# Patient Record
Sex: FEMALE | Race: BLACK OR AFRICAN AMERICAN | NOT HISPANIC OR LATINO
[De-identification: names, ages, dates, MRNs, and addresses within clinical notes are randomized per-mention and may not be internally consistent; named-entity substitution may affect disease eponyms.]

---

## 2017-01-12 ENCOUNTER — LABORATORY RESULT (OUTPATIENT)
Age: 37
End: 2017-01-12

## 2017-01-12 ENCOUNTER — APPOINTMENT (OUTPATIENT)
Dept: INTERNAL MEDICINE | Facility: HOSPITAL | Age: 37
End: 2017-01-12

## 2017-01-12 ENCOUNTER — INPATIENT (INPATIENT)
Facility: HOSPITAL | Age: 37
LOS: 3 days | Discharge: HOME | End: 2017-01-16
Attending: HOSPITALIST | Admitting: HOSPITALIST
Payer: COMMERCIAL

## 2017-01-12 ENCOUNTER — APPOINTMENT (OUTPATIENT)
Dept: RADIOLOGY | Facility: HOSPITAL | Age: 37
End: 2017-01-12

## 2017-01-12 ENCOUNTER — OUTPATIENT (OUTPATIENT)
Dept: OUTPATIENT SERVICES | Facility: HOSPITAL | Age: 37
LOS: 1 days | End: 2017-01-12
Payer: COMMERCIAL

## 2017-01-12 VITALS — SYSTOLIC BLOOD PRESSURE: 127 MMHG | DIASTOLIC BLOOD PRESSURE: 78 MMHG | HEART RATE: 115 BPM | OXYGEN SATURATION: 98 %

## 2017-01-12 VITALS
DIASTOLIC BLOOD PRESSURE: 77 MMHG | SYSTOLIC BLOOD PRESSURE: 121 MMHG | RESPIRATION RATE: 20 BRPM | TEMPERATURE: 98 F | OXYGEN SATURATION: 100 % | HEART RATE: 118 BPM

## 2017-01-12 VITALS — OXYGEN SATURATION: 99 %

## 2017-01-12 DIAGNOSIS — Z98.89 OTHER SPECIFIED POSTPROCEDURAL STATES: Chronic | ICD-10-CM

## 2017-01-12 DIAGNOSIS — Z98.51 TUBAL LIGATION STATUS: Chronic | ICD-10-CM

## 2017-01-12 LAB
ALBUMIN SERPL ELPH-MCNC: 1.4 G/DL — LOW (ref 3.3–5)
ALBUMIN SERPL ELPH-MCNC: 1.6 G/DL — LOW (ref 3.3–5)
ALP SERPL-CCNC: 69 U/L — SIGNIFICANT CHANGE UP (ref 40–120)
ALP SERPL-CCNC: 72 U/L — SIGNIFICANT CHANGE UP (ref 40–120)
ALT FLD-CCNC: 10 U/L — SIGNIFICANT CHANGE UP (ref 4–33)
ALT FLD-CCNC: 8 U/L — SIGNIFICANT CHANGE UP (ref 4–33)
ANISOCYTOSIS BLD QL: SIGNIFICANT CHANGE UP
AST SERPL-CCNC: 16 U/L — SIGNIFICANT CHANGE UP (ref 4–32)
AST SERPL-CCNC: 21 U/L — SIGNIFICANT CHANGE UP (ref 4–32)
BASE EXCESS BLDA CALC-SCNC: -2.3 MMOL/L — SIGNIFICANT CHANGE UP
BASOPHILS # BLD AUTO: 0.05 K/UL — SIGNIFICANT CHANGE UP (ref 0–0.2)
BASOPHILS NFR BLD AUTO: 0.2 % — SIGNIFICANT CHANGE UP (ref 0–2)
BASOPHILS NFR SPEC: 0.8 % — SIGNIFICANT CHANGE UP (ref 0–2)
BILIRUB SERPL-MCNC: 0.3 MG/DL — SIGNIFICANT CHANGE UP (ref 0.2–1.2)
BILIRUB SERPL-MCNC: 0.4 MG/DL — SIGNIFICANT CHANGE UP (ref 0.2–1.2)
BLASTS # FLD: 0 % — SIGNIFICANT CHANGE UP (ref 0–0)
BUN SERPL-MCNC: 54 MG/DL — HIGH (ref 7–23)
BUN SERPL-MCNC: 55 MG/DL — HIGH (ref 7–23)
CALCIUM SERPL-MCNC: 5.9 MG/DL — CRITICAL LOW (ref 8.4–10.5)
CALCIUM SERPL-MCNC: 6.1 MG/DL — CRITICAL LOW (ref 8.4–10.5)
CHLORIDE BLDA-SCNC: 108 MMOL/L — SIGNIFICANT CHANGE UP (ref 96–108)
CHLORIDE SERPL-SCNC: 104 MMOL/L — SIGNIFICANT CHANGE UP (ref 98–107)
CHLORIDE SERPL-SCNC: 104 MMOL/L — SIGNIFICANT CHANGE UP (ref 98–107)
CK MB BLD-MCNC: 1.72 NG/ML — SIGNIFICANT CHANGE UP (ref 1–4.7)
CK SERPL-CCNC: 99 U/L — SIGNIFICANT CHANGE UP (ref 25–170)
CO2 SERPL-SCNC: 20 MMOL/L — LOW (ref 22–31)
CO2 SERPL-SCNC: 21 MMOL/L — LOW (ref 22–31)
CREAT BLDA-MCNC: 7.93 MG/DL — HIGH (ref 0.5–1.3)
CREAT SERPL-MCNC: 7.38 MG/DL — HIGH (ref 0.5–1.3)
CREAT SERPL-MCNC: 7.39 MG/DL — HIGH (ref 0.5–1.3)
EOSINOPHIL # BLD AUTO: 0.3 K/UL — SIGNIFICANT CHANGE UP (ref 0–0.5)
EOSINOPHIL NFR BLD AUTO: 1.4 % — SIGNIFICANT CHANGE UP (ref 0–6)
EOSINOPHIL NFR FLD: 1.6 % — SIGNIFICANT CHANGE UP (ref 0–6)
GLUCOSE BLDA-MCNC: 99 MG/DL — SIGNIFICANT CHANGE UP (ref 70–99)
GLUCOSE SERPL-MCNC: 97 MG/DL — SIGNIFICANT CHANGE UP (ref 70–99)
GLUCOSE SERPL-MCNC: 98 MG/DL — SIGNIFICANT CHANGE UP (ref 70–99)
HCO3 BLDA-SCNC: 23 MMOL/L — SIGNIFICANT CHANGE UP (ref 22–26)
HCT VFR BLD CALC: 13.6 % — CRITICAL LOW (ref 34.5–45)
HCT VFR BLD CALC: 15.2 % — CRITICAL LOW (ref 34.5–45)
HCT VFR BLDA CALC: 13.5 % — CRITICAL LOW (ref 34.5–46.5)
HGB BLD-MCNC: 4.5 G/DL — CRITICAL LOW (ref 11.5–15.5)
HGB BLD-MCNC: 5.1 G/DL — CRITICAL LOW (ref 11.5–15.5)
HGB BLDA-MCNC: 4.2 G/DL — CRITICAL LOW (ref 11.5–15.5)
HYPOCHROMIA BLD QL: SIGNIFICANT CHANGE UP
IMM GRANULOCYTES NFR BLD AUTO: 0.6 % — SIGNIFICANT CHANGE UP (ref 0–1.5)
LACTATE BLDA-SCNC: 1.9 MMOL/L — SIGNIFICANT CHANGE UP (ref 0.5–2)
LYMPHOCYTES # BLD AUTO: 20.9 % — SIGNIFICANT CHANGE UP (ref 13–44)
LYMPHOCYTES # BLD AUTO: 4.54 K/UL — HIGH (ref 1–3.3)
LYMPHOCYTES NFR SPEC AUTO: 19.5 % — SIGNIFICANT CHANGE UP (ref 13–44)
MACROCYTES BLD QL: SLIGHT — SIGNIFICANT CHANGE UP
MCHC RBC-ENTMCNC: 27.6 PG — SIGNIFICANT CHANGE UP (ref 27–34)
MCHC RBC-ENTMCNC: 28 PG — SIGNIFICANT CHANGE UP (ref 27–34)
MCHC RBC-ENTMCNC: 33.1 % — SIGNIFICANT CHANGE UP (ref 32–36)
MCHC RBC-ENTMCNC: 33.6 % — SIGNIFICANT CHANGE UP (ref 32–36)
MCV RBC AUTO: 83.4 FL — SIGNIFICANT CHANGE UP (ref 80–100)
MCV RBC AUTO: 83.5 FL — SIGNIFICANT CHANGE UP (ref 80–100)
METAMYELOCYTES # FLD: 0.8 % — SIGNIFICANT CHANGE UP (ref 0–1)
MICROCYTES BLD QL: SLIGHT — SIGNIFICANT CHANGE UP
MONOCYTES # BLD AUTO: 1.87 K/UL — HIGH (ref 0–0.9)
MONOCYTES NFR BLD AUTO: 8.6 % — SIGNIFICANT CHANGE UP (ref 2–14)
MONOCYTES NFR BLD: 2.3 % — SIGNIFICANT CHANGE UP (ref 2–9)
MYELOCYTES NFR BLD: 0 % — SIGNIFICANT CHANGE UP (ref 0–0)
NEUTROPHIL AB SER-ACNC: 75 % — SIGNIFICANT CHANGE UP (ref 43–77)
NEUTROPHILS # BLD AUTO: 14.79 K/UL — HIGH (ref 1.8–7.4)
NEUTROPHILS NFR BLD AUTO: 68.3 % — SIGNIFICANT CHANGE UP (ref 43–77)
NEUTS BAND # BLD: 0 % — SIGNIFICANT CHANGE UP (ref 0–6)
OTHER - HEMATOLOGY %: 0 — SIGNIFICANT CHANGE UP
OVALOCYTES BLD QL SMEAR: SLIGHT — SIGNIFICANT CHANGE UP
PCO2 BLDA: 35 MMHG — SIGNIFICANT CHANGE UP (ref 32–48)
PH BLDA: 7.41 PH — SIGNIFICANT CHANGE UP (ref 7.35–7.45)
PLATELET # BLD AUTO: 338 K/UL — SIGNIFICANT CHANGE UP (ref 150–400)
PLATELET # BLD AUTO: 447 K/UL — HIGH (ref 150–400)
PLATELET COUNT - ESTIMATE: NORMAL — SIGNIFICANT CHANGE UP
PMV BLD: 10 FL — SIGNIFICANT CHANGE UP (ref 7–13)
PMV BLD: 10.4 FL — SIGNIFICANT CHANGE UP (ref 7–13)
PO2 BLDA: 111 MMHG — HIGH (ref 83–108)
POIKILOCYTOSIS BLD QL AUTO: SIGNIFICANT CHANGE UP
POLYCHROMASIA BLD QL SMEAR: SLIGHT — SIGNIFICANT CHANGE UP
POTASSIUM BLDA-SCNC: 4.1 MMOL/L — SIGNIFICANT CHANGE UP (ref 3.4–4.5)
POTASSIUM SERPL-MCNC: 4.5 MMOL/L — SIGNIFICANT CHANGE UP (ref 3.5–5.3)
POTASSIUM SERPL-MCNC: 4.5 MMOL/L — SIGNIFICANT CHANGE UP (ref 3.5–5.3)
POTASSIUM SERPL-SCNC: 4.5 MMOL/L — SIGNIFICANT CHANGE UP (ref 3.5–5.3)
POTASSIUM SERPL-SCNC: 4.5 MMOL/L — SIGNIFICANT CHANGE UP (ref 3.5–5.3)
PROMYELOCYTES # FLD: 0 % — SIGNIFICANT CHANGE UP (ref 0–0)
PROT SERPL-MCNC: 4.6 G/DL — LOW (ref 6–8.3)
PROT SERPL-MCNC: 4.9 G/DL — LOW (ref 6–8.3)
RBC # BLD: 1.63 M/UL — LOW (ref 3.8–5.2)
RBC # BLD: 1.82 M/UL — LOW (ref 3.8–5.2)
RBC # FLD: 20.4 % — HIGH (ref 10.3–14.5)
RBC # FLD: 20.5 % — HIGH (ref 10.3–14.5)
RETICS #: 240.3 10X3/UL — HIGH (ref 17–73)
RETICS/RBC NFR: 14.7 % — HIGH (ref 0.5–2.5)
SAO2 % BLDA: 99.6 % — HIGH (ref 95–99)
SICKLE CELLS BLD QL SMEAR: SLIGHT — SIGNIFICANT CHANGE UP
SODIUM BLDA-SCNC: 137 MMOL/L — SIGNIFICANT CHANGE UP (ref 136–146)
SODIUM SERPL-SCNC: 141 MMOL/L — SIGNIFICANT CHANGE UP (ref 135–145)
SODIUM SERPL-SCNC: 141 MMOL/L — SIGNIFICANT CHANGE UP (ref 135–145)
SPHEROCYTES BLD QL SMEAR: SLIGHT — SIGNIFICANT CHANGE UP
TARGETS BLD QL SMEAR: SLIGHT — SIGNIFICANT CHANGE UP
TROPONIN T SERPL-MCNC: < 0.06 NG/ML — SIGNIFICANT CHANGE UP (ref 0–0.06)
VARIANT LYMPHS # BLD: 0 % — SIGNIFICANT CHANGE UP
WBC # BLD: 19.91 K/UL — HIGH (ref 3.8–10.5)
WBC # BLD: 21.69 K/UL — HIGH (ref 3.8–10.5)
WBC # FLD AUTO: 19.91 K/UL — HIGH (ref 3.8–10.5)
WBC # FLD AUTO: 21.69 K/UL — HIGH (ref 3.8–10.5)

## 2017-01-12 PROCEDURE — 71020: CPT | Mod: 26

## 2017-01-12 RX ORDER — HYDROMORPHONE HYDROCHLORIDE 2 MG/ML
1 INJECTION INTRAMUSCULAR; INTRAVENOUS; SUBCUTANEOUS ONCE
Qty: 0 | Refills: 0 | Status: DISCONTINUED | OUTPATIENT
Start: 2017-01-12 | End: 2017-01-12

## 2017-01-12 RX ORDER — HYDROMORPHONE HYDROCHLORIDE 2 MG/ML
2 INJECTION INTRAMUSCULAR; INTRAVENOUS; SUBCUTANEOUS ONCE
Qty: 0 | Refills: 0 | Status: DISCONTINUED | OUTPATIENT
Start: 2017-01-12 | End: 2017-01-12

## 2017-01-12 RX ORDER — AZITHROMYCIN 500 MG/1
500 TABLET, FILM COATED ORAL ONCE
Qty: 0 | Refills: 0 | Status: COMPLETED | OUTPATIENT
Start: 2017-01-12 | End: 2017-01-12

## 2017-01-12 RX ORDER — CEFTRIAXONE 500 MG/1
1 INJECTION, POWDER, FOR SOLUTION INTRAMUSCULAR; INTRAVENOUS ONCE
Qty: 0 | Refills: 0 | Status: COMPLETED | OUTPATIENT
Start: 2017-01-12 | End: 2017-01-12

## 2017-01-12 RX ADMIN — HYDROMORPHONE HYDROCHLORIDE 2 MILLIGRAM(S): 2 INJECTION INTRAMUSCULAR; INTRAVENOUS; SUBCUTANEOUS at 21:58

## 2017-01-12 RX ADMIN — CEFTRIAXONE 100 GRAM(S): 500 INJECTION, POWDER, FOR SOLUTION INTRAMUSCULAR; INTRAVENOUS at 23:35

## 2017-01-12 RX ADMIN — HYDROMORPHONE HYDROCHLORIDE 2 MILLIGRAM(S): 2 INJECTION INTRAMUSCULAR; INTRAVENOUS; SUBCUTANEOUS at 22:42

## 2017-01-12 NOTE — ED ADULT NURSE REASSESSMENT NOTE - NS ED NURSE REASSESS COMMENT FT1
pt AOx3, reduced pain scale after taking pain medication. Cardiac monitor in place/sinus tachy. Will continue to monitor.

## 2017-01-12 NOTE — ED PROVIDER NOTE - MEDICAL DECISION MAKING DETAILS
36 F with sickle cell disease, p/w sob and chest pain  in setting of anemia and worsening renal failure, concerning for acute chest vs PE. will get labs trasnfusion, rectal temp, pain cotnrol, ct chest, MIcu consult

## 2017-01-12 NOTE — ED PROVIDER NOTE - ATTENDING CONTRIBUTION TO CARE
Attending note:   After face to face evaluation of this patient, I concur with above noted hx, pe, and care plan for this patient. +R pleuritic pain in this patient with ss, arf, fever, chills.   Evaluation in progress

## 2017-01-12 NOTE — ED PROVIDER NOTE - OBJECTIVE STATEMENT
36 F h/o sickle cell disease, no h/o acute chest, p/w sob and pleuritic chest pain 36 F h/o sickle cell disease, no h/o acute chest, also with kidney failure 2/2 parvovirus, p/w sob and pleuritic chest pain that began yesterday at rest. Also with chills at home. No cough/URi symptoms. No personal or family h/o clots. No joint /muscle pain. Pain feels similar to prior pna, but worse. Was sent by Dr Velásquez with hgb 5 and worsening renal failure. Chronic edema, but no worsening leg edema or calf pain. Sob is worse with exertion, not worse with laying flat.

## 2017-01-12 NOTE — ED ADULT NURSE NOTE - OBJECTIVE STATEMENT
Pt AO x3, ambulatory, c/o chest pain radiating sickle cell disease. Pt c/o chest pain ans sob. Pt states hemoglobin  was 5 and CR 7.8 Pt AO x3, ambulatory, c/o chest pain radiating s/p sickle cell disease for 2 days. Pt states hemoglobin  was 5 and CR 7.8. Evaluated by provider. Blood will be obtained by provider. cardiac monitor in place/tachy.  Awaiting further study.

## 2017-01-12 NOTE — ED ADULT NURSE NOTE - PMH
History of Cholecystectomy    HPV (Human Papillomavirus)    Nephrotic syndrome    Parvovirus B19 infection    Right Hip Pain- Surgery 1991    Sickle Cell Crisis

## 2017-01-13 DIAGNOSIS — D64.9 ANEMIA, UNSPECIFIED: ICD-10-CM

## 2017-01-13 DIAGNOSIS — D57.00 HB-SS DISEASE WITH CRISIS, UNSPECIFIED: ICD-10-CM

## 2017-01-13 DIAGNOSIS — N17.9 ACUTE KIDNEY FAILURE, UNSPECIFIED: ICD-10-CM

## 2017-01-13 DIAGNOSIS — A41.9 SEPSIS, UNSPECIFIED ORGANISM: ICD-10-CM

## 2017-01-13 DIAGNOSIS — E83.51 HYPOCALCEMIA: ICD-10-CM

## 2017-01-13 DIAGNOSIS — Z41.8 ENCOUNTER FOR OTHER PROCEDURES FOR PURPOSES OTHER THAN REMEDYING HEALTH STATE: ICD-10-CM

## 2017-01-13 DIAGNOSIS — N04.9 NEPHROTIC SYNDROME WITH UNSPECIFIED MORPHOLOGIC CHANGES: ICD-10-CM

## 2017-01-13 LAB
ALBUMIN SERPL ELPH-MCNC: 1.5 G/DL — LOW (ref 3.3–5)
ALP SERPL-CCNC: 75 U/L — SIGNIFICANT CHANGE UP (ref 40–120)
ALT FLD-CCNC: 8 U/L — SIGNIFICANT CHANGE UP (ref 4–33)
APTT BLD: 22.8 SEC — LOW (ref 27.5–37.4)
AST SERPL-CCNC: 16 U/L — SIGNIFICANT CHANGE UP (ref 4–32)
B PERT DNA SPEC QL NAA+PROBE: SIGNIFICANT CHANGE UP
BASOPHILS # BLD AUTO: 0.05 K/UL — SIGNIFICANT CHANGE UP (ref 0–0.2)
BASOPHILS NFR BLD AUTO: 0.3 % — SIGNIFICANT CHANGE UP (ref 0–2)
BILIRUB SERPL-MCNC: 0.4 MG/DL — SIGNIFICANT CHANGE UP (ref 0.2–1.2)
BLD GP AB SCN SERPL QL: NEGATIVE — SIGNIFICANT CHANGE UP
BUN SERPL-MCNC: 54 MG/DL — HIGH (ref 7–23)
C PNEUM DNA SPEC QL NAA+PROBE: NOT DETECTED — SIGNIFICANT CHANGE UP
CALCIUM SERPL-MCNC: 6.2 MG/DL — CRITICAL LOW (ref 8.4–10.5)
CHLORIDE SERPL-SCNC: 103 MMOL/L — SIGNIFICANT CHANGE UP (ref 98–107)
CHOLEST SERPL-MCNC: 263 MG/DL — HIGH (ref 120–199)
CK SERPL-CCNC: 82 U/L — SIGNIFICANT CHANGE UP (ref 25–170)
CO2 SERPL-SCNC: 21 MMOL/L — LOW (ref 22–31)
CREAT SERPL-MCNC: 7.31 MG/DL — HIGH (ref 0.5–1.3)
EOSINOPHIL # BLD AUTO: 0.23 K/UL — SIGNIFICANT CHANGE UP (ref 0–0.5)
EOSINOPHIL NFR BLD AUTO: 1.3 % — SIGNIFICANT CHANGE UP (ref 0–6)
FLUAV H1 2009 PAND RNA SPEC QL NAA+PROBE: NOT DETECTED — SIGNIFICANT CHANGE UP
FLUAV H1 RNA SPEC QL NAA+PROBE: NOT DETECTED — SIGNIFICANT CHANGE UP
FLUAV H3 RNA SPEC QL NAA+PROBE: NOT DETECTED — SIGNIFICANT CHANGE UP
FLUAV SUBTYP SPEC NAA+PROBE: SIGNIFICANT CHANGE UP
FLUBV RNA SPEC QL NAA+PROBE: NOT DETECTED — SIGNIFICANT CHANGE UP
GLUCOSE SERPL-MCNC: 103 MG/DL — HIGH (ref 70–99)
HADV DNA SPEC QL NAA+PROBE: NOT DETECTED — SIGNIFICANT CHANGE UP
HBA1C BLD-MCNC: 3.8 % — LOW (ref 4–5.6)
HCG SERPL-ACNC: < 5 MIU/ML — SIGNIFICANT CHANGE UP
HCOV 229E RNA SPEC QL NAA+PROBE: NOT DETECTED — SIGNIFICANT CHANGE UP
HCOV HKU1 RNA SPEC QL NAA+PROBE: NOT DETECTED — SIGNIFICANT CHANGE UP
HCOV NL63 RNA SPEC QL NAA+PROBE: NOT DETECTED — SIGNIFICANT CHANGE UP
HCOV OC43 RNA SPEC QL NAA+PROBE: NOT DETECTED — SIGNIFICANT CHANGE UP
HCT VFR BLD CALC: 17.1 % — CRITICAL LOW (ref 34.5–45)
HDLC SERPL-MCNC: 50 MG/DL — SIGNIFICANT CHANGE UP (ref 45–65)
HGB BLD-MCNC: 5.4 G/DL — CRITICAL LOW (ref 11.5–15.5)
HMPV RNA SPEC QL NAA+PROBE: NOT DETECTED — SIGNIFICANT CHANGE UP
HPIV1 RNA SPEC QL NAA+PROBE: NOT DETECTED — SIGNIFICANT CHANGE UP
HPIV2 RNA SPEC QL NAA+PROBE: NOT DETECTED — SIGNIFICANT CHANGE UP
HPIV3 RNA SPEC QL NAA+PROBE: NOT DETECTED — SIGNIFICANT CHANGE UP
HPIV4 RNA SPEC QL NAA+PROBE: NOT DETECTED — SIGNIFICANT CHANGE UP
IMM GRANULOCYTES NFR BLD AUTO: 0.4 % — SIGNIFICANT CHANGE UP (ref 0–1.5)
INR BLD: 1.04 — SIGNIFICANT CHANGE UP (ref 0.87–1.18)
LDH SERPL L TO P-CCNC: 419 U/L — HIGH (ref 135–225)
LIPID PNL WITH DIRECT LDL SERPL: 180 MG/DL — SIGNIFICANT CHANGE UP
LYMPHOCYTES # BLD AUTO: 18.9 % — SIGNIFICANT CHANGE UP (ref 13–44)
LYMPHOCYTES # BLD AUTO: 3.22 K/UL — SIGNIFICANT CHANGE UP (ref 1–3.3)
M PNEUMO DNA SPEC QL NAA+PROBE: NOT DETECTED — SIGNIFICANT CHANGE UP
MAGNESIUM SERPL-MCNC: 1.5 MG/DL — LOW (ref 1.6–2.6)
MAGNESIUM SERPL-MCNC: 1.7 MG/DL — SIGNIFICANT CHANGE UP (ref 1.6–2.6)
MANUAL SMEAR VERIFICATION: SIGNIFICANT CHANGE UP
MCHC RBC-ENTMCNC: 26.3 PG — LOW (ref 27–34)
MCHC RBC-ENTMCNC: 31.6 % — LOW (ref 32–36)
MCV RBC AUTO: 83.4 FL — SIGNIFICANT CHANGE UP (ref 80–100)
MONOCYTES # BLD AUTO: 1.43 K/UL — HIGH (ref 0–0.9)
MONOCYTES NFR BLD AUTO: 8.4 % — SIGNIFICANT CHANGE UP (ref 2–14)
NEUTROPHILS # BLD AUTO: 12.04 K/UL — HIGH (ref 1.8–7.4)
NEUTROPHILS NFR BLD AUTO: 70.7 % — SIGNIFICANT CHANGE UP (ref 43–77)
NT-PROBNP SERPL-SCNC: 3576 PG/ML — SIGNIFICANT CHANGE UP
NT-PROBNP SERPL-SCNC: 4280 PG/ML — SIGNIFICANT CHANGE UP
PHOSPHATE SERPL-MCNC: 6.2 MG/DL — HIGH (ref 2.5–4.5)
PHOSPHATE SERPL-MCNC: 6.7 MG/DL — HIGH (ref 2.5–4.5)
PLATELET # BLD AUTO: 430 K/UL — HIGH (ref 150–400)
PMV BLD: 10.5 FL — SIGNIFICANT CHANGE UP (ref 7–13)
POTASSIUM SERPL-MCNC: 4.3 MMOL/L — SIGNIFICANT CHANGE UP (ref 3.5–5.3)
POTASSIUM SERPL-SCNC: 4.3 MMOL/L — SIGNIFICANT CHANGE UP (ref 3.5–5.3)
PROT SERPL-MCNC: 5 G/DL — LOW (ref 6–8.3)
PROTHROM AB SERPL-ACNC: 11.9 SEC — SIGNIFICANT CHANGE UP (ref 10–13.1)
RBC # BLD: 2.05 M/UL — LOW (ref 3.8–5.2)
RBC # FLD: 20.7 % — HIGH (ref 10.3–14.5)
RETICS #: 197 10X3/UL — HIGH (ref 17–73)
RETICS/RBC NFR: 9.6 % — HIGH (ref 0.5–2.5)
RH IG SCN BLD-IMP: NEGATIVE — SIGNIFICANT CHANGE UP
RSV RNA SPEC QL NAA+PROBE: NOT DETECTED — SIGNIFICANT CHANGE UP
RV+EV RNA SPEC QL NAA+PROBE: NOT DETECTED — SIGNIFICANT CHANGE UP
SODIUM SERPL-SCNC: 141 MMOL/L — SIGNIFICANT CHANGE UP (ref 135–145)
SPECIMEN SOURCE: SIGNIFICANT CHANGE UP
SPECIMEN SOURCE: SIGNIFICANT CHANGE UP
TRIGL SERPL-MCNC: 280 MG/DL — HIGH (ref 10–149)
TROPONIN T SERPL-MCNC: < 0.06 NG/ML — SIGNIFICANT CHANGE UP (ref 0–0.06)
TSH SERPL-MCNC: 4.51 UIU/ML — HIGH (ref 0.27–4.2)
WBC # BLD: 17.04 K/UL — HIGH (ref 3.8–10.5)
WBC # FLD AUTO: 17.04 K/UL — HIGH (ref 3.8–10.5)

## 2017-01-13 PROCEDURE — 99233 SBSQ HOSP IP/OBS HIGH 50: CPT | Mod: GC

## 2017-01-13 PROCEDURE — 99223 1ST HOSP IP/OBS HIGH 75: CPT | Mod: GC

## 2017-01-13 PROCEDURE — 93970 EXTREMITY STUDY: CPT | Mod: 26

## 2017-01-13 PROCEDURE — 99254 IP/OBS CNSLTJ NEW/EST MOD 60: CPT | Mod: GC

## 2017-01-13 PROCEDURE — 77001 FLUOROGUIDE FOR VEIN DEVICE: CPT | Mod: 26,GC

## 2017-01-13 PROCEDURE — 71250 CT THORAX DX C-: CPT | Mod: 26

## 2017-01-13 PROCEDURE — 76937 US GUIDE VASCULAR ACCESS: CPT | Mod: 26

## 2017-01-13 PROCEDURE — 36569 INSJ PICC 5 YR+ W/O IMAGING: CPT

## 2017-01-13 RX ORDER — AZITHROMYCIN 500 MG/1
500 TABLET, FILM COATED ORAL EVERY 24 HOURS
Qty: 0 | Refills: 0 | Status: DISCONTINUED | OUTPATIENT
Start: 2017-01-14 | End: 2017-01-16

## 2017-01-13 RX ORDER — CALCIUM ACETATE 667 MG
1334 TABLET ORAL
Qty: 0 | Refills: 0 | Status: DISCONTINUED | OUTPATIENT
Start: 2017-01-13 | End: 2017-01-15

## 2017-01-13 RX ORDER — DIPHENHYDRAMINE HCL 50 MG
25 CAPSULE ORAL ONCE
Qty: 0 | Refills: 0 | Status: COMPLETED | OUTPATIENT
Start: 2017-01-13 | End: 2017-01-13

## 2017-01-13 RX ORDER — HYDROMORPHONE HYDROCHLORIDE 2 MG/ML
8 INJECTION INTRAMUSCULAR; INTRAVENOUS; SUBCUTANEOUS EVERY 4 HOURS
Qty: 0 | Refills: 0 | Status: DISCONTINUED | OUTPATIENT
Start: 2017-01-13 | End: 2017-01-15

## 2017-01-13 RX ORDER — FUROSEMIDE 40 MG
40 TABLET ORAL ONCE
Qty: 0 | Refills: 0 | Status: DISCONTINUED | OUTPATIENT
Start: 2017-01-13 | End: 2017-01-13

## 2017-01-13 RX ORDER — SODIUM CHLORIDE 9 MG/ML
1000 INJECTION, SOLUTION INTRAVENOUS
Qty: 0 | Refills: 0 | Status: DISCONTINUED | OUTPATIENT
Start: 2017-01-13 | End: 2017-01-15

## 2017-01-13 RX ORDER — HYDROMORPHONE HYDROCHLORIDE 2 MG/ML
2 INJECTION INTRAMUSCULAR; INTRAVENOUS; SUBCUTANEOUS EVERY 4 HOURS
Qty: 0 | Refills: 0 | Status: DISCONTINUED | OUTPATIENT
Start: 2017-01-13 | End: 2017-01-13

## 2017-01-13 RX ORDER — FUROSEMIDE 40 MG
40 TABLET ORAL ONCE
Qty: 0 | Refills: 0 | Status: COMPLETED | OUTPATIENT
Start: 2017-01-13 | End: 2017-01-14

## 2017-01-13 RX ORDER — ERYTHROPOIETIN 10000 [IU]/ML
10000 INJECTION, SOLUTION INTRAVENOUS; SUBCUTANEOUS
Qty: 0 | Refills: 0 | Status: DISCONTINUED | OUTPATIENT
Start: 2017-01-13 | End: 2017-01-13

## 2017-01-13 RX ORDER — DOCUSATE SODIUM 100 MG
100 CAPSULE ORAL THREE TIMES A DAY
Qty: 0 | Refills: 0 | Status: DISCONTINUED | OUTPATIENT
Start: 2017-01-13 | End: 2017-01-16

## 2017-01-13 RX ORDER — HYDROMORPHONE HYDROCHLORIDE 2 MG/ML
30 INJECTION INTRAMUSCULAR; INTRAVENOUS; SUBCUTANEOUS
Qty: 0 | Refills: 0 | Status: DISCONTINUED | OUTPATIENT
Start: 2017-01-13 | End: 2017-01-13

## 2017-01-13 RX ORDER — CEFTRIAXONE 500 MG/1
1 INJECTION, POWDER, FOR SOLUTION INTRAMUSCULAR; INTRAVENOUS EVERY 24 HOURS
Qty: 0 | Refills: 0 | Status: DISCONTINUED | OUTPATIENT
Start: 2017-01-13 | End: 2017-01-16

## 2017-01-13 RX ORDER — MAGNESIUM SULFATE 500 MG/ML
2 VIAL (ML) INJECTION ONCE
Qty: 0 | Refills: 0 | Status: COMPLETED | OUTPATIENT
Start: 2017-01-13 | End: 2017-01-13

## 2017-01-13 RX ORDER — LACTOBACILLUS ACIDOPHILUS 100MM CELL
1 CAPSULE ORAL EVERY 12 HOURS
Qty: 0 | Refills: 0 | Status: DISCONTINUED | OUTPATIENT
Start: 2017-01-13 | End: 2017-01-16

## 2017-01-13 RX ORDER — HEPARIN SODIUM 5000 [USP'U]/ML
5000 INJECTION INTRAVENOUS; SUBCUTANEOUS EVERY 8 HOURS
Qty: 0 | Refills: 0 | Status: DISCONTINUED | OUTPATIENT
Start: 2017-01-13 | End: 2017-01-16

## 2017-01-13 RX ORDER — FOLIC ACID 0.8 MG
1 TABLET ORAL DAILY
Qty: 0 | Refills: 0 | Status: DISCONTINUED | OUTPATIENT
Start: 2017-01-13 | End: 2017-01-16

## 2017-01-13 RX ORDER — ONDANSETRON 8 MG/1
4 TABLET, FILM COATED ORAL EVERY 6 HOURS
Qty: 0 | Refills: 0 | Status: DISCONTINUED | OUTPATIENT
Start: 2017-01-13 | End: 2017-01-13

## 2017-01-13 RX ORDER — ACETAMINOPHEN 500 MG
650 TABLET ORAL EVERY 8 HOURS
Qty: 0 | Refills: 0 | Status: DISCONTINUED | OUTPATIENT
Start: 2017-01-13 | End: 2017-01-16

## 2017-01-13 RX ORDER — NALOXONE HYDROCHLORIDE 4 MG/.1ML
0.1 SPRAY NASAL
Qty: 0 | Refills: 0 | Status: DISCONTINUED | OUTPATIENT
Start: 2017-01-13 | End: 2017-01-16

## 2017-01-13 RX ORDER — SODIUM BICARBONATE 1 MEQ/ML
650 SYRINGE (ML) INTRAVENOUS
Qty: 0 | Refills: 0 | Status: DISCONTINUED | OUTPATIENT
Start: 2017-01-13 | End: 2017-01-16

## 2017-01-13 RX ORDER — HYDROMORPHONE HYDROCHLORIDE 2 MG/ML
2 INJECTION INTRAMUSCULAR; INTRAVENOUS; SUBCUTANEOUS ONCE
Qty: 0 | Refills: 0 | Status: DISCONTINUED | OUTPATIENT
Start: 2017-01-13 | End: 2017-01-13

## 2017-01-13 RX ORDER — SENNA PLUS 8.6 MG/1
2 TABLET ORAL AT BEDTIME
Qty: 0 | Refills: 0 | Status: DISCONTINUED | OUTPATIENT
Start: 2017-01-13 | End: 2017-01-16

## 2017-01-13 RX ORDER — FUROSEMIDE 40 MG
80 TABLET ORAL ONCE
Qty: 0 | Refills: 0 | Status: COMPLETED | OUTPATIENT
Start: 2017-01-13 | End: 2017-01-13

## 2017-01-13 RX ADMIN — HYDROMORPHONE HYDROCHLORIDE 8 MILLIGRAM(S): 2 INJECTION INTRAMUSCULAR; INTRAVENOUS; SUBCUTANEOUS at 14:04

## 2017-01-13 RX ADMIN — Medication 25 MILLIGRAM(S): at 03:39

## 2017-01-13 RX ADMIN — Medication 100 MILLIGRAM(S): at 08:31

## 2017-01-13 RX ADMIN — Medication 25 MILLIGRAM(S): at 21:30

## 2017-01-13 RX ADMIN — HYDROMORPHONE HYDROCHLORIDE 8 MILLIGRAM(S): 2 INJECTION INTRAMUSCULAR; INTRAVENOUS; SUBCUTANEOUS at 14:34

## 2017-01-13 RX ADMIN — HEPARIN SODIUM 5000 UNIT(S): 5000 INJECTION INTRAVENOUS; SUBCUTANEOUS at 22:42

## 2017-01-13 RX ADMIN — HYDROMORPHONE HYDROCHLORIDE 2 MILLIGRAM(S): 2 INJECTION INTRAMUSCULAR; INTRAVENOUS; SUBCUTANEOUS at 10:15

## 2017-01-13 RX ADMIN — Medication 1334 MILLIGRAM(S): at 18:39

## 2017-01-13 RX ADMIN — Medication 650 MILLIGRAM(S): at 08:32

## 2017-01-13 RX ADMIN — SODIUM CHLORIDE 75 MILLILITER(S): 9 INJECTION, SOLUTION INTRAVENOUS at 18:48

## 2017-01-13 RX ADMIN — HEPARIN SODIUM 5000 UNIT(S): 5000 INJECTION INTRAVENOUS; SUBCUTANEOUS at 08:31

## 2017-01-13 RX ADMIN — Medication 1 TABLET(S): at 18:39

## 2017-01-13 RX ADMIN — Medication 80 MILLIGRAM(S): at 06:30

## 2017-01-13 RX ADMIN — HYDROMORPHONE HYDROCHLORIDE 2 MILLIGRAM(S): 2 INJECTION INTRAMUSCULAR; INTRAVENOUS; SUBCUTANEOUS at 10:01

## 2017-01-13 RX ADMIN — AZITHROMYCIN 250 MILLIGRAM(S): 500 TABLET, FILM COATED ORAL at 00:03

## 2017-01-13 RX ADMIN — HYDROMORPHONE HYDROCHLORIDE 8 MILLIGRAM(S): 2 INJECTION INTRAMUSCULAR; INTRAVENOUS; SUBCUTANEOUS at 19:40

## 2017-01-13 RX ADMIN — Medication 1 TABLET(S): at 08:31

## 2017-01-13 RX ADMIN — HYDROMORPHONE HYDROCHLORIDE 8 MILLIGRAM(S): 2 INJECTION INTRAMUSCULAR; INTRAVENOUS; SUBCUTANEOUS at 18:43

## 2017-01-13 RX ADMIN — Medication 650 MILLIGRAM(S): at 22:42

## 2017-01-13 RX ADMIN — Medication 1 MILLIGRAM(S): at 12:57

## 2017-01-13 NOTE — H&P ADULT. - NEUROLOGICAL DETAILS
responds to pain/alert and oriented x 3 responds to verbal commands/alert and oriented x 3/responds to pain

## 2017-01-13 NOTE — H&P ADULT. - RS GEN PE MLT RESP DETAILS PC
rales/airway patent/breath sounds equal/respirations non-labored/good air movement rales/respirations non-labored

## 2017-01-13 NOTE — H&P ADULT. - ATTENDING COMMENTS
35 y/o female HX of Sickle cell Disease, Nephrotic Syndrome due to Parvovirus , CKD stage 5, still making urine, NO HX of ACS, + chronic Legs edema, Pt was admitted with SOB, Pleuritic CP, x 2 days, + fever, + chills, on PO Lasix at home, NO HA, NO Dizziness, no N/V, No Cough, + Back pain, + MOIZ on CKD, pt was rejected by MICU, pt was seen by Renal House gracy, BUN 54, Creatinine 7.38, Hgb 5.4, WBC 19.9, Retic 14.7% , pt is getting 2 units of PRBC,     Venous Doppler Legs R/O DVT, Hem Onc Consult, Renal Consult F/U, Bacid, + Sepsis, R/O Pneumonia, IV Ceftriaxone, IV Zithromax, DVT Prophylaxis: SQ Heparin, Tele monitor, ECHO, incentive Spirometry, Folic Acid, Lasix 80 mg IVP X 1 after first unit of PRBC,  Fall/aspiration precaution, F/U CBC, CMP, cultures, UA, Retic count, LDH, RVP, BNP, Hep A,B,C   profile, F/U CT Chest Report, Pain control, IV Dilaudid PRN, Senna, Colace, Sodium Bicarb    Case D/W Pt, HS, Nursing     Pt was seen & Examined by me, Dr. DYLAN Griffin on 1/13/2017.

## 2017-01-13 NOTE — H&P ADULT. - PROBLEM SELECTOR PLAN 2
- Pt in sickle crisis that may have been triggered by PNA.   - CT chest results pending. CXR could be ACS.   - Pain control with PCA pump for now.   - monitor RBCs and transfuse as needed. - Pt in sickle crisis that may have been triggered by PNA.   - CT chest results pending. CXR could be ACS.   - Pain control with IV Dilaudid for now consider PCA pump if not controlled.   - monitor RBCs and transfuse as needed.

## 2017-01-13 NOTE — H&P ADULT. - ASSESSMENT
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 currently admitted for PNA, which concern for some mild ACS

## 2017-01-13 NOTE — H&P ADULT. - PSH
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

## 2017-01-13 NOTE — H&P ADULT. - PROBLEM SELECTOR PLAN 5
- Pt is fluid overloaded with a low alb.   - currently not on full AC ever.  - renal c/s placed. consider started AC for low protein.

## 2017-01-13 NOTE — ED ADULT NURSE REASSESSMENT NOTE - NS ED NURSE REASSESS COMMENT FT1
Dr. Tyson now covering Pt Nic contacted.  Dr Tyson was aware of the situation and has DC'd the 2nd unit of blood.  Dr. Tyson advises she will A-stick PT Nic for 6:00am labs.  Dr. Tyson advises she will contact RUTH to obtain IV access via ultrasound.  Dr. Tyson advises she will call the ED to follow up.  Day shift RN notified of situation.

## 2017-01-13 NOTE — H&P ADULT. - RADIOLOGY RESULTS AND INTERPRETATION
CXR. right opacity. Chest X Ray: IMPRESSION:    Mild  pulmonary  vascular  congestion.    Bilateral  trace  to  small  pleural  effusions  with  likely  associated  passive  atelectasis.    Linear  atelectasis  in  the  lower  lungs.

## 2017-01-13 NOTE — H&P ADULT. - PROBLEM SELECTOR PLAN 4
- MOIZ on CKD 2/2 FSGS c hemodynamically mediated in the setting of sepsis.   - monitor Cr as infection begins to resolve.   - avoid nephrotoxins. - MOIZ on CKD 2/2 FSGS c hemodynamically mediated in the setting of sepsis.   - monitor Cr as infection begins to resolve.   - avoid nephrotoxins.  - renal c/s placed.

## 2017-01-13 NOTE — ED ADULT NURSE REASSESSMENT NOTE - NS ED NURSE REASSESS COMMENT FT1
Pt Iv became infiltrated at start of magnesium sulfate drip.  IV removed.  Pt refused to allow RN staff to attempt to gain IV access d/t hx of difficulty obtainiong access.  Pt reports it took 7 attempts by MD with ultrasound machine to obtain access yesterday.  MD Villalta notified.  MD Villalta advises to contact RUTH to obtain access.  Situation escalated to ANM Randolph.

## 2017-01-13 NOTE — H&P ADULT. - FAMILY HISTORY
<<-----Click on this checkbox to enter Family History Family history of sickle cell trait in father     Family history of sickle cell trait in mother     Sibling  Still living? Yes, Estimated age: Age Unknown  Family history of sickle cell disease, Age at diagnosis: Age Unknown  Family history of sarcoidosis, Age at diagnosis: Age Unknown

## 2017-01-13 NOTE — H&P ADULT. - PROBLEM SELECTOR PLAN 6
- 2/2 renal disease.   - correct for albumin. - 2/2 renal disease.   - corrected for albumin seems to be close to baseline.   - c/w calcium supplementation

## 2017-01-13 NOTE — H&P ADULT. - HISTORY OF PRESENT ILLNESS
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 She states that yesterday suddenly she started feeling chest pain in the center of her chest. The pain was about an 8/10. She went to see her PCP who told her to come into the ED. She could not think of any factors that may have caused this. She denies dehydration, cough, sputum production, recent illness, sick contacts, recent travel, or hemoptysis.  She does admit to some orthopnea and lower ext edema which have been chronic for her. Currently she states that her chest pain is improved, but she is still having SOB and is having pain when she takes deep breaths.   In the ED the patients vitals were: 100.2, 113, 118/57, 24, 100% 2L NC. She was given ceftriaxone and azithromycin as well as IV dilaudid.

## 2017-01-13 NOTE — H&P ADULT. - PROBLEM SELECTOR PLAN 3
- Multifactorial likely 2/2 chronic blood loss and chronic disease anemia.   - getting 2 units of blood with lasix in between.   - As per renal can give 2.5 of Metolazone if pt does not respond.

## 2017-01-13 NOTE — H&P ADULT. - PROBLEM SELECTOR PLAN 1
- Pt with leukocytsis, increased RR, and sings of PNA  - Started on tx for CAP.   - Fluids could not be given due to pts already fluid overloaded state.   - F/u blood cultures. sputum cx, legionella, and CT chest read.

## 2017-01-13 NOTE — H&P ADULT. - CONSTITUTIONAL DETAILS
well-groomed/well-nourished/well-developed well-developed/distress due to pain/well-nourished/well-groomed

## 2017-01-13 NOTE — PHYSICAL THERAPY INITIAL EVALUATION ADULT - PERTINENT HX OF CURRENT PROBLEM, REHAB EVAL
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

## 2017-01-13 NOTE — PHYSICAL THERAPY INITIAL EVALUATION ADULT - ADDITIONAL COMMENTS
Patient lives with family in a home with NAIMA and within; patient did not use an AD prior to admission

## 2017-01-14 ENCOUNTER — TRANSCRIPTION ENCOUNTER (OUTPATIENT)
Age: 37
End: 2017-01-14

## 2017-01-14 LAB
BASOPHILS # BLD AUTO: 0.02 K/UL — SIGNIFICANT CHANGE UP (ref 0–0.2)
BASOPHILS NFR BLD AUTO: 0.2 % — SIGNIFICANT CHANGE UP (ref 0–2)
BUN SERPL-MCNC: 58 MG/DL — HIGH (ref 7–23)
CALCIUM SERPL-MCNC: 6 MG/DL — CRITICAL LOW (ref 8.4–10.5)
CHLORIDE SERPL-SCNC: 105 MMOL/L — SIGNIFICANT CHANGE UP (ref 98–107)
CK SERPL-CCNC: 46 U/L — SIGNIFICANT CHANGE UP (ref 25–170)
CO2 SERPL-SCNC: 21 MMOL/L — LOW (ref 22–31)
CREAT SERPL-MCNC: 7.28 MG/DL — HIGH (ref 0.5–1.3)
EOSINOPHIL # BLD AUTO: 0.53 K/UL — HIGH (ref 0–0.5)
EOSINOPHIL NFR BLD AUTO: 4.2 % — SIGNIFICANT CHANGE UP (ref 0–6)
GLUCOSE SERPL-MCNC: 89 MG/DL — SIGNIFICANT CHANGE UP (ref 70–99)
HAV IGM SER-ACNC: SIGNIFICANT CHANGE UP
HBV CORE IGM SER-ACNC: NONREACTIVE — SIGNIFICANT CHANGE UP
HBV SURFACE AG SER-ACNC: NONREACTIVE — SIGNIFICANT CHANGE UP
HCT VFR BLD CALC: 19 % — CRITICAL LOW (ref 34.5–45)
HCV AB S/CO SERPL IA: 0.11 S/CO — SIGNIFICANT CHANGE UP
HCV AB SERPL-IMP: SIGNIFICANT CHANGE UP
HGB BLD-MCNC: 6.4 G/DL — CRITICAL LOW (ref 11.5–15.5)
IMM GRANULOCYTES NFR BLD AUTO: 0.5 % — SIGNIFICANT CHANGE UP (ref 0–1.5)
LDH SERPL L TO P-CCNC: 324 U/L — HIGH (ref 135–225)
LYMPHOCYTES # BLD AUTO: 23.9 % — SIGNIFICANT CHANGE UP (ref 13–44)
LYMPHOCYTES # BLD AUTO: 3 K/UL — SIGNIFICANT CHANGE UP (ref 1–3.3)
MAGNESIUM SERPL-MCNC: 1.6 MG/DL — SIGNIFICANT CHANGE UP (ref 1.6–2.6)
MCHC RBC-ENTMCNC: 27.9 PG — SIGNIFICANT CHANGE UP (ref 27–34)
MCHC RBC-ENTMCNC: 33.7 % — SIGNIFICANT CHANGE UP (ref 32–36)
MCV RBC AUTO: 83 FL — SIGNIFICANT CHANGE UP (ref 80–100)
MONOCYTES # BLD AUTO: 1.28 K/UL — HIGH (ref 0–0.9)
MONOCYTES NFR BLD AUTO: 10.2 % — SIGNIFICANT CHANGE UP (ref 2–14)
NEUTROPHILS # BLD AUTO: 7.65 K/UL — HIGH (ref 1.8–7.4)
NEUTROPHILS NFR BLD AUTO: 61 % — SIGNIFICANT CHANGE UP (ref 43–77)
PHOSPHATE SERPL-MCNC: 7.5 MG/DL — HIGH (ref 2.5–4.5)
PLATELET # BLD AUTO: 337 K/UL — SIGNIFICANT CHANGE UP (ref 150–400)
PMV BLD: 9.9 FL — SIGNIFICANT CHANGE UP (ref 7–13)
POTASSIUM SERPL-MCNC: 4 MMOL/L — SIGNIFICANT CHANGE UP (ref 3.5–5.3)
POTASSIUM SERPL-SCNC: 4 MMOL/L — SIGNIFICANT CHANGE UP (ref 3.5–5.3)
RBC # BLD: 2.29 M/UL — LOW (ref 3.8–5.2)
RBC # FLD: 18.8 % — HIGH (ref 10.3–14.5)
RETICS #: 177.5 10X3/UL — HIGH (ref 17–73)
RETICS/RBC NFR: 7.8 % — HIGH (ref 0.5–2.5)
SODIUM SERPL-SCNC: 140 MMOL/L — SIGNIFICANT CHANGE UP (ref 135–145)
TROPONIN T SERPL-MCNC: < 0.06 NG/ML — SIGNIFICANT CHANGE UP (ref 0–0.06)
WBC # BLD: 12.54 K/UL — HIGH (ref 3.8–10.5)
WBC # FLD AUTO: 12.54 K/UL — HIGH (ref 3.8–10.5)

## 2017-01-14 PROCEDURE — 99233 SBSQ HOSP IP/OBS HIGH 50: CPT

## 2017-01-14 PROCEDURE — 99232 SBSQ HOSP IP/OBS MODERATE 35: CPT | Mod: GC

## 2017-01-14 RX ORDER — DIPHENHYDRAMINE HCL 50 MG
25 CAPSULE ORAL ONCE
Qty: 0 | Refills: 0 | Status: COMPLETED | OUTPATIENT
Start: 2017-01-14 | End: 2017-01-14

## 2017-01-14 RX ORDER — ERYTHROPOIETIN 10000 [IU]/ML
20000 INJECTION, SOLUTION INTRAVENOUS; SUBCUTANEOUS ONCE
Qty: 0 | Refills: 0 | Status: COMPLETED | OUTPATIENT
Start: 2017-01-14 | End: 2017-01-14

## 2017-01-14 RX ORDER — FUROSEMIDE 40 MG
40 TABLET ORAL ONCE
Qty: 0 | Refills: 0 | Status: COMPLETED | OUTPATIENT
Start: 2017-01-14 | End: 2017-01-14

## 2017-01-14 RX ORDER — CALCIUM ACETATE 667 MG
667 TABLET ORAL
Qty: 0 | Refills: 0 | Status: DISCONTINUED | OUTPATIENT
Start: 2017-01-14 | End: 2017-01-14

## 2017-01-14 RX ADMIN — Medication 1334 MILLIGRAM(S): at 17:47

## 2017-01-14 RX ADMIN — Medication 1 TABLET(S): at 17:46

## 2017-01-14 RX ADMIN — ERYTHROPOIETIN 20000 UNIT(S): 10000 INJECTION, SOLUTION INTRAVENOUS; SUBCUTANEOUS at 23:25

## 2017-01-14 RX ADMIN — Medication 100 MILLIGRAM(S): at 14:51

## 2017-01-14 RX ADMIN — HYDROMORPHONE HYDROCHLORIDE 8 MILLIGRAM(S): 2 INJECTION INTRAMUSCULAR; INTRAVENOUS; SUBCUTANEOUS at 18:10

## 2017-01-14 RX ADMIN — Medication 25 MILLIGRAM(S): at 03:10

## 2017-01-14 RX ADMIN — SODIUM CHLORIDE 75 MILLILITER(S): 9 INJECTION, SOLUTION INTRAVENOUS at 06:47

## 2017-01-14 RX ADMIN — Medication 1334 MILLIGRAM(S): at 08:06

## 2017-01-14 RX ADMIN — CEFTRIAXONE 100 GRAM(S): 500 INJECTION, POWDER, FOR SOLUTION INTRAMUSCULAR; INTRAVENOUS at 01:05

## 2017-01-14 RX ADMIN — HYDROMORPHONE HYDROCHLORIDE 8 MILLIGRAM(S): 2 INJECTION INTRAMUSCULAR; INTRAVENOUS; SUBCUTANEOUS at 01:48

## 2017-01-14 RX ADMIN — Medication 40 MILLIGRAM(S): at 18:00

## 2017-01-14 RX ADMIN — AZITHROMYCIN 250 MILLIGRAM(S): 500 TABLET, FILM COATED ORAL at 01:05

## 2017-01-14 RX ADMIN — HEPARIN SODIUM 5000 UNIT(S): 5000 INJECTION INTRAVENOUS; SUBCUTANEOUS at 17:47

## 2017-01-14 RX ADMIN — HYDROMORPHONE HYDROCHLORIDE 8 MILLIGRAM(S): 2 INJECTION INTRAMUSCULAR; INTRAVENOUS; SUBCUTANEOUS at 01:18

## 2017-01-14 RX ADMIN — HYDROMORPHONE HYDROCHLORIDE 8 MILLIGRAM(S): 2 INJECTION INTRAMUSCULAR; INTRAVENOUS; SUBCUTANEOUS at 19:11

## 2017-01-14 RX ADMIN — Medication 1 TABLET(S): at 05:47

## 2017-01-14 RX ADMIN — Medication 650 MILLIGRAM(S): at 17:46

## 2017-01-14 RX ADMIN — HYDROMORPHONE HYDROCHLORIDE 8 MILLIGRAM(S): 2 INJECTION INTRAMUSCULAR; INTRAVENOUS; SUBCUTANEOUS at 10:29

## 2017-01-14 RX ADMIN — CEFTRIAXONE 100 GRAM(S): 500 INJECTION, POWDER, FOR SOLUTION INTRAMUSCULAR; INTRAVENOUS at 22:46

## 2017-01-14 RX ADMIN — Medication 1 MILLIGRAM(S): at 14:51

## 2017-01-14 RX ADMIN — HYDROMORPHONE HYDROCHLORIDE 8 MILLIGRAM(S): 2 INJECTION INTRAMUSCULAR; INTRAVENOUS; SUBCUTANEOUS at 10:14

## 2017-01-14 RX ADMIN — HEPARIN SODIUM 5000 UNIT(S): 5000 INJECTION INTRAVENOUS; SUBCUTANEOUS at 08:06

## 2017-01-14 RX ADMIN — HEPARIN SODIUM 5000 UNIT(S): 5000 INJECTION INTRAVENOUS; SUBCUTANEOUS at 23:27

## 2017-01-14 RX ADMIN — Medication 40 MILLIGRAM(S): at 03:10

## 2017-01-14 RX ADMIN — Medication 25 MILLIGRAM(S): at 14:41

## 2017-01-14 RX ADMIN — SODIUM CHLORIDE 75 MILLILITER(S): 9 INJECTION, SOLUTION INTRAVENOUS at 19:11

## 2017-01-14 RX ADMIN — Medication 650 MILLIGRAM(S): at 08:06

## 2017-01-14 NOTE — DISCHARGE NOTE ADULT - PATIENT PORTAL LINK FT
“You can access the Follow[employer] Patient Portal, offered by [facility], by registering with the following website: [email or web address]”

## 2017-01-14 NOTE — DISCHARGE NOTE ADULT - MEDICATION SUMMARY - MEDICATIONS TO TAKE
I will START or STAY ON the medications listed below when I get home from the hospital:    Tylenol with Codeine #3 oral tablet  -- 1 tab(s) by mouth every 6 hours, As Needed  -- Indication: For pain    sodium bicarbonate 650 mg oral tablet  -- 1 tab(s) by mouth 2 times a day  -- Indication: For CKD    Procrit 10,000 units/mL injectable solution  -- 1 milliliter(s) injectable 3 times a week  -- Indication: For CKD    senna oral tablet  -- 2 tab(s) by mouth once a day (at bedtime)  -- Indication: For Bowel regimen     docusate sodium 100 mg oral capsule  -- 1 cap(s) by mouth 3 times a day  -- Indication: For bowel regimen     calcium acetate 667 mg oral tablet  -- 2 tab(s) by mouth 3 times a day  -- Indication: For Nephrotic syndrome    folic acid 1 mg oral tablet  -- 1 tab(s) by mouth once a day  -- Indication: For Hb-SS disease with crisis

## 2017-01-14 NOTE — DISCHARGE NOTE ADULT - CARE PROVIDER_API CALL
Saniya Velásquez), Internal Medicine  [address] [facility] [place] [address]  Phone: [phone]  Fax: [phone] Saniya Velásquez), Internal Medicine  [address] [place] [address]  Phone: [phone]  Fax: [phone]    Donovan Hampton), Nephrology  [address] [place] [address]  Phone: [phone]  Fax: [phone]

## 2017-01-14 NOTE — DISCHARGE NOTE ADULT - MEDICATION SUMMARY - MEDICATIONS TO STOP TAKING
I will STOP taking the medications listed below when I get home from the hospital:    potassium chloride 10 mEq oral tablet, extended release  -- 2 tab(s) by mouth 2 times a day    Lasix 40 mg oral tablet  -- 1.5 tab(s) by mouth 2 times a day

## 2017-01-14 NOTE — DISCHARGE NOTE ADULT - HOSPITAL COURSE
36F h/o sickle cell, CKD V (not on HD at home) due to nephrotic syndrome (FSGS 2/2 parvo) adm 1/13 for Hgb 5.2, Cr 7 with chest pain, concerning for pain crisis. 36F h/o sickle cell, CKD V (not on HD at home) due to nephrotic syndrome (FSGS 2/2 parvo) adm 1/13 for Hgb 5.2, Cr 7 with chest pain, concerning for pain crisis.    In the ED, patient was noted to have a Hgb in the 4s and a Cr in the 7s.  Renal and Heme consulted.  She was given 1U pRBCs but subsequently lost access.  IV access was difficult to obtain on multiple attempts, so PICC line was placed by IR.  Patient was started on CAP coverage due to haziness on CXR.  RVP negative.  Heme did not have high suspicion for acute chest and was continued on pain control and antibiotics, but IVF were held 2/2 fluid overload.  Renal had concern for PE, but patient was unable to undergo V/Q scan due to PICC, and patient could not undergo CTA due to MOIZ on CKD.  On 1/14, pt with Hgb 6.4, transfused another unit pRBCs. 36F h/o sickle cell, CKD V (not on HD at home) due to nephrotic syndrome (FSGS 2/2 parvo) adm 1/13 for Hgb 5.2, Cr 7 with chest pain, concerning for pain crisis.    In the ED, patient was noted to have a Hgb in the 4s and a Cr in the 7s.  Renal and Heme consulted.  She was given 1U pRBCs but subsequently lost access.  IV access was difficult to obtain on multiple attempts, so PICC line was placed by IR.  Patient was started on CAP coverage due to haziness on CXR.  RVP negative.  Heme did not have high suspicion for acute chest and was continued on pain control and antibiotics, but IVF were held 2/2 fluid overload.  Renal had concern for PE, but patient was unable to undergo V/Q scan due to PICC, and patient could not undergo CTA due to MOIZ on CKD.  On 1/14, pt with Hgb 6.4, transfused another unit pRBCs.    Pt to hold off on lasix for now- Will need to take Lasix prn as needed  F/U Dr Hampton and DR lola Velásquez as out pt.  Dispo Home 36F h/o sickle cell, CKD V (not on HD at home) due to nephrotic syndrome (FSGS 2/2 parvo) adm 1/13 for Hgb 5.2, Cr 7 with chest pain, concerning for pain crisis.    In the ED, patient was noted to have a Hgb in the 4s and a Cr in the 7s.  Renal and Heme consulted.  She was given 1U pRBCs but subsequently lost access.  IV access was difficult to obtain on multiple attempts, so PICC line was placed by IR.  Patient was started on CAP coverage due to haziness on CXR.  RVP negative.  Heme did not have high suspicion for acute chest and was continued on pain control and antibiotics, but IVF were held 2/2 fluid overload.  Renal had concern for PE, but patient was unable to undergo V/Q scan due to PICC, and patient could not undergo CTA due to MOIZ on CKD.  On 1/14, pt with Hgb 6.4, transfused another unit pRBCs.  pleuritic chest pain almost completely resolved by DC  Pt to hold off on lasix for now- Will need to take Lasix prn as needed  F/U Dr Hampton and DR lola Velásquez as out pt.  Dispo Home

## 2017-01-14 NOTE — DISCHARGE NOTE ADULT - PLAN OF CARE
To be free of crisis F/U DR Saniya Velásquez as out patient in a week F/U with Dr Hampton in 1-2 weeks as out pt. F/U with Dr Hampton appt 1/26

## 2017-01-14 NOTE — DISCHARGE NOTE ADULT - CARE PROVIDERS DIRECT ADDRESSES
[email or web address]

## 2017-01-14 NOTE — DISCHARGE NOTE ADULT - CARE PLAN
Principal Discharge DX:	Sickle cell crisis  Secondary Diagnosis:	Nephrotic syndrome Principal Discharge DX:	Sickle cell crisis  Goal:	To be free of crisis  Instructions for follow-up, activity and diet:	F/U DR Saniya Velásquez as out patient in a week  Secondary Diagnosis:	Nephrotic syndrome  Instructions for follow-up, activity and diet:	F/U with Dr Hampton in 1-2 weeks as out pt. Principal Discharge DX:	Sickle cell crisis  Goal:	To be free of crisis  Instructions for follow-up, activity and diet:	F/U DR Saniya Velásquez as out patient in a week  Secondary Diagnosis:	Nephrotic syndrome  Instructions for follow-up, activity and diet:	F/U with Dr Hampton appt 1/26

## 2017-01-15 LAB
ALBUMIN SERPL ELPH-MCNC: 1.2 G/DL — LOW (ref 3.3–5)
BASOPHILS # BLD AUTO: 0.02 K/UL — SIGNIFICANT CHANGE UP (ref 0–0.2)
BASOPHILS NFR BLD AUTO: 0.2 % — SIGNIFICANT CHANGE UP (ref 0–2)
BUN SERPL-MCNC: 58 MG/DL — HIGH (ref 7–23)
CALCIUM SERPL-MCNC: 6.1 MG/DL — CRITICAL LOW (ref 8.4–10.5)
CHLORIDE SERPL-SCNC: 104 MMOL/L — SIGNIFICANT CHANGE UP (ref 98–107)
CO2 SERPL-SCNC: 21 MMOL/L — LOW (ref 22–31)
CREAT SERPL-MCNC: 7.17 MG/DL — HIGH (ref 0.5–1.3)
EOSINOPHIL # BLD AUTO: 0.51 K/UL — HIGH (ref 0–0.5)
EOSINOPHIL NFR BLD AUTO: 4.8 % — SIGNIFICANT CHANGE UP (ref 0–6)
GLUCOSE SERPL-MCNC: 82 MG/DL — SIGNIFICANT CHANGE UP (ref 70–99)
HCT VFR BLD CALC: 21.2 % — LOW (ref 34.5–45)
HCT VFR BLD CALC: 24.5 % — LOW (ref 34.5–45)
HGB A MFR BLD: 32.5 % — LOW
HGB A MFR BLD: 55.2 % — LOW
HGB A2 MFR BLD: 3.5 % — SIGNIFICANT CHANGE UP (ref 2.4–3.5)
HGB A2 MFR BLD: 3.8 % — HIGH (ref 2.4–3.5)
HGB BLD-MCNC: 7.1 G/DL — LOW (ref 11.5–15.5)
HGB BLD-MCNC: 8 G/DL — LOW (ref 11.5–15.5)
HGB F MFR BLD: < 1 % — SIGNIFICANT CHANGE UP (ref 0–1.5)
HGB F MFR BLD: < 1 % — SIGNIFICANT CHANGE UP (ref 0–1.5)
HGB S MFR BLD: 40.9 % — HIGH (ref 0–0)
HGB S MFR BLD: 63.3 % — HIGH (ref 0–0)
IMM GRANULOCYTES NFR BLD AUTO: 0.6 % — SIGNIFICANT CHANGE UP (ref 0–1.5)
LDH SERPL L TO P-CCNC: 323 U/L — HIGH (ref 135–225)
LYMPHOCYTES # BLD AUTO: 2.96 K/UL — SIGNIFICANT CHANGE UP (ref 1–3.3)
LYMPHOCYTES # BLD AUTO: 27.7 % — SIGNIFICANT CHANGE UP (ref 13–44)
MAGNESIUM SERPL-MCNC: 1.6 MG/DL — SIGNIFICANT CHANGE UP (ref 1.6–2.6)
MCHC RBC-ENTMCNC: 27.9 PG — SIGNIFICANT CHANGE UP (ref 27–34)
MCHC RBC-ENTMCNC: 28.4 PG — SIGNIFICANT CHANGE UP (ref 27–34)
MCHC RBC-ENTMCNC: 32.7 % — SIGNIFICANT CHANGE UP (ref 32–36)
MCHC RBC-ENTMCNC: 33.5 % — SIGNIFICANT CHANGE UP (ref 32–36)
MCV RBC AUTO: 84.8 FL — SIGNIFICANT CHANGE UP (ref 80–100)
MCV RBC AUTO: 85.4 FL — SIGNIFICANT CHANGE UP (ref 80–100)
MONOCYTES # BLD AUTO: 1.01 K/UL — HIGH (ref 0–0.9)
MONOCYTES NFR BLD AUTO: 9.4 % — SIGNIFICANT CHANGE UP (ref 2–14)
NEUTROPHILS # BLD AUTO: 6.13 K/UL — SIGNIFICANT CHANGE UP (ref 1.8–7.4)
NEUTROPHILS NFR BLD AUTO: 57.3 % — SIGNIFICANT CHANGE UP (ref 43–77)
PHOSPHATE SERPL-MCNC: 7.7 MG/DL — HIGH (ref 2.5–4.5)
PLATELET # BLD AUTO: 336 K/UL — SIGNIFICANT CHANGE UP (ref 150–400)
PLATELET # BLD AUTO: 380 K/UL — SIGNIFICANT CHANGE UP (ref 150–400)
PMV BLD: 10.2 FL — SIGNIFICANT CHANGE UP (ref 7–13)
PMV BLD: 9.9 FL — SIGNIFICANT CHANGE UP (ref 7–13)
POTASSIUM SERPL-MCNC: 3.7 MMOL/L — SIGNIFICANT CHANGE UP (ref 3.5–5.3)
POTASSIUM SERPL-SCNC: 3.7 MMOL/L — SIGNIFICANT CHANGE UP (ref 3.5–5.3)
RBC # BLD: 2.5 M/UL — LOW (ref 3.8–5.2)
RBC # BLD: 2.87 M/UL — LOW (ref 3.8–5.2)
RBC # FLD: 18.2 % — HIGH (ref 10.3–14.5)
RBC # FLD: 18.2 % — HIGH (ref 10.3–14.5)
RETICS #: 152 10X3/UL — HIGH (ref 17–73)
RETICS/RBC NFR: 6.1 % — HIGH (ref 0.5–2.5)
SODIUM SERPL-SCNC: 139 MMOL/L — SIGNIFICANT CHANGE UP (ref 135–145)
WBC # BLD: 10.69 K/UL — HIGH (ref 3.8–10.5)
WBC # BLD: 12.1 K/UL — HIGH (ref 3.8–10.5)
WBC # FLD AUTO: 10.69 K/UL — HIGH (ref 3.8–10.5)
WBC # FLD AUTO: 12.1 K/UL — HIGH (ref 3.8–10.5)

## 2017-01-15 PROCEDURE — 99233 SBSQ HOSP IP/OBS HIGH 50: CPT | Mod: GC

## 2017-01-15 RX ORDER — CALCIUM ACETATE 667 MG
1334 TABLET ORAL
Qty: 0 | Refills: 0 | Status: DISCONTINUED | OUTPATIENT
Start: 2017-01-15 | End: 2017-01-16

## 2017-01-15 RX ORDER — HYDROMORPHONE HYDROCHLORIDE 2 MG/ML
2 INJECTION INTRAMUSCULAR; INTRAVENOUS; SUBCUTANEOUS EVERY 4 HOURS
Qty: 0 | Refills: 0 | Status: DISCONTINUED | OUTPATIENT
Start: 2017-01-15 | End: 2017-01-16

## 2017-01-15 RX ORDER — HYDROMORPHONE HYDROCHLORIDE 2 MG/ML
1 INJECTION INTRAMUSCULAR; INTRAVENOUS; SUBCUTANEOUS EVERY 4 HOURS
Qty: 0 | Refills: 0 | Status: DISCONTINUED | OUTPATIENT
Start: 2017-01-15 | End: 2017-01-16

## 2017-01-15 RX ADMIN — CEFTRIAXONE 100 GRAM(S): 500 INJECTION, POWDER, FOR SOLUTION INTRAMUSCULAR; INTRAVENOUS at 22:34

## 2017-01-15 RX ADMIN — AZITHROMYCIN 250 MILLIGRAM(S): 500 TABLET, FILM COATED ORAL at 00:58

## 2017-01-15 RX ADMIN — Medication 1 TABLET(S): at 17:51

## 2017-01-15 RX ADMIN — HYDROMORPHONE HYDROCHLORIDE 2 MILLIGRAM(S): 2 INJECTION INTRAMUSCULAR; INTRAVENOUS; SUBCUTANEOUS at 17:50

## 2017-01-15 RX ADMIN — Medication 100 MILLIGRAM(S): at 05:53

## 2017-01-15 RX ADMIN — Medication 1 TABLET(S): at 05:53

## 2017-01-15 RX ADMIN — HEPARIN SODIUM 5000 UNIT(S): 5000 INJECTION INTRAVENOUS; SUBCUTANEOUS at 17:52

## 2017-01-15 RX ADMIN — HYDROMORPHONE HYDROCHLORIDE 2 MILLIGRAM(S): 2 INJECTION INTRAMUSCULAR; INTRAVENOUS; SUBCUTANEOUS at 13:45

## 2017-01-15 RX ADMIN — HEPARIN SODIUM 5000 UNIT(S): 5000 INJECTION INTRAVENOUS; SUBCUTANEOUS at 09:38

## 2017-01-15 RX ADMIN — HYDROMORPHONE HYDROCHLORIDE 2 MILLIGRAM(S): 2 INJECTION INTRAMUSCULAR; INTRAVENOUS; SUBCUTANEOUS at 13:29

## 2017-01-15 RX ADMIN — Medication 1334 MILLIGRAM(S): at 17:51

## 2017-01-15 RX ADMIN — Medication 100 MILLIGRAM(S): at 13:31

## 2017-01-15 RX ADMIN — HYDROMORPHONE HYDROCHLORIDE 8 MILLIGRAM(S): 2 INJECTION INTRAMUSCULAR; INTRAVENOUS; SUBCUTANEOUS at 05:53

## 2017-01-15 RX ADMIN — HYDROMORPHONE HYDROCHLORIDE 8 MILLIGRAM(S): 2 INJECTION INTRAMUSCULAR; INTRAVENOUS; SUBCUTANEOUS at 01:24

## 2017-01-15 RX ADMIN — Medication 1 MILLIGRAM(S): at 13:32

## 2017-01-15 RX ADMIN — HEPARIN SODIUM 5000 UNIT(S): 5000 INJECTION INTRAVENOUS; SUBCUTANEOUS at 23:52

## 2017-01-15 RX ADMIN — HYDROMORPHONE HYDROCHLORIDE 8 MILLIGRAM(S): 2 INJECTION INTRAMUSCULAR; INTRAVENOUS; SUBCUTANEOUS at 06:50

## 2017-01-15 RX ADMIN — HYDROMORPHONE HYDROCHLORIDE 2 MILLIGRAM(S): 2 INJECTION INTRAMUSCULAR; INTRAVENOUS; SUBCUTANEOUS at 22:35

## 2017-01-15 RX ADMIN — Medication 1334 MILLIGRAM(S): at 13:31

## 2017-01-15 RX ADMIN — HYDROMORPHONE HYDROCHLORIDE 2 MILLIGRAM(S): 2 INJECTION INTRAMUSCULAR; INTRAVENOUS; SUBCUTANEOUS at 23:35

## 2017-01-15 RX ADMIN — Medication 650 MILLIGRAM(S): at 05:53

## 2017-01-15 RX ADMIN — Medication 650 MILLIGRAM(S): at 17:51

## 2017-01-15 RX ADMIN — HYDROMORPHONE HYDROCHLORIDE 8 MILLIGRAM(S): 2 INJECTION INTRAMUSCULAR; INTRAVENOUS; SUBCUTANEOUS at 02:12

## 2017-01-15 RX ADMIN — Medication 1334 MILLIGRAM(S): at 09:39

## 2017-01-15 RX ADMIN — Medication 1334 MILLIGRAM(S): at 22:53

## 2017-01-15 RX ADMIN — AZITHROMYCIN 250 MILLIGRAM(S): 500 TABLET, FILM COATED ORAL at 23:52

## 2017-01-16 VITALS
DIASTOLIC BLOOD PRESSURE: 65 MMHG | SYSTOLIC BLOOD PRESSURE: 103 MMHG | HEART RATE: 89 BPM | TEMPERATURE: 98 F | RESPIRATION RATE: 18 BRPM | OXYGEN SATURATION: 99 %

## 2017-01-16 LAB
ALBUMIN SERPL ELPH-MCNC: 1.2 G/DL — LOW (ref 3.3–5)
BASOPHILS # BLD AUTO: 0.03 K/UL — SIGNIFICANT CHANGE UP (ref 0–0.2)
BASOPHILS NFR BLD AUTO: 0.3 % — SIGNIFICANT CHANGE UP (ref 0–2)
BUN SERPL-MCNC: 57 MG/DL — HIGH (ref 7–23)
CALCIUM SERPL-MCNC: 6.5 MG/DL — CRITICAL LOW (ref 8.4–10.5)
CHLORIDE SERPL-SCNC: 105 MMOL/L — SIGNIFICANT CHANGE UP (ref 98–107)
CO2 SERPL-SCNC: 21 MMOL/L — LOW (ref 22–31)
CREAT SERPL-MCNC: 7.47 MG/DL — HIGH (ref 0.5–1.3)
EOSINOPHIL # BLD AUTO: 0.52 K/UL — HIGH (ref 0–0.5)
EOSINOPHIL NFR BLD AUTO: 5.1 % — SIGNIFICANT CHANGE UP (ref 0–6)
GLUCOSE SERPL-MCNC: 88 MG/DL — SIGNIFICANT CHANGE UP (ref 70–99)
HCT VFR BLD CALC: 22.5 % — LOW (ref 34.5–45)
HGB A MFR BLD: 63.7 % — LOW
HGB A2 MFR BLD: 3.2 % — SIGNIFICANT CHANGE UP (ref 2.4–3.5)
HGB BLD-MCNC: 7.4 G/DL — LOW (ref 11.5–15.5)
HGB ELECT COMMENT: SIGNIFICANT CHANGE UP
HGB ELECT COMMENT: SIGNIFICANT CHANGE UP
HGB F MFR BLD: 0.4 % — SIGNIFICANT CHANGE UP (ref 0–1.5)
HGB S MFR BLD: 32.7 % — HIGH (ref 0–0)
IMM GRANULOCYTES NFR BLD AUTO: 0.5 % — SIGNIFICANT CHANGE UP (ref 0–1.5)
LDH SERPL L TO P-CCNC: 309 U/L — HIGH (ref 135–225)
LYMPHOCYTES # BLD AUTO: 2.72 K/UL — SIGNIFICANT CHANGE UP (ref 1–3.3)
LYMPHOCYTES # BLD AUTO: 26.9 % — SIGNIFICANT CHANGE UP (ref 13–44)
MAGNESIUM SERPL-MCNC: 1.7 MG/DL — SIGNIFICANT CHANGE UP (ref 1.6–2.6)
MCHC RBC-ENTMCNC: 28 PG — SIGNIFICANT CHANGE UP (ref 27–34)
MCHC RBC-ENTMCNC: 32.9 % — SIGNIFICANT CHANGE UP (ref 32–36)
MCV RBC AUTO: 85.2 FL — SIGNIFICANT CHANGE UP (ref 80–100)
MONOCYTES # BLD AUTO: 1.05 K/UL — HIGH (ref 0–0.9)
MONOCYTES NFR BLD AUTO: 10.4 % — SIGNIFICANT CHANGE UP (ref 2–14)
NEUTROPHILS # BLD AUTO: 5.74 K/UL — SIGNIFICANT CHANGE UP (ref 1.8–7.4)
NEUTROPHILS NFR BLD AUTO: 56.8 % — SIGNIFICANT CHANGE UP (ref 43–77)
PHOSPHATE SERPL-MCNC: 7.5 MG/DL — HIGH (ref 2.5–4.5)
PLATELET # BLD AUTO: 378 K/UL — SIGNIFICANT CHANGE UP (ref 150–400)
PMV BLD: 10.1 FL — SIGNIFICANT CHANGE UP (ref 7–13)
POTASSIUM SERPL-MCNC: 3.4 MMOL/L — LOW (ref 3.5–5.3)
POTASSIUM SERPL-SCNC: 3.4 MMOL/L — LOW (ref 3.5–5.3)
RBC # BLD: 2.64 M/UL — LOW (ref 3.8–5.2)
RBC # FLD: 18.4 % — HIGH (ref 10.3–14.5)
RETICS #: 132.6 10X3/UL — HIGH (ref 17–73)
RETICS/RBC NFR: 4.9 % — HIGH (ref 0.5–2.5)
SODIUM SERPL-SCNC: 142 MMOL/L — SIGNIFICANT CHANGE UP (ref 135–145)
WBC # BLD: 10.11 K/UL — SIGNIFICANT CHANGE UP (ref 3.8–10.5)
WBC # FLD AUTO: 10.11 K/UL — SIGNIFICANT CHANGE UP (ref 3.8–10.5)

## 2017-01-16 PROCEDURE — 99239 HOSP IP/OBS DSCHRG MGMT >30: CPT

## 2017-01-16 PROCEDURE — 99233 SBSQ HOSP IP/OBS HIGH 50: CPT | Mod: GC

## 2017-01-16 RX ORDER — ACETAMINOPHEN WITH CODEINE 300MG-30MG
1 TABLET ORAL
Qty: 0 | Refills: 0 | COMMUNITY

## 2017-01-16 RX ORDER — FUROSEMIDE 40 MG
1.5 TABLET ORAL
Qty: 0 | Refills: 0 | COMMUNITY

## 2017-01-16 RX ORDER — DOCUSATE SODIUM 100 MG
1 CAPSULE ORAL
Qty: 0 | Refills: 0 | COMMUNITY
Start: 2017-01-16

## 2017-01-16 RX ORDER — SENNA PLUS 8.6 MG/1
2 TABLET ORAL
Qty: 0 | Refills: 0 | COMMUNITY
Start: 2017-01-16

## 2017-01-16 RX ADMIN — Medication 1334 MILLIGRAM(S): at 09:06

## 2017-01-16 RX ADMIN — HEPARIN SODIUM 5000 UNIT(S): 5000 INJECTION INTRAVENOUS; SUBCUTANEOUS at 08:08

## 2017-01-16 RX ADMIN — HYDROMORPHONE HYDROCHLORIDE 2 MILLIGRAM(S): 2 INJECTION INTRAMUSCULAR; INTRAVENOUS; SUBCUTANEOUS at 06:21

## 2017-01-16 RX ADMIN — HYDROMORPHONE HYDROCHLORIDE 2 MILLIGRAM(S): 2 INJECTION INTRAMUSCULAR; INTRAVENOUS; SUBCUTANEOUS at 06:50

## 2017-01-16 RX ADMIN — Medication 1 MILLIGRAM(S): at 12:21

## 2017-01-16 RX ADMIN — Medication 1 TABLET(S): at 06:21

## 2017-01-16 RX ADMIN — Medication 650 MILLIGRAM(S): at 06:21

## 2017-01-16 RX ADMIN — Medication 1334 MILLIGRAM(S): at 12:21

## 2017-01-16 NOTE — PROVIDER CONTACT NOTE (OTHER) - RECOMMENDATIONS
continue to monitor
continue to monitor VS q4 and symptomatic hypotension
continue to monitor pt for symptomatic low BP and continue VS assessment q4hr
order calcium supplement
continue to monitor pt for symptomatic low BP and continue VS assessment q4hr

## 2017-01-17 LAB
BACTERIA BLD CULT: SIGNIFICANT CHANGE UP
BACTERIA BLD CULT: SIGNIFICANT CHANGE UP
HGB ELECT COMMENT: SIGNIFICANT CHANGE UP

## 2017-01-20 ENCOUNTER — LABORATORY RESULT (OUTPATIENT)
Age: 37
End: 2017-01-20

## 2017-01-20 ENCOUNTER — APPOINTMENT (OUTPATIENT)
Dept: INTERNAL MEDICINE | Facility: HOSPITAL | Age: 37
End: 2017-01-20

## 2017-01-20 ENCOUNTER — OUTPATIENT (OUTPATIENT)
Dept: OUTPATIENT SERVICES | Facility: HOSPITAL | Age: 37
LOS: 1 days | End: 2017-01-20

## 2017-01-20 VITALS — SYSTOLIC BLOOD PRESSURE: 120 MMHG | OXYGEN SATURATION: 100 % | HEART RATE: 79 BPM | DIASTOLIC BLOOD PRESSURE: 84 MMHG

## 2017-01-20 DIAGNOSIS — N05.1 UNSPECIFIED NEPHRITIC SYNDROME WITH FOCAL AND SEGMENTAL GLOMERULAR LESIONS: ICD-10-CM

## 2017-01-20 DIAGNOSIS — Z98.89 OTHER SPECIFIED POSTPROCEDURAL STATES: Chronic | ICD-10-CM

## 2017-01-20 DIAGNOSIS — N18.4 CHRONIC KIDNEY DISEASE, STAGE 4 (SEVERE): ICD-10-CM

## 2017-01-20 DIAGNOSIS — R80.9 PROTEINURIA, UNSPECIFIED: ICD-10-CM

## 2017-01-20 DIAGNOSIS — D64.9 ANEMIA, UNSPECIFIED: ICD-10-CM

## 2017-01-20 DIAGNOSIS — Z98.51 TUBAL LIGATION STATUS: Chronic | ICD-10-CM

## 2017-01-20 DIAGNOSIS — D57.1 SICKLE-CELL DISEASE WITHOUT CRISIS: ICD-10-CM

## 2017-01-20 LAB
ALBUMIN SERPL ELPH-MCNC: 1.5 G/DL — LOW (ref 3.3–5)
ALP SERPL-CCNC: 68 U/L — SIGNIFICANT CHANGE UP (ref 40–120)
ALT FLD-CCNC: 8 U/L — SIGNIFICANT CHANGE UP (ref 4–33)
ANISOCYTOSIS BLD QL: SLIGHT — SIGNIFICANT CHANGE UP
AST SERPL-CCNC: 10 U/L — SIGNIFICANT CHANGE UP (ref 4–32)
BASOPHILS # BLD AUTO: 0.04 K/UL — SIGNIFICANT CHANGE UP (ref 0–0.2)
BASOPHILS NFR BLD AUTO: 0.3 % — SIGNIFICANT CHANGE UP (ref 0–2)
BILIRUB SERPL-MCNC: < 0.2 MG/DL — LOW (ref 0.2–1.2)
BUN SERPL-MCNC: 51 MG/DL — HIGH (ref 7–23)
CALCIUM SERPL-MCNC: 6.9 MG/DL — LOW (ref 8.4–10.5)
CHLORIDE SERPL-SCNC: 106 MMOL/L — SIGNIFICANT CHANGE UP (ref 98–107)
CO2 SERPL-SCNC: 23 MMOL/L — SIGNIFICANT CHANGE UP (ref 22–31)
CREAT SERPL-MCNC: 7.02 MG/DL — HIGH (ref 0.5–1.3)
EOSINOPHIL # BLD AUTO: 0.58 K/UL — HIGH (ref 0–0.5)
EOSINOPHIL NFR BLD AUTO: 4.8 % — SIGNIFICANT CHANGE UP (ref 0–6)
GLUCOSE SERPL-MCNC: 80 MG/DL — SIGNIFICANT CHANGE UP (ref 70–99)
HCT VFR BLD CALC: 26.8 % — LOW (ref 34.5–45)
HGB BLD-MCNC: 8.4 G/DL — LOW (ref 11.5–15.5)
HYPOCHROMIA BLD QL: SLIGHT — SIGNIFICANT CHANGE UP
IMM GRANULOCYTES NFR BLD AUTO: 0.3 % — SIGNIFICANT CHANGE UP (ref 0–1.5)
LG PLATELETS BLD QL AUTO: SLIGHT — SIGNIFICANT CHANGE UP
LYMPHOCYTES # BLD AUTO: 27.1 % — SIGNIFICANT CHANGE UP (ref 13–44)
LYMPHOCYTES # BLD AUTO: 3.28 K/UL — SIGNIFICANT CHANGE UP (ref 1–3.3)
MANUAL SMEAR VERIFICATION: SIGNIFICANT CHANGE UP
MCHC RBC-ENTMCNC: 27.9 PG — SIGNIFICANT CHANGE UP (ref 27–34)
MCHC RBC-ENTMCNC: 31.3 % — LOW (ref 32–36)
MCV RBC AUTO: 89 FL — SIGNIFICANT CHANGE UP (ref 80–100)
MONOCYTES # BLD AUTO: 0.72 K/UL — SIGNIFICANT CHANGE UP (ref 0–0.9)
MONOCYTES NFR BLD AUTO: 6 % — SIGNIFICANT CHANGE UP (ref 2–14)
NEUTROPHILS # BLD AUTO: 7.43 K/UL — HIGH (ref 1.8–7.4)
NEUTROPHILS NFR BLD AUTO: 61.5 % — SIGNIFICANT CHANGE UP (ref 43–77)
OVALOCYTES BLD QL SMEAR: SLIGHT — SIGNIFICANT CHANGE UP
PLATELET # BLD AUTO: 581 K/UL — HIGH (ref 150–400)
PLATELET COUNT - ESTIMATE: SIGNIFICANT CHANGE UP
PMV BLD: 10.3 FL — SIGNIFICANT CHANGE UP (ref 7–13)
POIKILOCYTOSIS BLD QL AUTO: SLIGHT — SIGNIFICANT CHANGE UP
POLYCHROMASIA BLD QL SMEAR: SLIGHT — SIGNIFICANT CHANGE UP
POTASSIUM SERPL-MCNC: 3.9 MMOL/L — SIGNIFICANT CHANGE UP (ref 3.5–5.3)
POTASSIUM SERPL-SCNC: 3.9 MMOL/L — SIGNIFICANT CHANGE UP (ref 3.5–5.3)
PROT SERPL-MCNC: 5.1 G/DL — LOW (ref 6–8.3)
RBC # BLD: 3.01 M/UL — LOW (ref 3.8–5.2)
RBC # FLD: 19.4 % — HIGH (ref 10.3–14.5)
RETICS #: 180 10X3/UL — HIGH (ref 17–73)
RETICS/RBC NFR: 6 % — HIGH (ref 0.5–2.5)
SODIUM SERPL-SCNC: 142 MMOL/L — SIGNIFICANT CHANGE UP (ref 135–145)
TARGETS BLD QL SMEAR: SLIGHT — SIGNIFICANT CHANGE UP
WBC # BLD: 12.09 K/UL — HIGH (ref 3.8–10.5)
WBC # FLD AUTO: 12.09 K/UL — HIGH (ref 3.8–10.5)

## 2017-01-25 ENCOUNTER — APPOINTMENT (OUTPATIENT)
Dept: SURGERY | Facility: CLINIC | Age: 37
End: 2017-01-25

## 2017-01-25 VITALS
SYSTOLIC BLOOD PRESSURE: 102 MMHG | DIASTOLIC BLOOD PRESSURE: 70 MMHG | TEMPERATURE: 98.2 F | HEART RATE: 81 BPM | BODY MASS INDEX: 25.88 KG/M2 | WEIGHT: 161 LBS | HEIGHT: 66 IN

## 2017-01-26 ENCOUNTER — LABORATORY RESULT (OUTPATIENT)
Age: 37
End: 2017-01-26

## 2017-01-26 ENCOUNTER — APPOINTMENT (OUTPATIENT)
Dept: NEPHROLOGY | Facility: CLINIC | Age: 37
End: 2017-01-26

## 2017-01-26 VITALS
HEIGHT: 66 IN | WEIGHT: 166 LBS | RESPIRATION RATE: 14 BRPM | DIASTOLIC BLOOD PRESSURE: 79 MMHG | SYSTOLIC BLOOD PRESSURE: 101 MMHG | HEART RATE: 77 BPM | OXYGEN SATURATION: 99 % | BODY MASS INDEX: 26.68 KG/M2 | TEMPERATURE: 96.6 F

## 2017-01-26 DIAGNOSIS — N18.4 CHRONIC KIDNEY DISEASE, STAGE 4 (SEVERE): ICD-10-CM

## 2017-01-26 RX ORDER — CYCLOBENZAPRINE HYDROCHLORIDE 10 MG/1
10 TABLET, FILM COATED ORAL
Qty: 30 | Refills: 3 | Status: ACTIVE | COMMUNITY
Start: 2017-01-26 | End: 1900-01-01

## 2017-01-27 ENCOUNTER — RESULT REVIEW (OUTPATIENT)
Age: 37
End: 2017-01-27

## 2017-02-06 ENCOUNTER — APPOINTMENT (OUTPATIENT)
Dept: NEPHROLOGY | Facility: CLINIC | Age: 37
End: 2017-02-06

## 2017-02-06 ENCOUNTER — LABORATORY RESULT (OUTPATIENT)
Age: 37
End: 2017-02-06

## 2017-02-06 VITALS
OXYGEN SATURATION: 100 % | WEIGHT: 165.34 LBS | HEIGHT: 66 IN | DIASTOLIC BLOOD PRESSURE: 81 MMHG | SYSTOLIC BLOOD PRESSURE: 117 MMHG | HEART RATE: 99 BPM | BODY MASS INDEX: 26.57 KG/M2

## 2017-02-06 DIAGNOSIS — E87.6 HYPOKALEMIA: ICD-10-CM

## 2017-02-06 DIAGNOSIS — D64.9 ANEMIA, UNSPECIFIED: ICD-10-CM

## 2017-02-06 DIAGNOSIS — E83.51 HYPOCALCEMIA: ICD-10-CM

## 2017-02-06 DIAGNOSIS — R60.9 EDEMA, UNSPECIFIED: ICD-10-CM

## 2017-02-06 DIAGNOSIS — E87.2 ACIDOSIS: ICD-10-CM

## 2017-02-06 RX ORDER — METOLAZONE 5 MG/1
5 TABLET ORAL TWICE DAILY
Refills: 0 | Status: ACTIVE | COMMUNITY
Start: 2017-01-26

## 2017-02-07 ENCOUNTER — RESULT REVIEW (OUTPATIENT)
Age: 37
End: 2017-02-07

## 2017-02-08 LAB
ALBUMIN SERPL ELPH-MCNC: 1.6 G/DL
ALBUMIN SERPL ELPH-MCNC: 1.7 G/DL
ANION GAP SERPL CALC-SCNC: 17 MMOL/L
ANION GAP SERPL CALC-SCNC: 20 MMOL/L
B19V DNA FLD QL NAA+PROBE: NORMAL
BASOPHILS # BLD AUTO: 0.1 K/UL
BASOPHILS # BLD AUTO: 0.1 K/UL
BASOPHILS NFR BLD AUTO: 0.9 %
BASOPHILS NFR BLD AUTO: 0.9 %
BUN SERPL-MCNC: 53 MG/DL
BUN SERPL-MCNC: 60 MG/DL
CALCIUM SERPL-MCNC: 6.5 MG/DL
CALCIUM SERPL-MCNC: 6.8 MG/DL
CHLORIDE SERPL-SCNC: 106 MMOL/L
CHLORIDE SERPL-SCNC: 108 MMOL/L
CO2 SERPL-SCNC: 18 MMOL/L
CO2 SERPL-SCNC: 20 MMOL/L
CREAT SERPL-MCNC: 6.55 MG/DL
CREAT SERPL-MCNC: 7.46 MG/DL
EOSINOPHIL # BLD AUTO: 0.58 K/UL
EOSINOPHIL # BLD AUTO: 0.75 K/UL
EOSINOPHIL NFR BLD AUTO: 5.2 %
EOSINOPHIL NFR BLD AUTO: 6.9 %
GLUCOSE SERPL-MCNC: 36 MG/DL
GLUCOSE SERPL-MCNC: 76 MG/DL
HCT VFR BLD CALC: 25.3 %
HCT VFR BLD CALC: 28 %
HGB BLD-MCNC: 7.9 G/DL
HGB BLD-MCNC: 8.6 G/DL
IMM GRANULOCYTES NFR BLD AUTO: 0.6 %
LYMPHOCYTES # BLD AUTO: 3.12 K/UL
LYMPHOCYTES # BLD AUTO: 3.93 K/UL
LYMPHOCYTES NFR BLD AUTO: 28.7 %
LYMPHOCYTES NFR BLD AUTO: 35.3 %
MAN DIFF?: NORMAL
MAN DIFF?: NORMAL
MCHC RBC-ENTMCNC: 27.4 PG
MCHC RBC-ENTMCNC: 27.4 PG
MCHC RBC-ENTMCNC: 30.7 GM/DL
MCHC RBC-ENTMCNC: 31.2 GM/DL
MCV RBC AUTO: 87.8 FL
MCV RBC AUTO: 89.2 FL
MONOCYTES # BLD AUTO: 0.58 K/UL
MONOCYTES # BLD AUTO: 0.73 K/UL
MONOCYTES NFR BLD AUTO: 5.2 %
MONOCYTES NFR BLD AUTO: 6.7 %
NEUTROPHILS # BLD AUTO: 5.67 K/UL
NEUTROPHILS # BLD AUTO: 6.12 K/UL
NEUTROPHILS NFR BLD AUTO: 50.9 %
NEUTROPHILS NFR BLD AUTO: 56.2 %
PHOSPHATE SERPL-MCNC: 6.5 MG/DL
PHOSPHATE SERPL-MCNC: 7.6 MG/DL
PLATELET # BLD AUTO: 553 K/UL
PLATELET # BLD AUTO: 604 K/UL
POTASSIUM SERPL-SCNC: 5 MMOL/L
POTASSIUM SERPL-SCNC: 5.3 MMOL/L
RBC # BLD: 2.88 M/UL
RBC # BLD: 3.14 M/UL
RBC # FLD: 20 %
RBC # FLD: 20.1 %
SODIUM SERPL-SCNC: 143 MMOL/L
SODIUM SERPL-SCNC: 146 MMOL/L
WBC # FLD AUTO: 10.89 K/UL
WBC # FLD AUTO: 11.14 K/UL

## 2017-02-09 ENCOUNTER — OUTPATIENT (OUTPATIENT)
Dept: OUTPATIENT SERVICES | Facility: HOSPITAL | Age: 37
LOS: 1 days | End: 2017-02-09
Payer: COMMERCIAL

## 2017-02-09 VITALS
OXYGEN SATURATION: 99 % | SYSTOLIC BLOOD PRESSURE: 110 MMHG | DIASTOLIC BLOOD PRESSURE: 80 MMHG | RESPIRATION RATE: 16 BRPM | WEIGHT: 164.91 LBS | TEMPERATURE: 98 F | HEIGHT: 65 IN | HEART RATE: 96 BPM

## 2017-02-09 DIAGNOSIS — N18.9 CHRONIC KIDNEY DISEASE, UNSPECIFIED: ICD-10-CM

## 2017-02-09 DIAGNOSIS — Z98.89 OTHER SPECIFIED POSTPROCEDURAL STATES: Chronic | ICD-10-CM

## 2017-02-09 DIAGNOSIS — Z98.51 TUBAL LIGATION STATUS: Chronic | ICD-10-CM

## 2017-02-09 DIAGNOSIS — D57.1 SICKLE-CELL DISEASE WITHOUT CRISIS: ICD-10-CM

## 2017-02-09 LAB
ALBUMIN SERPL ELPH-MCNC: 1.7 G/DL — LOW (ref 3.3–5)
ALP SERPL-CCNC: 83 U/L — SIGNIFICANT CHANGE UP (ref 40–120)
ALT FLD-CCNC: 9 U/L — SIGNIFICANT CHANGE UP (ref 4–33)
AST SERPL-CCNC: 12 U/L — SIGNIFICANT CHANGE UP (ref 4–32)
BILIRUB SERPL-MCNC: < 0.2 MG/DL — LOW (ref 0.2–1.2)
BLD GP AB SCN SERPL QL: NEGATIVE — SIGNIFICANT CHANGE UP
BUN SERPL-MCNC: 68 MG/DL — HIGH (ref 7–23)
CALCIUM SERPL-MCNC: 6.8 MG/DL — LOW (ref 8.4–10.5)
CHLORIDE SERPL-SCNC: 104 MMOL/L — SIGNIFICANT CHANGE UP (ref 98–107)
CO2 SERPL-SCNC: 17 MMOL/L — LOW (ref 22–31)
CREAT SERPL-MCNC: 7.9 MG/DL — HIGH (ref 0.5–1.3)
GLUCOSE SERPL-MCNC: 63 MG/DL — LOW (ref 70–99)
HCG SERPL-ACNC: < 5 MIU/ML — SIGNIFICANT CHANGE UP
HCT VFR BLD CALC: 26.5 % — LOW (ref 34.5–45)
HGB BLD-MCNC: 8.5 G/DL — LOW (ref 11.5–15.5)
MCHC RBC-ENTMCNC: 27.8 PG — SIGNIFICANT CHANGE UP (ref 27–34)
MCHC RBC-ENTMCNC: 32.1 % — SIGNIFICANT CHANGE UP (ref 32–36)
MCV RBC AUTO: 86.6 FL — SIGNIFICANT CHANGE UP (ref 80–100)
PLATELET # BLD AUTO: 558 K/UL — HIGH (ref 150–400)
PMV BLD: 10.8 FL — SIGNIFICANT CHANGE UP (ref 7–13)
POTASSIUM SERPL-MCNC: 4.2 MMOL/L — SIGNIFICANT CHANGE UP (ref 3.5–5.3)
POTASSIUM SERPL-SCNC: 4.2 MMOL/L — SIGNIFICANT CHANGE UP (ref 3.5–5.3)
PROT SERPL-MCNC: 5.4 G/DL — LOW (ref 6–8.3)
RBC # BLD: 3.06 M/UL — LOW (ref 3.8–5.2)
RBC # FLD: 20.1 % — HIGH (ref 10.3–14.5)
RH IG SCN BLD-IMP: NEGATIVE — SIGNIFICANT CHANGE UP
SODIUM SERPL-SCNC: 143 MMOL/L — SIGNIFICANT CHANGE UP (ref 135–145)
WBC # BLD: 14.99 K/UL — HIGH (ref 3.8–10.5)
WBC # FLD AUTO: 14.99 K/UL — HIGH (ref 3.8–10.5)

## 2017-02-09 PROCEDURE — 93010 ELECTROCARDIOGRAM REPORT: CPT

## 2017-02-09 RX ORDER — POTASSIUM PHOSPHATE, MONOBASIC 500 MG/1
0 TABLET, SOLUBLE ORAL
Qty: 0 | Refills: 0 | COMMUNITY

## 2017-02-09 RX ORDER — CALCIUM PHOSPHATE 600 MG
1 TABLET ORAL
Qty: 0 | Refills: 0 | COMMUNITY

## 2017-02-09 NOTE — H&P PST ADULT - LYMPHATIC
supraclavicular L/posterior cervical R/posterior cervical L/supraclavicular R/anterior cervical R/anterior cervical L

## 2017-02-09 NOTE — H&P PST ADULT - HISTORY OF PRESENT ILLNESS
37y/o female presents for preop eval for scheduled laparoscopic insertion of intraperitoneal dialysis catheter on 2/14/2017. 37y/o female presents for preop eval for scheduled laparoscopic insertion of intraperitoneal dialysis catheter on 2/14/2017. Pt with h/o chronic kidney disease not currently on dialysis.

## 2017-02-09 NOTE — H&P PST ADULT - PROBLEM SELECTOR PLAN 1
scheduled laparoscopic insertion of intraperitoneal dialysis catheter on 2/14/2017.   preop instructions, gi prophylaxis & surgical soap given  Pt verbalized understanding of all PST instructions  t&s and ucg on admit.

## 2017-02-09 NOTE — H&P PST ADULT - PROBLEM SELECTOR PLAN 2
followed by Dr Velásquez, last transfused with three unit PRBC 1/14/17  pending copy of medical eval  OR booking notified via fax pt with sickle cell disease

## 2017-02-09 NOTE — H&P PST ADULT - PMH
History of Cholecystectomy    HPV (Human Papillomavirus)    Nephrotic syndrome    Parvovirus B19 infection    Right Hip Pain- Surgery 1991    Sickle Cell Crisis Chronic kidney disease    History of Cholecystectomy    HPV (Human Papillomavirus)    Nephrotic syndrome    Parvovirus B19 infection    Right Hip Pain- Surgery 1991    Sickle Cell Crisis Chronic kidney disease    History of Cholecystectomy    HPV (Human Papillomavirus)    Nephrotic syndrome    Parvovirus B19 infection    Right Hip Pain- Surgery 1991    Sickle cell disease

## 2017-02-09 NOTE — H&P PST ADULT - NEGATIVE MUSCULOSKELETAL SYMPTOMS
muscle pain b/l leg  & generalized joint pain with muscle pain b/l leg  & generalized joint pain  with sickle cell crisis

## 2017-02-09 NOTE — H&P PST ADULT - NSANTHOSAYNRD_GEN_A_CORE
No. STAR screening performed.  STOP BANG Legend: 0-2 = LOW Risk; 3-4 = INTERMEDIATE Risk; 5-8 = HIGH Risk

## 2017-02-13 ENCOUNTER — APPOINTMENT (OUTPATIENT)
Dept: INTERNAL MEDICINE | Facility: HOSPITAL | Age: 37
End: 2017-02-13

## 2017-02-13 ENCOUNTER — OUTPATIENT (OUTPATIENT)
Dept: OUTPATIENT SERVICES | Facility: HOSPITAL | Age: 37
LOS: 1 days | End: 2017-02-13

## 2017-02-13 VITALS — HEART RATE: 107 BPM | SYSTOLIC BLOOD PRESSURE: 126 MMHG | OXYGEN SATURATION: 100 % | DIASTOLIC BLOOD PRESSURE: 89 MMHG

## 2017-02-13 DIAGNOSIS — Z98.51 TUBAL LIGATION STATUS: Chronic | ICD-10-CM

## 2017-02-13 DIAGNOSIS — Z98.89 OTHER SPECIFIED POSTPROCEDURAL STATES: Chronic | ICD-10-CM

## 2017-02-13 DIAGNOSIS — N18.9 CHRONIC KIDNEY DISEASE, UNSPECIFIED: ICD-10-CM

## 2017-02-13 DIAGNOSIS — N04.9 NEPHROTIC SYNDROME WITH UNSPECIFIED MORPHOLOGIC CHANGES: ICD-10-CM

## 2017-02-13 NOTE — ASU PATIENT PROFILE, ADULT - PMH
Chronic kidney disease    History of Cholecystectomy    HPV (Human Papillomavirus)    Nephrotic syndrome    Parvovirus B19 infection    Right Hip Pain- Surgery 1991    Sickle cell disease

## 2017-02-14 ENCOUNTER — APPOINTMENT (OUTPATIENT)
Dept: SURGERY | Facility: HOSPITAL | Age: 37
End: 2017-02-14

## 2017-02-14 ENCOUNTER — OUTPATIENT (OUTPATIENT)
Dept: OUTPATIENT SERVICES | Facility: HOSPITAL | Age: 37
LOS: 1 days | Discharge: HOME | End: 2017-02-14
Payer: COMMERCIAL

## 2017-02-14 VITALS
SYSTOLIC BLOOD PRESSURE: 130 MMHG | DIASTOLIC BLOOD PRESSURE: 83 MMHG | HEART RATE: 93 BPM | OXYGEN SATURATION: 98 % | HEIGHT: 66 IN | WEIGHT: 164.91 LBS | RESPIRATION RATE: 13 BRPM | TEMPERATURE: 98 F

## 2017-02-14 VITALS
OXYGEN SATURATION: 100 % | SYSTOLIC BLOOD PRESSURE: 141 MMHG | HEART RATE: 86 BPM | RESPIRATION RATE: 18 BRPM | DIASTOLIC BLOOD PRESSURE: 91 MMHG

## 2017-02-14 DIAGNOSIS — Z98.51 TUBAL LIGATION STATUS: Chronic | ICD-10-CM

## 2017-02-14 DIAGNOSIS — Z98.89 OTHER SPECIFIED POSTPROCEDURAL STATES: Chronic | ICD-10-CM

## 2017-02-14 DIAGNOSIS — N18.9 CHRONIC KIDNEY DISEASE, UNSPECIFIED: ICD-10-CM

## 2017-02-14 DIAGNOSIS — D57.1 SICKLE-CELL DISEASE WITHOUT CRISIS: ICD-10-CM

## 2017-02-14 DIAGNOSIS — N05.1 UNSPECIFIED NEPHRITIC SYNDROME WITH FOCAL AND SEGMENTAL GLOMERULAR LESIONS: ICD-10-CM

## 2017-02-14 DIAGNOSIS — N04.9 NEPHROTIC SYNDROME WITH UNSPECIFIED MORPHOLOGIC CHANGES: ICD-10-CM

## 2017-02-14 LAB
BLD GP AB SCN SERPL QL: NEGATIVE — SIGNIFICANT CHANGE UP
GAS PNL BLDV: 136 MMOL/L — SIGNIFICANT CHANGE UP (ref 136–146)
GLUCOSE BLDV-MCNC: 73 — SIGNIFICANT CHANGE UP (ref 70–99)
HCG UR QL: NEGATIVE — SIGNIFICANT CHANGE UP
HCT VFR BLDV CALC: 28.3 % — LOW (ref 34.5–45)
POTASSIUM BLDV-SCNC: 4.2 MMOL/L — SIGNIFICANT CHANGE UP (ref 3.4–4.5)
RH IG SCN BLD-IMP: NEGATIVE — SIGNIFICANT CHANGE UP

## 2017-02-14 PROCEDURE — 49324 LAP INSERT TUNNEL IP CATH: CPT | Mod: GC

## 2017-02-14 RX ORDER — SODIUM CHLORIDE 9 MG/ML
1000 INJECTION INTRAMUSCULAR; INTRAVENOUS; SUBCUTANEOUS
Qty: 0 | Refills: 0 | Status: DISCONTINUED | OUTPATIENT
Start: 2017-02-14 | End: 2017-03-01

## 2017-02-14 RX ORDER — POTASSIUM CHLORIDE 20 MEQ
1 PACKET (EA) ORAL
Qty: 0 | Refills: 0 | COMMUNITY

## 2017-02-14 RX ORDER — HYDROMORPHONE HYDROCHLORIDE 2 MG/ML
0.5 INJECTION INTRAMUSCULAR; INTRAVENOUS; SUBCUTANEOUS
Qty: 0 | Refills: 0 | Status: DISCONTINUED | OUTPATIENT
Start: 2017-02-14 | End: 2017-02-14

## 2017-02-14 RX ORDER — HYDROMORPHONE HYDROCHLORIDE 2 MG/ML
1 INJECTION INTRAMUSCULAR; INTRAVENOUS; SUBCUTANEOUS
Qty: 0 | Refills: 0 | Status: DISCONTINUED | OUTPATIENT
Start: 2017-02-14 | End: 2017-02-14

## 2017-02-14 RX ORDER — ONDANSETRON 8 MG/1
4 TABLET, FILM COATED ORAL ONCE
Qty: 0 | Refills: 0 | Status: DISCONTINUED | OUTPATIENT
Start: 2017-02-14 | End: 2017-03-01

## 2017-02-14 RX ADMIN — HYDROMORPHONE HYDROCHLORIDE 1 MILLIGRAM(S): 2 INJECTION INTRAMUSCULAR; INTRAVENOUS; SUBCUTANEOUS at 18:15

## 2017-02-14 RX ADMIN — HYDROMORPHONE HYDROCHLORIDE 0.5 MILLIGRAM(S): 2 INJECTION INTRAMUSCULAR; INTRAVENOUS; SUBCUTANEOUS at 19:14

## 2017-02-14 RX ADMIN — HYDROMORPHONE HYDROCHLORIDE 1 MILLIGRAM(S): 2 INJECTION INTRAMUSCULAR; INTRAVENOUS; SUBCUTANEOUS at 18:30

## 2017-02-14 RX ADMIN — HYDROMORPHONE HYDROCHLORIDE 0.5 MILLIGRAM(S): 2 INJECTION INTRAMUSCULAR; INTRAVENOUS; SUBCUTANEOUS at 19:05

## 2017-02-14 RX ADMIN — HYDROMORPHONE HYDROCHLORIDE 1 MILLIGRAM(S): 2 INJECTION INTRAMUSCULAR; INTRAVENOUS; SUBCUTANEOUS at 18:57

## 2017-02-14 NOTE — ASU DISCHARGE PLAN (ADULT/PEDIATRIC). - NOTIFY
Bleeding that does not stop/Fever greater than 101/Persistent Nausea and Vomiting/Inability to Tolerate Liquids or Foods Bleeding that does not stop/Persistent Nausea and Vomiting/Fever greater than 101/Unable to Urinate/Pain not relieved by Medications Inability to Tolerate Liquids or Foods/Fever greater than 101/Bleeding that does not stop/Persistent Nausea and Vomiting/Unable to Urinate/Pain not relieved by Medications

## 2017-02-14 NOTE — ASU DISCHARGE PLAN (ADULT/PEDIATRIC). - MEDICATION SUMMARY - MEDICATIONS TO TAKE
I will START or STAY ON the medications listed below when I get home from the hospital:    metalazone  -- 5 milligram(s) by mouth 2 times a day  -- Indication: For Promote diuresis    calcium phosphrous  -- 1 tab(s) by mouth 2 times a day  -- Indication: For Prevent hypocalcemia    Tylenol with Codeine #3 oral tablet  -- 1 tab(s) by mouth every 6 hours, As Needed  -- Indication: For Pain controal as needed, patient has at home    Lasix 80 mg oral tablet  -- 1 tab(s) by mouth 2 times a day  -- Indication: For Promote diuresis    Procrit 3000 units/mL injectable solution  --  injectable 2 x week  -- Indication: For Stimulate erythropoietin production    sodium chloride 1 g oral tablet  -- 1 tab(s) by mouth 2 times a day  -- Indication: For Prevent hyponatremia    folic acid 1 mg oral tablet  -- 1 tab(s) by mouth once a day  -- Indication: For Vitamin B supplement

## 2017-02-14 NOTE — ASU DISCHARGE PLAN (ADULT/PEDIATRIC). - MEDICATION SUMMARY - MEDICATIONS TO STOP TAKING
I will STOP taking the medications listed below when I get home from the hospital:    potassium chloride 20 mEq oral granule, extended release  -- 1 tab(s) by mouth 2 times a day

## 2017-02-14 NOTE — ASU DISCHARGE PLAN (ADULT/PEDIATRIC). - NURSING INSTRUCTIONS
call MD if fever greater than 101 , increase in pain not relieved by medicine , any redness , swelling or drainage from incision site

## 2017-02-16 ENCOUNTER — TRANSCRIPTION ENCOUNTER (OUTPATIENT)
Age: 37
End: 2017-02-16

## 2017-02-17 ENCOUNTER — APPOINTMENT (OUTPATIENT)
Dept: NEPHROLOGY | Facility: CLINIC | Age: 37
End: 2017-02-17

## 2017-02-22 ENCOUNTER — APPOINTMENT (OUTPATIENT)
Dept: SURGERY | Facility: CLINIC | Age: 37
End: 2017-02-22

## 2017-02-27 ENCOUNTER — APPOINTMENT (OUTPATIENT)
Dept: INTERNAL MEDICINE | Facility: HOSPITAL | Age: 37
End: 2017-02-27

## 2017-02-28 ENCOUNTER — APPOINTMENT (OUTPATIENT)
Dept: ORTHOPEDIC SURGERY | Facility: CLINIC | Age: 37
End: 2017-02-28

## 2017-02-28 ENCOUNTER — FORM ENCOUNTER (OUTPATIENT)
Age: 37
End: 2017-02-28

## 2017-02-28 VITALS
DIASTOLIC BLOOD PRESSURE: 85 MMHG | WEIGHT: 155 LBS | HEIGHT: 66 IN | BODY MASS INDEX: 24.91 KG/M2 | HEART RATE: 98 BPM | SYSTOLIC BLOOD PRESSURE: 125 MMHG

## 2017-02-28 DIAGNOSIS — M16.0 BILATERAL PRIMARY OSTEOARTHRITIS OF HIP: ICD-10-CM

## 2017-03-01 ENCOUNTER — OUTPATIENT (OUTPATIENT)
Dept: OUTPATIENT SERVICES | Facility: HOSPITAL | Age: 37
LOS: 1 days | End: 2017-03-01
Payer: COMMERCIAL

## 2017-03-01 ENCOUNTER — APPOINTMENT (OUTPATIENT)
Dept: RADIOLOGY | Facility: CLINIC | Age: 37
End: 2017-03-01

## 2017-03-01 DIAGNOSIS — Z98.89 OTHER SPECIFIED POSTPROCEDURAL STATES: Chronic | ICD-10-CM

## 2017-03-01 DIAGNOSIS — Z00.8 ENCOUNTER FOR OTHER GENERAL EXAMINATION: ICD-10-CM

## 2017-03-01 DIAGNOSIS — Z98.51 TUBAL LIGATION STATUS: Chronic | ICD-10-CM

## 2017-03-01 PROCEDURE — 27093 INJECTION FOR HIP X-RAY: CPT

## 2017-03-01 PROCEDURE — 73525 CONTRAST X-RAY OF HIP: CPT

## 2017-03-03 DIAGNOSIS — K59.00 CONSTIPATION, UNSPECIFIED: ICD-10-CM

## 2017-03-03 RX ORDER — SENNA 8.6 MG/1
8.6 TABLET, FILM COATED ORAL AT BEDTIME
Qty: 90 | Refills: 3 | Status: ACTIVE | COMMUNITY
Start: 2017-03-03 | End: 1900-01-01

## 2017-03-03 RX ORDER — DOCUSATE SODIUM 100 MG/1
100 CAPSULE ORAL 3 TIMES DAILY
Qty: 270 | Refills: 3 | Status: ACTIVE | COMMUNITY
Start: 2017-03-03 | End: 1900-01-01

## 2017-03-06 ENCOUNTER — EMERGENCY (EMERGENCY)
Facility: HOSPITAL | Age: 37
LOS: 1 days | Discharge: HOME | End: 2017-03-06
Attending: EMERGENCY MEDICINE | Admitting: EMERGENCY MEDICINE
Payer: COMMERCIAL

## 2017-03-06 ENCOUNTER — APPOINTMENT (OUTPATIENT)
Dept: SURGERY | Facility: CLINIC | Age: 37
End: 2017-03-06

## 2017-03-06 VITALS
RESPIRATION RATE: 18 BRPM | SYSTOLIC BLOOD PRESSURE: 136 MMHG | TEMPERATURE: 98 F | OXYGEN SATURATION: 100 % | DIASTOLIC BLOOD PRESSURE: 89 MMHG | HEART RATE: 99 BPM

## 2017-03-06 VITALS
SYSTOLIC BLOOD PRESSURE: 125 MMHG | DIASTOLIC BLOOD PRESSURE: 80 MMHG | HEIGHT: 66 IN | BODY MASS INDEX: 24.91 KG/M2 | TEMPERATURE: 98.2 F | WEIGHT: 155 LBS | HEART RATE: 99 BPM

## 2017-03-06 DIAGNOSIS — Z98.89 OTHER SPECIFIED POSTPROCEDURAL STATES: Chronic | ICD-10-CM

## 2017-03-06 DIAGNOSIS — Z98.51 TUBAL LIGATION STATUS: Chronic | ICD-10-CM

## 2017-03-06 PROCEDURE — 93010 ELECTROCARDIOGRAM REPORT: CPT

## 2017-03-06 PROCEDURE — 99285 EMERGENCY DEPT VISIT HI MDM: CPT | Mod: 25

## 2017-03-07 ENCOUNTER — CLINICAL ADVICE (OUTPATIENT)
Age: 37
End: 2017-03-07

## 2017-03-07 VITALS
DIASTOLIC BLOOD PRESSURE: 82 MMHG | SYSTOLIC BLOOD PRESSURE: 122 MMHG | HEART RATE: 81 BPM | OXYGEN SATURATION: 98 % | RESPIRATION RATE: 18 BRPM

## 2017-03-07 LAB
ALBUMIN SERPL ELPH-MCNC: 2 G/DL
ALBUMIN SERPL ELPH-MCNC: 2 G/DL — LOW (ref 3.3–5)
ALP SERPL-CCNC: 77 U/L — SIGNIFICANT CHANGE UP (ref 40–120)
ALT FLD-CCNC: 10 U/L RC — SIGNIFICANT CHANGE UP (ref 10–45)
ANION GAP SERPL CALC-SCNC: 19 MMOL/L
ANION GAP SERPL CALC-SCNC: 21 MMOL/L — HIGH (ref 5–17)
APTT BLD: 29.3 SEC — SIGNIFICANT CHANGE UP (ref 27.5–37.4)
AST SERPL-CCNC: 18 U/L — SIGNIFICANT CHANGE UP (ref 10–40)
BASOPHILS # BLD AUTO: 0.1 K/UL — SIGNIFICANT CHANGE UP (ref 0–0.2)
BILIRUB SERPL-MCNC: 0.3 MG/DL — SIGNIFICANT CHANGE UP (ref 0.2–1.2)
BLD GP AB SCN SERPL QL: NEGATIVE — SIGNIFICANT CHANGE UP
BUN SERPL-MCNC: 85 MG/DL
BUN SERPL-MCNC: 85 MG/DL — HIGH (ref 7–23)
CALCIUM SERPL-MCNC: 4.9 MG/DL — CRITICAL LOW (ref 8.4–10.5)
CALCIUM SERPL-MCNC: 5.2 MG/DL
CHLORIDE SERPL-SCNC: 102 MMOL/L
CHLORIDE SERPL-SCNC: 102 MMOL/L — SIGNIFICANT CHANGE UP (ref 96–108)
CO2 SERPL-SCNC: 20 MMOL/L
CO2 SERPL-SCNC: 21 MMOL/L — LOW (ref 22–31)
CREAT SERPL-MCNC: 10.27 MG/DL — HIGH (ref 0.5–1.3)
CREAT SERPL-MCNC: 10.34 MG/DL
EOSINOPHIL # BLD AUTO: 2 K/UL — HIGH (ref 0–0.5)
EOSINOPHIL NFR BLD AUTO: 10 % — HIGH (ref 0–6)
GAS PNL BLDV: SIGNIFICANT CHANGE UP
GLUCOSE SERPL-MCNC: 102 MG/DL
GLUCOSE SERPL-MCNC: 94 MG/DL — SIGNIFICANT CHANGE UP (ref 70–99)
HCT VFR BLD CALC: 20.3 % — CRITICAL LOW (ref 34.5–45)
HGB BLD-MCNC: 6.9 G/DL — CRITICAL LOW (ref 11.5–15.5)
INR BLD: 1.03 RATIO — SIGNIFICANT CHANGE UP (ref 0.88–1.16)
LYMPHOCYTES # BLD AUTO: 22 % — SIGNIFICANT CHANGE UP (ref 13–44)
LYMPHOCYTES # BLD AUTO: 5.1 K/UL — HIGH (ref 1–3.3)
MAGNESIUM SERPL-MCNC: 1.5 MG/DL — LOW (ref 1.6–2.6)
MCHC RBC-ENTMCNC: 28.5 PG — SIGNIFICANT CHANGE UP (ref 27–34)
MCHC RBC-ENTMCNC: 33.8 GM/DL — SIGNIFICANT CHANGE UP (ref 32–36)
MCV RBC AUTO: 84.5 FL — SIGNIFICANT CHANGE UP (ref 80–100)
MONOCYTES # BLD AUTO: 2.1 K/UL — HIGH (ref 0–0.9)
MONOCYTES NFR BLD AUTO: 7 % — SIGNIFICANT CHANGE UP (ref 2–14)
NEUTROPHILS # BLD AUTO: 11 K/UL — HIGH (ref 1.8–7.4)
NEUTROPHILS NFR BLD AUTO: 61 % — SIGNIFICANT CHANGE UP (ref 43–77)
PHOSPHATE SERPL-MCNC: 7.9 MG/DL
PHOSPHATE SERPL-MCNC: 8.4 MG/DL — HIGH (ref 2.5–4.5)
PLATELET # BLD AUTO: 471 K/UL — HIGH (ref 150–400)
POTASSIUM SERPL-MCNC: 3.8 MMOL/L — SIGNIFICANT CHANGE UP (ref 3.5–5.3)
POTASSIUM SERPL-SCNC: 3.6 MMOL/L
POTASSIUM SERPL-SCNC: 3.8 MMOL/L — SIGNIFICANT CHANGE UP (ref 3.5–5.3)
PROT SERPL-MCNC: 5.3 G/DL — LOW (ref 6–8.3)
PROTHROM AB SERPL-ACNC: 11.2 SEC — SIGNIFICANT CHANGE UP (ref 10–13.1)
RBC # BLD: 2.41 M/UL — LOW (ref 3.8–5.2)
RBC # BLD: 2.41 M/UL — LOW (ref 3.8–5.2)
RBC # FLD: 17.4 % — HIGH (ref 10.3–14.5)
RETICS #: 74.4 K/UL — SIGNIFICANT CHANGE UP (ref 25–125)
RETICS/RBC NFR: 3.1 % — HIGH (ref 0.5–2.5)
RH IG SCN BLD-IMP: NEGATIVE — SIGNIFICANT CHANGE UP
SODIUM SERPL-SCNC: 141 MMOL/L
SODIUM SERPL-SCNC: 144 MMOL/L — SIGNIFICANT CHANGE UP (ref 135–145)
WBC # BLD: 20.2 K/UL — HIGH (ref 3.8–10.5)
WBC # FLD AUTO: 20.2 K/UL — HIGH (ref 3.8–10.5)

## 2017-03-07 PROCEDURE — 99284 EMERGENCY DEPT VISIT MOD MDM: CPT | Mod: 25

## 2017-03-07 PROCEDURE — 82947 ASSAY GLUCOSE BLOOD QUANT: CPT

## 2017-03-07 PROCEDURE — 96376 TX/PRO/DX INJ SAME DRUG ADON: CPT

## 2017-03-07 PROCEDURE — 85045 AUTOMATED RETICULOCYTE COUNT: CPT

## 2017-03-07 PROCEDURE — 93005 ELECTROCARDIOGRAM TRACING: CPT

## 2017-03-07 PROCEDURE — 82803 BLOOD GASES ANY COMBINATION: CPT

## 2017-03-07 PROCEDURE — 84132 ASSAY OF SERUM POTASSIUM: CPT

## 2017-03-07 PROCEDURE — 85730 THROMBOPLASTIN TIME PARTIAL: CPT

## 2017-03-07 PROCEDURE — 84100 ASSAY OF PHOSPHORUS: CPT

## 2017-03-07 PROCEDURE — 83735 ASSAY OF MAGNESIUM: CPT

## 2017-03-07 PROCEDURE — 76937 US GUIDE VASCULAR ACCESS: CPT

## 2017-03-07 PROCEDURE — 86850 RBC ANTIBODY SCREEN: CPT

## 2017-03-07 PROCEDURE — 82565 ASSAY OF CREATININE: CPT

## 2017-03-07 PROCEDURE — 85610 PROTHROMBIN TIME: CPT

## 2017-03-07 PROCEDURE — 85027 COMPLETE CBC AUTOMATED: CPT

## 2017-03-07 PROCEDURE — 96375 TX/PRO/DX INJ NEW DRUG ADDON: CPT

## 2017-03-07 PROCEDURE — 82330 ASSAY OF CALCIUM: CPT

## 2017-03-07 PROCEDURE — 76937 US GUIDE VASCULAR ACCESS: CPT | Mod: 26

## 2017-03-07 PROCEDURE — 96374 THER/PROPH/DIAG INJ IV PUSH: CPT

## 2017-03-07 PROCEDURE — 80053 COMPREHEN METABOLIC PANEL: CPT

## 2017-03-07 PROCEDURE — 83605 ASSAY OF LACTIC ACID: CPT

## 2017-03-07 PROCEDURE — 86901 BLOOD TYPING SEROLOGIC RH(D): CPT

## 2017-03-07 PROCEDURE — 36000 PLACE NEEDLE IN VEIN: CPT

## 2017-03-07 PROCEDURE — 82435 ASSAY OF BLOOD CHLORIDE: CPT

## 2017-03-07 PROCEDURE — 86900 BLOOD TYPING SEROLOGIC ABO: CPT

## 2017-03-07 PROCEDURE — 85014 HEMATOCRIT: CPT

## 2017-03-07 PROCEDURE — 84295 ASSAY OF SERUM SODIUM: CPT

## 2017-03-07 RX ORDER — HYDROMORPHONE HYDROCHLORIDE 2 MG/ML
1 INJECTION INTRAMUSCULAR; INTRAVENOUS; SUBCUTANEOUS ONCE
Qty: 0 | Refills: 0 | Status: DISCONTINUED | OUTPATIENT
Start: 2017-03-07 | End: 2017-03-07

## 2017-03-07 RX ORDER — CALCIUM GLUCONATE 100 MG/ML
2 VIAL (ML) INTRAVENOUS ONCE
Qty: 0 | Refills: 0 | Status: COMPLETED | OUTPATIENT
Start: 2017-03-07 | End: 2017-03-07

## 2017-03-07 RX ORDER — SODIUM CHLORIDE 9 MG/ML
1000 INJECTION INTRAMUSCULAR; INTRAVENOUS; SUBCUTANEOUS ONCE
Qty: 0 | Refills: 0 | Status: DISCONTINUED | OUTPATIENT
Start: 2017-03-07 | End: 2017-03-07

## 2017-03-07 RX ADMIN — HYDROMORPHONE HYDROCHLORIDE 1 MILLIGRAM(S): 2 INJECTION INTRAMUSCULAR; INTRAVENOUS; SUBCUTANEOUS at 04:12

## 2017-03-07 RX ADMIN — HYDROMORPHONE HYDROCHLORIDE 1 MILLIGRAM(S): 2 INJECTION INTRAMUSCULAR; INTRAVENOUS; SUBCUTANEOUS at 05:53

## 2017-03-07 RX ADMIN — Medication 200 GRAM(S): at 03:57

## 2017-03-07 RX ADMIN — HYDROMORPHONE HYDROCHLORIDE 1 MILLIGRAM(S): 2 INJECTION INTRAMUSCULAR; INTRAVENOUS; SUBCUTANEOUS at 03:03

## 2017-03-07 NOTE — ED PROVIDER NOTE - PROGRESS NOTE DETAILS
spoke with Dr. Ramires, nephro fellow hypocalcemic as  outpatient, recommending 2 grams calcium gluconate. patient has appointment in office on 3/7/17 for f/u. hold transfusion at this time as patient on border for transfusion, not in severe pain crisis, would not have come to ED if it were not for abnormal labwork.  patient to f/u in nephro office pain improved, discussed d/c, all abnormal lab results, need to f/u today with nephrologist.

## 2017-03-07 NOTE — ED PROVIDER NOTE - CARE PLAN
Instructions for follow-up, activity and diet:	1) Follow up with Dr. Larsen on 3/7/17, call to confirm appointment   2) You were given a copy of your results, please show them to your doctor for review.   3) Return to the ED for any pain, chest pain, shortness of breath, feel weak, confused, numbness and tingling, dizziness, lightheadedness, or if you have any other new, worsening or concerning symptoms. Principal Discharge DX:	Hypocalcemia  Instructions for follow-up, activity and diet:	1) Follow up with Dr. Larsen on 3/7/17, call to confirm appointment   2) You were given a copy of your results, please show them to your doctor for review.   3) Return to the ED for any pain, chest pain, shortness of breath, feel weak, confused, numbness and tingling, dizziness, lightheadedness, or if you have any other new, worsening or concerning symptoms.

## 2017-03-07 NOTE — ED PROVIDER NOTE - OBJECTIVE STATEMENT
36 year old female, past medical history sickle cell, ESRD on peritoneal dialysis, who presents to the ED for abnormal labwork. Labwork done today by Dr. Larsen nephrology [phone] ([address]). Patient reports pain in lower back and knees, typical for sickle cell crisis. No chest pain, shortness of breath, cough, nausea, vomiting. No weakness, fatigue. No abdominal pain, diarhea, constipation, hematochezia, melena. No fevers. No trauma.     primary medical doctor: Dr. Uzma Velásquez

## 2017-03-07 NOTE — ED PROVIDER NOTE - MEDICAL DECISION MAKING DETAILS
36 year old female, past medical history sickle cell, ESRD on peritoneal dialysis, who presents to the ED for abnormal labwork. Check labwork for hypocalcemia. Pain control for sickle cell crisis.

## 2017-03-07 NOTE — ED PROVIDER NOTE - PLAN OF CARE
1) Follow up with Dr. Larsen on 3/7/17, call to confirm appointment   2) You were given a copy of your results, please show them to your doctor for review.   3) Return to the ED for any pain, chest pain, shortness of breath, feel weak, confused, numbness and tingling, dizziness, lightheadedness, or if you have any other new, worsening or concerning symptoms.

## 2017-03-07 NOTE — ED PROVIDER NOTE - ATTENDING CONTRIBUTION TO CARE
36 year old female, past medical history sickle cell, ESRD on peritoneal dialysis, who presents to the ED for abnormal labwork. Check labwork for hypocalcemia. Pain control for sickle cell   spoke with nephro fellow - will repleat Ca++ and reassess. patient to be seen in morning by nephro.

## 2017-03-07 NOTE — ED PROCEDURE NOTE - PROCEDURE ADDITIONAL DETAILS
POCUS: Emergency Department Focused Ultrasound performed at patient's bedside.  The complete report will be available in PACS.     20 G  R brachial vein

## 2017-03-08 ENCOUNTER — LABORATORY RESULT (OUTPATIENT)
Age: 37
End: 2017-03-08

## 2017-03-09 LAB
ALBUMIN SERPL ELPH-MCNC: 1.9 G/DL
ALBUMIN SERPL ELPH-MCNC: 2 G/DL
ALP BLD-CCNC: 72 U/L
ALT SERPL-CCNC: 16 U/L
ANION GAP SERPL CALC-SCNC: 22 MMOL/L
AST SERPL-CCNC: 17 U/L
BASOPHILS # BLD AUTO: 0.18 K/UL
BASOPHILS NFR BLD AUTO: 1 %
BILIRUB DIRECT SERPL-MCNC: 0.1 MG/DL
BILIRUB INDIRECT SERPL-MCNC: 0.2 MG/DL
BILIRUB SERPL-MCNC: 0.3 MG/DL
BUN SERPL-MCNC: 90 MG/DL
CALCIUM SERPL-MCNC: 6.1 MG/DL
CHLORIDE SERPL-SCNC: 101 MMOL/L
CO2 SERPL-SCNC: 19 MMOL/L
CREAT SERPL-MCNC: 11.19 MG/DL
EOSINOPHIL # BLD AUTO: 1.61 K/UL
EOSINOPHIL NFR BLD AUTO: 9 %
GLUCOSE SERPL-MCNC: 104 MG/DL
HAPTOGLOB SERPL-MCNC: <20 MG/DL
HCT VFR BLD CALC: 22.3 %
HGB BLD-MCNC: 7.3 G/DL
LDH SERPL-CCNC: 484 U/L
LYMPHOCYTES # BLD AUTO: 3.77 K/UL
LYMPHOCYTES NFR BLD AUTO: 21 %
MAN DIFF?: NORMAL
MCHC RBC-ENTMCNC: 27.3 PG
MCHC RBC-ENTMCNC: 32.7 GM/DL
MCV RBC AUTO: 83.5 FL
MONOCYTES # BLD AUTO: 1.43 K/UL
MONOCYTES NFR BLD AUTO: 8 %
NEUTROPHILS # BLD AUTO: 10.58 K/UL
NEUTROPHILS NFR BLD AUTO: 59 %
PHOSPHATE SERPL-MCNC: 7.8 MG/DL
PLATELET # BLD AUTO: 541 K/UL
POTASSIUM SERPL-SCNC: 3.7 MMOL/L
PROT SERPL-MCNC: 5.6 G/DL
RBC # BLD: 2.67 M/UL
RBC # FLD: 19.9 %
SODIUM SERPL-SCNC: 142 MMOL/L
WBC # FLD AUTO: 17.93 K/UL

## 2017-03-10 DIAGNOSIS — M25.561 PAIN IN RIGHT KNEE: ICD-10-CM

## 2017-03-10 DIAGNOSIS — Z90.49 ACQUIRED ABSENCE OF OTHER SPECIFIED PARTS OF DIGESTIVE TRACT: ICD-10-CM

## 2017-03-10 DIAGNOSIS — M54.5 LOW BACK PAIN: ICD-10-CM

## 2017-03-10 DIAGNOSIS — M25.562 PAIN IN LEFT KNEE: ICD-10-CM

## 2017-03-10 DIAGNOSIS — Z99.2 DEPENDENCE ON RENAL DIALYSIS: ICD-10-CM

## 2017-03-10 DIAGNOSIS — N18.6 END STAGE RENAL DISEASE: ICD-10-CM

## 2017-03-10 DIAGNOSIS — E83.52 HYPERCALCEMIA: ICD-10-CM

## 2017-03-22 ENCOUNTER — RESULT REVIEW (OUTPATIENT)
Age: 37
End: 2017-03-22

## 2017-03-22 ENCOUNTER — OTHER (OUTPATIENT)
Age: 37
End: 2017-03-22

## 2017-03-30 ENCOUNTER — APPOINTMENT (OUTPATIENT)
Dept: INTERNAL MEDICINE | Facility: HOSPITAL | Age: 37
End: 2017-03-30

## 2017-03-30 ENCOUNTER — OUTPATIENT (OUTPATIENT)
Dept: OUTPATIENT SERVICES | Facility: HOSPITAL | Age: 37
LOS: 1 days | End: 2017-03-30

## 2017-03-30 DIAGNOSIS — R80.9 PROTEINURIA, UNSPECIFIED: ICD-10-CM

## 2017-03-30 DIAGNOSIS — Z98.51 TUBAL LIGATION STATUS: Chronic | ICD-10-CM

## 2017-03-30 DIAGNOSIS — Z98.89 OTHER SPECIFIED POSTPROCEDURAL STATES: Chronic | ICD-10-CM

## 2017-03-30 RX ORDER — HYDROMORPHONE HYDROCHLORIDE 2 MG/1
2 TABLET ORAL EVERY 6 HOURS
Qty: 24 | Refills: 0 | Status: DISCONTINUED | COMMUNITY
Start: 2017-03-07 | End: 2017-03-30

## 2017-03-30 RX ORDER — CALCIUM ACETATE 667 MG/1
667 CAPSULE ORAL 3 TIMES DAILY
Qty: 180 | Refills: 3 | Status: DISCONTINUED | COMMUNITY
Start: 2017-02-13 | End: 2017-03-30

## 2017-03-31 DIAGNOSIS — D57.1 SICKLE-CELL DISEASE WITHOUT CRISIS: ICD-10-CM

## 2017-03-31 DIAGNOSIS — N05.1 UNSPECIFIED NEPHRITIC SYNDROME WITH FOCAL AND SEGMENTAL GLOMERULAR LESIONS: ICD-10-CM

## 2017-03-31 DIAGNOSIS — R80.9 PROTEINURIA, UNSPECIFIED: ICD-10-CM

## 2017-03-31 DIAGNOSIS — M87.059 IDIOPATHIC ASEPTIC NECROSIS OF UNSPECIFIED FEMUR: ICD-10-CM

## 2017-03-31 DIAGNOSIS — M25.551 PAIN IN RIGHT HIP: ICD-10-CM

## 2017-05-03 ENCOUNTER — RX RENEWAL (OUTPATIENT)
Age: 37
End: 2017-05-03

## 2017-05-26 ENCOUNTER — APPOINTMENT (OUTPATIENT)
Dept: RADIOLOGY | Facility: CLINIC | Age: 37
End: 2017-05-26

## 2017-05-26 ENCOUNTER — OUTPATIENT (OUTPATIENT)
Dept: OUTPATIENT SERVICES | Facility: HOSPITAL | Age: 37
LOS: 1 days | End: 2017-05-26
Payer: COMMERCIAL

## 2017-05-26 DIAGNOSIS — M16.0 BILATERAL PRIMARY OSTEOARTHRITIS OF HIP: ICD-10-CM

## 2017-05-26 DIAGNOSIS — Z98.51 TUBAL LIGATION STATUS: Chronic | ICD-10-CM

## 2017-05-26 DIAGNOSIS — Z98.89 OTHER SPECIFIED POSTPROCEDURAL STATES: Chronic | ICD-10-CM

## 2017-05-26 PROCEDURE — 27093 INJECTION FOR HIP X-RAY: CPT

## 2017-05-26 PROCEDURE — 73525 CONTRAST X-RAY OF HIP: CPT

## 2017-06-05 DIAGNOSIS — Z41.8 ENCOUNTER FOR OTHER PROCEDURES FOR PURPOSES OTHER THAN REMEDYING HEALTH STATE: ICD-10-CM

## 2017-06-07 ENCOUNTER — RX RENEWAL (OUTPATIENT)
Age: 37
End: 2017-06-07

## 2017-06-08 ENCOUNTER — APPOINTMENT (OUTPATIENT)
Dept: INTERNAL MEDICINE | Facility: HOSPITAL | Age: 37
End: 2017-06-08

## 2017-06-14 ENCOUNTER — INPATIENT (INPATIENT)
Facility: HOSPITAL | Age: 37
LOS: 0 days | Discharge: HOME | DRG: 811 | End: 2017-06-15
Attending: GENERAL ACUTE CARE HOSPITAL | Admitting: GENERAL ACUTE CARE HOSPITAL
Payer: COMMERCIAL

## 2017-06-14 VITALS
RESPIRATION RATE: 18 BRPM | SYSTOLIC BLOOD PRESSURE: 120 MMHG | TEMPERATURE: 98 F | DIASTOLIC BLOOD PRESSURE: 80 MMHG | HEART RATE: 106 BPM | OXYGEN SATURATION: 96 %

## 2017-06-14 DIAGNOSIS — D64.9 ANEMIA, UNSPECIFIED: ICD-10-CM

## 2017-06-14 DIAGNOSIS — Z98.89 OTHER SPECIFIED POSTPROCEDURAL STATES: Chronic | ICD-10-CM

## 2017-06-14 DIAGNOSIS — N25.81 SECONDARY HYPERPARATHYROIDISM OF RENAL ORIGIN: ICD-10-CM

## 2017-06-14 DIAGNOSIS — N18.6 END STAGE RENAL DISEASE: ICD-10-CM

## 2017-06-14 DIAGNOSIS — Z98.51 TUBAL LIGATION STATUS: Chronic | ICD-10-CM

## 2017-06-14 DIAGNOSIS — D57.1 SICKLE-CELL DISEASE WITHOUT CRISIS: ICD-10-CM

## 2017-06-14 DIAGNOSIS — E83.39 OTHER DISORDERS OF PHOSPHORUS METABOLISM: ICD-10-CM

## 2017-06-14 LAB
ALBUMIN SERPL ELPH-MCNC: 3.8 G/DL — SIGNIFICANT CHANGE UP (ref 3.3–5)
ALP SERPL-CCNC: 85 U/L — SIGNIFICANT CHANGE UP (ref 40–120)
ALT FLD-CCNC: 16 U/L RC — SIGNIFICANT CHANGE UP (ref 10–45)
ANION GAP SERPL CALC-SCNC: 17 MMOL/L — SIGNIFICANT CHANGE UP (ref 5–17)
AST SERPL-CCNC: 30 U/L — SIGNIFICANT CHANGE UP (ref 10–40)
BILIRUB SERPL-MCNC: 1.5 MG/DL — HIGH (ref 0.2–1.2)
BLD GP AB SCN SERPL QL: NEGATIVE — SIGNIFICANT CHANGE UP
BUN SERPL-MCNC: 51 MG/DL — HIGH (ref 7–23)
CALCIUM SERPL-MCNC: 8.4 MG/DL — SIGNIFICANT CHANGE UP (ref 8.4–10.5)
CHLORIDE SERPL-SCNC: 93 MMOL/L — LOW (ref 96–108)
CO2 SERPL-SCNC: 26 MMOL/L — SIGNIFICANT CHANGE UP (ref 22–31)
CREAT SERPL-MCNC: 11.15 MG/DL — HIGH (ref 0.5–1.3)
GAS PNL BLDV: SIGNIFICANT CHANGE UP
GLUCOSE SERPL-MCNC: 93 MG/DL — SIGNIFICANT CHANGE UP (ref 70–99)
HAPTOGLOB SERPL-MCNC: <20 MG/DL — LOW (ref 34–200)
HCG SERPL-ACNC: 3.3 MIU/ML — SIGNIFICANT CHANGE UP
HCT VFR BLD CALC: 12.8 % — CRITICAL LOW (ref 34.5–45)
HGB BLD-MCNC: 4.7 G/DL — CRITICAL LOW (ref 11.5–15.5)
MCHC RBC-ENTMCNC: 31.5 PG — SIGNIFICANT CHANGE UP (ref 27–34)
MCHC RBC-ENTMCNC: 36.6 GM/DL — HIGH (ref 32–36)
MCV RBC AUTO: 86.2 FL — SIGNIFICANT CHANGE UP (ref 80–100)
PLATELET # BLD AUTO: 289 K/UL — SIGNIFICANT CHANGE UP (ref 150–400)
POTASSIUM SERPL-MCNC: 3.6 MMOL/L — SIGNIFICANT CHANGE UP (ref 3.5–5.3)
POTASSIUM SERPL-SCNC: 3.6 MMOL/L — SIGNIFICANT CHANGE UP (ref 3.5–5.3)
PROT SERPL-MCNC: 7.4 G/DL — SIGNIFICANT CHANGE UP (ref 6–8.3)
RBC # BLD: 1.48 M/UL — LOW (ref 3.8–5.2)
RBC # FLD: 28.3 % — HIGH (ref 10.3–14.5)
RH IG SCN BLD-IMP: NEGATIVE — SIGNIFICANT CHANGE UP
SODIUM SERPL-SCNC: 136 MMOL/L — SIGNIFICANT CHANGE UP (ref 135–145)
WBC # BLD: 14 K/UL — HIGH (ref 3.8–10.5)
WBC # FLD AUTO: 14 K/UL — HIGH (ref 3.8–10.5)

## 2017-06-14 PROCEDURE — 93010 ELECTROCARDIOGRAM REPORT: CPT

## 2017-06-14 PROCEDURE — 99285 EMERGENCY DEPT VISIT HI MDM: CPT | Mod: 25

## 2017-06-14 PROCEDURE — 71020: CPT | Mod: 26

## 2017-06-14 RX ORDER — CALCITRIOL 0.5 UG/1
0.5 CAPSULE ORAL DAILY
Qty: 0 | Refills: 0 | Status: DISCONTINUED | OUTPATIENT
Start: 2017-06-14 | End: 2017-06-15

## 2017-06-14 RX ORDER — SODIUM CHLORIDE 9 MG/ML
3 INJECTION INTRAMUSCULAR; INTRAVENOUS; SUBCUTANEOUS EVERY 8 HOURS
Qty: 0 | Refills: 0 | Status: DISCONTINUED | OUTPATIENT
Start: 2017-06-14 | End: 2017-06-15

## 2017-06-14 RX ORDER — FUROSEMIDE 40 MG
1 TABLET ORAL
Qty: 0 | Refills: 0 | COMMUNITY

## 2017-06-14 RX ORDER — FUROSEMIDE 40 MG
100 TABLET ORAL
Qty: 0 | Refills: 0 | Status: DISCONTINUED | OUTPATIENT
Start: 2017-06-14 | End: 2017-06-15

## 2017-06-14 RX ORDER — FOLIC ACID 0.8 MG
1 TABLET ORAL DAILY
Qty: 0 | Refills: 0 | Status: DISCONTINUED | OUTPATIENT
Start: 2017-06-14 | End: 2017-06-15

## 2017-06-14 RX ORDER — ACETAMINOPHEN 500 MG
650 TABLET ORAL ONCE
Qty: 0 | Refills: 0 | Status: COMPLETED | OUTPATIENT
Start: 2017-06-14 | End: 2017-06-14

## 2017-06-14 RX ORDER — DOCUSATE SODIUM 100 MG
100 CAPSULE ORAL THREE TIMES A DAY
Qty: 0 | Refills: 0 | Status: DISCONTINUED | OUTPATIENT
Start: 2017-06-14 | End: 2017-06-15

## 2017-06-14 RX ORDER — GENTAMICIN SULFATE 0.1 %
1 OINTMENT (GRAM) TOPICAL
Qty: 0 | Refills: 0 | Status: DISCONTINUED | OUTPATIENT
Start: 2017-06-14 | End: 2017-06-15

## 2017-06-14 RX ORDER — CALCIUM ACETATE 667 MG
2001 TABLET ORAL
Qty: 0 | Refills: 0 | Status: DISCONTINUED | OUTPATIENT
Start: 2017-06-14 | End: 2017-06-15

## 2017-06-14 RX ORDER — DIPHENHYDRAMINE HCL 50 MG
50 CAPSULE ORAL ONCE
Qty: 0 | Refills: 0 | Status: COMPLETED | OUTPATIENT
Start: 2017-06-14 | End: 2017-06-14

## 2017-06-14 RX ADMIN — SODIUM CHLORIDE 3 MILLILITER(S): 9 INJECTION INTRAMUSCULAR; INTRAVENOUS; SUBCUTANEOUS at 21:35

## 2017-06-14 RX ADMIN — Medication 1 MILLIGRAM(S): at 22:09

## 2017-06-14 RX ADMIN — Medication 102 MILLIGRAM(S): at 16:03

## 2017-06-14 RX ADMIN — CALCITRIOL 0.5 MICROGRAM(S): 0.5 CAPSULE ORAL at 22:09

## 2017-06-14 RX ADMIN — Medication 650 MILLIGRAM(S): at 17:09

## 2017-06-14 RX ADMIN — Medication 1 TABLET(S): at 22:09

## 2017-06-14 RX ADMIN — Medication 100 MILLIGRAM(S): at 22:09

## 2017-06-14 RX ADMIN — Medication 2001 MILLIGRAM(S): at 20:11

## 2017-06-14 RX ADMIN — SODIUM CHLORIDE 3 MILLILITER(S): 9 INJECTION INTRAMUSCULAR; INTRAVENOUS; SUBCUTANEOUS at 14:20

## 2017-06-14 RX ADMIN — Medication 650 MILLIGRAM(S): at 16:03

## 2017-06-14 NOTE — PROVIDER CONTACT NOTE (OTHER) - RECOMMENDATIONS
Perform 2.5% dialysate exchange now. Dwell for 4 hours. Perform 1.5% dialysate exchange after 4 hour dwell.

## 2017-06-14 NOTE — PROVIDER CONTACT NOTE (OTHER) - ACTION/TREATMENT ORDERED:
Perform 2.5% dialysate exchange now. Dwell for 4 hours. Perform 1.5% dialysate exchange after 4 hour dwell, dwell 1.5% overnight. Day Team will come in AM to verify schedule for tomorrow.

## 2017-06-14 NOTE — CONSULT NOTE ADULT - PROBLEM SELECTOR RECOMMENDATION 4
in setting of renal failure. Monitor serum phosphorus. Continue calcium carbonate 3 tab TID with meals. Low phosphate diet.

## 2017-06-14 NOTE — ED PROVIDER NOTE - ATTENDING CONTRIBUTION TO CARE
Attending MD Butterfield:  I personally have seen and examined this patient.  Resident note reviewed and agree on plan of care and except where noted.  See HPI, PE, and MDM for details.       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

## 2017-06-14 NOTE — ED PROVIDER NOTE - PROGRESS NOTE DETAILS
Attending MD Butterfield: Dr. Hong rasmussen for admission x 2, no callback yet Attending MD Butterfield: Dr. Pitts pageyonatan 3rd time, no response

## 2017-06-14 NOTE — PROVIDER CONTACT NOTE (OTHER) - SITUATION
Pt arrived from ED with CAPD orders. Pt only did extraneal dwell this AM with no other exchanges today. Would normally do 2.5% dwell for 4 hours and then 1.5% dwell for overnight

## 2017-06-14 NOTE — ED PROVIDER NOTE - CARE PLAN
Principal Discharge DX:	Anemia, unspecified type Principal Discharge DX:	Anemia, unspecified type  Instructions for follow-up, activity and diet:	transfuse 2u PRBCs, marcella pereyra heme contacted. renal consulted. monitor hemolysis labs.

## 2017-06-14 NOTE — ED PROVIDER NOTE - PHYSICAL EXAMINATION
Attending MD Butterfield: A & O x 3, NAD, HEENT WNL and no facial asymmetry; +scleral icterus, lungs CTAB, heart with reg rhythm without murmur; abdomen soft NTND; PD catheter site c/d/i, extremities with no edema; neuro exam non focal with no motor or sensory deficits.

## 2017-06-14 NOTE — ED ADULT NURSE REASSESSMENT NOTE - NS ED NURSE REASSESS COMMENT FT1
pt receiving 1 unit of PRBC pt educated on signs and symptoms of transfusion reaction. pt noted to be hypoxic to 89% on RA, pt states that this happens sometimes when she needs a transfusion. pt denies sob, no wheezing noted. 2L NC placed, pt sating 95-96%, admitting MD at bedside. no transfusion reaction noted. will continue to monitor.

## 2017-06-14 NOTE — CONSULT NOTE ADULT - PROBLEM SELECTOR RECOMMENDATION 9
Will resume outpatient PD orders of 3 manual exchanges of 2000ml,( 2 exchanges of 2.5% and 1 exchange of extroneal overnight). Continue lasix.

## 2017-06-14 NOTE — ED PROVIDER NOTE - OBJECTIVE STATEMENT
36 F w/ HbSS disease (never had exchange, never acute chest), baseline Hb 8, ESRD on new peritoneal HD as of May presenting after outpatient labs showed Hb 5.2, Reports fatigue and ZAMORA since Monday. Has normal periods, no heavy bleeding, last at the end of May. Denies dark or bloody stools, dizziness, light headedness, presyncope. Gets epo 15k units 3x weekly w peritoneal HD. Last transfusion was in february. No current joint pains c/w sickle crisis. No cough, no chest pain.     Had rash and itching with her first transfusion years ago and now requires benadryl pre medication to prevent reaction. No recent reaction to transfusion.

## 2017-06-14 NOTE — H&P ADULT - HISTORY OF PRESENT ILLNESS
36 F with history of HbSS disease,  ESRD on PD  sent for anemia of 5.2.  Pt reports that this value was found on her monthly labs . she reports feeling fatigue upon exertion, and may " a little SOB"   denies any cp / lightheadeness   no Melena , hematochezia .  has regular periods and not unusally heavy   she recieves epo during HD   denies any pain at this time .. is not in SS crisis   denies fever  /chills / N/V /GI / sx   denies taking NSAID s

## 2017-06-14 NOTE — H&P ADULT - PROBLEM SELECTOR PLAN 1
acute n chronic   etiology likely multifactorial sec to ESRD, SSD and possible element of bone supression.  no obvious source of acute blood loss  cont transfusion  monitor H/H  monitor for fluid overload   check occult blood   heme and

## 2017-06-14 NOTE — CONSULT NOTE ADULT - PROBLEM SELECTOR RECOMMENDATION 2
in setting of renal failure/? Gi loss- Monitor H/H. Pt scheduled to get  pRBC today. Continue outpt dose of epogen 15,000 units x3 weekly.

## 2017-06-14 NOTE — ED PROCEDURE NOTE - PROCEDURE ADDITIONAL DETAILS
Emergency Department Focused Ultrasound performed at patient's bedside for educational purposes. The study will have a follow up study performed or was performed in the direct supervision of an ultrasound trained attending.     successful cannulation of LUE brachial vein with 20G angiocatheter

## 2017-06-14 NOTE — ED ADULT NURSE REASSESSMENT NOTE - NS ED NURSE REASSESS COMMENT FT1
Spoke with blood bank and was told blood was ready for . Upon arriving @ blood bank, I was told that blood was not ready and that it needed to be irradiated and to return in 5-10min. Moriah DOTSON aware

## 2017-06-14 NOTE — CONSULT NOTE ADULT - SUBJECTIVE AND OBJECTIVE BOX
Health system DIVISION OF KIDNEY DISEASES AND HYPERTENSION -- INITIAL CONSULT NOTE  --------------------------------------------------------------------------------  HPI:  36 F with history of HbSS disease,  ESRD on PD  presented  after outpatient labs showed Hb 5.2. In Er patient found to have Hgb of 4.3 , to get pRBC. Pt states she was recently started on PD on 2017. Follows with Dr. Larsen. Pt states renal failure was secondary to Parvo-virus. Pt states PD is going well, denies any cloudy drainage, discharge, fever chills, constipation.       PAST HISTORY  --------------------------------------------------------------------------------  PAST MEDICAL & SURGICAL HISTORY:  Sickle cell disease  Chronic kidney disease  Parvovirus B19 infection  Nephrotic syndrome  HPV (Human Papillomavirus)  Right Hip Pain- Surgery   History of Cholecystectomy  Sickle Cell Crisis  History of hip surgery: R hip due to necrosis  H/O tubal ligation  H/O:   S/P Laparoscopic Cholecystectomy    FAMILY HISTORY:  Family history of sarcoidosis (Sibling): Sister with HgbSS and sarcoidosis  Family history of sickle cell disease (Sibling): Sister with HgbSS and sarcoidosis  Family history of sickle cell trait in mother  Family history of sickle cell trait in father  No pertinent family history in first degree relatives    PAST SOCIAL HISTORY:    ALLERGIES & MEDICATIONS  --------------------------------------------------------------------------------  Allergies    No Known Allergies    Intolerances      Standing Inpatient Medications  sodium chloride 0.9% lock flush 3milliLiter(s) IV Push every 8 hours  diphenhydrAMINE  IVPB 50milliGRAM(s) IV Intermittent Once  acetaminophen   Tablet. 650milliGRAM(s) Oral once    PRN Inpatient Medications      REVIEW OF SYSTEMS  --------------------------------------------------------------------------------  Gen: No fevers/chills  Skin: No rashes  Head/Eyes/Ears: Normal hearing,  Normal vision   Respiratory: + dyspnea, - cough  CV: No chest pain  GI: No abdominal pain, diarrhea, constipation, nausea, vomiting  : No dysuria, hematuria  MSK: No  edema  Heme: No easy bruising or bleeding  Psych: No significant depression    All other systems were reviewed and are negative, except as noted.    VITALS/PHYSICAL EXAM  --------------------------------------------------------------------------------  T(C): 36.9, Max: 36.9 (06-14 @ 15:10)  HR: 96 (96 - 106)  BP: 106/67 (106/67 - 120/80)  RR: 18 (18 - 18)  SpO2: 96% (96% - 96%)  Wt(kg): --        Physical Exam:  	Gen: NAD  	HEENT: MMM  	Pulm: CTA B/L  	CV: S1S2  	Abd: Soft, +BS , +PD catheter   	Ext: No LE edema B/L  	Neuro: Awake, alert   	Skin: Warm and dry, No rash   	    LABS/STUDIES  --------------------------------------------------------------------------------              4.7    14.0  >-----------<  289      [17 @ 14:18]              12.8     136  |  93  |  51  ----------------------------<  93      [17 @ 14:18]  3.6   |  26  |  11.15        Ca     8.4     [17 @ 14:18]    TPro  7.4  /  Alb  3.8  /  TBili  1.5  /  DBili  x   /  AST  30  /  ALT  16  /  AlkPhos  85  [17 @ 14:18]              [17 @ 14:18]    Creatinine Trend:  SCr 11.15 [:18]    Urinalysis - [05-10-16 @ 19:10]      Color PLYEL / Appearance CLEAR / SG 1.011 / pH 7.5      Gluc 50 / Ketone NEGATIVE  / Bili NEGATIVE / Urobili NORMAL       Blood SMALL / Protein >600 / Leuk Est NEGATIVE / Nitrite NEGATIVE      RBC 0-2 / WBC 10-25 / Hyaline 2-5 / Gran  / Sq Epi OCC / Non Sq Epi  / Bacteria FEW      Ferritin 2500      [10-26-16 @ 07:35]  .6 (Ca --)      [16 @ 11:17]   --  HbA1c 3.8      [17 @ 11:35]  TSH 4.51      [17 @ 11:35]  Lipid: chol 263, , HDL 50,       [17 @ 11:35]    HBsAg Nonreactive      [17 11:35]  HCV 0.11, Nonreactive Hepatitis C AB  S/CO Ratio                        Interpretation  < 1.0                                     Non-Reactive  1.0 - 4.9                           Weakly-Reactive  > 5.0                                 Reactive  Non-Reactive: A      [17 @ 11:35]    dsDNA <12      [16 @ 06:20]  Rheumatoid Factor 8.6      [16 @ 06:20]  Syphilis Screen (Treponema Pallidum Ab) Negative      [14 @ 17:15]

## 2017-06-15 VITALS
HEART RATE: 96 BPM | TEMPERATURE: 98 F | DIASTOLIC BLOOD PRESSURE: 84 MMHG | RESPIRATION RATE: 18 BRPM | OXYGEN SATURATION: 98 % | SYSTOLIC BLOOD PRESSURE: 127 MMHG

## 2017-06-15 LAB
ALBUMIN SERPL ELPH-MCNC: 3.3 G/DL — SIGNIFICANT CHANGE UP (ref 3.3–5)
ALP SERPL-CCNC: 76 U/L — SIGNIFICANT CHANGE UP (ref 40–120)
ALT FLD-CCNC: 14 U/L RC — SIGNIFICANT CHANGE UP (ref 10–45)
ANION GAP SERPL CALC-SCNC: 19 MMOL/L — HIGH (ref 5–17)
AST SERPL-CCNC: 22 U/L — SIGNIFICANT CHANGE UP (ref 10–40)
BASOPHILS # BLD AUTO: 0.1 K/UL — SIGNIFICANT CHANGE UP (ref 0–0.2)
BASOPHILS NFR BLD AUTO: 0.8 % — SIGNIFICANT CHANGE UP (ref 0–2)
BILIRUB SERPL-MCNC: 1.2 MG/DL — SIGNIFICANT CHANGE UP (ref 0.2–1.2)
BUN SERPL-MCNC: 47 MG/DL — HIGH (ref 7–23)
CALCIUM SERPL-MCNC: 8 MG/DL — LOW (ref 8.4–10.5)
CHLORIDE SERPL-SCNC: 93 MMOL/L — LOW (ref 96–108)
CO2 SERPL-SCNC: 25 MMOL/L — SIGNIFICANT CHANGE UP (ref 22–31)
CREAT SERPL-MCNC: 10.47 MG/DL — HIGH (ref 0.5–1.3)
EOSINOPHIL # BLD AUTO: 0.7 K/UL — HIGH (ref 0–0.5)
EOSINOPHIL NFR BLD AUTO: 6.2 % — HIGH (ref 0–6)
GLUCOSE SERPL-MCNC: 88 MG/DL — SIGNIFICANT CHANGE UP (ref 70–99)
HCT VFR BLD CALC: 20.6 % — CRITICAL LOW (ref 34.5–45)
HGB BLD-MCNC: 7.2 G/DL — LOW (ref 11.5–15.5)
LYMPHOCYTES # BLD AUTO: 3.9 K/UL — HIGH (ref 1–3.3)
LYMPHOCYTES # BLD AUTO: 36.4 % — SIGNIFICANT CHANGE UP (ref 13–44)
MCHC RBC-ENTMCNC: 32.4 PG — SIGNIFICANT CHANGE UP (ref 27–34)
MCHC RBC-ENTMCNC: 35.7 GM/DL — SIGNIFICANT CHANGE UP (ref 32–36)
MCV RBC AUTO: 90.7 FL — SIGNIFICANT CHANGE UP (ref 80–100)
MONOCYTES # BLD AUTO: 1.4 K/UL — HIGH (ref 0–0.9)
MONOCYTES NFR BLD AUTO: 13.2 % — SIGNIFICANT CHANGE UP (ref 2–14)
NEUTROPHILS # BLD AUTO: 4.7 K/UL — SIGNIFICANT CHANGE UP (ref 1.8–7.4)
NEUTROPHILS NFR BLD AUTO: 43.4 % — SIGNIFICANT CHANGE UP (ref 43–77)
PLATELET # BLD AUTO: 311 K/UL — SIGNIFICANT CHANGE UP (ref 150–400)
POTASSIUM SERPL-MCNC: 3.7 MMOL/L — SIGNIFICANT CHANGE UP (ref 3.5–5.3)
POTASSIUM SERPL-SCNC: 3.7 MMOL/L — SIGNIFICANT CHANGE UP (ref 3.5–5.3)
PROT SERPL-MCNC: 6.5 G/DL — SIGNIFICANT CHANGE UP (ref 6–8.3)
RBC # BLD: 2.27 M/UL — LOW (ref 3.8–5.2)
RBC # FLD: 22.2 % — HIGH (ref 10.3–14.5)
SODIUM SERPL-SCNC: 137 MMOL/L — SIGNIFICANT CHANGE UP (ref 135–145)
WBC # BLD: 10.8 K/UL — HIGH (ref 3.8–10.5)
WBC # FLD AUTO: 10.8 K/UL — HIGH (ref 3.8–10.5)

## 2017-06-15 PROCEDURE — 82947 ASSAY GLUCOSE BLOOD QUANT: CPT

## 2017-06-15 PROCEDURE — 83010 ASSAY OF HAPTOGLOBIN QUANT: CPT

## 2017-06-15 PROCEDURE — 82803 BLOOD GASES ANY COMBINATION: CPT

## 2017-06-15 PROCEDURE — 36430 TRANSFUSION BLD/BLD COMPNT: CPT

## 2017-06-15 PROCEDURE — 85045 AUTOMATED RETICULOCYTE COUNT: CPT

## 2017-06-15 PROCEDURE — 82435 ASSAY OF BLOOD CHLORIDE: CPT

## 2017-06-15 PROCEDURE — 84295 ASSAY OF SERUM SODIUM: CPT

## 2017-06-15 PROCEDURE — 84702 CHORIONIC GONADOTROPIN TEST: CPT

## 2017-06-15 PROCEDURE — 80053 COMPREHEN METABOLIC PANEL: CPT

## 2017-06-15 PROCEDURE — 99285 EMERGENCY DEPT VISIT HI MDM: CPT | Mod: 25

## 2017-06-15 PROCEDURE — 86900 BLOOD TYPING SEROLOGIC ABO: CPT

## 2017-06-15 PROCEDURE — 82330 ASSAY OF CALCIUM: CPT

## 2017-06-15 PROCEDURE — 85027 COMPLETE CBC AUTOMATED: CPT

## 2017-06-15 PROCEDURE — 86850 RBC ANTIBODY SCREEN: CPT

## 2017-06-15 PROCEDURE — 86923 COMPATIBILITY TEST ELECTRIC: CPT

## 2017-06-15 PROCEDURE — P9040: CPT

## 2017-06-15 PROCEDURE — 96374 THER/PROPH/DIAG INJ IV PUSH: CPT

## 2017-06-15 PROCEDURE — 82565 ASSAY OF CREATININE: CPT

## 2017-06-15 PROCEDURE — 93005 ELECTROCARDIOGRAM TRACING: CPT

## 2017-06-15 PROCEDURE — 85014 HEMATOCRIT: CPT

## 2017-06-15 PROCEDURE — 84132 ASSAY OF SERUM POTASSIUM: CPT

## 2017-06-15 PROCEDURE — 85660 RBC SICKLE CELL TEST: CPT

## 2017-06-15 PROCEDURE — 86901 BLOOD TYPING SEROLOGIC RH(D): CPT

## 2017-06-15 PROCEDURE — 83615 LACTATE (LD) (LDH) ENZYME: CPT

## 2017-06-15 PROCEDURE — 83605 ASSAY OF LACTIC ACID: CPT

## 2017-06-15 PROCEDURE — 71046 X-RAY EXAM CHEST 2 VIEWS: CPT

## 2017-06-15 RX ADMIN — Medication 100 MILLIGRAM(S): at 05:51

## 2017-06-15 RX ADMIN — SODIUM CHLORIDE 3 MILLILITER(S): 9 INJECTION INTRAMUSCULAR; INTRAVENOUS; SUBCUTANEOUS at 05:50

## 2017-06-15 NOTE — DISCHARGE NOTE ADULT - SECONDARY DIAGNOSIS.
Hyperparathyroidism due to renal insufficiency ESRD (end stage renal disease) on dialysis Hyperphosphatemia Hb-SS disease without crisis

## 2017-06-15 NOTE — DISCHARGE NOTE ADULT - PLAN OF CARE
Follow up with hematologist You were admitted with severe anemia Continue calcitriol Continue peritoneal dialysis  Follow up with your nephrologist Continue current medication Follow up with your primary care physician and hematologist You were admitted with severe anemia with no obvious source of acute blood loss  and had blood transfusion   H & H to be monitored and need to have hematological evaluation  However you wanted to sign out against medical advice  Follow up with primary care physician and hematologist as soon as possible Continue current medication  No concentrated phosphate You were admitted with severe anemia with no obvious source of acute blood loss  and had blood transfusion   H & H to be monitored and need to have hematological evaluation  However you wanted to sign out against medical advice  Please Follow up with primary care physician and hematologist as soon as possible

## 2017-06-15 NOTE — DISCHARGE NOTE ADULT - ADDITIONAL INSTRUCTIONS
You are leaving the hospital against medical advice. After through discussion regarding the risks , you have verbalized understanding that leaving now without completing the recommended diagnostic and treatment regimen may result in permanent physical injury up to and including death.  Please follow up with your primary care physician and hematologist as soon as possible  Make appointments to follow up with your out patient physicians.  Bring all discharge paperwork including discharge medication list to your follow up appointments.

## 2017-06-15 NOTE — DISCHARGE NOTE ADULT - CARE PROVIDER_API CALL
Saniya Velásquez), Internal Medicine  [id number] [place] [id number]  Phone: [phone]  Fax: [phone]    Sailaja Larsen), Internal Medicine; Nephrology  [address] [place] [address]  Phone: [phone]  Fax: [phone]

## 2017-06-15 NOTE — DISCHARGE NOTE ADULT - HOSPITAL COURSE
To be done by MD 36 F with history of HbSS disease,  ESRD on PD  sent for anemia of 5.2.  Pt reports that this value was found on her monthly labs . she reports feeling fatigue upon exertion, and may " a little SOB"   denies any cp / lightheadeness   no Melena , hematochezia .  has regular periods and not unusally heavy   she recieves epo during HD   denies any pain at this time .. is not in SC crisis   denies fever  /chills / N/V /GI / sx   denies taking NSAID s         Problem/Plan - 1:  ·  Problem: Anemia.  Plan: acute n chronic   etiology likely multifactorial sec to ESRD, SCD and possible element of bone supression.  no obvious source of acute blood loss  cont transfusion  monitor H/H  monitor for fluid overload   check occult blood   heme and.     Problem/Plan - 2:  ·  Problem: Hyperparathyroidism due to renal insufficiency.  Plan: Continue calcitriol 0.5mcg Qd. Monitor serum PTH.     Problem/Plan - 3:  ·  Problem: ESRD (end stage renal disease) on dialysis.  Plan: resume outpatient PD orders.     Problem/Plan - 4:  ·  Problem: Hyperphosphatemia.  Plan: calcium carbonate 3 tab TID with meals. Low phosphate diet.     Problem/Plan - 5:  ·  Problem: Sickle cell disease.  Plan: not in crisis   monitor.   Pt signed out AMA on Maryse /15

## 2017-06-15 NOTE — DISCHARGE NOTE ADULT - PATIENT PORTAL LINK FT
“You can access the Follow[facility] Patient Portal, offered by [facility], by registering with the following website: [email or web address]”

## 2017-06-15 NOTE — DISCHARGE NOTE ADULT - CARE PROVIDERS DIRECT ADDRESSES
[email or web address]

## 2017-06-15 NOTE — DISCHARGE NOTE ADULT - MEDICATION SUMMARY - MEDICATIONS TO TAKE
I will START or STAY ON the medications listed below when I get home from the hospital:    gentamicin 0.1% topical cream  -- Apply on skin to dialysis exit site  -- Indication: For Antibiotic ointment    furosemide 20 mg oral tablet  -- 1 tab(s) by mouth 2 times a day (total of 100 mg 2 times a day)  -- Indication: For diuretic    furosemide 80 mg oral tablet  -- 1 tab(s) by mouth 2 times a day (total of 100 mg 2 times a day)  -- Indication: For diuretic    metOLazone 2.5 mg oral tablet  -- 2 tab(s) by mouth 2 times a day  -- Indication: For Diuretic    Procrit  --  injectable 3 times a week (Sunday, Wednesday & Friday)  -- Indication: For Anemia    Colace 100 mg oral capsule  -- 3 cap(s) by mouth once a day (at bedtime)  -- Indication: For bowel movement    calcium acetate 667 mg oral tablet  -- 3 tab(s) by mouth 3 times a day (with meals)  -- Indication: For Hyperphosphatemia    Cristin-Marguerite Rx oral tablet  -- 1 tab(s) by mouth once a day  -- Indication: For Supplement    calcitriol 0.25 mcg oral capsule  -- 2 cap(s) by mouth once a day (in the evening)  -- Indication: For Hyperparathyroidism due to renal insufficiency    folic acid 1 mg oral tablet  -- 1 tab(s) by mouth once a day  -- Indication: For Sickle cell disease

## 2017-06-19 ENCOUNTER — RX RENEWAL (OUTPATIENT)
Age: 37
End: 2017-06-19

## 2017-07-11 RX ORDER — CALCITRIOL 0.5 UG/1
0.5 CAPSULE, LIQUID FILLED ORAL DAILY
Qty: 90 | Refills: 2 | Status: ACTIVE | COMMUNITY
Start: 2017-03-31 | End: 1900-01-01

## 2017-07-18 LAB
ALBUMIN SERPL ELPH-MCNC: 3.5 G/DL
ALBUMIN SERPL ELPH-MCNC: 3.5 G/DL
ALP BLD-CCNC: 111 U/L
ALT SERPL-CCNC: 14 U/L
ANION GAP SERPL CALC-SCNC: 23 MMOL/L
AST SERPL-CCNC: 31 U/L
BASOPHILS # BLD AUTO: 0.04 K/UL
BASOPHILS NFR BLD AUTO: 0.6 %
BILIRUB DIRECT SERPL-MCNC: 0.2 MG/DL
BILIRUB INDIRECT SERPL-MCNC: 0.4 MG/DL
BILIRUB SERPL-MCNC: 0.6 MG/DL
BUN SERPL-MCNC: 26 MG/DL
CALCIUM SERPL-MCNC: 8.3 MG/DL
CALCIUM SERPL-MCNC: 8.3 MG/DL
CHLORIDE SERPL-SCNC: 91 MMOL/L
CO2 SERPL-SCNC: 21 MMOL/L
CREAT SERPL-MCNC: 9 MG/DL
EOSINOPHIL # BLD AUTO: 0.22 K/UL
EOSINOPHIL NFR BLD AUTO: 3.4 %
FERRITIN SERPL-MCNC: 2294 NG/ML
GLUCOSE SERPL-MCNC: 74 MG/DL
HCT VFR BLD CALC: 38.2 %
HGB BLD-MCNC: 12.6 G/DL
IMM GRANULOCYTES NFR BLD AUTO: 0.2 %
IRON SATN MFR SERPL: 35 %
IRON SERPL-MCNC: 60 UG/DL
LYMPHOCYTES # BLD AUTO: 2.56 K/UL
LYMPHOCYTES NFR BLD AUTO: 39 %
MAN DIFF?: NORMAL
MCHC RBC-ENTMCNC: 27.8 PG
MCHC RBC-ENTMCNC: 33 GM/DL
MCV RBC AUTO: 84.1 FL
MONOCYTES # BLD AUTO: 0.58 K/UL
MONOCYTES NFR BLD AUTO: 8.8 %
NEUTROPHILS # BLD AUTO: 3.15 K/UL
NEUTROPHILS NFR BLD AUTO: 48 %
PARATHYROID HORMONE INTACT: 592 PG/ML
PHOSPHATE SERPL-MCNC: 6.3 MG/DL
PLATELET # BLD AUTO: 236 K/UL
POTASSIUM SERPL-SCNC: 3.5 MMOL/L
PROT SERPL-MCNC: 7.4 G/DL
RBC # BLD: 4.54 M/UL
RBC # FLD: 17.1 %
SODIUM SERPL-SCNC: 135 MMOL/L
TIBC SERPL-MCNC: 170 UG/DL
UIBC SERPL-MCNC: 110 UG/DL
WBC # FLD AUTO: 6.56 K/UL

## 2017-07-27 RX ORDER — VIT B COMP NO.3/FOLIC/C/BIOTIN 1 MG-60 MG
1 TABLET ORAL DAILY
Qty: 90 | Refills: 0 | Status: ACTIVE | COMMUNITY
Start: 2017-03-31 | End: 1900-01-01

## 2017-08-01 ENCOUNTER — FORM ENCOUNTER (OUTPATIENT)
Age: 37
End: 2017-08-01

## 2017-08-02 ENCOUNTER — APPOINTMENT (OUTPATIENT)
Dept: RADIOLOGY | Facility: IMAGING CENTER | Age: 37
End: 2017-08-02
Payer: COMMERCIAL

## 2017-08-02 ENCOUNTER — OUTPATIENT (OUTPATIENT)
Dept: OUTPATIENT SERVICES | Facility: HOSPITAL | Age: 37
LOS: 1 days | End: 2017-08-02
Payer: COMMERCIAL

## 2017-08-02 ENCOUNTER — CXD DOCUMENT (OUTPATIENT)
Age: 37
End: 2017-08-02

## 2017-08-02 DIAGNOSIS — Z98.51 TUBAL LIGATION STATUS: Chronic | ICD-10-CM

## 2017-08-02 DIAGNOSIS — Z98.89 OTHER SPECIFIED POSTPROCEDURAL STATES: Chronic | ICD-10-CM

## 2017-08-02 DIAGNOSIS — N18.9 CHRONIC KIDNEY DISEASE, UNSPECIFIED: ICD-10-CM

## 2017-08-02 PROCEDURE — 74020: CPT

## 2017-08-02 PROCEDURE — 74020: CPT | Mod: 26

## 2017-08-03 ENCOUNTER — INPATIENT (INPATIENT)
Facility: HOSPITAL | Age: 37
LOS: 0 days | Discharge: HOME | DRG: 919 | End: 2017-08-04
Attending: INTERNAL MEDICINE | Admitting: INTERNAL MEDICINE
Payer: COMMERCIAL

## 2017-08-03 VITALS
OXYGEN SATURATION: 95 % | RESPIRATION RATE: 16 BRPM | SYSTOLIC BLOOD PRESSURE: 114 MMHG | TEMPERATURE: 99 F | HEIGHT: 65 IN | DIASTOLIC BLOOD PRESSURE: 74 MMHG | WEIGHT: 132.06 LBS | HEART RATE: 110 BPM

## 2017-08-03 DIAGNOSIS — Z98.89 OTHER SPECIFIED POSTPROCEDURAL STATES: Chronic | ICD-10-CM

## 2017-08-03 DIAGNOSIS — Z98.51 TUBAL LIGATION STATUS: Chronic | ICD-10-CM

## 2017-08-03 PROCEDURE — 99285 EMERGENCY DEPT VISIT HI MDM: CPT | Mod: 25

## 2017-08-03 NOTE — ED ADULT NURSE NOTE - OBJECTIVE STATEMENT
patient stated "my PD cath. filling but not draining from yesterday" Denies any discomforts to the site. Afebrile. Awaiting to be seen.

## 2017-08-04 ENCOUNTER — TRANSCRIPTION ENCOUNTER (OUTPATIENT)
Age: 37
End: 2017-08-04

## 2017-08-04 VITALS
HEART RATE: 84 BPM | RESPIRATION RATE: 16 BRPM | SYSTOLIC BLOOD PRESSURE: 100 MMHG | OXYGEN SATURATION: 97 % | DIASTOLIC BLOOD PRESSURE: 63 MMHG | TEMPERATURE: 98 F

## 2017-08-04 DIAGNOSIS — D57.1 SICKLE-CELL DISEASE WITHOUT CRISIS: ICD-10-CM

## 2017-08-04 DIAGNOSIS — N18.6 END STAGE RENAL DISEASE: ICD-10-CM

## 2017-08-04 DIAGNOSIS — N25.81 SECONDARY HYPERPARATHYROIDISM OF RENAL ORIGIN: ICD-10-CM

## 2017-08-04 DIAGNOSIS — E83.39 OTHER DISORDERS OF PHOSPHORUS METABOLISM: ICD-10-CM

## 2017-08-04 DIAGNOSIS — T85.611A BREAKDOWN (MECHANICAL) OF INTRAPERITONEAL DIALYSIS CATHETER, INITIAL ENCOUNTER: ICD-10-CM

## 2017-08-04 LAB
ALBUMIN SERPL ELPH-MCNC: 3.5 G/DL — SIGNIFICANT CHANGE UP (ref 3.3–5)
ALP SERPL-CCNC: 88 U/L — SIGNIFICANT CHANGE UP (ref 40–120)
ALT FLD-CCNC: 21 U/L RC — SIGNIFICANT CHANGE UP (ref 10–45)
ANION GAP SERPL CALC-SCNC: 20 MMOL/L — HIGH (ref 5–17)
AST SERPL-CCNC: 41 U/L — HIGH (ref 10–40)
BILIRUB SERPL-MCNC: 0.9 MG/DL — SIGNIFICANT CHANGE UP (ref 0.2–1.2)
BUN SERPL-MCNC: 64 MG/DL — HIGH (ref 7–23)
CALCIUM SERPL-MCNC: 8.2 MG/DL — LOW (ref 8.4–10.5)
CHLORIDE SERPL-SCNC: 96 MMOL/L — SIGNIFICANT CHANGE UP (ref 96–108)
CO2 SERPL-SCNC: 21 MMOL/L — LOW (ref 22–31)
CREAT SERPL-MCNC: 10.07 MG/DL — HIGH (ref 0.5–1.3)
EOSINOPHIL NFR BLD AUTO: 3 % — SIGNIFICANT CHANGE UP (ref 0–6)
GLUCOSE SERPL-MCNC: 89 MG/DL — SIGNIFICANT CHANGE UP (ref 70–99)
HCT VFR BLD CALC: 22.8 % — LOW (ref 34.5–45)
HGB BLD-MCNC: 8 G/DL — LOW (ref 11.5–15.5)
LYMPHOCYTES # BLD AUTO: 29 % — SIGNIFICANT CHANGE UP (ref 13–44)
MAGNESIUM SERPL-MCNC: 1.8 MG/DL — SIGNIFICANT CHANGE UP (ref 1.6–2.6)
MCHC RBC-ENTMCNC: 29 PG — SIGNIFICANT CHANGE UP (ref 27–34)
MCHC RBC-ENTMCNC: 35.4 GM/DL — SIGNIFICANT CHANGE UP (ref 32–36)
MCV RBC AUTO: 82.1 FL — SIGNIFICANT CHANGE UP (ref 80–100)
MONOCYTES NFR BLD AUTO: 10 % — SIGNIFICANT CHANGE UP (ref 2–14)
NEUTROPHILS NFR BLD AUTO: 58 % — SIGNIFICANT CHANGE UP (ref 43–77)
PHOSPHATE SERPL-MCNC: 6.1 MG/DL — HIGH (ref 2.5–4.5)
PLATELET # BLD AUTO: 255 K/UL — SIGNIFICANT CHANGE UP (ref 150–400)
POTASSIUM SERPL-MCNC: 3.5 MMOL/L — SIGNIFICANT CHANGE UP (ref 3.5–5.3)
POTASSIUM SERPL-SCNC: 3.5 MMOL/L — SIGNIFICANT CHANGE UP (ref 3.5–5.3)
PROT SERPL-MCNC: 6.7 G/DL — SIGNIFICANT CHANGE UP (ref 6–8.3)
RBC # BLD: 2.77 M/UL — LOW (ref 3.8–5.2)
RBC # FLD: 19.4 % — HIGH (ref 10.3–14.5)
SODIUM SERPL-SCNC: 137 MMOL/L — SIGNIFICANT CHANGE UP (ref 135–145)
WBC # BLD: 14.5 K/UL — HIGH (ref 3.8–10.5)
WBC # FLD AUTO: 14.5 K/UL — HIGH (ref 3.8–10.5)

## 2017-08-04 PROCEDURE — 99223 1ST HOSP IP/OBS HIGH 75: CPT

## 2017-08-04 PROCEDURE — 80053 COMPREHEN METABOLIC PANEL: CPT

## 2017-08-04 PROCEDURE — 99223 1ST HOSP IP/OBS HIGH 75: CPT | Mod: GC

## 2017-08-04 PROCEDURE — 96374 THER/PROPH/DIAG INJ IV PUSH: CPT

## 2017-08-04 PROCEDURE — 84100 ASSAY OF PHOSPHORUS: CPT

## 2017-08-04 PROCEDURE — 85027 COMPLETE CBC AUTOMATED: CPT

## 2017-08-04 PROCEDURE — 99285 EMERGENCY DEPT VISIT HI MDM: CPT | Mod: 25

## 2017-08-04 PROCEDURE — 83735 ASSAY OF MAGNESIUM: CPT

## 2017-08-04 PROCEDURE — 93005 ELECTROCARDIOGRAM TRACING: CPT

## 2017-08-04 RX ORDER — CALCIUM ACETATE 667 MG
667 TABLET ORAL
Qty: 0 | Refills: 0 | Status: DISCONTINUED | OUTPATIENT
Start: 2017-08-04 | End: 2017-08-04

## 2017-08-04 RX ORDER — DOCUSATE SODIUM 100 MG
100 CAPSULE ORAL
Qty: 0 | Refills: 0 | Status: DISCONTINUED | OUTPATIENT
Start: 2017-08-04 | End: 2017-08-04

## 2017-08-04 RX ORDER — FUROSEMIDE 40 MG
20 TABLET ORAL
Qty: 0 | Refills: 0 | Status: DISCONTINUED | OUTPATIENT
Start: 2017-08-04 | End: 2017-08-04

## 2017-08-04 RX ORDER — FOLIC ACID 0.8 MG
1 TABLET ORAL DAILY
Qty: 0 | Refills: 0 | Status: DISCONTINUED | OUTPATIENT
Start: 2017-08-04 | End: 2017-08-04

## 2017-08-04 RX ORDER — FUROSEMIDE 40 MG
80 TABLET ORAL ONCE
Qty: 0 | Refills: 0 | Status: COMPLETED | OUTPATIENT
Start: 2017-08-04 | End: 2017-08-04

## 2017-08-04 RX ORDER — FUROSEMIDE 40 MG
20 TABLET ORAL ONCE
Qty: 0 | Refills: 0 | Status: COMPLETED | OUTPATIENT
Start: 2017-08-04 | End: 2017-08-04

## 2017-08-04 RX ORDER — HEPARIN SODIUM 5000 [USP'U]/ML
5000 INJECTION INTRAVENOUS; SUBCUTANEOUS EVERY 12 HOURS
Qty: 0 | Refills: 0 | Status: DISCONTINUED | OUTPATIENT
Start: 2017-08-04 | End: 2017-08-04

## 2017-08-04 RX ORDER — GENTAMICIN SULFATE 0.1 %
1 OINTMENT (GRAM) TOPICAL
Qty: 0 | Refills: 0 | Status: DISCONTINUED | OUTPATIENT
Start: 2017-08-04 | End: 2017-08-04

## 2017-08-04 RX ORDER — CALCITRIOL 0.5 UG/1
0.25 CAPSULE ORAL DAILY
Qty: 0 | Refills: 0 | Status: DISCONTINUED | OUTPATIENT
Start: 2017-08-04 | End: 2017-08-04

## 2017-08-04 RX ORDER — FUROSEMIDE 40 MG
80 TABLET ORAL
Qty: 0 | Refills: 0 | Status: DISCONTINUED | OUTPATIENT
Start: 2017-08-04 | End: 2017-08-04

## 2017-08-04 RX ORDER — FUROSEMIDE 40 MG
40 TABLET ORAL ONCE
Qty: 0 | Refills: 0 | Status: COMPLETED | OUTPATIENT
Start: 2017-08-04 | End: 2017-08-04

## 2017-08-04 RX ADMIN — Medication 80 MILLIGRAM(S): at 12:40

## 2017-08-04 RX ADMIN — Medication 1 TABLET(S): at 12:39

## 2017-08-04 RX ADMIN — Medication 40 MILLIGRAM(S): at 02:49

## 2017-08-04 RX ADMIN — Medication 667 MILLIGRAM(S): at 12:40

## 2017-08-04 RX ADMIN — Medication 20 MILLIGRAM(S): at 12:40

## 2017-08-04 RX ADMIN — Medication 1 MILLIGRAM(S): at 12:40

## 2017-08-04 RX ADMIN — CALCITRIOL 0.25 MICROGRAM(S): 0.5 CAPSULE ORAL at 12:39

## 2017-08-04 NOTE — CONSULT NOTE ADULT - ATTENDING COMMENTS
I have seen this patient with the fellow and agree with their assessment and plan. In addition,  ESRD on Peritoneal dialysis admitted with catheter malfunction  Xray reviewed: Dialysis catheter is in the left upper quadrant. This is unlikely to move down to the pelvis on its own and will need surgical intervention.  Discussed with Dr Angulo( P D   surgeon) He would prefer her to be in [facility] as he does not come here and has OR time there. He will be able to take her to the OR on Monday morning at 7 am.   Currently she is not uremic or volume overloaded. Her electrolytes are stable and there is no indication for urgent HD. She still makes urine  Ok to discharge her home today. She will go to the ER on sunday afternoon at [facility]   Will give lasix IV x  1dose  Continue with home medications

## 2017-08-04 NOTE — H&P ADULT - NSHPLABSRESULTS_GEN_ALL_CORE
Labs and diagnostic imaging personally reviewed. Pertinent results include:   - leukocytosis of 14.5 w/o neutrophilic predominance, chronic normocytic anemia of 8/22.8, CKD w/ elevated BUN/Cr = 64/10.07, hyperphosphatemia = 6.1  - abdominal xray w/ visualized peritoneal dialysis catheter w/ tip ending in LUQ Labs and diagnostic imaging personally reviewed. Pertinent results include:   - leukocytosis of 14.5 w/o neutrophilic predominance, chronic normocytic anemia of 8/22.8, CKD w/ elevated BUN/Cr = 64/10.07, hyperphosphatemia = 6.1  - abdominal xray w/ visualized peritoneal dialysis catheter w/ tip ending in LUQ, appears coiled

## 2017-08-04 NOTE — CHART NOTE - NSCHARTNOTEFT_GEN_A_CORE
Medicine Discharge note:   Discussed with Dr. Snow / Dr. Salazar plan for discharge.  Medications reviewed, reconciled and resolved with Dr. Salazar.  Pt. has plan to go to [facility] on Sunday afternoon to have preop workup and admission for   possible OR on Monday with Dr. Angulo on Monday morning.  Discharge discussed with Dr. Salazar in detail and will be discharged home.  Pt. was given instructions and details of plan with vascular MD as well.

## 2017-08-04 NOTE — H&P ADULT - NEGATIVE GENERAL GENITOURINARY SYMPTOMS
no urine discoloration/no hematuria/no dysuria/no flank pain L/no flank pain R/normal urinary frequency

## 2017-08-04 NOTE — H&P ADULT - NEGATIVE CARDIOVASCULAR SYMPTOMS
no paroxysmal nocturnal dyspnea/no palpitations/no dyspnea on exertion/no orthopnea/no chest pain/no peripheral edema

## 2017-08-04 NOTE — DISCHARGE NOTE ADULT - MEDICATION SUMMARY - MEDICATIONS TO TAKE
I will START or STAY ON the medications listed below when I get home from the hospital:    gentamicin 0.1% topical cream  -- Apply on skin to dialysis exit site  -- Indication: For renal     furosemide 20 mg oral tablet  -- 1 tab(s) by mouth 2 times a day (total of 100 mg 2 times a day)  -- Indication: For diuretic / renal     furosemide 80 mg oral tablet  -- 1 tab(s) by mouth 2 times a day (total of 100 mg 2 times a day)  -- Indication: For diuretic  / renal     metOLazone 2.5 mg oral tablet  -- 2 tab(s) by mouth 2 times a day  -- Indication: For diuretic  / renal     Procrit  --  injectable 3 times a week (Sunday, Wednesday & Friday)  -- Indication: For anemia     Colace 100 mg oral capsule  -- 3 cap(s) by mouth once a day (at bedtime)  -- Indication: For stool softener     calcium acetate 667 mg oral tablet  -- 3 tab(s) by mouth 3 times a day (with meals)  -- Indication: For stool softener     Cristin-Marguerite Rx oral tablet  -- 1 tab(s) by mouth once a day  -- Indication: For supplement     calcitriol 0.25 mcg oral capsule  -- 2 cap(s) by mouth once a day (in the evening)  -- Indication: For supplement     folic acid 1 mg oral tablet  -- 1 tab(s) by mouth once a day  -- Indication: For supplement

## 2017-08-04 NOTE — H&P ADULT - PROBLEM SELECTOR PLAN 5
- continue Calcium Carbonate 3tabs TID with meals; will monitor Ph levels  - renal diet (low phosphorus)

## 2017-08-04 NOTE — DISCHARGE NOTE ADULT - CARE PLAN
Principal Discharge DX:	Peritoneal dialysis catheter dysfunction, initial encounter  Goal:	seen and followed by renal team  / will f/u at [facility]  - Dr. Angulo  Instructions for follow-up, activity and diet:	You will be going to [facility] on Sunday, 8/6  You will be following and seeing Dr. Angulo on Monday, 8/7 at [facility]  Keep your follow up appointments as directed  Secondary Diagnosis:	ESRD on peritoneal dialysis  Goal:	stable - scheduled to see Dr. Angulo at [facility]  Instructions for follow-up, activity and diet:	You will be going to [facility] on Sunday, 8/6  You will be following and seeing Dr. Angulo on Monday, 8/7 at [facility]  Keep your follow up appointments as directed  Secondary Diagnosis:	Hb-SS disease without crisis  Goal:	stable  Instructions for follow-up, activity and diet:	c/w Lasix / Epogen  Secondary Diagnosis:	Hyperparathyroidism due to renal insufficiency  Goal:	stable  Instructions for follow-up, activity and diet:	c/w calcitriol  Secondary Diagnosis:	Hyperphosphatemia  Goal:	stable  Instructions for follow-up, activity and diet:	c/w Phoslo   c/w renal diet Principal Discharge DX:	Peritoneal dialysis catheter dysfunction, initial encounter  Goal:	seen and followed by renal team  / will f/u at [facility]  - Dr. Angulo  Instructions for follow-up, activity and diet:	You will be going to [facility] on Sunday, 8/6  You will be following and seeing Dr. Angulo on Monday, 8/7 at [facility]  Keep your follow up appointments as directed  Secondary Diagnosis:	ESRD on peritoneal dialysis  Goal:	stable - scheduled to see Dr. Angulo at [facility]  Instructions for follow-up, activity and diet:	You will be going to [facility] on Sunday, 8/6  You will be following and seeing Dr. Angulo on Monday, 8/7 at [facility]  Keep your follow up appointments as directed  Secondary Diagnosis:	Hb-SS disease without crisis  Goal:	stable  Instructions for follow-up, activity and diet:	c/w Lasix / Epogen  Secondary Diagnosis:	Hyperparathyroidism due to renal insufficiency  Goal:	stable  Instructions for follow-up, activity and diet:	c/w calcitriol  Secondary Diagnosis:	Hyperphosphatemia  Goal:	stable  Instructions for follow-up, activity and diet:	c/w Phoslo   c/w renal diet

## 2017-08-04 NOTE — H&P ADULT - PROBLEM SELECTOR PLAN 2
- she is still able to make urine; continue home diuretics: Lasix 100mg BID and Metolazone 5mg BID; monitor strict Is/Os  - nephrology consulted - she is still able to make urine; continue home diuretics: Lasix 100mg BID and Metolazone 5mg BID; monitor strict Is/Os  - nephrology consulted; their recommendations have been appreciated, no indication for urgent dialysis at this time; will continue to monitor renal function and electrolytes

## 2017-08-04 NOTE — H&P ADULT - PROBLEM SELECTOR PLAN 1
- pt unable to perform peritoneal dialysis at home for two nights prior to presentation  - etiology of malfunction: occluded catheter vs. malpositioned/kinked catheter vs. pericatheter leakage  - no signs/sx of infection, pt denies constipation (last BM day of presentation), abdominal xray performed in ED w/ visualized peritoneal dialysis catheter w/ tip ending in LUQ  - will consider CT imaging w/ contrast w/ nephrology's input - pt unable to perform complete peritoneal dialysis at home for two nights prior to presentation  - etiology of malfunction: occluded catheter vs. malpositioned/kinked catheter vs. pericatheter leakage  - no signs/sx of infection, pt denies constipation (last BM day of presentation), abdominal xray performed in ED w/ visualized peritoneal dialysis catheter, coiled in LUQ  - surgery/ Held to be consulted for repositioning/replacement  - will consider CT imaging w/ contrast w/ surgery's input

## 2017-08-04 NOTE — H&P ADULT - NEGATIVE GASTROINTESTINAL SYMPTOMS
no melena/no nausea/no hematochezia/no abdominal pain/no change in bowel habits/no vomiting/no diarrhea/no constipation

## 2017-08-04 NOTE — H&P ADULT - PROBLEM SELECTOR PLAN 3
- not in acute crisis  - stable anemia; will continue to monitor H&H; continue Folic Acid 1mg daily  - pt receives Epogen 3x weekly as outpatient

## 2017-08-04 NOTE — DISCHARGE NOTE ADULT - ADDITIONAL INSTRUCTIONS
You will need to go to the Emergency Department at [facility] on Sunday   You are scheduled to be seen by Dr. Angulo and have a procedure on Monday, 8/7  Take your medications as directed  Follow up as directed above and see your renal  / medical MD as out-pt

## 2017-08-04 NOTE — DISCHARGE NOTE ADULT - CARE PROVIDER_API CALL
Corina Snow), Internal Medicine; Nephrology  [address] [place] [address]  [address] [place] [address]  Phone: [phone]  Fax: [phone]    Nikolai Angulo), ColonRectal Surgery; Surgery  [address] [place] [address]  Phone: [phone]  Fax: [phone]

## 2017-08-04 NOTE — CONSULT NOTE ADULT - SUBJECTIVE AND OBJECTIVE BOX
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 As per pt, her last complete HD was  Pt. states since last weekend, she was unable to completely drain her belly. Pt. states she fills her belly with 2L and only been able to drain 1 L out. She went to PD clinic on Wednesday where an abdominal xray was done. Pt. states the next day, she was able to drain 1.7L , which was an improvement, however she was advised to go to the hospital for evaluation of PD catheter. Pt. states she has no abdominal pain, SOB, fever/chills, N/V, diarrhea, constipation.     PAST HISTORY  --------------------------------------------------------------------------------  PAST MEDICAL & SURGICAL HISTORY:  Sickle cell disease  ESRD on PD  Parvovirus B19 infection  Nephrotic syndrome  HPV (Human Papillomavirus)  Right Hip Pain- Surgery   History of Cholecystectomy  History of hip surgery: R hip due to necrosis  H/O tubal ligation  H/O:   S/P Laparoscopic Cholecystectomy      FAMILY HISTORY:  Family history of sarcoidosis (Sibling): Sister with HgbSS and sarcoidosis  Family history of sickle cell disease (Sibling): Sister with HgbSS and sarcoidosis  Family history of sickle cell trait in mother  Family history of sickle cell trait in father    PAST SOCIAL HISTORY: no history of alcohol, drug or tobacco use.     ALLERGIES & MEDICATIONS  --------------------------------------------------------------------------------  Allergies    No Known Allergies    Intolerances      Standing Inpatient Medications  heparin  Injectable 5000 Unit(s) SubCutaneous every 12 hours  gentamicin 0.1% Cream 1 Application(s) Topical four times a day  furosemide    Tablet 20 milliGRAM(s) Oral two times a day  furosemide    Tablet 80 milliGRAM(s) Oral two times a day  metolazone 5 milliGRAM(s) Oral two times a day  calcium acetate 667 milliGRAM(s) Oral three times a day with meals  Nephro-jay 1 Tablet(s) Oral daily  calcitriol   Capsule 0.25 MICROGram(s) Oral daily  folic acid 1 milliGRAM(s) Oral daily  docusate sodium 100 milliGRAM(s) Oral two times a day    PRN Inpatient Medications      REVIEW OF SYSTEMS  --------------------------------------------------------------------------------  Gen: No fatigue, fevers/chills, weakness  Skin: No rashes  Head/Eyes/Ears/Mouth: No headache;   Respiratory: No dyspnea,   CV: No chest pain,   GI: No abdominal pain, diarrhea, constipation, nausea, vomiting,   : No increased frequency, dysuria,   MSK:  no edema  Neuro: No dizziness/lightheadedness, weakness,   Heme: No easy bruising or bleeding      All other systems were reviewed and are negative, except as noted.    VITALS/PHYSICAL EXAM  --------------------------------------------------------------------------------  T(C): 36.8 (17 @ 06:22), Max: 37.2 (17 @ 23:11)  HR: 82 (17 06:22) (82 - 110)  BP: 105/66 (17 06:22) (104/62 - 114/74)  RR: 18 (17:22) (16 - 20)  SpO2: 95% (17 06:22) (95% - 98%)  Wt(kg): --  Height (cm): 165.1 (17:22)  Weight (kg): 59.9 (17 06:22)  BMI (kg/m2): 22 (17:22)  BSA (m2): 1.66 (17:22)      Physical Exam:  	Gen: NAD,   	HEENT: PERRL,  	Pulm: CTA B/L  	CV: RRR,   	Abd: +BS, soft, nontender/nondistended  + PD cath in place  	: No suprapubic tenderness  	LE: Warm,  no edema  	Neuro: No focal deficits,   	Psych: Normal affect and mood  	Skin: Warm, without rashes  	    LABS/STUDIES  --------------------------------------------------------------------------------              8.0    14.5  >-----------<  255      [17 @ 02:40]              22.8     137  |  96  |  64  ----------------------------<  89      [17 @ 02:40]  3.5   |  21  |  10.07        Ca     8.2     [17 @ 02:40]      Mg     1.8     [17 02:40]      Phos  6.1     [17 02:40]    TPro  6.7  /  Alb  3.5  /  TBili  0.9  /  DBili  x   /  AST  41  /  ALT  21  /  AlkPhos  88  [17 @ 02:40]          Creatinine Trend:  SCr 10.07 [ @ 02:40]    Urinalysis - [05-10-16 @ 19:10]      Color PLYEL / Appearance CLEAR / SG 1.011 / pH 7.5      Gluc 50 / Ketone NEGATIVE  / Bili NEGATIVE / Urobili NORMAL       Blood SMALL / Protein >600 / Leuk Est NEGATIVE / Nitrite NEGATIVE      RBC 0-2 / WBC 10-25 / Hyaline 2-5 / Gran  / Sq Epi OCC / Non Sq Epi  / Bacteria FEW      Ferritin 2500      [10-26-16 @ 07:35]  .6 (Ca --)      [16 @ 11:17]   --  HbA1c 3.8      [17 @ 11:35]  TSH 4.51      [17 @ 11:35]  Lipid: chol 263, , HDL 50,       [17 @ 11:35]    HCV 0.11, --      [17 @ 11:35]    dsDNA <12      [16 @ 06:20]  Rheumatoid Factor 8.6      [16 @ 06:20]  Syphilis Screen (Treponema Pallidum Ab) Negative      [14 @ 17:15]

## 2017-08-04 NOTE — H&P ADULT - NSHPPHYSICALEXAM_GEN_ALL_CORE
VITALS: 98.3F, 105/66, 82bpm, 18br/m  GEN: young female, laying in bed, in NAD  PSYCH: mood and affect appear appropriate  SKIN: intact, no e/o rash  HEENT: swollen face (improved since yesterday as per pt), NC/AT, PERRL  NECK: supple, no e/o elevated JVP  RESPI: B/L air entry, CTAB  CARDIO: regular rate/rhythm, no murmurs appreciated; no LE edema B/L  ABD: peritoneal catheter site bandaged, underneath no e/o erythema/warmth/swelling; soft, NT, ND, +BS  EXT: spontaneous movement/full ROM of all extremities  VASC: peripheral pulses palpated B/L

## 2017-08-04 NOTE — ED PROVIDER NOTE - OBJECTIVE STATEMENT
36F with sickle cell disease, ESRD (2/2 Parvovirus infection) on PD presents for non-functional PD catheter. Pt unable to complete PD sessions since yesterday. Pt notified her Nephrologist (Dr. Pearl) who advised her to have abdominal x-ray. She was told PD catheter may have shifted 2/2 constipation and to present to ED. She denies constipation, reports last BM today. Pt makes urine 2-3 times per day, takes Lasix 100 BID, missed PM dose. 36F with sickle cell disease, ESRD (2/2 Parvovirus infection) on PD presents for non-functional PD catheter. Pt unable to complete PD sessions since yesterday. Pt notified her Nephrologist (Dr. Pearl) who advised her to have abdominal x-ray. She was told PD catheter may have shifted 2/2 constipation and to present to ED. She denies constipation, reports last BM today. Pt makes urine 2-3 times per day, takes Lasix 100 BID, missed PM dose.    Attendinyo female presents with PD catheter malfunctioning for 2 days.  Last had regular PD on Tuesday.

## 2017-08-04 NOTE — H&P ADULT - ASSESSMENT
36yoF w/ PMHx significant for ESRD on PD (nightly for 8h while sleeping, last session on 8/1/17; pt still makes urine and is on diuretics; dry weight = 130lbs as per pt), hyperparathyroidism, hyperphosphatemia, sickle cell disease (HbSS), chronic anemia, who presents from home w/ complaints of a malfunctioning peritoneal dialysis catheter causing her to miss two sessions prior to presentation. 36yoF w/ PMHx significant for ESRD on PD (nightly for 8h while sleeping, last complete session on 8/1/17; pt still makes urine and is on diuretics; dry weight = 130lbs as per pt), hyperparathyroidism, hyperphosphatemia, sickle cell disease (HbSS), chronic anemia, who presents from home w/ complaints of a malfunctioning peritoneal dialysis catheter, coiled as per abdominal xray.

## 2017-08-04 NOTE — DISCHARGE NOTE ADULT - SECONDARY DIAGNOSIS.
ESRD on peritoneal dialysis Hb-SS disease without crisis Hyperparathyroidism due to renal insufficiency Hyperphosphatemia

## 2017-08-04 NOTE — ED PROVIDER NOTE - MEDICAL DECISION MAKING DETAILS
Malfunctioning PD catheter, no respiratory distress at this time, check CBC, CMP for electrolyte abnormalities and EKG, consult Nephrology for recommendations

## 2017-08-04 NOTE — ED PROVIDER NOTE - NS ED ROS FT
CONST: no fevers, no chills  EYES: no pain  ENT: no sore throat   CV: no chest pain  RESP: no shortness of breath  ABD: no abdominal pain   : no dysuria  MSK: no back pain  NEURO: no headache or additional neurologic complaints  HEME: no easy bleeding  SKIN:  no rash CONST: no fevers, no chills  HEAD: +puffy face  EYES: no pain  ENT: no sore throat   CV: no chest pain, no palpitations  RESP: no shortness of breath  ABD: no abdominal pain   : no dysuria   MSK: no back pain  NEURO: no headache or additional neurologic complaints  HEME: no easy bleeding  SKIN:  no rash

## 2017-08-04 NOTE — CONSULT NOTE ADULT - ASSESSMENT
36F w/ ESRD on home dialysis via PD catheter p/w complaint of non-functional PD catheter  - PD catheter is likely malpositioned as there is no evidence of infection, constipation, fibrin plugging or other etiology for dysfunction  - Patient reports she will be seeing Dr. Angulo on Monday at 7am and will be discharged today.  After discussion with Dr. Angulo, he clarified that the patient is to come to the [facility] ED Sunday evening to have preop labs and is scheduled for PD catheter replacement Monday morning at [facility] at 7:30am.   Discussed with Dr. Angulo, will be discussed with patient and primary team  Gisel Garcia PGY2

## 2017-08-04 NOTE — DISCHARGE NOTE ADULT - HOSPITAL COURSE
to  be completed by attending MD 36yoF w/ PMHx significant for ESRD on PD (nightly for 8h while sleeping, last complete session on 8/1/17; pt still makes urine and is on diuretics; dry weight = 130lbs as per pt), hyperparathyroidism, hyperphosphatemia, sickle cell disease (HbSS), chronic anemia, who presents from home w/ complaints of a malfunctioning peritoneal dialysis catheter (she is unable to completely drain dialysate), and inability to perform complete dialysis sessions for two days prior to presentation. Patient had presented as outpatient to PD Clinic and abdominal radiography was obtained revealing coiled catheter in LUQ. After evaluation by nephrology during this hospitalization, pt was administered Lasix 100mg IV as per their recommendations (hemodynamics monitored thereafter), and since there is no indication for urgent dialysis, they recommend pt to be discharged w/ f/u at [facility] w/ Dr. Angulo (surgeon who had originally placed the pt's PD catheter) for repositioning of peritoneal catheter on Monday, 8/7 (pt instructed to present to [facility]'s ED on Sunday 8/6). Patient will return home on current medication regimen w/o changes. Stable at the time of discharge to home.

## 2017-08-04 NOTE — ED PROVIDER NOTE - PHYSICAL EXAMINATION
GENERAL: AOx3, NAD, appears stated age   HEENT: pupils equal & reactive, no scleral icterus +facial swelling  NECK: supple, trachea midline, no thyromegaly  CARDIAC: S1 & S2 auscultated, RRR, no murmurs  CHEST: breath sounds equal B/L, no increased WOB, no crackles  ABDOMEN: bowel sounds present, soft, NTTP +distention +PD catheter left abdominal wall  NEURO: CN II-XII grossly intact, no focal deficits  MSKl: Strength appropriate in all extremities for age, ROM normal  SKIN: Warm and dry, no diffuse rashes  PSYCH: Speech fluid, appropriate mood and affect  EXT: No LE edema, pedal pulses 2+ and equal B/L

## 2017-08-04 NOTE — CONSULT NOTE ADULT - PROBLEM SELECTOR RECOMMENDATION 9
Pt. now p/w inability to completely drain. 2/2 constipation vs mal-positioned PD cathter.  Abdominal Xray shows PD cathter is coiled in LUQ. Recommend surgical consult with Dr. Angulo who placed PD cathter in for assessment, possible re-positioning. No urgent indication for dialysis at this time. Pt. still has residual renal function. Monitor BMP. Pt. now p/w inability to completely drain. 2/2 constipation vs mal-positioned PD cathter.  Abdominal Xray shows PD cathter is coiled in LUQ. Recommend surgical consult with Dr. Angulo who placed PD cathter in for assessment, possible re-positioning. No urgent indication for dialysis at this time. Pt. still has residual renal function. Monitor BMP.    PD prescription: CCPD: 3 Cycles of 2L each (2.5% dextrose and last fill with extraneal fluid.

## 2017-08-04 NOTE — CONSULT NOTE ADULT - SUBJECTIVE AND OBJECTIVE BOX
CC: Patient is a 36y old  Female who presents with a chief complaint of malfunctioning peritoneal dialysis catheter (04 Aug 2017 12:39)          HPI:  36yoF w/ PMHx significant for ESRD on PD (placed 3/2017 by Dr. Angulo), hyperparathyroidism, hyperphosphatemia, sickle cell disease (HbSS), and chronic anemia who was sent in from PD clinic for inability to drain dialysate. She states her normal cycle is to drain 2-2.4L at night, then cycle dialysate twice, then in the AM drains about 2L. She was last able to successfully cycle Tuesday night prior to presentation, but on Wednesday morning, she only drained 1L (1L less than normal).  That day, she went to the PD clinic where she was told she needed an abdominal xray. She underwent an xray that afternoon and returned to PD clinic Thursday AM. She then was asked to try manual drainage, and was able to instill 2L and only drained 1.7L. She states she had no resistance to instilling fluid but still was not able to drain an appropriate amount. She last had a BM yesterday and reports daily to every other day bowel movements with no change in bowel habits. She denies tenderness or erythema at the site of the PD catheter. She denies fever or other signs of infection.    PMH  Sickle cell disease  Chronic kidney disease  Parvovirus B19 infection  Nephrotic syndrome  HPV (Human Papillomavirus)  Right Hip Pain- Surgery   History of Cholecystectomy  Sickle Cell Crisis    PSH  History of hip surgery  H/O tubal ligation  H/O:   S/P Laparoscopic Cholecystectomy    MEDS  See EMR  Allergies    No Known Allergies      Physical Exam  T(C): 36.7 (17 @ 12:43), Max: 37.2 (17 @ 23:11)  HR: 84 (17 @ 12:43) (82 - 110)  BP: 100/63 (17 @ 12:43) (100/63 - 114/74)  RR: 16 (17 @ 12:43) (16 - 20)  SpO2: 97% (17 @ 12:43) (95% - 98%)  Wt(kg): --  Tmax: T(C): , Max: 37.2 (17 @ 23:11)  Wt(kg): --    Gen: NAD, resting comfortably in bed  HEENT: normocephalic, atraumatic, no scleral icterus  Pulm: Equal chest rise, no respiratory distress  Abd: Soft, ND, NTP, no rebound, no guarding, no palpable organomegaly/masses, catheter dressing C/D/I. No tenderness around catheter site      17  -  17  --------------------------------------------------------  IN:    Oral Fluid: 360 mL  Total IN: 360 mL    OUT:  Total OUT: 0 mL    Total NET: 360 mL          Labs:                        8.0    14.5  )-----------( 255      ( 04 Aug 2017 02:40 )             22.8     08-04    137  |  96  |  64<H>  ----------------------------<  89  3.5   |  21<L>  |  10.07<H>    Ca    8.2<L>      04 Aug 2017 02:40  Phos  6.1     08-04  Mg     1.8     -    TPro  6.7  /  Alb  3.5  /  TBili  0.9  /  DBili  x   /  AST  41<H>  /  ALT  21  /  AlkPhos  88  08-04              Imaging  AXR demonstrates no significant stool burden with PD catheter curled in LUQ; appears intact

## 2017-08-04 NOTE — CONSULT NOTE ADULT - ASSESSMENT
37 y/o F PMH SSA, Parvo B19 causing nephrotic syndrome FSGS leading to ESRD now on PD admitted for mal-functioning PD catheter.

## 2017-08-04 NOTE — H&P ADULT - HISTORY OF PRESENT ILLNESS
36yoF w/ PMHx significant for ESRD on PD (nightly for 8h while sleeping, last session on 8/1/17; pt still makes urine and is on diuretics; dry weight = 130lbs as per pt), hyperparathyroidism, hyperphosphatemia, sickle cell disease (HbSS), chronic anemia, who presents from home w/ complaints of a malfunctioning peritoneal dialysis catheter, and inability to perform dialysis sessions for two days prior to presentation. Pt denies any associated sx, including abdominal pain, constipation, melena/dark stools/hematochezia, dysuria/increased freq of urination/hematuria, erythema/edema/pain around catheter insertion site, discharge from catheter insertion site, fever/chills, or recent illness/sick contacts.     ED Presenting Vitals: 98.9F, 110bpm, 114/74, 16br/m, 95% on RA 36yoF w/ PMHx significant for ESRD on PD (nightly for 8h while sleeping, last session on 8/1/17; pt still makes urine and is on diuretics; dry weight = 130lbs as per pt), hyperparathyroidism, hyperphosphatemia, sickle cell disease (HbSS), chronic anemia, who presents from home w/ complaints of a malfunctioning peritoneal dialysis catheter, and inability to perform dialysis sessions for two days prior to presentation. Pt denies any associated sx, including abdominal pain, constipation (last BM day of presentation), melena/dark stools/hematochezia, dysuria/increased freq of urination/hematuria, erythema/edema/pain around catheter insertion site, discharge from catheter insertion site, fever/chills, or recent illness/sick contacts. Of note, pt recently hospitalized (pt left AMA on 6/15/17) for symptomatic, acute on chronic anemia.     ED Presenting Vitals: 98.9F, 110bpm, 114/74, 16br/m, 95% on RA 36yoF w/ PMHx significant for ESRD on PD (nightly for 8h while sleeping, last complete session on 8/1/17; pt still makes urine and is on diuretics; dry weight = 130lbs as per pt), hyperparathyroidism, hyperphosphatemia, sickle cell disease (HbSS), chronic anemia, who presents from home w/ complaints of a malfunctioning peritoneal dialysis catheter, and inability to perform complete dialysis sessions for two days prior to presentation. As per patient, after presenting to PD clinic, she was advised to present to ED for evaluation. Pt denies any associated sx, including abdominal pain, constipation (last BM day of presentation), melena/dark stools/hematochezia, dysuria/increased freq of urination/hematuria, erythema/edema/pain around catheter insertion site, discharge from catheter insertion site, fever/chills, or recent illness/sick contacts. Of note, pt recently hospitalized (pt left AMA on 6/15/17) for symptomatic, acute on chronic anemia. PD catheter placed in March 2017 by surgery/Dr. Angulo.    ED Presenting Vitals: 98.9F, 110bpm, 114/74, 16br/m, 95% on RA 36yoF w/ PMHx significant for ESRD on PD (nightly for 8h while sleeping, last complete session on 8/1/17; pt still makes urine and is on diuretics; dry weight = 130lbs as per pt), hyperparathyroidism, hyperphosphatemia, sickle cell disease (HbSS), chronic anemia, who presents from home w/ complaints of a malfunctioning peritoneal dialysis catheter (she is unable to completely drain dialysate), and inability to perform complete dialysis sessions for two days prior to presentation. As per patient, after presenting to PD clinic, she was advised to present to ED for evaluation. Pt denies any associated sx, including abdominal pain, constipation (last BM day of presentation), melena/dark stools/hematochezia, dysuria/increased freq of urination/hematuria, erythema/edema/pain around catheter insertion site, discharge from catheter insertion site, fever/chills, or recent illness/sick contacts. Of note, pt recently hospitalized (pt left AMA on 6/15/17) for symptomatic, acute on chronic anemia. PD catheter placed in March 2017 by surgery/Dr. Angulo.    ED Presenting Vitals: 98.9F, 110bpm, 114/74, 16br/m, 95% on RA

## 2017-08-04 NOTE — DISCHARGE NOTE ADULT - PATIENT PORTAL LINK FT
“You can access the [facility] Patient Portal, offered by [facility], by registering with the following website: [email or web address]”

## 2017-08-06 ENCOUNTER — INPATIENT (INPATIENT)
Facility: HOSPITAL | Age: 37
LOS: 2 days | Discharge: HOME | End: 2017-08-09
Attending: HOSPITALIST | Admitting: HOSPITALIST
Payer: COMMERCIAL

## 2017-08-06 VITALS
TEMPERATURE: 99 F | SYSTOLIC BLOOD PRESSURE: 112 MMHG | HEART RATE: 88 BPM | OXYGEN SATURATION: 97 % | DIASTOLIC BLOOD PRESSURE: 73 MMHG | RESPIRATION RATE: 16 BRPM

## 2017-08-06 DIAGNOSIS — Z98.51 TUBAL LIGATION STATUS: Chronic | ICD-10-CM

## 2017-08-06 DIAGNOSIS — Z98.89 OTHER SPECIFIED POSTPROCEDURAL STATES: Chronic | ICD-10-CM

## 2017-08-06 DIAGNOSIS — D64.9 ANEMIA, UNSPECIFIED: ICD-10-CM

## 2017-08-06 LAB
ALBUMIN SERPL ELPH-MCNC: 3.3 G/DL — SIGNIFICANT CHANGE UP (ref 3.3–5)
ALP SERPL-CCNC: 90 U/L — SIGNIFICANT CHANGE UP (ref 40–120)
ALT FLD-CCNC: 16 U/L — SIGNIFICANT CHANGE UP (ref 4–33)
ANISOCYTOSIS BLD QL: SLIGHT — SIGNIFICANT CHANGE UP
APTT BLD: 29.6 SEC — SIGNIFICANT CHANGE UP (ref 27.5–37.4)
AST SERPL-CCNC: 28 U/L — SIGNIFICANT CHANGE UP (ref 4–32)
BASOPHILS # BLD AUTO: 0.01 K/UL — SIGNIFICANT CHANGE UP (ref 0–0.2)
BASOPHILS NFR BLD AUTO: 0.1 % — SIGNIFICANT CHANGE UP (ref 0–2)
BASOPHILS NFR SPEC: 0 % — SIGNIFICANT CHANGE UP (ref 0–2)
BILIRUB SERPL-MCNC: 0.9 MG/DL — SIGNIFICANT CHANGE UP (ref 0.2–1.2)
BLD GP AB SCN SERPL QL: NEGATIVE — SIGNIFICANT CHANGE UP
BUN SERPL-MCNC: 86 MG/DL — HIGH (ref 7–23)
CALCIUM SERPL-MCNC: 7.8 MG/DL — LOW (ref 8.4–10.5)
CHLORIDE SERPL-SCNC: 99 MMOL/L — SIGNIFICANT CHANGE UP (ref 98–107)
CO2 SERPL-SCNC: 23 MMOL/L — SIGNIFICANT CHANGE UP (ref 22–31)
CREAT SERPL-MCNC: 13.11 MG/DL — HIGH (ref 0.5–1.3)
EOSINOPHIL # BLD AUTO: 0.75 K/UL — HIGH (ref 0–0.5)
EOSINOPHIL NFR BLD AUTO: 6.6 % — HIGH (ref 0–6)
EOSINOPHIL NFR FLD: 2 % — SIGNIFICANT CHANGE UP (ref 0–6)
GLUCOSE SERPL-MCNC: 94 MG/DL — SIGNIFICANT CHANGE UP (ref 70–99)
HCT VFR BLD CALC: 17.7 % — CRITICAL LOW (ref 34.5–45)
HGB BLD-MCNC: 6.4 G/DL — CRITICAL LOW (ref 11.5–15.5)
HYPOCHROMIA BLD QL: SLIGHT — SIGNIFICANT CHANGE UP
IMM GRANULOCYTES # BLD AUTO: 0.1 # — SIGNIFICANT CHANGE UP
IMM GRANULOCYTES NFR BLD AUTO: 0.9 % — SIGNIFICANT CHANGE UP (ref 0–1.5)
INR BLD: 0.89 — SIGNIFICANT CHANGE UP (ref 0.88–1.17)
LYMPHOCYTES # BLD AUTO: 3.58 K/UL — HIGH (ref 1–3.3)
LYMPHOCYTES # BLD AUTO: 31.4 % — SIGNIFICANT CHANGE UP (ref 13–44)
LYMPHOCYTES NFR SPEC AUTO: 33 % — SIGNIFICANT CHANGE UP (ref 13–44)
MANUAL SMEAR VERIFICATION: SIGNIFICANT CHANGE UP
MCHC RBC-ENTMCNC: 27.4 PG — SIGNIFICANT CHANGE UP (ref 27–34)
MCHC RBC-ENTMCNC: 36.2 % — HIGH (ref 32–36)
MCV RBC AUTO: 75.6 FL — LOW (ref 80–100)
MICROCYTES BLD QL: SLIGHT — SIGNIFICANT CHANGE UP
MONOCYTES # BLD AUTO: 1.2 K/UL — HIGH (ref 0–0.9)
MONOCYTES NFR BLD AUTO: 10.5 % — SIGNIFICANT CHANGE UP (ref 2–14)
MONOCYTES NFR BLD: 9 % — SIGNIFICANT CHANGE UP (ref 2–9)
NEUTROPHIL AB SER-ACNC: 56 % — SIGNIFICANT CHANGE UP (ref 43–77)
NEUTROPHILS # BLD AUTO: 5.77 K/UL — SIGNIFICANT CHANGE UP (ref 1.8–7.4)
NEUTROPHILS NFR BLD AUTO: 50.5 % — SIGNIFICANT CHANGE UP (ref 43–77)
NRBC # FLD: 0.03 — SIGNIFICANT CHANGE UP
OVALOCYTES BLD QL SMEAR: SIGNIFICANT CHANGE UP
PLATELET # BLD AUTO: 267 K/UL — SIGNIFICANT CHANGE UP (ref 150–400)
PLATELET COUNT - ESTIMATE: NORMAL — SIGNIFICANT CHANGE UP
PMV BLD: 11 FL — SIGNIFICANT CHANGE UP (ref 7–13)
POIKILOCYTOSIS BLD QL AUTO: SIGNIFICANT CHANGE UP
POTASSIUM SERPL-MCNC: 3.9 MMOL/L — SIGNIFICANT CHANGE UP (ref 3.5–5.3)
POTASSIUM SERPL-SCNC: 3.9 MMOL/L — SIGNIFICANT CHANGE UP (ref 3.5–5.3)
PROT SERPL-MCNC: 6.9 G/DL — SIGNIFICANT CHANGE UP (ref 6–8.3)
PROTHROM AB SERPL-ACNC: 10 SEC — SIGNIFICANT CHANGE UP (ref 9.8–13.1)
RBC # BLD: 2.34 M/UL — LOW (ref 3.8–5.2)
RBC # FLD: 21.5 % — HIGH (ref 10.3–14.5)
REVIEW TO FOLLOW: YES — SIGNIFICANT CHANGE UP
RH IG SCN BLD-IMP: NEGATIVE — SIGNIFICANT CHANGE UP
SICKLE CELLS BLD QL SMEAR: SIGNIFICANT CHANGE UP
SODIUM SERPL-SCNC: 140 MMOL/L — SIGNIFICANT CHANGE UP (ref 135–145)
WBC # BLD: 11.41 K/UL — HIGH (ref 3.8–10.5)
WBC # FLD AUTO: 11.41 K/UL — HIGH (ref 3.8–10.5)

## 2017-08-06 PROCEDURE — 99223 1ST HOSP IP/OBS HIGH 75: CPT | Mod: GC

## 2017-08-06 RX ORDER — DIPHENHYDRAMINE HCL 50 MG
50 CAPSULE ORAL ONCE
Qty: 0 | Refills: 0 | Status: COMPLETED | OUTPATIENT
Start: 2017-08-06 | End: 2017-08-06

## 2017-08-06 RX ORDER — FUROSEMIDE 40 MG
50 TABLET ORAL ONCE
Qty: 0 | Refills: 0 | Status: COMPLETED | OUTPATIENT
Start: 2017-08-06 | End: 2017-08-06

## 2017-08-06 RX ADMIN — Medication 50 MILLIGRAM(S): at 21:53

## 2017-08-06 NOTE — H&P ADULT - NSHPLABSRESULTS_GEN_ALL_CORE
(08-06 @ 18:30)                      6.4  11.41 )-----------( 267                 17.7    Neutrophils = 5.77 (50.5%)  Lymphocytes = 3.58 (31.4%)  Eosinophils = 0.75 (6.6%)  Basophils = 0.01 (0.1%)  Monocytes = 1.20 (10.5%)  Bands = --%    08-06    140  |  99  |  86<H>  ----------------------------<  94  3.9   |  23  |  13.11<H>    Ca    7.8<L>      06 Aug 2017 18:30    TPro  6.9  /  Alb  3.3  /  TBili  0.9  /  DBili  x   /  AST  28  /  ALT  16  /  AlkPhos  90  08-06    ( 06 Aug 2017 18:30 )   PT: 10.0 SEC;   INR: 0.89 ;       PTT:29.6 SEC    Labs reviewed. Leukocytosis. Microcytic anemia with drop in hgb from 8 to 6.4 Cr of 13. Ca 7.8  Imaging from 8/3: PD cath in LUQ

## 2017-08-06 NOTE — ED PROVIDER NOTE - OBJECTIVE STATEMENT
35 yo female with PMHx of chronic kidney disease s/p parvo virus infection causing avascular necrosis PD dialysis patient presenting to the ED for admission, pending surgical replacement of dialysis catheter tomorrow with Dr. Angulo. Pt seen at [facility] on wednesday with xray done showing catheter tip in LUQ. Pt sent here by Dr. Abdalla (nephrology at [facility]). Pt denies current complaints. Denies fever, chills, nausea, vomiting, abdominal pain, cp ,sob, diarrhea, constipation, urinary sx.

## 2017-08-06 NOTE — H&P ADULT - NSHPOUTPATIENTPROVIDERS_GEN_ALL_CORE
Dr Saniya Velásquez [phone]  Dr Hampton ( nephrologist) [phone] PCP: Dr Saniya Velásquez [phone] Heme: Dr Saniya Velásquez [phone]  Nephrologist: Dr. Abdalla

## 2017-08-06 NOTE — H&P ADULT - ASSESSMENT
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

## 2017-08-06 NOTE — H&P ADULT - HISTORY OF PRESENT ILLNESS
35-year-old woman w/ a hx of sickle cell  disease, Right hip avascular necrosis (s/p remote bone graft), and Parvovirus B19 infection c/b Nephrotic syndrome FSGS, collapsing variant (5/2016), ESRD on PD, chronic anemia 35-year-old woman w/ a hx of sickle cell  disease, Right hip avascular necrosis (s/p remote bone graft), and Parvovirus B19 infection c/b Nephrotic syndrome FSGS, collapsing variant (5/2016), ESRD on PD, chronic anemia presents for scheduled surgical replacement of peritoneal dialysis catheter 8/7 with Dr. Angulo. Pt without acute complaints however found to be anemic to 6.4 35-year-old woman w/ a hx of sickle cell  disease, Right hip avascular necrosis (s/p remote bone graft), and Parvovirus B19 infection c/b Nephrotic syndrome FSGS, collapsing variant (5/2016), ESRD on PD, chronic anemia presents for scheduled surgical replacement of peritoneal dialysis catheter 8/7 with Dr. Angulo. Pt without acute complaints however found to be anemic to 6.4 on admission labs. Denies shortness of breath, palpitations, chest pain or signs of bleeding. Reports LMP was 7/18/16. Periods last 4 days and are usually heavy requiring use of 3-4 menstrual pads. Denies n/v, diarrhea, abdominal pain, fevers, 35-year-old woman w/ a hx of sickle cell  disease, Right hip avascular necrosis (s/p remote bone graft), and Parvovirus B19 infection c/b Nephrotic syndrome FSGS, collapsing variant (5/2016), ESRD on PD, chronic anemia presents for scheduled surgical replacement of peritoneal dialysis catheter 8/7 with Dr. Angulo. Pt without acute complaints however found to be anemic to 6.4 on admission labs. Denies shortness of breath, palpitations, chest pain or signs of bleeding. Reports LMP was 7/18/16. Periods last 4 days and are usually heavy requiring use of 3-4 menstrual pads. Denies n/v, diarrhea, melena, abdominal pain, fevers or lower extremity edema.   In the ED, Vital Signs Last 24 Hrs  T(C): 36.8 (06 Aug 2017 23:05), Max: 37.1 (06 Aug 2017 16:33)  T(F): 98.3 (06 Aug 2017 23:05), Max: 98.7 (06 Aug 2017 16:33)  HR: 81 (06 Aug 2017 23:05) (80 - 88)  BP: 111/69 (06 Aug 2017 23:05) (109/78 - 128/83)  RR: 17 (06 Aug 2017 23:05) (16 - 18)  SpO2: 98% (06 Aug 2017 23:05) (97% - 100%)    Pt was transfused 1 unit of PRBCs and treated with 1x 50mg IV lasix and IV benadryl. 35-year-old woman w/ a hx of sickle cell  disease, Right hip avascular necrosis (s/p remote bone graft), and Parvovirus B19 infection c/b Nephrotic syndrome FSGS, collapsing variant (5/2016), ESRD on PD, chronic anemia presents for scheduled surgical replacement of peritoneal dialysis catheter 8/7 with Dr. Angulo. Pt noted the catheter was not draining as well therefore presented to [facility] where the catheter was found to be malpositioned. Pt without acute complaints however found to be anemic to 6.4 on admission labs. Denies shortness of breath, palpitations, chest pain or signs of bleeding. Reports LMP was 7/18/16. Periods last 4 days and are usually heavy requiring use of 3-4 menstrual pads. Denies n/v, diarrhea, melena, abdominal pain, fevers or lower extremity edema.   In the ED, Vital Signs Last 24 Hrs  T(C): 36.8 (06 Aug 2017 23:05), Max: 37.1 (06 Aug 2017 16:33)  T(F): 98.3 (06 Aug 2017 23:05), Max: 98.7 (06 Aug 2017 16:33)  HR: 81 (06 Aug 2017 23:05) (80 - 88)  BP: 111/69 (06 Aug 2017 23:05) (109/78 - 128/83)  RR: 17 (06 Aug 2017 23:05) (16 - 18)  SpO2: 98% (06 Aug 2017 23:05) (97% - 100%)    Pt was transfused 1 unit of PRBCs and treated with 1x 50mg IV lasix and IV benadryl. 35-year-old female, [employer] employee, with h/o sickle cell  disease (remote VOC), h/o Rt hip  avascular necrosis (s/p remote bone graft per the pt), Parvovirus infection c/b Nephrotic syndrome FSGS, collapsing variant (5/2016), ESRD on PD (still makes "some" urine), chronic anemia presents for scheduled surgical replacement of peritoneal dialysis catheter 8/7 with Dr. Angulo. Pt noted the catheter was not draining as well therefore presented to [facility] where the catheter was found to be malpositioned. Pt without acute complaints however found to be anemic to 6.4 on admission labs. Denies shortness of breath, palpitations, chest pain or signs of bleeding. Reports LMP was 7/18/16. Periods last 4 days and are usually heavy requiring use of 3-4 menstrual pads. Reports no n/v, diarrhea, melena, abdominal pain, fevers or lower extremity edema, however, says that her face feels and look swollen, which is c/w "too much fluid". In addition the pt says that has moderate body aches and b/l knee pain which are c/w a "crisis". Pt states that her pain is usually controlled by Tylenol with codeine.     In the ED, Vital Signs Last 24 Hrs  T(C): 36.8 (06 Aug 2017 23:05), Max: 37.1 (06 Aug 2017 16:33)  T(F): 98.3 (06 Aug 2017 23:05), Max: 98.7 (06 Aug 2017 16:33)  HR: 81 (06 Aug 2017 23:05) (80 - 88)  BP: 111/69 (06 Aug 2017 23:05) (109/78 - 128/83)  RR: 17 (06 Aug 2017 23:05) (16 - 18)  SpO2: 98% (06 Aug 2017 23:05) (97% - 100%)    Pt was transfused 1 unit of PRBCs and treated with 1x 50mg IV lasix and IV benadryl.

## 2017-08-06 NOTE — ED ADULT TRIAGE NOTE - CHIEF COMPLAINT QUOTE
onset 5 days ago. c/o  PD no working . s/p xray done, catheter "moved up" scheduled for sx tomorrow w/ Dr Angulo. . s/p parvo virus that affected kidneys 2016. here for admission onset 5 days ago. c/o  PD not working . s/p xray done, catheter "moved up" scheduled for sx tomorrow w/ Dr Angulo. . s/p parvo virus that affected kidneys 2016. here for admission

## 2017-08-06 NOTE — ED ADULT NURSE NOTE - OBJECTIVE STATEMENT
pt AO x3, ambulatory, present for admission s/p surgery for PD not working since last Wednesday. XR shows catheter moved up. Scheduled for surgery tomorrow w/  Held. Last dialysis was Wednesday and she couldn't dialysis for 4 days. Denies any pain or discomfort at this time. VSS. Pending evaluation by provider. pt asked US for IV access and blood works. Awaiting further study. Will continue to monitor.

## 2017-08-06 NOTE — ED PROVIDER NOTE - MEDICAL DECISION MAKING DETAILS
35 yo female with PMHx of chronic kidney disease s/p parvo virus infection causing avascular necrosis PD dialysis patient presenting to the ED for admission, pending surgical replacement of dialysis catheter tomorrow with Dr. Angulo.  Plan: check kidney function  Labs, preops, admit

## 2017-08-06 NOTE — H&P ADULT - NSHPREVIEWOFSYSTEMS_GEN_ALL_CORE
CONSTITUTIONAL:  No weight loss, fever, chills, weakness or fatigue.  HEENT:  Eyes:  No visual loss, blurred vision, double vision or yellow sclerae. Ears, Nose, Throat:  No hearing loss, sneezing, congestion, runny nose or sore throat.  SKIN:  No rash or itching.  CARDIOVASCULAR:  No chest pain, chest pressure or chest discomfort. No palpitations or edema.  RESPIRATORY:  No shortness of breath, cough or sputum.  GASTROINTESTINAL:  No anorexia, nausea, vomiting or diarrhea. No abdominal pain or blood.  GENITOURINARY:  Denies hematuria, dysuria.   NEUROLOGICAL:  No headache, dizziness, syncope, paralysis, ataxia, numbness or tingling in the extremities. No change in bowel or bladder control.  MUSCULOSKELETAL:  No muscle, back pain, joint pain or stiffness.  HEMATOLOGIC:  No anemia, bleeding or bruising.  LYMPHATICS:  No enlarged nodes. No history of splenectomy.  PSYCHIATRIC:  No history of depression or anxiety.  ENDOCRINOLOGIC:  No reports of sweating, cold or heat intolerance. No polyuria or polydipsia.  ALLERGIES:  No history of asthma, hives, eczema or rhinitis. CONSTITUTIONAL:  No weight loss, fever, chills, weakness or fatigue.  HEENT:  Eyes:  No visual loss, blurred vision, double vision or yellow sclerae. Ears, Nose, Throat:  No hearing loss, sneezing, congestion, runny nose or sore throat.  SKIN:  No rash or itching.  CARDIOVASCULAR:  No chest pain, chest pressure or chest discomfort. No palpitations or edema.  RESPIRATORY:  No shortness of breath, cough or sputum.  GASTROINTESTINAL:  No anorexia, nausea, vomiting or diarrhea. No abdominal pain or blood.  GENITOURINARY:  Denies hematuria, dysuria.   NEUROLOGICAL:  No headache, dizziness, syncope, paralysis, ataxia, numbness or tingling in the extremities. No change in bowel or bladder control.  MUSCULOSKELETAL:  gen body pains and b/l knee pain  HEMATOLOGIC:  No anemia, bleeding or bruising.  LYMPHATICS:  No enlarged nodes. No history of splenectomy.  PSYCHIATRIC:  No history of depression or anxiety.  ENDOCRINOLOGIC:  No reports of sweating, cold or heat intolerance. No polyuria or polydipsia.  ALLERGIES:  No history of asthma, hives, eczema or rhinitis.

## 2017-08-06 NOTE — ED ADULT NURSE NOTE - CHIEF COMPLAINT QUOTE
onset 5 days ago. c/o  PD not working . s/p xray done, catheter "moved up" scheduled for sx tomorrow w/ Dr Angulo. . s/p parvo virus that affected kidneys 2016. here for admission

## 2017-08-06 NOTE — ED PROVIDER NOTE - CHPI ED SYMPTOMS NEG
no nausea/no pain/no vomiting/no decreased eating/drinking/no numbness/no chills/no tingling/no dizziness/no weakness/no fever

## 2017-08-06 NOTE — H&P ADULT - NSHPPHYSICALEXAM_GEN_ALL_CORE
GENERAL APPEARANCE: Well developed, well nourished, alert and cooperative. NAD.   HEENT:  PERRL, EOMI. External auditory canals normal, hearing grossly intact.  NECK: Neck supple, non-tender without lymphadenopathy, masses or thyromegaly.  CARDIAC: Normal S1 and S2. No S3, S4 or murmurs. Rhythm is regular.  LUNGS: Clear to auscultation and percussion without rales, rhonchi, wheezing or diminished breath sounds.  ABDOMEN: Positive bowel sounds. Soft, nondistended, nontender. PD catheter belt in place. No guarding or rebound.   MUSKULOSKELETAL: ROM intact spine and extremities. No joint erythema or tenderness.   BACK: normal gait and posture, no spinal deformity, symmetry of spinal muscles, without tenderness, decreased range of motion.  EXTREMITIES: No significant deformity or joint abnormality. No edema. Peripheral pulses intact. No varicosities.  NEUROLOGICAL: CN II-XII intact. Strength and sensation symmetric and intact throughout.   SKIN: Skin normal color, texture and turgor with no lesions or eruptions.  PSYCHIATRIC: AOx3.Normal affect and behavior.

## 2017-08-06 NOTE — H&P ADULT - PROBLEM SELECTOR PLAN 3
-continue with Calcitrol  -continue to monitor -Plan for PD catheter exchange tomorrow   -NPO after midnight  -continue with lasix 100 mg BID and metolazone  -continue with ca acetate, nephrovite  -monitor electrolytes, avoid nephrotoxins -Plan for catheter replacement tomorrow, transfuse 2 units as per surgery  -follow up post transfusion cbc  -Heme consult in the AM for weigh in regarding OR timing given acute VOC

## 2017-08-06 NOTE — H&P ADULT - PROBLEM SELECTOR PLAN 1
-will check iron studies  -likely mixed picture of iron deficiency anemia and anemia of chronic disease due to ESRD  -will transfuse 1 unit of prbcs  -follow up post transfusion cbc in the AM  -continue with epo on HD days M/W/F  -continue with folic acid -will check iron studies  -likely mixed picture of iron deficiency anemia and anemia of chronic disease due to ESRD  -will transfuse 1 unit of prbcs  -follow up post transfusion cbc in the AM  -continue with epo on HD days M/W/F  -continue with folic acid  -heme consult -will check iron studies  -likely mixed picture of iron deficiency anemia and anemia of chronic disease due to ESRD  -will transfuse 2 unit of prbcs  -follow up post transfusion cbc in the AM  -continue with epo on HD days M/W/F  -continue with folic acid  -heme consult Transfuse 2 units of PRBC  Pain control, Tylenol and low dose Dilaudid in the setting of ESRD; The pt wishes to not be on PCA pump as the pain is only mild to moderate;   Given pending transfusion of 2 units of PRBC and mild fluid overload in the setting of ESRD and PD will hold on IV hydration  Hemolysis labs in AM  Hematology c/s in AM Transfuse 2 units of PRBC  Pain control, Tylenol and low dose Dilaudid in the setting of ESRD; The pt wishes to not be on PCA pump as the pain is only mild to moderate;   Given pending transfusion of 2 units of PRBC and mild fluid overload in the setting of ESRD and PD will hold off on IV hydration  Hemolysis labs in AM  Hematology c/s in AM

## 2017-08-06 NOTE — H&P ADULT - PROBLEM SELECTOR PLAN 2
-Plan for PD catheter exchange tomorrow   -NPO after midnight  -continue with lasix 100 mg BID and metolazone  -continue with ca acetate, nephrovite  -monitor electrolytes, avoid nephrotoxins -plan for catheter replacement tomorrow, follow up post transfusion cbc  -Will consult Heme in the morning regarding need for additional transfusion -plan for catheter replacement tomorrow, transfuse 2 units as per surgery  -follow up post transfusion cbc  -Will consult Heme in the morning -plan for catheter replacement tomorrow, transfuse 2 units as per surgery  -follow up post transfusion cbc  -consider Heme consult in the AM Multifactorial anemia given ESRD, sickle cell disease and chronic inflammation;  -follow up post transfusion cbc in the AM  -continue with folic acid  -Heme consult in AM prior to OR

## 2017-08-06 NOTE — H&P ADULT - PROBLEM SELECTOR PLAN 4
-SCDs  -continue with colace -continue with Calcitrol  -continue to monitor -Plan for PD catheter exchange   -NPO after midnight  -continue with lasix 100 mg BID and metolazone  -continue with ca acetate, nephrovite  -monitor electrolytes, avoid nephrotoxins -Renal c/s in AM  -Plan for PD catheter exchange   -NPO after midnight  -continue with lasix 100 mg BID and metolazone  -continue with ca acetate, nephrovite  -monitor electrolytes, avoid nephrotoxins

## 2017-08-07 DIAGNOSIS — N18.9 CHRONIC KIDNEY DISEASE, UNSPECIFIED: ICD-10-CM

## 2017-08-07 DIAGNOSIS — D64.9 ANEMIA, UNSPECIFIED: ICD-10-CM

## 2017-08-07 DIAGNOSIS — E83.51 HYPOCALCEMIA: ICD-10-CM

## 2017-08-07 DIAGNOSIS — T85.611A BREAKDOWN (MECHANICAL) OF INTRAPERITONEAL DIALYSIS CATHETER, INITIAL ENCOUNTER: ICD-10-CM

## 2017-08-07 DIAGNOSIS — Z29.9 ENCOUNTER FOR PROPHYLACTIC MEASURES, UNSPECIFIED: ICD-10-CM

## 2017-08-07 DIAGNOSIS — D57.00 HB-SS DISEASE WITH CRISIS, UNSPECIFIED: ICD-10-CM

## 2017-08-07 DIAGNOSIS — N18.6 END STAGE RENAL DISEASE: ICD-10-CM

## 2017-08-07 LAB
BASOPHILS # BLD AUTO: 0.05 K/UL — SIGNIFICANT CHANGE UP (ref 0–0.2)
BASOPHILS NFR BLD AUTO: 0.4 % — SIGNIFICANT CHANGE UP (ref 0–2)
BILIRUB SERPL-MCNC: 0.7 MG/DL — SIGNIFICANT CHANGE UP (ref 0.2–1.2)
BUN SERPL-MCNC: 93 MG/DL — HIGH (ref 7–23)
CALCIUM SERPL-MCNC: 7.3 MG/DL — LOW (ref 8.4–10.5)
CHLORIDE SERPL-SCNC: 100 MMOL/L — SIGNIFICANT CHANGE UP (ref 98–107)
CO2 SERPL-SCNC: 20 MMOL/L — LOW (ref 22–31)
CREAT SERPL-MCNC: 13.14 MG/DL — HIGH (ref 0.5–1.3)
EOSINOPHIL # BLD AUTO: 0.75 K/UL — HIGH (ref 0–0.5)
EOSINOPHIL NFR BLD AUTO: 6.7 % — HIGH (ref 0–6)
FERRITIN SERPL-MCNC: 2501 NG/ML — HIGH (ref 15–150)
GLUCOSE SERPL-MCNC: 77 MG/DL — SIGNIFICANT CHANGE UP (ref 70–99)
HCG SERPL-ACNC: < 5 MIU/ML — SIGNIFICANT CHANGE UP
HCG SERPL-ACNC: < 5 MIU/ML — SIGNIFICANT CHANGE UP
HCG UR-SCNC: NEGATIVE — SIGNIFICANT CHANGE UP
HCT VFR BLD CALC: 22.5 % — LOW (ref 34.5–45)
HGB BLD-MCNC: 8.1 G/DL — LOW (ref 11.5–15.5)
IMM GRANULOCYTES # BLD AUTO: 0.07 # — SIGNIFICANT CHANGE UP
IMM GRANULOCYTES NFR BLD AUTO: 0.6 % — SIGNIFICANT CHANGE UP (ref 0–1.5)
INR BLD: 0.88 — SIGNIFICANT CHANGE UP (ref 0.88–1.17)
IRON SATN MFR SERPL: 165 UG/DL — SIGNIFICANT CHANGE UP (ref 140–530)
IRON SATN MFR SERPL: 71 UG/DL — SIGNIFICANT CHANGE UP (ref 30–160)
LDH SERPL L TO P-CCNC: 412 U/L — HIGH (ref 135–225)
LYMPHOCYTES # BLD AUTO: 3.79 K/UL — HIGH (ref 1–3.3)
LYMPHOCYTES # BLD AUTO: 34 % — SIGNIFICANT CHANGE UP (ref 13–44)
MAGNESIUM SERPL-MCNC: 1.8 MG/DL — SIGNIFICANT CHANGE UP (ref 1.6–2.6)
MCHC RBC-ENTMCNC: 28.3 PG — SIGNIFICANT CHANGE UP (ref 27–34)
MCHC RBC-ENTMCNC: 36 % — SIGNIFICANT CHANGE UP (ref 32–36)
MCV RBC AUTO: 78.7 FL — LOW (ref 80–100)
MONOCYTES # BLD AUTO: 1.21 K/UL — HIGH (ref 0–0.9)
MONOCYTES NFR BLD AUTO: 10.8 % — SIGNIFICANT CHANGE UP (ref 2–14)
NEUTROPHILS # BLD AUTO: 5.29 K/UL — SIGNIFICANT CHANGE UP (ref 1.8–7.4)
NEUTROPHILS NFR BLD AUTO: 47.5 % — SIGNIFICANT CHANGE UP (ref 43–77)
NRBC # FLD: 0.02 — SIGNIFICANT CHANGE UP
PHOSPHATE SERPL-MCNC: 7.9 MG/DL — HIGH (ref 2.5–4.5)
PLATELET # BLD AUTO: 243 K/UL — SIGNIFICANT CHANGE UP (ref 150–400)
PMV BLD: 10.6 FL — SIGNIFICANT CHANGE UP (ref 7–13)
POTASSIUM SERPL-MCNC: 4.2 MMOL/L — SIGNIFICANT CHANGE UP (ref 3.5–5.3)
POTASSIUM SERPL-SCNC: 4.2 MMOL/L — SIGNIFICANT CHANGE UP (ref 3.5–5.3)
PROTHROM AB SERPL-ACNC: 9.9 SEC — SIGNIFICANT CHANGE UP (ref 9.8–13.1)
RBC # BLD: 2.86 M/UL — LOW (ref 3.8–5.2)
RBC # FLD: 20.3 % — HIGH (ref 10.3–14.5)
RETICS #: 0.2 10X6/UL — HIGH (ref 0.02–0.07)
RETICS/RBC NFR: 8.1 % — HIGH (ref 0.5–2.5)
SODIUM SERPL-SCNC: 142 MMOL/L — SIGNIFICANT CHANGE UP (ref 135–145)
SP GR UR: 1.01 — SIGNIFICANT CHANGE UP (ref 1–1.03)
UIBC SERPL-MCNC: 94 UG/DL — LOW (ref 110–370)
WBC # BLD: 11.16 K/UL — HIGH (ref 3.8–10.5)
WBC # FLD AUTO: 11.16 K/UL — HIGH (ref 3.8–10.5)

## 2017-08-07 PROCEDURE — 99233 SBSQ HOSP IP/OBS HIGH 50: CPT | Mod: GC

## 2017-08-07 PROCEDURE — 99222 1ST HOSP IP/OBS MODERATE 55: CPT | Mod: GC

## 2017-08-07 RX ORDER — DIPHENHYDRAMINE HCL 50 MG
50 CAPSULE ORAL ONCE
Qty: 0 | Refills: 0 | Status: COMPLETED | OUTPATIENT
Start: 2017-08-07 | End: 2017-08-07

## 2017-08-07 RX ORDER — FOLIC ACID 0.8 MG
1 TABLET ORAL DAILY
Qty: 0 | Refills: 0 | Status: DISCONTINUED | OUTPATIENT
Start: 2017-08-07 | End: 2017-08-09

## 2017-08-07 RX ORDER — ACETAMINOPHEN 500 MG
650 TABLET ORAL EVERY 8 HOURS
Qty: 0 | Refills: 0 | Status: DISCONTINUED | OUTPATIENT
Start: 2017-08-07 | End: 2017-08-09

## 2017-08-07 RX ORDER — HYDROMORPHONE HYDROCHLORIDE 2 MG/ML
0.5 INJECTION INTRAMUSCULAR; INTRAVENOUS; SUBCUTANEOUS ONCE
Qty: 0 | Refills: 0 | Status: DISCONTINUED | OUTPATIENT
Start: 2017-08-07 | End: 2017-08-07

## 2017-08-07 RX ORDER — GENTAMICIN SULFATE 0.1 %
1 OINTMENT (GRAM) TOPICAL
Qty: 0 | Refills: 0 | Status: DISCONTINUED | OUTPATIENT
Start: 2017-08-07 | End: 2017-08-09

## 2017-08-07 RX ORDER — DOCUSATE SODIUM 100 MG
300 CAPSULE ORAL DAILY
Qty: 0 | Refills: 0 | Status: DISCONTINUED | OUTPATIENT
Start: 2017-08-07 | End: 2017-08-09

## 2017-08-07 RX ORDER — CALCIUM ACETATE 667 MG
667 TABLET ORAL
Qty: 0 | Refills: 0 | Status: DISCONTINUED | OUTPATIENT
Start: 2017-08-07 | End: 2017-08-09

## 2017-08-07 RX ORDER — FUROSEMIDE 40 MG
100 TABLET ORAL
Qty: 0 | Refills: 0 | Status: DISCONTINUED | OUTPATIENT
Start: 2017-08-07 | End: 2017-08-09

## 2017-08-07 RX ORDER — CALCITRIOL 0.5 UG/1
0.5 CAPSULE ORAL DAILY
Qty: 0 | Refills: 0 | Status: DISCONTINUED | OUTPATIENT
Start: 2017-08-07 | End: 2017-08-09

## 2017-08-07 RX ORDER — HYDROMORPHONE HYDROCHLORIDE 2 MG/ML
0.5 INJECTION INTRAMUSCULAR; INTRAVENOUS; SUBCUTANEOUS EVERY 6 HOURS
Qty: 0 | Refills: 0 | Status: DISCONTINUED | OUTPATIENT
Start: 2017-08-07 | End: 2017-08-09

## 2017-08-07 RX ADMIN — Medication 300 MILLIGRAM(S): at 12:37

## 2017-08-07 RX ADMIN — Medication 667 MILLIGRAM(S): at 12:37

## 2017-08-07 RX ADMIN — Medication 1 TABLET(S): at 12:37

## 2017-08-07 RX ADMIN — HYDROMORPHONE HYDROCHLORIDE 0.5 MILLIGRAM(S): 2 INJECTION INTRAMUSCULAR; INTRAVENOUS; SUBCUTANEOUS at 02:50

## 2017-08-07 RX ADMIN — CALCITRIOL 0.5 MICROGRAM(S): 0.5 CAPSULE ORAL at 12:37

## 2017-08-07 RX ADMIN — HYDROMORPHONE HYDROCHLORIDE 0.5 MILLIGRAM(S): 2 INJECTION INTRAMUSCULAR; INTRAVENOUS; SUBCUTANEOUS at 21:45

## 2017-08-07 RX ADMIN — HYDROMORPHONE HYDROCHLORIDE 0.5 MILLIGRAM(S): 2 INJECTION INTRAMUSCULAR; INTRAVENOUS; SUBCUTANEOUS at 21:27

## 2017-08-07 RX ADMIN — Medication 100 MILLIGRAM(S): at 06:55

## 2017-08-07 RX ADMIN — Medication 50 MILLIGRAM(S): at 02:27

## 2017-08-07 RX ADMIN — HYDROMORPHONE HYDROCHLORIDE 0.5 MILLIGRAM(S): 2 INJECTION INTRAMUSCULAR; INTRAVENOUS; SUBCUTANEOUS at 03:05

## 2017-08-07 RX ADMIN — Medication 1 MILLIGRAM(S): at 12:37

## 2017-08-07 RX ADMIN — Medication 667 MILLIGRAM(S): at 17:09

## 2017-08-07 RX ADMIN — Medication 100 MILLIGRAM(S): at 17:09

## 2017-08-07 NOTE — CONSULT NOTE ADULT - ASSESSMENT
36F with sickle cell and ESRD on PD presents with PD catheter malpositioning and anemia  -transfuse 2 units and repeat CBC  -NPO pMN  -electrolytes as per medicine  -OR tomorrow  -d/w Dr. Angulo

## 2017-08-07 NOTE — PROGRESS NOTE ADULT - SUBJECTIVE AND OBJECTIVE BOX
Patient is a 36y old  Female who presents with a chief complaint of Cath replacement/anemia (06 Aug 2017 22:45)      SUBJECTIVE / OVERNIGHT EVENTS:  No acute events overnight. Patient found to have abnormal PD catheter functions this past Tuesday. Has been off dialysis since Thursday. Denied pain, sob, chest pain.    MEDICATIONS  (STANDING):  gentamicin 0.1% Cream 1 Application(s) Topical four times a day  metolazone 5 milliGRAM(s) Oral two times a day  furosemide    Tablet 100 milliGRAM(s) Oral two times a day  docusate sodium 300 milliGRAM(s) Oral daily  calcium acetate 667 milliGRAM(s) Oral three times a day with meals  Nephro-jay 1 Tablet(s) Oral daily  calcitriol   Capsule 0.5 MICROGram(s) Oral daily  folic acid 1 milliGRAM(s) Oral daily    MEDICATIONS  (PRN):  acetaminophen   Tablet. 650 milliGRAM(s) Oral every 8 hours PRN Mild Pain (1 - 3)  HYDROmorphone  Injectable 0.5 milliGRAM(s) IV Push every 6 hours PRN Moderate and Severe Pain (4 - 10)      CAPILLARY BLOOD GLUCOSE  I&O's Summary    07 Aug 2017 07:01  -  07 Aug 2017 08:44  --------------------------------------------------------  IN: 300 mL / OUT: 0 mL / NET: 300 mL        T(C): 36.5 (08-07-17 @ 06:51), Max: 37.1 (08-06-17 @ 16:33)  HR: 64 (08-07-17 @ 06:51) (64 - 88)  BP: 100/66 (08-07-17 @ 06:51) (97/62 - 128/83)  RR: 18 (08-07-17 @ 06:51) (16 - 18)  SpO2: 99% (08-07-17 @ 06:51) (97% - 100%)  PHYSICAL EXAM:  GENERAL: NAD, well-developed  HEAD:  Atraumatic, Normocephalic  EYES: EOMI, PERRLA, conjunctiva and sclera clear  NECK: Supple, No JVD  CHEST/LUNG: Clear to auscultation bilaterally; No wheeze  HEART: Regular rate and rhythm; No murmurs, rubs, or gallops  ABDOMEN: Soft, Nontender, Nondistended; PD catheter with dressing in place.  EXTREMITIES:  2+ Peripheral Pulses, No clubbing, cyanosis, or edema  PSYCH: AAOx3  NEUROLOGY: non-focal  SKIN: No rashes or lesions    LABS:                        8.1    11.16 )-----------( 243      ( 07 Aug 2017 06:45 )             22.5     WBC Trend: 11.16<--, 11.41<--, 14.5<--  08-07    142  |  100  |  93<H>  ----------------------------<  77  4.2   |  20<L>  |  13.14<H>    Ca    7.3<L>      07 Aug 2017 06:45  Phos  7.9     08-07  Mg     1.8     08-07    TPro  x   /  Alb  x   /  TBili  0.7  /  DBili  x   /  AST  x   /  ALT  x   /  AlkPhos  x   08-07    Creatinine Trend: 13.14<--, 13.11<--, 10.07<--  PT/INR - ( 07 Aug 2017 06:45 )   PT: 9.9 SEC;   INR: 0.88          PTT - ( 06 Aug 2017 18:30 )  PTT:29.6 SEC      RADIOLOGY & ADDITIONAL TESTS:    Imaging Personally Reviewed:    Consultant(s) Notes Reviewed:      Care Discussed with Consultants/Other Providers: Patient is a 36y old  Female who presents with a chief complaint of Cath replacement/anemia (06 Aug 2017 22:45)    SUBJECTIVE / OVERNIGHT EVENTS:  No acute events overnight. Patient found to have abnormal PD catheter functions this past Tuesday. Has been off dialysis since Thursday. Denied pain, sob, chest pain.    MEDICATIONS  (STANDING):  gentamicin 0.1% Cream 1 Application(s) Topical four times a day  metolazone 5 milliGRAM(s) Oral two times a day  furosemide    Tablet 100 milliGRAM(s) Oral two times a day  docusate sodium 300 milliGRAM(s) Oral daily  calcium acetate 667 milliGRAM(s) Oral three times a day with meals  Nephro-jay 1 Tablet(s) Oral daily  calcitriol   Capsule 0.5 MICROGram(s) Oral daily  folic acid 1 milliGRAM(s) Oral daily    MEDICATIONS  (PRN):  acetaminophen   Tablet. 650 milliGRAM(s) Oral every 8 hours PRN Mild Pain (1 - 3)  HYDROmorphone  Injectable 0.5 milliGRAM(s) IV Push every 6 hours PRN Moderate and Severe Pain (4 - 10)      CAPILLARY BLOOD GLUCOSE  I&O's Summary    07 Aug 2017 07:01  -  07 Aug 2017 08:44  --------------------------------------------------------  IN: 300 mL / OUT: 0 mL / NET: 300 mL        T(C): 36.5 (08-07-17 @ 06:51), Max: 37.1 (08-06-17 @ 16:33)  HR: 64 (08-07-17 @ 06:51) (64 - 88)  BP: 100/66 (08-07-17 @ 06:51) (97/62 - 128/83)  RR: 18 (08-07-17 @ 06:51) (16 - 18)  SpO2: 99% (08-07-17 @ 06:51) (97% - 100%)  PHYSICAL EXAM:  GENERAL: NAD, well-developed  HEAD:  Atraumatic, Normocephalic  EYES: EOMI, PERRLA, conjunctiva and sclera clear  NECK: Supple, No JVD  CHEST/LUNG: Clear to auscultation bilaterally; No wheeze  HEART: Regular rate and rhythm; No murmurs, rubs, or gallops  ABDOMEN: Soft, Nontender, Nondistended; PD catheter with dressing in place.  EXTREMITIES:  2+ Peripheral Pulses, No clubbing, cyanosis, or edema  PSYCH: AAOx3  NEUROLOGY: non-focal  SKIN: No rashes or lesions    LABS:                        8.1    11.16 )-----------( 243      ( 07 Aug 2017 06:45 )             22.5     WBC Trend: 11.16<--, 11.41<--, 14.5<--  08-07    142  |  100  |  93<H>  ----------------------------<  77  4.2   |  20<L>  |  13.14<H>    Ca    7.3<L>      07 Aug 2017 06:45  Phos  7.9     08-07  Mg     1.8     08-07    TPro  x   /  Alb  x   /  TBili  0.7  /  DBili  x   /  AST  x   /  ALT  x   /  AlkPhos  x   08-07    Creatinine Trend: 13.14<--, 13.11<--, 10.07<--  PT/INR - ( 07 Aug 2017 06:45 )   PT: 9.9 SEC;   INR: 0.88          PTT - ( 06 Aug 2017 18:30 )  PTT:29.6 SEC      RADIOLOGY & ADDITIONAL TESTS:    Imaging Personally Reviewed:  Chest X-ray- Bibasilar atelectasis    Consultant(s) Notes Reviewed:      Care Discussed with Consultants/Other Providers: Surgery- Discussed possible plan for surgery tomorrow

## 2017-08-07 NOTE — CONSULT NOTE ADULT - SUBJECTIVE AND OBJECTIVE BOX
[facility] Division of Kidney Diseases & Hypertension  INITIAL CONSULT NOTE  [phone]--------------------------------------------------------------------------------  HPI:    5 y/o F PMH Sickle cell disease, Parvo B19 (with failed IVIG treatment), nephrotic syndrome from FSGS leading to  ESRD now on PD p/w malfunctioning PD catheter. As per pt, her last complete HD was 17. Pt. states since last weekend, she was unable to completely drain her belly. Pt. states she fills her belly with 2L and only been able to drain 1 L out. She went to PD clinic on Wednesday where an abdominal xray was done. Pt. states the next day, she was able to drain 1.7L , which was an improvement, however she was advised to go to the hospital for evaluation of PD catheter. She went to [facility] on  and was advised by surgery for catheter repositioning. Since her surgeon comes to [facility], she was discharged from [facility] and came to [facility] yesterday. She does 3 cycles of CCPD with 2L each (2.5% dextrose and last fill with extraneal fluid). She was scheduled to go to OR today but was rescheduled for tomorrow due to anemia. Currently she has no complains of SOB, CP, abdominal pain, fevers with chills, vomiting, leg swelling.         PAST HISTORY  --------------------------------------------------------------------------------  PAST MEDICAL & SURGICAL HISTORY:  Sickle cell disease  Chronic kidney disease  Parvovirus B19 infection  Nephrotic syndrome  HPV (Human Papillomavirus)  Right Hip Pain- Surgery   History of Cholecystectomy  History of hip surgery: R hip due to necrosis  H/O tubal ligation  H/O:   S/P Laparoscopic Cholecystectomy    FAMILY HISTORY:  Family history of sarcoidosis (Sibling): Sister with HgbSS and sarcoidosis  Family history of sickle cell disease (Sibling): Sister with HgbSS and sarcoidosis  Family history of sickle cell trait in mother  Family history of sickle cell trait in father    PAST SOCIAL HISTORY:    ALLERGIES & MEDICATIONS  --------------------------------------------------------------------------------  Allergies    No Known Allergies    Intolerances      Standing Inpatient Medications  gentamicin 0.1% Cream 1 Application(s) Topical four times a day  metolazone 5 milliGRAM(s) Oral two times a day  furosemide    Tablet 100 milliGRAM(s) Oral two times a day  docusate sodium 300 milliGRAM(s) Oral daily  calcium acetate 667 milliGRAM(s) Oral three times a day with meals  Nephro-jay 1 Tablet(s) Oral daily  calcitriol   Capsule 0.5 MICROGram(s) Oral daily  folic acid 1 milliGRAM(s) Oral daily    PRN Inpatient Medications  acetaminophen   Tablet. 650 milliGRAM(s) Oral every 8 hours PRN  HYDROmorphone  Injectable 0.5 milliGRAM(s) IV Push every 6 hours PRN      REVIEW OF SYSTEMS  --------------------------------------------------------------------------------  Gen: No  fevers/chills, weakness  Skin: No rashes  Head/Eyes/Ears/Mouth: No headache; Normal hearing; Normal vision w/o blurriness;   Respiratory: No dyspnea, cough, wheezing, hemoptysis  CV: No chest pain,   GI: No abdominal pain, diarrhea, constipation, nausea, vomiting  : No increased frequency, dysuria, hematuria, nocturia  MSK: No joint pain/swelling;   Neuro: No dizziness/lightheadedness, weakness, seizures    All other systems were reviewed and are negative, except as noted.    VITALS/PHYSICAL EXAM  --------------------------------------------------------------------------------  T(C): 36.5 (17 @ 06:51), Max: 37.1 (17 @ 16:33)  HR: 64 (17 @ 06:51) (64 - 88)  BP: 100/66 (17 @ 06:51) (97/62 - 128/83)  RR: 18 (17 @ 06:51) (16 - 18)  SpO2: 99% (17 @ 06:51) (97% - 100%)  Wt(kg): --  Height (cm): 165.1 (17 @ 01:49)      17 @ 07:01  -  17 @ 11:01  --------------------------------------------------------  IN: 300 mL / OUT: 0 mL / NET: 300 mL      Physical Exam:  	Gen: NAD  	Pulm: CTA B/L  	CV:  S1S2  	Abd: +BS, soft, PD catheter present, site appears to be clean.    	Ext: No B/L Lower ext edema    LABS/STUDIES  --------------------------------------------------------------------------------              8.1    11.16 >-----------<  243      [17 @ 06:45]              22.5     142  |  100  |  93  ----------------------------<  77      [17 06:45]  4.2   |  20  |  13.14        Ca     7.3     [17 06:45]      Mg     1.8     [17:45]      Phos  7.9     [17 06:45]    TPro  x   /  Alb  x   /  TBili  0.7  /  DBili  x   /  AST  x   /  ALT  x   /  AlkPhos  x   [17:45]    PT/INR: PT 9.9  , INR 0.88       [17 06:45]  PTT: 29.6       [17 18:30]          [17:45]    Creatinine Trend:  SCr 13.14 [:45]  SCr 13.11 [ 18:30]  SCr 10.07 [ @ 02:40]        Iron 71, TIBC 165, %sat --      [17 @ 18:30]  Ferritin 2501      [17 18:30]

## 2017-08-07 NOTE — PROGRESS NOTE ADULT - PROBLEM SELECTOR PLAN 2
Multifactorial anemia given ESRD, sickle cell disease and chronic inflammation;  -Hb 8.4 after transfusion 2u.  -continue with folic acid  -f/u heme recs. Acute on chronic; likelu Multifactorial anemia given ESRD, sickle cell disease and chronic inflammation;  -Hb 8.4 after transfusion 2u.  -continue with folic acid  -f/u heme recs.

## 2017-08-07 NOTE — PROGRESS NOTE ADULT - ASSESSMENT
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

## 2017-08-07 NOTE — CONSULT NOTE ADULT - PROBLEM SELECTOR RECOMMENDATION 9
ESRD on PD: Pt. scheduled for PD catheter repositioning on 8/7.Latest Scr is 13.14 which is stable from previous labs. No urgent indication for dialysis at this time. Pt. still has residual renal function. Monitor BMP.

## 2017-08-07 NOTE — CONSULT NOTE ADULT - ATTENDING COMMENTS
Agree with above. Planned PD catheter repositioning in OR on 8/8.  Will followup afte procedure with surgery to organize PD if appropriate. No acute need for dialysis today.

## 2017-08-07 NOTE — PROGRESS NOTE ADULT - SUBJECTIVE AND OBJECTIVE BOX
GENERAL SURGERY PREOP NOTE:     Procedure: Laparoscopic repositioning of peritoneal dialysis catheter    Pt is NPO since midnight    Consent signed and in front of chart    Vital Signs Last 24 Hrs  T(C): 36.5 (07 Aug 2017 06:51), Max: 37.1 (06 Aug 2017 16:33)  T(F): 97.7 (07 Aug 2017 06:51), Max: 98.7 (06 Aug 2017 16:33)  HR: 64 (07 Aug 2017 06:51) (64 - 88)  BP: 100/66 (07 Aug 2017 06:51) (97/62 - 128/83)  BP(mean): --  RR: 18 (07 Aug 2017 06:51) (16 - 18)  SpO2: 99% (07 Aug 2017 06:51) (97% - 100%)      LABS:                        8.1    11.16 )-----------( 243      ( 07 Aug 2017 06:45 )             22.5     08-07    142  |  100  |  93<H>  ----------------------------<  77  4.2   |  20<L>  |  13.14<H>    Ca    7.3<L>      07 Aug 2017 06:45  Phos  7.9     08-07  Mg     1.8     08-07    TPro  x   /  Alb  x   /  TBili  0.7  /  DBili  x   /  AST  x   /  ALT  x   /  AlkPhos  x   08-07    PT/INR - ( 07 Aug 2017 06:45 )   PT: 9.9 SEC;   INR: 0.88     PTT - ( 06 Aug 2017 18:30 )  PTT:29.6 SEC GENERAL SURGERY PREOP NOTE:     Procedure: Laparoscopic repositioning of peritoneal dialysis catheter    Pt is ordered NPO since midnight    Consent signed and in front of chart    Vital Signs Last 24 Hrs  T(C): 36.5 (07 Aug 2017 06:51), Max: 37.1 (06 Aug 2017 16:33)  T(F): 97.7 (07 Aug 2017 06:51), Max: 98.7 (06 Aug 2017 16:33)  HR: 64 (07 Aug 2017 06:51) (64 - 88)  BP: 100/66 (07 Aug 2017 06:51) (97/62 - 128/83)  BP(mean): --  RR: 18 (07 Aug 2017 06:51) (16 - 18)  SpO2: 99% (07 Aug 2017 06:51) (97% - 100%)      LABS:                        8.1    11.16 )-----------( 243      ( 07 Aug 2017 06:45 )             22.5     08-07    142  |  100  |  93<H>  ----------------------------<  77  4.2   |  20<L>  |  13.14<H>    Ca    7.3<L>      07 Aug 2017 06:45  Phos  7.9     08-07  Mg     1.8     08-07    TPro  x   /  Alb  x   /  TBili  0.7  /  DBili  x   /  AST  x   /  ALT  x   /  AlkPhos  x   08-07    PT/INR - ( 07 Aug 2017 06:45 )   PT: 9.9 SEC;   INR: 0.88     PTT - ( 06 Aug 2017 18:30 )  PTT:29.6 SEC

## 2017-08-07 NOTE — PROGRESS NOTE ADULT - PROBLEM SELECTOR PLAN 4
-Renal c/s. F/u recs.  -Plan for PD catheter exchange.   -NPO after midnight  -continue with lasix 100 mg BID and metolazone  -continue with ca acetate, nephrovite  -monitor electrolytes, avoid nephrotoxins

## 2017-08-07 NOTE — PROGRESS NOTE ADULT - PROBLEM SELECTOR PLAN 5
-continue with Calcitrol  -continue to monitor -continue with Calcitrol  -continue to monitor  - C/w phos binder for hyperphosphatemia

## 2017-08-07 NOTE — PROGRESS NOTE ADULT - PROBLEM SELECTOR PLAN 1
- s/p 2 units of PRBC transfusion and post-transfusion Hb 8.4.  - Pain control, Tylenol and low dose Dilaudid in the setting of ESRD; The pt wishes to not be on PCA pump as the pain is only mild to moderate;   Given pending transfusion of 2 units of PRBC and mild fluid overload in the setting of - ESRD and PD will hold off on IV hydration  - Hematology consulted.

## 2017-08-07 NOTE — CONSULT NOTE ADULT - SUBJECTIVE AND OBJECTIVE BOX
PEDIATRIC GENERAL SURGERY CONSULT NOTE    Patient is a 36y old  Female who was sent over from [facility] this evening by her nephrologist, Dr. Abdalla. Patient has a history of sickle cell and ESRD on peritoneal dialysis 2/2 parvovirus B19. As per patient, PD catheter was placed in March by Dr. Angulo. Since then, she has been successfully draining 2 liters nightly. On Wednesday, she began noticing she was unable to drain as much, and presented to [place]. Abdominal series showed PD catheter with tip in the left upper quadrant, Dr. Angulo notified and scheduled for repositioning on .      HPI:  35-year-old woman w/ a hx of sickle cell  disease, Right hip avascular necrosis (s/p remote bone graft), and Parvovirus B19 infection c/b Nephrotic syndrome FSGS, collapsing variant (2016), ESRD on PD, chronic anemia presents for scheduled surgical replacement of peritoneal dialysis catheter  with Dr. Angulo. Pt without acute complaints however found to be anemic to 6.4 on admission labs. Denies shortness of breath, palpitations, chest pain or signs of bleeding. Reports LMP was 16. Periods last 4 days and are usually heavy requiring use of 3-4 menstrual pads. Denies n/v, diarrhea, melena, abdominal pain, fevers or lower extremity edema.   In the ED, Vital Signs Last 24 Hrs  T(C): 36.8 (06 Aug 2017 23:05), Max: 37.1 (06 Aug 2017 16:33)  T(F): 98.3 (06 Aug 2017 23:05), Max: 98.7 (06 Aug 2017 16:33)  HR: 81 (06 Aug 2017 23:05) (80 - 88)  BP: 111/69 (06 Aug 2017 23:05) (109/78 - 128/83)  RR: 17 (06 Aug 2017 23:05) (16 - 18)  SpO2: 98% (06 Aug 2017 23:05) (97% - 100%)    Pt was transfused 1 unit of PRBCs and treated with 1x 50mg IV lasix and IV benadryl. (06 Aug 2017 22:45)      PAST MEDICAL & SURGICAL HISTORY:  Sickle cell disease  Chronic kidney disease  Parvovirus B19 infection  Nephrotic syndrome  HPV (Human Papillomavirus)  Right Hip Pain- Surgery   History of Cholecystectomy  History of hip surgery: R hip due to necrosis  H/O tubal ligation  H/O:   S/P Laparoscopic Cholecystectomy      FAMILY HISTORY:  Family history of sarcoidosis (Sibling): Sister with HgbSS and sarcoidosis  Family history of sickle cell disease (Sibling): Sister with HgbSS and sarcoidosis  Family history of sickle cell trait in mother  Family history of sickle cell trait in father    Allergies    No Known Allergies      Physical Exam  Vital Signs Last 24 Hrs  T(C): 36.8 (06 Aug 2017 23:05), Max: 37.1 (06 Aug 2017 16:33)  T(F): 98.3 (06 Aug 2017 23:05), Max: 98.7 (06 Aug 2017 16:33)  HR: 81 (06 Aug 2017 23:05) (80 - 88)  BP: 111/69 (06 Aug 2017 23:05) (109/78 - 128/83)  BP(mean): --  RR: 17 (06 Aug 2017 23:05) (16 - 18)  SpO2: 98% (06 Aug 2017 23:05) (97% - 100%)    General: WN/WD NAD  Neurology: A&Ox3, nonfocal, CANDELARIA x 4  Respiratory: CTA B/L  CV: RRR, S1S2, no murmurs, rubs or gallops  Abdominal: Soft, mildly distended, not tender. Peritoneal dialysis catheter in place. No surrounding erythema/ exudate  Extremities: No edema, + peripheral pulses                            6.4    11.41 )-----------( 267      ( 06 Aug 2017 18:30 )             17.7     08-06    140  |  99  |  86<H>  ----------------------------<  94  3.9   |  23  |  13.11<H>    Ca    7.8<L>      06 Aug 2017 18:30    TPro  6.9  /  Alb  3.3  /  TBili  0.9  /  DBili  x   /  AST  28  /  ALT  16  /  AlkPhos  90  08-06    PT/INR - ( 06 Aug 2017 18:30 )   PT: 10.0 SEC;   INR: 0.89          PTT - ( 06 Aug 2017 18:30 )  PTT:29.6 SEC      IMAGING STUDIES:

## 2017-08-08 LAB
24R-OH-CALCIDIOL SERPL-MCNC: 6.1 NG/ML — LOW (ref 30–100)
BLD GP AB SCN SERPL QL: NEGATIVE — SIGNIFICANT CHANGE UP
BUN SERPL-MCNC: 103 MG/DL — HIGH (ref 7–23)
CALCIUM SERPL-MCNC: 7.5 MG/DL — LOW (ref 8.4–10.5)
CHLORIDE SERPL-SCNC: 101 MMOL/L — SIGNIFICANT CHANGE UP (ref 98–107)
CO2 SERPL-SCNC: 20 MMOL/L — LOW (ref 22–31)
CREAT SERPL-MCNC: 14.08 MG/DL — HIGH (ref 0.5–1.3)
GLUCOSE SERPL-MCNC: 87 MG/DL — SIGNIFICANT CHANGE UP (ref 70–99)
HCT VFR BLD CALC: 23 % — LOW (ref 34.5–45)
HGB BLD-MCNC: 8.2 G/DL — LOW (ref 11.5–15.5)
MAGNESIUM SERPL-MCNC: 1.8 MG/DL — SIGNIFICANT CHANGE UP (ref 1.6–2.6)
MCHC RBC-ENTMCNC: 28 PG — SIGNIFICANT CHANGE UP (ref 27–34)
MCHC RBC-ENTMCNC: 35.7 % — SIGNIFICANT CHANGE UP (ref 32–36)
MCV RBC AUTO: 78.5 FL — LOW (ref 80–100)
NRBC # FLD: 0.02 — SIGNIFICANT CHANGE UP
PHOSPHATE SERPL-MCNC: 7 MG/DL — HIGH (ref 2.5–4.5)
PLATELET # BLD AUTO: 280 K/UL — SIGNIFICANT CHANGE UP (ref 150–400)
PMV BLD: 11.1 FL — SIGNIFICANT CHANGE UP (ref 7–13)
POTASSIUM SERPL-MCNC: 4.4 MMOL/L — SIGNIFICANT CHANGE UP (ref 3.5–5.3)
POTASSIUM SERPL-SCNC: 4.4 MMOL/L — SIGNIFICANT CHANGE UP (ref 3.5–5.3)
RBC # BLD: 2.93 M/UL — LOW (ref 3.8–5.2)
RBC # FLD: 21.5 % — HIGH (ref 10.3–14.5)
RH IG SCN BLD-IMP: NEGATIVE — SIGNIFICANT CHANGE UP
SODIUM SERPL-SCNC: 140 MMOL/L — SIGNIFICANT CHANGE UP (ref 135–145)
VIT D25+D1,25 OH+D1,25 PNL SERPL-MCNC: 13.8 PG/ML — LOW (ref 19.9–79.3)
WBC # BLD: 11.61 K/UL — HIGH (ref 3.8–10.5)
WBC # FLD AUTO: 11.61 K/UL — HIGH (ref 3.8–10.5)

## 2017-08-08 PROCEDURE — 49421 INS TUN IP CATH FOR DIAL OPN: CPT | Mod: GC

## 2017-08-08 PROCEDURE — 99233 SBSQ HOSP IP/OBS HIGH 50: CPT | Mod: GC

## 2017-08-08 RX ORDER — HYDROMORPHONE HYDROCHLORIDE 2 MG/ML
0.5 INJECTION INTRAMUSCULAR; INTRAVENOUS; SUBCUTANEOUS ONCE
Qty: 0 | Refills: 0 | Status: DISCONTINUED | OUTPATIENT
Start: 2017-08-08 | End: 2017-08-08

## 2017-08-08 RX ORDER — FENTANYL CITRATE 50 UG/ML
25 INJECTION INTRAVENOUS
Qty: 0 | Refills: 0 | Status: DISCONTINUED | OUTPATIENT
Start: 2017-08-08 | End: 2017-08-08

## 2017-08-08 RX ORDER — HYDROMORPHONE HYDROCHLORIDE 2 MG/ML
0.5 INJECTION INTRAMUSCULAR; INTRAVENOUS; SUBCUTANEOUS
Qty: 0 | Refills: 0 | Status: DISCONTINUED | OUTPATIENT
Start: 2017-08-08 | End: 2017-08-08

## 2017-08-08 RX ADMIN — HYDROMORPHONE HYDROCHLORIDE 0.5 MILLIGRAM(S): 2 INJECTION INTRAMUSCULAR; INTRAVENOUS; SUBCUTANEOUS at 05:59

## 2017-08-08 RX ADMIN — FENTANYL CITRATE 25 MICROGRAM(S): 50 INJECTION INTRAVENOUS at 17:47

## 2017-08-08 RX ADMIN — HYDROMORPHONE HYDROCHLORIDE 0.5 MILLIGRAM(S): 2 INJECTION INTRAMUSCULAR; INTRAVENOUS; SUBCUTANEOUS at 18:45

## 2017-08-08 RX ADMIN — Medication 667 MILLIGRAM(S): at 22:05

## 2017-08-08 RX ADMIN — FENTANYL CITRATE 25 MICROGRAM(S): 50 INJECTION INTRAVENOUS at 17:37

## 2017-08-08 RX ADMIN — HYDROMORPHONE HYDROCHLORIDE 0.5 MILLIGRAM(S): 2 INJECTION INTRAMUSCULAR; INTRAVENOUS; SUBCUTANEOUS at 22:44

## 2017-08-08 RX ADMIN — Medication 100 MILLIGRAM(S): at 22:05

## 2017-08-08 RX ADMIN — HYDROMORPHONE HYDROCHLORIDE 0.5 MILLIGRAM(S): 2 INJECTION INTRAMUSCULAR; INTRAVENOUS; SUBCUTANEOUS at 21:50

## 2017-08-08 RX ADMIN — HYDROMORPHONE HYDROCHLORIDE 0.5 MILLIGRAM(S): 2 INJECTION INTRAMUSCULAR; INTRAVENOUS; SUBCUTANEOUS at 22:59

## 2017-08-08 RX ADMIN — HYDROMORPHONE HYDROCHLORIDE 0.5 MILLIGRAM(S): 2 INJECTION INTRAMUSCULAR; INTRAVENOUS; SUBCUTANEOUS at 06:15

## 2017-08-08 RX ADMIN — FENTANYL CITRATE 25 MICROGRAM(S): 50 INJECTION INTRAVENOUS at 18:00

## 2017-08-08 RX ADMIN — HYDROMORPHONE HYDROCHLORIDE 0.5 MILLIGRAM(S): 2 INJECTION INTRAMUSCULAR; INTRAVENOUS; SUBCUTANEOUS at 18:30

## 2017-08-08 RX ADMIN — HYDROMORPHONE HYDROCHLORIDE 0.5 MILLIGRAM(S): 2 INJECTION INTRAMUSCULAR; INTRAVENOUS; SUBCUTANEOUS at 21:35

## 2017-08-08 RX ADMIN — HYDROMORPHONE HYDROCHLORIDE 0.5 MILLIGRAM(S): 2 INJECTION INTRAMUSCULAR; INTRAVENOUS; SUBCUTANEOUS at 18:05

## 2017-08-08 RX ADMIN — Medication 100 MILLIGRAM(S): at 06:52

## 2017-08-08 NOTE — BRIEF OPERATIVE NOTE - OPERATION/FINDINGS
Laparoscopic assisted repositioning of PD cath. 500cc of heparinized NS instilled with good return
abdominal wall mass and incarcerated umbilical hernia with omentum

## 2017-08-08 NOTE — PROGRESS NOTE ADULT - SUBJECTIVE AND OBJECTIVE BOX
[facility] Division of Kidney Diseases & Hypertension  FOLLOW UP NOTE  [phone]--------------------------------------------------------------------------------  Chief Complaint:Anemia      24 hour events/subjective:    Patient seen and evaluated today. Currently has no complaints of SOB, CP, nausea, vomiting, abdominal pain, leaking from PD catheter site. She is scheduled for PD catheter repositioning today.     PAST HISTORY  --------------------------------------------------------------------------------  No significant changes to PMH, PSH, FHx, SHx, unless otherwise noted    ALLERGIES & MEDICATIONS  --------------------------------------------------------------------------------  Allergies    No Known Allergies    Intolerances      Standing Inpatient Medications  gentamicin 0.1% Cream 1 Application(s) Topical four times a day  metolazone 5 milliGRAM(s) Oral two times a day  furosemide    Tablet 100 milliGRAM(s) Oral two times a day  docusate sodium 300 milliGRAM(s) Oral daily  calcium acetate 667 milliGRAM(s) Oral three times a day with meals  Nephro-jay 1 Tablet(s) Oral daily  calcitriol   Capsule 0.5 MICROGram(s) Oral daily  folic acid 1 milliGRAM(s) Oral daily    PRN Inpatient Medications  acetaminophen   Tablet. 650 milliGRAM(s) Oral every 8 hours PRN  HYDROmorphone  Injectable 0.5 milliGRAM(s) IV Push every 6 hours PRN      REVIEW OF SYSTEMS  --------------------------------------------------------------------------------  Gen: No  fevers/chills  Skin: No rashes  Head/Eyes/Ears/Mouth: No headache; Normal hearing; Normal vision w/o blurriness  Respiratory: No dyspnea, cough, wheezing, hemoptysis  CV: No chest pain, PND, orthopnea  GI: No abdominal pain, diarrhea, constipation, nausea, vomiting  : No increased frequency, dysuria, hematuria, nocturia  MSK: No joint pain/swelling; no back pain; no edema  Neuro: No dizziness/lightheadedness, weakness, seizures, numbness, tingling      All other systems were reviewed and are negative, except as noted.    VITALS/PHYSICAL EXAM  --------------------------------------------------------------------------------  T(C): 36.7 (08-08-17 @ 05:49), Max: 37 (08-07-17 @ 17:06)  HR: 74 (08-08-17 @ 06:50) (66 - 80)  BP: 110/75 (08-08-17 @ 06:50) (105/72 - 111/76)  RR: 17 (08-08-17 @ 06:50) (17 - 18)  SpO2: 96% (08-08-17 @ 06:50) (96% - 100%)  Wt(kg): --  Height (cm): 165.1 (08-07-17 @ 01:49)  Weight (kg): 65.9 (08-08-17 @ 05:51)  BMI (kg/m2): 24.2 (08-08-17 @ 05:51)  BSA (m2): 1.73 (08-08-17 @ 05:51)      08-07-17 @ 07:01  -  08-08-17 @ 07:00  --------------------------------------------------------  IN: 300 mL / OUT: 0 mL / NET: 300 mL      Physical Exam:  	Gen: NAD, well-appearing  	Pulm: CTA B/L  	CV: RRR, S1S2;  	Abd: +BS, soft, nontender/nondistended, PD catheter site covered in dressing; site appears to be clean               Extremities: no bilateral LE edema noted.     LABS/STUDIES  --------------------------------------------------------------------------------              8.2    11.61 >-----------<  280      [08-08-17 @ 06:40]              23.0     140  |  101  |  103  ----------------------------<  87      [08-08-17 @ 06:40]  4.4   |  20  |  14.08        Ca     7.5     [08-08-17 @ 06:40]      Mg     1.8     [08-08-17 @ 06:40]      Phos  7.0     [08-08-17 @ 06:40]    TPro  x   /  Alb  x   /  TBili  0.7  /  DBili  x   /  AST  x   /  ALT  x   /  AlkPhos  x   [08-07-17 @ 06:45]    PT/INR: PT 9.9  , INR 0.88       [08-07-17 @ 06:45]  PTT: 29.6       [08-06-17 @ 18:30]          [08-07-17 @ 06:45]    Creatinine Trend:  SCr 14.08 [08-08 @ 06:40]  SCr 13.14 [08-07 @ 06:45]  SCr 13.11 [08-06 @ 18:30]  SCr 10.07 [08-04 @ 02:40]        Iron 71, TIBC 165, %sat --      [08-06-17 @ 18:30]  Ferritin 2501      [08-06-17 @ 18:30]

## 2017-08-08 NOTE — PROGRESS NOTE ADULT - PROBLEM SELECTOR PLAN 2
Serum phosphorus noted to be 7.0 on 8/8/17; Serum phosphorus noted be 7.9. Monitor serum phosphorus and c/w phoslo with meals.

## 2017-08-08 NOTE — PROGRESS NOTE ADULT - PROBLEM SELECTOR PLAN 4
-Renal c/s. No emergent HD at this time.  -Plan for PD catheter exchange.   -continue with lasix 100 mg BID and metolazone  -continue with ca acetate, nephrovite  -monitor electrolytes, avoid nephrotoxins

## 2017-08-08 NOTE — BRIEF OPERATIVE NOTE - PRE-OP DX
ESRD (end stage renal disease)  08/08/2017    Active  Refugio Lang
Abdominal wall mass  08/07/2017    Farhat Rosenberg  Incarcerated umbilical hernia  08/07/2017    Farhat Rosenberg

## 2017-08-08 NOTE — BRIEF OPERATIVE NOTE - PROCEDURE
Laparoscopic manipulation of peritoneal dialysis catheter  08/08/2017    Active  WROYSTER
Soft tissue mass excision  08/07/2017  abdominal wall mass  Active  GUY  Umbilical hernia repair  08/07/2017    Active  GUY

## 2017-08-08 NOTE — PROGRESS NOTE ADULT - PROBLEM SELECTOR PLAN 1
ESRD on PD: Pt. scheduled for PD catheter repositioning today.Latest Scr is 14.08 which is stable from previous labs. No urgent indication for dialysis at this time; Pt. still has residual renal function. Monitor BMP.

## 2017-08-08 NOTE — PROGRESS NOTE ADULT - PROBLEM SELECTOR PLAN 1
- s/p 2 units of PRBC transfusion and post-transfusion Hb 8.4, now stable at 8.2 this am.  - Denied of pain in the am. Used 2 doses prn Dilaudid overnight for pain.  - No sob, signs of infection at this time.

## 2017-08-08 NOTE — PROGRESS NOTE ADULT - ASSESSMENT
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

## 2017-08-08 NOTE — PROGRESS NOTE ADULT - SUBJECTIVE AND OBJECTIVE BOX
Patient is a 36y old  Female who presents with a chief complaint of Cath replacement/anemia (06 Aug 2017 22:45)      SUBJECTIVE / OVERNIGHT EVENTS:  No acute events overnight. Denied pain, sob, chest pain. Remained NPO since midnight.    MEDICATIONS  (STANDING):  gentamicin 0.1% Cream 1 Application(s) Topical four times a day  metolazone 5 milliGRAM(s) Oral two times a day  furosemide    Tablet 100 milliGRAM(s) Oral two times a day  docusate sodium 300 milliGRAM(s) Oral daily  calcium acetate 667 milliGRAM(s) Oral three times a day with meals  Nephro-jay 1 Tablet(s) Oral daily  calcitriol   Capsule 0.5 MICROGram(s) Oral daily  folic acid 1 milliGRAM(s) Oral daily    MEDICATIONS  (PRN):  acetaminophen   Tablet. 650 milliGRAM(s) Oral every 8 hours PRN Mild Pain (1 - 3)  HYDROmorphone  Injectable 0.5 milliGRAM(s) IV Push every 6 hours PRN Moderate and Severe Pain (4 - 10)      CAPILLARY BLOOD GLUCOSE        I&O's Summary    07 Aug 2017 07:01  -  08 Aug 2017 07:00  --------------------------------------------------------  IN: 300 mL / OUT: 0 mL / NET: 300 mL        T(C): 36.7 (08-08-17 @ 05:49), Max: 37 (08-07-17 @ 17:06)  HR: 74 (08-08-17 @ 06:50) (66 - 80)  BP: 110/75 (08-08-17 @ 06:50) (105/72 - 111/76)  RR: 17 (08-08-17 @ 06:50) (17 - 18)  SpO2: 96% (08-08-17 @ 06:50) (96% - 100%)  PHYSICAL EXAM:  GENERAL: NAD, well-developed  HEAD:  Atraumatic, Normocephalic  EYES: EOMI, PERRLA, conjunctiva and sclera clear  NECK: Supple, No JVD  CHEST/LUNG: Clear to auscultation bilaterally; No wheeze  HEART: Regular rate and rhythm; No murmurs, rubs, or gallops  ABDOMEN: Soft, Nontender, Nondistended; Bowel sounds present  EXTREMITIES:  2+ Peripheral Pulses, No clubbing, cyanosis, or edema  PSYCH: AAOx3  NEUROLOGY: non-focal  SKIN: No rashes or lesions    LABS:                        8.2    11.61 )-----------( 280      ( 08 Aug 2017 06:40 )             23.0     WBC Trend: 11.61<--, 11.16<--, 11.41<--  08-08    140  |  101  |  103<H>  ----------------------------<  87  4.4   |  20<L>  |  14.08<H>    Ca    7.5<L>      08 Aug 2017 06:40  Phos  7.0     08-08  Mg     1.8     08-08    TPro  x   /  Alb  x   /  TBili  0.7  /  DBili  x   /  AST  x   /  ALT  x   /  AlkPhos  x   08-07    Creatinine Trend: 14.08<--, 13.14<--, 13.11<--, 10.07<--  PT/INR - ( 07 Aug 2017 06:45 )   PT: 9.9 SEC;   INR: 0.88          PTT - ( 06 Aug 2017 18:30 )  PTT:29.6 SEC Patient is a 36y old  Female who presents with a chief complaint of Cath replacement/anemia (06 Aug 2017 22:45)      SUBJECTIVE / OVERNIGHT EVENTS:  No acute events overnight. Denied pain, sob, chest pain. Remained NPO since midnight.    MEDICATIONS  (STANDING):  gentamicin 0.1% Cream 1 Application(s) Topical four times a day  metolazone 5 milliGRAM(s) Oral two times a day  furosemide    Tablet 100 milliGRAM(s) Oral two times a day  docusate sodium 300 milliGRAM(s) Oral daily  calcium acetate 667 milliGRAM(s) Oral three times a day with meals  Nephro-jay 1 Tablet(s) Oral daily  calcitriol   Capsule 0.5 MICROGram(s) Oral daily  folic acid 1 milliGRAM(s) Oral daily    MEDICATIONS  (PRN):  acetaminophen   Tablet. 650 milliGRAM(s) Oral every 8 hours PRN Mild Pain (1 - 3)  HYDROmorphone  Injectable 0.5 milliGRAM(s) IV Push every 6 hours PRN Moderate and Severe Pain (4 - 10)      CAPILLARY BLOOD GLUCOSE        I&O's Summary    07 Aug 2017 07:01  -  08 Aug 2017 07:00  --------------------------------------------------------  IN: 300 mL / OUT: 0 mL / NET: 300 mL        T(C): 36.7 (08-08-17 @ 05:49), Max: 37 (08-07-17 @ 17:06)  HR: 74 (08-08-17 @ 06:50) (66 - 80)  BP: 110/75 (08-08-17 @ 06:50) (105/72 - 111/76)  RR: 17 (08-08-17 @ 06:50) (17 - 18)  SpO2: 96% (08-08-17 @ 06:50) (96% - 100%)    PHYSICAL EXAM:  GENERAL: NAD, well-developed  HEAD:  Atraumatic, Normocephalic  EYES: EOMI, PERRLA, conjunctiva and sclera clear  NECK: Supple, No JVD  CHEST/LUNG: Clear to auscultation bilaterally; No wheeze  HEART: Regular rate and rhythm; No murmurs, rubs, or gallops  ABDOMEN: Soft, Nontender, Nondistended; Bowel sounds present  EXTREMITIES:  2+ Peripheral Pulses, No clubbing, cyanosis, or edema  PSYCH: AAOx3  NEUROLOGY: non-focal  SKIN: No rashes or lesions    LABS:                        8.2    11.61 )-----------( 280      ( 08 Aug 2017 06:40 )             23.0     WBC Trend: 11.61<--, 11.16<--, 11.41<--  08-08    140  |  101  |  103<H>  ----------------------------<  87  4.4   |  20<L>  |  14.08<H>    Ca    7.5<L>      08 Aug 2017 06:40  Phos  7.0     08-08  Mg     1.8     08-08    TPro  x   /  Alb  x   /  TBili  0.7  /  DBili  x   /  AST  x   /  ALT  x   /  AlkPhos  x   08-07    Creatinine Trend: 14.08<--, 13.14<--, 13.11<--, 10.07<--  PT/INR - ( 07 Aug 2017 06:45 )   PT: 9.9 SEC;   INR: 0.88          PTT - ( 06 Aug 2017 18:30 )  PTT:29.6 SEC

## 2017-08-08 NOTE — PROGRESS NOTE ADULT - ASSESSMENT
35 y/o F PMH SSA, Parvo B19 causing nephrotic syndrome FSGS leading to ESRD now on PD admitted for mal-functioning PD catheter.

## 2017-08-08 NOTE — PROGRESS NOTE ADULT - PROBLEM SELECTOR PLAN 2
Acute on chronic; likelu Multifactorial anemia given ESRD, sickle cell disease and chronic inflammation;  -Hb 8.4 after transfusion 2u. Currently stable.  -continue with folic acid.

## 2017-08-09 ENCOUNTER — TRANSCRIPTION ENCOUNTER (OUTPATIENT)
Age: 37
End: 2017-08-09

## 2017-08-09 VITALS
SYSTOLIC BLOOD PRESSURE: 109 MMHG | TEMPERATURE: 98 F | RESPIRATION RATE: 17 BRPM | HEART RATE: 98 BPM | DIASTOLIC BLOOD PRESSURE: 66 MMHG

## 2017-08-09 LAB
BASOPHILS # BLD AUTO: 0.03 K/UL — SIGNIFICANT CHANGE UP (ref 0–0.2)
BASOPHILS NFR BLD AUTO: 0.2 % — SIGNIFICANT CHANGE UP (ref 0–2)
BUN SERPL-MCNC: 110 MG/DL — HIGH (ref 7–23)
CALCIUM SERPL-MCNC: 7.8 MG/DL — LOW (ref 8.4–10.5)
CHLORIDE SERPL-SCNC: 99 MMOL/L — SIGNIFICANT CHANGE UP (ref 98–107)
CO2 SERPL-SCNC: 19 MMOL/L — LOW (ref 22–31)
CREAT SERPL-MCNC: 14.5 MG/DL — HIGH (ref 0.5–1.3)
EOSINOPHIL # BLD AUTO: 0.02 K/UL — SIGNIFICANT CHANGE UP (ref 0–0.5)
EOSINOPHIL NFR BLD AUTO: 0.1 % — SIGNIFICANT CHANGE UP (ref 0–6)
GLUCOSE SERPL-MCNC: 97 MG/DL — SIGNIFICANT CHANGE UP (ref 70–99)
HCT VFR BLD CALC: 23.9 % — LOW (ref 34.5–45)
HGB BLD-MCNC: 8.3 G/DL — LOW (ref 11.5–15.5)
IMM GRANULOCYTES # BLD AUTO: 0.08 # — SIGNIFICANT CHANGE UP
IMM GRANULOCYTES NFR BLD AUTO: 0.5 % — SIGNIFICANT CHANGE UP (ref 0–1.5)
LYMPHOCYTES # BLD AUTO: 1.96 K/UL — SIGNIFICANT CHANGE UP (ref 1–3.3)
LYMPHOCYTES # BLD AUTO: 11.7 % — LOW (ref 13–44)
MAGNESIUM SERPL-MCNC: 1.7 MG/DL — SIGNIFICANT CHANGE UP (ref 1.6–2.6)
MCHC RBC-ENTMCNC: 27.9 PG — SIGNIFICANT CHANGE UP (ref 27–34)
MCHC RBC-ENTMCNC: 34.7 % — SIGNIFICANT CHANGE UP (ref 32–36)
MCV RBC AUTO: 80.5 FL — SIGNIFICANT CHANGE UP (ref 80–100)
MONOCYTES # BLD AUTO: 0.67 K/UL — SIGNIFICANT CHANGE UP (ref 0–0.9)
MONOCYTES NFR BLD AUTO: 4 % — SIGNIFICANT CHANGE UP (ref 2–14)
NEUTROPHILS # BLD AUTO: 14.05 K/UL — HIGH (ref 1.8–7.4)
NEUTROPHILS NFR BLD AUTO: 83.5 % — HIGH (ref 43–77)
NRBC # FLD: 0 — SIGNIFICANT CHANGE UP
PHOSPHATE SERPL-MCNC: 7.2 MG/DL — HIGH (ref 2.5–4.5)
PLATELET # BLD AUTO: 261 K/UL — SIGNIFICANT CHANGE UP (ref 150–400)
PMV BLD: 11 FL — SIGNIFICANT CHANGE UP (ref 7–13)
POTASSIUM SERPL-MCNC: 4.5 MMOL/L — SIGNIFICANT CHANGE UP (ref 3.5–5.3)
POTASSIUM SERPL-SCNC: 4.5 MMOL/L — SIGNIFICANT CHANGE UP (ref 3.5–5.3)
RBC # BLD: 2.97 M/UL — LOW (ref 3.8–5.2)
RBC # FLD: 21.8 % — HIGH (ref 10.3–14.5)
SODIUM SERPL-SCNC: 138 MMOL/L — SIGNIFICANT CHANGE UP (ref 135–145)
WBC # BLD: 16.81 K/UL — HIGH (ref 3.8–10.5)
WBC # FLD AUTO: 16.81 K/UL — HIGH (ref 3.8–10.5)

## 2017-08-09 PROCEDURE — 99239 HOSP IP/OBS DSCHRG MGMT >30: CPT

## 2017-08-09 RX ORDER — HYDROMORPHONE HYDROCHLORIDE 2 MG/ML
0.5 INJECTION INTRAMUSCULAR; INTRAVENOUS; SUBCUTANEOUS ONCE
Qty: 0 | Refills: 0 | Status: DISCONTINUED | OUTPATIENT
Start: 2017-08-09 | End: 2017-08-09

## 2017-08-09 RX ORDER — OXYCODONE AND ACETAMINOPHEN 5; 325 MG/1; MG/1
1 TABLET ORAL EVERY 6 HOURS
Qty: 0 | Refills: 0 | Status: DISCONTINUED | OUTPATIENT
Start: 2017-08-09 | End: 2017-08-09

## 2017-08-09 RX ORDER — ACETAMINOPHEN 500 MG
650 TABLET ORAL EVERY 6 HOURS
Qty: 0 | Refills: 0 | Status: DISCONTINUED | OUTPATIENT
Start: 2017-08-09 | End: 2017-08-09

## 2017-08-09 RX ORDER — HYDROMORPHONE HYDROCHLORIDE 2 MG/ML
0.5 INJECTION INTRAMUSCULAR; INTRAVENOUS; SUBCUTANEOUS EVERY 6 HOURS
Qty: 0 | Refills: 0 | Status: DISCONTINUED | OUTPATIENT
Start: 2017-08-09 | End: 2017-08-09

## 2017-08-09 RX ORDER — ACETAMINOPHEN WITH CODEINE 300MG-30MG
1 TABLET ORAL
Qty: 10 | Refills: 0 | OUTPATIENT
Start: 2017-08-09 | End: 2017-08-12

## 2017-08-09 RX ORDER — SENNA PLUS 8.6 MG/1
2 TABLET ORAL AT BEDTIME
Qty: 0 | Refills: 0 | Status: DISCONTINUED | OUTPATIENT
Start: 2017-08-09 | End: 2017-08-09

## 2017-08-09 RX ORDER — DOCUSATE SODIUM 100 MG
100 CAPSULE ORAL
Qty: 0 | Refills: 0 | Status: DISCONTINUED | OUTPATIENT
Start: 2017-08-09 | End: 2017-08-09

## 2017-08-09 RX ADMIN — HYDROMORPHONE HYDROCHLORIDE 0.5 MILLIGRAM(S): 2 INJECTION INTRAMUSCULAR; INTRAVENOUS; SUBCUTANEOUS at 05:18

## 2017-08-09 RX ADMIN — Medication 300 MILLIGRAM(S): at 11:49

## 2017-08-09 RX ADMIN — Medication 667 MILLIGRAM(S): at 11:48

## 2017-08-09 RX ADMIN — OXYCODONE AND ACETAMINOPHEN 1 TABLET(S): 5; 325 TABLET ORAL at 18:19

## 2017-08-09 RX ADMIN — HYDROMORPHONE HYDROCHLORIDE 0.5 MILLIGRAM(S): 2 INJECTION INTRAMUSCULAR; INTRAVENOUS; SUBCUTANEOUS at 13:49

## 2017-08-09 RX ADMIN — OXYCODONE AND ACETAMINOPHEN 1 TABLET(S): 5; 325 TABLET ORAL at 10:06

## 2017-08-09 RX ADMIN — HYDROMORPHONE HYDROCHLORIDE 0.5 MILLIGRAM(S): 2 INJECTION INTRAMUSCULAR; INTRAVENOUS; SUBCUTANEOUS at 01:33

## 2017-08-09 RX ADMIN — Medication 667 MILLIGRAM(S): at 08:59

## 2017-08-09 RX ADMIN — OXYCODONE AND ACETAMINOPHEN 1 TABLET(S): 5; 325 TABLET ORAL at 09:36

## 2017-08-09 RX ADMIN — Medication 1 TABLET(S): at 11:48

## 2017-08-09 RX ADMIN — CALCITRIOL 0.5 MICROGRAM(S): 0.5 CAPSULE ORAL at 11:48

## 2017-08-09 RX ADMIN — HYDROMORPHONE HYDROCHLORIDE 0.5 MILLIGRAM(S): 2 INJECTION INTRAMUSCULAR; INTRAVENOUS; SUBCUTANEOUS at 01:48

## 2017-08-09 RX ADMIN — OXYCODONE AND ACETAMINOPHEN 1 TABLET(S): 5; 325 TABLET ORAL at 17:49

## 2017-08-09 RX ADMIN — Medication 667 MILLIGRAM(S): at 17:56

## 2017-08-09 RX ADMIN — HYDROMORPHONE HYDROCHLORIDE 0.5 MILLIGRAM(S): 2 INJECTION INTRAMUSCULAR; INTRAVENOUS; SUBCUTANEOUS at 05:03

## 2017-08-09 RX ADMIN — HYDROMORPHONE HYDROCHLORIDE 0.5 MILLIGRAM(S): 2 INJECTION INTRAMUSCULAR; INTRAVENOUS; SUBCUTANEOUS at 14:02

## 2017-08-09 RX ADMIN — Medication 100 MILLIGRAM(S): at 05:38

## 2017-08-09 RX ADMIN — Medication 100 MILLIGRAM(S): at 17:54

## 2017-08-09 RX ADMIN — Medication 1 MILLIGRAM(S): at 11:49

## 2017-08-09 NOTE — DISCHARGE NOTE ADULT - PLAN OF CARE
Replacement Please follow up in PD clinic as instructed. Maintain stable You were transfused 2u pRBC on admission. Hb stable afterwards. Please c/w procrit as outpatient after discharge. Follow up with your nephrologist. Maintain stable Hb level Please follow up in PD clinic as instructed. Your PD catheter was replaced per surgery laparoscopically after admission. Pain controlled post-procedure. Trial of one session PD dialysis went well at the time of discharge.

## 2017-08-09 NOTE — PROGRESS NOTE ADULT - PROBLEM SELECTOR PLAN 3
-Renal on consult. No emergent HD at this time.  -continue with lasix 100 mg BID and metolazone daily  -continue with ca acetate for hypocalcemia, nephrovite and phos binder.  -monitor electrolytes, avoid nephrotoxins

## 2017-08-09 NOTE — PROGRESS NOTE ADULT - ATTENDING COMMENTS
Agree with above. Had successful PD catheter repositioning via laprascopic technique on 8/8, with successful post procedure lavage.    -will do 1 low volume (500cc) supine exchange, and then okay from renal perspective to d/c home with follow up in PD clinic. Also discussed with Dr. Larsen, her outpatient nephrologist, to ensure patient's followup is organized to restart PD.
Patient seen and examined. Feels well. Surgery to fix peritoneal catheter delayed until tomorrow due to patient eating breakfast and lunch today. Surgery and renal aware. No acute indication for dialysis per renal. Plan for surgery tomorrow. NPO at midnight. Likely will need dialysis following re-positioning.
Agree with above. Planned PD catheter repositioning in OR on 8/8.  Will followup after procedure with surgery to organize PD if appropriate. No acute need for dialysis today.
Patient seen and examined. Plan for surgery to re-position PD catheter today. Once performed, will need PD non-urgently per renal recs (appreciated). DC home tomorrow.    Further details of plan per resident note above
Patient seen and examined. Currently, medically stable for discharge following PD today. F/u with renal as outpatient.

## 2017-08-09 NOTE — PROGRESS NOTE ADULT - PROBLEM SELECTOR PLAN 1
ESRD on PD:  Had PD catheter repositioning done yesterday. Latest Scr is 14.50 which is stable from previous labs. No urgent indication for dialysis at this time; Pt. still has residual renal function. Monitor BMP. ESRD on PD:  Had PD catheter repositioning done yesterday. Latest Scr is 14.50 which is stable from previous labs. Discussed with surgical team; catheter is ready to be used. Will do 1 exchange of 500 mL of 1.5% dextrose with dwell time of 3 hours and in supine position. Advised patient not to use cycler until she follows up in PD clinic. Monitor renal function and BP. ESRD on PD:  Had laproscopic PD catheter repositioning done yesterday. Latest Scr is 14.50 which is stable from previous labs. Discussed with surgical team; catheter is ready to be used. Will do low volume, supine PD -->:1 exchange of 500 mL of 1.5% dextrose with dwell time of 3 hours and in supine position. Advised patient not to use cycler until she follows up in PD clinic. Monitor renal function and BP.

## 2017-08-09 NOTE — PROGRESS NOTE ADULT - NSHPATTENDINGPLANDISCUSS_GEN_ALL_CORE
Dr. moreno, Dr. Larsen (outpatient nephrologist), primary team
Dr. Jurado
Dr. Jurado
Dr. moreno
Dr. Jurado

## 2017-08-09 NOTE — PROGRESS NOTE ADULT - SUBJECTIVE AND OBJECTIVE BOX
ANESTHESIA POSTOP CHECK    36y Female POSTOP DAY 1 S/P laparoscopic peritoneal dialysis catheter revision    Vital Signs Last 24 Hrs  T(C): 36.8 (09 Aug 2017 05:00), Max: 36.8 (08 Aug 2017 17:25)  T(F): 98.3 (09 Aug 2017 05:00), Max: 98.3 (09 Aug 2017 05:00)  HR: 78 (09 Aug 2017 05:00) (66 - 86)  BP: 117/73 (09 Aug 2017 05:00) (107/70 - 136/79)  BP(mean): --  RR: 18 (09 Aug 2017 05:00) (12 - 19)  SpO2: 97% (09 Aug 2017 05:00) (94% - 100%)  I&O's Summary      [x] NO APPARENT ANESTHESIA COMPLICATIONS

## 2017-08-09 NOTE — DISCHARGE NOTE ADULT - MEDICATION SUMMARY - MEDICATIONS TO TAKE
I will START or STAY ON the medications listed below when I get home from the hospital:    acetaminophen-codeine 300 mg-15 mg oral tablet  -- 1 tab(s) by mouth every 6 hours, As Needed for pain MDD:4 tabs  -- Caution federal law prohibits the transfer of this drug to any person other  than the person for whom it was prescribed.  May cause drowsiness.  Alcohol may intensify this effect.  Use care when operating dangerous machinery.  This product contains acetaminophen.  Do not use  with any other product containing acetaminophen to prevent possible liver damage.  Using more of this medication than prescribed may cause serious breathing problems.    -- Indication: For Pain control    gentamicin 0.1% topical cream  -- Apply on skin to dialysis exit site  -- Indication: For Skin    furosemide 20 mg oral tablet  -- 1 tab(s) by mouth 2 times a day (taken with 80mg tab for total of 100 mg 2 times a day)  -- Indication: For ESRD on peritoneal dialysis    furosemide 80 mg oral tablet  -- 1 tab(s) by mouth 2 times a day (taken with 20mg tab for total of 100 mg 2 times a day)  -- Indication: For ESRD on peritoneal dialysis    metOLazone 2.5 mg oral tablet  -- 2 tab(s) by mouth 2 times a day  -- Indication: For ESRD on peritoneal dialysis    Procrit  --  injectable 3 times a week on dialysis days (Monday, Wednesday, & Friday)  -- Indication: For ESRD on peritoneal dialysis    Colace 100 mg oral capsule  -- 3 cap(s) by mouth once a day (at bedtime)  -- Indication: For Constipation    calcium acetate 667 mg oral tablet  -- 3 tab(s) by mouth 3 times a day (with meals)  -- Indication: For ESRD on peritoneal dialysis    Cristin-Marguerite Rx oral tablet  -- 1 tab(s) by mouth once a day  -- Indication: For ESRD on peritoneal dialysis    calcitriol 0.25 mcg oral capsule  -- 2 cap(s) by mouth once a day (in the evening)  -- Indication: For ESRD on peritoneal dialysis    folic acid 1 mg oral tablet  -- 1 tab(s) by mouth once a day  -- Indication: For ESRD on peritoneal dialysis

## 2017-08-09 NOTE — DISCHARGE NOTE ADULT - PATIENT PORTAL LINK FT
“You can access the [facility] Patient Portal, offered by [facility], by registering with the following website: [email or web address]”

## 2017-08-09 NOTE — PROGRESS NOTE ADULT - SUBJECTIVE AND OBJECTIVE BOX
[facility] GENERAL SURGERY POST-OP NOTE    SUBJECTIVE: Pt underwent laparoscopic manipulation and repositioning of peritoneal dialysis catheter. Pt tolerated the procedure well and was transferred to PACU then floor in stable condition. Pt seen and evaluated at bedside. Resting comfortably in bed. Pain well controlled on current regimen. Denies nausea/vomiting, CP, palpitations, SOB, lightheaded, dizziness.      Objective:  Gen: NAD, AAOx3  Pulm: b/l chest rise. No work on breathing  Card: RRR  Abd: soft, appropriately tender, distended. Dressings CDI. PD catheter in place    Vital Signs Last 24 Hrs  T(C): 36.7 (08 Aug 2017 21:11), Max: 36.8 (08 Aug 2017 17:25)  T(F): 98 (08 Aug 2017 21:11), Max: 98.2 (08 Aug 2017 17:25)  HR: 84 (08 Aug 2017 21:11) (66 - 86)  BP: 132/89 (08 Aug 2017 21:11) (106/72 - 136/79)  BP(mean): --  RR: 18 (08 Aug 2017 21:11) (12 - 19)  SpO2: 95% (08 Aug 2017 21:11) (94% - 100%)  I&O's Summary    07 Aug 2017 07:01  -  08 Aug 2017 07:00  --------------------------------------------------------  IN: 300 mL / OUT: 0 mL / NET: 300 mL      I&O's Detail    07 Aug 2017 07:01  -  08 Aug 2017 07:00  --------------------------------------------------------  IN:    Packed Red Blood Cells: 300 mL  Total IN: 300 mL    OUT:  Total OUT: 0 mL    Total NET: 300 mL          MEDICATIONS  (STANDING):  gentamicin 0.1% Cream 1 Application(s) Topical four times a day  metolazone 5 milliGRAM(s) Oral two times a day  furosemide    Tablet 100 milliGRAM(s) Oral two times a day  docusate sodium 300 milliGRAM(s) Oral daily  calcium acetate 667 milliGRAM(s) Oral three times a day with meals  Nephro-jay 1 Tablet(s) Oral daily  calcitriol   Capsule 0.5 MICROGram(s) Oral daily  folic acid 1 milliGRAM(s) Oral daily    MEDICATIONS  (PRN):  acetaminophen   Tablet. 650 milliGRAM(s) Oral every 8 hours PRN Mild Pain (1 - 3)  HYDROmorphone  Injectable 0.5 milliGRAM(s) IV Push every 6 hours PRN Moderate and Severe Pain (4 - 10)      LABS:                        8.2    11.61 )-----------( 280      ( 08 Aug 2017 06:40 )             23.0     08-08    140  |  101  |  103<H>  ----------------------------<  87  4.4   |  20<L>  |  14.08<H>    Ca    7.5<L>      08 Aug 2017 06:40  Phos  7.0     08-08  Mg     1.8     08-08    TPro  x   /  Alb  x   /  TBili  0.7  /  DBili  x   /  AST  x   /  ALT  x   /  AlkPhos  x   08-07    PT/INR - ( 07 Aug 2017 06:45 )   PT: 9.9 SEC;   INR: 0.88                RADIOLOGY & ADDITIONAL STUDIES:      A/P: 36y Female with ESRD s/p peritoneal dialysis catheter repositioning. Pt hemodynamically stable with adequate pain control resting in bed comfortably.      - Pain control - continue current regimen, aggressive pain control for pt due to Sickle cell hx  - Strict I/O's  - F/U GI fxn  - OOB/DVT ppx  - F/u AM labs  - dispo: stable

## 2017-08-09 NOTE — PROGRESS NOTE ADULT - SUBJECTIVE AND OBJECTIVE BOX
Patient is a 36y old  Female who presents with a chief complaint of Cath replacement/anemia (06 Aug 2017 22:45)      SUBJECTIVE / OVERNIGHT EVENTS:  No acute events overnight. Tolerated procedure well yesterday. Pain controlled overnight with 4 x 0.5 dilaudid IV push. Tolerating po intake.    MEDICATIONS  (STANDING):  gentamicin 0.1% Cream 1 Application(s) Topical four times a day  metolazone 5 milliGRAM(s) Oral two times a day  furosemide    Tablet 100 milliGRAM(s) Oral two times a day  docusate sodium 300 milliGRAM(s) Oral daily  calcium acetate 667 milliGRAM(s) Oral three times a day with meals  Nephro-jay 1 Tablet(s) Oral daily  calcitriol   Capsule 0.5 MICROGram(s) Oral daily  folic acid 1 milliGRAM(s) Oral daily  senna 2 Tablet(s) Oral at bedtime  docusate sodium 100 milliGRAM(s) Oral two times a day    MEDICATIONS  (PRN):  oxyCODONE    5 mG/acetaminophen 325 mG 1 Tablet(s) Oral every 6 hours PRN Severe Pain (7 - 10)  acetaminophen   Tablet. 650 milliGRAM(s) Oral every 6 hours PRN Moderate Pain (4 - 6)  HYDROmorphone  Injectable 0.5 milliGRAM(s) IV Push every 6 hours PRN for breakthrough pain    T(C): 36.8 (08-09-17 @ 05:00), Max: 36.8 (08-08-17 @ 17:25)  HR: 78 (08-09-17 @ 05:00) (66 - 86)  BP: 117/73 (08-09-17 @ 05:00) (107/70 - 136/79)  RR: 18 (08-09-17 @ 05:00) (12 - 19)  SpO2: 97% (08-09-17 @ 05:00) (94% - 100%)  PHYSICAL EXAM:  GENERAL: NAD, well-developed  HEAD:  Atraumatic, Normocephalic  EYES: EOMI, PERRLA, conjunctiva and sclera clear  NECK: Supple, No JVD  CHEST/LUNG: Clear to auscultation bilaterally; No wheeze  HEART: Regular rate and rhythm; No murmurs, rubs, or gallops  ABDOMEN: Soft, Nontender, Nondistended; Bowel sounds present  EXTREMITIES:  2+ Peripheral Pulses, No clubbing, cyanosis, or edema  PSYCH: AAOx3  NEUROLOGY: non-focal  SKIN: No rashes or lesions    LABS:                        8.3    x     )-----------( 261      ( 09 Aug 2017 05:50 )             23.9     WBC Trend: 11.61<--, 11.16<--, 11.41<--  08-08    140  |  101  |  103<H>  ----------------------------<  87  4.4   |  20<L>  |  14.08<H>    Ca    7.5<L>      08 Aug 2017 06:40  Phos  7.0     08-08  Mg     1.8     08-08      Creatinine Trend: 14.08<--, 13.14<--, 13.11<--, 10.07<--    RADIOLOGY & ADDITIONAL TESTS:    Imaging Personally Reviewed:    Consultant(s) Notes Reviewed:  Surgery    Care Discussed with Consultants/Other Providers: Nephrology Patient is a 36y old  Female who presents with a chief complaint of Cath replacement/anemia (06 Aug 2017 22:45)      SUBJECTIVE / OVERNIGHT EVENTS:  No acute events overnight. Tolerated procedure well yesterday. Pain controlled overnight with 4 x 0.5 dilaudid IV push. Tolerating po intake.    MEDICATIONS  (STANDING):  gentamicin 0.1% Cream 1 Application(s) Topical four times a day  metolazone 5 milliGRAM(s) Oral two times a day  furosemide    Tablet 100 milliGRAM(s) Oral two times a day  docusate sodium 300 milliGRAM(s) Oral daily  calcium acetate 667 milliGRAM(s) Oral three times a day with meals  Nephro-jay 1 Tablet(s) Oral daily  calcitriol   Capsule 0.5 MICROGram(s) Oral daily  folic acid 1 milliGRAM(s) Oral daily  senna 2 Tablet(s) Oral at bedtime  docusate sodium 100 milliGRAM(s) Oral two times a day    MEDICATIONS  (PRN):  oxyCODONE    5 mG/acetaminophen 325 mG 1 Tablet(s) Oral every 6 hours PRN Severe Pain (7 - 10)  acetaminophen   Tablet. 650 milliGRAM(s) Oral every 6 hours PRN Moderate Pain (4 - 6)  HYDROmorphone  Injectable 0.5 milliGRAM(s) IV Push every 6 hours PRN for breakthrough pain    T(C): 36.8 (08-09-17 @ 05:00), Max: 36.8 (08-08-17 @ 17:25)  HR: 78 (08-09-17 @ 05:00) (66 - 86)  BP: 117/73 (08-09-17 @ 05:00) (107/70 - 136/79)  RR: 18 (08-09-17 @ 05:00) (12 - 19)  SpO2: 97% (08-09-17 @ 05:00) (94% - 100%)  PHYSICAL EXAM:  GENERAL: NAD, well-developed  HEAD:  Atraumatic, Normocephalic  EYES: EOMI, PERRLA, conjunctiva and sclera clear  NECK: Supple, No JVD  CHEST/LUNG: Clear to auscultation bilaterally; No wheeze  HEART: Regular rate and rhythm; No murmurs, rubs, or gallops  ABDOMEN: Soft, Tender at aleisha-surgical site. PD catheter and Dressing CDI  EXTREMITIES:  2+ Peripheral Pulses, No clubbing, cyanosis, or edema  PSYCH: AAOx3  NEUROLOGY: non-focal  SKIN: No rashes or lesions    LABS:                        8.3    x     )-----------( 261      ( 09 Aug 2017 05:50 )             23.9     WBC Trend: 11.61<--, 11.16<--, 11.41<--  08-08    140  |  101  |  103<H>  ----------------------------<  87  4.4   |  20<L>  |  14.08<H>    Ca    7.5<L>      08 Aug 2017 06:40  Phos  7.0     08-08  Mg     1.8     08-08      Creatinine Trend: 14.08<--, 13.14<--, 13.11<--, 10.07<--    RADIOLOGY & ADDITIONAL TESTS:    Imaging Personally Reviewed:    Consultant(s) Notes Reviewed:  Surgery    Care Discussed with Consultants/Other Providers: Nephrology

## 2017-08-09 NOTE — DISCHARGE NOTE ADULT - HOSPITAL COURSE
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 Angulo. Pt noted the catheter was not draining as well therefore presented to [facility] where the catheter was found to be malpositioned. Pt without acute complaints however found to be anemic to 6.4 on admission labs. Denies shortness of breath, palpitations, chest pain or signs of bleeding. Reports LMP was 7/18/16. Periods last 4 days and are usually heavy requiring use of 3-4 menstrual pads. Reports no n/v, diarrhea, melena, abdominal pain, fevers or lower extremity edema, however, says that her face feels and look swollen, which is c/w "too much fluid". In addition the pt says that has moderate body aches and b/l knee pain which are c/w a "crisis". Pt states that her pain is usually controlled by Tylenol with codeine.     After admission, patient was started on 2u pRBC transfusion. HD stable and post-transfusion serial Hb stable. Patient underwent laparoscopic PD catheter replacement on 8/8. She tolerated the procedure well and had one session of peritoneal dialysis on 8/9. Patient pain controlled on the day of discharge and will follow up with PD clinic as outpatient per nephrology. 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 Angulo. Pt noted the catheter was not draining as well therefore presented to [facility] where the catheter was found to be malpositioned. Pt without acute complaints however found to be anemic to 6.4 on admission labs. Denies shortness of breath, palpitations, chest pain or signs of bleeding. Reports LMP was 7/18/16. Periods last 4 days and are usually heavy requiring use of 3-4 menstrual pads. Reports no n/v, diarrhea, melena, abdominal pain, fevers or lower extremity edema, however, says that her face feels and look swollen, which is c/w "too much fluid". In addition the pt says that has moderate body aches and b/l knee pain which are c/w a "crisis". Pt states that her pain is usually controlled by Tylenol with codeine.     After admission, patient was started on 2u pRBC transfusion. HD stable and post-transfusion serial Hb stable. Patient underwent laparoscopic PD catheter replacement on 8/8. She tolerated the procedure well and had one session of peritoneal dialysis on 8/9. Patient pain controlled on the day of discharge and will follow up with PD clinic as outpatient per nephrology.    DISCHARGE TIME- 35 MINUTES SPENT PREPARING DISCHARGE, INCLUDING PAPERWORK, MEDICATION RECONCILIATION, AND COUNSELLING OF PATIENT. 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 Angulo. Pt noted the catheter was not draining as well therefore presented to [facility] where the catheter was found to be malpositioned. Pt without acute complaints however found to be anemic to 6.4 on admission labs. Denies shortness of breath, palpitations, chest pain or signs of bleeding. Reports LMP was 7/18/16. Periods last 4 days and are usually heavy requiring use of 3-4 menstrual pads. Reports no n/v, diarrhea, melena, abdominal pain, fevers or lower extremity edema. In addition the pt says that has moderate body aches and b/l knee pain which are c/w a "crisis". Pt states that her pain is usually controlled by Tylenol with codeine. After admission, patient was started on 2u pRBC transfusion. HD stable and post-transfusion serial Hb stable. Patient underwent laparoscopic PD catheter replacement on 8/8. She tolerated the procedure well and had one session of peritoneal dialysis on 8/9. Patient pain controlled on the day of discharge and will follow up with PD clinic as outpatient per nephrology.    DISCHARGE TIME- 35 MINUTES SPENT PREPARING DISCHARGE, INCLUDING PAPERWORK, MEDICATION RECONCILIATION, AND COUNSELLING OF PATIENT.

## 2017-08-09 NOTE — PROGRESS NOTE ADULT - SUBJECTIVE AND OBJECTIVE BOX
[facility] Division of Kidney Diseases & Hypertension  FOLLOW UP NOTE  [phone]--------------------------------------------------------------------------------  Chief Complaint:Anemia      24 hour events/subjective:    Patient seen and evaluated today. Had PD catheter repositioning done yesterday. Currently denies complains of SOB, CP, nausea, vomiting. Still has complains of abdominal pain at site of PD catheter site.     PAST HISTORY  --------------------------------------------------------------------------------  No significant changes to PMH, PSH, FHx, SHx, unless otherwise noted    ALLERGIES & MEDICATIONS  --------------------------------------------------------------------------------  Allergies    No Known Allergies    Intolerances      Standing Inpatient Medications  gentamicin 0.1% Cream 1 Application(s) Topical four times a day  metolazone 5 milliGRAM(s) Oral two times a day  furosemide    Tablet 100 milliGRAM(s) Oral two times a day  docusate sodium 300 milliGRAM(s) Oral daily  calcium acetate 667 milliGRAM(s) Oral three times a day with meals  Nephro-jay 1 Tablet(s) Oral daily  calcitriol   Capsule 0.5 MICROGram(s) Oral daily  folic acid 1 milliGRAM(s) Oral daily  senna 2 Tablet(s) Oral at bedtime  docusate sodium 100 milliGRAM(s) Oral two times a day    PRN Inpatient Medications  oxyCODONE    5 mG/acetaminophen 325 mG 1 Tablet(s) Oral every 6 hours PRN  acetaminophen   Tablet. 650 milliGRAM(s) Oral every 6 hours PRN  HYDROmorphone  Injectable 0.5 milliGRAM(s) IV Push every 6 hours PRN      REVIEW OF SYSTEMS  --------------------------------------------------------------------------------  Gen: No  fevers/chills  Skin: No rashes  Head/Eyes/Ears/Mouth: No headache; Normal hearing; Normal vision w/o blurriness  Respiratory: No dyspnea, cough, wheezing, hemoptysis  CV: No chest pain, PND, orthopnea  GI: No abdominal pain, diarrhea, constipation, nausea, vomiting  : No increased frequency, dysuria, hematuria, nocturia  MSK: No joint pain/swelling; no back pain; no edema  Neuro: No dizziness/lightheadedness, weakness, seizures, numbness, tingling      All other systems were reviewed and are negative, except as noted.    VITALS/PHYSICAL EXAM  --------------------------------------------------------------------------------  T(C): 36.8 (08-09-17 @ 05:00), Max: 36.8 (08-08-17 @ 17:25)  HR: 78 (08-09-17 @ 05:00) (66 - 86)  BP: 117/73 (08-09-17 @ 05:00) (107/70 - 136/79)  RR: 18 (08-09-17 @ 05:00) (12 - 19)  SpO2: 97% (08-09-17 @ 05:00) (94% - 100%)  Wt(kg): --  Height (cm): 165.1 (08-08-17 @ 15:22)  Weight (kg): 60.3 (08-08-17 @ 15:22)  BMI (kg/m2): 22.1 (08-08-17 @ 15:22)  BSA (m2): 1.66 (08-08-17 @ 15:22)      Physical Exam:  	Gen: NAD, well-appearing  	Pulm: CTA B/L  	CV: RRR, S1S2;  	Abd: +BS, soft, nontender/nondistended; PD catheter site covering in dressing; site clean.               Extremities: no bilateral LE edema present.     LABS/STUDIES  --------------------------------------------------------------------------------              8.3    16.81 >-----------<  261      [08-09-17 @ 05:50]              23.9     138  |  99  |  110  ----------------------------<  97      [08-09-17 @ 06:00]  4.5   |  19  |  14.50        Ca     7.8     [08-09-17 @ 06:00]      Mg     1.7     [08-09-17 @ 06:00]      Phos  7.2     [08-09-17 @ 06:00]            Creatinine Trend:  SCr 14.50 [08-09 @ 06:00]  SCr 14.08 [08-08 @ 06:40]  SCr 13.14 [08-07 @ 06:45]  SCr 13.11 [08-06 @ 18:30]  SCr 10.07 [08-04 @ 02:40]        Iron 71, TIBC 165, %sat --      [08-06-17 @ 18:30]  Ferritin 2501      [08-06-17 @ 18:30]  Vitamin D (25OH) 6.1      [08-08-17 @ 06:40] [facility] Division of Kidney Diseases & Hypertension  FOLLOW UP NOTE  [phone]--------------------------------------------------------------------------------  Chief Complaint:Anemia      24 hour events/subjective:    Patient seen and evaluated today. Had PD catheter repositioning done yesterday, and was successfully lavaged. Currently denies complains of SOB, CP, nausea, vomiting. Still has complains of abdominal pain at site of PD catheter site.     PAST HISTORY  --------------------------------------------------------------------------------  No significant changes to PMH, PSH, FHx, SHx, unless otherwise noted    ALLERGIES & MEDICATIONS  --------------------------------------------------------------------------------  Allergies    No Known Allergies    Intolerances      Standing Inpatient Medications  gentamicin 0.1% Cream 1 Application(s) Topical four times a day  metolazone 5 milliGRAM(s) Oral two times a day  furosemide    Tablet 100 milliGRAM(s) Oral two times a day  docusate sodium 300 milliGRAM(s) Oral daily  calcium acetate 667 milliGRAM(s) Oral three times a day with meals  Nephro-ajy 1 Tablet(s) Oral daily  calcitriol   Capsule 0.5 MICROGram(s) Oral daily  folic acid 1 milliGRAM(s) Oral daily  senna 2 Tablet(s) Oral at bedtime  docusate sodium 100 milliGRAM(s) Oral two times a day    PRN Inpatient Medications  oxyCODONE    5 mG/acetaminophen 325 mG 1 Tablet(s) Oral every 6 hours PRN  acetaminophen   Tablet. 650 milliGRAM(s) Oral every 6 hours PRN  HYDROmorphone  Injectable 0.5 milliGRAM(s) IV Push every 6 hours PRN      REVIEW OF SYSTEMS  --------------------------------------------------------------------------------  Gen: No  fevers/chills  Skin: No rashes  Head/Eyes/Ears/Mouth: No headache; Normal hearing; Normal vision w/o blurriness  Respiratory: No dyspnea, cough, wheezing, hemoptysis  CV: No chest pain, PND, orthopnea  GI: No abdominal pain, diarrhea, constipation, nausea, vomiting  : No increased frequency, dysuria, hematuria, nocturia  MSK: No joint pain/swelling; no back pain; no edema  Neuro: No dizziness/lightheadedness, weakness, seizures, numbness, tingling      All other systems were reviewed and are negative, except as noted.    VITALS/PHYSICAL EXAM  --------------------------------------------------------------------------------  T(C): 36.8 (08-09-17 @ 05:00), Max: 36.8 (08-08-17 @ 17:25)  HR: 78 (08-09-17 @ 05:00) (66 - 86)  BP: 117/73 (08-09-17 @ 05:00) (107/70 - 136/79)  RR: 18 (08-09-17 @ 05:00) (12 - 19)  SpO2: 97% (08-09-17 @ 05:00) (94% - 100%)  Wt(kg): --  Height (cm): 165.1 (08-08-17 @ 15:22)  Weight (kg): 60.3 (08-08-17 @ 15:22)  BMI (kg/m2): 22.1 (08-08-17 @ 15:22)  BSA (m2): 1.66 (08-08-17 @ 15:22)      Physical Exam:  	Gen: NAD, well-appearing  	Pulm: CTA B/L  	CV: RRR, S1S2;  	Abd: +BS, soft, nontender/nondistended; PD catheter site covering in dressing; site clean.               Extremities: no bilateral LE edema present.     LABS/STUDIES  --------------------------------------------------------------------------------              8.3    16.81 >-----------<  261      [08-09-17 @ 05:50]              23.9     138  |  99  |  110  ----------------------------<  97      [08-09-17 @ 06:00]  4.5   |  19  |  14.50        Ca     7.8     [08-09-17 @ 06:00]      Mg     1.7     [08-09-17 @ 06:00]      Phos  7.2     [08-09-17 @ 06:00]            Creatinine Trend:  SCr 14.50 [08-09 @ 06:00]  SCr 14.08 [08-08 @ 06:40]  SCr 13.14 [08-07 @ 06:45]  SCr 13.11 [08-06 @ 18:30]  SCr 10.07 [08-04 @ 02:40]        Iron 71, TIBC 165, %sat --      [08-06-17 @ 18:30]  Ferritin 2501      [08-06-17 @ 18:30]  Vitamin D (25OH) 6.1      [08-08-17 @ 06:40]

## 2017-08-09 NOTE — PROGRESS NOTE ADULT - PROBLEM SELECTOR PLAN 1
- s/p 2 units of PRBC transfusion and post-transfusion Hb 8.4, now stable at >8 this am.  - Pain more likely from post-op at this time. Used 4 doses prn Dilaudid overnight for pain.  - No sob, signs of infection at this time.  - C/w folic acid. Patient on Epo as outpatient.

## 2017-08-09 NOTE — PROGRESS NOTE ADULT - PROBLEM SELECTOR PLAN 2
- s/p repair. Will discuss w/ renal regarding further management.  - Pain control w/ po percocet prn. Dilaudid prn q6h 0.5mg only for breakthrough pain.

## 2017-08-09 NOTE — DISCHARGE NOTE ADULT - CARE PLAN
Principal Discharge DX:	Peritoneal dialysis catheter dysfunction  Goal:	Replacement  Instructions for follow-up, activity and diet:	Please follow up in PD clinic as instructed.  Secondary Diagnosis:	Sickle cell disease  Goal:	Maintain stable Principal Discharge DX:	Peritoneal dialysis catheter dysfunction  Goal:	Replacement  Instructions for follow-up, activity and diet:	Please follow up in PD clinic as instructed.  Secondary Diagnosis:	Sickle cell disease  Goal:	Maintain stable Hb level  Instructions for follow-up, activity and diet:	You were transfused 2u pRBC on admission. Hb stable afterwards. Please c/w procrit as outpatient after discharge. Follow up with your nephrologist. Principal Discharge DX:	Peritoneal dialysis catheter dysfunction  Goal:	Replacement  Instructions for follow-up, activity and diet:	Please follow up in PD clinic as instructed. Your PD catheter was replaced per surgery laparoscopically after admission. Pain controlled post-procedure. Trial of one session PD dialysis went well at the time of discharge.  Secondary Diagnosis:	Sickle cell disease  Goal:	Maintain stable Hb level  Instructions for follow-up, activity and diet:	You were transfused 2u pRBC on admission. Hb stable afterwards. Please c/w procrit as outpatient after discharge. Follow up with your nephrologist.

## 2017-08-18 RX ORDER — LACTULOSE 10 G/15ML
10 SOLUTION ORAL DAILY
Qty: 1 | Refills: 0 | Status: ACTIVE | COMMUNITY
Start: 2017-03-03 | End: 1900-01-01

## 2017-08-23 ENCOUNTER — RX RENEWAL (OUTPATIENT)
Age: 37
End: 2017-08-23

## 2017-08-31 ENCOUNTER — APPOINTMENT (OUTPATIENT)
Dept: INTERNAL MEDICINE | Facility: HOSPITAL | Age: 37
End: 2017-08-31
Payer: COMMERCIAL

## 2017-08-31 ENCOUNTER — OUTPATIENT (OUTPATIENT)
Dept: OUTPATIENT SERVICES | Facility: HOSPITAL | Age: 37
LOS: 1 days | End: 2017-08-31

## 2017-08-31 VITALS — DIASTOLIC BLOOD PRESSURE: 86 MMHG | SYSTOLIC BLOOD PRESSURE: 134 MMHG | HEART RATE: 86 BPM

## 2017-08-31 DIAGNOSIS — Z98.89 OTHER SPECIFIED POSTPROCEDURAL STATES: Chronic | ICD-10-CM

## 2017-08-31 DIAGNOSIS — B34.3 PARVOVIRUS INFECTION, UNSPECIFIED: ICD-10-CM

## 2017-08-31 DIAGNOSIS — Z98.51 TUBAL LIGATION STATUS: Chronic | ICD-10-CM

## 2017-08-31 PROCEDURE — 99213 OFFICE O/P EST LOW 20 MIN: CPT

## 2017-09-01 DIAGNOSIS — N05.1 UNSPECIFIED NEPHRITIC SYNDROME WITH FOCAL AND SEGMENTAL GLOMERULAR LESIONS: ICD-10-CM

## 2017-09-01 DIAGNOSIS — N18.5 CHRONIC KIDNEY DISEASE, STAGE 5: ICD-10-CM

## 2017-09-01 DIAGNOSIS — D57.1 SICKLE-CELL DISEASE WITHOUT CRISIS: ICD-10-CM

## 2017-09-01 DIAGNOSIS — B34.3 PARVOVIRUS INFECTION, UNSPECIFIED: ICD-10-CM

## 2017-09-07 ENCOUNTER — FORM ENCOUNTER (OUTPATIENT)
Age: 37
End: 2017-09-07

## 2017-09-08 ENCOUNTER — APPOINTMENT (OUTPATIENT)
Dept: RADIOLOGY | Facility: CLINIC | Age: 37
End: 2017-09-08
Payer: COMMERCIAL

## 2017-09-08 ENCOUNTER — OUTPATIENT (OUTPATIENT)
Dept: OUTPATIENT SERVICES | Facility: HOSPITAL | Age: 37
LOS: 1 days | End: 2017-09-08
Payer: COMMERCIAL

## 2017-09-08 DIAGNOSIS — Z98.51 TUBAL LIGATION STATUS: Chronic | ICD-10-CM

## 2017-09-08 DIAGNOSIS — Z00.8 ENCOUNTER FOR OTHER GENERAL EXAMINATION: ICD-10-CM

## 2017-09-08 DIAGNOSIS — Z98.89 OTHER SPECIFIED POSTPROCEDURAL STATES: Chronic | ICD-10-CM

## 2017-09-08 PROCEDURE — 27093 INJECTION FOR HIP X-RAY: CPT

## 2017-09-08 PROCEDURE — 73525 CONTRAST X-RAY OF HIP: CPT | Mod: 26,RT

## 2017-09-08 PROCEDURE — 27093 INJECTION FOR HIP X-RAY: CPT | Mod: LT

## 2017-09-08 PROCEDURE — 73525 CONTRAST X-RAY OF HIP: CPT

## 2017-09-22 DIAGNOSIS — N25.81 SECONDARY HYPERPARATHYROIDISM OF RENAL ORIGIN: ICD-10-CM

## 2017-09-22 RX ORDER — CINACALCET HYDROCHLORIDE 30 MG/1
30 TABLET, COATED ORAL
Qty: 90 | Refills: 0 | Status: ACTIVE | COMMUNITY
Start: 2017-09-22 | End: 1900-01-01

## 2017-10-12 ENCOUNTER — RESULT REVIEW (OUTPATIENT)
Age: 37
End: 2017-10-12

## 2017-11-01 ENCOUNTER — APPOINTMENT (OUTPATIENT)
Dept: SURGERY | Facility: CLINIC | Age: 37
End: 2017-11-01
Payer: COMMERCIAL

## 2017-11-01 VITALS
TEMPERATURE: 98.1 F | SYSTOLIC BLOOD PRESSURE: 120 MMHG | DIASTOLIC BLOOD PRESSURE: 84 MMHG | HEART RATE: 85 BPM | WEIGHT: 135 LBS | BODY MASS INDEX: 21.69 KG/M2 | HEIGHT: 66 IN

## 2017-11-01 DIAGNOSIS — K42.9 UMBILICAL HERNIA W/OUT OBSTRUCTION OR GANGRENE: ICD-10-CM

## 2017-11-01 DIAGNOSIS — Z71.9 COUNSELING, UNSPECIFIED: ICD-10-CM

## 2017-11-01 PROCEDURE — 99242 OFF/OP CONSLTJ NEW/EST SF 20: CPT

## 2017-11-06 ENCOUNTER — APPOINTMENT (OUTPATIENT)
Dept: SURGERY | Facility: CLINIC | Age: 37
End: 2017-11-06
Payer: COMMERCIAL

## 2017-11-06 VITALS
SYSTOLIC BLOOD PRESSURE: 121 MMHG | HEIGHT: 66 IN | HEART RATE: 85 BPM | TEMPERATURE: 97.9 F | WEIGHT: 135 LBS | DIASTOLIC BLOOD PRESSURE: 86 MMHG | BODY MASS INDEX: 21.69 KG/M2

## 2017-11-06 PROCEDURE — 99212 OFFICE O/P EST SF 10 MIN: CPT

## 2017-11-09 ENCOUNTER — OUTPATIENT (OUTPATIENT)
Dept: OUTPATIENT SERVICES | Facility: HOSPITAL | Age: 37
LOS: 1 days | End: 2017-11-09
Payer: COMMERCIAL

## 2017-11-09 VITALS
DIASTOLIC BLOOD PRESSURE: 82 MMHG | SYSTOLIC BLOOD PRESSURE: 124 MMHG | TEMPERATURE: 99 F | HEART RATE: 96 BPM | HEIGHT: 65 IN | RESPIRATION RATE: 14 BRPM | WEIGHT: 141.98 LBS

## 2017-11-09 DIAGNOSIS — Z98.89 OTHER SPECIFIED POSTPROCEDURAL STATES: Chronic | ICD-10-CM

## 2017-11-09 DIAGNOSIS — K42.9 UMBILICAL HERNIA WITHOUT OBSTRUCTION OR GANGRENE: ICD-10-CM

## 2017-11-09 DIAGNOSIS — Z01.818 ENCOUNTER FOR OTHER PREPROCEDURAL EXAMINATION: ICD-10-CM

## 2017-11-09 DIAGNOSIS — N18.9 CHRONIC KIDNEY DISEASE, UNSPECIFIED: ICD-10-CM

## 2017-11-09 DIAGNOSIS — Z98.51 TUBAL LIGATION STATUS: Chronic | ICD-10-CM

## 2017-11-09 LAB
BASOPHILS # BLD AUTO: 0.06 K/UL — SIGNIFICANT CHANGE UP (ref 0–0.2)
BASOPHILS NFR BLD AUTO: 0.6 % — SIGNIFICANT CHANGE UP (ref 0–2)
BUN SERPL-MCNC: 51 MG/DL — HIGH (ref 7–23)
CALCIUM SERPL-MCNC: 9.1 MG/DL — SIGNIFICANT CHANGE UP (ref 8.4–10.5)
CHLORIDE SERPL-SCNC: 89 MMOL/L — LOW (ref 98–107)
CO2 SERPL-SCNC: 25 MMOL/L — SIGNIFICANT CHANGE UP (ref 22–31)
CREAT SERPL-MCNC: 9.28 MG/DL — HIGH (ref 0.5–1.3)
EOSINOPHIL # BLD AUTO: 0.53 K/UL — HIGH (ref 0–0.5)
EOSINOPHIL NFR BLD AUTO: 5.3 % — SIGNIFICANT CHANGE UP (ref 0–6)
GLUCOSE SERPL-MCNC: 70 MG/DL — SIGNIFICANT CHANGE UP (ref 70–99)
HCT VFR BLD CALC: 37.6 % — SIGNIFICANT CHANGE UP (ref 34.5–45)
HGB BLD-MCNC: 12.6 G/DL — SIGNIFICANT CHANGE UP (ref 11.5–15.5)
IMM GRANULOCYTES # BLD AUTO: 0.08 # — SIGNIFICANT CHANGE UP
IMM GRANULOCYTES NFR BLD AUTO: 0.8 % — SIGNIFICANT CHANGE UP (ref 0–1.5)
LYMPHOCYTES # BLD AUTO: 2.82 K/UL — SIGNIFICANT CHANGE UP (ref 1–3.3)
LYMPHOCYTES # BLD AUTO: 28.3 % — SIGNIFICANT CHANGE UP (ref 13–44)
MCHC RBC-ENTMCNC: 27.8 PG — SIGNIFICANT CHANGE UP (ref 27–34)
MCHC RBC-ENTMCNC: 33.5 % — SIGNIFICANT CHANGE UP (ref 32–36)
MCV RBC AUTO: 83 FL — SIGNIFICANT CHANGE UP (ref 80–100)
MONOCYTES # BLD AUTO: 0.84 K/UL — SIGNIFICANT CHANGE UP (ref 0–0.9)
MONOCYTES NFR BLD AUTO: 8.4 % — SIGNIFICANT CHANGE UP (ref 2–14)
NEUTROPHILS # BLD AUTO: 5.65 K/UL — SIGNIFICANT CHANGE UP (ref 1.8–7.4)
NEUTROPHILS NFR BLD AUTO: 56.6 % — SIGNIFICANT CHANGE UP (ref 43–77)
NRBC # FLD: 0.06 — SIGNIFICANT CHANGE UP
PLATELET # BLD AUTO: 240 K/UL — SIGNIFICANT CHANGE UP (ref 150–400)
PMV BLD: 12.2 FL — SIGNIFICANT CHANGE UP (ref 7–13)
POTASSIUM SERPL-MCNC: 3.9 MMOL/L — SIGNIFICANT CHANGE UP (ref 3.5–5.3)
POTASSIUM SERPL-SCNC: 3.9 MMOL/L — SIGNIFICANT CHANGE UP (ref 3.5–5.3)
RBC # BLD: 4.53 M/UL — SIGNIFICANT CHANGE UP (ref 3.8–5.2)
RBC # FLD: 15.6 % — HIGH (ref 10.3–14.5)
SODIUM SERPL-SCNC: 131 MMOL/L — LOW (ref 135–145)
WBC # BLD: 9.98 K/UL — SIGNIFICANT CHANGE UP (ref 3.8–10.5)
WBC # FLD AUTO: 9.98 K/UL — SIGNIFICANT CHANGE UP (ref 3.8–10.5)

## 2017-11-09 PROCEDURE — 93010 ELECTROCARDIOGRAM REPORT: CPT

## 2017-11-09 RX ORDER — ACETAMINOPHEN WITH CODEINE 300MG-30MG
1 TABLET ORAL
Qty: 0 | Refills: 0 | COMMUNITY

## 2017-11-09 RX ORDER — FUROSEMIDE 40 MG
1 TABLET ORAL
Qty: 0 | Refills: 0 | COMMUNITY

## 2017-11-09 RX ORDER — DOCUSATE SODIUM 100 MG
3 CAPSULE ORAL
Qty: 0 | Refills: 0 | COMMUNITY

## 2017-11-09 RX ORDER — CALCITRIOL 0.5 UG/1
2 CAPSULE ORAL
Qty: 0 | Refills: 0 | COMMUNITY

## 2017-11-09 RX ORDER — POTASSIUM CHLORIDE 20 MEQ
1 PACKET (EA) ORAL
Qty: 0 | Refills: 0 | COMMUNITY

## 2017-11-09 RX ORDER — SODIUM BICARBONATE 1 MEQ/ML
1 SYRINGE (ML) INTRAVENOUS
Qty: 0 | Refills: 0 | COMMUNITY

## 2017-11-09 RX ORDER — CALCIUM ACETATE 667 MG
2 TABLET ORAL
Qty: 0 | Refills: 0 | COMMUNITY

## 2017-11-09 RX ORDER — ERYTHROPOIETIN 10000 [IU]/ML
0 INJECTION, SOLUTION INTRAVENOUS; SUBCUTANEOUS
Qty: 0 | Refills: 0 | COMMUNITY

## 2017-11-09 RX ORDER — ERYTHROPOIETIN 10000 [IU]/ML
1 INJECTION, SOLUTION INTRAVENOUS; SUBCUTANEOUS
Qty: 0 | Refills: 0 | COMMUNITY

## 2017-11-09 RX ORDER — GENTAMICIN SULFATE 0.1 %
1 OINTMENT (GRAM) TOPICAL
Qty: 0 | Refills: 0 | COMMUNITY

## 2017-11-09 RX ORDER — SODIUM CHLORIDE 9 MG/ML
1 INJECTION INTRAMUSCULAR; INTRAVENOUS; SUBCUTANEOUS
Qty: 0 | Refills: 0 | COMMUNITY

## 2017-11-09 RX ORDER — FOLIC ACID 0.8 MG
1 TABLET ORAL
Qty: 0 | Refills: 0 | COMMUNITY

## 2017-11-09 RX ORDER — CALCIUM ACETATE 667 MG
3 TABLET ORAL
Qty: 0 | Refills: 0 | COMMUNITY

## 2017-11-09 NOTE — H&P PST ADULT - PROBLEM SELECTOR PLAN 2
As discussed with anesthesia, Dr. Villalta, await nephrology clearance with nephrologist due to fact patient is taking furosemide 100 mg orally 2x/day and metolazone 2.5 mg orally 2x/day  * Discussed with anesthesia, Dr. Villalta, that patient requires surgery being done at [facility]. Notified Gita in surgeon's office As discussed with anesthesia, Dr. Villalta, await nephrology clearance with nephrologist due to fact patient is taking furosemide 100 mg orally 2x/day and metolazone 2.5 mg orally 2x/day--notified Gita in surgeon's office  * Discussed with anesthesia, Dr. Villalta, that patient requires surgery being done at [facility]. Notified Gita in surgeon's office

## 2017-11-09 NOTE — H&P PST ADULT - PSH
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

## 2017-11-09 NOTE — H&P PST ADULT - PROBLEM SELECTOR PLAN 1
This is a  35 y/o female who is scheduled for umbilical hernia repair on 11-17-17  * Given pre op famotidine  * Instructed to take normal am dose of   the am of surgery  * Await stress test and echo from [facility] (patient on transplant list) This is a  37 y/o female who is scheduled for umbilical hernia repair on 11-17-17  * Given pre op famotidine  * Instructed to take normal am dose of furosemide and metolazone the am of surgery  * Await stress test and echo from [facility] (patient on transplant list)

## 2017-11-09 NOTE — H&P PST ADULT - LYMPHATIC
anterior cervical R/posterior cervical R/posterior cervical L/anterior cervical L/supraclavicular L/supraclavicular R

## 2017-11-09 NOTE — H&P PST ADULT - HISTORY OF PRESENT ILLNESS
This is a  35 y/o female with significant h/o necrosis left hip with subsequent surgery, sickle cell disease (received transfusion received Jan. 2017), anemia, and renal failure on peritoneal dialysis due to Parvo virus in 2016 (has "minimal function of one kidney" -- not sure which one). Patient is on the transplant list at [facility]. She presents with umbilical hernia after her peritoneal catheter inserted. Scheduled for umbilical hernia repair on 11-17-17 This is a  35 y/o female with significant h/o necrosis right hip with subsequent surgery, sickle cell disease (received transfusion received Jan. 2017), anemia, and renal failure on peritoneal dialysis due to Parvo virus in 2016 (has "minimal function of one kidney" -- not sure which one). Patient is on the transplant list at [facility]. She presents with umbilical hernia after her peritoneal catheter inserted. Scheduled for umbilical hernia repair on 11-17-17

## 2017-11-09 NOTE — H&P PST ADULT - PMH
Anemia    Cholelithiasis    Chronic kidney disease  started dialysis March 2017 with etiology from Parvo Virus 2016  CRF (chronic renal failure)  2-2017, insertion of peritoneal dialysis  HPV (Human Papillomavirus)    Kidney dysfunction  "have minimal function in one kidney" -- patient not sure which one -- started dialysis in 2017 with insertion of peritoneal dialysis  Nephrotic syndrome    Parvovirus B19 infection  2016 resulting in chronic renal disease  Right Hip Pain- Surgery 1991  at PAST appointment on 11-9-17, patient states surgery was 1991  Sickle cell disease    Umbilical hernia  November 2017 Anemia    Cholelithiasis    Chronic kidney disease  started dialysis March 2017 with etiology from Parvo Virus 2016  CRF (chronic renal failure)  2-2017, insertion of peritoneal dialysis  HPV (Human Papillomavirus)    Hypertension  DENIES  HYPERTENSION at PAST appointment on 11-9-17 at [facility]  -- states she is on furosemide 100 mg po 2x/day and metolazone 2.5 mg orally 2x/day but NOT  FOR  HYPERTENSION  Kidney dysfunction  "have minimal function in one kidney" -- patient not sure which one -- started dialysis in 2017 with insertion of peritoneal dialysis  Nephrotic syndrome    Parvovirus B19 infection  2016 resulting in chronic renal disease  Right Hip Pain- Surgery 1991  at PAST appointment on 11-9-17, patient states surgery was 1991  Sickle cell disease    Umbilical hernia  November 2017

## 2017-11-09 NOTE — H&P PST ADULT - GENITOURINARY COMMENTS
CRF -- has peritoneal dialysis catheter -- on transplant list at [facility]; "have minimal functioning of one kidney"-- patient not sure which one it is

## 2017-11-09 NOTE — H&P PST ADULT - FAMILY HISTORY
Family history of sickle cell trait in father     Family history of sickle cell trait in mother     Sibling  Still living? Yes, Estimated age: Age Unknown  Family history of sickle cell disease, Age at diagnosis: Age Unknown  Family history of sarcoidosis, Age at diagnosis: Age Unknown

## 2017-11-14 NOTE — ASU PATIENT PROFILE, ADULT - PMH
Anemia    Cholelithiasis    Chronic kidney disease  started dialysis March 2017 with etiology from Parvo Virus 2016  CRF (chronic renal failure)  2-2017, insertion of peritoneal dialysis  HPV (Human Papillomavirus)    Hypertension  DENIES  HYPERTENSION at PAST appointment on 11-9-17 at [facility]  -- states she is on furosemide 100 mg po 2x/day and metolazone 2.5 mg orally 2x/day but NOT  FOR  HYPERTENSION  Kidney dysfunction  "have minimal function in one kidney" -- patient not sure which one -- started dialysis in 2017 with insertion of peritoneal dialysis  Nephrotic syndrome    Parvovirus B19 infection  2016 resulting in chronic renal disease  Right Hip Pain- Surgery 1991  at PAST appointment on 11-9-17, patient states surgery was 1991  Sickle cell disease    Umbilical hernia  November 2017

## 2017-11-15 ENCOUNTER — APPOINTMENT (OUTPATIENT)
Dept: SURGERY | Facility: HOSPITAL | Age: 37
End: 2017-11-15

## 2017-11-15 ENCOUNTER — OUTPATIENT (OUTPATIENT)
Dept: OUTPATIENT SERVICES | Facility: HOSPITAL | Age: 37
LOS: 1 days | Discharge: HOME | End: 2017-11-15
Payer: COMMERCIAL

## 2017-11-15 VITALS
DIASTOLIC BLOOD PRESSURE: 75 MMHG | HEART RATE: 81 BPM | WEIGHT: 141.98 LBS | RESPIRATION RATE: 16 BRPM | SYSTOLIC BLOOD PRESSURE: 114 MMHG | TEMPERATURE: 98 F | HEIGHT: 65 IN | OXYGEN SATURATION: 100 %

## 2017-11-15 VITALS
SYSTOLIC BLOOD PRESSURE: 120 MMHG | OXYGEN SATURATION: 99 % | HEART RATE: 80 BPM | RESPIRATION RATE: 12 BRPM | DIASTOLIC BLOOD PRESSURE: 80 MMHG

## 2017-11-15 DIAGNOSIS — Z98.89 OTHER SPECIFIED POSTPROCEDURAL STATES: Chronic | ICD-10-CM

## 2017-11-15 DIAGNOSIS — Z98.51 TUBAL LIGATION STATUS: Chronic | ICD-10-CM

## 2017-11-15 DIAGNOSIS — K42.9 UMBILICAL HERNIA WITHOUT OBSTRUCTION OR GANGRENE: ICD-10-CM

## 2017-11-15 PROCEDURE — 49587: CPT

## 2017-11-15 RX ORDER — SODIUM CHLORIDE 9 MG/ML
1000 INJECTION INTRAMUSCULAR; INTRAVENOUS; SUBCUTANEOUS
Qty: 0 | Refills: 0 | Status: DISCONTINUED | OUTPATIENT
Start: 2017-11-15 | End: 2017-11-15

## 2017-11-15 RX ORDER — HYDROMORPHONE HYDROCHLORIDE 2 MG/ML
2 INJECTION INTRAMUSCULAR; INTRAVENOUS; SUBCUTANEOUS
Qty: 0 | Refills: 0 | Status: DISCONTINUED | OUTPATIENT
Start: 2017-11-15 | End: 2017-11-15

## 2017-11-15 RX ORDER — HYDROMORPHONE HYDROCHLORIDE 2 MG/ML
1 INJECTION INTRAMUSCULAR; INTRAVENOUS; SUBCUTANEOUS
Qty: 32 | Refills: 0 | OUTPATIENT
Start: 2017-11-15 | End: 2017-11-19

## 2017-11-15 RX ADMIN — HYDROMORPHONE HYDROCHLORIDE 2 MILLIGRAM(S): 2 INJECTION INTRAMUSCULAR; INTRAVENOUS; SUBCUTANEOUS at 08:47

## 2017-11-15 NOTE — ASU DISCHARGE PLAN (ADULT/PEDIATRIC). - NOTIFY
Increased Irritability or Sluggishness/Numbness, color, or temperature change to extremity/Numbness, tingling/Excessive Diarrhea/Pain not relieved by Medications/Persistent Nausea and Vomiting/Bleeding that does not stop/Unable to Urinate/Fever greater than 101/Inability to Tolerate Liquids or Foods/Swelling that continues

## 2017-11-15 NOTE — ASU DISCHARGE PLAN (ADULT/PEDIATRIC). - MEDICATION SUMMARY - MEDICATIONS TO TAKE
I will START or STAY ON the medications listed below when I get home from the hospital:    HYDROmorphone 2 mg oral tablet  -- 1 tab(s) by mouth every 3 hours, As needed, Moderate Pain (4 - 6) MDD:6 tabs  -- Indication: For Umbilical hernia without obstruction and without gangrene    metOLazone 2.5 mg oral tablet  -- 1 tab(s) by mouth 2 times a day  -- Indication: For Umbilical hernia without obstruction and without gangrene    furosemide  -- 100 milligram(s) by mouth 2 times a day  -- Indication: For Umbilical hernia without obstruction and without gangrene    Epogen  -- injectable 2 times a week with dialysis  -- Indication: For Umbilical hernia without obstruction and without gangrene    calcium acetate 667 mg oral tablet  -- 3 tab(s) by mouth 3 times a day  -- Indication: For Umbilical hernia without obstruction and without gangrene    Cristin-Marguerite oral tablet  -- 1 tab(s) by mouth once a day  -- Indication: For Umbilical hernia without obstruction and without gangrene    folic acid 1 mg oral tablet  -- 1 tab(s) by mouth once a day pm  -- Indication: For Umbilical hernia without obstruction and without gangrene    calcitriol 0.5 mcg oral capsule  -- 2 cap(s) by mouth 2 times a day  -- Indication: For Umbilical hernia without obstruction and without gangrene

## 2017-11-15 NOTE — ASU DISCHARGE PLAN (ADULT/PEDIATRIC). - NURSING INSTRUCTIONS
You received dilaudid in the recovery room at  8:45 am,  your next dose should be taken( if needed)  as prescribed in 3 hrs at 11:45 am

## 2017-11-16 ENCOUNTER — TRANSCRIPTION ENCOUNTER (OUTPATIENT)
Age: 37
End: 2017-11-16

## 2017-11-29 ENCOUNTER — APPOINTMENT (OUTPATIENT)
Dept: SURGERY | Facility: CLINIC | Age: 37
End: 2017-11-29

## 2017-11-30 ENCOUNTER — RX RENEWAL (OUTPATIENT)
Age: 37
End: 2017-11-30

## 2017-12-06 ENCOUNTER — FORM ENCOUNTER (OUTPATIENT)
Age: 37
End: 2017-12-06

## 2017-12-07 ENCOUNTER — APPOINTMENT (OUTPATIENT)
Dept: RADIOLOGY | Facility: CLINIC | Age: 37
End: 2017-12-07
Payer: COMMERCIAL

## 2017-12-07 ENCOUNTER — OUTPATIENT (OUTPATIENT)
Dept: OUTPATIENT SERVICES | Facility: HOSPITAL | Age: 37
LOS: 1 days | End: 2017-12-07
Payer: COMMERCIAL

## 2017-12-07 DIAGNOSIS — Z98.51 TUBAL LIGATION STATUS: Chronic | ICD-10-CM

## 2017-12-07 DIAGNOSIS — Z98.89 OTHER SPECIFIED POSTPROCEDURAL STATES: Chronic | ICD-10-CM

## 2017-12-07 DIAGNOSIS — Z00.8 ENCOUNTER FOR OTHER GENERAL EXAMINATION: ICD-10-CM

## 2017-12-07 PROCEDURE — 73525 CONTRAST X-RAY OF HIP: CPT

## 2017-12-07 PROCEDURE — 27093 INJECTION FOR HIP X-RAY: CPT

## 2017-12-07 PROCEDURE — 73525 CONTRAST X-RAY OF HIP: CPT | Mod: 26,RT

## 2017-12-07 PROCEDURE — 27093 INJECTION FOR HIP X-RAY: CPT | Mod: RT

## 2017-12-08 ENCOUNTER — INPATIENT (INPATIENT)
Facility: HOSPITAL | Age: 37
LOS: 8 days | Discharge: HOME | End: 2017-12-17
Attending: HOSPITALIST | Admitting: HOSPITALIST
Payer: COMMERCIAL

## 2017-12-08 VITALS
OXYGEN SATURATION: 100 % | SYSTOLIC BLOOD PRESSURE: 143 MMHG | HEART RATE: 108 BPM | RESPIRATION RATE: 18 BRPM | TEMPERATURE: 98 F | DIASTOLIC BLOOD PRESSURE: 90 MMHG

## 2017-12-08 DIAGNOSIS — Z98.89 OTHER SPECIFIED POSTPROCEDURAL STATES: Chronic | ICD-10-CM

## 2017-12-08 DIAGNOSIS — Z98.51 TUBAL LIGATION STATUS: Chronic | ICD-10-CM

## 2017-12-08 NOTE — ED ADULT TRIAGE NOTE - CHIEF COMPLAINT QUOTE
Pt. with c/o chest pain, back pain and difficulty breathing that started around 7pm 2/2 sickle cell crisis.

## 2017-12-09 DIAGNOSIS — D72.829 ELEVATED WHITE BLOOD CELL COUNT, UNSPECIFIED: ICD-10-CM

## 2017-12-09 DIAGNOSIS — R07.9 CHEST PAIN, UNSPECIFIED: ICD-10-CM

## 2017-12-09 DIAGNOSIS — D57.00 HB-SS DISEASE WITH CRISIS, UNSPECIFIED: ICD-10-CM

## 2017-12-09 DIAGNOSIS — N18.6 END STAGE RENAL DISEASE: ICD-10-CM

## 2017-12-09 DIAGNOSIS — Z29.9 ENCOUNTER FOR PROPHYLACTIC MEASURES, UNSPECIFIED: ICD-10-CM

## 2017-12-09 DIAGNOSIS — D57.1 SICKLE-CELL DISEASE WITHOUT CRISIS: ICD-10-CM

## 2017-12-09 LAB
ALBUMIN SERPL ELPH-MCNC: 3.3 G/DL — SIGNIFICANT CHANGE UP (ref 3.3–5)
ALP SERPL-CCNC: 84 U/L — SIGNIFICANT CHANGE UP (ref 40–120)
ALT FLD-CCNC: 17 U/L — SIGNIFICANT CHANGE UP (ref 4–33)
ANISOCYTOSIS BLD QL: SIGNIFICANT CHANGE UP
ANISOCYTOSIS BLD QL: SIGNIFICANT CHANGE UP
APTT BLD: 23.7 SEC — LOW (ref 27.5–37.4)
AST SERPL-CCNC: 30 U/L — SIGNIFICANT CHANGE UP (ref 4–32)
BASE EXCESS BLDA CALC-SCNC: 1.5 MMOL/L — SIGNIFICANT CHANGE UP
BASE EXCESS BLDV CALC-SCNC: 6.4 MMOL/L — SIGNIFICANT CHANGE UP
BASOPHILS # BLD AUTO: 0.02 K/UL — SIGNIFICANT CHANGE UP (ref 0–0.2)
BASOPHILS # BLD AUTO: 0.07 K/UL — SIGNIFICANT CHANGE UP (ref 0–0.2)
BASOPHILS NFR BLD AUTO: 0.1 % — SIGNIFICANT CHANGE UP (ref 0–2)
BASOPHILS NFR BLD AUTO: 0.3 % — SIGNIFICANT CHANGE UP (ref 0–2)
BASOPHILS NFR SPEC: 0 % — SIGNIFICANT CHANGE UP (ref 0–2)
BASOPHILS NFR SPEC: 0 % — SIGNIFICANT CHANGE UP (ref 0–2)
BILIRUB SERPL-MCNC: 1 MG/DL — SIGNIFICANT CHANGE UP (ref 0.2–1.2)
BLASTS # FLD: 0 % — SIGNIFICANT CHANGE UP (ref 0–0)
BLOOD GAS VENOUS - CREATININE: 11.3 MG/DL — HIGH (ref 0.5–1.3)
BODY FLUID TYPE: SIGNIFICANT CHANGE UP
BUN SERPL-MCNC: 68 MG/DL — HIGH (ref 7–23)
BUN SERPL-MCNC: 69 MG/DL — HIGH (ref 7–23)
CA-I BLDA-SCNC: 1.02 MMOL/L — LOW (ref 1.15–1.29)
CALCIUM SERPL-MCNC: 8.5 MG/DL — SIGNIFICANT CHANGE UP (ref 8.4–10.5)
CALCIUM SERPL-MCNC: 8.6 MG/DL — SIGNIFICANT CHANGE UP (ref 8.4–10.5)
CHLORIDE BLDV-SCNC: 96 MMOL/L — SIGNIFICANT CHANGE UP (ref 96–108)
CHLORIDE SERPL-SCNC: 91 MMOL/L — LOW (ref 98–107)
CHLORIDE SERPL-SCNC: 94 MMOL/L — LOW (ref 98–107)
CK MB BLD-MCNC: 2.48 NG/ML — SIGNIFICANT CHANGE UP (ref 1–4.7)
CK MB BLD-MCNC: 2.52 NG/ML — SIGNIFICANT CHANGE UP (ref 1–4.7)
CK MB BLD-MCNC: SIGNIFICANT CHANGE UP (ref 0–2.5)
CK MB BLD-MCNC: SIGNIFICANT CHANGE UP (ref 0–2.5)
CK SERPL-CCNC: 102 U/L — SIGNIFICANT CHANGE UP (ref 25–170)
CK SERPL-CCNC: 98 U/L — SIGNIFICANT CHANGE UP (ref 25–170)
CLARITY SPEC: CLEAR — SIGNIFICANT CHANGE UP
CO2 SERPL-SCNC: 23 MMOL/L — SIGNIFICANT CHANGE UP (ref 22–31)
CO2 SERPL-SCNC: 25 MMOL/L — SIGNIFICANT CHANGE UP (ref 22–31)
COLOR FLD: COLORLESS — SIGNIFICANT CHANGE UP
CREAT SERPL-MCNC: 10.3 MG/DL — HIGH (ref 0.5–1.3)
CREAT SERPL-MCNC: 10.44 MG/DL — HIGH (ref 0.5–1.3)
D DIMER BLD IA.RAPID-MCNC: 5575 NG/ML — SIGNIFICANT CHANGE UP
DACRYOCYTES BLD QL SMEAR: SLIGHT — SIGNIFICANT CHANGE UP
EOSINOPHIL # BLD AUTO: 0 K/UL — SIGNIFICANT CHANGE UP (ref 0–0.5)
EOSINOPHIL # BLD AUTO: 0.01 K/UL — SIGNIFICANT CHANGE UP (ref 0–0.5)
EOSINOPHIL NFR BLD AUTO: 0 % — SIGNIFICANT CHANGE UP (ref 0–6)
EOSINOPHIL NFR BLD AUTO: 0 % — SIGNIFICANT CHANGE UP (ref 0–6)
EOSINOPHIL NFR FLD: 0 % — SIGNIFICANT CHANGE UP (ref 0–6)
EOSINOPHIL NFR FLD: 0 % — SIGNIFICANT CHANGE UP (ref 0–6)
GAS PNL BLDV: 135 MMOL/L — LOW (ref 136–146)
GIANT PLATELETS BLD QL SMEAR: PRESENT — SIGNIFICANT CHANGE UP
GLUCOSE BLDA-MCNC: 134 MG/DL — HIGH (ref 70–99)
GLUCOSE BLDV-MCNC: 116 — HIGH (ref 70–99)
GLUCOSE SERPL-MCNC: 119 MG/DL — HIGH (ref 70–99)
GLUCOSE SERPL-MCNC: 128 MG/DL — HIGH (ref 70–99)
GRAM STN FLD: SIGNIFICANT CHANGE UP
HCG SERPL-ACNC: < 5 MIU/ML — SIGNIFICANT CHANGE UP
HCO3 BLDA-SCNC: 26 MMOL/L — SIGNIFICANT CHANGE UP (ref 22–26)
HCO3 BLDV-SCNC: 30 MMOL/L — HIGH (ref 20–27)
HCT VFR BLD CALC: 24.4 % — LOW (ref 34.5–45)
HCT VFR BLD CALC: 25.2 % — LOW (ref 34.5–45)
HCT VFR BLDA CALC: 26 % — LOW (ref 34.5–46.5)
HCT VFR BLDV CALC: 29.3 % — LOW (ref 34.5–45)
HGB BLD-MCNC: 8.6 G/DL — LOW (ref 11.5–15.5)
HGB BLD-MCNC: 8.7 G/DL — LOW (ref 11.5–15.5)
HGB BLDA-MCNC: 8.4 G/DL — LOW (ref 11.5–15.5)
HGB BLDV-MCNC: 9.5 G/DL — LOW (ref 11.5–15.5)
IMM GRANULOCYTES # BLD AUTO: 0.49 # — SIGNIFICANT CHANGE UP
IMM GRANULOCYTES # BLD AUTO: 1.56 # — SIGNIFICANT CHANGE UP
IMM GRANULOCYTES NFR BLD AUTO: 2.7 % — HIGH (ref 0–1.5)
IMM GRANULOCYTES NFR BLD AUTO: 5.6 % — HIGH (ref 0–1.5)
INR BLD: 0.95 — SIGNIFICANT CHANGE UP (ref 0.88–1.17)
LACTATE BLDA-SCNC: 2.5 MMOL/L — HIGH (ref 0.5–2)
LACTATE BLDV-MCNC: 1.6 MMOL/L — SIGNIFICANT CHANGE UP (ref 0.5–2)
LYMPHOCYTES # BLD AUTO: 10.7 % — LOW (ref 13–44)
LYMPHOCYTES # BLD AUTO: 11.4 % — LOW (ref 13–44)
LYMPHOCYTES # BLD AUTO: 2.06 K/UL — SIGNIFICANT CHANGE UP (ref 1–3.3)
LYMPHOCYTES # BLD AUTO: 2.95 K/UL — SIGNIFICANT CHANGE UP (ref 1–3.3)
LYMPHOCYTES NFR SPEC AUTO: 8.3 % — LOW (ref 13–44)
LYMPHOCYTES NFR SPEC AUTO: 9.6 % — LOW (ref 13–44)
MACROCYTES BLD QL: SIGNIFICANT CHANGE UP
MACROCYTES BLD QL: SLIGHT — SIGNIFICANT CHANGE UP
MAGNESIUM SERPL-MCNC: 1.8 MG/DL — SIGNIFICANT CHANGE UP (ref 1.6–2.6)
MCHC RBC-ENTMCNC: 27.3 PG — SIGNIFICANT CHANGE UP (ref 27–34)
MCHC RBC-ENTMCNC: 28.3 PG — SIGNIFICANT CHANGE UP (ref 27–34)
MCHC RBC-ENTMCNC: 34.5 % — SIGNIFICANT CHANGE UP (ref 32–36)
MCHC RBC-ENTMCNC: 35.2 % — SIGNIFICANT CHANGE UP (ref 32–36)
MCV RBC AUTO: 79 FL — LOW (ref 80–100)
MCV RBC AUTO: 80.3 FL — SIGNIFICANT CHANGE UP (ref 80–100)
METAMYELOCYTES # FLD: 0.9 % — SIGNIFICANT CHANGE UP (ref 0–1)
METAMYELOCYTES # FLD: 0.9 % — SIGNIFICANT CHANGE UP (ref 0–1)
MICROCYTES BLD QL: SLIGHT — SIGNIFICANT CHANGE UP
MONOCYTES # BLD AUTO: 1.45 K/UL — HIGH (ref 0–0.9)
MONOCYTES # BLD AUTO: 2.53 K/UL — HIGH (ref 0–0.9)
MONOCYTES NFR BLD AUTO: 8 % — SIGNIFICANT CHANGE UP (ref 2–14)
MONOCYTES NFR BLD AUTO: 9.1 % — SIGNIFICANT CHANGE UP (ref 2–14)
MONOCYTES NFR BLD: 4.4 % — SIGNIFICANT CHANGE UP (ref 2–9)
MONOCYTES NFR BLD: 7.4 % — SIGNIFICANT CHANGE UP (ref 2–9)
MYELOCYTES NFR BLD: 0 % — SIGNIFICANT CHANGE UP (ref 0–0)
MYELOCYTES NFR BLD: 1.9 % — HIGH (ref 0–0)
NEUTROPHIL AB SER-ACNC: 77.8 % — HIGH (ref 43–77)
NEUTROPHIL AB SER-ACNC: 80.7 % — HIGH (ref 43–77)
NEUTROPHILS # BLD AUTO: 14.1 K/UL — HIGH (ref 1.8–7.4)
NEUTROPHILS # BLD AUTO: 20.56 K/UL — HIGH (ref 1.8–7.4)
NEUTROPHILS NFR BLD AUTO: 74.3 % — SIGNIFICANT CHANGE UP (ref 43–77)
NEUTROPHILS NFR BLD AUTO: 77.8 % — HIGH (ref 43–77)
NEUTS BAND # BLD: 1.8 % — SIGNIFICANT CHANGE UP (ref 0–6)
NRBC # FLD: 0.11 — SIGNIFICANT CHANGE UP
NRBC # FLD: 0.42 — SIGNIFICANT CHANGE UP
NRBC FLD-RTO: 1.5 — SIGNIFICANT CHANGE UP
OTHER - HEMATOLOGY %: 0 — SIGNIFICANT CHANGE UP
PCO2 BLDA: 38 MMHG — SIGNIFICANT CHANGE UP (ref 32–48)
PCO2 BLDV: 47 MMHG — SIGNIFICANT CHANGE UP (ref 41–51)
PH BLDA: 7.44 PH — SIGNIFICANT CHANGE UP (ref 7.35–7.45)
PH BLDV: 7.43 PH — SIGNIFICANT CHANGE UP (ref 7.32–7.43)
PHOSPHATE SERPL-MCNC: 5.1 MG/DL — HIGH (ref 2.5–4.5)
PLATELET # BLD AUTO: 134 K/UL — LOW (ref 150–400)
PLATELET # BLD AUTO: 218 K/UL — SIGNIFICANT CHANGE UP (ref 150–400)
PLATELET COUNT - ESTIMATE: NORMAL — SIGNIFICANT CHANGE UP
PLATELET COUNT - ESTIMATE: SIGNIFICANT CHANGE UP
PMV BLD: 10.5 FL — SIGNIFICANT CHANGE UP (ref 7–13)
PMV BLD: 11.4 FL — SIGNIFICANT CHANGE UP (ref 7–13)
PO2 BLDA: 122 MMHG — HIGH (ref 83–108)
PO2 BLDV: 40 MMHG — SIGNIFICANT CHANGE UP (ref 35–40)
POIKILOCYTOSIS BLD QL AUTO: SIGNIFICANT CHANGE UP
POIKILOCYTOSIS BLD QL AUTO: SLIGHT — SIGNIFICANT CHANGE UP
POLYCHROMASIA BLD QL SMEAR: SLIGHT — SIGNIFICANT CHANGE UP
POLYCHROMASIA BLD QL SMEAR: SLIGHT — SIGNIFICANT CHANGE UP
POTASSIUM BLDA-SCNC: 4.2 MMOL/L — SIGNIFICANT CHANGE UP (ref 3.4–4.5)
POTASSIUM BLDV-SCNC: 4.6 MMOL/L — HIGH (ref 3.4–4.5)
POTASSIUM SERPL-MCNC: 4.6 MMOL/L — SIGNIFICANT CHANGE UP (ref 3.5–5.3)
POTASSIUM SERPL-MCNC: 5.1 MMOL/L — SIGNIFICANT CHANGE UP (ref 3.5–5.3)
POTASSIUM SERPL-SCNC: 4.6 MMOL/L — SIGNIFICANT CHANGE UP (ref 3.5–5.3)
POTASSIUM SERPL-SCNC: 5.1 MMOL/L — SIGNIFICANT CHANGE UP (ref 3.5–5.3)
PROMYELOCYTES # FLD: 0 % — SIGNIFICANT CHANGE UP (ref 0–0)
PROT SERPL-MCNC: 7.4 G/DL — SIGNIFICANT CHANGE UP (ref 6–8.3)
PROTHROM AB SERPL-ACNC: 10.5 SEC — SIGNIFICANT CHANGE UP (ref 9.8–13.1)
RBC # BLD: 3.04 M/UL — LOW (ref 3.8–5.2)
RBC # BLD: 3.19 M/UL — LOW (ref 3.8–5.2)
RBC # FLD: 18.1 % — HIGH (ref 10.3–14.5)
RBC # FLD: 18.6 % — HIGH (ref 10.3–14.5)
RCV VOL RI: 0 CELL/UL — SIGNIFICANT CHANGE UP (ref 0–5)
RETICS #: 0.2 10X6/UL — HIGH (ref 0.02–0.07)
RETICS/RBC NFR: 5.3 % — HIGH (ref 0.5–2.5)
REVIEW TO FOLLOW: YES — SIGNIFICANT CHANGE UP
REVIEW TO FOLLOW: YES — SIGNIFICANT CHANGE UP
SAO2 % BLDA: 99.4 % — HIGH (ref 95–99)
SAO2 % BLDV: 66.5 % — SIGNIFICANT CHANGE UP (ref 60–85)
SICKLE CELLS BLD QL SMEAR: SLIGHT — SIGNIFICANT CHANGE UP
SICKLE CELLS BLD QL SMEAR: SLIGHT — SIGNIFICANT CHANGE UP
SODIUM BLDA-SCNC: 134 MMOL/L — LOW (ref 136–146)
SODIUM SERPL-SCNC: 135 MMOL/L — SIGNIFICANT CHANGE UP (ref 135–145)
SODIUM SERPL-SCNC: 137 MMOL/L — SIGNIFICANT CHANGE UP (ref 135–145)
SPECIMEN SOURCE: SIGNIFICANT CHANGE UP
TARGETS BLD QL SMEAR: SIGNIFICANT CHANGE UP
TARGETS BLD QL SMEAR: SLIGHT — SIGNIFICANT CHANGE UP
TOTAL NUCLEATED CELL COUNT, BODY FLUID: 4 CELL/UL — SIGNIFICANT CHANGE UP (ref 0–5)
TROPONIN T SERPL-MCNC: < 0.06 NG/ML — SIGNIFICANT CHANGE UP (ref 0–0.06)
TROPONIN T SERPL-MCNC: < 0.06 NG/ML — SIGNIFICANT CHANGE UP (ref 0–0.06)
VARIANT LYMPHS # BLD: 2.6 % — SIGNIFICANT CHANGE UP
VARIANT LYMPHS # BLD: 3.7 % — SIGNIFICANT CHANGE UP
WBC # BLD: 18.12 K/UL — HIGH (ref 3.8–10.5)
WBC # BLD: 27.68 K/UL — HIGH (ref 3.8–10.5)
WBC # FLD AUTO: 18.12 K/UL — HIGH (ref 3.8–10.5)
WBC # FLD AUTO: 27.68 K/UL — HIGH (ref 3.8–10.5)

## 2017-12-09 PROCEDURE — 71010: CPT | Mod: 26,59

## 2017-12-09 PROCEDURE — 71020: CPT | Mod: 26

## 2017-12-09 PROCEDURE — 99223 1ST HOSP IP/OBS HIGH 75: CPT | Mod: AI,GC

## 2017-12-09 PROCEDURE — 90947 DIALYSIS REPEATED EVAL: CPT | Mod: GC

## 2017-12-09 RX ORDER — HYDROMORPHONE HYDROCHLORIDE 2 MG/ML
1 INJECTION INTRAMUSCULAR; INTRAVENOUS; SUBCUTANEOUS ONCE
Qty: 0 | Refills: 0 | Status: DISCONTINUED | OUTPATIENT
Start: 2017-12-09 | End: 2017-12-09

## 2017-12-09 RX ORDER — ACETAMINOPHEN 500 MG
650 TABLET ORAL EVERY 6 HOURS
Qty: 0 | Refills: 0 | Status: DISCONTINUED | OUTPATIENT
Start: 2017-12-09 | End: 2017-12-10

## 2017-12-09 RX ORDER — SENNA PLUS 8.6 MG/1
2 TABLET ORAL AT BEDTIME
Qty: 0 | Refills: 0 | Status: DISCONTINUED | OUTPATIENT
Start: 2017-12-09 | End: 2017-12-11

## 2017-12-09 RX ORDER — HEPARIN SODIUM 5000 [USP'U]/ML
5000 INJECTION INTRAVENOUS; SUBCUTANEOUS EVERY 8 HOURS
Qty: 0 | Refills: 0 | Status: DISCONTINUED | OUTPATIENT
Start: 2017-12-09 | End: 2017-12-12

## 2017-12-09 RX ORDER — KETAMINE HYDROCHLORIDE 100 MG/ML
20 INJECTION INTRAMUSCULAR; INTRAVENOUS ONCE
Qty: 0 | Refills: 0 | Status: DISCONTINUED | OUTPATIENT
Start: 2017-12-09 | End: 2017-12-09

## 2017-12-09 RX ORDER — CALCIUM ACETATE 667 MG
3 TABLET ORAL
Qty: 0 | Refills: 0 | COMMUNITY

## 2017-12-09 RX ORDER — IPRATROPIUM/ALBUTEROL SULFATE 18-103MCG
3 AEROSOL WITH ADAPTER (GRAM) INHALATION EVERY 6 HOURS
Qty: 0 | Refills: 0 | Status: DISCONTINUED | OUTPATIENT
Start: 2017-12-09 | End: 2017-12-17

## 2017-12-09 RX ORDER — SODIUM CHLORIDE 9 MG/ML
1000 INJECTION INTRAMUSCULAR; INTRAVENOUS; SUBCUTANEOUS ONCE
Qty: 0 | Refills: 0 | Status: COMPLETED | OUTPATIENT
Start: 2017-12-09 | End: 2017-12-09

## 2017-12-09 RX ORDER — CALCIUM ACETATE 667 MG
2668 TABLET ORAL
Qty: 0 | Refills: 0 | Status: DISCONTINUED | OUTPATIENT
Start: 2017-12-09 | End: 2017-12-17

## 2017-12-09 RX ORDER — CALCIUM ACETATE 667 MG
667 TABLET ORAL
Qty: 0 | Refills: 0 | Status: DISCONTINUED | OUTPATIENT
Start: 2017-12-09 | End: 2017-12-09

## 2017-12-09 RX ORDER — CALCITRIOL 0.5 UG/1
0.5 CAPSULE ORAL DAILY
Qty: 0 | Refills: 0 | Status: DISCONTINUED | OUTPATIENT
Start: 2017-12-09 | End: 2017-12-17

## 2017-12-09 RX ORDER — FOLIC ACID 0.8 MG
1 TABLET ORAL DAILY
Qty: 0 | Refills: 0 | Status: DISCONTINUED | OUTPATIENT
Start: 2017-12-09 | End: 2017-12-17

## 2017-12-09 RX ORDER — DOCUSATE SODIUM 100 MG
100 CAPSULE ORAL
Qty: 0 | Refills: 0 | Status: DISCONTINUED | OUTPATIENT
Start: 2017-12-09 | End: 2017-12-11

## 2017-12-09 RX ORDER — GENTAMICIN SULFATE 0.1 %
1 OINTMENT (GRAM) TOPICAL
Qty: 0 | Refills: 0 | Status: DISCONTINUED | OUTPATIENT
Start: 2017-12-09 | End: 2017-12-09

## 2017-12-09 RX ORDER — HYDROMORPHONE HYDROCHLORIDE 2 MG/ML
2 INJECTION INTRAMUSCULAR; INTRAVENOUS; SUBCUTANEOUS ONCE
Qty: 0 | Refills: 0 | Status: DISCONTINUED | OUTPATIENT
Start: 2017-12-09 | End: 2017-12-09

## 2017-12-09 RX ORDER — NALOXONE HYDROCHLORIDE 4 MG/.1ML
0.1 SPRAY NASAL
Qty: 0 | Refills: 0 | Status: DISCONTINUED | OUTPATIENT
Start: 2017-12-09 | End: 2017-12-17

## 2017-12-09 RX ORDER — SODIUM CHLORIDE 9 MG/ML
1000 INJECTION, SOLUTION INTRAVENOUS
Qty: 0 | Refills: 0 | Status: DISCONTINUED | OUTPATIENT
Start: 2017-12-09 | End: 2017-12-13

## 2017-12-09 RX ORDER — GENTAMICIN SULFATE 0.1 %
1 OINTMENT (GRAM) TOPICAL THREE TIMES A DAY
Qty: 0 | Refills: 0 | Status: DISCONTINUED | OUTPATIENT
Start: 2017-12-09 | End: 2017-12-12

## 2017-12-09 RX ORDER — HYDROMORPHONE HYDROCHLORIDE 2 MG/ML
4 INJECTION INTRAMUSCULAR; INTRAVENOUS; SUBCUTANEOUS EVERY 4 HOURS
Qty: 0 | Refills: 0 | Status: DISCONTINUED | OUTPATIENT
Start: 2017-12-09 | End: 2017-12-10

## 2017-12-09 RX ORDER — POLYETHYLENE GLYCOL 3350 17 G/17G
17 POWDER, FOR SOLUTION ORAL DAILY
Qty: 0 | Refills: 0 | Status: DISCONTINUED | OUTPATIENT
Start: 2017-12-09 | End: 2017-12-11

## 2017-12-09 RX ORDER — HYDROMORPHONE HYDROCHLORIDE 2 MG/ML
2 INJECTION INTRAMUSCULAR; INTRAVENOUS; SUBCUTANEOUS EVERY 4 HOURS
Qty: 0 | Refills: 0 | Status: DISCONTINUED | OUTPATIENT
Start: 2017-12-09 | End: 2017-12-10

## 2017-12-09 RX ORDER — FUROSEMIDE 40 MG
100 TABLET ORAL
Qty: 0 | Refills: 0 | Status: DISCONTINUED | OUTPATIENT
Start: 2017-12-09 | End: 2017-12-13

## 2017-12-09 RX ORDER — ONDANSETRON 8 MG/1
4 TABLET, FILM COATED ORAL EVERY 6 HOURS
Qty: 0 | Refills: 0 | Status: DISCONTINUED | OUTPATIENT
Start: 2017-12-09 | End: 2017-12-17

## 2017-12-09 RX ORDER — HYDROMORPHONE HYDROCHLORIDE 2 MG/ML
30 INJECTION INTRAMUSCULAR; INTRAVENOUS; SUBCUTANEOUS
Qty: 0 | Refills: 0 | Status: DISCONTINUED | OUTPATIENT
Start: 2017-12-09 | End: 2017-12-10

## 2017-12-09 RX ADMIN — Medication 100 MILLIGRAM(S): at 20:16

## 2017-12-09 RX ADMIN — HYDROMORPHONE HYDROCHLORIDE 1 MILLIGRAM(S): 2 INJECTION INTRAMUSCULAR; INTRAVENOUS; SUBCUTANEOUS at 00:50

## 2017-12-09 RX ADMIN — HYDROMORPHONE HYDROCHLORIDE 1 MILLIGRAM(S): 2 INJECTION INTRAMUSCULAR; INTRAVENOUS; SUBCUTANEOUS at 01:18

## 2017-12-09 RX ADMIN — HYDROMORPHONE HYDROCHLORIDE 4 MILLIGRAM(S): 2 INJECTION INTRAMUSCULAR; INTRAVENOUS; SUBCUTANEOUS at 13:55

## 2017-12-09 RX ADMIN — Medication 2668 MILLIGRAM(S): at 20:16

## 2017-12-09 RX ADMIN — HYDROMORPHONE HYDROCHLORIDE 30 MILLILITER(S): 2 INJECTION INTRAMUSCULAR; INTRAVENOUS; SUBCUTANEOUS at 17:24

## 2017-12-09 RX ADMIN — KETAMINE HYDROCHLORIDE 20 MILLIGRAM(S): 100 INJECTION INTRAMUSCULAR; INTRAVENOUS at 05:04

## 2017-12-09 RX ADMIN — HYDROMORPHONE HYDROCHLORIDE 2 MILLIGRAM(S): 2 INJECTION INTRAMUSCULAR; INTRAVENOUS; SUBCUTANEOUS at 11:31

## 2017-12-09 RX ADMIN — HYDROMORPHONE HYDROCHLORIDE 4 MILLIGRAM(S): 2 INJECTION INTRAMUSCULAR; INTRAVENOUS; SUBCUTANEOUS at 08:33

## 2017-12-09 RX ADMIN — HYDROMORPHONE HYDROCHLORIDE 4 MILLIGRAM(S): 2 INJECTION INTRAMUSCULAR; INTRAVENOUS; SUBCUTANEOUS at 13:35

## 2017-12-09 RX ADMIN — HYDROMORPHONE HYDROCHLORIDE 2 MILLIGRAM(S): 2 INJECTION INTRAMUSCULAR; INTRAVENOUS; SUBCUTANEOUS at 11:46

## 2017-12-09 RX ADMIN — HYDROMORPHONE HYDROCHLORIDE 1 MILLIGRAM(S): 2 INJECTION INTRAMUSCULAR; INTRAVENOUS; SUBCUTANEOUS at 00:35

## 2017-12-09 RX ADMIN — Medication 1 APPLICATION(S): at 17:00

## 2017-12-09 RX ADMIN — HYDROMORPHONE HYDROCHLORIDE 1 MILLIGRAM(S): 2 INJECTION INTRAMUSCULAR; INTRAVENOUS; SUBCUTANEOUS at 02:57

## 2017-12-09 RX ADMIN — HYDROMORPHONE HYDROCHLORIDE 2 MILLIGRAM(S): 2 INJECTION INTRAMUSCULAR; INTRAVENOUS; SUBCUTANEOUS at 02:07

## 2017-12-09 RX ADMIN — HYDROMORPHONE HYDROCHLORIDE 1 MILLIGRAM(S): 2 INJECTION INTRAMUSCULAR; INTRAVENOUS; SUBCUTANEOUS at 04:05

## 2017-12-09 RX ADMIN — HYDROMORPHONE HYDROCHLORIDE 1 MILLIGRAM(S): 2 INJECTION INTRAMUSCULAR; INTRAVENOUS; SUBCUTANEOUS at 01:03

## 2017-12-09 RX ADMIN — HYDROMORPHONE HYDROCHLORIDE 1 MILLIGRAM(S): 2 INJECTION INTRAMUSCULAR; INTRAVENOUS; SUBCUTANEOUS at 03:50

## 2017-12-09 RX ADMIN — HYDROMORPHONE HYDROCHLORIDE 4 MILLIGRAM(S): 2 INJECTION INTRAMUSCULAR; INTRAVENOUS; SUBCUTANEOUS at 08:48

## 2017-12-09 RX ADMIN — SODIUM CHLORIDE 1000 MILLILITER(S): 9 INJECTION INTRAMUSCULAR; INTRAVENOUS; SUBCUTANEOUS at 01:02

## 2017-12-09 RX ADMIN — HYDROMORPHONE HYDROCHLORIDE 2 MILLIGRAM(S): 2 INJECTION INTRAMUSCULAR; INTRAVENOUS; SUBCUTANEOUS at 15:39

## 2017-12-09 RX ADMIN — HYDROMORPHONE HYDROCHLORIDE 1 MILLIGRAM(S): 2 INJECTION INTRAMUSCULAR; INTRAVENOUS; SUBCUTANEOUS at 03:14

## 2017-12-09 RX ADMIN — HYDROMORPHONE HYDROCHLORIDE 2 MILLIGRAM(S): 2 INJECTION INTRAMUSCULAR; INTRAVENOUS; SUBCUTANEOUS at 02:22

## 2017-12-09 NOTE — ED PROVIDER NOTE - PMH
Anemia    Cholelithiasis    Chronic kidney disease  started dialysis March 2017 with etiology from Parvo Virus 2016  CRF (chronic renal failure)  2-2017, insertion of peritoneal dialysis  HPV (Human Papillomavirus)    Hypertension  DENIES  HYPERTENSION at PAST appointment on 11-9-17 at [facility]  -- states she is on furosemide 100 mg po 2x/day and metolazone 2.5 mg orally 2x/day but NOT  FOR  HYPERTENSION  Kidney dysfunction  "have minimal function in one kidney" -- patient not sure which one -- started dialysis in 2017 with insertion of peritoneal dialysis  Nephrotic syndrome    Parvovirus B19 infection  2016 resulting in chronic renal disease  Right Hip Pain- Surgery 1991  at PAST appointment on 11-9-17, patient states surgery was 1991  Sickle cell disease    Umbilical hernia  November 2017

## 2017-12-09 NOTE — ED PROVIDER NOTE - PROGRESS NOTE DETAILS
chest pain continued will treat with dilaudid. Pt has elevated D-dimer discussed case with Dr. Perdomo, will hold off on lovenox for now, will wait until morning for V/Q scan will admit for sickle cell crisis currently.

## 2017-12-09 NOTE — H&P ADULT - NSHPSOCIALHISTORY_GEN_ALL_CORE
Denies tobacco, EtOH, IV or recreational drug use Lives in a home locally, works as  at [employer]. Lives with three young children, all healthy. Denies tobacco, EtOH, IV or recreational drug use

## 2017-12-09 NOTE — H&P ADULT - HISTORY OF PRESENT ILLNESS
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 She reports being in her usual state of health until the onset of chest pain at approximately 5 PM yesterday. The onset was sudden with severe pain; the pain is non-pleuritic and not associated with movement. The pain has since spread to involve her back and bilateral knees and elbows and she states this pain is typical of prior sickle cell crises. She denies having frequent pain crises but is unable to given more specific information regarding their frequency. She took 4 mg of dilaudid at home without relief of her pain. Currently she denies fever/chills, SOB, cough, bleeding, N/V/D/C.     On presentation to the ED vitals were: 37 102 135/89 22 100% RA. She received 1L NS and a total of 6 mg dilaudid over 4 hours and an additional 20 mg of ketamine without adequate control of pain. 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 She reports being in her usual state of health until the onset of chest pain at approximately 5 PM yesterday. She has had no change in her health recently other than receiving a scheduled steroid injection in to her right hip yesterday for chronic hip pain. The onset of her pain was sudden with severe throbbing substernal pain; the pain is non-pleuritic and not associated with movement. The pain has since spread to involve her back and bilateral knees and elbows and she states this pain is typical of prior sickle cell crises. She denies having frequent pain crises, less than one per year. She took 4 mg of dilaudid at home without relief of her pain. Currently she denies fever/chills, SOB, cough, bleeding, N/V/D/C.     On presentation to the ED vitals were: 37 102 135/89 22 100% RA. She received 1L NS and a total of 6 mg dilaudid over 4 hours and an additional 20 mg of ketamine without adequate control of pain.

## 2017-12-09 NOTE — ED PROVIDER NOTE - OBJECTIVE STATEMENT
36 YOF pmh sickle cell disease, ESRD on PD, presents to ed with severe midsternal chest pain, back pain and diffuse joint pain. Pt denies any previous episodes of chest pain, denies fevers, denies chills, denies headaches. Pt denies any nausea or vomiting. Pt denies any recent admissions to the hospital, denies any long flights, denies any calf pain, denies any SOB. Chest pain started abruptly at 5pm, pt took 4mg of dilaudid at home and was still in severe pain.

## 2017-12-09 NOTE — H&P ADULT - PROBLEM SELECTOR PLAN 4
Patient with history of SCD with infrequent pain crises; not on hydroxyurea  - C/S heme for atypical pain crisis  - trend CBC  - Pain control as above Patient with history of ESRD 2/2 FSGS  - Consult nephro to continue PD as inpatient  - Continue calcium acetate, calcitriol, neppravite, epogen, sensipar Patient with history of ESRD 2/2 FSGS  - Consult nephro to continue PD as inpatient  - Continue calcium acetate, calcitriol, neppravite, epogen

## 2017-12-09 NOTE — ED ADULT NURSE NOTE - OBJECTIVE STATEMENT
Patint is bought to room 25 accompanied by mother with complaints of chest pain and back pain history of siccle cell and peritoneal dialysis. On arrival, patient is alert and oriented x 4, very uncomfortable, respirations are even and unlabored, normal sinus rhythm on cardiac monitor,, abdomen is soft and nontender, 20 gauge saline lock placed on right AC, blood drawn and sent. Will follow up and monitor. Patient is bought to room 25 accompanied by mother with complaints of chest pain and back pain history of sickle cell and peritoneal dialysis. On arrival, patient is alert and oriented x 4, very uncomfortable, respirations are even and unlabored, normal sinus rhythm on cardiac monitor,, abdomen is soft and nontender, 20 gauge saline lock placed on right AC, blood drawn and sent. Will follow up and monitor.

## 2017-12-09 NOTE — H&P ADULT - ASSESSMENT
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 Patient reports that pain is typical of prior crises although she has not had chest involvement in the past. She denies clear inciting factor for her current pain crisis. Suspect this is pain crisis; low suspicion for PE, ACS, acute chest given normal sats, EKG, troponin, lack of calf pain/swelling.

## 2017-12-09 NOTE — ED ADULT NURSE NOTE - CHPI ED SYMPTOMS NEG
no dizziness/no fever/no cough/no syncope/no chills/no nausea/no shortness of breath/no diaphoresis/no vomiting

## 2017-12-09 NOTE — H&P ADULT - PROBLEM SELECTOR PLAN 5
HSQ q8 Patient with history of SCD with infrequent pain crises; not on hydroxyurea  - C/S heme for atypical pain crisis  - trend CBC  - Pain control as above

## 2017-12-09 NOTE — H&P ADULT - PROBLEM SELECTOR PLAN 2
Low suspicion for ACS (atypical pain, normal EKG/trop), PE (Wells Score 1.5 for tachycardia, D-Dimer could be explained by ESRD, and pain), acute chest (lack of CXR changes)  - Pain control as above  - Trend trops q8 x3 Low suspicion for ACS (atypical pain, normal EKG/trop), PE (Wells Score 1.5 for tachycardia, D-Dimer could be explained by ESRD, and pain), acute chest (lack of CXR changes)  - Pain control as above  - Trend trops q8 x3  - Defer CT PE protocol at this time given low suspicion

## 2017-12-09 NOTE — ED PROVIDER NOTE - ATTENDING CONTRIBUTION TO CARE
35 y/o F with h/o SCD, ESRD on PD here with chest pain.  Pt was 35 y/o F with h/o SCD, ESRD on PD here with chest pain.  Pt reports sudden onset of substernal nonradiating chest pain around 9pm tonight, worse with deep inspiration and movement.  No fever, chills, sob, cough, n/v/d, dysuria, leg pain or swelling.  Pt reports relatively well controlled SCD, no previous h/o acute chest, and typical sxs of crisis are joint pains 35 y/o F with h/o SCD, ESRD on PD here with chest pain.  Pt reports sudden onset of substernal nonradiating chest pain around 9pm tonight, worse with deep inspiration and movement.  No fever, chills, sob, cough, n/v/d, dysuria, leg pain or swelling.  Pt reports relatively well controlled SCD, no previous h/o acute chest, and typical sxs of crisis are joint pains, never had chest pain.  Pt is lying in stretcher, awake and alert, nontoxic but is tearful and appears uncomfortable in pain.  VSS.  Lungs cta bl.  Cards nl S1/S2, RRR, no MRG.  Abd soft ntnd.  No pedal edema or calf tenderness.

## 2017-12-09 NOTE — H&P ADULT - ATTENDING COMMENTS
I personally seen and examined pt with HS. D/W HS, agree with the above H&P.  Well's Score 1.5. low suspicion for PE. Will obtain leg doppler and ABG and closely monitor pt for thromboembolic process or acute chest syndrome.  Will start PCA with Dilaudid for pain control. I personally seen and examined pt with HS. D/W HS, agree with the above H&P.  Well's Score 1.5. low suspicion for PE. Will obtain leg doppler and ABG , check 2nd CE and tele monitoring,  closely monitor pt for thromboembolic process or acute chest syndrome.  Will start PCA with Dilaudid for pain control.

## 2017-12-09 NOTE — H&P ADULT - NSHPPHYSICALEXAM_GEN_ALL_CORE
PHYSICAL EXAM:  GENERAL: Moderate distress complaining of severe diffuse pain  HEAD:  Atraumatic  EYES: PERRLA  ENMT: Moist mucous membranes  CHEST/LUNG: Clear to auscultation bilaterally; no crackles/wheezes  HEART: Regular rate and rhythm; no m/r/g  ABDOMEN: Distended, non-tender. PD catheter in place, non-tender, no bleeding/leaking.   VASCULAR:  Warm, no edema  NERVOUS SYSTEM:  Alert & Oriented X3, Good concentration; Motor Strength 5/5 B/L upper and lower extremities; SILT distal extremities  MSK: No effusion/erythema/swelling of knees or elbow, not tender to palpation. No pain with palpation of wrists, finger joints, ankle or toes. No calf pain/swelling.  SKIN: No rashes or lesions

## 2017-12-09 NOTE — H&P ADULT - NSHPREVIEWOFSYSTEMS_GEN_ALL_CORE
REVIEW OF SYSTEMS  General:	No fever/chill, fatigue  Skin/Breast: No rash  Ophthalmologic: No vision change, eye pain  ENMT: No oral lesions	  Respiratory and Thorax: No SOB, cough  Cardiovascular: +Chest pain, mid sternal, non-pleuritic, non-radiating	  Gastrointestinal: -Abdominal Pain, - N/V/D	  Genitourinary: -Dysuria	  Musculoskeletal: +Bilateral elbow, knee pain. Denies pain in other joints	  Neurological: No headache, weakness, numbness		  Hematology/Lymphatics: No recent bleeding

## 2017-12-09 NOTE — H&P ADULT - PROBLEM SELECTOR PLAN 3
Patient with history of ESRD 2/2 FSGS  - Consult nephro to continue PD as inpatient  - Continue calcium acetate, calcitriol, neppravite, epogen, sensipar Elevated WBC on admission; patient afebrile and denying respiratory/urinary symptoms. No pneumonia on CXR  - Likely reactive given pain crisis  - repeat CBC Elevated WBC on admission; patient afebrile and denying respiratory/urinary symptoms. No pneumonia on CXR, no abdominal tenderness to suggest secondary bacterial peritonitis  - Likely reactive given pain crisis, monitor off abx  - repeat CBC

## 2017-12-09 NOTE — CONSULT NOTE ADULT - PROBLEM SELECTOR RECOMMENDATION 9
Pt. with ESRD on PD. Last PD was last night. Pt. states she tolerated it well without complications. PD catheter working well. Currently pt. clinically stable. Continue with current PD prescription of 3 exchanges (fill volume: 2000 cc with 2.5% dextrose dianeal fluid and last fill). Monitor labs and BP Pt. with ESRD on PD. Last PD was Thursday night. Pt. states she tolerated it well without complications. PD catheter working well. Currently pt. clinically stable. Continue with current PD prescription of 3 exchanges (fill volume: 2000 cc with 2.5% dextrose dianeal fluid and last fill). Monitor labs and BP

## 2017-12-09 NOTE — CONSULT NOTE ADULT - SUBJECTIVE AND OBJECTIVE BOX
[facility] DIVISION OF KIDNEY DISEASES AND HYPERTENSION -- INITIAL CONSULT NOTE  --------------------------------------------------------------------------------  HPI: This is a 36 year old female with a PMHx of sickle cell disease, FSGS, nephrotic syndrome and  Parvo 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 Pt. has been on PD since 3/2017. Pt. nephrologist is Dr. Larsen. Pt. last PD session was last night. As per pt, it went well without complications. PD catheter is functioning well. Pt. current PD prescription is . Nephrolgoy was consulted to arrange for PD. Currently pt. states she has chest discomfort and joint pain. Pt. denies any SOB, abdominal pain, N/V.                PAST HISTORY  --------------------------------------------------------------------------------  PAST MEDICAL & SURGICAL HISTORY:  Umbilical hernia: 2017  CRF (chronic renal failure): 2-, insertion of peritoneal dialysis  Kidney dysfunction: &quot;have minimal function in one kidney&quot; -- patient not sure which one -- started dialysis in 2017 with insertion of peritoneal dialysis  Cholelithiasis  Anemia  Sickle cell disease  Chronic kidney disease: started dialysis 2017 with etiology from Parvo Virus 2016  Parvovirus B19 infection: 2016 resulting in chronic renal disease  Nephrotic syndrome  HPV (Human Papillomavirus)  Right Hip Pain- Surgery : at PAST appointment on 17, patient states surgery was   History of hip surgery: R hip due to necrosis in --bone graft from right tibia  H/O tubal ligation: in , along with   H/O: : 014  S/P Laparoscopic Cholecystectomy    FAMILY HISTORY:  Family history of sarcoidosis (Sibling): Sister with HgbSS and sarcoidosis  Family history of sickle cell disease (Sibling): Sister with HgbSS and sarcoidosis  Family history of sickle cell trait in mother  Family history of sickle cell trait in father    PAST SOCIAL HISTORY: No history of alcohol, drugs or tobacco use.     ALLERGIES & MEDICATIONS  --------------------------------------------------------------------------------  Allergies    No Known Allergies    Intolerances      Standing Inpatient Medications  calcitriol   Capsule 0.5 MICROGram(s) Oral daily  calcium acetate 2668 milliGRAM(s) Oral three times a day with meals  docusate sodium 100 milliGRAM(s) Oral two times a day  folic acid 1 milliGRAM(s) Oral daily  furosemide    Tablet 100 milliGRAM(s) Oral two times a day  gentamicin 0.1% Cream 1 Application(s) Topical four times a day  heparin  Injectable 5000 Unit(s) SubCutaneous every 8 hours  metolazone 5 milliGRAM(s) Oral two times a day  Nephro-jay 1 Tablet(s) Oral daily  senna 2 Tablet(s) Oral at bedtime  sodium chloride 0.45%. 1000 milliLiter(s) IV Continuous <Continuous>    PRN Inpatient Medications  acetaminophen   Tablet. 650 milliGRAM(s) Oral every 6 hours PRN  ALBUTerol/ipratropium for Nebulization 3 milliLiter(s) Nebulizer every 6 hours PRN  HYDROmorphone  Injectable 2 milliGRAM(s) IV Push every 4 hours PRN  HYDROmorphone  Injectable 4 milliGRAM(s) IV Push every 4 hours PRN      REVIEW OF SYSTEMS  --------------------------------------------------------------------------------  Gen: No fevers  Head/Eyes/Ears/Mouth: No headache  Respiratory: No dyspnea  CV: +chest pain   GI: No abdominal pain  : No dysuria  MSK: No edema + joint pain   Heme: As per HPI       All other systems were reviewed and are negative, except as noted.    VITALS/PHYSICAL EXAM  --------------------------------------------------------------------------------  T(C): 37 (17 05:58), Max: 37 (17 @ 05:58)  HR: 102 (17 05:58) (95 - 108)  BP: 135/89 (17 05:58) (133/85 - 143/90)  RR: 22 (17 05:58) (18 - 24)  SpO2: 100% (17 05:58) (99% - 100%)  Wt(kg): --        Physical Exam:  	Gen: Resting in bed  	HEENT: supple neck   	Pulm: CTA B/L  	CV: RRR  	Abd: soft + PD catheter seen intact.   	: No suprapubic tenderness  	LE: Warm, no edema  	Neuro: Awake and alert   	Psych: Normal affect and mood  	Skin: Warm, without rashes  	    LABS/STUDIES  --------------------------------------------------------------------------------              8.7    18.12 >-----------<  218      [17 @ 00:01]              25.2     135  |  91  |  68  ----------------------------<  119      [17 @ 00:10]  5.1   |  23  |  10.44        Ca     8.6     [17 @ 00:10]    TPro  7.4  /  Alb  3.3  /  TBili  1.0  /  DBili  x   /  AST  30  /  ALT  17  /  AlkPhos  84  [17 @ 00:10]    PT/INR: PT 10.5 , INR 0.95       [17 @ 02:31]  PTT: 23.7       [17 @ 02:31]    Troponin < 0.06      [17 @ 00:10]  CK 98      [17 @ 00:10]    Creatinine Trend:  SCr 10.44 [ @ 00:10] [facility] DIVISION OF KIDNEY DISEASES AND HYPERTENSION -- INITIAL CONSULT NOTE  --------------------------------------------------------------------------------  HPI: This is a 36 year old female with a PMHx of sickle cell disease, FSGS, nephrotic syndrome and  Parvo 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 Pt. has been on PD since 3/2017. Pt. nephrologist is Dr. Larsen. Pt. states she has not had any problems with PD. PD catheter is working well. Pt. states her last PD was last night, tolerated well without any complications. Pt. says she is on CCPD 3 exchanges (fill volume: 2000cc with 2.5% dextrose dianeal fluid and a last fill). Currently pt. states she has chest discomfort and joint pain. Pt. denies any SOB, abdominal pain, N/V.         PAST HISTORY  --------------------------------------------------------------------------------  PAST MEDICAL & SURGICAL HISTORY:  Umbilical hernia: 2017  CRF (chronic renal failure): -, insertion of peritoneal dialysis  Kidney dysfunction: &quot;have minimal function in one kidney&quot; -- patient not sure which one -- started dialysis in 2017 with insertion of peritoneal dialysis  Cholelithiasis  Anemia  Sickle cell disease  Chronic kidney disease: started dialysis 2017 with etiology from Parvo Virus 2016  Parvovirus B19 infection: 2016 resulting in chronic renal disease  Nephrotic syndrome  HPV (Human Papillomavirus)  Right Hip Pain- Surgery : at PAST appointment on 17, patient states surgery was   History of hip surgery: R hip due to necrosis in --bone graft from right tibia  H/O tubal ligation: in , along with   H/O: : 014  S/P Laparoscopic Cholecystectomy    FAMILY HISTORY:  Family history of sarcoidosis (Sibling): Sister with HgbSS and sarcoidosis  Family history of sickle cell disease (Sibling): Sister with HgbSS and sarcoidosis  Family history of sickle cell trait in mother  Family history of sickle cell trait in father    PAST SOCIAL HISTORY: No history of alcohol, drugs or tobacco use.     ALLERGIES & MEDICATIONS  --------------------------------------------------------------------------------  Allergies    No Known Allergies    Intolerances      Standing Inpatient Medications  calcitriol   Capsule 0.5 MICROGram(s) Oral daily  calcium acetate 2668 milliGRAM(s) Oral three times a day with meals  docusate sodium 100 milliGRAM(s) Oral two times a day  folic acid 1 milliGRAM(s) Oral daily  furosemide    Tablet 100 milliGRAM(s) Oral two times a day  gentamicin 0.1% Cream 1 Application(s) Topical four times a day  heparin  Injectable 5000 Unit(s) SubCutaneous every 8 hours  metolazone 5 milliGRAM(s) Oral two times a day  Nephro-jay 1 Tablet(s) Oral daily  senna 2 Tablet(s) Oral at bedtime  sodium chloride 0.45%. 1000 milliLiter(s) IV Continuous <Continuous>    PRN Inpatient Medications  acetaminophen   Tablet. 650 milliGRAM(s) Oral every 6 hours PRN  ALBUTerol/ipratropium for Nebulization 3 milliLiter(s) Nebulizer every 6 hours PRN  HYDROmorphone  Injectable 2 milliGRAM(s) IV Push every 4 hours PRN  HYDROmorphone  Injectable 4 milliGRAM(s) IV Push every 4 hours PRN      REVIEW OF SYSTEMS  --------------------------------------------------------------------------------  Gen: No fevers  Head/Eyes/Ears/Mouth: No headache  Respiratory: No dyspnea  CV: +chest pain   GI: No abdominal pain  : No dysuria  MSK: No edema + joint pain   Heme: As per HPI       All other systems were reviewed and are negative, except as noted.    VITALS/PHYSICAL EXAM  --------------------------------------------------------------------------------  T(C): 37 (17 @ 05:58), Max: 37 (17 @ 05:58)  HR: 102 (17 05:58) (95 - 108)  BP: 135/89 (17 05:58) (133/85 - 143/90)  RR: 22 (17 05:58) (18 - 24)  SpO2: 100% (17 05:58) (99% - 100%)  Wt(kg): --        Physical Exam:  	Gen: Resting in bed  	HEENT: supple neck   	Pulm: CTA B/L  	CV: RRR  	Abd: soft + PD catheter seen intact.   	: No suprapubic tenderness  	LE: Warm, no edema  	Neuro: Awake and alert   	Psych: Normal affect and mood  	Skin: Warm, without rashes  	    LABS/STUDIES  --------------------------------------------------------------------------------              8.7    18.12 >-----------<  218      [17 00:01]              25.2     135  |  91  |  68  ----------------------------<  119      [17 00:10]  5.1   |  23  |  10.44        Ca     8.6     [12-09-17 @ 00:10]    TPro  7.4  /  Alb  3.3  /  TBili  1.0  /  DBili  x   /  AST  30  /  ALT  17  /  AlkPhos  84  [17 @ 00:10]    PT/INR: PT 10.5 , INR 0.95       [17 @ 02:31]  PTT: 23.7       [17 @ 02:31]    Troponin < 0.06      [17 @ 00:10]  CK 98      [17 @ 00:10]    Creatinine Trend:  SCr 10.44 [ 00:10] [facility] DIVISION OF KIDNEY DISEASES AND HYPERTENSION -- INITIAL CONSULT NOTE  --------------------------------------------------------------------------------  HPI: This is a 36 year old female with a PMHx of sickle cell disease, FSGS, nephrotic syndrome and  Parvo 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 Pt. has been on PD since 3/2017. Pt. nephrologist is Dr. Larsen. Pt. states she has not had any problems with PD. PD catheter is working well. Pt. states her last PD was Thursday night, tolerated well without any complications. Pt. was unable to do PD yesterday due to chest discomfort. Pt. says she is on CCPD 3 exchanges (fill volume: 2000cc with 2.5% dextrose dianeal fluid and a last fill). Currently pt. states she has chest discomfort and joint pain. Pt. denies any SOB, abdominal pain, N/V.         PAST HISTORY  --------------------------------------------------------------------------------  PAST MEDICAL & SURGICAL HISTORY:  Umbilical hernia: 2017  CRF (chronic renal failure): -, insertion of peritoneal dialysis  Kidney dysfunction: &quot;have minimal function in one kidney&quot; -- patient not sure which one -- started dialysis in 2017 with insertion of peritoneal dialysis  Cholelithiasis  Anemia  Sickle cell disease  Chronic kidney disease: started dialysis 2017 with etiology from Parvo Virus 2016  Parvovirus B19 infection: 2016 resulting in chronic renal disease  Nephrotic syndrome  HPV (Human Papillomavirus)  Right Hip Pain- Surgery : at PAST appointment on 17, patient states surgery was   History of hip surgery: R hip due to necrosis in --bone graft from right tibia  H/O tubal ligation: in , along with   H/O: : 014  S/P Laparoscopic Cholecystectomy    FAMILY HISTORY:  Family history of sarcoidosis (Sibling): Sister with HgbSS and sarcoidosis  Family history of sickle cell disease (Sibling): Sister with HgbSS and sarcoidosis  Family history of sickle cell trait in mother  Family history of sickle cell trait in father    PAST SOCIAL HISTORY: No history of alcohol, drugs or tobacco use.     ALLERGIES & MEDICATIONS  --------------------------------------------------------------------------------  Allergies    No Known Allergies    Intolerances      Standing Inpatient Medications  calcitriol   Capsule 0.5 MICROGram(s) Oral daily  calcium acetate 2668 milliGRAM(s) Oral three times a day with meals  docusate sodium 100 milliGRAM(s) Oral two times a day  folic acid 1 milliGRAM(s) Oral daily  furosemide    Tablet 100 milliGRAM(s) Oral two times a day  gentamicin 0.1% Cream 1 Application(s) Topical four times a day  heparin  Injectable 5000 Unit(s) SubCutaneous every 8 hours  metolazone 5 milliGRAM(s) Oral two times a day  Nephro-jay 1 Tablet(s) Oral daily  senna 2 Tablet(s) Oral at bedtime  sodium chloride 0.45%. 1000 milliLiter(s) IV Continuous <Continuous>    PRN Inpatient Medications  acetaminophen   Tablet. 650 milliGRAM(s) Oral every 6 hours PRN  ALBUTerol/ipratropium for Nebulization 3 milliLiter(s) Nebulizer every 6 hours PRN  HYDROmorphone  Injectable 2 milliGRAM(s) IV Push every 4 hours PRN  HYDROmorphone  Injectable 4 milliGRAM(s) IV Push every 4 hours PRN      REVIEW OF SYSTEMS  --------------------------------------------------------------------------------  Gen: No fevers  Head/Eyes/Ears/Mouth: No headache  Respiratory: No dyspnea  CV: +chest pain   GI: No abdominal pain  : No dysuria  MSK: No edema + joint pain   Heme: As per HPI       All other systems were reviewed and are negative, except as noted.    VITALS/PHYSICAL EXAM  --------------------------------------------------------------------------------  T(C): 37 (17 @ 05:58), Max: 37 (17 05:58)  HR: 102 (17 @ 05:58) (95 - 108)  BP: 135/89 (17 @ 05:58) (133/85 - 143/90)  RR: 22 (17 @ 05:58) (18 - 24)  SpO2: 100% (17 @ 05:58) (99% - 100%)  Wt(kg): --        Physical Exam:  	Gen: Resting in bed  	HEENT: supple neck   	Pulm: CTA B/L  	CV: RRR  	Abd: soft + PD catheter seen intact.   	: No suprapubic tenderness  	LE: Warm, no edema  	Neuro: Awake and alert   	Psych: Normal affect and mood  	Skin: Warm, without rashes  	    LABS/STUDIES  --------------------------------------------------------------------------------              8.7    18.12 >-----------<  218      [17 @ 00:01]              25.2     135  |  91  |  68  ----------------------------<  119      [17 @ 00:10]  5.1   |  23  |  10.44        Ca     8.6     [17 @ 00:10]    TPro  7.4  /  Alb  3.3  /  TBili  1.0  /  DBili  x   /  AST  30  /  ALT  17  /  AlkPhos  84  [17 @ 00:10]    PT/INR: PT 10.5 , INR 0.95       [17 @ 02:31]  PTT: 23.7       [17 @ 02:31]    Troponin < 0.06      [17 @ 00:10]  CK 98      [17 00:10]    Creatinine Trend:  SCr 10.44 [ 00:10]

## 2017-12-09 NOTE — ED PROVIDER NOTE - MEDICAL DECISION MAKING DETAILS
36 YOF pmh ssd, ESRD on PD, coming with first episode of severe chest pain, no SOB, pt is tachycardic. DDx includes ACS, acute chest syndrome, PE, acute pain crisis, ptx, pna. Will hydrate pt CTA chest for PE, pain control reassess.

## 2017-12-09 NOTE — H&P ADULT - PMH
Anemia    Cholelithiasis    Chronic kidney disease  started dialysis March 2017 with etiology from Parvo Virus 2016  CRF (chronic renal failure)  2-2017, insertion of peritoneal dialysis  HPV (Human Papillomavirus)    Kidney dysfunction  "have minimal function in one kidney" -- patient not sure which one -- started dialysis in 2017 with insertion of peritoneal dialysis  Nephrotic syndrome    Parvovirus B19 infection  2016 resulting in chronic renal disease  Right Hip Pain- Surgery 1991  at PAST appointment on 11-9-17, patient states surgery was 1991  Sickle cell disease    Umbilical hernia  November 2017

## 2017-12-09 NOTE — ED PROVIDER NOTE - CONSTITUTIONAL, MLM
normal... awake, alert, oriented to person, place, time/situation and in moderate apparent distress.

## 2017-12-09 NOTE — H&P ADULT - PROBLEM SELECTOR PLAN 1
Patient reports pain is typical of prior episodes but she does not usually have chest involvement. There is no clear inciting event for this current pain episode.  - Pain control with 4 dilaudid IV q4 severe pain, 2 IV q4 moderate, tylenol mild  - 75 cc/hr NS; cautious given ESRD  - Incentive spirometry, duonebs PRN  - UA to rule out UTI as precipitating cause Patient reports pain is typical of prior episodes but she does not usually have chest involvement. There is no clear inciting event for this current pain episode.  - Pain control with 4 dilaudid IV q4 severe pain, 2 IV q4 moderate, tylenol mild (I-STOP # [id number])  - 75 cc/hr NS; cautious given ESRD  - Incentive spirometry, duonebs PRN  - UA to rule out UTI as precipitating cause Patient reports pain is typical of prior episodes but she does not usually have chest involvement. There is no clear inciting event for this current pain episode.  - Pain control with 4 dilaudid IV q4 severe pain, 2 IV q4 moderate, tylenol mild (I-STOP # [id number])  - 75 cc/hr NS; cautious given ESRD  - Incentive spirometry, duonebs PRN  - UA to rule out UTI as precipitating cause  - Tele with continuous pulse ox

## 2017-12-09 NOTE — H&P ADULT - NSHPLABSRESULTS_GEN_ALL_CORE
Complete Blood Count + Automated Diff (12.09.17 @ 00:01)    Review to Follow: YES    Nucleated RBC #: 0.11    WBC Count: 18.12 K/uL    RBC Count: 3.19 M/uL    Hemoglobin: 8.7: Delta: 12.6 on 11/09/  Delta: 12.6 on 11/09/ g/dL    Hematocrit: 25.2 %    Mean Cell Volume: 79.0 fL    Mean Cell Hemoglobin: 27.3 pg    Mean Cell Hemoglobin Conc: 34.5 %    Red Cell Distrib Width: 18.1 %    Platelet Count - Automated: 218 K/uL    MPV: 11.4 fl    Comprehensive Metabolic Panel (12.22.16 @ 16:42)    Sodium, Serum: 141 mmol/L    Potassium, Serum: 3.7 mmol/L    Chloride, Serum: 104 mmol/L    Carbon Dioxide, Serum: 24 mmol/L    Blood Urea Nitrogen, Serum: 34 mg/dL    Creatinine, Serum: 4.66 mg/dL    Glucose, Serum: 85 mg/dL    Calcium, Total Serum: 6.7 mg/dL    Protein Total, Serum: 4.7 g/dL    Albumin, Serum: 1.5 g/dL    Bilirubin Total, Serum: 0.3 mg/dL    Alkaline Phosphatase, Serum: 79    Blood Gas Venous Comprehensive (12.09.17 @ 01:00)    Blood Gas Venous - Lactate: 1.6: Please note updated reference range. mmol/L    Blood Gas Venous - Chloride: 96: Delta: 109 on 10/04/  Delta: 109 on 10/04/ mmol/L    Blood Gas Venous - Creatinine: 11.30: Delta: 5.54 on 10/04/  Delta: 5.54 on 10/04/ mg/dL    pH, Venous: 7.43 pH    pCO2, Venous: 47 mmHg    pO2, Venous: 40 mmHg    HCO3, Venous: 30 mmol/L    Base Excess, Venous: 6.4: REFERENCE RANGE = -3 + 2 mmol/L mmol/L    Oxygen Saturation, Venous: 66.5 %    Blood Gas Venous - Sodium: 135 mmol/L    Blood Gas Venous - Potassium: 4.6 mmol/L    Blood Gas Venous - Glucose: 116    Blood Gas Venous - Hemoglobin: 9.5: Delta: 4.4 on 10/04/  Delta: 4.4 on 10/04/ g/dL    Blood Gas Venous - Hematocrit: 29.3 %    D-Dimer Assay, Quantitative: 0019 Complete Blood Count + Automated Diff (12.09.17 @ 00:01)    Review to Follow: YES    Nucleated RBC #: 0.11    WBC Count: 18.12 K/uL    RBC Count: 3.19 M/uL    Hemoglobin: 8.7: Delta: 12.6 on 11/09/  Delta: 12.6 on 11/09/ g/dL    Hematocrit: 25.2 %    Mean Cell Volume: 79.0 fL    Mean Cell Hemoglobin: 27.3 pg    Mean Cell Hemoglobin Conc: 34.5 %    Red Cell Distrib Width: 18.1 %    Platelet Count - Automated: 218 K/uL    MPV: 11.4 fl    Comprehensive Metabolic Panel (12.22.16 @ 16:42)    Sodium, Serum: 141 mmol/L    Potassium, Serum: 3.7 mmol/L    Chloride, Serum: 104 mmol/L    Carbon Dioxide, Serum: 24 mmol/L    Blood Urea Nitrogen, Serum: 34 mg/dL    Creatinine, Serum: 4.66 mg/dL    Glucose, Serum: 85 mg/dL    Calcium, Total Serum: 6.7 mg/dL    Protein Total, Serum: 4.7 g/dL    Albumin, Serum: 1.5 g/dL    Bilirubin Total, Serum: 0.3 mg/dL    Alkaline Phosphatase, Serum: 79    Blood Gas Venous Comprehensive (12.09.17 @ 01:00)    Blood Gas Venous - Lactate: 1.6: Please note updated reference range. mmol/L    Blood Gas Venous - Chloride: 96: Delta: 109 on 10/04/  Delta: 109 on 10/04/ mmol/L    Blood Gas Venous - Creatinine: 11.30: Delta: 5.54 on 10/04/  Delta: 5.54 on 10/04/ mg/dL    pH, Venous: 7.43 pH    pCO2, Venous: 47 mmHg    pO2, Venous: 40 mmHg    HCO3, Venous: 30 mmol/L    Base Excess, Venous: 6.4: REFERENCE RANGE = -3 + 2 mmol/L mmol/L    Oxygen Saturation, Venous: 66.5 %    Blood Gas Venous - Sodium: 135 mmol/L    Blood Gas Venous - Potassium: 4.6 mmol/L    Blood Gas Venous - Glucose: 116    Blood Gas Venous - Hemoglobin: 9.5: Delta: 4.4 on 10/04/  Delta: 4.4 on 10/04/ g/dL    Blood Gas Venous - Hematocrit: 29.3 %    D-Dimer Assay, Quantitative: 5575    EKG - NSR, rate 100, normal axis. No ST segment change or TWI    Radiology:  < from: Xray Chest 2 Views PA/Lat (12.09.17 @ 02:21) >    IMPRESSION:  Elevated left hemidiaphragm with underlying gaseous and fluid gastric   distention    Fine bibasilar strand-like opacities compatible with subsegmental   atelectatic changes or scarring. Underinflated but otherwise clear   remaining visualized lungs. No pleural effusions or pneumothorax.    Stable cardiac and mediastinal silhouettes.     Trachea midline.    Unremarkable osseous structures.    Right upper quadrant surgical clips again noted.    < end of copied text >

## 2017-12-10 LAB
BODY FLUID TYPE: SIGNIFICANT CHANGE UP
BUN SERPL-MCNC: 70 MG/DL — HIGH (ref 7–23)
CALCIUM SERPL-MCNC: 7.8 MG/DL — LOW (ref 8.4–10.5)
CHLORIDE SERPL-SCNC: 94 MMOL/L — LOW (ref 98–107)
CLARITY SPEC: SIGNIFICANT CHANGE UP
CO2 SERPL-SCNC: 23 MMOL/L — SIGNIFICANT CHANGE UP (ref 22–31)
COLOR FLD: YELLOW — SIGNIFICANT CHANGE UP
CREAT SERPL-MCNC: 9.97 MG/DL — HIGH (ref 0.5–1.3)
CRP SERPL-MCNC: 200.8 MG/L — HIGH
CRYSTALS FLD MICRO: SIGNIFICANT CHANGE UP
ERYTHROCYTE [SEDIMENTATION RATE] IN BLOOD: 58 MM/HR — HIGH (ref 4–25)
GLUCOSE SERPL-MCNC: 107 MG/DL — HIGH (ref 70–99)
GRAM STN FLD: SIGNIFICANT CHANGE UP
HCT VFR BLD CALC: 21.9 % — LOW (ref 34.5–45)
HGB BLD-MCNC: 7.7 G/DL — LOW (ref 11.5–15.5)
MAGNESIUM SERPL-MCNC: 1.6 MG/DL — SIGNIFICANT CHANGE UP (ref 1.6–2.6)
MCHC RBC-ENTMCNC: 28 PG — SIGNIFICANT CHANGE UP (ref 27–34)
MCHC RBC-ENTMCNC: 35.2 % — SIGNIFICANT CHANGE UP (ref 32–36)
MCV RBC AUTO: 79.6 FL — LOW (ref 80–100)
NRBC # FLD: 2.22 — SIGNIFICANT CHANGE UP
NRBC FLD-RTO: 8.2 — SIGNIFICANT CHANGE UP
PHOSPHATE SERPL-MCNC: 5 MG/DL — HIGH (ref 2.5–4.5)
PLATELET # BLD AUTO: 155 K/UL — SIGNIFICANT CHANGE UP (ref 150–400)
PMV BLD: 11 FL — SIGNIFICANT CHANGE UP (ref 7–13)
POTASSIUM SERPL-MCNC: 4.7 MMOL/L — SIGNIFICANT CHANGE UP (ref 3.5–5.3)
POTASSIUM SERPL-SCNC: 4.7 MMOL/L — SIGNIFICANT CHANGE UP (ref 3.5–5.3)
RBC # BLD: 2.75 M/UL — LOW (ref 3.8–5.2)
RBC # FLD: 18.7 % — HIGH (ref 10.3–14.5)
RCV VOL RI: 200 CELL/UL — HIGH (ref 0–5)
SODIUM SERPL-SCNC: 134 MMOL/L — LOW (ref 135–145)
SPECIMEN SOURCE: SIGNIFICANT CHANGE UP
TOTAL NUCLEATED CELL COUNT, BODY FLUID: 4000 CELL/UL — HIGH (ref 0–5)
WBC # BLD: 27.1 K/UL — HIGH (ref 3.8–10.5)
WBC # FLD AUTO: 27.1 K/UL — HIGH (ref 3.8–10.5)

## 2017-12-10 PROCEDURE — 71020: CPT | Mod: 26

## 2017-12-10 PROCEDURE — 73562 X-RAY EXAM OF KNEE 3: CPT | Mod: 26,RT

## 2017-12-10 PROCEDURE — 99233 SBSQ HOSP IP/OBS HIGH 50: CPT | Mod: GC

## 2017-12-10 RX ORDER — ACETAMINOPHEN 500 MG
650 TABLET ORAL EVERY 6 HOURS
Qty: 0 | Refills: 0 | Status: DISCONTINUED | OUTPATIENT
Start: 2017-12-10 | End: 2017-12-14

## 2017-12-10 RX ORDER — MORPHINE SULFATE 50 MG/1
30 CAPSULE, EXTENDED RELEASE ORAL
Qty: 0 | Refills: 0 | Status: DISCONTINUED | OUTPATIENT
Start: 2017-12-10 | End: 2017-12-11

## 2017-12-10 RX ORDER — PIPERACILLIN AND TAZOBACTAM 4; .5 G/20ML; G/20ML
3.38 INJECTION, POWDER, LYOPHILIZED, FOR SOLUTION INTRAVENOUS EVERY 12 HOURS
Qty: 0 | Refills: 0 | Status: DISCONTINUED | OUTPATIENT
Start: 2017-12-10 | End: 2017-12-14

## 2017-12-10 RX ORDER — VANCOMYCIN HCL 1 G
1000 VIAL (EA) INTRAVENOUS ONCE
Qty: 0 | Refills: 0 | Status: COMPLETED | OUTPATIENT
Start: 2017-12-10 | End: 2017-12-10

## 2017-12-10 RX ADMIN — SODIUM CHLORIDE 75 MILLILITER(S): 9 INJECTION, SOLUTION INTRAVENOUS at 06:55

## 2017-12-10 RX ADMIN — HYDROMORPHONE HYDROCHLORIDE 4 MILLIGRAM(S): 2 INJECTION INTRAMUSCULAR; INTRAVENOUS; SUBCUTANEOUS at 04:47

## 2017-12-10 RX ADMIN — Medication 650 MILLIGRAM(S): at 22:39

## 2017-12-10 RX ADMIN — HEPARIN SODIUM 5000 UNIT(S): 5000 INJECTION INTRAVENOUS; SUBCUTANEOUS at 23:18

## 2017-12-10 RX ADMIN — Medication 1 MILLIGRAM(S): at 15:53

## 2017-12-10 RX ADMIN — Medication 100 MILLIGRAM(S): at 05:00

## 2017-12-10 RX ADMIN — HYDROMORPHONE HYDROCHLORIDE 30 MILLILITER(S): 2 INJECTION INTRAMUSCULAR; INTRAVENOUS; SUBCUTANEOUS at 15:27

## 2017-12-10 RX ADMIN — HYDROMORPHONE HYDROCHLORIDE 4 MILLIGRAM(S): 2 INJECTION INTRAMUSCULAR; INTRAVENOUS; SUBCUTANEOUS at 05:56

## 2017-12-10 RX ADMIN — PIPERACILLIN AND TAZOBACTAM 25 GRAM(S): 4; .5 INJECTION, POWDER, LYOPHILIZED, FOR SOLUTION INTRAVENOUS at 23:18

## 2017-12-10 RX ADMIN — Medication 100 MILLIGRAM(S): at 18:36

## 2017-12-10 RX ADMIN — Medication 250 MILLIGRAM(S): at 22:08

## 2017-12-10 RX ADMIN — CALCITRIOL 0.5 MICROGRAM(S): 0.5 CAPSULE ORAL at 15:53

## 2017-12-10 RX ADMIN — HYDROMORPHONE HYDROCHLORIDE 4 MILLIGRAM(S): 2 INJECTION INTRAMUSCULAR; INTRAVENOUS; SUBCUTANEOUS at 09:47

## 2017-12-10 RX ADMIN — MORPHINE SULFATE 30 MILLILITER(S): 50 CAPSULE, EXTENDED RELEASE ORAL at 22:01

## 2017-12-10 RX ADMIN — HEPARIN SODIUM 5000 UNIT(S): 5000 INJECTION INTRAVENOUS; SUBCUTANEOUS at 15:53

## 2017-12-10 RX ADMIN — Medication 650 MILLIGRAM(S): at 17:20

## 2017-12-10 RX ADMIN — HYDROMORPHONE HYDROCHLORIDE 30 MILLILITER(S): 2 INJECTION INTRAMUSCULAR; INTRAVENOUS; SUBCUTANEOUS at 02:06

## 2017-12-10 RX ADMIN — Medication 1 TABLET(S): at 15:53

## 2017-12-10 RX ADMIN — HEPARIN SODIUM 5000 UNIT(S): 5000 INJECTION INTRAVENOUS; SUBCUTANEOUS at 05:00

## 2017-12-10 NOTE — CHART NOTE - NSCHARTNOTEFT_GEN_A_CORE
X-ray right knee reviewed no fractures.  Given elevated CRP of 200 and elevated WBC spoke with patient regarding risks of septic arthritis.  Performed a right knee aspiration under usual sterile conditions through suprapatellar approach.  7 cc of clear yellow fluid was aspirated.  Sent for CC/GS/Culture/Crystals.    Keep patient NPO after midnight until results come back.    RICHARD Goldsmith MD [id number]

## 2017-12-10 NOTE — CONSULT NOTE ADULT - SUBJECTIVE AND OBJECTIVE BOX
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 She reports being in her usual state of health until the onset of chest pain at approximately 5 PM 17 She has had no change in her health recently other than receiving a scheduled steroid injection in to her right hip yesterday for chronic hip pain. The onset of her pain was sudden with severe throbbing substernal pain; the pain is non-pleuritic and not associated with movement. The pain has spread to involve her back and bilateral knees and elbows and she states this pain is typical of prior sickle cell crises. She denies having frequent pain crises, less than one per year. She took 4 mg of dilaudid at home without relief of her pain. Currently she denies fever/chills, SOB, cough, bleeding, N/V/D/C.     Pain currently worst in right knee.  Pain with rom of knee.  Able to Range from 0-45 degrees without pain. No hip pain at this time. . Denies recent trauma.        PAST MEDICAL & SURGICAL HISTORY:  Umbilical hernia: 2017  CRF (chronic renal failure): 2-, insertion of peritoneal dialysis  Kidney dysfunction: &quot;have minimal function in one kidney&quot; -- patient not sure which one -- started dialysis in 2017 with insertion of peritoneal dialysis  Cholelithiasis  Anemia  Sickle cell disease  Chronic kidney disease: started dialysis 2017 with etiology from Parvo Virus 2016  Parvovirus B19 infection: 2016 resulting in chronic renal disease  Nephrotic syndrome  HPV (Human Papillomavirus)  Right Hip Pain- Surgery : at PAST appointment on 17, patient states surgery was   History of hip surgery: R hip due to necrosis in --bone graft from right tibia  H/O tubal ligation: in , along with   H/O: : 014  S/P Laparoscopic Cholecystectomy    [] No significant past history as reviewed with the patient and family    FAMILY HISTORY:  Family history of sarcoidosis (Sibling): Sister with HgbSS and sarcoidosis  Family history of sickle cell disease (Sibling): Sister with HgbSS and sarcoidosis  Family history of sickle cell trait in mother  Family history of sickle cell trait in father    [] Family history not pertinent as reviewed with the patient and family    SOCIAL HISTORY:    MEDICATIONS  (STANDING):  calcitriol   Capsule 0.5 MICROGram(s) Oral daily  calcium acetate 2668 milliGRAM(s) Oral three times a day with meals  docusate sodium 100 milliGRAM(s) Oral two times a day  folic acid 1 milliGRAM(s) Oral daily  furosemide    Tablet 100 milliGRAM(s) Oral two times a day  gentamicin 0.1% Cream 1 Application(s) Topical three times a day  heparin  Injectable 5000 Unit(s) SubCutaneous every 8 hours  metolazone 5 milliGRAM(s) Oral two times a day  morphine PCA (5 mG/mL) 30 milliLiter(s) PCA Continuous PCA Continuous  Nephro-jay 1 Tablet(s) Oral daily  piperacillin/tazobactam IVPB. 3.375 Gram(s) IV Intermittent every 12 hours  polyethylene glycol 3350 17 Gram(s) Oral daily  senna 2 Tablet(s) Oral at bedtime  sodium chloride 0.45%. 1000 milliLiter(s) (75 mL/Hr) IV Continuous <Continuous>  vancomycin  IVPB 1000 milliGRAM(s) IV Intermittent once    MEDICATIONS  (PRN):  acetaminophen   Tablet 650 milliGRAM(s) Oral every 6 hours PRN For Temp greater than 38 C (100.4 F)  ALBUTerol/ipratropium for Nebulization 3 milliLiter(s) Nebulizer every 6 hours PRN Shortness of Breath  naloxone Injectable 0.1 milliGRAM(s) IV Push every 3 minutes PRN For ANY of the following changes in patient status:  A. RR LESS THAN 10 breaths per minute, B. Oxygen saturation LESS THAN 90%, C. Sedation score of 6  ondansetron Injectable 4 milliGRAM(s) IV Push every 6 hours PRN Nausea    Allergies    No Known Allergies    Intolerances        REVIEW OF SYSTEMS  [x] All review of systems negative except for those marked.  Systemic:	[] Fever		[] Chills		[] Night sweats		[] Fatigue	[] Other  [] Cardiovascular:  [] Pulmonary:  [] Renal/Urologic:  [] Gastrointestinal:  [] Metabolic:  [] Neurologic:  [] Hematologic:  [] ENT:  [] Ophthalmologic:  [] Musculoskeletal: see HPI    Vital Signs Last 24 Hrs  T(C): 37.7 (10 Dec 2017 18:10), Max: 38.8 (10 Dec 2017 16:41)  T(F): 99.8 (10 Dec 2017 18:10), Max: 101.8 (10 Dec 2017 16:41)  HR: 135 (10 Dec 2017 18:10) (100 - 136)  BP: 139/81 (10 Dec 2017 18:10) (125/84 - 147/84)  BP(mean): --  RR: 20 (10 Dec 2017 18:10) (19 - 24)  SpO2: 95% (10 Dec 2017 18:10) (93% - 100%)     @ 07:01  -  12-10 @ 07:00  --------------------------------------------------------  IN: 4000 mL / OUT: 5800 mL / NET: -1800 mL        PHYSICAL EXAM:  NAD  RLE: Minimal knee effusion.  No erythema.  TTP over distal femur and proximal tibia.  AROM: 0-45 degrees without pain.  45-75 with pain  EHL/FHL/TA/GS intact  SILT DP/SP/Beatty/Sa/T  WWP distall, Dp palpable                          7.7    27.10 )-----------( 155      ( 10 Dec 2017 07:10 )             21.9     12-10    134<L>  |  94<L>  |  70<H>  ----------------------------<  107<H>  4.7   |  23  |  9.97<H>    Ca    7.8<L>      10 Dec 2017 07:10  Phos  5.0     12-10  Mg     1.6     12-10    TPro  7.4  /  Alb  3.3  /  TBili  1.0  /  DBili  x   /  AST  30  /  ALT  17  /  AlkPhos  84  12-09    PT/INR - ( 09 Dec 2017 02:31 )   PT: 10.5 SEC;   INR: 0.95          PTT - ( 09 Dec 2017 02:31 )  PTT:23.7 SEC      IMAGING STUDIES:    36y Female with acute pain crisis and right knee pain with elevated white count.  FU Xray right knee  FU ESR/CRP  FU cultures  Will follow

## 2017-12-10 NOTE — CHART NOTE - NSCHARTNOTEFT_GEN_A_CORE
Was called for medication error involving PCA pump. During day, patients Dilaudid PCA pump was changed to morphine PCA pump. As per night nursing team, day nurse did not change medication from morphine to Dilaudid, but medication dosing of Dilaudid PCA were changed to the medication dosing of the morphine PCA. This error had occurred over a period of approximately 6 hours, with the exception of the pump being held for approximately 30-45 minutes for a procedure.  Patient was seen an evaluated at bedside. Patient was informed of medication error and told that she received Dilaudid instead of morphine for the past few hours. Patient understood mistake was made an had no further questions.  Patient was informed that if she became nausea, fatigued, SOB, or overly sedated to immediately notify nursing staff. Patient denies any nausea, vomiting, CP, SOB, and abdominal pain. At the radha patient was seen she was febrile, but otherwise hemodynamically stable with vitals similar to prior readings. Patient was sitting in bed, not in acute distress, and alert and orientedx3. Cardiac Exam was notable to tachycardia. Lungs were clear to ascultation bilaterally. Abdomen was soft, mildly distended, but non-tender. No peripheral edema was appreciated on exam.     Plan   - incident report was filed and signed   - plan to have nursing staff evaluate patient with bed check q30min for next few hours to monitor for over sedation or respiratory distress  - plan discussed with nursing staff

## 2017-12-10 NOTE — PROGRESS NOTE ADULT - ASSESSMENT
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 Patient reports that pain is typical of prior crises although she has not had chest involvement in the past. She denies clear inciting factor for her current pain crisis. Suspect this is pain crisis; low suspicion for PE, ACS, acute chest given normal sats, EKG, troponin, lack of calf pain/swelling.

## 2017-12-10 NOTE — CHART NOTE - NSCHARTNOTEFT_GEN_A_CORE
Results of aspriation reviewed.    CC: 4k  Gram Stain : neg  Crystals: neg    Given aspiration results low likelihood of septic arthritis.  Pain likely secondary to sickle cell crisis.  Will continue to follow culture.

## 2017-12-10 NOTE — CHART NOTE - NSCHARTNOTEFT_GEN_A_CORE
pt developed fever 0f 101.8. C/o pain of arms/ legs and back. No chest pain or SOB. C/o worsening right knee pain. Denies any abd pain. Exam showed clear lungs. Abd benign. Right knee: (+) tenderness, mildly swelling, no erythema, no warm, limited ROM due to pain.   :ab: WBC 27.1  Impression: fever in the setting of sickle cell crisis.  Likely due to VOC. r/o infection. R/O right knee arthritis (septic vs crystal).  Plan:  Blood cx X 2 setts.  UA, Ucx, CXR  Empiric broad abx coverage, Zosyn, Vanco, renally dosing.  X-ray of right knee  ortho consult  Check serum uric acid level  Hematology consult in Am  Increase PCA dilaudid for better pain control.    Spoke to pt's father, Dr Lucero at bedside about plan of care. He was in agreement with the plan,. pt developed fever 0f 101.8. C/o pain of arms/ legs and back. No chest pain or SOB. C/o worsening right knee pain. Denies any abd pain. Exam showed clear lungs. Abd benign. Right knee: (+) tenderness, mildly swelling, no erythema, no warm, limited ROM due to pain.   Lab: WBC 27.1  Impression: fever in the setting of sickle cell crisis.  Likely due to VOC. r/o infection. R/O right knee arthritis (septic vs crystal).  Plan:  Blood cx X 2 sets.  UA, Ucx, CXR  Empiric broad abx coverage, Zosyn, Vanco, renally dosing.  X-ray of right knee  ortho consult  Check serum uric acid level  Hematology consult in Am  Increase PCA dilaudid for better pain control.    Spoke to pt's father, Dr Lucero at bedside about plan of care. He was in agreement with the plan,.

## 2017-12-10 NOTE — PROGRESS NOTE ADULT - PROBLEM SELECTOR PLAN 5
Patient with history of SCD with infrequent pain crises; not on hydroxyurea  - defer heme consult for the time being  - trend CBC  - Pain control as above

## 2017-12-10 NOTE — PROGRESS NOTE ADULT - PROBLEM SELECTOR PLAN 4
Patient with history of ESRD 2/2 FSGS  - Continue PD per renal orders  - Continue calcium acetate, calcitriol, neppravite, epogen

## 2017-12-10 NOTE — PROGRESS NOTE ADULT - SUBJECTIVE AND OBJECTIVE BOX
CONTACT INFO  Nico Summers M.D., PGY-1  Pager: NS- [phone], [facility]- [phone]    Mon-Fri: pager covered by day team 7am-7pm;   ***Academic conferences M-F 8am-9am & 12pm-1pm- page ONLY if URGENT or if Consultant  /Sabine: see chart, primary physician assigned available 7am-12pm  Sat/Beatty Cross Coverage 12pm-7pm: NS- page [phone] for Team1-4, [facility]- pager forwarded to covering Resident  For Night coverage 7pm-7am: NS- page [phone] Team1-3, page [phone] Team4 & Care Model    ERIC ELIZABETH  36y  Female      Patient is a 36y old  Female who presents with a chief complaint of Pain (09 Dec 2017 07:50)      INTERVAL HPI/OVERNIGHT EVENTS: NAEON, pain better controlled with PCA pump but persistent. Patient without complaint this AM.     REVIEW OF SYSTEMS:  CONSTITUTIONAL: No fever  RESPIRATORY: No cough; No shortness of breath  CARDIOVASCULAR: Improved chest pain  GASTROINTESTINAL: No abdominal or epigastric pain. No constipation  GENITOURINARY: No dysuria  NEUROLOGICAL: No headache  MUSCULOSKELETAL: Improved knee, elbow pain  SKIN: No rash  ENDOCRINE: No heat or cold intolerance  PSYCHIATRIC: No difficulty sleeping  HEME/LYMPH: No easy bleeding     Vital Signs Last 24 Hrs  T(C): 37.7 (10 Dec 2017 05:15), Max: 37.7 (10 Dec 2017 05:15)  T(F): 99.8 (10 Dec 2017 05:15), Max: 99.8 (10 Dec 2017 05:15)  HR: 111 (10 Dec 2017 05:15) (96 - 111)  BP: 147/84 (10 Dec 2017 05:15) (125/79 - 147/84)  BP(mean): --  RR: 22 (10 Dec 2017 05:15) (21 - 24)  SpO2: 97% (10 Dec 2017 05:15) (97% - 100%)    PHYSICAL EXAM:  GENERAL: NAD  HEAD:  Atraumatic  ENMT: Moist mucous membranes  NERVOUS SYSTEM:  Alert & Oriented X3, Good concentration; MAEW, SILT distal extremities  CHEST/LUNG: Clear to auscultation bilaterally; w/r/r  HEART: Regular rate and rhythm; no m/r/g  ABDOMEN: Distended, non-tender, PD catheter in place  EXTREMITIES:  Warm, no edema  SKIN: No rashes or lesions    Consultant(s) Notes Reviewed:  renal  Care Discussed with Consultants/Other Providers: renal    LABS:                        8.6    27.68 )-----------( 134      ( 09 Dec 2017 15:37 )             24.4     12-09    137  |  94<L>  |  69<H>  ----------------------------<  128<H>  4.6   |  25  |  10.30<H>    Ca    8.5      09 Dec 2017 15:37  Phos  5.1     12-09  Mg     1.8     12-09    TPro  7.4  /  Alb  3.3  /  TBili  1.0  /  DBili  x   /  AST  30  /  ALT  17  /  AlkPhos  84  12-09    PT/INR - ( 09 Dec 2017 02:31 )   PT: 10.5 SEC;   INR: 0.95          PTT - ( 09 Dec 2017 02:31 )  PTT:23.7 SEC    ABG - ( 09 Dec 2017 14:42 )  pH: 7.44  /  pCO2: 38    /  pO2: 122   / HCO3: 26    / Base Excess: 1.5   /  SaO2: 99.4 CONTACT INFO  Nico Summers M.D., PGY-1  Pager: NS- [phone], [facility]- [phone]    Mon-Fri: pager covered by day team 7am-7pm;   ***Academic conferences M-F 8am-9am & 12pm-1pm- page ONLY if URGENT or if Consultant  /Sabine: see chart, primary physician assigned available 7am-12pm  Sat/Beatty Cross Coverage 12pm-7pm: NS- page [phone] for Team1-4, [facility]- pager forwarded to covering Resident  For Night coverage 7pm-7am: NS- page [phone] Team1-3, page [phone] Team4 & Care Model    ERIC ELIZABETH  36y  Female      Patient is a 36y old  Female who presents with a chief complaint of Pain (09 Dec 2017 07:50)      INTERVAL HPI/OVERNIGHT EVENTS: NAEON, pain better controlled with PCA pump but persistent. Patient without complaint this AM. Tele with sinus [phone] overnight.     REVIEW OF SYSTEMS:  CONSTITUTIONAL: No fever  RESPIRATORY: No cough; No shortness of breath  CARDIOVASCULAR: Improved chest pain  GASTROINTESTINAL: No abdominal or epigastric pain. No constipation  GENITOURINARY: No dysuria  NEUROLOGICAL: No headache  MUSCULOSKELETAL: Improved knee, elbow pain  SKIN: No rash  ENDOCRINE: No heat or cold intolerance  PSYCHIATRIC: No difficulty sleeping  HEME/LYMPH: No easy bleeding     Vital Signs Last 24 Hrs  T(C): 37.7 (10 Dec 2017 05:15), Max: 37.7 (10 Dec 2017 05:15)  T(F): 99.8 (10 Dec 2017 05:15), Max: 99.8 (10 Dec 2017 05:15)  HR: 111 (10 Dec 2017 05:15) (96 - 111)  BP: 147/84 (10 Dec 2017 05:15) (125/79 - 147/84)  BP(mean): --  RR: 22 (10 Dec 2017 05:15) (21 - 24)  SpO2: 97% (10 Dec 2017 05:15) (97% - 100%)    PHYSICAL EXAM:  GENERAL: NAD  HEAD:  Atraumatic  ENMT: Moist mucous membranes  NERVOUS SYSTEM:  Alert & Oriented X3, Good concentration; MAEW, SILT distal extremities  CHEST/LUNG: Clear to auscultation bilaterally; w/r/r  HEART: Regular rate and rhythm; no m/r/g  ABDOMEN: Distended, non-tender, PD catheter in place  EXTREMITIES:  Warm, no edema  SKIN: No rashes or lesions    Consultant(s) Notes Reviewed:  renal  Care Discussed with Consultants/Other Providers: renal    LABS:                        8.6    27.68 )-----------( 134      ( 09 Dec 2017 15:37 )             24.4     12-09    137  |  94<L>  |  69<H>  ----------------------------<  128<H>  4.6   |  25  |  10.30<H>    Ca    8.5      09 Dec 2017 15:37  Phos  5.1     12-09  Mg     1.8     12-09    TPro  7.4  /  Alb  3.3  /  TBili  1.0  /  DBili  x   /  AST  30  /  ALT  17  /  AlkPhos  84  12-09    PT/INR - ( 09 Dec 2017 02:31 )   PT: 10.5 SEC;   INR: 0.95          PTT - ( 09 Dec 2017 02:31 )  PTT:23.7 SEC    ABG - ( 09 Dec 2017 14:42 )  pH: 7.44  /  pCO2: 38    /  pO2: 122   / HCO3: 26    / Base Excess: 1.5   /  SaO2: 99.4

## 2017-12-10 NOTE — PROGRESS NOTE ADULT - PROBLEM SELECTOR PLAN 1
Patient reports pain is typical of prior episodes but she does not usually have chest involvement. There is no clear inciting event for this current pain episode.  - Continue dilaudid PCA, patient continues to report pain although improving (I-STOP # [id number])  - 75 cc/hr NS  - c/w IS, prn duoneb  - c/w tele

## 2017-12-10 NOTE — PROGRESS NOTE ADULT - PROBLEM SELECTOR PLAN 2
Improved this AM.   - ABG with only mildly elevated A-a gradient; duplex pending; low suspicion PE  - Trops -, stop trending. Continue tele

## 2017-12-10 NOTE — PROGRESS NOTE ADULT - PROBLEM SELECTOR PLAN 3
WBC uptrended yesterday; suspect reactive. No symptoms infection.  - Peritoneal fluid with few PMN, no organisms on gram stain  - F/U peritoneal fluid cx, bcx  - f/u am CBC  - monitor off abx

## 2017-12-11 ENCOUNTER — TRANSCRIPTION ENCOUNTER (OUTPATIENT)
Age: 37
End: 2017-12-11

## 2017-12-11 DIAGNOSIS — D57.01 HB-SS DISEASE WITH ACUTE CHEST SYNDROME: ICD-10-CM

## 2017-12-11 DIAGNOSIS — A04.72 ENTEROCOLITIS DUE TO CLOSTRIDIUM DIFFICILE, NOT SPECIFIED AS RECURRENT: ICD-10-CM

## 2017-12-11 DIAGNOSIS — R00.0 TACHYCARDIA, UNSPECIFIED: ICD-10-CM

## 2017-12-11 DIAGNOSIS — A41.9 SEPSIS, UNSPECIFIED ORGANISM: ICD-10-CM

## 2017-12-11 LAB
ALBUMIN FLD-MCNC: 0.3 G/DL — SIGNIFICANT CHANGE UP
ALBUMIN SERPL ELPH-MCNC: 2.9 G/DL — LOW (ref 3.3–5)
ALP SERPL-CCNC: 238 U/L — HIGH (ref 40–120)
ALT FLD-CCNC: 36 U/L — HIGH (ref 4–33)
AMYLASE FLD-CCNC: 12 U/L — SIGNIFICANT CHANGE UP
AST SERPL-CCNC: 67 U/L — HIGH (ref 4–32)
BILIRUB SERPL-MCNC: 1.5 MG/DL — HIGH (ref 0.2–1.2)
BODY FLUID TYPE: SIGNIFICANT CHANGE UP
BUN SERPL-MCNC: 90 MG/DL — HIGH (ref 7–23)
BUN SERPL-MCNC: 90 MG/DL — HIGH (ref 7–23)
C DIFF TOX GENS STL QL NAA+PROBE: DETECTED — HIGH
CALCIUM SERPL-MCNC: 7.6 MG/DL — LOW (ref 8.4–10.5)
CALCIUM SERPL-MCNC: 7.6 MG/DL — LOW (ref 8.4–10.5)
CHLORIDE SERPL-SCNC: 96 MMOL/L — LOW (ref 98–107)
CHLORIDE SERPL-SCNC: 96 MMOL/L — LOW (ref 98–107)
CLARITY SPEC: CLEAR — SIGNIFICANT CHANGE UP
CO2 SERPL-SCNC: 16 MMOL/L — LOW (ref 22–31)
CO2 SERPL-SCNC: 16 MMOL/L — LOW (ref 22–31)
COLOR FLD: YELLOW — SIGNIFICANT CHANGE UP
CREAT SERPL-MCNC: 11.04 MG/DL — HIGH (ref 0.5–1.3)
CREAT SERPL-MCNC: 11.04 MG/DL — HIGH (ref 0.5–1.3)
GLUCOSE FLD-MCNC: 456 MG/DL — SIGNIFICANT CHANGE UP
GLUCOSE SERPL-MCNC: 109 MG/DL — HIGH (ref 70–99)
GLUCOSE SERPL-MCNC: 109 MG/DL — HIGH (ref 70–99)
GRAM STN FLD: SIGNIFICANT CHANGE UP
HCT VFR BLD CALC: 19.3 % — CRITICAL LOW (ref 34.5–45)
HGB BLD-MCNC: 6.9 G/DL — CRITICAL LOW (ref 11.5–15.5)
LDH SERPL L TO P-CCNC: 131 U/L — SIGNIFICANT CHANGE UP
LDH SERPL L TO P-CCNC: 2278 U/L — HIGH (ref 135–225)
LYMPHOCYTES NFR FLD: 18 % — SIGNIFICANT CHANGE UP
MACROPHAGES # FLD: 74 % — SIGNIFICANT CHANGE UP
MAGNESIUM SERPL-MCNC: 1.8 MG/DL — SIGNIFICANT CHANGE UP (ref 1.6–2.6)
MCHC RBC-ENTMCNC: 27 PG — SIGNIFICANT CHANGE UP (ref 27–34)
MCHC RBC-ENTMCNC: 35.8 % — SIGNIFICANT CHANGE UP (ref 32–36)
MCV RBC AUTO: 75.4 FL — LOW (ref 80–100)
NEUTS SEG NFR FLD MANUAL: 8 % — SIGNIFICANT CHANGE UP
NRBC # FLD: 7.52 — SIGNIFICANT CHANGE UP
NRBC FLD-RTO: 30.1 — SIGNIFICANT CHANGE UP
PHOSPHATE SERPL-MCNC: 6.2 MG/DL — HIGH (ref 2.5–4.5)
PLATELET # BLD AUTO: 93 K/UL — LOW (ref 150–400)
PMV BLD: 10 FL — SIGNIFICANT CHANGE UP (ref 7–13)
POTASSIUM SERPL-MCNC: 5.6 MMOL/L — HIGH (ref 3.5–5.3)
POTASSIUM SERPL-MCNC: 5.6 MMOL/L — HIGH (ref 3.5–5.3)
POTASSIUM SERPL-SCNC: 5.6 MMOL/L — HIGH (ref 3.5–5.3)
POTASSIUM SERPL-SCNC: 5.6 MMOL/L — HIGH (ref 3.5–5.3)
PROT FLD-MCNC: 0.4 G/DL — SIGNIFICANT CHANGE UP
PROT SERPL-MCNC: 6.4 G/DL — SIGNIFICANT CHANGE UP (ref 6–8.3)
RBC # BLD: 2.56 M/UL — LOW (ref 3.8–5.2)
RBC # FLD: 19.7 % — HIGH (ref 10.3–14.5)
RCV VOL RI: < 1000 CELL/UL — HIGH (ref 0–5)
RETICS #: 0.1 10X6/UL — HIGH (ref 0.02–0.07)
RETICS/RBC NFR: 2.2 % — SIGNIFICANT CHANGE UP (ref 0.5–2.5)
SODIUM SERPL-SCNC: 138 MMOL/L — SIGNIFICANT CHANGE UP (ref 135–145)
SODIUM SERPL-SCNC: 138 MMOL/L — SIGNIFICANT CHANGE UP (ref 135–145)
SPECIMEN SOURCE: SIGNIFICANT CHANGE UP
TOTAL CELLS COUNTED, BODY FLUID: 100 CELLS — SIGNIFICANT CHANGE UP
TOTAL NUCLEATED CELL COUNT, BODY FLUID: 45 CELL/UL — HIGH (ref 0–5)
URATE SERPL-MCNC: 10.5 MG/DL — HIGH (ref 2.5–7)
VANCOMYCIN FLD-MCNC: 27.5 UG/ML — CRITICAL HIGH
WBC # BLD: 24.97 K/UL — HIGH (ref 3.8–10.5)
WBC # FLD AUTO: 24.97 K/UL — HIGH (ref 3.8–10.5)

## 2017-12-11 PROCEDURE — 99291 CRITICAL CARE FIRST HOUR: CPT | Mod: 25

## 2017-12-11 PROCEDURE — 99233 SBSQ HOSP IP/OBS HIGH 50: CPT | Mod: GC

## 2017-12-11 PROCEDURE — 36800 INSERTION OF CANNULA: CPT | Mod: GC

## 2017-12-11 PROCEDURE — 99223 1ST HOSP IP/OBS HIGH 75: CPT

## 2017-12-11 PROCEDURE — 99221 1ST HOSP IP/OBS SF/LOW 40: CPT | Mod: 25

## 2017-12-11 PROCEDURE — 36512 APHERESIS RBC: CPT

## 2017-12-11 PROCEDURE — 90945 DIALYSIS ONE EVALUATION: CPT | Mod: GC

## 2017-12-11 PROCEDURE — 71010: CPT | Mod: 26

## 2017-12-11 RX ORDER — DIPHENHYDRAMINE HCL 50 MG
25 CAPSULE ORAL ONCE
Qty: 0 | Refills: 0 | Status: COMPLETED | OUTPATIENT
Start: 2017-12-11 | End: 2017-12-11

## 2017-12-11 RX ORDER — AZITHROMYCIN 500 MG/1
500 TABLET, FILM COATED ORAL ONCE
Qty: 0 | Refills: 0 | Status: COMPLETED | OUTPATIENT
Start: 2017-12-11 | End: 2017-12-11

## 2017-12-11 RX ORDER — AZITHROMYCIN 500 MG/1
500 TABLET, FILM COATED ORAL EVERY 24 HOURS
Qty: 0 | Refills: 0 | Status: DISCONTINUED | OUTPATIENT
Start: 2017-12-12 | End: 2017-12-14

## 2017-12-11 RX ORDER — CALCIUM GLUCONATE 100 MG/ML
1 VIAL (ML) INTRAVENOUS ONCE
Qty: 0 | Refills: 0 | Status: COMPLETED | OUTPATIENT
Start: 2017-12-11 | End: 2017-12-11

## 2017-12-11 RX ORDER — HYDROMORPHONE HYDROCHLORIDE 2 MG/ML
1 INJECTION INTRAMUSCULAR; INTRAVENOUS; SUBCUTANEOUS ONCE
Qty: 0 | Refills: 0 | Status: DISCONTINUED | OUTPATIENT
Start: 2017-12-11 | End: 2017-12-12

## 2017-12-11 RX ORDER — HYDROMORPHONE HYDROCHLORIDE 2 MG/ML
0.5 INJECTION INTRAMUSCULAR; INTRAVENOUS; SUBCUTANEOUS
Qty: 0 | Refills: 0 | Status: DISCONTINUED | OUTPATIENT
Start: 2017-12-11 | End: 2017-12-11

## 2017-12-11 RX ORDER — HEPARIN SODIUM 5000 [USP'U]/ML
2000 INJECTION INTRAVENOUS; SUBCUTANEOUS ONCE
Qty: 0 | Refills: 0 | Status: DISCONTINUED | OUTPATIENT
Start: 2017-12-11 | End: 2017-12-12

## 2017-12-11 RX ORDER — VANCOMYCIN HCL 1 G
125 VIAL (EA) INTRAVENOUS EVERY 6 HOURS
Qty: 0 | Refills: 0 | Status: DISCONTINUED | OUTPATIENT
Start: 2017-12-11 | End: 2017-12-17

## 2017-12-11 RX ORDER — AZITHROMYCIN 500 MG/1
TABLET, FILM COATED ORAL
Qty: 0 | Refills: 0 | Status: DISCONTINUED | OUTPATIENT
Start: 2017-12-11 | End: 2017-12-14

## 2017-12-11 RX ORDER — DIPHENHYDRAMINE HCL 50 MG
25 CAPSULE ORAL ONCE
Qty: 0 | Refills: 0 | Status: DISCONTINUED | OUTPATIENT
Start: 2017-12-11 | End: 2017-12-11

## 2017-12-11 RX ORDER — HYDROMORPHONE HYDROCHLORIDE 2 MG/ML
30 INJECTION INTRAMUSCULAR; INTRAVENOUS; SUBCUTANEOUS
Qty: 0 | Refills: 0 | Status: DISCONTINUED | OUTPATIENT
Start: 2017-12-11 | End: 2017-12-12

## 2017-12-11 RX ADMIN — Medication 125 MILLIGRAM(S): at 20:08

## 2017-12-11 RX ADMIN — PIPERACILLIN AND TAZOBACTAM 25 GRAM(S): 4; .5 INJECTION, POWDER, LYOPHILIZED, FOR SOLUTION INTRAVENOUS at 05:31

## 2017-12-11 RX ADMIN — Medication 650 MILLIGRAM(S): at 21:53

## 2017-12-11 RX ADMIN — PIPERACILLIN AND TAZOBACTAM 25 GRAM(S): 4; .5 INJECTION, POWDER, LYOPHILIZED, FOR SOLUTION INTRAVENOUS at 21:54

## 2017-12-11 RX ADMIN — HEPARIN SODIUM 5000 UNIT(S): 5000 INJECTION INTRAVENOUS; SUBCUTANEOUS at 14:22

## 2017-12-11 RX ADMIN — HYDROMORPHONE HYDROCHLORIDE 0.5 MILLIGRAM(S): 2 INJECTION INTRAMUSCULAR; INTRAVENOUS; SUBCUTANEOUS at 02:20

## 2017-12-11 RX ADMIN — Medication 1 APPLICATION(S): at 10:57

## 2017-12-11 RX ADMIN — HYDROMORPHONE HYDROCHLORIDE 0.5 MILLIGRAM(S): 2 INJECTION INTRAMUSCULAR; INTRAVENOUS; SUBCUTANEOUS at 02:06

## 2017-12-11 RX ADMIN — SODIUM CHLORIDE 75 MILLILITER(S): 9 INJECTION, SOLUTION INTRAVENOUS at 10:04

## 2017-12-11 RX ADMIN — HEPARIN SODIUM 5000 UNIT(S): 5000 INJECTION INTRAVENOUS; SUBCUTANEOUS at 23:06

## 2017-12-11 RX ADMIN — Medication 200 GRAM(S): at 21:53

## 2017-12-11 RX ADMIN — Medication 100 MILLIGRAM(S): at 18:45

## 2017-12-11 RX ADMIN — HYDROMORPHONE HYDROCHLORIDE 30 MILLILITER(S): 2 INJECTION INTRAMUSCULAR; INTRAVENOUS; SUBCUTANEOUS at 14:49

## 2017-12-11 RX ADMIN — HYDROMORPHONE HYDROCHLORIDE 0.5 MILLIGRAM(S): 2 INJECTION INTRAMUSCULAR; INTRAVENOUS; SUBCUTANEOUS at 05:24

## 2017-12-11 RX ADMIN — Medication 100 MILLIGRAM(S): at 05:25

## 2017-12-11 RX ADMIN — Medication 100 MILLIGRAM(S): at 05:26

## 2017-12-11 RX ADMIN — HYDROMORPHONE HYDROCHLORIDE 30 MILLILITER(S): 2 INJECTION INTRAMUSCULAR; INTRAVENOUS; SUBCUTANEOUS at 09:18

## 2017-12-11 RX ADMIN — Medication 25 MILLIGRAM(S): at 21:54

## 2017-12-11 RX ADMIN — HYDROMORPHONE HYDROCHLORIDE 0.5 MILLIGRAM(S): 2 INJECTION INTRAMUSCULAR; INTRAVENOUS; SUBCUTANEOUS at 05:45

## 2017-12-11 RX ADMIN — Medication 25 MILLIGRAM(S): at 18:57

## 2017-12-11 RX ADMIN — AZITHROMYCIN 250 MILLIGRAM(S): 500 TABLET, FILM COATED ORAL at 23:17

## 2017-12-11 RX ADMIN — HEPARIN SODIUM 5000 UNIT(S): 5000 INJECTION INTRAVENOUS; SUBCUTANEOUS at 05:25

## 2017-12-11 NOTE — DISCHARGE NOTE ADULT - CARE PROVIDER_API CALL
Saniya Velásquez), Internal Medicine  [address] [address] [place], [place] [address]  Phone: [phone]  Fax: [phone]

## 2017-12-11 NOTE — DISCHARGE NOTE ADULT - MEDICATION SUMMARY - MEDICATIONS TO CHANGE
I will SWITCH the dose or number of times a day I take the medications listed below when I get home from the hospital:    HYDROmorphone 2 mg oral tablet  -- 1 tab(s) by mouth every 3 hours, As needed, Moderate Pain (4 - 6) MDD:6 tabs

## 2017-12-11 NOTE — CONSULT NOTE ADULT - SUBJECTIVE AND OBJECTIVE BOX
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 She reports being in her usual state of health until the onset of chest pain at approximately 5 PM yesterday. She has had no change in her health recently other than receiving a scheduled steroid injection in to her right hip yesterday for chronic hip pain. The onset of her pain was sudden with severe throbbing substernal pain; the pain is non-pleuritic and not associated with movement. The pain has since spread to involve her back and bilateral knees and elbows and she states this pain is typical of prior sickle cell crises. She denies having frequent pain crises, less than one per year. She took 4 mg of dilaudid at home without relief of her pain. Currently she denies fever/chills, SOB, cough, bleeding, N/V/D/C.     On presentation to the ED vitals were: 37 102 135/89 22 100% RA. She received 1L NS and a total of 6 mg dilaudid over 4 hours and an additional 20 mg of ketamine without adequate control of pain. (09 Dec 2017 07:50)      Allergies    No Known Allergies    Intolerances        MEDICATIONS  (STANDING):  calcitriol   Capsule 0.5 MICROGram(s) Oral daily  calcium acetate 2668 milliGRAM(s) Oral three times a day with meals  folic acid 1 milliGRAM(s) Oral daily  furosemide    Tablet 100 milliGRAM(s) Oral two times a day  gentamicin 0.1% Cream 1 Application(s) Topical three times a day  heparin  Injectable 5000 Unit(s) SubCutaneous every 8 hours  HYDROmorphone PCA (1 mG/mL) 30 milliLiter(s) PCA Continuous PCA Continuous  metolazone 5 milliGRAM(s) Oral two times a day  Nephro-jay 1 Tablet(s) Oral daily  piperacillin/tazobactam IVPB. 3.375 Gram(s) IV Intermittent every 12 hours  sodium chloride 0.45%. 1000 milliLiter(s) (75 mL/Hr) IV Continuous <Continuous>    MEDICATIONS  (PRN):  acetaminophen   Tablet 650 milliGRAM(s) Oral every 6 hours PRN For Temp greater than 38 C (100.4 F)  ALBUTerol/ipratropium for Nebulization 3 milliLiter(s) Nebulizer every 6 hours PRN Shortness of Breath  naloxone Injectable 0.1 milliGRAM(s) IV Push every 3 minutes PRN For ANY of the following changes in patient status:  A. RR LESS THAN 10 breaths per minute, B. Oxygen saturation LESS THAN 90%, C. Sedation score of 6  ondansetron Injectable 4 milliGRAM(s) IV Push every 6 hours PRN Nausea      PAST MEDICAL & SURGICAL HISTORY:  Umbilical hernia: 2017  CRF (chronic renal failure): , insertion of peritoneal dialysis  Kidney dysfunction: &quot;have minimal function in one kidney&quot; -- patient not sure which one -- started dialysis in  with insertion of peritoneal dialysis  Cholelithiasis  Anemia  Sickle cell disease  Chronic kidney disease: started dialysis 2017 with etiology from Parvo Virus   Parvovirus B19 infection:  resulting in chronic renal disease  Nephrotic syndrome  HPV (Human Papillomavirus)  Right Hip Pain- Surgery : at PAST appointment on 17, patient states surgery was   History of hip surgery: R hip due to necrosis in --bone graft from right tibia  H/O tubal ligation: in , along with   H/O: : 014  S/P Laparoscopic Cholecystectomy      FAMILY HISTORY:  Family history of sarcoidosis (Sibling): Sister with HgbSS and sarcoidosis  Family history of sickle cell disease (Sibling): Sister with HgbSS and sarcoidosis  Family history of sickle cell trait in mother  Family history of sickle cell trait in father      SOCIAL HISTORY: No EtOH, no tobacco    REVIEW OF SYSTEMS:    CONSTITUTIONAL: No weakness, fevers or chills  EYES/ENT: No visual changes;  No vertigo or throat pain   NECK: No pain or stiffness  RESPIRATORY: No cough, wheezing, hemoptysis; No shortness of breath  CARDIOVASCULAR: No chest pain or palpitations  GASTROINTESTINAL: No abdominal or epigastric pain. No nausea, vomiting, or hematemesis; No diarrhea or constipation. No melena or hematochezia.  GENITOURINARY: No dysuria, frequency or hematuria  NEUROLOGICAL: No numbness or weakness  SKIN: No itching, burning, rashes, or lesions   All other review of systems is negative unless indicated above.        T(F): 98.4 (17 @ 08:39), Max: 101.8 (12-10-17 @ 16:41)  HR: 124 (17 @ 08:39)  BP: 123/87 (17 @ 08:39)  RR: 17 (17 @ 08:39)  SpO2: 100% (17 @ 08:39)  Wt(kg): --    GENERAL: NAD, well-developed  HEAD:  Atraumatic, Normocephalic  EYES: EOMI, PERRLA, conjunctiva and sclera clear  NECK: Supple, No JVD  CHEST/LUNG: Clear to auscultation bilaterally; No wheeze  HEART: Regular rate and rhythm; No murmurs, rubs, or gallops  ABDOMEN: Soft, Nontender, Nondistended; Bowel sounds present  EXTREMITIES:  2+ Peripheral Pulses, No clubbing, cyanosis, or edema  NEUROLOGY: non-focal  SKIN: No rashes or lesions                          6.9    24.97 )-----------( 93       ( 11 Dec 2017 06:50 )             19.3           138  |  96<L>  |  90<H>  ----------------------------<  109<H>  5.6<H>   |  16<L>  |  11.04<H>    Ca    7.6<L>      11 Dec 2017 06:50  Phos  6.2       Mg     1.8         TPro  6.4  /  Alb  2.9<L>  /  TBili  1.5<H>  /  DBili  x   /  AST  67<H>  /  ALT  36<H>  /  AlkPhos  238<H>        Magnesium, Serum: 1.8 mg/dL ( @ 06:50)  Phosphorus Level, Serum: 6.2 mg/dL ( @ 06:50)  Lactate Dehydrogenase, Serum: 2278 U/L ( @ 06:50)  Uric Acid, Serum: 10.5 mg/dL ( @ 06:50) Hematology Consult Note    HPI:  35 YO F with PMH of sickle cell disease and 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 She reports being in her usual state of health until the onset of chest pain at approximately 5 PM yesterday. She has had no change in her health recently other than receiving a scheduled steroid injection in to her right hip yesterday for chronic hip pain. The onset of her pain was sudden with severe throbbing substernal pain; the pain is non-pleuritic and not associated with movement. The pain has since spread to involve her back and bilateral knees and elbows and she states this pain is typical of prior sickle cell crises. She denies having frequent pain crises, less than one per year. She took 4 mg of dilaudid at home without relief of her pain. Currently she denies fever/chills, SOB, cough, bleeding, N/V/D/C.     On presentation to the ED vitals were: 37 102 135/89 22 100% RA. She received 1L NS and a total of 6 mg dilaudid over 4 hours and an additional 20 mg of ketamine without adequate control of pain. (09 Dec 2017 07:50)    Pt now noted to have fevers, possible infiltrate on CXR, and hypoxia requiring 5L via NC. Upon my evaluation of the patient, she reports no pain, but is unable to accurately tell me where she is. Review of her PCA pump reveals no usage over the past day. Pulse oximetry completed by me on 5L via NC reveals O2 sat of 93%, HR of 123.     Allergies    No Known Allergies    Intolerances        MEDICATIONS  (STANDING):  calcitriol   Capsule 0.5 MICROGram(s) Oral daily  calcium acetate 2668 milliGRAM(s) Oral three times a day with meals  folic acid 1 milliGRAM(s) Oral daily  furosemide    Tablet 100 milliGRAM(s) Oral two times a day  gentamicin 0.1% Cream 1 Application(s) Topical three times a day  heparin  Injectable 5000 Unit(s) SubCutaneous every 8 hours  HYDROmorphone PCA (1 mG/mL) 30 milliLiter(s) PCA Continuous PCA Continuous  metolazone 5 milliGRAM(s) Oral two times a day  Nephro-jya 1 Tablet(s) Oral daily  piperacillin/tazobactam IVPB. 3.375 Gram(s) IV Intermittent every 12 hours  sodium chloride 0.45%. 1000 milliLiter(s) (75 mL/Hr) IV Continuous <Continuous>    MEDICATIONS  (PRN):  acetaminophen   Tablet 650 milliGRAM(s) Oral every 6 hours PRN For Temp greater than 38 C (100.4 F)  ALBUTerol/ipratropium for Nebulization 3 milliLiter(s) Nebulizer every 6 hours PRN Shortness of Breath  naloxone Injectable 0.1 milliGRAM(s) IV Push every 3 minutes PRN For ANY of the following changes in patient status:  A. RR LESS THAN 10 breaths per minute, B. Oxygen saturation LESS THAN 90%, C. Sedation score of 6  ondansetron Injectable 4 milliGRAM(s) IV Push every 6 hours PRN Nausea      PAST MEDICAL & SURGICAL HISTORY:  Umbilical hernia: 2017  CRF (chronic renal failure): , insertion of peritoneal dialysis  Kidney dysfunction: &quot;have minimal function in one kidney&quot; -- patient not sure which one -- started dialysis in  with insertion of peritoneal dialysis  Cholelithiasis  Anemia  Sickle cell disease  Chronic kidney disease: started dialysis 2017 with etiology from Parvo Virus 2016  Parvovirus B19 infection: 2016 resulting in chronic renal disease  Nephrotic syndrome  HPV (Human Papillomavirus)  Right Hip Pain- Surgery : at PAST appointment on 17, patient states surgery was   History of hip surgery: R hip due to necrosis in --bone graft from right tibia  H/O tubal ligation: in , along with   H/O: : 014  S/P Laparoscopic Cholecystectomy      FAMILY HISTORY:  Family history of sarcoidosis (Sibling): Sister with HgbSS and sarcoidosis  Family history of sickle cell disease (Sibling): Sister with HgbSS and sarcoidosis  Family history of sickle cell trait in mother  Family history of sickle cell trait in father      SOCIAL HISTORY: No EtOH, no tobacco    REVIEW OF SYSTEMS:    Unable to accurately assess due to change in mental status        T(F): 98.4 (17 @ 08:39), Max: 101.8 (12-10-17 @ 16:41)  HR: 124 (17 @ 08:39)  BP: 123/87 (17 @ 08:39)  RR: 17 (17 @ 08:39)  SpO2: 100% (12-11-17 @ 08:39)  Wt(kg): --    GENERAL: Lethargic, but arousable, NAD, well-developed  HEAD:  Atraumatic, Normocephalic  EYES: conjunctiva and sclera clear  NECK: Supple, No JVD  CHEST/LUNG: Poor inspiratory effort, grossly clear b/l  HEART: Tachycardic; No murmurs, rubs, or gallops  ABDOMEN: Soft, Nontender, Nondistended; Bowel sounds present  EXTREMITIES:  2+ Peripheral Pulses, No clubbing, cyanosis, or edema  NEUROLOGY: Inattentive, disoriented, no gross CN or motor deficits observed  SKIN: No rashes or lesions                          6.9    24.97 )-----------( 93       ( 11 Dec 2017 06:50 )             19.3           138  |  96<L>  |  90<H>  ----------------------------<  109<H>  5.6<H>   |  16<L>  |  11.04<H>    Ca    7.6<L>      11 Dec 2017 06:50  Phos  6.2       Mg     1.8         TPro  6.4  /  Alb  2.9<L>  /  TBili  1.5<H>  /  DBili  x   /  AST  67<H>  /  ALT  36<H>  /  AlkPhos  238<H>        Magnesium, Serum: 1.8 mg/dL ( @ 06:50)  Phosphorus Level, Serum: 6.2 mg/dL ( @ 06:50)  Lactate Dehydrogenase, Serum: 2278 U/L ( @ 06:50)  Uric Acid, Serum: 10.5 mg/dL ( @ 06:50)    < from: Xray Chest 2 Views PA/Lat (12.10.17 @ 21:00) >]    PRELIM:  INTERPRETATION:  vascular crowding from low lung volumes, no definite   focal consolidation    < end of copied text >

## 2017-12-11 NOTE — PROGRESS NOTE ADULT - SUBJECTIVE AND OBJECTIVE BOX
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 She reports being in her usual state of health until the onset of chest pain at approximately 5 PM yesterday. She has had no change in her health recently other than receiving a scheduled steroid injection in to her right hip yesterday for chronic hip pain. The onset of her pain was sudden with severe throbbing substernal pain; the pain is non-pleuritic and not associated with movement. The pain has since spread to involve her back and bilateral knees and elbows and she states this pain is typical of prior sickle cell crises. She denies having frequent pain crises, less than one per year. She took 4 mg of dilaudid at home without relief of her pain. Currently she denies fever/chills, SOB, cough, bleeding, N/V/D/C.     On presentation to the ED vitals were: 37 102 135/89 22 100% RA. She received 1L NS and a total of 6 mg Dilaudid over 4 hours and an additional 20 mg of ketamine without adequate control of pain. (09 Dec 2017 07:50)    Hospital Course:    Patient was initially being treated for pain crisis with PCA pump, found to have persistent leukocytosis, fevers, with new infiltrate on CXR. and hypoxia requiring 5L on NC. Hematology was consulted and contacted the Blood bank for exchange transfusion. Pt was transferred to MICU for line access. Additionally patient is positive for c-diff colitis.       PAST MEDICAL & SURGICAL HISTORY:  Umbilical hernia: 2017  CRF (chronic renal failure): 2-, insertion of peritoneal dialysis  Kidney dysfunction: &quot;have minimal function in one kidney&quot; -- patient not sure which one -- started dialysis in 2017 with insertion of peritoneal dialysis  Cholelithiasis  Anemia  Sickle cell disease  Chronic kidney disease: started dialysis 2017 with etiology from Parvo Virus 2016  Parvovirus B19 infection: 2016 resulting in chronic renal disease  Nephrotic syndrome  HPV (Human Papillomavirus)  Right Hip Pain- Surgery : at PAST appointment on 17, patient states surgery was   History of hip surgery: R hip due to necrosis in --bone graft from right tibia  H/O tubal ligation: in , along with   H/O: : 014  S/P Laparoscopic Cholecystectomy      FAMILY HISTORY:  Family history of sarcoidosis (Sibling): Sister with HgbSS and sarcoidosis  Family history of sickle cell disease (Sibling): Sister with HgbSS and sarcoidosis  Family history of sickle cell trait in mother  Family history of sickle cell trait in father      SOCIAL HISTORY:  Smoking: [ ] Never Smoked [ ] Former Smoker (__ packs x ___ years) [ ] Current Smoker  (__ packs x ___ years)  Substance Use: [ ] Never Used [ ] Used ____  EtOH Use:  Marital Status: [ ] Single [ ]  [ ]  [ ]   Sexual History:   Occupation:  Recent Travel:  Country of Birth:  Advance Directives:    Allergies    No Known Allergies    Intolerances        HOME MEDICATIONS:    REVIEW OF SYSTEMS:  Constitutional: [ ] fevers [ ] chills [ ] weight loss [ ] weight gain  HEENT: [ ] dry eyes [ ] eye irritation [ ] postnasal drip [ ] nasal congestion  CV: [ ] chest pain [ ] orthopnea [ ] palpitations [ ] murmur  Resp: [ ] cough [ ] shortness of breath [ ] dyspnea [ ] wheezing [ ] sputum [ ] hemoptysis  GI: [ ] nausea [ ] vomiting [ ] diarrhea [ ] constipation [ ] abd pain [ ] dysphagia   : [ ] dysuria [ ] nocturia [ ] hematuria [ ] increased urinary frequency  Musculoskeletal: [ ] back pain [ ] myalgias [ ] arthralgias [ ] fracture  Skin: [ ] rash [ ] itch  Neurological: [ ] headache [ ] dizziness [ ] syncope [ ] weakness [ ] numbness  Psychiatric: [ ] anxiety [ ] depression  Endocrine: [ ] diabetes [ ] thyroid problem  Hematologic/Lymphatic: [ ] anemia [ ] bleeding problem  Allergic/Immunologic: [ ] itchy eyes [ ] nasal discharge [ ] hives [ ] angioedema  [ ] All other systems negative  [ ] Unable to assess ROS because ________    OBJECTIVE:  ICU Vital Signs Last 24 Hrs  T(C): 36.2 (11 Dec 2017 17:37), Max: 38.6 (10 Dec 2017 22:15)  T(F): 97.2 (11 Dec 2017 17:37), Max: 101.4 (10 Dec 2017 22:15)  HR: 120 (11 Dec 2017 17:37) (119 - 135)  BP: 135/87 (11 Dec 2017 17:37) (105/84 - 141/85)  BP(mean): 96 (11 Dec 2017 17:37) (89 - 96)  ABP: --  ABP(mean): --  RR: 25 (11 Dec 2017 17:37) (17 - 28)  SpO2: 95% (11 Dec 2017 17:37) (73% - 100%)         @ 07:01  -   @ 19:17  --------------------------------------------------------  IN: 4300 mL / OUT: 2300 mL / NET: 2000 mL      CAPILLARY BLOOD GLUCOSE          PHYSICAL EXAM:  General:   HEENT:   Lymph Nodes:  Neck:   Respiratory:   Cardiovascular:   Abdomen:   Extremities:   Skin:   Neurological:  Psychiatry:    LINES:     HOSPITAL MEDICATIONS:  MEDICATIONS  (STANDING):  azithromycin  IVPB 500 milliGRAM(s) IV Intermittent once  azithromycin  IVPB      calcitriol   Capsule 0.5 MICROGram(s) Oral daily  calcium acetate 2668 milliGRAM(s) Oral three times a day with meals  folic acid 1 milliGRAM(s) Oral daily  furosemide    Tablet 100 milliGRAM(s) Oral two times a day  gentamicin 0.1% Cream 1 Application(s) Topical three times a day  heparin  Injectable 5000 Unit(s) SubCutaneous every 8 hours  HYDROmorphone PCA (1 mG/mL) 30 milliLiter(s) PCA Continuous PCA Continuous  metolazone 5 milliGRAM(s) Oral two times a day  Nephro-jay 1 Tablet(s) Oral daily  piperacillin/tazobactam IVPB. 3.375 Gram(s) IV Intermittent every 12 hours  sodium chloride 0.45%. 1000 milliLiter(s) (75 mL/Hr) IV Continuous <Continuous>  vancomycin    Solution 125 milliGRAM(s) Oral every 6 hours    MEDICATIONS  (PRN):  acetaminophen   Tablet 650 milliGRAM(s) Oral every 6 hours PRN For Temp greater than 38 C (100.4 F)  ALBUTerol/ipratropium for Nebulization 3 milliLiter(s) Nebulizer every 6 hours PRN Shortness of Breath  naloxone Injectable 0.1 milliGRAM(s) IV Push every 3 minutes PRN For ANY of the following changes in patient status:  A. RR LESS THAN 10 breaths per minute, B. Oxygen saturation LESS THAN 90%, C. Sedation score of 6  ondansetron Injectable 4 milliGRAM(s) IV Push every 6 hours PRN Nausea      LABS:                        6.9    24.97 )-----------( 93       ( 11 Dec 2017 06:50 )             19.3     Hgb Trend: 6.9<--, 7.7<--, 8.6<--, 8.7<--  12-11    138  |  96<L>  |  90<H>  ----------------------------<  109<H>  5.6<H>   |  16<L>  |  11.04<H>    Ca    7.6<L>      11 Dec 2017 06:50  Phos  6.2     12-11  Mg     1.8     12-    TPro  6.4  /  Alb  2.9<L>  /  TBili  1.5<H>  /  DBili  x   /  AST  67<H>  /  ALT  36<H>  /  AlkPhos  238<H>  12-    Creatinine Trend: 11.04<--, 9.97<--, 10.30<--, 10.44<--            MICROBIOLOGY:     RADIOLOGY:  [ ] Reviewed and interpreted by me    EKG:

## 2017-12-11 NOTE — CONSULT NOTE ADULT - PROBLEM SELECTOR RECOMMENDATION 9
-Hypoxia, fever, possible infiltrate, coupled with AMS, concerning for acute chest syndrome  -Blood bank contacted for exchange transfusion. Discussed with primary team, who will contact MICU for possible transfer and line access for exchange  -Hct low for exchange, will start with simple PRBC transfusion while awaiting access.  -Agree with broad-spectrum antibiotics, continue to investigate infectious etiologies.  -Pt has not been using PCA- unclear if this is because of AMS or lack of symptoms. c/w supportive care for now.

## 2017-12-11 NOTE — PROGRESS NOTE ADULT - ASSESSMENT
This is a 36 year old female with a PMHx of sickle cell disease, FSGS, nephrotic syndrome and  Parvo 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 36 year old female with sickle cell disease and 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

## 2017-12-11 NOTE — DISCHARGE NOTE ADULT - PATIENT PORTAL LINK FT
“You can access the [facility] Patient Portal, offered by [facility], by registering with the following website: [email or web address]”

## 2017-12-11 NOTE — DISCHARGE NOTE ADULT - CARE PLAN
Principal Discharge DX:	Acute chest syndrome  Goal:	Resolution of symptoms  Instructions for follow-up, activity and diet:	During this hospitalization you experienced chest pain, fevers and had findings on your chest x-ray concerning for acute chest syndrome. This is a life threatening condition that affects some people with sickle cell disease. You were treated with antibiotics and an exchange transfusion with subsequent improvement in your symptoms. Please follow up with [------------------------].  Secondary Diagnosis:	Clostridium difficile diarrhea  Instructions for follow-up, activity and diet:	During this hospitalization you developed diarrhea; this diarrhea was tested and found to be caused by a bacteria called clostridium difficile. This is a serious infection and you were treated with an antibiotic called vancomycin. With subsequent improvement in your diarrhea.  Secondary Diagnosis:	ESRD on peritoneal dialysis  Instructions for follow-up, activity and diet:	You were treated with peritoneal dialysis during this admission for your end stage kidney disease. You tolerated dialysis well, please continue your dialysis as prescribed. Principal Discharge DX:	Acute chest syndrome  Goal:	Resolution of symptoms  Instructions for follow-up, activity and diet:	During this hospitalization you experienced chest pain, fevers and had findings on your chest x-ray concerning for acute chest syndrome. This is a life threatening condition that affects some people with sickle cell disease. You were treated with antibiotics and an exchange transfusion with subsequent improvement in your symptoms. Please follow up with Dr Velásquez within 1 week of discharge. Please continue moxifloxacin for one more day.  Secondary Diagnosis:	Clostridium difficile diarrhea  Instructions for follow-up, activity and diet:	During this hospitalization you developed diarrhea; this diarrhea was tested and found to be caused by a bacteria called clostridium difficile. This is a serious infection and you were treated with an antibiotic called vancomycin with subsequent improvement in your diarrhea. Please continue taking Vancomycin for 4 more days.  Secondary Diagnosis:	ESRD on peritoneal dialysis  Instructions for follow-up, activity and diet:	You were treated with peritoneal dialysis during this admission for your end stage kidney disease. You tolerated dialysis well, please continue your dialysis as prescribed. Principal Discharge DX:	Acute chest syndrome  Goal:	Resolution of symptoms  Instructions for follow-up, activity and diet:	During this hospitalization you experienced chest pain, fevers and had findings on your chest x-ray concerning for acute chest syndrome. This is a life threatening condition that affects some people with sickle cell disease. You were treated with antibiotics and an exchange transfusion with subsequent improvement in your symptoms. Please follow up with Dr Velásquez within 1 week of discharge.  Secondary Diagnosis:	Clostridium difficile diarrhea  Instructions for follow-up, activity and diet:	During this hospitalization you developed diarrhea; this diarrhea was tested and found to be caused by a bacteria called clostridium difficile. This is a serious infection and you were treated with an antibiotic called vancomycin with subsequent improvement in your diarrhea. Please continue taking Vancomycin as prescribed to complete your course of antibiotics.  Secondary Diagnosis:	ESRD on peritoneal dialysis  Instructions for follow-up, activity and diet:	You were treated with peritoneal dialysis during this admission for your end stage kidney disease. You tolerated dialysis well, please continue your dialysis as prescribed.

## 2017-12-11 NOTE — PROCEDURE NOTE - PROCEDURE
<<-----Click on this checkbox to enter Procedure Central line placement  12/11/2017    Active  JULIETTE

## 2017-12-11 NOTE — DISCHARGE NOTE ADULT - MEDICATION SUMMARY - MEDICATIONS TO TAKE
I will START or STAY ON the medications listed below when I get home from the hospital:    HYDROmorphone 2 mg oral tablet  -- 1 tab(s) by mouth every 4 hours while awake, As Needed -Moderate Pain (4 - 6) MDD:8 tabs   -- Indication: For Pain    gentamicin 0.1% topical cream  -- Apply on skin to affected area 4 times a day  -- Indication: For Dialysis Catheter    metOLazone 2.5 mg oral tablet  -- 1 tab(s) by mouth 2 times a day  -- Indication: For ESRD (end stage renal disease) on dialysis    furosemide  -- 100 milligram(s) by mouth 2 times a day  -- Indication: For ESRD (end stage renal disease) on dialysis    Epogen  -- injectable 2 times a week with dialysis  -- Indication: For ESRD (end stage renal disease) on dialysis    vancomycin 125 mg oral capsule  -- 1 cap(s) by mouth every 6 hours  -- Indication: For Diarrhea    calcium acetate 667 mg oral tablet  -- 4 tab(s) by mouth 3 times a day  -- Indication: For ESRD (end stage renal disease) on dialysis    Cristin-Marguerite oral tablet  -- 1 tab(s) by mouth once a day  -- Indication: For ESRD (end stage renal disease) on dialysis    folic acid 1 mg oral tablet  -- 1 tab(s) by mouth once a day pm  -- Indication: For Sickle cell disease    calcitriol 0.5 mcg oral capsule  -- 2 cap(s) by mouth 2 times a day  -- Indication: For ESRD (end stage renal disease) on dialysis

## 2017-12-11 NOTE — DISCHARGE NOTE ADULT - PLAN OF CARE
Resolution of symptoms During this hospitalization you experienced chest pain, fevers and had findings on your chest x-ray concerning for acute chest syndrome. This is a life threatening condition that affects some people with sickle cell disease. You were treated with antibiotics and an exchange transfusion with subsequent improvement in your symptoms. Please follow up with [------------------------]. During this hospitalization you developed diarrhea; this diarrhea was tested and found to be caused by a bacteria called clostridium difficile. This is a serious infection and you were treated with an antibiotic called vancomycin. With subsequent improvement in your diarrhea. You were treated with peritoneal dialysis during this admission for your end stage kidney disease. You tolerated dialysis well, please continue your dialysis as prescribed. During this hospitalization you experienced chest pain, fevers and had findings on your chest x-ray concerning for acute chest syndrome. This is a life threatening condition that affects some people with sickle cell disease. You were treated with antibiotics and an exchange transfusion with subsequent improvement in your symptoms. Please follow up with Dr Velásquez within 1 week of discharge. Please continue moxifloxacin for one more day. During this hospitalization you developed diarrhea; this diarrhea was tested and found to be caused by a bacteria called clostridium difficile. This is a serious infection and you were treated with an antibiotic called vancomycin with subsequent improvement in your diarrhea. Please continue taking Vancomycin for 4 more days. During this hospitalization you experienced chest pain, fevers and had findings on your chest x-ray concerning for acute chest syndrome. This is a life threatening condition that affects some people with sickle cell disease. You were treated with antibiotics and an exchange transfusion with subsequent improvement in your symptoms. Please follow up with Dr Velásquez within 1 week of discharge. During this hospitalization you developed diarrhea; this diarrhea was tested and found to be caused by a bacteria called clostridium difficile. This is a serious infection and you were treated with an antibiotic called vancomycin with subsequent improvement in your diarrhea. Please continue taking Vancomycin as prescribed to complete your course of antibiotics.

## 2017-12-11 NOTE — PROGRESS NOTE ADULT - PROBLEM SELECTOR PLAN 4
No complaint this AM  - Continue tele  - continue to monitor No complaint this AM. CXR with new infiltrate  - Continue tele  - continue to monitor Meets SIRS criteria with tachycardia, fevers. WBC Persistently elevated, unclear etiology. Knee and peritoneal fluid obtained and do not appear infected. CXR with new infiltrate. Received vanc zosyn yesterday.   - Continue empiric antibiotics  - f/u knee imaging  - F/U cx  - f/u am CBC

## 2017-12-11 NOTE — PROGRESS NOTE ADULT - PROBLEM SELECTOR PLAN 5
Patient with history of ESRD 2/2 FSGS  - Continue PD per renal orders  - Continue calcium acetate, calcitriol, neppravite, epogen Persistent sinus tach in the setting of fevers and pain  - Continue tele  - Continue to monitor

## 2017-12-11 NOTE — CONSULT NOTE ADULT - SUBJECTIVE AND OBJECTIVE BOX
Patient is a 36y old  Female who presents with a chief complaint of Pain (11 Dec 2017 12:28) PMH of sickle cell disease and ESRD 2/2 FSGS on PD (started )  Pt noted to have fevers, possible infiltrate on CXR, and hypoxia requiring 5L via NC.  She is drowsy and AAOx2, with difficulty swallowing and moderate distress because of pain. On contact isolation for C.diff    Vital Signs Last 24 Hrs  T(C): 36.7 (11 Dec 2017 14:08), Max: 38.8 (10 Dec 2017 16:41)  T(F): 98.1 (11 Dec 2017 14:08), Max: 101.8 (10 Dec 2017 16:41)  HR: 120 (11 Dec 2017 14:08) (120 - 135)  BP: 111/71 (11 Dec 2017 14:08) (105/84 - 141/85)  BP(mean): --  RR: 18 (11 Dec 2017 14:08) (17 - 20)  SpO2: 90% (11 Dec 2017 14:08) (90% - 100%)  Allergies    No Known Allergies      PAST MEDICAL & SURGICAL HISTORY:  Umbilical hernia: 2017  CRF (chronic renal failure): 2-, insertion of peritoneal dialysis  Kidney dysfunction: &quot;have minimal function in one kidney&quot; -- patient not sure which one -- started dialysis in  with insertion of peritoneal dialysis  Cholelithiasis  Anemia  Sickle cell disease  Chronic kidney disease: started dialysis 2017 with etiology from Parvo Virus 2016  Parvovirus B19 infection: 2016 resulting in chronic renal disease  Nephrotic syndrome  HPV (Human Papillomavirus)  Right Hip Pain- Surgery : at PAST appointment on 17, patient states surgery was   History of hip surgery: R hip due to necrosis in --bone graft from right tibia  H/O tubal ligation: in , along with   H/O: : 014  S/P Laparoscopic Cholecystectomy      REVIEW OF SYSTEMS      General: In mild distress because of pain, she is drowsy and delirrious cannot provide accurate history.    Respiratory and Thorax: SOB    Gastrointestinal:	pain  	    Allergic/Immunologic:	                        6.9    24.97 )-----------( 93       ( 11 Dec 2017 06:50 )             19.3         Lactate Dehydrogenase, Serum: 2278 U/L ( @ 06:50)      12-11    138  |  96<L>  |  90<H>  ----------------------------<  109<H>  5.6<H>   |  16<L>  |  11.04<H>    Ca    7.6<L>      11 Dec 2017 06:50  Phos  6.2     12-  Mg     1.8     12-    TPro  6.4  /  Alb  2.9<L>  /  TBili  1.5<H>  /  DBili  x   /  AST  67<H>  /  ALT  36<H>  /  AlkPhos  238<H>      PHYSICAL EXAM:      Constitutional: Febrile    Eyes: EOMI    Respiratory: Clear , no wheezing    Cardiovascular: S1-S2 no murmur    Gastrointestinal: Distended and tender on palpation, no rebound    Neurological: lethargic, no focal neurologic deficit    Psychiatric: AAOX2

## 2017-12-11 NOTE — CHART NOTE - NSCHARTNOTEFT_GEN_A_CORE
Critically ill pt requiring PIV access for medical management, Under US guidance identified Rt UE vein, proximal to  AC and successfully placed 20g x 1.88 inch angiocath into vessel. . Placement confirmed s/p with ultrasound and catheter determined to be in patent lumen of vein. Pt tolerated well w/o complication.

## 2017-12-11 NOTE — PROGRESS NOTE ADULT - PROBLEM SELECTOR PLAN 1
Pt. with ESRD on PD. Pt. tolerating PD well without complications. PD catheter working well. Continue with current PD prescription of 3 exchanges (fill volume: 2000 cc with 2.5% dextrose dianeal fluid and last fill). Monitor labs and BP Pt. with ESRD on PD. Pt. tolerating PD well. PD catheter working well. Will continue with current PD prescription. Monitor labs and BP. Pt. with ESRD on PD. Pt. tolerating PD well. PD catheter working well. Will continue with current PD prescription. Monitor labs and BP

## 2017-12-11 NOTE — PROGRESS NOTE ADULT - ASSESSMENT
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     #Neuro  - A+Ox2, no focal deficits on exam    #Cardiac  - BP systolic 130s,  sinus tachycardia most likely due to underlying pain/anemia  - No known hx cardiac disease    #Pulmonary  - Acute chest syndrome supported by fevers 101.4, new infiltrate on CXR, hypoxia, white count 24  - s/p shiley, undergoing plasmaphoresis  - pt sat 94 on RA    #GI  - no know hx of GI disease    #Endocrine  - pt glucose 109 on BMP, no anti-glycemic medication at home     #ID  - C diff positive but pt has not had any episodes of diarrhea  - on vanc/zosyn/azithromycin for PNA coverage  - f/u urine legionella  - f/u urine culture, bcx   - f/u peritoneal culture     #Renal  - pt ESRD w/ FSGS on PD continue to follow home schedule for PD  - calcitriol/phoslo b/c pt on pD    #ID  - on zosyn/azithromycin for PNA coverage  - will f/u Bcx, urine cx and titrate as appropriate  - pt being treated with oral vanc 125 mg q6 for C diff    #Heme  - known sickle cell disease (sickle/+thal)  - pt anemic this morning (Hgb 6.9) receiving 1 unit PRBC now, f/u CBC  - PCA pump for pain, narcan injectable also ordered

## 2017-12-11 NOTE — PROGRESS NOTE ADULT - PROBLEM SELECTOR PLAN 3
Persistent sinus tach in the setting of fevers and pain  - Continue tele  - Continue to monitor Patient reports pain is typical of prior episodes but she does not usually have chest involvement. There is no clear inciting event for this current pain episode. PCA changed to morphine before being ultimately dc'd yesterday.   - Currently on dilaudid .5 q2 with good pain control.   - 75 cc/hr NS  - c/w IS, prn duoneb  - c/w tele  (I-STOP # [id number])

## 2017-12-11 NOTE — PROGRESS NOTE ADULT - SUBJECTIVE AND OBJECTIVE BOX
CONTACT INFO  Nico Summers M.D., PGY-1  Pager: [facility]- [phone], [facility]- [phone]    Mon-Fri: pager covered by day team 7am-7pm;   ***Academic conferences M-F 8am-9am & 12pm-1pm- page ONLY if URGENT or if Consultant  /Sabine: see chart, primary physician assigned available 7am-12pm  Sat/Beatty Cross Coverage 12pm-7pm: [facility]- page [phone] for Team1-4, [facility]- pager forwarded to covering Resident  For Night coverage 7pm-7am: NS- page [phone] Team1-3, page [phone] Team4 & Care Model    ERIC ELIZABETH  36y  Female      Patient is a 36y old  Female who presents with a chief complaint of Pain (09 Dec 2017 07:50)      INTERVAL HPI/OVERNIGHT EVENTS: Febrile yesterday and with leukocytosis, complained of increasing R knee pain. Knee was tapped by ortho with fluid not consistent with septic/crystalline arthritis. Medication error with PCA pump disclosed to patient. This AM, states pain is well controlled on dilaudid .5 q2, feels well.     REVIEW OF SYSTEMS:  CONSTITUTIONAL: +Fevers  RESPIRATORY: No cough; No shortness of breath  CARDIOVASCULAR: No chest pain  GASTROINTESTINAL: No abdominal or epigastric pain.  GENITOURINARY: No dysuria  NEUROLOGICAL: No headaches  MUSCULOSKELETAL: +Right knee pain  SKIN: No rash  PSYCHIATRIC: No difficulty sleeping  HEME/LYMPH: No easy bleeding     Vital Signs Last 24 Hrs  T(C): 37.3 (11 Dec 2017 05:20), Max: 38.8 (10 Dec 2017 16:41)  T(F): 99.2 (11 Dec 2017 05:20), Max: 101.8 (10 Dec 2017 16:41)  HR: 134 (11 Dec 2017 05:20) (125 - 136)  BP: 117/96 (11 Dec 2017 05:20) (105/84 - 141/85)  BP(mean): --  RR: 18 (11 Dec 2017 05:20) (18 - 22)  SpO2: 95% (11 Dec 2017 05:20) (93% - 99%)    PHYSICAL EXAM:  GENERAL: NAD  HEAD:  Atraumatic  ENMT: Moist mucous membranes  NERVOUS SYSTEM:  Alert & Oriented X3. Conversant, lethargic with eyes closing throughout conversation, easily arousable and appropriate. Moves all extremities, sensation intact.   CHEST/LUNG: Clear to auscultation bilaterally  HEART: Tachycardic, regular, no murmur  ABDOMEN: Distended, non-tender, +PD catheter  EXTREMITIES: Warm, no edema. L knee painful to mild palpation, R knee bandaged, painful to mild palpation and with passive ROM. No bleeding/erythema noted under bandage. No effusion noted.   SKIN: No rashes    Consultant(s) Notes Reviewed: ortho    LABS:                        7.7    27.10 )-----------( 155      ( 10 Dec 2017 07:10 )             21.9     12-10    134<L>  |  94<L>  |  70<H>  ----------------------------<  107<H>  4.7   |  23  |  9.97<H>    Ca    7.8<L>      10 Dec 2017 07:10  Phos  5.0     12-10  Mg     1.6     12-10          ABG - ( 09 Dec 2017 14:42 )  pH: 7.44  /  pCO2: 38    /  pO2: 122   / HCO3: 26    / Base Excess: 1.5   /  SaO2: 99.4

## 2017-12-11 NOTE — PROGRESS NOTE ADULT - PROBLEM SELECTOR PLAN 6
Patient with history of SCD with infrequent pain crises; not on hydroxyurea  - heme consult   - trend CBC  - Pain control as above Patient with history of SCD with infrequent pain crises; not on hydroxyurea  - heme consult given new CXR infiltrate  - trend CBC  - Pain control as above Patient with history of ESRD 2/2 FSGS  - Continue PD per renal orders  - Continue calcium acetate, calcitriol, neppravite, epogen

## 2017-12-11 NOTE — PROGRESS NOTE ADULT - PROBLEM SELECTOR PLAN 1
Patient reports pain is typical of prior episodes but she does not usually have chest involvement. There is no clear inciting event for this current pain episode. PCA changed to morphine before being ultimately dc'd yesterday.   - Currently on dilaudid .5 q2 with good pain control.   - 75 cc/hr NS  - c/w IS, prn duoneb  - c/w tele  (I-STOP # [id number]) Patient with persistent leukocytosis, fevers yesterday and new infiltrate on CXR. Now concerned for acute chest syndrome  - Heme consult for potential exchange transfusion

## 2017-12-11 NOTE — CONSULT NOTE ADULT - ASSESSMENT
Patient is a 36y old  Female who presents with a chief complaint of Pain (11 Dec 2017 12:28) PMH of sickle cell disease and ESRD 2/2 FSGS on PD (started 2017)  With hypoxia and altered mental status most probably due to acute chest. RBC exchange with 7 units of PRBC, TARGET HCT 30% after line placement. Recommend on unit simple transfusion  while awaiting for the line placement.
This is a 36 year old female with a PMHx of sickle cell disease, FSGS, nephrotic syndrome and  Parvo 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
35 YO F with PMH of sickle cell disease and ESRD 2/2 FSGS on PD (started 2017) presents with chest, back, elbow and knee pain, now with concern for acute chest.

## 2017-12-11 NOTE — CONSULT NOTE ADULT - ATTENDING COMMENTS
Patient with sickle cell disease (sickle/thal+) now with hypoxia, fevers, ?infiltrate and AMS - constellation of symptoms concerning for acute chest syndrome so will transfer to MICU for RBC exchange.  Also has c. diff colitis which may explain his AMS however not having significant diarrhea, would also check peritoneal fluid to r/o infection.

## 2017-12-11 NOTE — PROGRESS NOTE ADULT - SUBJECTIVE AND OBJECTIVE BOX
[facility] DIVISION OF KIDNEY DISEASES AND HYPERTENSION -- FOLLOW UP NOTE  --------------------------------------------------------------------------------  HPI: This is a 36 year old female with a PMHx of sickle cell disease, FSGS, nephrotic syndrome and  Parvo 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 Pt. has been on PD since 3/2017. Pt. nephrologist is Dr. Larsen. Pt. states she has not had any problems with PD. PD catheter is working well. Pt. says she is on CCPD 3 exchanges (fill volume: 2000cc with 2.5% dextrose dianeal fluid and a last fill). Pt. had fever overnight and was started on IV ABX. Pt. states she currently has chest discomfort and joint pain. Pt. denies any SOB, abdominal pain, N/V.       PAST HISTORY  --------------------------------------------------------------------------------  No significant changes to PMH, PSH, FHx, SHx, unless otherwise noted    ALLERGIES & MEDICATIONS  --------------------------------------------------------------------------------  Allergies    No Known Allergies    Intolerances      Standing Inpatient Medications  calcitriol   Capsule 0.5 MICROGram(s) Oral daily  calcium acetate 2668 milliGRAM(s) Oral three times a day with meals  docusate sodium 100 milliGRAM(s) Oral two times a day  folic acid 1 milliGRAM(s) Oral daily  furosemide    Tablet 100 milliGRAM(s) Oral two times a day  gentamicin 0.1% Cream 1 Application(s) Topical three times a day  heparin  Injectable 5000 Unit(s) SubCutaneous every 8 hours  HYDROmorphone  Injectable 0.5 milliGRAM(s) IV Push every 2 hours  metolazone 5 milliGRAM(s) Oral two times a day  Nephro-jay 1 Tablet(s) Oral daily  piperacillin/tazobactam IVPB. 3.375 Gram(s) IV Intermittent every 12 hours  polyethylene glycol 3350 17 Gram(s) Oral daily  senna 2 Tablet(s) Oral at bedtime  sodium chloride 0.45%. 1000 milliLiter(s) IV Continuous <Continuous>    PRN Inpatient Medications  acetaminophen   Tablet 650 milliGRAM(s) Oral every 6 hours PRN  ALBUTerol/ipratropium for Nebulization 3 milliLiter(s) Nebulizer every 6 hours PRN  naloxone Injectable 0.1 milliGRAM(s) IV Push every 3 minutes PRN  ondansetron Injectable 4 milliGRAM(s) IV Push every 6 hours PRN      REVIEW OF SYSTEMS  --------------------------------------------------------------------------------  Gen: + fevers  Head/Eyes/Ears/Mouth: No headache  Respiratory: No dyspnea  CV: +chest pain   GI: No abdominal pain  : No dysuria  MSK: No edema + joint pain   Heme: As per HPI       All other systems were reviewed and are negative, except as noted.      VITALS/PHYSICAL EXAM  --------------------------------------------------------------------------------  T(C): 37.3 (12-11-17 @ 05:20), Max: 38.8 (12-10-17 @ 16:41)  HR: 134 (12-11-17 @ 05:20) (125 - 136)  BP: 117/96 (12-11-17 @ 05:20) (105/84 - 141/85)  RR: 18 (12-11-17 @ 05:20) (18 - 22)  SpO2: 95% (12-11-17 @ 05:20) (93% - 99%)  Wt(kg): --        Physical Exam:  	Gen: Resting in bed  	HEENT: supple neck   	Pulm: CTA B/L  	CV: RRR  	Abd: soft + PD catheter seen intact.   	: No suprapubic tenderness  	LE: Warm, no edema  	Neuro: Awake and alert   	Psych: Normal affect and mood  	Skin: Warm, without rashes  	  LABS/STUDIES  --------------------------------------------------------------------------------              7.7    27.10 >-----------<  155      [12-10-17 @ 07:10]              21.9     134  |  94  |  70  ----------------------------<  107      [12-10-17 @ 07:10]  4.7   |  23  |  9.97        Ca     7.8     [12-10-17 @ 07:10]      Mg     1.6     [12-10-17 @ 07:10]      Phos  5.0     [12-10-17 @ 07:10]          Troponin < 0.06      [12-09-17 @ 15:37]        [12-09-17 @ 15:37]    Creatinine Trend:  SCr 9.97 [12-10 @ 07:10]  SCr 10.30 [12-09 @ 15:37]  SCr 10.44 [12-09 @ 00:10]        Iron 71, TIBC 165, %sat --      [08-06-17 @ 18:30]  Ferritin 2501      [08-06-17 @ 18:30]  Vitamin D (25OH) 6.1      [08-08-17 @ 06:40]  HbA1c 3.8      [01-13-17 @ 11:35]  TSH 4.51      [01-13-17 @ 11:35]  Lipid: chol 263, , HDL 50,       [01-13-17 @ 11:35] [facility] DIVISION OF KIDNEY DISEASES AND HYPERTENSION -- FOLLOW UP NOTE  --------------------------------------------------------------------------------  HPI: 36-year-old female with PMH of sickle cell disease, and 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 Pt. has been on PD since 3/2017. Pt.'s outpatient nephrologist is Dr. Larsen. Pt. states she has not had any problems with PD. PD catheter is working well. Pt. says she is on CCPD 3 exchanges (fill volume: 2000cc with 2.5% dextrose dianeal fluid and a last fill). Pt. had fever overnight and was started on IV ABX. Pt. states she currently has chest discomfort and joint pain. Pt. denies any SOB, abdominal pain, or N/V.     PAST HISTORY  --------------------------------------------------------------------------------  No significant changes to PMH, PSH, FHx, SHx, unless otherwise noted    ALLERGIES & MEDICATIONS  --------------------------------------------------------------------------------  Allergies    No Known Allergies    Intolerances    Standing Inpatient Medications  calcitriol   Capsule 0.5 MICROGram(s) Oral daily  calcium acetate 2668 milliGRAM(s) Oral three times a day with meals  docusate sodium 100 milliGRAM(s) Oral two times a day  folic acid 1 milliGRAM(s) Oral daily  furosemide    Tablet 100 milliGRAM(s) Oral two times a day  gentamicin 0.1% Cream 1 Application(s) Topical three times a day  heparin  Injectable 5000 Unit(s) SubCutaneous every 8 hours  HYDROmorphone  Injectable 0.5 milliGRAM(s) IV Push every 2 hours  metolazone 5 milliGRAM(s) Oral two times a day  Nephro-jay 1 Tablet(s) Oral daily  piperacillin/tazobactam IVPB. 3.375 Gram(s) IV Intermittent every 12 hours  polyethylene glycol 3350 17 Gram(s) Oral daily  senna 2 Tablet(s) Oral at bedtime  sodium chloride 0.45%. 1000 milliLiter(s) IV Continuous <Continuous>    PRN Inpatient Medications  acetaminophen   Tablet 650 milliGRAM(s) Oral every 6 hours PRN  ALBUTerol/ipratropium for Nebulization 3 milliLiter(s) Nebulizer every 6 hours PRN  naloxone Injectable 0.1 milliGRAM(s) IV Push every 3 minutes PRN  ondansetron Injectable 4 milliGRAM(s) IV Push every 6 hours PRN    REVIEW OF SYSTEMS  --------------------------------------------------------------------------------  Gen: + fevers  Head/Eyes/Ears/Mouth: No headache  Respiratory: No dyspnea  CV: +chest pain   GI: No abdominal pain  : No dysuria  MSK: No edema + joint pain   Heme: As per HPI     VITALS/PHYSICAL EXAM  --------------------------------------------------------------------------------  T(C): 37.3 (12-11-17 @ 05:20), Max: 38.8 (12-10-17 @ 16:41)  HR: 134 (12-11-17 @ 05:20) (125 - 136)  BP: 117/96 (12-11-17 @ 05:20) (105/84 - 141/85)  RR: 18 (12-11-17 @ 05:20) (18 - 22)  SpO2: 95% (12-11-17 @ 05:20) (93% - 99%)  Wt(kg): --    Physical Exam:  	Gen: Resting in bed  	HEENT: No JVD   	Pulm: CTA B/L  	CV: RRR, S1S2+  	Abd: soft + PD catheter seen intact.   	: No suprapubic tenderness  	LE: Warm, no edema  	Neuro: Awake   	Psych: Normal affect and mood  	Skin: Warm  	  LABS/STUDIES  --------------------------------------------------------------------------------              7.7    27.10 >-----------<  155      [12-10-17 @ 07:10]              21.9     134  |  94  |  70  ----------------------------<  107      [12-10-17 @ 07:10]  4.7   |  23  |  9.97        Ca     7.8     [12-10-17 @ 07:10]      Mg     1.6     [12-10-17 @ 07:10]      Phos  5.0     [12-10-17 @ 07:10]    Troponin < 0.06      [12-09-17 @ 15:37]        [12-09-17 @ 15:37]    Creatinine Trend:  SCr 9.97 [12-10 @ 07:10]  SCr 10.30 [12-09 @ 15:37]  SCr 10.44 [12-09 @ 00:10]

## 2017-12-11 NOTE — DISCHARGE NOTE ADULT - HOSPITAL COURSE
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 She reports being in her usual state of health until the onset of chest pain. She has had no change in her health recently other than receiving a scheduled steroid injection in to her right hip yesterday for chronic hip pain. She took 4 mg of dilaudid at home without relief of her pain. On presentation to the ED vitals were: 37 102 135/89 22 100% RA. She received 1L NS and a total of 6 mg dilaudid over 4 hours and an additional 20 mg of ketamine without adequate control of pain. On admission she was placed on a dilaudid PCA for improved pain control. Her pain evolved and began to involve primarily her knees and elbows, typical of her prior pain crises, with improvement of her chest pain. She was ruled out for ACS with EKG and negative troponins; a d-dimer was elevated but it was felt that her symptoms and elevated d-dimer were adequately explained by her sickle cell pain crisis. An ABG was obtained which demonstrated adequate oxygenation and she was felt to be low probability for PE so she was not assessed with a CT angio. Chest x-ray at the time appeared clear without an infiltrate concerning for acute chest. Her white count was elevated but she was afebrile and non-toxic appearing. Nephrology was contacted to continue peritoneal dialysis. On hospital day two she developed fevers and complained of increasing pain in her right knee which was tender and swollen on exam so ortho was consulted for joint aspiration and she was placed on empiric vanc/zosyn. The results of this aspiration were largely normal and did not appear consistent with septic or crystalline arthritis. A CXR was obtained which demonstrated infiltrates bilaterally. On hospital day three the patient was found to be more lethargic and heme was consulted for recommendations given the new chest x-ray findings and concern for acute chest. The patient also was noted to have diarrhea which was tested and returned as positive for c. diff for which she began receiving PO vancomycin. She was transferred to the MICU for urgent exchange transfusion for acute chest syndrome. 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 She reports being in her usual state of health until the onset of chest pain. She has had no change in her health recently other than receiving a scheduled steroid injection in to her right hip yesterday for chronic hip pain. She took 4 mg of dilaudid at home without relief of her pain. On presentation to the ED vitals were: 37 102 135/89 22 100% RA. She received 1L NS and a total of 6 mg dilaudid over 4 hours and an additional 20 mg of ketamine without adequate control of pain. On admission she was placed on a dilaudid PCA for improved pain control. Her pain evolved and began to involve primarily her knees and elbows, typical of her prior pain crises, with improvement of her chest pain. She was ruled out for ACS with EKG and negative troponins; a d-dimer was elevated but it was felt that her symptoms and elevated d-dimer were adequately explained by her sickle cell pain crisis. An ABG was obtained which demonstrated adequate oxygenation and she was felt to be low probability for PE so she was not assessed with a CT angio. Chest x-ray at the time appeared clear without an infiltrate concerning for acute chest. Her white count was elevated but she was afebrile and non-toxic appearing. Nephrology was contacted to continue peritoneal dialysis. On hospital day two she developed fevers and complained of increasing pain in her right knee which was tender and swollen on exam so ortho was consulted for joint aspiration and she was placed on empiric vanc/zosyn. The results of this aspiration were largely normal and did not appear consistent with septic or crystalline arthritis. A CXR was obtained which demonstrated infiltrates bilaterally. On hospital day three the patient was found to be more lethargic and heme was consulted for recommendations given the new chest x-ray findings and concern for acute chest. The patient also was noted to have diarrhea which was tested and returned as positive for c. diff for which she began receiving PO vancomycin. She was transferred to the MICU for urgent exchange transfusion for acute chest syndrome. In the MICU she was successfully exchanged with an adequate reduction in her fraction of HgbS%, she was also placed on zosyn and azithro for acute chest. She remained altered but a CT head was negative for acute processes so the lethargy was felt to be likely 2/2 opiates. Her platelet count dropped significantly in the ICU (cain 34) but HIT panel was negative so this was attributed to a consumptive process related to her sickle crisis. She was transferred back to the floor following her exchange and at that time she remained afebrile with improving mental status and white blood cell count. Her heparin was restarted and she was placed on monotherapy with moxifloxacin to complete a total of 7 days for acute chest. Her pain regimen was transitioned to PO dilaudid and her symptoms began to improve. [--------------]. 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 She reports being in her usual state of health until the onset of chest pain. She has had no change in her health recently other than receiving a scheduled steroid injection in to her right hip yesterday for chronic hip pain. She took 4 mg of dilaudid at home without relief of her pain. On presentation to the ED vitals were: 37 102 135/89 22 100% RA. She received 1L NS and a total of 6 mg dilaudid over 4 hours and an additional 20 mg of ketamine without adequate control of pain. On admission she was placed on a dilaudid PCA for improved pain control. Her pain evolved and began to involve primarily her knees and elbows, typical of her prior pain crises, with improvement of her chest pain. She was ruled out for ACS with EKG and negative troponins; a d-dimer was elevated but it was felt that her symptoms and elevated d-dimer were adequately explained by her sickle cell pain crisis. An ABG was obtained which demonstrated adequate oxygenation and she was felt to be low probability for PE so she was not assessed with a CT angio. Chest x-ray at the time appeared clear without an infiltrate concerning for acute chest. Her white count was elevated but she was afebrile and non-toxic appearing. Nephrology was contacted to continue peritoneal dialysis. On hospital day two she developed fevers and complained of increasing pain in her right knee which was tender and swollen on exam so ortho was consulted for joint aspiration and she was placed on empiric vanc/zosyn. The results of this aspiration were largely normal and did not appear consistent with septic or crystalline arthritis. A CXR was obtained which demonstrated infiltrates bilaterally. On hospital day three the patient was found to be more lethargic and heme was consulted for recommendations given the new chest x-ray findings and concern for acute chest. The patient also was noted to have diarrhea which was tested and returned as positive for c. diff for which she began receiving PO vancomycin. She was transferred to the MICU for urgent exchange transfusion for acute chest syndrome. In the MICU she was successfully exchanged with an adequate reduction in her fraction of HgbS%, she was also placed on zosyn and azithro for acute chest. She remained altered but a CT head was negative for acute processes so the lethargy was felt to be likely 2/2 opiates. Her platelet count dropped significantly in the ICU (cain 34) but HIT panel was negative so this was attributed to a consumptive process related to her sickle crisis. She was transferred back to the floor following her exchange and at that time she remained afebrile with improving mental status and white blood cell count. Her heparin was restarted and she was placed on monotherapy with moxifloxacin to complete a total of 7 days for acute chest. Her pain regimen was transitioned to PO dilaudid and her symptoms began to improve. Her mental status has also improved to baseline and she is medically ready for discharge. 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 She reports being in her usual state of health until the onset of chest pain. She has had no change in her health recently other than receiving a scheduled steroid injection in to her right hip yesterday for chronic hip pain. She took 4 mg of dilaudid at home without relief of her pain. On presentation to the ED vitals were: 37 102 135/89 22 100% RA. She received 1L NS and a total of 6 mg dilaudid over 4 hours and an additional 20 mg of ketamine without adequate control of pain. On admission she was placed on a dilaudid PCA for improved pain control. Her pain evolved and began to involve primarily her knees and elbows, typical of her prior pain crises, with improvement of her chest pain. She was ruled out for ACS with EKG and negative troponins; a d-dimer was elevated but it was felt that her symptoms and elevated d-dimer were adequately explained by her sickle cell pain crisis. An ABG was obtained which demonstrated adequate oxygenation and she was felt to be low probability for PE so she was not assessed with a CT angio. Chest x-ray at the time appeared clear without an infiltrate concerning for acute chest. Her white count was elevated but she was afebrile and non-toxic appearing. Nephrology was contacted to continue peritoneal dialysis. On hospital day two she developed fevers and complained of increasing pain in her right knee which was tender and swollen on exam so ortho was consulted for joint aspiration and she was placed on empiric vanc/zosyn. The results of this aspiration were largely normal and did not appear consistent with septic or crystalline arthritis. A CXR was obtained which demonstrated infiltrates bilaterally. On hospital day three the patient was found to be more lethargic and heme was consulted for recommendations given the new chest x-ray findings and concern for acute chest. The patient also was noted to have diarrhea which was tested and returned as positive for c. diff for which she began receiving PO vancomycin. She was transferred to the MICU for urgent exchange transfusion for acute chest syndrome. In the MICU she was successfully exchanged with an adequate reduction in her fraction of HgbS%, she was also placed on zosyn and azithro for acute chest. She remained altered but a CT head was negative for acute processes so the lethargy was felt to be likely 2/2 opiates. Her platelet count dropped significantly in the ICU (cain 34) but HIT panel was negative so this was attributed to a consumptive process related to her sickle crisis. She was transferred back to the floor following her exchange and at that time she remained afebrile with improving mental status and white blood cell count. Her heparin was restarted and she was placed on monotherapy with moxifloxacin to complete a total of 7 days for acute chest. Her pain regimen was transitioned to PO dilaudid and her symptoms began to improve. Her mental status has also improved to baseline and she was deemed medically stable for discharge. 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 She reports being in her usual state of health until the onset of chest pain. She has had no change in her health recently other than receiving a scheduled steroid injection in to her right hip yesterday for chronic hip pain. She took 4 mg of dilaudid at home without relief of her pain. On presentation to the ED vitals were: 37 102 135/89 22 100% RA. She received 1L NS and a total of 6 mg dilaudid over 4 hours and an additional 20 mg of ketamine without adequate control of pain. On admission she was placed on a dilaudid PCA for improved pain control. Her pain evolved and began to involve primarily her knees and elbows, typical of her prior pain crises, with improvement of her chest pain. She was ruled out for ACS with EKG and negative troponins; a d-dimer was elevated but it was felt that her symptoms and elevated d-dimer were adequately explained by her sickle cell pain crisis. An ABG was obtained which demonstrated adequate oxygenation and she was felt to be low probability for PE so she was not assessed with a CT angio. Chest x-ray at the time appeared clear without an infiltrate concerning for acute chest. Her white count was elevated but she was afebrile and non-toxic appearing. Nephrology was contacted to continue peritoneal dialysis. On hospital day two she developed fevers and complained of increasing pain in her right knee which was tender and swollen on exam so ortho was consulted for joint aspiration and she was placed on empiric vanc/zosyn. The results of this aspiration were largely normal and did not appear consistent with septic or crystalline arthritis. A CXR was obtained which demonstrated infiltrates bilaterally. On hospital day three the patient was found to be more lethargic and heme was consulted for recommendations given the new chest x-ray findings and concern for acute chest. The patient also was noted to have diarrhea which was tested and returned as positive for c. diff for which she began receiving PO vancomycin. She was transferred to the MICU for urgent exchange transfusion for acute chest syndrome. In the MICU she was successfully exchanged with an adequate reduction in her fraction of HgbS%, she was also placed on zosyn and azithro for acute chest. She remained altered but a CT head was negative for acute processes so the lethargy was felt to be likely 2/2 opiates. Her platelet count dropped significantly in the ICU (cain 34) but HIT panel was negative so this was attributed to a consumptive process related to her sickle crisis. She was transferred back to the floor following her exchange and at that time she remained afebrile with improving mental status and white blood cell count. Her heparin was restarted and she was placed on monotherapy with moxifloxacin to complete a total of 7 days for acute chest. Her pain regimen was transitioned to PO dilaudid and her symptoms began to improve. Her mental status has also improved to baseline. Patient requested to be discharged as she felt well and was deemed medically stable for discharge. She will follow up for repeat labs as an outpatient.

## 2017-12-12 DIAGNOSIS — R41.82 ALTERED MENTAL STATUS, UNSPECIFIED: ICD-10-CM

## 2017-12-12 LAB
ALBUMIN SERPL ELPH-MCNC: 2.5 G/DL — LOW (ref 3.3–5)
ALBUMIN SERPL ELPH-MCNC: 2.7 G/DL — LOW (ref 3.3–5)
ALP SERPL-CCNC: 206 U/L — HIGH (ref 40–120)
ALP SERPL-CCNC: 209 U/L — HIGH (ref 40–120)
ALT FLD-CCNC: 32 U/L — SIGNIFICANT CHANGE UP (ref 4–33)
ALT FLD-CCNC: 33 U/L — SIGNIFICANT CHANGE UP (ref 4–33)
ANISOCYTOSIS BLD QL: SLIGHT — SIGNIFICANT CHANGE UP
AST SERPL-CCNC: 31 U/L — SIGNIFICANT CHANGE UP (ref 4–32)
AST SERPL-CCNC: 38 U/L — HIGH (ref 4–32)
BASOPHILS # BLD AUTO: 0.02 K/UL — SIGNIFICANT CHANGE UP (ref 0–0.2)
BASOPHILS # BLD AUTO: 0.06 K/UL — SIGNIFICANT CHANGE UP (ref 0–0.2)
BASOPHILS # BLD AUTO: 0.06 K/UL — SIGNIFICANT CHANGE UP (ref 0–0.2)
BASOPHILS NFR BLD AUTO: 0.2 % — SIGNIFICANT CHANGE UP (ref 0–2)
BASOPHILS NFR BLD AUTO: 0.3 % — SIGNIFICANT CHANGE UP (ref 0–2)
BASOPHILS NFR BLD AUTO: 0.3 % — SIGNIFICANT CHANGE UP (ref 0–2)
BASOPHILS NFR SPEC: 0 % — SIGNIFICANT CHANGE UP (ref 0–2)
BILIRUB DIRECT SERPL-MCNC: 0.4 MG/DL — HIGH (ref 0.1–0.2)
BILIRUB SERPL-MCNC: 0.9 MG/DL — SIGNIFICANT CHANGE UP (ref 0.2–1.2)
BLASTS # FLD: 0 % — SIGNIFICANT CHANGE UP (ref 0–0)
BUN SERPL-MCNC: 86 MG/DL — HIGH (ref 7–23)
BUN SERPL-MCNC: 91 MG/DL — HIGH (ref 7–23)
CALCIUM SERPL-MCNC: 7.3 MG/DL — LOW (ref 8.4–10.5)
CALCIUM SERPL-MCNC: 7.6 MG/DL — LOW (ref 8.4–10.5)
CHLORIDE SERPL-SCNC: 94 MMOL/L — LOW (ref 98–107)
CHLORIDE SERPL-SCNC: 95 MMOL/L — LOW (ref 98–107)
CO2 SERPL-SCNC: 21 MMOL/L — LOW (ref 22–31)
CO2 SERPL-SCNC: 22 MMOL/L — SIGNIFICANT CHANGE UP (ref 22–31)
CREAT SERPL-MCNC: 8.53 MG/DL — HIGH (ref 0.5–1.3)
CREAT SERPL-MCNC: 9.41 MG/DL — HIGH (ref 0.5–1.3)
EOSINOPHIL # BLD AUTO: 0.02 K/UL — SIGNIFICANT CHANGE UP (ref 0–0.5)
EOSINOPHIL # BLD AUTO: 0.05 K/UL — SIGNIFICANT CHANGE UP (ref 0–0.5)
EOSINOPHIL # BLD AUTO: 0.1 K/UL — SIGNIFICANT CHANGE UP (ref 0–0.5)
EOSINOPHIL NFR BLD AUTO: 0.2 % — SIGNIFICANT CHANGE UP (ref 0–6)
EOSINOPHIL NFR BLD AUTO: 0.3 % — SIGNIFICANT CHANGE UP (ref 0–6)
EOSINOPHIL NFR BLD AUTO: 0.5 % — SIGNIFICANT CHANGE UP (ref 0–6)
EOSINOPHIL NFR FLD: 0 % — SIGNIFICANT CHANGE UP (ref 0–6)
GIANT PLATELETS BLD QL SMEAR: PRESENT — SIGNIFICANT CHANGE UP
GLUCOSE SERPL-MCNC: 132 MG/DL — HIGH (ref 70–99)
GLUCOSE SERPL-MCNC: 141 MG/DL — HIGH (ref 70–99)
HAPTOGLOB SERPL-MCNC: 114 MG/DL — SIGNIFICANT CHANGE UP (ref 34–200)
HCT VFR BLD CALC: 26.5 % — LOW (ref 34.5–45)
HCT VFR BLD CALC: 28.7 % — LOW (ref 34.5–45)
HCT VFR BLD CALC: 30.5 % — LOW (ref 34.5–45)
HGB A MFR BLD: 81.5 % — LOW
HGB A2 MFR BLD: 3 % — SIGNIFICANT CHANGE UP (ref 2.4–3.5)
HGB BLD-MCNC: 10.8 G/DL — LOW (ref 11.5–15.5)
HGB BLD-MCNC: 9.6 G/DL — LOW (ref 11.5–15.5)
HGB BLD-MCNC: 9.9 G/DL — LOW (ref 11.5–15.5)
HGB ELECT COMMENT: SIGNIFICANT CHANGE UP
HGB F MFR BLD: < 1 % — SIGNIFICANT CHANGE UP (ref 0–1.5)
HGB S MFR BLD: 15 % — HIGH (ref 0–0)
IMM GRANULOCYTES # BLD AUTO: 0.31 # — SIGNIFICANT CHANGE UP
IMM GRANULOCYTES # BLD AUTO: 0.41 # — SIGNIFICANT CHANGE UP
IMM GRANULOCYTES # BLD AUTO: 0.6 # — SIGNIFICANT CHANGE UP
IMM GRANULOCYTES NFR BLD AUTO: 2.3 % — HIGH (ref 0–1.5)
IMM GRANULOCYTES NFR BLD AUTO: 2.8 % — HIGH (ref 0–1.5)
IMM GRANULOCYTES NFR BLD AUTO: 3.2 % — HIGH (ref 0–1.5)
LDH SERPL L TO P-CCNC: 1442 U/L — HIGH (ref 135–225)
LYMPHOCYTES # BLD AUTO: 1.73 K/UL — SIGNIFICANT CHANGE UP (ref 1–3.3)
LYMPHOCYTES # BLD AUTO: 17.4 % — SIGNIFICANT CHANGE UP (ref 13–44)
LYMPHOCYTES # BLD AUTO: 2.27 K/UL — SIGNIFICANT CHANGE UP (ref 1–3.3)
LYMPHOCYTES # BLD AUTO: 23.4 % — SIGNIFICANT CHANGE UP (ref 13–44)
LYMPHOCYTES # BLD AUTO: 3.07 K/UL — SIGNIFICANT CHANGE UP (ref 1–3.3)
LYMPHOCYTES # BLD AUTO: 8.2 % — LOW (ref 13–44)
LYMPHOCYTES NFR SPEC AUTO: 22.9 % — SIGNIFICANT CHANGE UP (ref 13–44)
MAGNESIUM SERPL-MCNC: 1.8 MG/DL — SIGNIFICANT CHANGE UP (ref 1.6–2.6)
MANUAL SMEAR VERIFICATION: SIGNIFICANT CHANGE UP
MCHC RBC-ENTMCNC: 30.1 PG — SIGNIFICANT CHANGE UP (ref 27–34)
MCHC RBC-ENTMCNC: 30.1 PG — SIGNIFICANT CHANGE UP (ref 27–34)
MCHC RBC-ENTMCNC: 30.5 PG — SIGNIFICANT CHANGE UP (ref 27–34)
MCHC RBC-ENTMCNC: 34.5 % — SIGNIFICANT CHANGE UP (ref 32–36)
MCHC RBC-ENTMCNC: 35.4 % — SIGNIFICANT CHANGE UP (ref 32–36)
MCHC RBC-ENTMCNC: 36.2 % — HIGH (ref 32–36)
MCV RBC AUTO: 83.1 FL — SIGNIFICANT CHANGE UP (ref 80–100)
MCV RBC AUTO: 85 FL — SIGNIFICANT CHANGE UP (ref 80–100)
MCV RBC AUTO: 88.3 FL — SIGNIFICANT CHANGE UP (ref 80–100)
METAMYELOCYTES # FLD: 0 % — SIGNIFICANT CHANGE UP (ref 0–1)
MONOCYTES # BLD AUTO: 0.91 K/UL — HIGH (ref 0–0.9)
MONOCYTES # BLD AUTO: 1.36 K/UL — HIGH (ref 0–0.9)
MONOCYTES # BLD AUTO: 1.71 K/UL — HIGH (ref 0–0.9)
MONOCYTES NFR BLD AUTO: 7.7 % — SIGNIFICANT CHANGE UP (ref 2–14)
MONOCYTES NFR BLD AUTO: 8.1 % — SIGNIFICANT CHANGE UP (ref 2–14)
MONOCYTES NFR BLD AUTO: 9.4 % — SIGNIFICANT CHANGE UP (ref 2–14)
MONOCYTES NFR BLD: 3.6 % — SIGNIFICANT CHANGE UP (ref 2–9)
MYELOCYTES NFR BLD: 0 % — SIGNIFICANT CHANGE UP (ref 0–0)
NEUTROPHIL AB SER-ACNC: 73.5 % — SIGNIFICANT CHANGE UP (ref 43–77)
NEUTROPHILS # BLD AUTO: 12.73 K/UL — HIGH (ref 1.8–7.4)
NEUTROPHILS # BLD AUTO: 16.92 K/UL — HIGH (ref 1.8–7.4)
NEUTROPHILS # BLD AUTO: 6.18 K/UL — SIGNIFICANT CHANGE UP (ref 1.8–7.4)
NEUTROPHILS NFR BLD AUTO: 63.6 % — SIGNIFICANT CHANGE UP (ref 43–77)
NEUTROPHILS NFR BLD AUTO: 72 % — SIGNIFICANT CHANGE UP (ref 43–77)
NEUTROPHILS NFR BLD AUTO: 80.1 % — HIGH (ref 43–77)
NEUTS BAND # BLD: 0 % — SIGNIFICANT CHANGE UP (ref 0–6)
NRBC # FLD: 13.64 — SIGNIFICANT CHANGE UP
NRBC # FLD: 3.95 — SIGNIFICANT CHANGE UP
NRBC # FLD: 7.07 — SIGNIFICANT CHANGE UP
NRBC FLD-RTO: 40 — SIGNIFICANT CHANGE UP
NRBC FLD-RTO: 40.7 — SIGNIFICANT CHANGE UP
NRBC FLD-RTO: 64.6 — SIGNIFICANT CHANGE UP
OTHER - HEMATOLOGY %: 0 — SIGNIFICANT CHANGE UP
PHOSPHATE SERPL-MCNC: 6.3 MG/DL — HIGH (ref 2.5–4.5)
PLATELET # BLD AUTO: 34 K/UL — LOW (ref 150–400)
PLATELET # BLD AUTO: 50 K/UL — LOW (ref 150–400)
PLATELET # BLD AUTO: 70 K/UL — LOW (ref 150–400)
PLATELET COUNT - ESTIMATE: SIGNIFICANT CHANGE UP
PMV BLD: 10.2 FL — SIGNIFICANT CHANGE UP (ref 7–13)
PMV BLD: 9.1 FL — SIGNIFICANT CHANGE UP (ref 7–13)
PMV BLD: SIGNIFICANT CHANGE UP FL (ref 7–13)
POLYCHROMASIA BLD QL SMEAR: SLIGHT — SIGNIFICANT CHANGE UP
POTASSIUM SERPL-MCNC: 3.9 MMOL/L — SIGNIFICANT CHANGE UP (ref 3.5–5.3)
POTASSIUM SERPL-MCNC: 4.1 MMOL/L — SIGNIFICANT CHANGE UP (ref 3.5–5.3)
POTASSIUM SERPL-SCNC: 3.9 MMOL/L — SIGNIFICANT CHANGE UP (ref 3.5–5.3)
POTASSIUM SERPL-SCNC: 4.1 MMOL/L — SIGNIFICANT CHANGE UP (ref 3.5–5.3)
PROMYELOCYTES # FLD: 0 % — SIGNIFICANT CHANGE UP (ref 0–0)
PROT SERPL-MCNC: 5.8 G/DL — LOW (ref 6–8.3)
PROT SERPL-MCNC: 5.9 G/DL — LOW (ref 6–8.3)
RBC # BLD: 3.19 M/UL — LOW (ref 3.8–5.2)
RBC # BLD: 3.25 M/UL — LOW (ref 3.8–5.2)
RBC # BLD: 3.59 M/UL — LOW (ref 3.8–5.2)
RBC # FLD: 18.3 % — HIGH (ref 10.3–14.5)
RBC # FLD: 18.5 % — HIGH (ref 10.3–14.5)
RBC # FLD: 19.7 % — HIGH (ref 10.3–14.5)
SODIUM SERPL-SCNC: 137 MMOL/L — SIGNIFICANT CHANGE UP (ref 135–145)
SODIUM SERPL-SCNC: 138 MMOL/L — SIGNIFICANT CHANGE UP (ref 135–145)
VARIANT LYMPHS # BLD: 0 % — SIGNIFICANT CHANGE UP
WBC # BLD: 17.68 K/UL — HIGH (ref 3.8–10.5)
WBC # BLD: 21.12 K/UL — HIGH (ref 3.8–10.5)
WBC # BLD: 9.71 K/UL — SIGNIFICANT CHANGE UP (ref 3.8–10.5)
WBC # FLD AUTO: 17.68 K/UL — HIGH (ref 3.8–10.5)
WBC # FLD AUTO: 21.12 K/UL — HIGH (ref 3.8–10.5)
WBC # FLD AUTO: 9.71 K/UL — SIGNIFICANT CHANGE UP (ref 3.8–10.5)

## 2017-12-12 PROCEDURE — 36556 INSERT NON-TUNNEL CV CATH: CPT | Mod: GC

## 2017-12-12 PROCEDURE — 99291 CRITICAL CARE FIRST HOUR: CPT | Mod: 25

## 2017-12-12 PROCEDURE — 90945 DIALYSIS ONE EVALUATION: CPT | Mod: GC

## 2017-12-12 PROCEDURE — 93970 EXTREMITY STUDY: CPT | Mod: 26

## 2017-12-12 PROCEDURE — 99232 SBSQ HOSP IP/OBS MODERATE 35: CPT | Mod: GC

## 2017-12-12 PROCEDURE — 71010: CPT | Mod: 26

## 2017-12-12 RX ORDER — MIDAZOLAM HYDROCHLORIDE 1 MG/ML
2 INJECTION, SOLUTION INTRAMUSCULAR; INTRAVENOUS ONCE
Qty: 0 | Refills: 0 | Status: DISCONTINUED | OUTPATIENT
Start: 2017-12-12 | End: 2017-12-12

## 2017-12-12 RX ORDER — HYDROMORPHONE HYDROCHLORIDE 2 MG/ML
8 INJECTION INTRAMUSCULAR; INTRAVENOUS; SUBCUTANEOUS ONCE
Qty: 0 | Refills: 0 | Status: DISCONTINUED | OUTPATIENT
Start: 2017-12-12 | End: 2017-12-12

## 2017-12-12 RX ORDER — HYDROMORPHONE HYDROCHLORIDE 2 MG/ML
1 INJECTION INTRAMUSCULAR; INTRAVENOUS; SUBCUTANEOUS ONCE
Qty: 0 | Refills: 0 | Status: DISCONTINUED | OUTPATIENT
Start: 2017-12-12 | End: 2017-12-12

## 2017-12-12 RX ORDER — CHLORHEXIDINE GLUCONATE 213 G/1000ML
1 SOLUTION TOPICAL DAILY
Qty: 0 | Refills: 0 | Status: DISCONTINUED | OUTPATIENT
Start: 2017-12-12 | End: 2017-12-17

## 2017-12-12 RX ADMIN — HYDROMORPHONE HYDROCHLORIDE 1 MILLIGRAM(S): 2 INJECTION INTRAMUSCULAR; INTRAVENOUS; SUBCUTANEOUS at 00:50

## 2017-12-12 RX ADMIN — MIDAZOLAM HYDROCHLORIDE 2 MILLIGRAM(S): 1 INJECTION, SOLUTION INTRAMUSCULAR; INTRAVENOUS at 17:06

## 2017-12-12 RX ADMIN — HYDROMORPHONE HYDROCHLORIDE 8 MILLIGRAM(S): 2 INJECTION INTRAMUSCULAR; INTRAVENOUS; SUBCUTANEOUS at 14:45

## 2017-12-12 RX ADMIN — Medication 1 TABLET(S): at 11:02

## 2017-12-12 RX ADMIN — CALCITRIOL 0.5 MICROGRAM(S): 0.5 CAPSULE ORAL at 11:02

## 2017-12-12 RX ADMIN — HYDROMORPHONE HYDROCHLORIDE 8 MILLIGRAM(S): 2 INJECTION INTRAMUSCULAR; INTRAVENOUS; SUBCUTANEOUS at 14:11

## 2017-12-12 RX ADMIN — HYDROMORPHONE HYDROCHLORIDE 1 MILLIGRAM(S): 2 INJECTION INTRAMUSCULAR; INTRAVENOUS; SUBCUTANEOUS at 21:05

## 2017-12-12 RX ADMIN — AZITHROMYCIN 250 MILLIGRAM(S): 500 TABLET, FILM COATED ORAL at 18:39

## 2017-12-12 RX ADMIN — Medication 1 MILLIGRAM(S): at 11:02

## 2017-12-12 RX ADMIN — HYDROMORPHONE HYDROCHLORIDE 1 MILLIGRAM(S): 2 INJECTION INTRAMUSCULAR; INTRAVENOUS; SUBCUTANEOUS at 21:20

## 2017-12-12 RX ADMIN — PIPERACILLIN AND TAZOBACTAM 25 GRAM(S): 4; .5 INJECTION, POWDER, LYOPHILIZED, FOR SOLUTION INTRAVENOUS at 06:11

## 2017-12-12 RX ADMIN — Medication 100 MILLIGRAM(S): at 06:11

## 2017-12-12 RX ADMIN — Medication 125 MILLIGRAM(S): at 20:18

## 2017-12-12 RX ADMIN — Medication 2668 MILLIGRAM(S): at 07:33

## 2017-12-12 RX ADMIN — HYDROMORPHONE HYDROCHLORIDE 1 MILLIGRAM(S): 2 INJECTION INTRAMUSCULAR; INTRAVENOUS; SUBCUTANEOUS at 00:10

## 2017-12-12 RX ADMIN — Medication 125 MILLIGRAM(S): at 13:06

## 2017-12-12 RX ADMIN — PIPERACILLIN AND TAZOBACTAM 25 GRAM(S): 4; .5 INJECTION, POWDER, LYOPHILIZED, FOR SOLUTION INTRAVENOUS at 20:18

## 2017-12-12 RX ADMIN — SODIUM CHLORIDE 75 MILLILITER(S): 9 INJECTION, SOLUTION INTRAVENOUS at 07:35

## 2017-12-12 RX ADMIN — SODIUM CHLORIDE 75 MILLILITER(S): 9 INJECTION, SOLUTION INTRAVENOUS at 18:39

## 2017-12-12 RX ADMIN — Medication 125 MILLIGRAM(S): at 07:32

## 2017-12-12 RX ADMIN — Medication 125 MILLIGRAM(S): at 02:08

## 2017-12-12 RX ADMIN — CHLORHEXIDINE GLUCONATE 1 APPLICATION(S): 213 SOLUTION TOPICAL at 11:02

## 2017-12-12 NOTE — PROGRESS NOTE ADULT - ASSESSMENT
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     #Neuro  - A+Ox1, no focal deficits on exam however pt increasingly agitated  - on no sedation    #Cardiac  - BP systolic 130s,  sinus tachycardia most likely due to underlying pain/anemia  - No known hx cardiac disease    #Pulmonary  - Acute chest syndrome supported by fevers 101.4, new infiltrate on CXR, hypoxia, white count 24  - s/p shiley,   - pt sat 94 on RA    #GI  - no know hx of GI disease    #Endocrine  - pt glucose 132 on BMP, no anti-glycemic medication at home     #ID  - C diff positive but pt has not had any episodes of diarrhea  - on vanc/zosyn/azithromycin for PNA coverage  - f/u urine legionella  - f/u urine culture, bcx   - peritoneal culture clear (18 lymphocytes, 74 macrophages)    #Renal  - pt ESRD w/ FSGS on PD now 4x/day because increasingly uremic  - calcitriol/phoslo b/c pt on pD    #ID  - on zosyn/azithromycin for PNA coverage  - will f/u Bcx, urine cx and titrate as appropriate  - pt being treated with oral vanc 125 mg q6 for C diff    #Heme  - known sickle cell disease (sickle/+thal)  - Hgb 10.8  - PCA pump for pain, narcan injectable also ordered

## 2017-12-12 NOTE — PROGRESS NOTE ADULT - PROBLEM SELECTOR PLAN 2
-Pt has generalized inattentiveness and lethargy, likely multifactorial in etiology- acute chest syndrome, C. diff infection.  -Low threshold for CT imaging of the brain if there is no improvement in mentation over the next 24 hours. I do not detect any focal neurological deficits on examination, but lack of improvement in symptoms is concerning for central nervous system pathology.

## 2017-12-12 NOTE — PROGRESS NOTE ADULT - PROBLEM SELECTOR PLAN 1
Pt. with ESRD on PD. Pt. tolerating PD well. PD catheter working well. Will continue with current PD prescription. Monitor labs and BP

## 2017-12-12 NOTE — PROGRESS NOTE ADULT - SUBJECTIVE AND OBJECTIVE BOX
[facility] DIVISION OF KIDNEY DISEASES AND HYPERTENSION -- FOLLOW UP NOTE  --------------------------------------------------------------------------------  HPI: 36-year-old female with PMH of sickle cell disease, and 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 Pt. has been on PD since 3/2017. Pt.'s outpatient nephrologist is Dr. Larsen. Pt. states she has not had any problems with PD. PD catheter is working well. Pt. says she is on CCPD 3 exchanges (fill volume: 2000cc with 2.5% dextrose dianeal fluid and a last fill). Pt. was found to have Cdiff infection currently on ABX.  Pt. was complaining of chest pain yesterday and was found to have acute chest syndrome. Pt. was transferred to MICU and underwent exchange transfusion as per hematology. Pt. seen and examined at bedside.     PAST HISTORY  --------------------------------------------------------------------------------  No significant changes to PMH, PSH, FHx, SHx, unless otherwise noted    ALLERGIES & MEDICATIONS  --------------------------------------------------------------------------------  Allergies    No Known Allergies    Intolerances      Standing Inpatient Medications  azithromycin  IVPB 500 milliGRAM(s) IV Intermittent every 24 hours  azithromycin  IVPB      calcitriol   Capsule 0.5 MICROGram(s) Oral daily  calcium acetate 2668 milliGRAM(s) Oral three times a day with meals  chlorhexidine 4% Liquid 1 Application(s) Topical daily  folic acid 1 milliGRAM(s) Oral daily  furosemide    Tablet 100 milliGRAM(s) Oral two times a day  gentamicin 0.1% Cream 1 Application(s) Topical three times a day  heparin  Injectable 2000 Unit(s) IV Push once  HYDROmorphone PCA (1 mG/mL) 30 milliLiter(s) PCA Continuous PCA Continuous  metolazone 5 milliGRAM(s) Oral two times a day  Nephro-jay 1 Tablet(s) Oral daily  piperacillin/tazobactam IVPB. 3.375 Gram(s) IV Intermittent every 12 hours  sodium chloride 0.45%. 1000 milliLiter(s) IV Continuous <Continuous>  vancomycin    Solution 125 milliGRAM(s) Oral every 6 hours    PRN Inpatient Medications  acetaminophen   Tablet 650 milliGRAM(s) Oral every 6 hours PRN  ALBUTerol/ipratropium for Nebulization 3 milliLiter(s) Nebulizer every 6 hours PRN  naloxone Injectable 0.1 milliGRAM(s) IV Push every 3 minutes PRN  ondansetron Injectable 4 milliGRAM(s) IV Push every 6 hours PRN      REVIEW OF SYSTEMS  --------------------------------------------------------------------------------  Gen: + fevers  Head/Eyes/Ears/Mouth: No headache  Respiratory: No dyspnea  CV: +chest pain   GI: No abdominal pain  : No dysuria  MSK: No edema + joint pain   Heme: As per HPI       VITALS/PHYSICAL EXAM  --------------------------------------------------------------------------------  T(C): 36.9 (12-12-17 @ 06:45), Max: 37.3 (12-11-17 @ 20:00)  HR: 110 (12-12-17 @ 07:00) (108 - 127)  BP: 148/104 (12-12-17 @ 07:00) (111/71 - 148/104)  RR: 23 (12-12-17 @ 07:00) (17 - 28)  SpO2: 100% (12-12-17 @ 07:00) (71% - 100%)  Wt(kg): --  Height (cm): 167.64 (12-11-17 @ 18:00)  Weight (kg): 71.1 (12-11-17 @ 18:00)  BMI (kg/m2): 25.3 (12-11-17 @ 18:00)  BSA (m2): 1.8 (12-11-17 @ 18:00)      12-11-17 @ 07:01  -  12-12-17 @ 07:00  --------------------------------------------------------  IN: 8725 mL / OUT: 7100 mL / NET: 1625 mL    12-12-17 @ 07:01  -  12-12-17 @ 07:45  --------------------------------------------------------  IN: 75 mL / OUT: 0 mL / NET: 75 mL      Physical Exam:  	Gen: Resting in bed  	HEENT: No JVD   	Pulm: CTA B/L  	CV: RRR, S1S2+  	Abd: soft + PD catheter seen intact.   	: No suprapubic tenderness  	LE: Warm, no edema  	Neuro: Awake   	Psych: Normal affect and mood  	Skin: Warm  	  LABS/STUDIES  --------------------------------------------------------------------------------              10.8   17.68 >-----------<  50       [12-12-17 @ 03:00]              30.5     137  |  95  |  91  ----------------------------<  132      [12-12-17 @ 03:00]  4.1   |  21  |  9.41        Ca     7.3     [12-12-17 @ 03:00]      Mg     1.8     [12-12-17 @ 03:00]      Phos  6.3     [12-12-17 @ 03:00]    TPro  5.9  /  Alb  2.7  /  TBili  0.9  /  DBili  x   /  AST  38  /  ALT  33  /  AlkPhos  209  [12-12-17 @ 03:00]        Uric acid 10.5      [12-11-17 @ 06:50]  LDH 2278      [12-11-17 @ 06:50]    Creatinine Trend:  SCr 9.41 [12-12 @ 03:00]  SCr 11.04 [12-11 @ 06:50]  SCr 9.97 [12-10 @ 07:10]  SCr 10.30 [12-09 @ 15:37]  SCr 10.44 [12-09 @ 00:10]        Iron 71, TIBC 165, %sat --      [08-06-17 @ 18:30]  Ferritin 2501      [08-06-17 @ 18:30]  Vitamin D (25OH) 6.1      [08-08-17 @ 06:40]  HbA1c 3.8      [01-13-17 @ 11:35]  TSH 4.51      [01-13-17 @ 11:35]  Lipid: chol 263, , HDL 50,       [01-13-17 @ 11:35] [facility] DIVISION OF KIDNEY DISEASES AND HYPERTENSION -- FOLLOW UP NOTE  --------------------------------------------------------------------------------  HPI: 36-year-old female with PMH of sickle cell disease, and 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 Pt. has been on PD since 3/2017. Pt.'s outpatient nephrologist is Dr. Larsen. Pt. states she has not had any problems with PD. PD catheter is working well. Pt. says she is on CCPD 3 exchanges (fill volume: 2000cc with 2.5% dextrose dianeal fluid and a last fill). Pt. was found to have Cdiff infection currently on ABX.  Pt. was complaining of chest pain yesterday and was found to have acute chest syndrome. Pt. was transferred to MICU and underwent exchange transfusion as per hematology. Pt. seen and examined at bedside. Pt. states she feels better and denies any SOB or chest pain.      PAST HISTORY  --------------------------------------------------------------------------------  No significant changes to PMH, PSH, FHx, SHx, unless otherwise noted    ALLERGIES & MEDICATIONS  --------------------------------------------------------------------------------  Allergies    No Known Allergies    Intolerances      Standing Inpatient Medications  azithromycin  IVPB 500 milliGRAM(s) IV Intermittent every 24 hours  azithromycin  IVPB      calcitriol   Capsule 0.5 MICROGram(s) Oral daily  calcium acetate 2668 milliGRAM(s) Oral three times a day with meals  chlorhexidine 4% Liquid 1 Application(s) Topical daily  folic acid 1 milliGRAM(s) Oral daily  furosemide    Tablet 100 milliGRAM(s) Oral two times a day  gentamicin 0.1% Cream 1 Application(s) Topical three times a day  heparin  Injectable 2000 Unit(s) IV Push once  HYDROmorphone PCA (1 mG/mL) 30 milliLiter(s) PCA Continuous PCA Continuous  metolazone 5 milliGRAM(s) Oral two times a day  Nephro-jay 1 Tablet(s) Oral daily  piperacillin/tazobactam IVPB. 3.375 Gram(s) IV Intermittent every 12 hours  sodium chloride 0.45%. 1000 milliLiter(s) IV Continuous <Continuous>  vancomycin    Solution 125 milliGRAM(s) Oral every 6 hours    PRN Inpatient Medications  acetaminophen   Tablet 650 milliGRAM(s) Oral every 6 hours PRN  ALBUTerol/ipratropium for Nebulization 3 milliLiter(s) Nebulizer every 6 hours PRN  naloxone Injectable 0.1 milliGRAM(s) IV Push every 3 minutes PRN  ondansetron Injectable 4 milliGRAM(s) IV Push every 6 hours PRN      REVIEW OF SYSTEMS  --------------------------------------------------------------------------------  Gen: + fevers  Head/Eyes/Ears/Mouth: No headache  Respiratory: No dyspnea  CV: No chest pain   GI: No abdominal pain  : No dysuria  MSK: No edema + joint pain   Heme: As per HPI       VITALS/PHYSICAL EXAM  --------------------------------------------------------------------------------  T(C): 36.9 (12-12-17 @ 06:45), Max: 37.3 (12-11-17 @ 20:00)  HR: 110 (12-12-17 @ 07:00) (108 - 127)  BP: 148/104 (12-12-17 @ 07:00) (111/71 - 148/104)  RR: 23 (12-12-17 @ 07:00) (17 - 28)  SpO2: 100% (12-12-17 @ 07:00) (71% - 100%)  Wt(kg): --  Height (cm): 167.64 (12-11-17 @ 18:00)  Weight (kg): 71.1 (12-11-17 @ 18:00)  BMI (kg/m2): 25.3 (12-11-17 @ 18:00)  BSA (m2): 1.8 (12-11-17 @ 18:00)      12-11-17 @ 07:01  -  12-12-17 @ 07:00  --------------------------------------------------------  IN: 8725 mL / OUT: 7100 mL / NET: 1625 mL    12-12-17 @ 07:01  -  12-12-17 @ 07:45  --------------------------------------------------------  IN: 75 mL / OUT: 0 mL / NET: 75 mL      Physical Exam:  	Gen: Resting in bed  	HEENT: No JVD   	Pulm: CTA B/L  	CV: RRR, S1S2+  	Abd: soft + PD catheter seen intact.   	: No suprapubic tenderness  	LE: Warm, no edema  	Neuro: Awake   	Psych: Normal affect and mood  	Skin: Warm  	  LABS/STUDIES  --------------------------------------------------------------------------------              10.8   17.68 >-----------<  50       [12-12-17 @ 03:00]              30.5     137  |  95  |  91  ----------------------------<  132      [12-12-17 @ 03:00]  4.1   |  21  |  9.41        Ca     7.3     [12-12-17 @ 03:00]      Mg     1.8     [12-12-17 @ 03:00]      Phos  6.3     [12-12-17 @ 03:00]    TPro  5.9  /  Alb  2.7  /  TBili  0.9  /  DBili  x   /  AST  38  /  ALT  33  /  AlkPhos  209  [12-12-17 @ 03:00]        Uric acid 10.5      [12-11-17 @ 06:50]  LDH 2278      [12-11-17 @ 06:50]    Creatinine Trend:  SCr 9.41 [12-12 @ 03:00]  SCr 11.04 [12-11 @ 06:50]  SCr 9.97 [12-10 @ 07:10]  SCr 10.30 [12-09 @ 15:37]  SCr 10.44 [12-09 @ 00:10]        Iron 71, TIBC 165, %sat --      [08-06-17 @ 18:30]  Ferritin 2501      [08-06-17 @ 18:30]  Vitamin D (25OH) 6.1      [08-08-17 @ 06:40]  HbA1c 3.8      [01-13-17 @ 11:35]  TSH 4.51      [01-13-17 @ 11:35]  Lipid: chol 263, , HDL 50,       [01-13-17 @ 11:35] [facility] DIVISION OF KIDNEY DISEASES AND HYPERTENSION -- FOLLOW UP NOTE  --------------------------------------------------------------------------------  HPI: 36-year-old female with PMH of sickle cell disease, and 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 Pt. has been on PD since 3/2017. Pt.'s outpatient nephrologist is Dr. Larsen. Pt. states she has not had any problems with PD. PD catheter is working well. Pt. says she is on CCPD 3 exchanges (fill volume: 2000cc with 2.5% dextrose dianeal fluid and a last fill). Pt. was found to have Cdiff infection currently on ABX.  Pt. was complaining of chest pain yesterday and was found to have acute chest syndrome. Pt. was transferred to MICU and underwent exchange transfusion as per hematology. Pt. seen and examined at bedside. Pt. states she feels better and denies any SOB or chest pain.      PAST HISTORY  --------------------------------------------------------------------------------  No significant changes to PMH, PSH, FHx, SHx, unless otherwise noted    ALLERGIES & MEDICATIONS  --------------------------------------------------------------------------------  Allergies    No Known Allergies    Intolerances    Standing Inpatient Medications  azithromycin  IVPB 500 milliGRAM(s) IV Intermittent every 24 hours  azithromycin  IVPB      calcitriol   Capsule 0.5 MICROGram(s) Oral daily  calcium acetate 2668 milliGRAM(s) Oral three times a day with meals  chlorhexidine 4% Liquid 1 Application(s) Topical daily  folic acid 1 milliGRAM(s) Oral daily  furosemide    Tablet 100 milliGRAM(s) Oral two times a day  gentamicin 0.1% Cream 1 Application(s) Topical three times a day  heparin  Injectable 2000 Unit(s) IV Push once  HYDROmorphone PCA (1 mG/mL) 30 milliLiter(s) PCA Continuous PCA Continuous  metolazone 5 milliGRAM(s) Oral two times a day  Nephro-jay 1 Tablet(s) Oral daily  piperacillin/tazobactam IVPB. 3.375 Gram(s) IV Intermittent every 12 hours  sodium chloride 0.45%. 1000 milliLiter(s) IV Continuous <Continuous>  vancomycin    Solution 125 milliGRAM(s) Oral every 6 hours    PRN Inpatient Medications  acetaminophen   Tablet 650 milliGRAM(s) Oral every 6 hours PRN  ALBUTerol/ipratropium for Nebulization 3 milliLiter(s) Nebulizer every 6 hours PRN  naloxone Injectable 0.1 milliGRAM(s) IV Push every 3 minutes PRN  ondansetron Injectable 4 milliGRAM(s) IV Push every 6 hours PRN    REVIEW OF SYSTEMS  --------------------------------------------------------------------------------  Gen: + fevers  Head/Eyes/Ears/Mouth: No headache  Respiratory: No dyspnea  CV: No chest pain   GI: No abdominal pain  : No dysuria  MSK: No edema + joint pain   Heme: As per HPI     VITALS/PHYSICAL EXAM  --------------------------------------------------------------------------------  T(C): 36.9 (12-12-17 @ 06:45), Max: 37.3 (12-11-17 @ 20:00)  HR: 110 (12-12-17 @ 07:00) (108 - 127)  BP: 148/104 (12-12-17 @ 07:00) (111/71 - 148/104)  RR: 23 (12-12-17 @ 07:00) (17 - 28)  SpO2: 100% (12-12-17 @ 07:00) (71% - 100%)  Wt(kg): --  Height (cm): 167.64 (12-11-17 @ 18:00)  Weight (kg): 71.1 (12-11-17 @ 18:00)  BMI (kg/m2): 25.3 (12-11-17 @ 18:00)  BSA (m2): 1.8 (12-11-17 @ 18:00)    12-11-17 @ 07:01  -  12-12-17 @ 07:00  --------------------------------------------------------  IN: 8725 mL / OUT: 7100 mL / NET: 1625 mL    12-12-17 @ 07:01  -  12-12-17 @ 07:45  --------------------------------------------------------  IN: 75 mL / OUT: 0 mL / NET: 75 mL    Physical Exam:  	Gen: Resting in bed  	HEENT: No JVD   	Pulm: CTA B/L  	CV: RRR, S1S2+  	Abd: soft + PD catheter seen intact  	: No suprapubic tenderness  	LE: Warm, no edema  	Neuro: Awake   	Psych: Normal affect and mood  	Skin: Warm  	  LABS/STUDIES  --------------------------------------------------------------------------------              10.8   17.68 >-----------<  50       [12-12-17 @ 03:00]              30.5     137  |  95  |  91  ----------------------------<  132      [12-12-17 @ 03:00]  4.1   |  21  |  9.41        Ca     7.3     [12-12-17 @ 03:00]      Mg     1.8     [12-12-17 @ 03:00]      Phos  6.3     [12-12-17 @ 03:00]    TPro  5.9  /  Alb  2.7  /  TBili  0.9  /  DBili  x   /  AST  38  /  ALT  33  /  AlkPhos  209  [12-12-17 @ 03:00]    Creatinine Trend:  SCr 9.41 [12-12 @ 03:00]  SCr 11.04 [12-11 @ 06:50]  SCr 9.97 [12-10 @ 07:10]  SCr 10.30 [12-09 @ 15:37]  SCr 10.44 [12-09 @ 00:10]    Iron 71, TIBC 165, %sat --      [08-06-17 @ 18:30]  Ferritin 2501      [08-06-17 @ 18:30]  Vitamin D (25OH) 6.1      [08-08-17 @ 06:40]  HbA1c 3.8      [01-13-17 @ 11:35]  TSH 4.51      [01-13-17 @ 11:35]  Lipid: chol 263, , HDL 50,       [01-13-17 @ 11:35]

## 2017-12-12 NOTE — PROCEDURE NOTE - NSPROCDETAILS_GEN_ALL_CORE
sterile dressing applied/ultrasound guidance/lumen(s) aspirated and flushed/guidewire recovered/sterile technique, catheter placed
guidewire recovered

## 2017-12-12 NOTE — PROGRESS NOTE ADULT - SUBJECTIVE AND OBJECTIVE BOX
INTERVAL HPI/OVERNIGHT EVENTS:    Overnight pt altered, can't follow commands and increasingly agitated. Completed session PD and exchange transfusion. Platelet count dropped from 155 -> 34, now 50 with concern for HIT so heparin gtt stopped. Pt increasingly uremic BUN 90.    SUBJECTIVE: Patient seen and examined at bedside.     CONSTITUTIONAL: No weakness, fevers or chills  EYES/ENT: No visual changes;  No vertigo or throat pain   NECK: No pain or stiffness  RESPIRATORY: No cough, wheezing, hemoptysis; No shortness of breath  CARDIOVASCULAR: No chest pain or palpitations  GASTROINTESTINAL: No abdominal or epigastric pain. No nausea, vomiting, or hematemesis; No diarrhea or constipation. No melena or hematochezia.  GENITOURINARY: No dysuria, frequency or hematuria  NEUROLOGICAL: No numbness or weakness  SKIN: No itching, rashes    OBJECTIVE:    VITAL SIGNS:  ICU Vital Signs Last 24 Hrs  T(C): 36.9 (12 Dec 2017 06:45), Max: 37.3 (11 Dec 2017 20:00)  T(F): 98.4 (12 Dec 2017 06:45), Max: 99.1 (11 Dec 2017 20:00)  HR: 110 (12 Dec 2017 07:00) (108 - 127)  BP: 148/104 (12 Dec 2017 07:00) (111/71 - 148/104)  BP(mean): 115 (12 Dec 2017 07:00) (77 - 122)  ABP: --  ABP(mean): --  RR: 23 (12 Dec 2017 07:00) (17 - 28)  SpO2: 100% (12 Dec 2017 07:00) (71% - 100%)        12-11 @ 07:01 - 12-12 @ 07:00  --------------------------------------------------------  IN: 8725 mL / OUT: 7100 mL / NET: 1625 mL    12-12 @ 07:01  -  12-12 @ 07:58  --------------------------------------------------------  IN: 75 mL / OUT: 0 mL / NET: 75 mL      CAPILLARY BLOOD GLUCOSE          PHYSICAL EXAM:    General: NAD  HEENT: NC/AT; PERRL, clear conjunctiva  Neck: supple  Respiratory: CTA b/l  Cardiovascular: +S1/S2; RRR  Abdomen: soft, NT/ND; +BS x4  Extremities: WWP, 2+ peripheral pulses b/l; no LE edema  Skin: normal color and turgor; no rash  Neurological:    MEDICATIONS:  MEDICATIONS  (STANDING):  azithromycin  IVPB 500 milliGRAM(s) IV Intermittent every 24 hours  azithromycin  IVPB      calcitriol   Capsule 0.5 MICROGram(s) Oral daily  calcium acetate 2668 milliGRAM(s) Oral three times a day with meals  chlorhexidine 4% Liquid 1 Application(s) Topical daily  folic acid 1 milliGRAM(s) Oral daily  furosemide    Tablet 100 milliGRAM(s) Oral two times a day  gentamicin 0.1% Cream 1 Application(s) Topical three times a day  heparin  Injectable 2000 Unit(s) IV Push once  HYDROmorphone PCA (1 mG/mL) 30 milliLiter(s) PCA Continuous PCA Continuous  metolazone 5 milliGRAM(s) Oral two times a day  Nephro-jay 1 Tablet(s) Oral daily  piperacillin/tazobactam IVPB. 3.375 Gram(s) IV Intermittent every 12 hours  sodium chloride 0.45%. 1000 milliLiter(s) (75 mL/Hr) IV Continuous <Continuous>  vancomycin    Solution 125 milliGRAM(s) Oral every 6 hours    MEDICATIONS  (PRN):  acetaminophen   Tablet 650 milliGRAM(s) Oral every 6 hours PRN For Temp greater than 38 C (100.4 F)  ALBUTerol/ipratropium for Nebulization 3 milliLiter(s) Nebulizer every 6 hours PRN Shortness of Breath  naloxone Injectable 0.1 milliGRAM(s) IV Push every 3 minutes PRN For ANY of the following changes in patient status:  A. RR LESS THAN 10 breaths per minute, B. Oxygen saturation LESS THAN 90%, C. Sedation score of 6  ondansetron Injectable 4 milliGRAM(s) IV Push every 6 hours PRN Nausea      ALLERGIES:  Allergies    No Known Allergies    Intolerances        LABS:                        10.8   17.68 )-----------( 50       ( 12 Dec 2017 03:00 )             30.5     12-12    137  |  95<L>  |  91<H>  ----------------------------<  132<H>  4.1   |  21<L>  |  9.41<H>    Ca    7.3<L>      12 Dec 2017 03:00  Phos  6.3     12-12  Mg     1.8     12-12    TPro  5.9<L>  /  Alb  2.7<L>  /  TBili  0.9  /  DBili  x   /  AST  38<H>  /  ALT  33  /  AlkPhos  209<H>  12-12          RADIOLOGY & ADDITIONAL TESTS: Reviewed.

## 2017-12-12 NOTE — PROGRESS NOTE ADULT - ASSESSMENT
36 year old female with sickle cell disease and 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 Pt. found to have acute chest syndrome and was transferred to the MICU for exchange transfusion.

## 2017-12-12 NOTE — PROGRESS NOTE ADULT - PROBLEM SELECTOR PLAN 1
-s/p one exchange transfusion. Awaiting post-exchange hemoglobin electrophoresis, which will define need for any further exchange.  -Goal HgbS is less than 30%  -Oxygen requirements have improved slightly, now saturating at 97% on 4L via NC.  -c/w supportive care- IVF, analgesia, folic acid. Pt had not been using her PCA pump yesterday due to AMS, is currently not connected to pump. Consider IV pushes of opiates PRN (currently, patient reports no pain).

## 2017-12-12 NOTE — PROGRESS NOTE ADULT - ASSESSMENT
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 diff infection, concern for acute chest s/p one exchange transfusion.

## 2017-12-12 NOTE — PROGRESS NOTE ADULT - SUBJECTIVE AND OBJECTIVE BOX
Hematology Follow-up    INTERVAL HPI/OVERNIGHT EVENTS:  Patient S&E at bedside. No o/n events, patient resting comfortably. No complaints at this time. Patient denies fever, chills, dizziness, weakness, CP, palpitations, SOB, cough, N/V/D/C, dysuria, changes in bowel movements, LE edema.    MEDICATIONS  (STANDING):  azithromycin  IVPB 500 milliGRAM(s) IV Intermittent every 24 hours  azithromycin  IVPB      calcitriol   Capsule 0.5 MICROGram(s) Oral daily  calcium acetate 2668 milliGRAM(s) Oral three times a day with meals  chlorhexidine 4% Liquid 1 Application(s) Topical daily  folic acid 1 milliGRAM(s) Oral daily  furosemide    Tablet 100 milliGRAM(s) Oral two times a day  gentamicin 0.1% Cream 1 Application(s) Topical three times a day  heparin  Injectable 2000 Unit(s) IV Push once  HYDROmorphone PCA (1 mG/mL) 30 milliLiter(s) PCA Continuous PCA Continuous  metolazone 5 milliGRAM(s) Oral two times a day  Nephro-jay 1 Tablet(s) Oral daily  piperacillin/tazobactam IVPB. 3.375 Gram(s) IV Intermittent every 12 hours  sodium chloride 0.45%. 1000 milliLiter(s) (75 mL/Hr) IV Continuous <Continuous>  vancomycin    Solution 125 milliGRAM(s) Oral every 6 hours    MEDICATIONS  (PRN):  acetaminophen   Tablet 650 milliGRAM(s) Oral every 6 hours PRN For Temp greater than 38 C (100.4 F)  ALBUTerol/ipratropium for Nebulization 3 milliLiter(s) Nebulizer every 6 hours PRN Shortness of Breath  naloxone Injectable 0.1 milliGRAM(s) IV Push every 3 minutes PRN For ANY of the following changes in patient status:  A. RR LESS THAN 10 breaths per minute, B. Oxygen saturation LESS THAN 90%, C. Sedation score of 6  ondansetron Injectable 4 milliGRAM(s) IV Push every 6 hours PRN Nausea      No Known Allergies      VITAL SIGNS:  T(F): 97.8 (12-12-17 @ 08:00)  HR: 112 (12-12-17 @ 10:00)  BP: 134/88 (12-12-17 @ 10:00)  RR: 26 (12-12-17 @ 10:00)  SpO2: 99% (12-12-17 @ 10:00)  Wt(kg): --    PHYSICAL EXAM:    Constitutional: AAOx3, NAD  Eyes: PERRL, EOMI, sclera non-icteric  Neck: supple, no masses, no JVD  Respiratory: CTA b/l, good air entry b/l, no wheezing, rhonchi, rales, with normal respiratory effort and no intercostal retractions  Cardiovascular: RRR, normal S1S2, no M/R/G  Gastrointestinal: soft, NTND, no masses palpable, BS normal in all four quadrants, no HSM  Extremities:  no c/c/e  Neurological: Grossly intact  Skin: Normal temperature    LABS:                        10.8   17.68 )-----------( 50       ( 12 Dec 2017 03:00 )             30.5     12-12    137  |  95<L>  |  91<H>  ----------------------------<  132<H>  4.1   |  21<L>  |  9.41<H>    Ca    7.3<L>      12 Dec 2017 03:00  Phos  6.3     12-12  Mg     1.8     12-12    TPro  5.9<L>  /  Alb  2.7<L>  /  TBili  0.9  /  DBili  x   /  AST  38<H>  /  ALT  33  /  AlkPhos  209<H>  12-12           RADIOLOGY & ADDITIONAL TESTS:  Studies reviewed. Hematology Follow-up    INTERVAL HPI/OVERNIGHT EVENTS:  Patient S&E at bedside. s/p one exchange transfusion last night. Remains confused. Reports no sickle cell pain. Minimal diarrhea as per nursing.    MEDICATIONS  (STANDING):  azithromycin  IVPB 500 milliGRAM(s) IV Intermittent every 24 hours  azithromycin  IVPB      calcitriol   Capsule 0.5 MICROGram(s) Oral daily  calcium acetate 2668 milliGRAM(s) Oral three times a day with meals  chlorhexidine 4% Liquid 1 Application(s) Topical daily  folic acid 1 milliGRAM(s) Oral daily  furosemide    Tablet 100 milliGRAM(s) Oral two times a day  gentamicin 0.1% Cream 1 Application(s) Topical three times a day  heparin  Injectable 2000 Unit(s) IV Push once  HYDROmorphone PCA (1 mG/mL) 30 milliLiter(s) PCA Continuous PCA Continuous  metolazone 5 milliGRAM(s) Oral two times a day  Nephro-jay 1 Tablet(s) Oral daily  piperacillin/tazobactam IVPB. 3.375 Gram(s) IV Intermittent every 12 hours  sodium chloride 0.45%. 1000 milliLiter(s) (75 mL/Hr) IV Continuous <Continuous>  vancomycin    Solution 125 milliGRAM(s) Oral every 6 hours    MEDICATIONS  (PRN):  acetaminophen   Tablet 650 milliGRAM(s) Oral every 6 hours PRN For Temp greater than 38 C (100.4 F)  ALBUTerol/ipratropium for Nebulization 3 milliLiter(s) Nebulizer every 6 hours PRN Shortness of Breath  naloxone Injectable 0.1 milliGRAM(s) IV Push every 3 minutes PRN For ANY of the following changes in patient status:  A. RR LESS THAN 10 breaths per minute, B. Oxygen saturation LESS THAN 90%, C. Sedation score of 6  ondansetron Injectable 4 milliGRAM(s) IV Push every 6 hours PRN Nausea      No Known Allergies      VITAL SIGNS:  T(F): 97.8 (12-12-17 @ 08:00)  HR: 112 (12-12-17 @ 10:00)  BP: 134/88 (12-12-17 @ 10:00)  RR: 26 (12-12-17 @ 10:00)  SpO2: 99% (12-12-17 @ 10:00)  Wt(kg): --    PHYSICAL EXAM:    Constitutional: AAOx1 (person), NAD  Eyes: sclera non-icteric  Neck: supple, no masses, no JVD  Respiratory: Poor inspiratory effort, grossly CTA b/l anteriorly  Cardiovascular: Tachycardic, normal S1S2, no M/R/G  Gastrointestinal: soft, NTND, no masses palpable, BS normal in all four quadrants, no HSM  Extremities:  no c/c/e  Neurological: Inattentive, lethargic. No gross CN, sensory or motor deficits appreciated  Skin: Normal temperature    LABS:                        10.8   17.68 )-----------( 50       ( 12 Dec 2017 03:00 )             30.5     12-12    137  |  95<L>  |  91<H>  ----------------------------<  132<H>  4.1   |  21<L>  |  9.41<H>    Ca    7.3<L>      12 Dec 2017 03:00  Phos  6.3     12-12  Mg     1.8     12-12    TPro  5.9<L>  /  Alb  2.7<L>  /  TBili  0.9  /  DBili  x   /  AST  38<H>  /  ALT  33  /  AlkPhos  209<H>  12-12           RADIOLOGY & ADDITIONAL TESTS:    < from: Xray Chest 1 View AP- PORTABLE-Urgent (12.11.17 @ 18:39) >  IMPRESSION:    1. Shiley catheter with tip at the origin of the SVC.  2. Low lung volumes with mild interstitial edema.    < end of copied text >

## 2017-12-13 LAB
ALBUMIN SERPL ELPH-MCNC: 2.4 G/DL — LOW (ref 3.3–5)
ALP SERPL-CCNC: 191 U/L — HIGH (ref 40–120)
ALT FLD-CCNC: 29 U/L — SIGNIFICANT CHANGE UP (ref 4–33)
AST SERPL-CCNC: 28 U/L — SIGNIFICANT CHANGE UP (ref 4–32)
BASOPHILS # BLD AUTO: 0.06 K/UL — SIGNIFICANT CHANGE UP (ref 0–0.2)
BASOPHILS NFR BLD AUTO: 0.3 % — SIGNIFICANT CHANGE UP (ref 0–2)
BILIRUB DIRECT SERPL-MCNC: 0.5 MG/DL — HIGH (ref 0.1–0.2)
BILIRUB SERPL-MCNC: 1.1 MG/DL — SIGNIFICANT CHANGE UP (ref 0.2–1.2)
BILIRUB SERPL-MCNC: 1.1 MG/DL — SIGNIFICANT CHANGE UP (ref 0.2–1.2)
BUN SERPL-MCNC: 85 MG/DL — HIGH (ref 7–23)
CALCIUM SERPL-MCNC: 7.3 MG/DL — LOW (ref 8.4–10.5)
CHLORIDE SERPL-SCNC: 92 MMOL/L — LOW (ref 98–107)
CO2 SERPL-SCNC: 23 MMOL/L — SIGNIFICANT CHANGE UP (ref 22–31)
CREAT SERPL-MCNC: 8.49 MG/DL — HIGH (ref 0.5–1.3)
EOSINOPHIL # BLD AUTO: 0.17 K/UL — SIGNIFICANT CHANGE UP (ref 0–0.5)
EOSINOPHIL NFR BLD AUTO: 0.8 % — SIGNIFICANT CHANGE UP (ref 0–6)
GLUCOSE SERPL-MCNC: 101 MG/DL — HIGH (ref 70–99)
HCT VFR BLD CALC: 23.3 % — LOW (ref 34.5–45)
HGB A MFR BLD: 71.5 % — LOW
HGB A2 MFR BLD: 3.3 % — SIGNIFICANT CHANGE UP (ref 2.4–3.5)
HGB BLD-MCNC: 8 G/DL — LOW (ref 11.5–15.5)
HGB F MFR BLD: < 1 % — SIGNIFICANT CHANGE UP (ref 0–1.5)
HGB S MFR BLD: 24.6 % — HIGH (ref 0–0)
IMM GRANULOCYTES # BLD AUTO: 0.79 # — SIGNIFICANT CHANGE UP
IMM GRANULOCYTES NFR BLD AUTO: 3.6 % — HIGH (ref 0–1.5)
LYMPHOCYTES # BLD AUTO: 12.4 % — LOW (ref 13–44)
LYMPHOCYTES # BLD AUTO: 2.73 K/UL — SIGNIFICANT CHANGE UP (ref 1–3.3)
MAGNESIUM SERPL-MCNC: 1.6 MG/DL — SIGNIFICANT CHANGE UP (ref 1.6–2.6)
MCHC RBC-ENTMCNC: 28.5 PG — SIGNIFICANT CHANGE UP (ref 27–34)
MCHC RBC-ENTMCNC: 34.3 % — SIGNIFICANT CHANGE UP (ref 32–36)
MCV RBC AUTO: 82.9 FL — SIGNIFICANT CHANGE UP (ref 80–100)
MONOCYTES # BLD AUTO: 1.63 K/UL — HIGH (ref 0–0.9)
MONOCYTES NFR BLD AUTO: 7.4 % — SIGNIFICANT CHANGE UP (ref 2–14)
NEUTROPHILS # BLD AUTO: 16.7 K/UL — HIGH (ref 1.8–7.4)
NEUTROPHILS NFR BLD AUTO: 75.5 % — SIGNIFICANT CHANGE UP (ref 43–77)
NRBC # FLD: 12.16 — SIGNIFICANT CHANGE UP
NRBC FLD-RTO: 55.1 — SIGNIFICANT CHANGE UP
PHOSPHATE SERPL-MCNC: 5.1 MG/DL — HIGH (ref 2.5–4.5)
PLATELET # BLD AUTO: 79 K/UL — LOW (ref 150–400)
PMV BLD: SIGNIFICANT CHANGE UP FL (ref 7–13)
POTASSIUM SERPL-MCNC: 4 MMOL/L — SIGNIFICANT CHANGE UP (ref 3.5–5.3)
POTASSIUM SERPL-SCNC: 4 MMOL/L — SIGNIFICANT CHANGE UP (ref 3.5–5.3)
PROT SERPL-MCNC: 5.4 G/DL — LOW (ref 6–8.3)
RBC # BLD: 2.81 M/UL — LOW (ref 3.8–5.2)
RBC # FLD: 19.8 % — HIGH (ref 10.3–14.5)
SODIUM SERPL-SCNC: 135 MMOL/L — SIGNIFICANT CHANGE UP (ref 135–145)
VANCOMYCIN FLD-MCNC: 16.4 UG/ML — SIGNIFICANT CHANGE UP
WBC # BLD: 22.08 K/UL — HIGH (ref 3.8–10.5)
WBC # FLD AUTO: 22.08 K/UL — HIGH (ref 3.8–10.5)

## 2017-12-13 PROCEDURE — 99291 CRITICAL CARE FIRST HOUR: CPT

## 2017-12-13 PROCEDURE — 70450 CT HEAD/BRAIN W/O DYE: CPT | Mod: 26

## 2017-12-13 PROCEDURE — 90945 DIALYSIS ONE EVALUATION: CPT | Mod: GC

## 2017-12-13 RX ORDER — HYDROMORPHONE HYDROCHLORIDE 2 MG/ML
1 INJECTION INTRAMUSCULAR; INTRAVENOUS; SUBCUTANEOUS ONCE
Qty: 0 | Refills: 0 | Status: DISCONTINUED | OUTPATIENT
Start: 2017-12-13 | End: 2017-12-13

## 2017-12-13 RX ORDER — HYDROMORPHONE HYDROCHLORIDE 2 MG/ML
1 INJECTION INTRAMUSCULAR; INTRAVENOUS; SUBCUTANEOUS EVERY 4 HOURS
Qty: 0 | Refills: 0 | Status: DISCONTINUED | OUTPATIENT
Start: 2017-12-13 | End: 2017-12-14

## 2017-12-13 RX ORDER — HEPARIN SODIUM 5000 [USP'U]/ML
5000 INJECTION INTRAVENOUS; SUBCUTANEOUS EVERY 8 HOURS
Qty: 0 | Refills: 0 | Status: DISCONTINUED | OUTPATIENT
Start: 2017-12-13 | End: 2017-12-13

## 2017-12-13 RX ORDER — HYDROMORPHONE HYDROCHLORIDE 2 MG/ML
0.5 INJECTION INTRAMUSCULAR; INTRAVENOUS; SUBCUTANEOUS EVERY 4 HOURS
Qty: 0 | Refills: 0 | Status: DISCONTINUED | OUTPATIENT
Start: 2017-12-13 | End: 2017-12-14

## 2017-12-13 RX ADMIN — Medication 125 MILLIGRAM(S): at 15:12

## 2017-12-13 RX ADMIN — PIPERACILLIN AND TAZOBACTAM 25 GRAM(S): 4; .5 INJECTION, POWDER, LYOPHILIZED, FOR SOLUTION INTRAVENOUS at 21:29

## 2017-12-13 RX ADMIN — Medication 125 MILLIGRAM(S): at 21:30

## 2017-12-13 RX ADMIN — PIPERACILLIN AND TAZOBACTAM 25 GRAM(S): 4; .5 INJECTION, POWDER, LYOPHILIZED, FOR SOLUTION INTRAVENOUS at 08:54

## 2017-12-13 RX ADMIN — CALCITRIOL 0.5 MICROGRAM(S): 0.5 CAPSULE ORAL at 12:38

## 2017-12-13 RX ADMIN — HYDROMORPHONE HYDROCHLORIDE 1 MILLIGRAM(S): 2 INJECTION INTRAMUSCULAR; INTRAVENOUS; SUBCUTANEOUS at 18:20

## 2017-12-13 RX ADMIN — Medication 1 MILLIGRAM(S): at 12:39

## 2017-12-13 RX ADMIN — HYDROMORPHONE HYDROCHLORIDE 1 MILLIGRAM(S): 2 INJECTION INTRAMUSCULAR; INTRAVENOUS; SUBCUTANEOUS at 21:50

## 2017-12-13 RX ADMIN — AZITHROMYCIN 250 MILLIGRAM(S): 500 TABLET, FILM COATED ORAL at 17:31

## 2017-12-13 RX ADMIN — Medication 125 MILLIGRAM(S): at 02:05

## 2017-12-13 RX ADMIN — Medication 100 MILLIGRAM(S): at 06:15

## 2017-12-13 RX ADMIN — CHLORHEXIDINE GLUCONATE 1 APPLICATION(S): 213 SOLUTION TOPICAL at 13:56

## 2017-12-13 RX ADMIN — Medication 1 TABLET(S): at 12:38

## 2017-12-13 RX ADMIN — Medication 2668 MILLIGRAM(S): at 08:54

## 2017-12-13 RX ADMIN — HYDROMORPHONE HYDROCHLORIDE 1 MILLIGRAM(S): 2 INJECTION INTRAMUSCULAR; INTRAVENOUS; SUBCUTANEOUS at 18:06

## 2017-12-13 RX ADMIN — HYDROMORPHONE HYDROCHLORIDE 1 MILLIGRAM(S): 2 INJECTION INTRAMUSCULAR; INTRAVENOUS; SUBCUTANEOUS at 13:00

## 2017-12-13 RX ADMIN — HYDROMORPHONE HYDROCHLORIDE 1 MILLIGRAM(S): 2 INJECTION INTRAMUSCULAR; INTRAVENOUS; SUBCUTANEOUS at 22:10

## 2017-12-13 RX ADMIN — HYDROMORPHONE HYDROCHLORIDE 1 MILLIGRAM(S): 2 INJECTION INTRAMUSCULAR; INTRAVENOUS; SUBCUTANEOUS at 12:38

## 2017-12-13 RX ADMIN — Medication 125 MILLIGRAM(S): at 08:54

## 2017-12-13 NOTE — PROGRESS NOTE ADULT - SUBJECTIVE AND OBJECTIVE BOX
CHIEF COMPLAINT:    Interval Events:    REVIEW OF SYSTEMS:  Constitutional: [ ] negative [ ] fevers [ ] chills [ ] weight loss [ ] weight gain  HEENT: [ ] negative [ ] dry eyes [ ] eye irritation [ ] postnasal drip [ ] nasal congestion  CV: [ ] negative  [ ] chest pain [ ] orthopnea [ ] palpitations [ ] murmur  Resp: [ ] negative [ ] cough [ ] shortness of breath [ ] dyspnea [ ] wheezing [ ] sputum [ ] hemoptysis  GI: [ ] negative [ ] nausea [ ] vomiting [ ] diarrhea [ ] constipation [ ] abd pain [ ] dysphagia   : [ ] negative [ ] dysuria [ ] nocturia [ ] hematuria [ ] increased urinary frequency  Musculoskeletal: [ ] negative [ ] back pain [ ] myalgias [ ] arthralgias [ ] fracture  Skin: [ ] negative [ ] rash [ ] itch  Neurological: [ ] negative [ ] headache [ ] dizziness [ ] syncope [ ] weakness [ ] numbness  Psychiatric: [ ] negative [ ] anxiety [ ] depression  Endocrine: [ ] negative [ ] diabetes [ ] thyroid problem  Hematologic/Lymphatic: [ ] negative [ ] anemia [ ] bleeding problem  Allergic/Immunologic: [ ] negative [ ] itchy eyes [ ] nasal discharge [ ] hives [ ] angioedema  [ ] All other systems negative  [ ] Unable to assess ROS because ________    OBJECTIVE:  ICU Vital Signs Last 24 Hrs  T(C): 37.1 (13 Dec 2017 07:20), Max: 37.3 (12 Dec 2017 14:53)  T(F): 98.8 (13 Dec 2017 07:20), Max: 99.2 (12 Dec 2017 16:00)  HR: 100 (13 Dec 2017 07:20) (100 - 122)  BP: 125/80 (13 Dec 2017 07:20) (113/85 - 159/113)  BP(mean): 102 (13 Dec 2017 07:00) (92 - 122)  ABP: --  ABP(mean): --  RR: 24 (13 Dec 2017 07:20) (22 - 31)  SpO2: 100% (13 Dec 2017 07:00) (92% - 100%)        12-12 @ 07:01  -  12-13 @ 07:00  --------------------------------------------------------  IN: 5550 mL / OUT: 4800 mL / NET: 750 mL    12-13 @ 07:01  - 12-13 @ 08:19  --------------------------------------------------------  IN: 2000 mL / OUT: 2300 mL / NET: -300 mL      CAPILLARY BLOOD GLUCOSE          PHYSICAL EXAM:  General:   HEENT:   Lymph Nodes:  Neck:   Respiratory:   Cardiovascular:   Abdomen:   Extremities:   Skin:   Neurological:  Psychiatry:    LINES:    HOSPITAL MEDICATIONS:  Standing Meds:  azithromycin  IVPB 500 milliGRAM(s) IV Intermittent every 24 hours  azithromycin  IVPB      calcitriol   Capsule 0.5 MICROGram(s) Oral daily  calcium acetate 2668 milliGRAM(s) Oral three times a day with meals  chlorhexidine 4% Liquid 1 Application(s) Topical daily  folic acid 1 milliGRAM(s) Oral daily  furosemide    Tablet 100 milliGRAM(s) Oral two times a day  metolazone 5 milliGRAM(s) Oral two times a day  Nephro-jay 1 Tablet(s) Oral daily  piperacillin/tazobactam IVPB. 3.375 Gram(s) IV Intermittent every 12 hours  vancomycin    Solution 125 milliGRAM(s) Oral every 6 hours      PRN Meds:  acetaminophen   Tablet 650 milliGRAM(s) Oral every 6 hours PRN  ALBUTerol/ipratropium for Nebulization 3 milliLiter(s) Nebulizer every 6 hours PRN  naloxone Injectable 0.1 milliGRAM(s) IV Push every 3 minutes PRN  ondansetron Injectable 4 milliGRAM(s) IV Push every 6 hours PRN      LABS:                        8.0    22.08 )-----------( 79       ( 13 Dec 2017 03:00 )             23.3     Hgb Trend: 8.0<--, 9.6<--, 10.8<--, 9.9<--, 6.9<--  12-13    135  |  92<L>  |  85<H>  ----------------------------<  101<H>  4.0   |  23  |  8.49<H>    Ca    7.3<L>      13 Dec 2017 03:00  Phos  5.1     12-13  Mg     1.6     12-13    TPro  5.4<L>  /  Alb  2.4<L>  /  TBili  1.1  /  DBili  0.5<H>  /  AST  28  /  ALT  29  /  AlkPhos  191<H>  12-13    Creatinine Trend: 8.49<--, 8.53<--, 9.41<--, 11.04<--, 9.97<--, 10.30<--            MICROBIOLOGY:     RADIOLOGY:  [ ] Reviewed and interpreted by me    EKG: CHIEF COMPLAINT: ACS    Interval Events: Pt endorsing improvement in SOB and pain.    REVIEW OF SYSTEMS:  Constitutional: [ ] negative [ ] fevers [ ] chills [ ] weight loss [ ] weight gain  HEENT: [ ] negative [ ] dry eyes [ ] eye irritation [ ] postnasal drip [ ] nasal congestion  CV: [ ] negative  [ ] chest pain [ ] orthopnea [ ] palpitations [ ] murmur  Resp: [ ] negative [ ] cough [ ] shortness of breath [ ] dyspnea [ ] wheezing [ ] sputum [ ] hemoptysis  GI: [ ] negative [ ] nausea [ ] vomiting [ ] diarrhea [ ] constipation [ ] abd pain [ ] dysphagia   : [ ] negative [ ] dysuria [ ] nocturia [ ] hematuria [ ] increased urinary frequency  Musculoskeletal: [ ] negative [ ] back pain [ ] myalgias [ ] arthralgias [ ] fracture  Skin: [ ] negative [ ] rash [ ] itch  Neurological: [ ] negative [ ] headache [ ] dizziness [ ] syncope [ ] weakness [ ] numbness  Psychiatric: [ ] negative [ ] anxiety [ ] depression  Endocrine: [ ] negative [ ] diabetes [ ] thyroid problem  Hematologic/Lymphatic: [ ] negative [ ] anemia [ ] bleeding problem  Allergic/Immunologic: [ ] negative [ ] itchy eyes [ ] nasal discharge [ ] hives [ ] angioedema  [ ] All other systems negative  [X] Unable to assess ROS because of pt encephalopathy    OBJECTIVE:  ICU Vital Signs Last 24 Hrs  T(C): 37.1 (13 Dec 2017 07:20), Max: 37.3 (12 Dec 2017 14:53)  T(F): 98.8 (13 Dec 2017 07:20), Max: 99.2 (12 Dec 2017 16:00)  HR: 100 (13 Dec 2017 07:20) (100 - 122)  BP: 125/80 (13 Dec 2017 07:20) (113/85 - 159/113)  BP(mean): 102 (13 Dec 2017 07:00) (92 - 122)  ABP: --  ABP(mean): --  RR: 24 (13 Dec 2017 07:20) (22 - 31)  SpO2: 100% (13 Dec 2017 07:00) (92% - 100%)        12-12 @ 07:01  -  12-13 @ 07:00  --------------------------------------------------------  IN: 5550 mL / OUT: 4800 mL / NET: 750 mL    12-13 @ 07:01  -  12-13 @ 08:19  --------------------------------------------------------  IN: 2000 mL / OUT: 2300 mL / NET: -300 mL      CAPILLARY BLOOD GLUCOSE    PHYSICAL EXAM:  General: Pt lying in bed in NAD  Neck: Supple  Respiratory: CTAB  Cardiovascular: RRR, no m/g/r  Abdomen: Soft, non-tender, non-distended, +BS  Extremities: No edema  Skin: Warm, dry  Neurological: AAOx2-3, oriented to person, place and year but still gets confused when answering questions, often requiring 2 or 3 tries to get the correct answer    LINES: IJ line    HOSPITAL MEDICATIONS:  Standing Meds:  azithromycin  IVPB 500 milliGRAM(s) IV Intermittent every 24 hours  azithromycin  IVPB      calcitriol   Capsule 0.5 MICROGram(s) Oral daily  calcium acetate 2668 milliGRAM(s) Oral three times a day with meals  chlorhexidine 4% Liquid 1 Application(s) Topical daily  folic acid 1 milliGRAM(s) Oral daily  furosemide    Tablet 100 milliGRAM(s) Oral two times a day  metolazone 5 milliGRAM(s) Oral two times a day  Nephro-jay 1 Tablet(s) Oral daily  piperacillin/tazobactam IVPB. 3.375 Gram(s) IV Intermittent every 12 hours  vancomycin    Solution 125 milliGRAM(s) Oral every 6 hours      PRN Meds:  acetaminophen   Tablet 650 milliGRAM(s) Oral every 6 hours PRN  ALBUTerol/ipratropium for Nebulization 3 milliLiter(s) Nebulizer every 6 hours PRN  naloxone Injectable 0.1 milliGRAM(s) IV Push every 3 minutes PRN  ondansetron Injectable 4 milliGRAM(s) IV Push every 6 hours PRN      LABS:                        8.0    22.08 )-----------( 79       ( 13 Dec 2017 03:00 )             23.3     Hgb Trend: 8.0<--, 9.6<--, 10.8<--, 9.9<--, 6.9<--  12-13    135  |  92<L>  |  85<H>  ----------------------------<  101<H>  4.0   |  23  |  8.49<H>    Ca    7.3<L>      13 Dec 2017 03:00  Phos  5.1     12-13  Mg     1.6     12-13    TPro  5.4<L>  /  Alb  2.4<L>  /  TBili  1.1  /  DBili  0.5<H>  /  AST  28  /  ALT  29  /  AlkPhos  191<H>  12-13    Creatinine Trend: 8.49<--, 8.53<--, 9.41<--, 11.04<--, 9.97<--, 10.30<--      MICROBIOLOGY:     RADIOLOGY:  [ ] Reviewed and interpreted by me    EKG:

## 2017-12-13 NOTE — PROGRESS NOTE ADULT - SUBJECTIVE AND OBJECTIVE BOX
[facility] DIVISION OF KIDNEY DISEASES AND HYPERTENSION -- FOLLOW UP NOTE  --------------------------------------------------------------------------------  HPI: 36-year-old female with PMH of sickle cell disease, and 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 Pt. has been on PD since 3/2017. Pt.'s outpatient nephrologist is Dr. Larsen. Pt. states she has not had any problems with PD. PD catheter is working well. Pt. says she is on CCPD 3 exchanges (fill volume: 2000cc with 2.5% dextrose dianeal fluid and a last fill). Pt. was found to have Cdiff infection currently on ABX.  Pt. was complaining of chest pain and was found to have acute chest syndrome. Pt. was transferred to MICU and underwent exchange transfusion as per hematology on 12/11. Pt. seen and examined at bedside. Pt. states she feels better and denies any SOB or chest pain.      PAST HISTORY  --------------------------------------------------------------------------------  No significant changes to PMH, PSH, FHx, SHx, unless otherwise noted    ALLERGIES & MEDICATIONS  --------------------------------------------------------------------------------  Allergies    No Known Allergies    Intolerances      Standing Inpatient Medications  azithromycin  IVPB 500 milliGRAM(s) IV Intermittent every 24 hours  azithromycin  IVPB      calcitriol   Capsule 0.5 MICROGram(s) Oral daily  calcium acetate 2668 milliGRAM(s) Oral three times a day with meals  chlorhexidine 4% Liquid 1 Application(s) Topical daily  folic acid 1 milliGRAM(s) Oral daily  furosemide    Tablet 100 milliGRAM(s) Oral two times a day  metolazone 5 milliGRAM(s) Oral two times a day  Nephro-jay 1 Tablet(s) Oral daily  piperacillin/tazobactam IVPB. 3.375 Gram(s) IV Intermittent every 12 hours  vancomycin    Solution 125 milliGRAM(s) Oral every 6 hours    PRN Inpatient Medications  acetaminophen   Tablet 650 milliGRAM(s) Oral every 6 hours PRN  ALBUTerol/ipratropium for Nebulization 3 milliLiter(s) Nebulizer every 6 hours PRN  naloxone Injectable 0.1 milliGRAM(s) IV Push every 3 minutes PRN  ondansetron Injectable 4 milliGRAM(s) IV Push every 6 hours PRN      REVIEW OF SYSTEMS  --------------------------------------------------------------------------------  Gen: No fevers  Head/Eyes/Ears/Mouth: No headache  Respiratory: No dyspnea  CV: No chest pain   GI: No abdominal pain  : No dysuria  MSK: No edema + joint pain   Heme: As per HPI       VITALS/PHYSICAL EXAM  --------------------------------------------------------------------------------  T(C): 36.7 (12-13-17 @ 04:00), Max: 37.3 (12-12-17 @ 14:53)  HR: 108 (12-13-17 @ 05:00) (105 - 122)  BP: 142/88 (12-13-17 @ 05:00) (113/85 - 159/113)  RR: 29 (12-13-17 @ 05:00) (22 - 31)  SpO2: 100% (12-13-17 @ 05:00) (96% - 100%)  Wt(kg): --  Height (cm): 167.64 (12-11-17 @ 18:00)  Weight (kg): 71.1 (12-11-17 @ 18:00)  BMI (kg/m2): 25.3 (12-11-17 @ 18:00)  BSA (m2): 1.8 (12-11-17 @ 18:00)      12-12-17 @ 07:01  -  12-13-17 @ 07:00  --------------------------------------------------------  IN: 5550 mL / OUT: 4800 mL / NET: 750 mL      Physical Exam:  	Gen: Resting in bed  	HEENT: No JVD   	Pulm: CTA B/L  	CV: RRR, S1S2+  	Abd: soft + PD catheter seen intact  	: No suprapubic tenderness  	LE: Warm, no edema  	Neuro: Awake   	Psych: Normal affect and mood  	Skin: Warm    LABS/STUDIES  --------------------------------------------------------------------------------              8.0    22.08 >-----------<  79       [12-13-17 @ 03:00]              23.3     135  |  92  |  85  ----------------------------<  101      [12-13-17 @ 03:00]  4.0   |  23  |  8.49        Ca     7.3     [12-13-17 @ 03:00]      Mg     1.6     [12-13-17 @ 03:00]      Phos  5.1     [12-13-17 @ 03:00]    TPro  5.4  /  Alb  2.4  /  TBili  1.1  /  DBili  0.5  /  AST  28  /  ALT  29  /  AlkPhos  191  [12-13-17 @ 03:00]        LDH 1442      [12-12-17 @ 22:00]    Creatinine Trend:  SCr 8.49 [12-13 @ 03:00]  SCr 8.53 [12-12 @ 22:00]  SCr 9.41 [12-12 @ 03:00]  SCr 11.04 [12-11 @ 06:50]  SCr 9.97 [12-10 @ 07:10]        Iron 71, TIBC 165, %sat --      [08-06-17 @ 18:30]  Ferritin 2501      [08-06-17 @ 18:30]  Vitamin D (25OH) 6.1      [08-08-17 @ 06:40]  HbA1c 3.8      [01-13-17 @ 11:35]  TSH 4.51      [01-13-17 @ 11:35]  Lipid: chol 263, , HDL 50,       [01-13-17 @ 11:35] [facility] DIVISION OF KIDNEY DISEASES AND HYPERTENSION -- FOLLOW UP NOTE  --------------------------------------------------------------------------------  HPI: 36-year-old female with PMH of sickle cell disease, and 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 Pt. has been on PD since 3/2017. Pt.'s outpatient nephrologist is Dr. Larsen. Pt. states she has not had any problems with PD. PD catheter is working well. Pt. says she is on CCPD 3 exchanges (fill volume: 2000cc with 2.5% dextrose dianeal fluid and a last fill). Pt. was found to have Cdiff infection currently on ABX.  Pt. was complaining of chest pain and was found to have acute chest syndrome. Pt. was transferred to MICU and underwent exchange transfusion as per hematology on 12/11. Pt. seen and examined at bedside. Pt. states she feels better and denies any SOB or chest pain.      PAST HISTORY  --------------------------------------------------------------------------------  No significant changes to PMH, PSH, FHx, SHx, unless otherwise noted    ALLERGIES & MEDICATIONS  --------------------------------------------------------------------------------  Allergies    No Known Allergies    Intolerances    Standing Inpatient Medications  azithromycin  IVPB 500 milliGRAM(s) IV Intermittent every 24 hours  azithromycin  IVPB      calcitriol   Capsule 0.5 MICROGram(s) Oral daily  calcium acetate 2668 milliGRAM(s) Oral three times a day with meals  chlorhexidine 4% Liquid 1 Application(s) Topical daily  folic acid 1 milliGRAM(s) Oral daily  furosemide    Tablet 100 milliGRAM(s) Oral two times a day  metolazone 5 milliGRAM(s) Oral two times a day  Nephro-jay 1 Tablet(s) Oral daily  piperacillin/tazobactam IVPB. 3.375 Gram(s) IV Intermittent every 12 hours  vancomycin    Solution 125 milliGRAM(s) Oral every 6 hours    PRN Inpatient Medications  acetaminophen   Tablet 650 milliGRAM(s) Oral every 6 hours PRN  ALBUTerol/ipratropium for Nebulization 3 milliLiter(s) Nebulizer every 6 hours PRN  naloxone Injectable 0.1 milliGRAM(s) IV Push every 3 minutes PRN  ondansetron Injectable 4 milliGRAM(s) IV Push every 6 hours PRN    REVIEW OF SYSTEMS  --------------------------------------------------------------------------------  Gen: No fevers  Head/Eyes/Ears/Mouth: No headache  Respiratory: No dyspnea  CV: see HPI   GI: No abdominal pain  : No dysuria  MSK: No edema + joint pain   Heme: As per HPI     VITALS/PHYSICAL EXAM  --------------------------------------------------------------------------------  T(C): 36.7 (12-13-17 @ 04:00), Max: 37.3 (12-12-17 @ 14:53)  HR: 108 (12-13-17 @ 05:00) (105 - 122)  BP: 142/88 (12-13-17 @ 05:00) (113/85 - 159/113)  RR: 29 (12-13-17 @ 05:00) (22 - 31)  SpO2: 100% (12-13-17 @ 05:00) (96% - 100%)  Wt(kg): --  Height (cm): 167.64 (12-11-17 @ 18:00)  Weight (kg): 71.1 (12-11-17 @ 18:00)  BMI (kg/m2): 25.3 (12-11-17 @ 18:00)  BSA (m2): 1.8 (12-11-17 @ 18:00)    12-12-17 @ 07:01  -  12-13-17 @ 07:00  --------------------------------------------------------  IN: 5550 mL / OUT: 4800 mL / NET: 750 mL    Physical Exam:  	Gen: Resting in bed  	HEENT: No JVD   	Pulm: CTA B/L  	CV: RRR, S1S2+  	Abd: soft + PD catheter seen intact  	: No suprapubic tenderness  	LE: Warm, no edema  	Neuro: Awake   	Psych: Normal affect and mood  	Skin: Warm    LABS/STUDIES  --------------------------------------------------------------------------------              8.0    22.08 >-----------<  79       [12-13-17 @ 03:00]              23.3     135  |  92  |  85  ----------------------------<  101      [12-13-17 @ 03:00]  4.0   |  23  |  8.49        Ca     7.3     [12-13-17 @ 03:00]      Mg     1.6     [12-13-17 @ 03:00]      Phos  5.1     [12-13-17 @ 03:00]    TPro  5.4  /  Alb  2.4  /  TBili  1.1  /  DBili  0.5  /  AST  28  /  ALT  29  /  AlkPhos  191  [12-13-17 @ 03:00]    LDH 1442      [12-12-17 @ 22:00]    Creatinine Trend:  SCr 8.49 [12-13 @ 03:00]  SCr 8.53 [12-12 @ 22:00]  SCr 9.41 [12-12 @ 03:00]  SCr 11.04 [12-11 @ 06:50]  SCr 9.97 [12-10 @ 07:10] [facility] DIVISION OF KIDNEY DISEASES AND HYPERTENSION -- FOLLOW UP NOTE  --------------------------------------------------------------------------------  HPI: 36-year-old female with PMH of sickle cell disease, and 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 Pt. has been on PD since 3/2017. Pt.'s outpatient nephrologist is Dr. Larsen. Pt. states she has not had any problems with PD. PD catheter is working well. Pt. says she is on CCPD 3 exchanges (fill volume: 2000cc with 2.5% dextrose dianeal fluid and a last fill). Pt. was found to have Cdiff infection currently on ABX.  Pt. was complaining of chest pain and was found to have acute chest syndrome. Pt. was transferred to MICU and underwent exchange transfusion as per hematology on 12/11. Pt. seen and examined at bedside. Pt. states she feels better and denies any SOB or chest pain.      PAST HISTORY  --------------------------------------------------------------------------------  No significant changes to PMH, PSH, FHx, SHx, unless otherwise noted    ALLERGIES & MEDICATIONS  --------------------------------------------------------------------------------  Allergies    No Known Allergies    Intolerances    Standing Inpatient Medications  azithromycin  IVPB 500 milliGRAM(s) IV Intermittent every 24 hours  azithromycin  IVPB      calcitriol   Capsule 0.5 MICROGram(s) Oral daily  calcium acetate 2668 milliGRAM(s) Oral three times a day with meals  chlorhexidine 4% Liquid 1 Application(s) Topical daily  folic acid 1 milliGRAM(s) Oral daily  furosemide    Tablet 100 milliGRAM(s) Oral two times a day  metolazone 5 milliGRAM(s) Oral two times a day  Nephro-jay 1 Tablet(s) Oral daily  piperacillin/tazobactam IVPB. 3.375 Gram(s) IV Intermittent every 12 hours  vancomycin    Solution 125 milliGRAM(s) Oral every 6 hours    PRN Inpatient Medications  acetaminophen   Tablet 650 milliGRAM(s) Oral every 6 hours PRN  ALBUTerol/ipratropium for Nebulization 3 milliLiter(s) Nebulizer every 6 hours PRN  naloxone Injectable 0.1 milliGRAM(s) IV Push every 3 minutes PRN  ondansetron Injectable 4 milliGRAM(s) IV Push every 6 hours PRN    REVIEW OF SYSTEMS  --------------------------------------------------------------------------------  Gen: No fevers  Head/Eyes/Ears/Mouth: No headache  Respiratory: No dyspnea  CV: see HPI   GI: No abdominal pain  : No dysuria  MSK: No edema + joint pain   Heme: As per HPI     VITALS/PHYSICAL EXAM  --------------------------------------------------------------------------------  T(C): 36.7 (12-13-17 @ 04:00), Max: 37.3 (12-12-17 @ 14:53)  HR: 108 (12-13-17 @ 05:00) (105 - 122)  BP: 142/88 (12-13-17 @ 05:00) (113/85 - 159/113)  RR: 29 (12-13-17 @ 05:00) (22 - 31)  SpO2: 100% (12-13-17 @ 05:00) (96% - 100%)  Wt(kg): --  Height (cm): 167.64 (12-11-17 @ 18:00)  Weight (kg): 71.1 (12-11-17 @ 18:00)  BMI (kg/m2): 25.3 (12-11-17 @ 18:00)  BSA (m2): 1.8 (12-11-17 @ 18:00)    12-12-17 @ 07:01  -  12-13-17 @ 07:00  --------------------------------------------------------  IN: 5550 mL / OUT: 4800 mL / NET: 750 mL    Physical Exam:  	Gen: Resting in bed  	HEENT: No JVD   	Pulm: CTA B/L  	CV: RRR, S1S2+  	Abd: soft + PD catheter seen intact  	: No suprapubic tenderness  	LE: Warm, no edema  	Neuro: Awake   	Psych: Normal affect and mood  	Skin: Warm    LABS/STUDIES  --------------------------------------------------------------------------------              8.0    22.08 >-----------<  79       [12-13-17 @ 03:00]              23.3     135  |  92  |  85  ----------------------------<  101      [12-13-17 @ 03:00]  4.0   |  23  |  8.49        Ca     7.3     [12-13-17 @ 03:00]      Mg     1.6     [12-13-17 @ 03:00]      Phos  5.1     [12-13-17 @ 03:00]    TPro  5.4  /  Alb  2.4  /  TBili  1.1  /  DBili  0.5  /  AST  28  /  ALT  29  /  AlkPhos  191  [12-13-17 @ 03:00]    Creatinine Trend:  SCr 8.49 [12-13 @ 03:00]  SCr 8.53 [12-12 @ 22:00]  SCr 9.41 [12-12 @ 03:00]  SCr 11.04 [12-11 @ 06:50]  SCr 9.97 [12-10 @ 07:10]

## 2017-12-13 NOTE — CHART NOTE - NSCHARTNOTEFT_GEN_A_CORE
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 She reports being in her usual state of health until the onset of chest pain at approximately 5 PM yesterday. She has had no change in her health recently other than receiving a scheduled steroid injection in to her right hip yesterday for chronic hip pain. The onset of her pain was sudden with severe throbbing substernal pain; the pain is non-pleuritic and not associated with movement. The pain has since spread to involve her back and bilateral knees and elbows and she states this pain is typical of prior sickle cell crises. She denies having frequent pain crises, less than one per year. She took 4 mg of dilaudid at home without relief of her pain. Currently she denies fever/chills, SOB, cough, bleeding, N/V/D/C.     On presentation to the ED vitals were: 37 102 135/89 22 100% RA. She received 1L NS and a total of 6 mg dilaudid over 4 hours and an additional 20 mg of ketamine without adequate control of pain.     Pt admitted and found to be altered and in severe pain. Placed on PCA pump and continued on peritoneal dialysis. Pain improved on PCA pump but pt continued to be altered. WBC and temp elevated and CXR showed new infiltrate. Pt transferred to MICU for exchange transfucsion for acute chest. c. diff found to be positive and pt started on PO vanco, IV zosyn, IV azithromycin. Pt more encephalopathic to AAOx1 and very agitated. Given dilaudid and ativan with improvement in agitation. On hospital day 5, pt breathing and back pain improving. Encephalopathy also improved with pt AAOx2-3.    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     #Neuro  - AAOx2-3, no focal deficits on exam and pt confusion improving  - CTH clear    #Cardiac  - BP systolic 120-130s, tachycardia improved    #Pulmonary  - Acute chest syndrome supported by fevers 101.4, new infiltrate on CXR, hypoxia, white count 24  - pt sat 100 on 2L NC    #ID  - C diff positive but pt has not had any episodes of diarrhea: on PO vanc   - on IV zosyn, azithromycin for PNA coverage  - f/u urine legionella  - f/u urine culture, bcx  - peritoneal culture clear (18 lymphocytes, 74 macrophages)    #Renal  - pt ESRD w/ FSGS on PD now 4x/day because increasing uremia  - d/c lasix  - calcitriol/phoslo b/c pt on PD    #ID  - on zosyn/azithromycin for PNA coverage  - will f/u BCx, urine cx and titrate as appropriate  - pt being treated with oral vanc 125 mg q6 for C diff    #Heme  - known sickle cell disease (sickle/+thal)  - Hgb 10.8  - on IV dilaudid for pain PRN    DVT ppx:  - intermittent pneumatic compression devices; hold heparin products until HIT r/o

## 2017-12-13 NOTE — PROGRESS NOTE ADULT - ASSESSMENT
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     #Neuro  - A+Ox1, no focal deficits on exam however pt increasingly agitated  - on no sedation    #Cardiac  - BP systolic 130s,  sinus tachycardia most likely due to underlying pain/anemia  - No known hx cardiac disease    #Pulmonary  - Acute chest syndrome supported by fevers 101.4, new infiltrate on CXR, hypoxia, white count 24  - s/p shiley,   - pt sat 94 on RA    #GI  - no know hx of GI disease    #Endocrine  - pt glucose 132 on BMP, no anti-glycemic medication at home     #ID  - C diff positive but pt has not had any episodes of diarrhea  - on vanc/zosyn/azithromycin for PNA coverage  - f/u urine legionella  - f/u urine culture, bcx   - peritoneal culture clear (18 lymphocytes, 74 macrophages)    #Renal  - pt ESRD w/ FSGS on PD now 4x/day because increasingly uremic  - calcitriol/phoslo b/c pt on pD    #ID  - on zosyn/azithromycin for PNA coverage  - will f/u Bcx, urine cx and titrate as appropriate  - pt being treated with oral vanc 125 mg q6 for C diff    #Heme  - known sickle cell disease (sickle/+thal)  - Hgb 10.8  - PCA pump for pain, narcan injectable also ordered 37 yo F with PMH of sickle cell disease(sickle/+thal) and 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     #Neuro  - AAOx2-3, no focal deficits on exam and pt confusion improving  - Not on sedation  - CTH to r/o CVA as cause of encephalopathy    #Cardiac  - BP systolic 120-130s, tachycardia improved  - No known hx cardiac disease    #Pulmonary  - Acute chest syndrome supported by fevers 101.4, new infiltrate on CXR, hypoxia, white count 24  - pt sat 100 on supplemental O2  - will wean pt off O2    #GI  - no know hx of GI disease  - regular diet    #ID  - C diff positive but pt has not had any episodes of diarrhea: on PO vanc   - on IV zosyn, azithromycin for PNA coverage  - f/u urine legionella  - f/u urine culture, bcx   - peritoneal culture clear (18 lymphocytes, 74 macrophages)    #Renal  - pt ESRD w/ FSGS on PD now 4x/day because increasingly uremic  - d/c lasix  - calcitriol/phoslo b/c pt on PD    #ID  - on zosyn/azithromycin for PNA coverage  - will f/u BCx, urine cx and titrate as appropriate  - pt being treated with oral vanc 125 mg q6 for C diff    #Heme  - known sickle cell disease (sickle/+thal)  - Hgb 10.8  - pain medication as needed for pain    DVT ppx:  - intermittent pneumatic compression devices; hold heparin products until HIT r/o

## 2017-12-13 NOTE — PROGRESS NOTE ADULT - ASSESSMENT
36 year old female with sickle cell disease and 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 Pt. found to have acute chest syndrome and was transferred to the MICU for exchange transfusion. 36 year old female with sickle cell disease and 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 Pt. found to have acute chest syndrome and was transferred to the MICU for exchange transfusion. Pt. on CAPD during current hospital stay.

## 2017-12-14 DIAGNOSIS — D69.6 THROMBOCYTOPENIA, UNSPECIFIED: ICD-10-CM

## 2017-12-14 DIAGNOSIS — M25.561 PAIN IN RIGHT KNEE: ICD-10-CM

## 2017-12-14 LAB
BASOPHILS # BLD AUTO: 0.06 K/UL — SIGNIFICANT CHANGE UP (ref 0–0.2)
BASOPHILS NFR BLD AUTO: 0.4 % — SIGNIFICANT CHANGE UP (ref 0–2)
BUN SERPL-MCNC: 83 MG/DL — HIGH (ref 7–23)
CALCIUM SERPL-MCNC: 7.7 MG/DL — LOW (ref 8.4–10.5)
CHLORIDE SERPL-SCNC: 92 MMOL/L — LOW (ref 98–107)
CO2 SERPL-SCNC: 25 MMOL/L — SIGNIFICANT CHANGE UP (ref 22–31)
CREAT SERPL-MCNC: 8.77 MG/DL — HIGH (ref 0.5–1.3)
EOSINOPHIL # BLD AUTO: 0.44 K/UL — SIGNIFICANT CHANGE UP (ref 0–0.5)
EOSINOPHIL NFR BLD AUTO: 2.8 % — SIGNIFICANT CHANGE UP (ref 0–6)
GLUCOSE SERPL-MCNC: 92 MG/DL — SIGNIFICANT CHANGE UP (ref 70–99)
HAPTOGLOB SERPL-MCNC: 115 MG/DL — SIGNIFICANT CHANGE UP (ref 34–200)
HCT VFR BLD CALC: 23.4 % — LOW (ref 34.5–45)
HEPARIN CF II AG PPP-ACNC: 0.12 — SIGNIFICANT CHANGE UP (ref 0–0.39)
HGB BLD-MCNC: 7.9 G/DL — LOW (ref 11.5–15.5)
HGB ELECT COMMENT: SIGNIFICANT CHANGE UP
IMM GRANULOCYTES # BLD AUTO: 0.6 # — SIGNIFICANT CHANGE UP
IMM GRANULOCYTES NFR BLD AUTO: 3.8 % — HIGH (ref 0–1.5)
LDH SERPL L TO P-CCNC: 884 U/L — HIGH (ref 135–225)
LYMPHOCYTES # BLD AUTO: 1.67 K/UL — SIGNIFICANT CHANGE UP (ref 1–3.3)
LYMPHOCYTES # BLD AUTO: 10.5 % — LOW (ref 13–44)
MAGNESIUM SERPL-MCNC: 1.5 MG/DL — LOW (ref 1.6–2.6)
MANUAL SMEAR VERIFICATION: SIGNIFICANT CHANGE UP
MCHC RBC-ENTMCNC: 28.5 PG — SIGNIFICANT CHANGE UP (ref 27–34)
MCHC RBC-ENTMCNC: 33.8 % — SIGNIFICANT CHANGE UP (ref 32–36)
MCV RBC AUTO: 84.5 FL — SIGNIFICANT CHANGE UP (ref 80–100)
MONOCYTES # BLD AUTO: 1.34 K/UL — HIGH (ref 0–0.9)
MONOCYTES NFR BLD AUTO: 8.4 % — SIGNIFICANT CHANGE UP (ref 2–14)
NEUTROPHILS # BLD AUTO: 11.75 K/UL — HIGH (ref 1.8–7.4)
NEUTROPHILS NFR BLD AUTO: 74.1 % — SIGNIFICANT CHANGE UP (ref 43–77)
NRBC # FLD: 11.4 — SIGNIFICANT CHANGE UP
NRBC FLD-RTO: 71.9 — SIGNIFICANT CHANGE UP
PHOSPHATE SERPL-MCNC: 5.9 MG/DL — HIGH (ref 2.5–4.5)
PLATELET # BLD AUTO: 84 K/UL — LOW (ref 150–400)
PMV BLD: 11 FL — SIGNIFICANT CHANGE UP (ref 7–13)
POTASSIUM SERPL-MCNC: 3.6 MMOL/L — SIGNIFICANT CHANGE UP (ref 3.5–5.3)
POTASSIUM SERPL-SCNC: 3.6 MMOL/L — SIGNIFICANT CHANGE UP (ref 3.5–5.3)
RBC # BLD: 2.77 M/UL — LOW (ref 3.8–5.2)
RBC # FLD: 21.1 % — HIGH (ref 10.3–14.5)
RETICS #: 0.2 10X6/UL — HIGH (ref 0.02–0.07)
RETICS/RBC NFR: 5.9 % — HIGH (ref 0.5–2.5)
SODIUM SERPL-SCNC: 134 MMOL/L — LOW (ref 135–145)
WBC # BLD: 15.86 K/UL — HIGH (ref 3.8–10.5)
WBC # FLD AUTO: 15.86 K/UL — HIGH (ref 3.8–10.5)

## 2017-12-14 PROCEDURE — 90945 DIALYSIS ONE EVALUATION: CPT | Mod: GC

## 2017-12-14 PROCEDURE — 99233 SBSQ HOSP IP/OBS HIGH 50: CPT | Mod: GC

## 2017-12-14 RX ORDER — ACETAMINOPHEN 500 MG
650 TABLET ORAL EVERY 4 HOURS
Qty: 0 | Refills: 0 | Status: COMPLETED | OUTPATIENT
Start: 2017-12-14 | End: 2017-12-14

## 2017-12-14 RX ORDER — TRAMADOL HYDROCHLORIDE 50 MG/1
50 TABLET ORAL EVERY 4 HOURS
Qty: 0 | Refills: 0 | Status: DISCONTINUED | OUTPATIENT
Start: 2017-12-14 | End: 2017-12-15

## 2017-12-14 RX ORDER — GENTAMICIN SULFATE 0.1 %
1 OINTMENT (GRAM) TOPICAL DAILY
Qty: 0 | Refills: 0 | Status: DISCONTINUED | OUTPATIENT
Start: 2017-12-14 | End: 2017-12-17

## 2017-12-14 RX ORDER — MOXIFLOXACIN HYDROCHLORIDE TABLETS, 400 MG 400 MG/1
400 TABLET, FILM COATED ORAL DAILY
Qty: 0 | Refills: 0 | Status: DISCONTINUED | OUTPATIENT
Start: 2017-12-14 | End: 2017-12-17

## 2017-12-14 RX ORDER — MAGNESIUM OXIDE 400 MG ORAL TABLET 241.3 MG
400 TABLET ORAL
Qty: 0 | Refills: 0 | Status: COMPLETED | OUTPATIENT
Start: 2017-12-14 | End: 2017-12-14

## 2017-12-14 RX ORDER — HYDROMORPHONE HYDROCHLORIDE 2 MG/ML
2 INJECTION INTRAMUSCULAR; INTRAVENOUS; SUBCUTANEOUS EVERY 4 HOURS
Qty: 0 | Refills: 0 | Status: DISCONTINUED | OUTPATIENT
Start: 2017-12-14 | End: 2017-12-17

## 2017-12-14 RX ORDER — HYDROMORPHONE HYDROCHLORIDE 2 MG/ML
4 INJECTION INTRAMUSCULAR; INTRAVENOUS; SUBCUTANEOUS EVERY 4 HOURS
Qty: 0 | Refills: 0 | Status: DISCONTINUED | OUTPATIENT
Start: 2017-12-14 | End: 2017-12-17

## 2017-12-14 RX ORDER — SODIUM CHLORIDE 0.65 %
1 AEROSOL, SPRAY (ML) NASAL
Qty: 0 | Refills: 0 | Status: DISCONTINUED | OUTPATIENT
Start: 2017-12-14 | End: 2017-12-17

## 2017-12-14 RX ADMIN — HYDROMORPHONE HYDROCHLORIDE 0.5 MILLIGRAM(S): 2 INJECTION INTRAMUSCULAR; INTRAVENOUS; SUBCUTANEOUS at 06:42

## 2017-12-14 RX ADMIN — Medication 650 MILLIGRAM(S): at 14:30

## 2017-12-14 RX ADMIN — HYDROMORPHONE HYDROCHLORIDE 0.5 MILLIGRAM(S): 2 INJECTION INTRAMUSCULAR; INTRAVENOUS; SUBCUTANEOUS at 06:52

## 2017-12-14 RX ADMIN — HYDROMORPHONE HYDROCHLORIDE 1 MILLIGRAM(S): 2 INJECTION INTRAMUSCULAR; INTRAVENOUS; SUBCUTANEOUS at 02:56

## 2017-12-14 RX ADMIN — HYDROMORPHONE HYDROCHLORIDE 0.5 MILLIGRAM(S): 2 INJECTION INTRAMUSCULAR; INTRAVENOUS; SUBCUTANEOUS at 00:24

## 2017-12-14 RX ADMIN — CALCITRIOL 0.5 MICROGRAM(S): 0.5 CAPSULE ORAL at 13:46

## 2017-12-14 RX ADMIN — Medication 125 MILLIGRAM(S): at 02:40

## 2017-12-14 RX ADMIN — Medication 1 MILLIGRAM(S): at 13:46

## 2017-12-14 RX ADMIN — HYDROMORPHONE HYDROCHLORIDE 2 MILLIGRAM(S): 2 INJECTION INTRAMUSCULAR; INTRAVENOUS; SUBCUTANEOUS at 17:32

## 2017-12-14 RX ADMIN — Medication 1 TABLET(S): at 13:46

## 2017-12-14 RX ADMIN — Medication 1 SPRAY(S): at 09:30

## 2017-12-14 RX ADMIN — MOXIFLOXACIN HYDROCHLORIDE TABLETS, 400 MG 400 MILLIGRAM(S): 400 TABLET, FILM COATED ORAL at 17:23

## 2017-12-14 RX ADMIN — CHLORHEXIDINE GLUCONATE 1 APPLICATION(S): 213 SOLUTION TOPICAL at 13:22

## 2017-12-14 RX ADMIN — HYDROMORPHONE HYDROCHLORIDE 1 MILLIGRAM(S): 2 INJECTION INTRAMUSCULAR; INTRAVENOUS; SUBCUTANEOUS at 09:31

## 2017-12-14 RX ADMIN — Medication 125 MILLIGRAM(S): at 22:56

## 2017-12-14 RX ADMIN — Medication 1 APPLICATION(S): at 12:14

## 2017-12-14 RX ADMIN — HYDROMORPHONE HYDROCHLORIDE 1 MILLIGRAM(S): 2 INJECTION INTRAMUSCULAR; INTRAVENOUS; SUBCUTANEOUS at 09:45

## 2017-12-14 RX ADMIN — Medication 125 MILLIGRAM(S): at 08:28

## 2017-12-14 RX ADMIN — Medication 650 MILLIGRAM(S): at 13:46

## 2017-12-14 RX ADMIN — HYDROMORPHONE HYDROCHLORIDE 1 MILLIGRAM(S): 2 INJECTION INTRAMUSCULAR; INTRAVENOUS; SUBCUTANEOUS at 02:41

## 2017-12-14 RX ADMIN — MAGNESIUM OXIDE 400 MG ORAL TABLET 400 MILLIGRAM(S): 241.3 TABLET ORAL at 13:46

## 2017-12-14 RX ADMIN — Medication 125 MILLIGRAM(S): at 13:46

## 2017-12-14 RX ADMIN — MAGNESIUM OXIDE 400 MG ORAL TABLET 400 MILLIGRAM(S): 241.3 TABLET ORAL at 08:28

## 2017-12-14 RX ADMIN — PIPERACILLIN AND TAZOBACTAM 25 GRAM(S): 4; .5 INJECTION, POWDER, LYOPHILIZED, FOR SOLUTION INTRAVENOUS at 08:28

## 2017-12-14 RX ADMIN — HYDROMORPHONE HYDROCHLORIDE 0.5 MILLIGRAM(S): 2 INJECTION INTRAMUSCULAR; INTRAVENOUS; SUBCUTANEOUS at 00:39

## 2017-12-14 RX ADMIN — MAGNESIUM OXIDE 400 MG ORAL TABLET 400 MILLIGRAM(S): 241.3 TABLET ORAL at 17:23

## 2017-12-14 RX ADMIN — HYDROMORPHONE HYDROCHLORIDE 2 MILLIGRAM(S): 2 INJECTION INTRAMUSCULAR; INTRAVENOUS; SUBCUTANEOUS at 18:30

## 2017-12-14 NOTE — PROGRESS NOTE ADULT - SUBJECTIVE AND OBJECTIVE BOX
[facility] DIVISION OF KIDNEY DISEASES AND HYPERTENSION -- FOLLOW UP NOTE  --------------------------------------------------------------------------------  HPI: 36-year-old female with PMH of sickle cell disease, and 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 Pt. has been on PD since 3/2017. Pt.'s outpatient nephrologist is Dr. Larsen. Pt. states she has not had any problems with PD. PD catheter is working well. Pt. says she is on CCPD 3 exchanges (fill volume: 2000cc with 2.5% dextrose dianeal fluid and a last fill). Pt. was found to have Cdiff infection currently on ABX.  Pt. was complaining of chest pain and was found to have acute chest syndrome. Pt. was transferred to MICU and underwent exchange transfusion as per hematology on 12/11. Pt. was transferred back to medical floors yesterday. Pt. seen and examined at bedside. Pt. states she feels better and denies any SOB or chest pain.        PAST HISTORY  --------------------------------------------------------------------------------  No significant changes to PMH, PSH, FHx, SHx, unless otherwise noted    ALLERGIES & MEDICATIONS  --------------------------------------------------------------------------------  Allergies    No Known Allergies    Intolerances      Standing Inpatient Medications  azithromycin  IVPB 500 milliGRAM(s) IV Intermittent every 24 hours  azithromycin  IVPB      calcitriol   Capsule 0.5 MICROGram(s) Oral daily  calcium acetate 2668 milliGRAM(s) Oral three times a day with meals  chlorhexidine 4% Liquid 1 Application(s) Topical daily  folic acid 1 milliGRAM(s) Oral daily  Nephro-jay 1 Tablet(s) Oral daily  piperacillin/tazobactam IVPB. 3.375 Gram(s) IV Intermittent every 12 hours  vancomycin    Solution 125 milliGRAM(s) Oral every 6 hours    PRN Inpatient Medications  acetaminophen   Tablet 650 milliGRAM(s) Oral every 6 hours PRN  ALBUTerol/ipratropium for Nebulization 3 milliLiter(s) Nebulizer every 6 hours PRN  HYDROmorphone  Injectable 1 milliGRAM(s) IV Push every 4 hours PRN  HYDROmorphone  Injectable 0.5 milliGRAM(s) IV Push every 4 hours PRN  naloxone Injectable 0.1 milliGRAM(s) IV Push every 3 minutes PRN  ondansetron Injectable 4 milliGRAM(s) IV Push every 6 hours PRN      REVIEW OF SYSTEMS  --------------------------------------------------------------------------------  Gen: No fevers  Head/Eyes/Ears/Mouth: No headache  Respiratory: No dyspnea  CV: see HPI   GI: No abdominal pain  : No dysuria  MSK: No edema   Heme: As per HPI       VITALS/PHYSICAL EXAM  --------------------------------------------------------------------------------  T(C): 36.9 (12-14-17 @ 06:41), Max: 37.6 (12-13-17 @ 20:22)  HR: 92 (12-14-17 @ 06:41) (92 - 111)  BP: 123/73 (12-14-17 @ 06:41) (112/81 - 156/90)  RR: 18 (12-14-17 @ 06:41) (18 - 33)  SpO2: 98% (12-14-17 @ 06:41) (91% - 100%)  Wt(kg): --        12-12-17 @ 07:01  -  12-13-17 @ 07:00  --------------------------------------------------------  IN: 5550 mL / OUT: 4800 mL / NET: 750 mL    12-13-17 @ 07:01  -  12-14-17 @ 06:56  --------------------------------------------------------  IN: 6350 mL / OUT: 6600 mL / NET: -250 mL      Physical Exam:  	Gen: Resting in bed  	HEENT: No JVD   	Pulm: CTA B/L  	CV: RRR, S1S2+  	Abd: soft + PD catheter seen intact  	: No suprapubic tenderness  	LE: Warm, no edema  	Neuro: Awake   	Psych: Normal affect and mood  	Skin: Warm      LABS/STUDIES  --------------------------------------------------------------------------------              8.0    22.08 >-----------<  79       [12-13-17 @ 03:00]              23.3     135  |  92  |  85  ----------------------------<  101      [12-13-17 @ 03:00]  4.0   |  23  |  8.49        Ca     7.3     [12-13-17 @ 03:00]      Mg     1.6     [12-13-17 @ 03:00]      Phos  5.1     [12-13-17 @ 03:00]    TPro  5.4  /  Alb  2.4  /  TBili  1.1  /  DBili  0.5  /  AST  28  /  ALT  29  /  AlkPhos  191  [12-13-17 @ 03:00]        LDH 1442      [12-12-17 @ 22:00]    Creatinine Trend:  SCr 8.49 [12-13 @ 03:00]  SCr 8.53 [12-12 @ 22:00]  SCr 9.41 [12-12 @ 03:00]  SCr 11.04 [12-11 @ 06:50]  SCr 9.97 [12-10 @ 07:10]        Iron 71, TIBC 165, %sat --      [08-06-17 @ 18:30]  Ferritin 2501      [08-06-17 @ 18:30]  Vitamin D (25OH) 6.1      [08-08-17 @ 06:40]  HbA1c 3.8      [01-13-17 @ 11:35]  TSH 4.51      [01-13-17 @ 11:35]  Lipid: chol 263, , HDL 50,       [01-13-17 @ 11:35] [facility] DIVISION OF KIDNEY DISEASES AND HYPERTENSION -- FOLLOW UP NOTE  --------------------------------------------------------------------------------  HPI: 36-year-old female with PMH of sickle cell disease, and 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 Pt. has been on PD since 3/2017. Pt.'s outpatient nephrologist is Dr. Larsen. Pt. states she has not had any problems with PD. PD catheter is working well. Pt. says she is on CCPD 3 exchanges (fill volume: 2000cc with 2.5% dextrose dianeal fluid and a last fill). Pt. was found to have Cdiff infection currently on ABX.  Pt. was complaining of chest pain and was found to have acute chest syndrome. Pt. was transferred to MICU and underwent exchange transfusion as per hematology on 12/11. Pt. was transferred back to medical floor yesterday. Pt. seen and examined at bedside. Pt. states she feels better and denies any SOB or chest pain.      PAST HISTORY  --------------------------------------------------------------------------------  No significant changes to PMH, PSH, FHx, SHx, unless otherwise noted    ALLERGIES & MEDICATIONS  --------------------------------------------------------------------------------  Allergies    No Known Allergies    Intolerances    Standing Inpatient Medications  azithromycin  IVPB 500 milliGRAM(s) IV Intermittent every 24 hours  azithromycin  IVPB      calcitriol   Capsule 0.5 MICROGram(s) Oral daily  calcium acetate 2668 milliGRAM(s) Oral three times a day with meals  chlorhexidine 4% Liquid 1 Application(s) Topical daily  folic acid 1 milliGRAM(s) Oral daily  Nephro-jay 1 Tablet(s) Oral daily  piperacillin/tazobactam IVPB. 3.375 Gram(s) IV Intermittent every 12 hours  vancomycin    Solution 125 milliGRAM(s) Oral every 6 hours    REVIEW OF SYSTEMS  --------------------------------------------------------------------------------  Gen: No fevers  Head/Eyes/Ears/Mouth: No headache  Respiratory: No dyspnea  CV: see HPI   GI: No abdominal pain  : No dysuria  MSK: No edema   Heme: As per \[facility]\""     VITALS/PHYSICAL EXAM  --------------------------------------------------------------------------------  T(C): 36.9 (12-14-17 @ 06:41), Max: 37.6 (12-13-17 @ 20:22)  HR: 92 (12-14-17 @ 06:41) (92 - 111)  BP: 123/73 (12-14-17 @ 06:41) (112/81 - 156/90)  RR: 18 (12-14-17 @ 06:41) (18 - 33)  SpO2: 98% (12-14-17 @ 06:41) (91% - 100%)  Wt(kg): --    12-12-17 @ 07:01  -  12-13-17 @ 07:00  --------------------------------------------------------  IN: 5550 mL / OUT: 4800 mL / NET: 750 mL    12-13-17 @ 07:01  -  12-14-17 @ 06:56  --------------------------------------------------------  IN: 6350 mL / OUT: 6600 mL / NET: -250 mL    Physical Exam:  	Gen: Resting in bed  	HEENT: No JVD   	Pulm: CTA B/L  	CV: RRR, S1S2+  	Abd: soft + PD catheter seen intact  	: No suprapubic tenderness  	LE: Warm, no edema  	Neuro: Awake   	Psych: Normal affect and mood  	Skin: Warm    LABS/STUDIES  --------------------------------------------------------------------------------              8.0    22.08 >-----------<  79       [12-13-17 @ 03:00]              23.3     135  |  92  |  85  ----------------------------<  101      [12-13-17 @ 03:00]  4.0   |  23  |  8.49        Ca     7.3     [12-13-17 @ 03:00]      Mg     1.6     [12-13-17 @ 03:00]      Phos  5.1     [12-13-17 @ 03:00]    TPro  5.4  /  Alb  2.4  /  TBili  1.1  /  DBili  0.5  /  AST  28  /  ALT  29  /  AlkPhos  191  [12-13-17 @ 03:00]    Creatinine Trend:  SCr 8.49 [12-13 @ 03:00]  SCr 8.53 [12-12 @ 22:00]  SCr 9.41 [12-12 @ 03:00]  SCr 11.04 [12-11 @ 06:50]  SCr 9.97 [12-10 @ 07:10]    Iron 71, TIBC 165, %sat --      [08-06-17 @ 18:30]  Ferritin 2501      [08-06-17 @ 18:30]  Vitamin D (25OH) 6.1      [08-08-17 @ 06:40]  HbA1c 3.8      [01-13-17 @ 11:35]  TSH 4.51      [01-13-17 @ 11:35]  Lipid: chol 263, , HDL 50,       [01-13-17 @ 11:35]

## 2017-12-14 NOTE — CHART NOTE - NSCHARTNOTESELECT_GEN_ALL_CORE
MAR Accept Note/Event Note
Event Note
Event Note
Ortho Procedure Note
Ortho Results Note
Transfer Note
Ultrasound guided IV

## 2017-12-14 NOTE — PROGRESS NOTE ADULT - PROBLEM SELECTOR PLAN 2
SIRS criteria of tachy >90, leukocytosis with +C.diff  - Continue PO Vanc, diarrhea appears to have resolved  - Continue zosyn (day 4), azithromycin (day 3) until urine legionella returns negative  - Clinically improving SIRS criteria of tachy >90, leukocytosis with +C.diff  - Continue PO Vanc, diarrhea appears to have resolved  - Continue zosyn (day 4), azithromycin (day 3) until urine legionella returns negative (may be unable to obtain due to patient's ESRD, unclear if she is anuric as patient reports urine production but nurses unable to obtain sample yesterday)  - Clinically improving

## 2017-12-14 NOTE — CHART NOTE - NSCHARTNOTEFT_GEN_A_CORE
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 After admission, pt noted to be altered, febrile to 101.4 w/leukocytosis, hypoxia, and new b/l infiltrates on repeat CXR consistent w/acute chest. Pt transferred to MICU for exchange transfusion, started on zosyn and azithro. Prior to transfer pt developed diarrhea, cdiff+, started on PO vanco. Additionally had aspiration of R knee, fluid analysis not consistent w/septic or crystalline arthropathy. PD peritoneal cx neg. Pt had improvement in pain, oxygenation, and AMS (AOx1 initially, now AOx2-3). While in MICU, pt had drop in plt to 34 from 155 the day prior, hep dvt ppx d/c for concern of HIT, HIT ab sent. Pt clinically improved, AFVSS, stable for transfer to the floor.    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    1) SS Dz c/b Acute Chest s/p Exchange Transfusion  - afebrile, hypoxia resolved, satting well on 2LNC  - c/w azithro, zosyn for acute chest  - c/w dilaudid PRN for pain control. No constipation, given prev diarrhea hold off on stool softeners for now  - c/w folic acid  - monitor H+H, hemolysis labs, heme following    2) AMS  - likely multifactorial 2/2 sepsis, uremia elevation above baseline, severe pain  - CTH neg for acute pathology  - improving, continue to monitor    3) C Diff Diarrhea  - No further episodes of diarrhea, afebrile but still sig leukocytosis, c/w PO vanc    4) Thrombocytopenia  - Drop in plt more likely related to sepsis, 4T score 3, low probability HIT, will f/u HIT ab, send ROSEANNA in AM  - Given low likelihood of HIT, will hold off on argatroban gtt, c/w SCDs    5) 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

## 2017-12-14 NOTE — PROGRESS NOTE ADULT - ASSESSMENT
36 year old female with sickle cell disease and 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 Pt. found to have acute chest syndrome and was transferred to the MICU for exchange transfusion. Pt. now transferred back to medical floors. Pt. on CAPD during current hospital stay. 36 year old female with sickle cell disease and 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 Pt. found to have acute chest syndrome and was transferred to the MICU for exchange transfusion. Pt. now transferred back to medical floor. Pt. on CAPD during current hospital stay.

## 2017-12-14 NOTE — PROGRESS NOTE ADULT - PROBLEM SELECTOR PLAN 5
Unclear eitiology may be consumptive given sickle cell crisis and elevated LDH. Low concern for HIT (4T score 3, <5% probability HIT) but patient is off heparin while HIT assay/ROSEANNA are pending.  - F/U HIT panel/ROSEANNA  - Trend platelets on CBC

## 2017-12-14 NOTE — PROGRESS NOTE ADULT - SUBJECTIVE AND OBJECTIVE BOX
CONTACT INFO  Nico Summers M.D., PGY-1  Pager: NS- [phone], [facility]- [phone]    Mon-Fri: pager covered by day team 7am-7pm;   ***Academic conferences M-F 8am-9am & 12pm-1pm- page ONLY if URGENT or if Consultant  /Sabine: see chart, primary physician assigned available 7am-12pm  Sat/Beatty Cross Coverage 12pm-7pm: NS- page [phone] for Team1-4, [facility]- pager forwarded to covering Resident  For Night coverage 7pm-7am: NS- page [phone] Team1-3, page [phone] Team4 & Care Model    ERIC ELIZABETH  36y  Female      Patient is a 36y old  Female who presents with a chief complaint of Pain (11 Dec 2017 12:28)      INTERVAL HPI/OVERNIGHT EVENTS: Patient accepted as MICU bounceback overnight. Patient admitted 12/9 for chest pain which progressed to involve bilateral knees and elbows suspicious for sickle cell pain crisis. However became febrile, disoriented, and c/o increasing R knee pain and repeat CXR showing new infiltrate so she was tx'd to MICU 12/11 for exchange transfusion for acute chest. In MICU successfully exchanged with adequate improvement in HgbS, however she remained altered with unclear eitiology, CT head was negative, R knee fluid/blood cx and peritoneal cx have been negative although she is c. diff positive. In the ICU platelet count was noted to have dropped and HIT panel was sent (pending). Mental status has improved and patient is now transferred to the floor for further management. Antibiotics as follows: azithro 12/11-, PO vanc 12/11-, zosyn 12/10-, IV vanc x1 12/10. Last charted fever 12/10. This AM patient was successfully dialyzed and is breathing comfortably on 2L NC, now A+Ox3, continues to report severe R knee pain.     REVIEW OF SYSTEMS:  CONSTITUTIONAL: No fever  EYES: No eye pain  RESPIRATORY: No cough; No shortness of breath  CARDIOVASCULAR: No chest pain  GASTROINTESTINAL: No abdominal or epigastric pain. No diarrhea, patient has urge to defecate but then can not, has normal stool intermittently.   GENITOURINARY: No dysuria  NEUROLOGICAL: No headaches  MUSCULOSKELETAL: +9/10 R knee pain  SKIN: No rash  PSYCHIATRIC: No difficulty sleeping  HEME/LYMPH: No easy bleeding   ALLERY AND IMMUNOLOGIC: No hives or eczema    Vital Signs Last 24 Hrs  T(C): 36.3 (14 Dec 2017 07:30), Max: 37.6 (13 Dec 2017 20:22)  T(F): 97.3 (14 Dec 2017 07:30), Max: 99.6 (13 Dec 2017 20:22)  HR: 91 (14 Dec 2017 07:30) (91 - 111)  BP: 133/81 (14 Dec 2017 07:30) (112/81 - 156/90)  BP(mean): 80 (13 Dec 2017 22:00) (80 - 107)  RR: 18 (14 Dec 2017 07:30) (18 - 33)  SpO2: 98% (14 Dec 2017 06:41) (91% - 100%)    PHYSICAL EXAM:  GENERAL: NAD, sleepy during interview  ENMT: Moist mucous membranes, Good dentition  NERVOUS SYSTEM:  Alert & Oriented X3, lethargic but easily arousable falls asleep during conversation. Movement of R knee limited secondary to pain, moves other extremities well to command, sensation is intact. Facies symmetric, no dysarthria.   CHEST/LUNG: Fine crackles bilaterally  HEART: RRR, no m/r/g  ABDOMEN: +BSx4, soft, non-tender  EXTREMITIES:  R knee minimally swollen compared to L, no erythema or effusion noted. R leg tender from mid shin to knee. Pain with passive range of motion.   SKIN: No rashes     Consultant(s) Notes Reviewed: ICU    LABS:                        7.9    15.86 )-----------( 84       ( 14 Dec 2017 06:30 )             23.4     12-14    134<L>  |  92<L>  |  83<H>  ----------------------------<  92  3.6   |  25  |  8.77<H>    Ca    7.7<L>      14 Dec 2017 06:30  Phos  5.9     12-14  Mg     1.5     12-14    TPro  5.4<L>  /  Alb  2.4<L>  /  TBili  1.1  /  DBili  0.5<H>  /  AST  28  /  ALT  29  /  AlkPhos  191<H>  12-13

## 2017-12-14 NOTE — PROGRESS NOTE ADULT - ASSESSMENT
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 Now improved s/p exchange and transferred back to the floor for continued management.

## 2017-12-14 NOTE — PROGRESS NOTE ADULT - PROBLEM SELECTOR PLAN 4
Unclear eitiology, may be residual pain from pain crisis. Joint has been aspirated and does not appear infected.  - Continue pain control with dilaudid .5 q4 PRN and 1 q4 for breakthrough

## 2017-12-14 NOTE — PROGRESS NOTE ADULT - PROBLEM SELECTOR PLAN 1
Patient transferred to MICU for exchange transfusion given chest pain, fever, leukocytosis and infiltrates on CXR. Satting appropriately on 2L, minimal chest pain currently.   - Continue zosyn/azithro  - F/U Legionella Patient transferred to MICU for exchange transfusion given chest pain, fever, leukocytosis and infiltrates on CXR. Satting appropriately on 2L, minimal chest pain currently.   - Continue zosyn/azithro for planned 7 day course  - F/U Legionella

## 2017-12-15 LAB
ANISOCYTOSIS BLD QL: SLIGHT — SIGNIFICANT CHANGE UP
BACTERIA BLD CULT: SIGNIFICANT CHANGE UP
BACTERIA BLD CULT: SIGNIFICANT CHANGE UP
BACTERIA FLD CULT: SIGNIFICANT CHANGE UP
BASOPHILS # BLD AUTO: 0.04 K/UL — SIGNIFICANT CHANGE UP (ref 0–0.2)
BASOPHILS NFR BLD AUTO: 0.3 % — SIGNIFICANT CHANGE UP (ref 0–2)
BASOPHILS NFR SPEC: 0 % — SIGNIFICANT CHANGE UP (ref 0–2)
BUN SERPL-MCNC: 76 MG/DL — HIGH (ref 7–23)
CALCIUM SERPL-MCNC: 8.2 MG/DL — LOW (ref 8.4–10.5)
CHLORIDE SERPL-SCNC: 93 MMOL/L — LOW (ref 98–107)
CO2 SERPL-SCNC: 25 MMOL/L — SIGNIFICANT CHANGE UP (ref 22–31)
CREAT SERPL-MCNC: 7.86 MG/DL — HIGH (ref 0.5–1.3)
EOSINOPHIL # BLD AUTO: 0.51 K/UL — HIGH (ref 0–0.5)
EOSINOPHIL NFR BLD AUTO: 3.6 % — SIGNIFICANT CHANGE UP (ref 0–6)
EOSINOPHIL NFR FLD: 0 % — SIGNIFICANT CHANGE UP (ref 0–6)
GIANT PLATELETS BLD QL SMEAR: PRESENT — SIGNIFICANT CHANGE UP
GLUCOSE SERPL-MCNC: 88 MG/DL — SIGNIFICANT CHANGE UP (ref 70–99)
HCT VFR BLD CALC: 27.9 % — LOW (ref 34.5–45)
HGB BLD-MCNC: 9.4 G/DL — LOW (ref 11.5–15.5)
HYPOCHROMIA BLD QL: SLIGHT — SIGNIFICANT CHANGE UP
IMM GRANULOCYTES # BLD AUTO: 0.45 # — SIGNIFICANT CHANGE UP
IMM GRANULOCYTES NFR BLD AUTO: 3.2 % — HIGH (ref 0–1.5)
LYMPHOCYTES # BLD AUTO: 0.41 K/UL — LOW (ref 1–3.3)
LYMPHOCYTES # BLD AUTO: 2.9 % — LOW (ref 13–44)
LYMPHOCYTES NFR SPEC AUTO: 3 % — LOW (ref 13–44)
MACROCYTES BLD QL: SLIGHT — SIGNIFICANT CHANGE UP
MAGNESIUM SERPL-MCNC: 1.7 MG/DL — SIGNIFICANT CHANGE UP (ref 1.6–2.6)
MCHC RBC-ENTMCNC: 28.6 PG — SIGNIFICANT CHANGE UP (ref 27–34)
MCHC RBC-ENTMCNC: 33.7 % — SIGNIFICANT CHANGE UP (ref 32–36)
MCV RBC AUTO: 84.8 FL — SIGNIFICANT CHANGE UP (ref 80–100)
MONOCYTES # BLD AUTO: 1.64 K/UL — HIGH (ref 0–0.9)
MONOCYTES NFR BLD AUTO: 11.6 % — SIGNIFICANT CHANGE UP (ref 2–14)
MONOCYTES NFR BLD: 8.8 % — SIGNIFICANT CHANGE UP (ref 2–9)
MYELOCYTES NFR BLD: 2.9 % — HIGH (ref 0–0)
NEUTROPHIL AB SER-ACNC: 80.9 % — HIGH (ref 43–77)
NEUTROPHILS # BLD AUTO: 11.06 K/UL — HIGH (ref 1.8–7.4)
NEUTROPHILS NFR BLD AUTO: 78.4 % — HIGH (ref 43–77)
NEUTS BAND # BLD: 2.9 % — SIGNIFICANT CHANGE UP (ref 0–6)
NRBC # FLD: 9.07 — SIGNIFICANT CHANGE UP
NRBC FLD-RTO: 64.3 — SIGNIFICANT CHANGE UP
PHOSPHATE SERPL-MCNC: 6.1 MG/DL — HIGH (ref 2.5–4.5)
PLATELET # BLD AUTO: 127 K/UL — LOW (ref 150–400)
PLATELET COUNT - ESTIMATE: SIGNIFICANT CHANGE UP
PMV BLD: 11.4 FL — SIGNIFICANT CHANGE UP (ref 7–13)
POIKILOCYTOSIS BLD QL AUTO: SIGNIFICANT CHANGE UP
POLYCHROMASIA BLD QL SMEAR: SIGNIFICANT CHANGE UP
POTASSIUM SERPL-MCNC: 3.8 MMOL/L — SIGNIFICANT CHANGE UP (ref 3.5–5.3)
POTASSIUM SERPL-SCNC: 3.8 MMOL/L — SIGNIFICANT CHANGE UP (ref 3.5–5.3)
RBC # BLD: 3.29 M/UL — LOW (ref 3.8–5.2)
RBC # FLD: 21.3 % — HIGH (ref 10.3–14.5)
REVIEW TO FOLLOW: YES — SIGNIFICANT CHANGE UP
SICKLE CELLS BLD QL SMEAR: SIGNIFICANT CHANGE UP
SODIUM SERPL-SCNC: 136 MMOL/L — SIGNIFICANT CHANGE UP (ref 135–145)
SRA INTERP SER-IMP: SIGNIFICANT CHANGE UP
TARGETS BLD QL SMEAR: SIGNIFICANT CHANGE UP
VARIANT LYMPHS # BLD: 1.5 % — SIGNIFICANT CHANGE UP
WBC # BLD: 14.11 K/UL — HIGH (ref 3.8–10.5)
WBC # FLD AUTO: 14.11 K/UL — HIGH (ref 3.8–10.5)

## 2017-12-15 PROCEDURE — 99232 SBSQ HOSP IP/OBS MODERATE 35: CPT | Mod: GC

## 2017-12-15 PROCEDURE — 90945 DIALYSIS ONE EVALUATION: CPT | Mod: GC

## 2017-12-15 PROCEDURE — 99233 SBSQ HOSP IP/OBS HIGH 50: CPT | Mod: GC

## 2017-12-15 RX ORDER — HEPARIN SODIUM 5000 [USP'U]/ML
5000 INJECTION INTRAVENOUS; SUBCUTANEOUS EVERY 8 HOURS
Qty: 0 | Refills: 0 | Status: DISCONTINUED | OUTPATIENT
Start: 2017-12-15 | End: 2017-12-17

## 2017-12-15 RX ORDER — TRAMADOL HYDROCHLORIDE 50 MG/1
50 TABLET ORAL EVERY 12 HOURS
Qty: 0 | Refills: 0 | Status: DISCONTINUED | OUTPATIENT
Start: 2017-12-15 | End: 2017-12-17

## 2017-12-15 RX ADMIN — CALCITRIOL 0.5 MICROGRAM(S): 0.5 CAPSULE ORAL at 13:28

## 2017-12-15 RX ADMIN — Medication 125 MILLIGRAM(S): at 20:23

## 2017-12-15 RX ADMIN — CHLORHEXIDINE GLUCONATE 1 APPLICATION(S): 213 SOLUTION TOPICAL at 13:12

## 2017-12-15 RX ADMIN — HEPARIN SODIUM 5000 UNIT(S): 5000 INJECTION INTRAVENOUS; SUBCUTANEOUS at 13:28

## 2017-12-15 RX ADMIN — Medication 125 MILLIGRAM(S): at 02:52

## 2017-12-15 RX ADMIN — Medication 125 MILLIGRAM(S): at 13:28

## 2017-12-15 RX ADMIN — MOXIFLOXACIN HYDROCHLORIDE TABLETS, 400 MG 400 MILLIGRAM(S): 400 TABLET, FILM COATED ORAL at 13:28

## 2017-12-15 RX ADMIN — HEPARIN SODIUM 5000 UNIT(S): 5000 INJECTION INTRAVENOUS; SUBCUTANEOUS at 21:27

## 2017-12-15 RX ADMIN — HYDROMORPHONE HYDROCHLORIDE 4 MILLIGRAM(S): 2 INJECTION INTRAMUSCULAR; INTRAVENOUS; SUBCUTANEOUS at 01:26

## 2017-12-15 RX ADMIN — HYDROMORPHONE HYDROCHLORIDE 2 MILLIGRAM(S): 2 INJECTION INTRAMUSCULAR; INTRAVENOUS; SUBCUTANEOUS at 20:47

## 2017-12-15 RX ADMIN — Medication 1 MILLIGRAM(S): at 13:28

## 2017-12-15 RX ADMIN — HYDROMORPHONE HYDROCHLORIDE 4 MILLIGRAM(S): 2 INJECTION INTRAMUSCULAR; INTRAVENOUS; SUBCUTANEOUS at 06:14

## 2017-12-15 RX ADMIN — Medication 125 MILLIGRAM(S): at 09:08

## 2017-12-15 RX ADMIN — Medication 1 TABLET(S): at 13:28

## 2017-12-15 RX ADMIN — HYDROMORPHONE HYDROCHLORIDE 4 MILLIGRAM(S): 2 INJECTION INTRAMUSCULAR; INTRAVENOUS; SUBCUTANEOUS at 14:00

## 2017-12-15 RX ADMIN — HYDROMORPHONE HYDROCHLORIDE 4 MILLIGRAM(S): 2 INJECTION INTRAMUSCULAR; INTRAVENOUS; SUBCUTANEOUS at 18:50

## 2017-12-15 RX ADMIN — HYDROMORPHONE HYDROCHLORIDE 4 MILLIGRAM(S): 2 INJECTION INTRAMUSCULAR; INTRAVENOUS; SUBCUTANEOUS at 02:26

## 2017-12-15 RX ADMIN — Medication 1 SPRAY(S): at 13:41

## 2017-12-15 RX ADMIN — HYDROMORPHONE HYDROCHLORIDE 4 MILLIGRAM(S): 2 INJECTION INTRAMUSCULAR; INTRAVENOUS; SUBCUTANEOUS at 19:30

## 2017-12-15 RX ADMIN — HYDROMORPHONE HYDROCHLORIDE 4 MILLIGRAM(S): 2 INJECTION INTRAMUSCULAR; INTRAVENOUS; SUBCUTANEOUS at 23:32

## 2017-12-15 RX ADMIN — HYDROMORPHONE HYDROCHLORIDE 2 MILLIGRAM(S): 2 INJECTION INTRAMUSCULAR; INTRAVENOUS; SUBCUTANEOUS at 21:47

## 2017-12-15 RX ADMIN — HYDROMORPHONE HYDROCHLORIDE 4 MILLIGRAM(S): 2 INJECTION INTRAMUSCULAR; INTRAVENOUS; SUBCUTANEOUS at 13:29

## 2017-12-15 RX ADMIN — HYDROMORPHONE HYDROCHLORIDE 4 MILLIGRAM(S): 2 INJECTION INTRAMUSCULAR; INTRAVENOUS; SUBCUTANEOUS at 06:55

## 2017-12-15 NOTE — PROGRESS NOTE ADULT - PROBLEM SELECTOR PLAN 1
-Resolved s/p RBC exchange transfusion. Post-procedural hgb electrophoresis resulted with HgbS <30%, at goal. No need for further exchange. Shiley can be removed.  -c/w supportive care- IVF, analgesia, folic acid.

## 2017-12-15 NOTE — PROGRESS NOTE ADULT - PROBLEM SELECTOR PLAN 2
SIRS criteria of tachy >90, leukocytosis with +C.diff. Both tachycardia and leukocytosis have been improving. Patient reports 7 stools yesterday, improved from prior.   - Continue PO Vanc for 10 day course  - Continue moxiflocacin for 7 day course  - Continues to improve  - F/U AM CBC for WBC

## 2017-12-15 NOTE — PROGRESS NOTE ADULT - PROBLEM SELECTOR PLAN 4
Unclear eitiology, may be residual pain from pain crisis. Joint has been aspirated and does not appear infected. Appears to be primarily tibial pain  - Transitioned to PO dilaudid 2 q4, 4 q4 for break through yesterday. Also has tramadol 50 q12 (renal dose) and tylenol PRNs.  - Pain improved this AM and she has not been requesting dilaudid as frequently as available

## 2017-12-15 NOTE — PROGRESS NOTE ADULT - SUBJECTIVE AND OBJECTIVE BOX
Hematology Follow-up    INTERVAL HPI/OVERNIGHT EVENTS:  Patient S&E at bedside. No o/n events, patient resting comfortably. No complaints at this time. Patient denies fever, chills, dizziness, weakness, CP, palpitations, SOB, cough, N/V/D/C, dysuria, LE edema. Does not remember first few days of hospitalization. States she had three loose BMs yesterday.    MEDICATIONS  (STANDING):  calcitriol   Capsule 0.5 MICROGram(s) Oral daily  calcium acetate 2668 milliGRAM(s) Oral three times a day with meals  chlorhexidine 4% Liquid 1 Application(s) Topical daily  folic acid 1 milliGRAM(s) Oral daily  gentamicin 0.1% Cream 1 Application(s) Topical daily  heparin  Injectable 5000 Unit(s) SubCutaneous every 8 hours  moxifloxacin 400 milliGRAM(s) Oral daily  Nephro-jay 1 Tablet(s) Oral daily  vancomycin    Solution 125 milliGRAM(s) Oral every 6 hours    MEDICATIONS  (PRN):  ALBUTerol/ipratropium for Nebulization 3 milliLiter(s) Nebulizer every 6 hours PRN Shortness of Breath  HYDROmorphone   Tablet 2 milliGRAM(s) Oral every 4 hours PRN Moderate Pain (4 - 6)  HYDROmorphone   Tablet 4 milliGRAM(s) Oral every 4 hours PRN Severe Pain (7 - 10)  naloxone Injectable 0.1 milliGRAM(s) IV Push every 3 minutes PRN For ANY of the following changes in patient status:  A. RR LESS THAN 10 breaths per minute, B. Oxygen saturation LESS THAN 90%, C. Sedation score of 6  ondansetron Injectable 4 milliGRAM(s) IV Push every 6 hours PRN Nausea  sodium chloride 0.65% Nasal 1 Spray(s) Both Nostrils every 1 hour PRN Nasal Congestion  traMADol 50 milliGRAM(s) Oral every 12 hours PRN Severe Pain (7 - 10)      No Known Allergies      VITAL SIGNS:  T(F): 98.3 (12-15-17 @ 12:14)  HR: 101 (12-15-17 @ 12:14)  BP: 134/94 (12-15-17 @ 12:14)  RR: 18 (12-15-17 @ 12:14)  SpO2: 100% (12-15-17 @ 12:14)  Wt(kg): --    PHYSICAL EXAM:    Constitutional: Alert, awake, NAD. Mild persistent inattentiveness  Eyes: PERRL, EOMI, sclera non-icteric  Neck: supple, no masses, no JVD. R IJ shiley in place, c/d/i  Respiratory: CTA b/l, good air entry b/l, no wheezing, rhonchi, rales, with normal respiratory effort and no intercostal retractions  Cardiovascular: Tachycardic, normal S1S2, no M/R/G  Gastrointestinal: soft, NTND, no masses palpable, BS normal in all four quadrants, no HSM  Extremities:  no c/c/e  Neurological: Grossly intact  Skin: Normal temperature    LABS:                        9.4    14.11 )-----------( 127      ( 15 Dec 2017 06:01 )             27.9     12-15    136  |  93<L>  |  76<H>  ----------------------------<  88  3.8   |  25  |  7.86<H>    Ca    8.2<L>      15 Dec 2017 06:01  Phos  6.1     12-15  Mg     1.7     12-15             RADIOLOGY & ADDITIONAL TESTS:  Studies reviewed. Hematology Follow-up    INTERVAL HPI/OVERNIGHT EVENTS:  Patient S&E at bedside. No o/n events, patient resting in bed. Reports some pain typical of her VOC. Patient denies fever, chills, dizziness, weakness, CP, palpitations, SOB, cough, N/V/D/C, dysuria, LE edema. Does not remember first few days of hospitalization. States she had three loose BMs yesterday.    MEDICATIONS  (STANDING):  calcitriol   Capsule 0.5 MICROGram(s) Oral daily  calcium acetate 2668 milliGRAM(s) Oral three times a day with meals  chlorhexidine 4% Liquid 1 Application(s) Topical daily  folic acid 1 milliGRAM(s) Oral daily  gentamicin 0.1% Cream 1 Application(s) Topical daily  heparin  Injectable 5000 Unit(s) SubCutaneous every 8 hours  moxifloxacin 400 milliGRAM(s) Oral daily  Nephro-jay 1 Tablet(s) Oral daily  vancomycin    Solution 125 milliGRAM(s) Oral every 6 hours    MEDICATIONS  (PRN):  ALBUTerol/ipratropium for Nebulization 3 milliLiter(s) Nebulizer every 6 hours PRN Shortness of Breath  HYDROmorphone   Tablet 2 milliGRAM(s) Oral every 4 hours PRN Moderate Pain (4 - 6)  HYDROmorphone   Tablet 4 milliGRAM(s) Oral every 4 hours PRN Severe Pain (7 - 10)  naloxone Injectable 0.1 milliGRAM(s) IV Push every 3 minutes PRN For ANY of the following changes in patient status:  A. RR LESS THAN 10 breaths per minute, B. Oxygen saturation LESS THAN 90%, C. Sedation score of 6  ondansetron Injectable 4 milliGRAM(s) IV Push every 6 hours PRN Nausea  sodium chloride 0.65% Nasal 1 Spray(s) Both Nostrils every 1 hour PRN Nasal Congestion  traMADol 50 milliGRAM(s) Oral every 12 hours PRN Severe Pain (7 - 10)      No Known Allergies      VITAL SIGNS:  T(F): 98.3 (12-15-17 @ 12:14)  HR: 101 (12-15-17 @ 12:14)  BP: 134/94 (12-15-17 @ 12:14)  RR: 18 (12-15-17 @ 12:14)  SpO2: 100% (12-15-17 @ 12:14)  Wt(kg): --    PHYSICAL EXAM:    Constitutional: Alert, awake, NAD. Mild persistent inattentiveness  Eyes: PERRL, EOMI, sclera non-icteric  Neck: supple, no masses, no JVD. R IJ shiley in place, c/d/i  Respiratory: CTA b/l, good air entry b/l, no wheezing, rhonchi, rales, with normal respiratory effort and no intercostal retractions  Cardiovascular: Tachycardic, normal S1S2, no M/R/G  Gastrointestinal: soft, NTND, no masses palpable, BS normal in all four quadrants, no HSM  Extremities:  no c/c/e  Neurological: Grossly intact  Skin: Normal temperature    LABS:                        9.4    14.11 )-----------( 127      ( 15 Dec 2017 06:01 )             27.9     12-15    136  |  93<L>  |  76<H>  ----------------------------<  88  3.8   |  25  |  7.86<H>    Ca    8.2<L>      15 Dec 2017 06:01  Phos  6.1     12-15  Mg     1.7     12-15    RADIOLOGY & ADDITIONAL TESTS:    < from: CT Head No Cont (12.13.17 @ 11:21) >    IMPRESSION:  No acute intracranial pathology.    < end of copied text >

## 2017-12-15 NOTE — PROGRESS NOTE ADULT - PROBLEM SELECTOR PLAN 5
Unclear eitiology may be consumptive given sickle cell crisis and elevated LDH. HIT panel returns negative (.12 < .39)  - F/U AM CBC, if platelets >100 will restart HSQ  - Trend platelets on CBC

## 2017-12-15 NOTE — PROGRESS NOTE ADULT - SUBJECTIVE AND OBJECTIVE BOX
[facility] DIVISION OF KIDNEY DISEASES AND HYPERTENSION -- FOLLOW UP NOTE  --------------------------------------------------------------------------------  HPI: 36-year-old female with PMH of sickle cell disease, and 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 Pt. has been on PD since 3/2017. Pt.'s outpatient nephrologist is Dr. Larsen. Pt. states she has not had any problems with PD. PD catheter is working well. Pt. says she is on CCPD 3 exchanges (fill volume: 2000cc with 2.5% dextrose dianeal fluid and a last fill). Pt. was found to have Cdiff infection currently on ABX.  Pt. was complaining of chest pain and was found to have acute chest syndrome. Pt. was transferred to MICU and underwent exchange transfusion as per hematology on 12/11. Pt. was transferred back to medical floors. Pt. seen and examined at bedside. Pt. states she feels better and denies any SOB or chest pain.        PAST HISTORY  --------------------------------------------------------------------------------  No significant changes to PMH, PSH, FHx, SHx, unless otherwise noted    ALLERGIES & MEDICATIONS  --------------------------------------------------------------------------------  Allergies    No Known Allergies    Intolerances      Standing Inpatient Medications  calcitriol   Capsule 0.5 MICROGram(s) Oral daily  calcium acetate 2668 milliGRAM(s) Oral three times a day with meals  chlorhexidine 4% Liquid 1 Application(s) Topical daily  folic acid 1 milliGRAM(s) Oral daily  gentamicin 0.1% Cream 1 Application(s) Topical daily  moxifloxacin 400 milliGRAM(s) Oral daily  Nephro-jay 1 Tablet(s) Oral daily  vancomycin    Solution 125 milliGRAM(s) Oral every 6 hours    PRN Inpatient Medications  ALBUTerol/ipratropium for Nebulization 3 milliLiter(s) Nebulizer every 6 hours PRN  HYDROmorphone   Tablet 2 milliGRAM(s) Oral every 4 hours PRN  HYDROmorphone   Tablet 4 milliGRAM(s) Oral every 4 hours PRN  naloxone Injectable 0.1 milliGRAM(s) IV Push every 3 minutes PRN  ondansetron Injectable 4 milliGRAM(s) IV Push every 6 hours PRN  sodium chloride 0.65% Nasal 1 Spray(s) Both Nostrils every 1 hour PRN  traMADol 50 milliGRAM(s) Oral every 12 hours PRN      REVIEW OF SYSTEMS  --------------------------------------------------------------------------------  Gen: No fevers  Head/Eyes/Ears/Mouth: No headache  Respiratory: No dyspnea  CV: see HPI   GI: No abdominal pain  : No dysuria  MSK: No edema   Heme: As per HPI     VITALS/PHYSICAL EXAM  --------------------------------------------------------------------------------  T(C): 37.4 (12-15-17 @ 06:08), Max: 37.4 (12-15-17 @ 06:08)  HR: 100 (12-15-17 @ 06:08) (89 - 100)  BP: 130/93 (12-15-17 @ 06:08) (122/75 - 137/88)  RR: 16 (12-15-17 @ 06:08) (16 - 18)  SpO2: 96% (12-15-17 @ 06:08) (96% - 100%)  Wt(kg): --        12-13-17 @ 07:01  -  12-14-17 @ 07:00  --------------------------------------------------------  IN: 6350 mL / OUT: 6600 mL / NET: -250 mL    12-14-17 @ 07:01  -  12-15-17 @ 06:36  --------------------------------------------------------  IN: 6000 mL / OUT: 6700 mL / NET: -700 mL      Physical Exam:  	Gen: Resting in bed  	HEENT: No JVD   	Pulm: CTA B/L  	CV: RRR, S1S2+  	Abd: soft + PD catheter seen intact  	: No suprapubic tenderness  	LE: Warm, no edema  	Neuro: Awake   	Psych: Normal affect and mood  	Skin: Warm    LABS/STUDIES  --------------------------------------------------------------------------------              7.9    15.86 >-----------<  84       [12-14-17 @ 06:30]              23.4     134  |  92  |  83  ----------------------------<  92      [12-14-17 @ 06:30]  3.6   |  25  |  8.77        Ca     7.7     [12-14-17 @ 06:30]      Mg     1.5     [12-14-17 @ 06:30]      Phos  5.9     [12-14-17 @ 06:30]                [12-14-17 @ 06:30]    Creatinine Trend:  SCr 8.77 [12-14 @ 06:30]  SCr 8.49 [12-13 @ 03:00]  SCr 8.53 [12-12 @ 22:00]  SCr 9.41 [12-12 @ 03:00]  SCr 11.04 [12-11 @ 06:50]        Iron 71, TIBC 165, %sat --      [08-06-17 @ 18:30]  Ferritin 2501      [08-06-17 @ 18:30]  Vitamin D (25OH) 6.1      [08-08-17 @ 06:40]  HbA1c 3.8      [01-13-17 @ 11:35]  TSH 4.51      [01-13-17 @ 11:35]  Lipid: chol 263, , HDL 50,       [01-13-17 @ 11:35] [facility] DIVISION OF KIDNEY DISEASES AND HYPERTENSION -- FOLLOW UP NOTE  --------------------------------------------------------------------------------  HPI: 36-year-old female with PMH of sickle cell disease, and 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 Pt. has been on PD since 3/2017. Pt.'s outpatient nephrologist is Dr. Larsen. Pt. states she has not had any problems with PD. PD catheter is working well. Pt. says she is on CCPD 3 exchanges (fill volume: 2000cc with 2.5% dextrose dianeal fluid and a last fill). Pt. was found to have Cdiff infection currently on ABX.  Pt. was complaining of chest pain and was found to have acute chest syndrome. Pt. was transferred to MICU and underwent exchange transfusion as per hematology on 12/11. Pt. was transferred back to medical floors. Pt. seen and examined at bedside. Pt. states she feels better and denies any SOB or chest pain.      PAST HISTORY  --------------------------------------------------------------------------------  No significant changes to PMH, PSH, FHx, SHx, unless otherwise noted    ALLERGIES & MEDICATIONS  --------------------------------------------------------------------------------  Allergies    No Known Allergies    Intolerances    Standing Inpatient Medications  calcitriol   Capsule 0.5 MICROGram(s) Oral daily  calcium acetate 2668 milliGRAM(s) Oral three times a day with meals  chlorhexidine 4% Liquid 1 Application(s) Topical daily  folic acid 1 milliGRAM(s) Oral daily  gentamicin 0.1% Cream 1 Application(s) Topical daily  moxifloxacin 400 milliGRAM(s) Oral daily  Nephro-jay 1 Tablet(s) Oral daily  vancomycin    Solution 125 milliGRAM(s) Oral every 6 hours    PRN Inpatient Medications  ALBUTerol/ipratropium for Nebulization 3 milliLiter(s) Nebulizer every 6 hours PRN  HYDROmorphone   Tablet 2 milliGRAM(s) Oral every 4 hours PRN  HYDROmorphone   Tablet 4 milliGRAM(s) Oral every 4 hours PRN  naloxone Injectable 0.1 milliGRAM(s) IV Push every 3 minutes PRN  ondansetron Injectable 4 milliGRAM(s) IV Push every 6 hours PRN  sodium chloride 0.65% Nasal 1 Spray(s) Both Nostrils every 1 hour PRN  traMADol 50 milliGRAM(s) Oral every 12 hours PRN    REVIEW OF SYSTEMS  --------------------------------------------------------------------------------  Gen: No fevers  Head/Eyes/Ears/Mouth: No headache  Respiratory: No dyspnea  CV: see HPI   GI: No abdominal pain  : No dysuria  MSK: No edema   Heme: As per HPI     VITALS/PHYSICAL EXAM  --------------------------------------------------------------------------------  T(C): 37.4 (12-15-17 @ 06:08), Max: 37.4 (12-15-17 @ 06:08)  HR: 100 (12-15-17 @ 06:08) (89 - 100)  BP: 130/93 (12-15-17 @ 06:08) (122/75 - 137/88)  RR: 16 (12-15-17 @ 06:08) (16 - 18)  SpO2: 96% (12-15-17 @ 06:08) (96% - 100%)  Wt(kg): --    12-13-17 @ 07:01  -  12-14-17 @ 07:00  --------------------------------------------------------  IN: 6350 mL / OUT: 6600 mL / NET: -250 mL    12-14-17 @ 07:01  -  12-15-17 @ 06:36  --------------------------------------------------------  IN: 6000 mL / OUT: 6700 mL / NET: -700 mL    Physical Exam:  	Gen: Resting in bed  	HEENT: No JVD   	Pulm: CTA B/L  	CV: RRR, S1S2+  	Abd: soft + PD catheter seen intact  	: No suprapubic tenderness  	LE: Warm, no edema  	Neuro: Awake   	Psych: Normal affect and mood  	Skin: Warm    LABS/STUDIES  --------------------------------------------------------------------------------              7.9    15.86 >-----------<  84       [12-14-17 @ 06:30]              23.4     134  |  92  |  83  ----------------------------<  92      [12-14-17 @ 06:30]  3.6   |  25  |  8.77        Ca     7.7     [12-14-17 @ 06:30]      Mg     1.5     [12-14-17 @ 06:30]      Phos  5.9     [12-14-17 @ 06:30]    Creatinine Trend:  SCr 8.77 [12-14 @ 06:30]  SCr 8.49 [12-13 @ 03:00]  SCr 8.53 [12-12 @ 22:00]  SCr 9.41 [12-12 @ 03:00]  SCr 11.04 [12-11 @ 06:50]    Iron 71, TIBC 165, %sat --      [08-06-17 @ 18:30]  Ferritin 2501      [08-06-17 @ 18:30]  Vitamin D (25OH) 6.1      [08-08-17 @ 06:40]  HbA1c 3.8      [01-13-17 @ 11:35]  TSH 4.51      [01-13-17 @ 11:35]  Lipid: chol 263, , HDL 50,       [01-13-17 @ 11:35] [facility] DIVISION OF KIDNEY DISEASES AND HYPERTENSION -- FOLLOW UP NOTE  --------------------------------------------------------------------------------  HPI: 36-year-old female with PMH of sickle cell disease, and 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 Pt. has been on PD since 3/2017. Pt.'s outpatient nephrologist is Dr. Larsen. Pt. states she has not had any problems with PD. PD catheter is working well. Pt. says she is on CCPD 3 exchanges (fill volume: 2000cc with 2.5% dextrose dianeal fluid and a last fill). Pt. was found to have Cdiff infection currently on ABX.  Pt. was complaining of chest pain and was found to have acute chest syndrome. Pt. was transferred to MICU and underwent exchange transfusion as per hematology on 12/11. Pt. was transferred back to medical floors. Pt. seen and examined at bedside. Pt. states she feels better and denies any SOB or chest pain. Pt. c/o bilateral knee joint pains.     PAST HISTORY  --------------------------------------------------------------------------------  No significant changes to PMH, PSH, FHx, SHx, unless otherwise noted    ALLERGIES & MEDICATIONS  --------------------------------------------------------------------------------  Allergies    No Known Allergies    Intolerances    Standing Inpatient Medications  calcitriol   Capsule 0.5 MICROGram(s) Oral daily  calcium acetate 2668 milliGRAM(s) Oral three times a day with meals  chlorhexidine 4% Liquid 1 Application(s) Topical daily  folic acid 1 milliGRAM(s) Oral daily  gentamicin 0.1% Cream 1 Application(s) Topical daily  moxifloxacin 400 milliGRAM(s) Oral daily  Nephro-jay 1 Tablet(s) Oral daily  vancomycin    Solution 125 milliGRAM(s) Oral every 6 hours    PRN Inpatient Medications  ALBUTerol/ipratropium for Nebulization 3 milliLiter(s) Nebulizer every 6 hours PRN  HYDROmorphone   Tablet 2 milliGRAM(s) Oral every 4 hours PRN  HYDROmorphone   Tablet 4 milliGRAM(s) Oral every 4 hours PRN  naloxone Injectable 0.1 milliGRAM(s) IV Push every 3 minutes PRN  ondansetron Injectable 4 milliGRAM(s) IV Push every 6 hours PRN  sodium chloride 0.65% Nasal 1 Spray(s) Both Nostrils every 1 hour PRN  traMADol 50 milliGRAM(s) Oral every 12 hours PRN    REVIEW OF SYSTEMS  --------------------------------------------------------------------------------  Gen: No fevers  Head/Eyes/Ears/Mouth: No headache  Respiratory: No dyspnea  CV: see HPI   GI: No abdominal pain  : No dysuria  MSK: See HPI   Heme: See HPI     VITALS/PHYSICAL EXAM  --------------------------------------------------------------------------------  T(C): 37.4 (12-15-17 @ 06:08), Max: 37.4 (12-15-17 @ 06:08)  HR: 100 (12-15-17 @ 06:08) (89 - 100)  BP: 130/93 (12-15-17 @ 06:08) (122/75 - 137/88)  RR: 16 (12-15-17 @ 06:08) (16 - 18)  SpO2: 96% (12-15-17 @ 06:08) (96% - 100%)  Wt(kg): --    12-13-17 @ 07:01  -  12-14-17 @ 07:00  --------------------------------------------------------  IN: 6350 mL / OUT: 6600 mL / NET: -250 mL    12-14-17 @ 07:01  -  12-15-17 @ 06:36  --------------------------------------------------------  IN: 6000 mL / OUT: 6700 mL / NET: -700 mL    Physical Exam:  	Gen: Resting in bed  	HEENT: No JVD   	Pulm: CTA B/L  	CV: RRR, S1S2+  	Abd: soft + PD catheter seen intact  	: No suprapubic tenderness  	LE: Warm, no edema  	Neuro: Awake   	Psych: Normal affect and mood  	Skin: Warm    LABS/STUDIES  --------------------------------------------------------------------------------              7.9    15.86 >-----------<  84       [12-14-17 @ 06:30]              23.4     134  |  92  |  83  ----------------------------<  92      [12-14-17 @ 06:30]  3.6   |  25  |  8.77        Ca     7.7     [12-14-17 @ 06:30]      Mg     1.5     [12-14-17 @ 06:30]      Phos  5.9     [12-14-17 @ 06:30]    Creatinine Trend:  SCr 8.77 [12-14 @ 06:30]  SCr 8.49 [12-13 @ 03:00]  SCr 8.53 [12-12 @ 22:00]  SCr 9.41 [12-12 @ 03:00]  SCr 11.04 [12-11 @ 06:50]    Iron 71, TIBC 165, %sat --      [08-06-17 @ 18:30]  Ferritin 2501      [08-06-17 @ 18:30]  Vitamin D (25OH) 6.1      [08-08-17 @ 06:40]  HbA1c 3.8      [01-13-17 @ 11:35]  TSH 4.51      [01-13-17 @ 11:35]  Lipid: chol 263, , HDL 50,       [01-13-17 @ 11:35]

## 2017-12-15 NOTE — PROGRESS NOTE ADULT - ASSESSMENT
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 diff infection, concern for acute chest s/p one exchange transfusion.

## 2017-12-15 NOTE — PROGRESS NOTE ADULT - PROBLEM SELECTOR PLAN 1
Satting mid 90s off NC this AM, minimal chest pain currently.   - Antibiotics narrowed to moxifloxacin yesterday (Day 5/7 of antibiotics)

## 2017-12-15 NOTE — PROGRESS NOTE ADULT - ASSESSMENT
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 Now improved s/p exchange and transferred back to the floor for continued management. Pain, breathing, mental status all improving.

## 2017-12-15 NOTE — PHYSICAL THERAPY INITIAL EVALUATION ADULT - PERTINENT HX OF CURRENT PROBLEM, REHAB EVAL
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 She reports being in her usual state of health until the onset of chest pain

## 2017-12-15 NOTE — PROGRESS NOTE ADULT - SUBJECTIVE AND OBJECTIVE BOX
CONTACT INFO  Nico Summers M.D., PGY-1  Pager: [facility]- [phone], [facility]- [phone]    Mon-Fri: pager covered by day team 7am-7pm;   ***Academic conferences M-F 8am-9am & 12pm-1pm- page ONLY if URGENT or if Consultant  /Sabine: see chart, primary physician assigned available 7am-12pm  Sat/Beatty Cross Coverage 12pm-7pm: [facility]- page [phone] for Team1-4, [facility]- pager forwarded to covering Resident  For Night coverage 7pm-7am: NS- page [phone] Team1-3, page [phone] Team4 & Care Model    ERIC ELIZABETH  36y  Female      Patient is a 36y old  Female who presents with a chief complaint of Pain (11 Dec 2017 12:28)      INTERVAL HPI/OVERNIGHT EVENTS: NAEON, patient reports pain is mildly improved today, is less lethargic and breathing well off NC this AM. 7 stools yesterday. Tele with tachy up to 110 but mostly NSR in 90s.     REVIEW OF SYSTEMS:  CONSTITUTIONAL: No fever  RESPIRATORY: No cough; No shortness of breath  CARDIOVASCULAR: No chest pain  GASTROINTESTINAL: +Diarrhea. No abdominal or epigastric pain.  GENITOURINARY: No dysuria  MUSCULOSKELETAL: +Right knee pain  PSYCHIATRIC: No difficulty sleeping  HEME/LYMPH: No easy bleeding    Vital Signs Last 24 Hrs  T(C): 37.4 (15 Dec 2017 06:08), Max: 37.4 (15 Dec 2017 06:08)  T(F): 99.3 (15 Dec 2017 06:08), Max: 99.3 (15 Dec 2017 06:08)  HR: 100 (15 Dec 2017 06:08) (89 - 100)  BP: 130/93 (15 Dec 2017 06:08) (122/75 - 137/88)  BP(mean): --  RR: 16 (15 Dec 2017 06:08) (16 - 18)  SpO2: 96% (15 Dec 2017 06:08) (96% - 100%)    PHYSICAL EXAM:  GENERAL: NAD  HEAD:  Atraumatic  ENMT: Moist mucous membranes  NERVOUS SYSTEM:  Awake throughout interview, Alert & Oriented X3. Moves all extremities, sensation is intact to light touch  CHEST/LUNG: Clear to auscultation bilaterally  HEART: RRR, no m/r/g  ABDOMEN: +BSx4, soft, non-tender  EXTREMITIES: Warm, no edema. R proximal tibia tender to palpation, no joint effusion, no tenderness superior to the tibia. Does not appear asymmetrically swollen  SKIN: No rashes or lesions    Consultant(s) Notes Reviewed: Nephrology    LABS:                        7.9    15.86 )-----------( 84       ( 14 Dec 2017 06:30 )             23.4     12-14    134<L>  |  92<L>  |  83<H>  ----------------------------<  92  3.6   |  25  |  8.77<H>    Ca    7.7<L>      14 Dec 2017 06:30  Phos  5.9     12-14  Mg     1.5     12-14

## 2017-12-15 NOTE — PROGRESS NOTE ADULT - PROBLEM SELECTOR PLAN 2
-CT head negative for acute intracranial pathology. Patient has no recollection of earlier events of admission. Her mental status is much improved, but she still has moments of inattentiveness and dazed off multiple times during my examination.  -Etiology is likely multifactorial- VOC, C.diff sepsis, possibly uremia associated dialysis. Continue to monitor, avoid iatrogenic causes of AMS.

## 2017-12-15 NOTE — PROGRESS NOTE ADULT - PROBLEM SELECTOR PLAN 3
Severe infection by WBC > 92264, Cr > 1.5, Alb < 3. Diarrhea continues to improve per patient report although still substantial  - Continue PO vanc day 5/10

## 2017-12-15 NOTE — PROGRESS NOTE ADULT - ASSESSMENT
36 year old female with sickle cell disease and 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 Pt. found to have acute chest syndrome and was transferred to the MICU for exchange transfusion. Pt. now transferred back to medical floor. Pt. on CAPD during current hospital stay.

## 2017-12-16 LAB
BACTERIA FLD CULT: SIGNIFICANT CHANGE UP
BASOPHILS # BLD AUTO: 0.09 K/UL — SIGNIFICANT CHANGE UP (ref 0–0.2)
BASOPHILS NFR BLD AUTO: 0.6 % — SIGNIFICANT CHANGE UP (ref 0–2)
BUN SERPL-MCNC: 72 MG/DL — HIGH (ref 7–23)
CALCIUM SERPL-MCNC: 8.7 MG/DL — SIGNIFICANT CHANGE UP (ref 8.4–10.5)
CHLORIDE SERPL-SCNC: 93 MMOL/L — LOW (ref 98–107)
CO2 SERPL-SCNC: 25 MMOL/L — SIGNIFICANT CHANGE UP (ref 22–31)
CREAT SERPL-MCNC: 7.71 MG/DL — HIGH (ref 0.5–1.3)
EOSINOPHIL # BLD AUTO: 0.41 K/UL — SIGNIFICANT CHANGE UP (ref 0–0.5)
EOSINOPHIL NFR BLD AUTO: 2.7 % — SIGNIFICANT CHANGE UP (ref 0–6)
GLUCOSE SERPL-MCNC: 108 MG/DL — HIGH (ref 70–99)
HCT VFR BLD CALC: 29.1 % — LOW (ref 34.5–45)
HGB BLD-MCNC: 9.5 G/DL — LOW (ref 11.5–15.5)
IMM GRANULOCYTES # BLD AUTO: 0.72 # — SIGNIFICANT CHANGE UP
IMM GRANULOCYTES NFR BLD AUTO: 4.8 % — HIGH (ref 0–1.5)
LYMPHOCYTES # BLD AUTO: 1.89 K/UL — SIGNIFICANT CHANGE UP (ref 1–3.3)
LYMPHOCYTES # BLD AUTO: 12.7 % — LOW (ref 13–44)
MAGNESIUM SERPL-MCNC: 1.7 MG/DL — SIGNIFICANT CHANGE UP (ref 1.6–2.6)
MANUAL SMEAR VERIFICATION: SIGNIFICANT CHANGE UP
MCHC RBC-ENTMCNC: 28.6 PG — SIGNIFICANT CHANGE UP (ref 27–34)
MCHC RBC-ENTMCNC: 32.6 % — SIGNIFICANT CHANGE UP (ref 32–36)
MCV RBC AUTO: 87.7 FL — SIGNIFICANT CHANGE UP (ref 80–100)
MONOCYTES # BLD AUTO: 1.72 K/UL — HIGH (ref 0–0.9)
MONOCYTES NFR BLD AUTO: 11.5 % — SIGNIFICANT CHANGE UP (ref 2–14)
NEUTROPHILS # BLD AUTO: 10.08 K/UL — HIGH (ref 1.8–7.4)
NEUTROPHILS NFR BLD AUTO: 67.7 % — SIGNIFICANT CHANGE UP (ref 43–77)
NRBC # FLD: 4.07 — SIGNIFICANT CHANGE UP
NRBC FLD-RTO: 27.3 — SIGNIFICANT CHANGE UP
PHOSPHATE SERPL-MCNC: 5.8 MG/DL — HIGH (ref 2.5–4.5)
PLATELET # BLD AUTO: 206 K/UL — SIGNIFICANT CHANGE UP (ref 150–400)
PMV BLD: 11.4 FL — SIGNIFICANT CHANGE UP (ref 7–13)
POTASSIUM SERPL-MCNC: 3.2 MMOL/L — LOW (ref 3.5–5.3)
POTASSIUM SERPL-SCNC: 3.2 MMOL/L — LOW (ref 3.5–5.3)
RBC # BLD: 3.32 M/UL — LOW (ref 3.8–5.2)
RBC # FLD: 20.4 % — HIGH (ref 10.3–14.5)
SODIUM SERPL-SCNC: 136 MMOL/L — SIGNIFICANT CHANGE UP (ref 135–145)
WBC # BLD: 14.91 K/UL — HIGH (ref 3.8–10.5)
WBC # FLD AUTO: 14.91 K/UL — HIGH (ref 3.8–10.5)

## 2017-12-16 PROCEDURE — 90945 DIALYSIS ONE EVALUATION: CPT

## 2017-12-16 PROCEDURE — 99233 SBSQ HOSP IP/OBS HIGH 50: CPT | Mod: GC

## 2017-12-16 RX ORDER — TRAMADOL HYDROCHLORIDE 50 MG/1
1 TABLET ORAL
Qty: 8 | Refills: 0 | OUTPATIENT
Start: 2017-12-16 | End: 2017-12-19

## 2017-12-16 RX ORDER — VANCOMYCIN HCL 1 G
1 VIAL (EA) INTRAVENOUS
Qty: 16 | Refills: 0 | OUTPATIENT
Start: 2017-12-16 | End: 2017-12-19

## 2017-12-16 RX ORDER — MOXIFLOXACIN HYDROCHLORIDE TABLETS, 400 MG 400 MG/1
1 TABLET, FILM COATED ORAL
Qty: 1 | Refills: 0 | OUTPATIENT
Start: 2017-12-16 | End: 2017-12-16

## 2017-12-16 RX ORDER — HYDROMORPHONE HYDROCHLORIDE 2 MG/ML
1 INJECTION INTRAMUSCULAR; INTRAVENOUS; SUBCUTANEOUS
Qty: 32 | Refills: 0 | OUTPATIENT
Start: 2017-12-16 | End: 2017-12-19

## 2017-12-16 RX ORDER — POTASSIUM CHLORIDE 20 MEQ
20 PACKET (EA) ORAL ONCE
Qty: 0 | Refills: 0 | Status: COMPLETED | OUTPATIENT
Start: 2017-12-16 | End: 2017-12-16

## 2017-12-16 RX ADMIN — HYDROMORPHONE HYDROCHLORIDE 2 MILLIGRAM(S): 2 INJECTION INTRAMUSCULAR; INTRAVENOUS; SUBCUTANEOUS at 06:35

## 2017-12-16 RX ADMIN — HEPARIN SODIUM 5000 UNIT(S): 5000 INJECTION INTRAVENOUS; SUBCUTANEOUS at 05:03

## 2017-12-16 RX ADMIN — HYDROMORPHONE HYDROCHLORIDE 4 MILLIGRAM(S): 2 INJECTION INTRAMUSCULAR; INTRAVENOUS; SUBCUTANEOUS at 05:03

## 2017-12-16 RX ADMIN — Medication 125 MILLIGRAM(S): at 12:50

## 2017-12-16 RX ADMIN — HEPARIN SODIUM 5000 UNIT(S): 5000 INJECTION INTRAVENOUS; SUBCUTANEOUS at 21:25

## 2017-12-16 RX ADMIN — CALCITRIOL 0.5 MICROGRAM(S): 0.5 CAPSULE ORAL at 12:51

## 2017-12-16 RX ADMIN — HYDROMORPHONE HYDROCHLORIDE 2 MILLIGRAM(S): 2 INJECTION INTRAMUSCULAR; INTRAVENOUS; SUBCUTANEOUS at 13:10

## 2017-12-16 RX ADMIN — HYDROMORPHONE HYDROCHLORIDE 4 MILLIGRAM(S): 2 INJECTION INTRAMUSCULAR; INTRAVENOUS; SUBCUTANEOUS at 10:30

## 2017-12-16 RX ADMIN — Medication 2668 MILLIGRAM(S): at 09:08

## 2017-12-16 RX ADMIN — Medication 20 MILLIEQUIVALENT(S): at 17:45

## 2017-12-16 RX ADMIN — MOXIFLOXACIN HYDROCHLORIDE TABLETS, 400 MG 400 MILLIGRAM(S): 400 TABLET, FILM COATED ORAL at 12:51

## 2017-12-16 RX ADMIN — HYDROMORPHONE HYDROCHLORIDE 4 MILLIGRAM(S): 2 INJECTION INTRAMUSCULAR; INTRAVENOUS; SUBCUTANEOUS at 17:45

## 2017-12-16 RX ADMIN — Medication 2668 MILLIGRAM(S): at 12:51

## 2017-12-16 RX ADMIN — Medication 125 MILLIGRAM(S): at 20:45

## 2017-12-16 RX ADMIN — HYDROMORPHONE HYDROCHLORIDE 2 MILLIGRAM(S): 2 INJECTION INTRAMUSCULAR; INTRAVENOUS; SUBCUTANEOUS at 02:08

## 2017-12-16 RX ADMIN — HYDROMORPHONE HYDROCHLORIDE 2 MILLIGRAM(S): 2 INJECTION INTRAMUSCULAR; INTRAVENOUS; SUBCUTANEOUS at 19:51

## 2017-12-16 RX ADMIN — Medication 125 MILLIGRAM(S): at 02:04

## 2017-12-16 RX ADMIN — HEPARIN SODIUM 5000 UNIT(S): 5000 INJECTION INTRAVENOUS; SUBCUTANEOUS at 12:51

## 2017-12-16 RX ADMIN — HYDROMORPHONE HYDROCHLORIDE 2 MILLIGRAM(S): 2 INJECTION INTRAMUSCULAR; INTRAVENOUS; SUBCUTANEOUS at 20:21

## 2017-12-16 RX ADMIN — CHLORHEXIDINE GLUCONATE 1 APPLICATION(S): 213 SOLUTION TOPICAL at 12:49

## 2017-12-16 RX ADMIN — HYDROMORPHONE HYDROCHLORIDE 2 MILLIGRAM(S): 2 INJECTION INTRAMUSCULAR; INTRAVENOUS; SUBCUTANEOUS at 12:50

## 2017-12-16 RX ADMIN — Medication 2668 MILLIGRAM(S): at 17:45

## 2017-12-16 RX ADMIN — HYDROMORPHONE HYDROCHLORIDE 4 MILLIGRAM(S): 2 INJECTION INTRAMUSCULAR; INTRAVENOUS; SUBCUTANEOUS at 06:03

## 2017-12-16 RX ADMIN — HYDROMORPHONE HYDROCHLORIDE 4 MILLIGRAM(S): 2 INJECTION INTRAMUSCULAR; INTRAVENOUS; SUBCUTANEOUS at 00:32

## 2017-12-16 RX ADMIN — Medication 125 MILLIGRAM(S): at 09:07

## 2017-12-16 RX ADMIN — Medication 1 MILLIGRAM(S): at 12:51

## 2017-12-16 RX ADMIN — HYDROMORPHONE HYDROCHLORIDE 4 MILLIGRAM(S): 2 INJECTION INTRAMUSCULAR; INTRAVENOUS; SUBCUTANEOUS at 18:23

## 2017-12-16 RX ADMIN — HYDROMORPHONE HYDROCHLORIDE 2 MILLIGRAM(S): 2 INJECTION INTRAMUSCULAR; INTRAVENOUS; SUBCUTANEOUS at 07:00

## 2017-12-16 RX ADMIN — Medication 1 TABLET(S): at 12:51

## 2017-12-16 RX ADMIN — Medication 1 APPLICATION(S): at 11:45

## 2017-12-16 RX ADMIN — HYDROMORPHONE HYDROCHLORIDE 4 MILLIGRAM(S): 2 INJECTION INTRAMUSCULAR; INTRAVENOUS; SUBCUTANEOUS at 10:06

## 2017-12-16 RX ADMIN — HYDROMORPHONE HYDROCHLORIDE 2 MILLIGRAM(S): 2 INJECTION INTRAMUSCULAR; INTRAVENOUS; SUBCUTANEOUS at 03:08

## 2017-12-16 NOTE — PROGRESS NOTE ADULT - PROBLEM SELECTOR PLAN 2
SIRS criteria of tachy >90, leukocytosis with +C.diff. Both tachycardia and leukocytosis have been improving. Patient reports 1 loose stools yesterday, none overnight  - Continue PO Vanc for 10 day course  - Continue moxiflocacin for 7 day course  - Continues to improve  - WBC improving

## 2017-12-16 NOTE — PROGRESS NOTE ADULT - PROBLEM SELECTOR PLAN 1
Satting mid 90s off NC this AM, minimal chest pain currently.   - Antibiotics narrowed to moxifloxacin (Day 6/7 of antibiotics)

## 2017-12-16 NOTE — PROGRESS NOTE ADULT - SUBJECTIVE AND OBJECTIVE BOX
Patient is a 36y old  Female who presents with a chief complaint of Pain (11 Dec 2017 12:28)      SUBJECTIVE / OVERNIGHT EVENTS: Pt states that her pain is well controlled on current regimen. Received Dilaudid 2mg x2 and 4mg x2 last night. Pt states that she still has diffuse pain all over but has significantly improved. Expresses wishes to go home today.     MEDICATIONS  (STANDING):  calcitriol   Capsule 0.5 MICROGram(s) Oral daily  calcium acetate 2668 milliGRAM(s) Oral three times a day with meals  chlorhexidine 4% Liquid 1 Application(s) Topical daily  folic acid 1 milliGRAM(s) Oral daily  gentamicin 0.1% Cream 1 Application(s) Topical daily  heparin  Injectable 5000 Unit(s) SubCutaneous every 8 hours  moxifloxacin 400 milliGRAM(s) Oral daily  Nephro-jay 1 Tablet(s) Oral daily  vancomycin    Solution 125 milliGRAM(s) Oral every 6 hours    MEDICATIONS  (PRN):  ALBUTerol/ipratropium for Nebulization 3 milliLiter(s) Nebulizer every 6 hours PRN Shortness of Breath  HYDROmorphone   Tablet 2 milliGRAM(s) Oral every 4 hours PRN Moderate Pain (4 - 6)  HYDROmorphone   Tablet 4 milliGRAM(s) Oral every 4 hours PRN Severe Pain (7 - 10)  naloxone Injectable 0.1 milliGRAM(s) IV Push every 3 minutes PRN For ANY of the following changes in patient status:  A. RR LESS THAN 10 breaths per minute, B. Oxygen saturation LESS THAN 90%, C. Sedation score of 6  ondansetron Injectable 4 milliGRAM(s) IV Push every 6 hours PRN Nausea  sodium chloride 0.65% Nasal 1 Spray(s) Both Nostrils every 1 hour PRN Nasal Congestion  traMADol 50 milliGRAM(s) Oral every 12 hours PRN Severe Pain (7 - 10)        CAPILLARY BLOOD GLUCOSE        I&O's Summary    15 Dec 2017 07:01  -  16 Dec 2017 07:00  --------------------------------------------------------  IN: 8000 mL / OUT: 9400 mL / NET: -1400 mL      ICU Vital Signs Last 24 Hrs  T(C): 37 (16 Dec 2017 07:44), Max: 37.2 (16 Dec 2017 05:02)  T(F): 98.6 (16 Dec 2017 07:44), Max: 98.9 (16 Dec 2017 05:02)  HR: 108 (16 Dec 2017 07:44) (100 - 110)  BP: 134/96 (16 Dec 2017 07:44) (126/84 - 138/95)  BP(mean): --  ABP: --  ABP(mean): --  RR: 18 (16 Dec 2017 07:44) (18 - 18)  SpO2: 97% (16 Dec 2017 05:02) (96% - 100%)    PHYSICAL EXAM:  GENERAL: NAD  HEAD:  Atraumatic  ENMT: Moist mucous membranes  NERVOUS SYSTEM:  Awake throughout interview, Alert & Oriented X3. Moves all extremities, sensation is intact to light touch  CHEST/LUNG: Clear to auscultation bilaterally  HEART: RRR, no m/r/g  ABDOMEN: +BSx4, soft, non-tender  EXTREMITIES: Warm, no edema. R proximal tibia tender to palpation, no joint effusion, no tenderness superior to the tibia. Does not appear asymmetrically swollen  SKIN: No rashes or lesions    LABS:                        9.5    14.91 )-----------( 206      ( 16 Dec 2017 06:06 )             29.1     12-16    136  |  93<L>  |  72<H>  ----------------------------<  108<H>  3.2<L>   |  25  |  7.71<H>    Ca    8.7      16 Dec 2017 06:06  Phos  5.8     12-16  Mg     1.7     12-16      Consultant(s) Notes Reviewed:  Renal, Heme/onc,

## 2017-12-16 NOTE — PROGRESS NOTE ADULT - PROBLEM SELECTOR PLAN 1
Pt. with ESRD on PD. Pt. tolerating PD well. PD catheter working well. Will continue with current PD prescription. Monitor labs and BP Pt. with ESRD on PD. Pt. tolerating PD well. PD catheter working well. BP stable during PD. Will continue with current PD prescription. Monitor labs and BP

## 2017-12-16 NOTE — PROGRESS NOTE ADULT - PROBLEM SELECTOR PLAN 4
Unclear eitiology, may be residual pain from pain crisis. Joint has been aspirated and does not appear infected. Appears to be primarily tibial pain  - C/w PO dilaudid 2 q4, 4 q4 for break through, tramadol 50 q12 (renal dose) and tylenol PRNs.  - Pain improved this AM and she has not been requesting dilaudid as frequently as available

## 2017-12-16 NOTE — PROGRESS NOTE ADULT - SUBJECTIVE AND OBJECTIVE BOX
[facility] DIVISION OF KIDNEY DISEASES AND HYPERTENSION --   --------------------------------------------------------------------------------  Chief Complaint: ESRD/Ongoing hemodialysis requirement    HPI: 36-year-old female with PMH of sickle cell disease, and 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 Pt. has been on PD since 3/2017. Pt.'s outpatient nephrologist is Dr. Larsen. Pt. states she has not had any problems with PD. PD catheter is working well. Pt. says she is on CCPD 3 exchanges (fill volume: 2000cc with 2.5% dextrose dianeal fluid and a last fill). Pt. was found to have Cdiff infection currently on ABX.  Pt. was complaining of chest pain and was found to have acute chest syndrome. Pt. was transferred to MICU and underwent exchange transfusion as per hematology on 12/11. Pt. was transferred back to medical floor. Pt. seen and examined at bedside. Pt. says she feels better and denies any SOB or chest pain. Pt. however continues to have bilateral knee joint pains and back pain. Pt. says she wants to go home today.     PAST HISTORY  --------------------------------------------------------------------------------  No significant changes to PMH, PSH, FHx, SHx, unless otherwise noted    ALLERGIES & MEDICATIONS  --------------------------------------------------------------------------------  Allergies    No Known Allergies    Intolerances    Standing Inpatient Medications  calcitriol   Capsule 0.5 MICROGram(s) Oral daily  calcium acetate 2668 milliGRAM(s) Oral three times a day with meals  chlorhexidine 4% Liquid 1 Application(s) Topical daily  folic acid 1 milliGRAM(s) Oral daily  gentamicin 0.1% Cream 1 Application(s) Topical daily  heparin  Injectable 5000 Unit(s) SubCutaneous every 8 hours  moxifloxacin 400 milliGRAM(s) Oral daily  Nephro-jay 1 Tablet(s) Oral daily  vancomycin    Solution 125 milliGRAM(s) Oral every 6 hours    REVIEW OF SYSTEMS  --------------------------------------------------------------------------------  Gen: No fevers  Head/Eyes/Ears/Mouth: No headache  Respiratory: No dyspnea  CV: see HPI   GI: No abdominal pain  : No dysuria  MSK: See HPI   Heme: See HPI     VITALS/PHYSICAL EXAM  --------------------------------------------------------------------------------  T(C): 37 (12-16-17 @ 07:44), Max: 37.2 (12-16-17 @ 05:02)  HR: 108 (12-16-17 @ 07:44) (100 - 110)  BP: 134/96 (12-16-17 @ 07:44) (126/84 - 138/95)  RR: 18 (12-16-17 @ 07:44) (18 - 18)  SpO2: 97% (12-16-17 @ 05:02) (96% - 100%)  Wt(kg): --    12-15-17 @ 07:01  -  12-16-17 @ 07:00  --------------------------------------------------------  IN: 8000 mL / OUT: 9400 mL / NET: -1400 mL    Physical Exam:  	Gen: Resting in bed  	HEENT: No JVD   	Pulm: CTA B/L  	CV: RRR, S1S2+  	Abd: soft + PD catheter seen intact  	: No suprapubic tenderness  	LE: Warm, no edema  	Neuro: Awake   	Psych: Normal affect and mood  	Skin: Warm    LABS/STUDIES  --------------------------------------------------------------------------------              9.5    14.91 >-----------<  206      [12-16-17 @ 06:06]              29.1     136  |  93  |  72  ----------------------------<  108      [12-16-17 @ 06:06]  3.2   |  25  |  7.71        Ca     8.7     [12-16-17 @ 06:06]      Mg     1.7     [12-16-17 @ 06:06]      Phos  5.8     [12-16-17 @ 06:06]    Iron 71, TIBC 165, %sat --      [08-06-17 @ 18:30]  Ferritin 2501      [08-06-17 @ 18:30]  Vitamin D (25OH) 6.1      [08-08-17 @ 06:40]  HbA1c 3.8      [01-13-17 @ 11:35]  TSH 4.51      [01-13-17 @ 11:35]  Lipid: chol 263, , HDL 50,       [01-13-17 @ 11:35]

## 2017-12-16 NOTE — PROGRESS NOTE ADULT - PROBLEM SELECTOR PLAN 3
Severe infection by WBC > 94798, Cr > 1.5, Alb < 3. Diarrhea continues to improve per patient report although still substantial  - Continue PO vanc day 6/10

## 2017-12-16 NOTE — PROGRESS NOTE ADULT - ASSESSMENT
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 Now improved s/p exchange and transferred back to the floor for continued management. Pain, breathing, mental status all improving.

## 2017-12-17 VITALS
TEMPERATURE: 99 F | OXYGEN SATURATION: 100 % | HEART RATE: 110 BPM | RESPIRATION RATE: 18 BRPM | DIASTOLIC BLOOD PRESSURE: 89 MMHG | SYSTOLIC BLOOD PRESSURE: 131 MMHG

## 2017-12-17 LAB
BACTERIA FLD CULT: SIGNIFICANT CHANGE UP
BASOPHILS # BLD AUTO: 0.07 K/UL — SIGNIFICANT CHANGE UP (ref 0–0.2)
BASOPHILS NFR BLD AUTO: 0.4 % — SIGNIFICANT CHANGE UP (ref 0–2)
BUN SERPL-MCNC: 75 MG/DL — HIGH (ref 7–23)
CALCIUM SERPL-MCNC: 9.3 MG/DL — SIGNIFICANT CHANGE UP (ref 8.4–10.5)
CHLORIDE SERPL-SCNC: 92 MMOL/L — LOW (ref 98–107)
CO2 SERPL-SCNC: 26 MMOL/L — SIGNIFICANT CHANGE UP (ref 22–31)
CREAT SERPL-MCNC: 8.13 MG/DL — HIGH (ref 0.5–1.3)
EOSINOPHIL # BLD AUTO: 0.49 K/UL — SIGNIFICANT CHANGE UP (ref 0–0.5)
EOSINOPHIL NFR BLD AUTO: 2.6 % — SIGNIFICANT CHANGE UP (ref 0–6)
GLUCOSE SERPL-MCNC: 103 MG/DL — HIGH (ref 70–99)
HCT VFR BLD CALC: 27.4 % — LOW (ref 34.5–45)
HGB BLD-MCNC: 9.2 G/DL — LOW (ref 11.5–15.5)
IMM GRANULOCYTES # BLD AUTO: 0.92 # — SIGNIFICANT CHANGE UP
IMM GRANULOCYTES NFR BLD AUTO: 4.9 % — HIGH (ref 0–1.5)
LYMPHOCYTES # BLD AUTO: 14.4 % — SIGNIFICANT CHANGE UP (ref 13–44)
LYMPHOCYTES # BLD AUTO: 2.71 K/UL — SIGNIFICANT CHANGE UP (ref 1–3.3)
MAGNESIUM SERPL-MCNC: 1.8 MG/DL — SIGNIFICANT CHANGE UP (ref 1.6–2.6)
MANUAL SMEAR VERIFICATION: SIGNIFICANT CHANGE UP
MCHC RBC-ENTMCNC: 28.7 PG — SIGNIFICANT CHANGE UP (ref 27–34)
MCHC RBC-ENTMCNC: 33.6 % — SIGNIFICANT CHANGE UP (ref 32–36)
MCV RBC AUTO: 85.4 FL — SIGNIFICANT CHANGE UP (ref 80–100)
MONOCYTES # BLD AUTO: 2.27 K/UL — HIGH (ref 0–0.9)
MONOCYTES NFR BLD AUTO: 12.1 % — SIGNIFICANT CHANGE UP (ref 2–14)
NEUTROPHILS # BLD AUTO: 12.37 K/UL — HIGH (ref 1.8–7.4)
NEUTROPHILS NFR BLD AUTO: 65.6 % — SIGNIFICANT CHANGE UP (ref 43–77)
NRBC # FLD: 0.96 — SIGNIFICANT CHANGE UP
NRBC FLD-RTO: 5.1 — SIGNIFICANT CHANGE UP
PHOSPHATE SERPL-MCNC: 6.6 MG/DL — HIGH (ref 2.5–4.5)
PLATELET # BLD AUTO: 334 K/UL — SIGNIFICANT CHANGE UP (ref 150–400)
PMV BLD: 11 FL — SIGNIFICANT CHANGE UP (ref 7–13)
POTASSIUM SERPL-MCNC: 3.3 MMOL/L — LOW (ref 3.5–5.3)
POTASSIUM SERPL-SCNC: 3.3 MMOL/L — LOW (ref 3.5–5.3)
RBC # BLD: 3.21 M/UL — LOW (ref 3.8–5.2)
RBC # FLD: 19.2 % — HIGH (ref 10.3–14.5)
RETICS #: 0.26 10X6/UL — HIGH (ref 0.02–0.07)
RETICS/RBC NFR: 8.1 % — HIGH (ref 0.5–2.5)
SODIUM SERPL-SCNC: 135 MMOL/L — SIGNIFICANT CHANGE UP (ref 135–145)
WBC # BLD: 18.83 K/UL — HIGH (ref 3.8–10.5)
WBC # FLD AUTO: 18.83 K/UL — HIGH (ref 3.8–10.5)

## 2017-12-17 PROCEDURE — 99239 HOSP IP/OBS DSCHRG MGMT >30: CPT

## 2017-12-17 RX ORDER — VANCOMYCIN HCL 1 G
1 VIAL (EA) INTRAVENOUS
Qty: 14 | Refills: 0 | OUTPATIENT
Start: 2017-12-17 | End: 2017-12-19

## 2017-12-17 RX ORDER — HYDROMORPHONE HYDROCHLORIDE 2 MG/ML
1 INJECTION INTRAMUSCULAR; INTRAVENOUS; SUBCUTANEOUS
Qty: 18 | Refills: 0 | OUTPATIENT
Start: 2017-12-17 | End: 2017-12-19

## 2017-12-17 RX ADMIN — HYDROMORPHONE HYDROCHLORIDE 2 MILLIGRAM(S): 2 INJECTION INTRAMUSCULAR; INTRAVENOUS; SUBCUTANEOUS at 16:50

## 2017-12-17 RX ADMIN — HYDROMORPHONE HYDROCHLORIDE 2 MILLIGRAM(S): 2 INJECTION INTRAMUSCULAR; INTRAVENOUS; SUBCUTANEOUS at 02:52

## 2017-12-17 RX ADMIN — CHLORHEXIDINE GLUCONATE 1 APPLICATION(S): 213 SOLUTION TOPICAL at 12:37

## 2017-12-17 RX ADMIN — Medication 2668 MILLIGRAM(S): at 17:51

## 2017-12-17 RX ADMIN — HEPARIN SODIUM 5000 UNIT(S): 5000 INJECTION INTRAVENOUS; SUBCUTANEOUS at 05:09

## 2017-12-17 RX ADMIN — HYDROMORPHONE HYDROCHLORIDE 4 MILLIGRAM(S): 2 INJECTION INTRAMUSCULAR; INTRAVENOUS; SUBCUTANEOUS at 05:40

## 2017-12-17 RX ADMIN — Medication 1 TABLET(S): at 12:50

## 2017-12-17 RX ADMIN — HYDROMORPHONE HYDROCHLORIDE 4 MILLIGRAM(S): 2 INJECTION INTRAMUSCULAR; INTRAVENOUS; SUBCUTANEOUS at 05:09

## 2017-12-17 RX ADMIN — Medication 125 MILLIGRAM(S): at 02:22

## 2017-12-17 RX ADMIN — HYDROMORPHONE HYDROCHLORIDE 4 MILLIGRAM(S): 2 INJECTION INTRAMUSCULAR; INTRAVENOUS; SUBCUTANEOUS at 01:08

## 2017-12-17 RX ADMIN — Medication 125 MILLIGRAM(S): at 08:58

## 2017-12-17 RX ADMIN — Medication 1 MILLIGRAM(S): at 12:50

## 2017-12-17 RX ADMIN — HYDROMORPHONE HYDROCHLORIDE 4 MILLIGRAM(S): 2 INJECTION INTRAMUSCULAR; INTRAVENOUS; SUBCUTANEOUS at 00:38

## 2017-12-17 RX ADMIN — Medication 125 MILLIGRAM(S): at 12:50

## 2017-12-17 RX ADMIN — HYDROMORPHONE HYDROCHLORIDE 2 MILLIGRAM(S): 2 INJECTION INTRAMUSCULAR; INTRAVENOUS; SUBCUTANEOUS at 09:26

## 2017-12-17 RX ADMIN — HYDROMORPHONE HYDROCHLORIDE 2 MILLIGRAM(S): 2 INJECTION INTRAMUSCULAR; INTRAVENOUS; SUBCUTANEOUS at 10:00

## 2017-12-17 RX ADMIN — Medication 2668 MILLIGRAM(S): at 08:58

## 2017-12-17 RX ADMIN — Medication 2668 MILLIGRAM(S): at 12:50

## 2017-12-17 RX ADMIN — HYDROMORPHONE HYDROCHLORIDE 2 MILLIGRAM(S): 2 INJECTION INTRAMUSCULAR; INTRAVENOUS; SUBCUTANEOUS at 02:22

## 2017-12-17 RX ADMIN — HEPARIN SODIUM 5000 UNIT(S): 5000 INJECTION INTRAVENOUS; SUBCUTANEOUS at 12:50

## 2017-12-17 RX ADMIN — MOXIFLOXACIN HYDROCHLORIDE TABLETS, 400 MG 400 MILLIGRAM(S): 400 TABLET, FILM COATED ORAL at 12:50

## 2017-12-17 RX ADMIN — CALCITRIOL 0.5 MICROGRAM(S): 0.5 CAPSULE ORAL at 12:50

## 2017-12-17 RX ADMIN — HYDROMORPHONE HYDROCHLORIDE 2 MILLIGRAM(S): 2 INJECTION INTRAMUSCULAR; INTRAVENOUS; SUBCUTANEOUS at 16:13

## 2017-12-17 NOTE — PROGRESS NOTE ADULT - PROBLEM SELECTOR PLAN 4
Unclear eitiology, may be residual pain from pain crisis. Improved today  - C/w PO dilaudid 2 q4, 4 q4 for break through

## 2017-12-17 NOTE — PROGRESS NOTE ADULT - PROBLEM SELECTOR PLAN 2
SIRS criteria of tachy >90, leukocytosis with +C.diff. Reports no diarrhea  - Continue PO Vanc for /710 day course

## 2017-12-17 NOTE — PROGRESS NOTE ADULT - PROBLEM SELECTOR PLAN 7
s/p exchange transfusion, heme following. Pain persistent  - F/U heme recs, monitor respiratory status, monitor CBC  - Dilaudid pain control, cautious given patient's current lethargy and recent AMS
HSQ q8
s/p exchange transfusion, heme following. Pain slowly improving  - Respiratory status improving, continue to monitor  - Blood counts improving, trend CBC
s/p exchange transfusion, heme following. Pain slowly improving  - Respiratory status improving, continue to monitor  - Blood counts improving, trend CBC
s/p exchange transfusion, heme following. Pain slowly improving  - Trend CBC

## 2017-12-17 NOTE — PROGRESS NOTE ADULT - PROBLEM SELECTOR PROBLEM 7
Sickle cell disease
Prophylactic measure
Sickle cell disease

## 2017-12-17 NOTE — PROGRESS NOTE ADULT - PROBLEM SELECTOR PLAN 8
Holding heparin given thrombocytopenia
HSQ
HSQ    Gayathri Tyson, PGY3  Pager # [phone]
Holding heparin given thrombocytopenia. Will restart today if platelets >100

## 2017-12-17 NOTE — PROGRESS NOTE ADULT - PROVIDER SPECIALTY LIST ADULT
Heme/Onc
Heme/Onc
Internal Medicine
MICU
MICU
Nephrology
MICU
Internal Medicine
Internal Medicine

## 2017-12-17 NOTE — PROGRESS NOTE ADULT - NSHPATTENDINGPLANDISCUSS_GEN_ALL_CORE
patient
patient
patient and PD RN
patient, patient's family and MICU team
patient
HS
resident
resident

## 2017-12-17 NOTE — PROGRESS NOTE ADULT - SUBJECTIVE AND OBJECTIVE BOX
CONTACT INFO  Nico Summers M.D., PGY-1  Pager: [facility]- [phone], [facility]- [phone]    Mon-Fri: pager covered by day team 7am-7pm;   ***Academic conferences M-F 8am-9am & 12pm-1pm- page ONLY if URGENT or if Consultant  /Sabine: see chart, primary physician assigned available 7am-12pm  Sat/Beatty [place] Coverage 12pm-7pm: [facility]- page [phone] for Team1-4, [facility]- pager forwarded to covering Resident  For Night coverage 7pm-7am: NS- page [phone] Team1-3, page [phone] Team4 & Care Model    ERIC ELIZABETH  36y  Female      Patient is a 36y old  Female who presents with a chief complaint of Pain (11 Dec 2017 12:28)      INTERVAL HPI/OVERNIGHT EVENTS: NAEON, feels well, states diarrhea resolved.     REVIEW OF SYSTEMS:  CONSTITUTIONAL: No fever, weight loss, or fatigue  RESPIRATORY: No cough; No shortness of breath  CARDIOVASCULAR: No chest pain  GASTROINTESTINAL: No abdominal or epigastric pain. No diarrhea or constipation.   GENITOURINARY: No dysuria  NEUROLOGICAL: No headaches  SKIN: No rash  PSYCHIATRIC: No difficulty sleeping  HEME/LYMPH: No easy bleeding    Vital Signs Last 24 Hrs  T(C): 37 (17 Dec 2017 05:07), Max: 37.4 (16 Dec 2017 20:47)  T(F): 98.6 (17 Dec 2017 05:07), Max: 99.3 (16 Dec 2017 20:47)  HR: 110 (17 Dec 2017 05:07) (108 - 121)  BP: 131/89 (17 Dec 2017 05:07) (126/83 - 135/95)  BP(mean): --  RR: 18 (17 Dec 2017 05:07) (16 - 18)  SpO2: 100% (17 Dec 2017 05:07) (100% - 100%)    PHYSICAL EXAM:  GENERAL: NAD  HEAD:  Atraumatic  ENMT: Moist mucous membranes  NERVOUS SYSTEM:  Alert & Oriented X3, Good concentration. MAEW, SILT distal extremity  CHEST/LUNG: Clear to auscultation bilaterally  HEART: Regular rate and rhythm  ABDOMEN: +BSx4, distended, non-tender  EXTREMITIES: Warm, no edema    LABS:                        9.5    14.91 )-----------( 206      ( 16 Dec 2017 06:06 )             29.1     12-16    136  |  93<L>  |  72<H>  ----------------------------<  108<H>  3.2<L>   |  25  |  7.71<H>    Ca    8.7      16 Dec 2017 06:06  Phos  5.8     12-16  Mg     1.7     12-16

## 2017-12-17 NOTE — PROGRESS NOTE ADULT - ATTENDING COMMENTS
Agree with above. Seen and examined with residents. Improving clinical status. Heme/onc following. Serial labs.
Agree with above. Seen and examined with residents. Stable sickle cell labs. Improving mental status. Head CT pending. Father at bedside.
Ms. Lucero was seen and examined and the medical intensive care unit. She was admitted with sickle cell crisis and was found to have an altered mental status yesterday. She underwent exchange transfusion for acute chest syndrome. She is improved today, but still is not had her baseline. A CT scan has been recommended. Her case was discussed with the ICU staff. Her hemoglobin electrophoresis reveals an adequate result from exchange transfusion, with a hemoglobin as level of less than 20%. I agree with Dr. Diaz's assessment and plan as stated above.    The peripheral blood smear was reviewed. There are multiple competing reasons for her low platelet count. Thrombocytopenia is often a complication of acute chest syndrome. Etiology may have something to do with fat embolism from the chronic bone marrow which is often found in the lung and other tissues at autopsy and those patients who do not survive. We recommend that her counts be followed daily as is the norm.
Ms. Lucero was seen during hematology consultation rounds in followup. She is seated in a chair in her room. She is more comfortable in terms of her pain when seated. Her mental status is improved, and a CT scan of the head was negative. She continues on peritoneal dialysis. She had a catheter had been placed, and is now removed. All questions were answered to the best of our ability and to her apparent satisfaction. I agree with the assessment and plan as stated in Dr. Diaz's note.
Pt educated about PCA use.
Pain, dyspnea, diarrhea, and lethargy all diminished. CBC trending back toward baseline. Continue moxifloxacin x 2 more days for acute chest syndrome coverage, and continue po vanco for c diff.
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 Now improved s/p exchange and transferred back to the floor for continued management. Pain, breathing, mental status all improving.  continue abx for c diff  monitor labs   dc planning if clinically improve
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 Now improved s/p exchange and transferred back to the floor for continued management. Pain, breathing, mental status all improving.  continue abx for c diff  monitor labs   dc planning today   she will f/u with dr ro and will repeat labs as out patient
Improved respiratory status and pain. Will transition to oral analgesia -- pt requesting alternative to dilaudid which is making her drowsy. Abx for acute chest syndrome and c diff.
Pt with dropping hemoglobin as well as encephalopathy today, found to have c diff infection as well as CXR infiltrate diagnostic of acute chest syndrome. Appreciate MICU acceptable and care for this patient. Anticipate non-tunneled catheter placement for exchange transfusion.

## 2017-12-17 NOTE — PROGRESS NOTE ADULT - PROBLEM SELECTOR PLAN 5
Unclear etiology may be consumptive given sickle cell crisis and elevated LDH. HIT panel returns negative (.12 < .39). Plt now normal range  - C/w HSQ  - Trend platelets on CBC

## 2017-12-21 ENCOUNTER — LABORATORY RESULT (OUTPATIENT)
Age: 37
End: 2017-12-21

## 2017-12-22 LAB
ALBUMIN SERPL ELPH-MCNC: 3.4 G/DL
ANION GAP SERPL CALC-SCNC: 21 MMOL/L
BASOPHILS # BLD AUTO: 0.04 K/UL
BASOPHILS NFR BLD AUTO: 0.2 %
BUN SERPL-MCNC: 69 MG/DL
CALCIUM SERPL-MCNC: 10.4 MG/DL
CALCIUM SERPL-MCNC: 10.4 MG/DL
CHLORIDE SERPL-SCNC: 90 MMOL/L
CO2 SERPL-SCNC: 25 MMOL/L
CREAT SERPL-MCNC: 9.27 MG/DL
EOSINOPHIL # BLD AUTO: 0.13 K/UL
EOSINOPHIL NFR BLD AUTO: 0.6 %
FERRITIN SERPL-MCNC: 3886 NG/ML
GLUCOSE SERPL-MCNC: 89 MG/DL
HAPTOGLOB SERPL-MCNC: 316 MG/DL
HCT VFR BLD CALC: 31.5 %
HGB BLD-MCNC: 10.2 G/DL
IMM GRANULOCYTES NFR BLD AUTO: 0.9 %
IRON SATN MFR SERPL: 42 %
IRON SERPL-MCNC: 92 UG/DL
LDH SERPL-CCNC: 566 U/L
LYMPHOCYTES # BLD AUTO: 2 K/UL
LYMPHOCYTES NFR BLD AUTO: 9.5 %
MAGNESIUM SERPL-MCNC: 2.1 MG/DL
MAN DIFF?: NORMAL
MCHC RBC-ENTMCNC: 28.9 PG
MCHC RBC-ENTMCNC: 32.4 GM/DL
MCV RBC AUTO: 89.2 FL
MONOCYTES # BLD AUTO: 1.22 K/UL
MONOCYTES NFR BLD AUTO: 5.8 %
NEUTROPHILS # BLD AUTO: 17.44 K/UL
NEUTROPHILS NFR BLD AUTO: 83 %
PARATHYROID HORMONE INTACT: 401 PG/ML
PHOSPHATE SERPL-MCNC: 6.3 MG/DL
PLATELET # BLD AUTO: 741 K/UL
POTASSIUM SERPL-SCNC: 3.3 MMOL/L
RBC # BLD: 3.53 M/UL
RBC # BLD: 3.53 M/UL
RBC # FLD: 18 %
RETICS # AUTO: 4.9 %
RETICS AGGREG/RBC NFR: 171.7 K/UL
SODIUM SERPL-SCNC: 136 MMOL/L
TIBC SERPL-MCNC: 220 UG/DL
UIBC SERPL-MCNC: 128 UG/DL
WBC # FLD AUTO: 21.01 K/UL

## 2018-02-15 ENCOUNTER — APPOINTMENT (OUTPATIENT)
Dept: INTERNAL MEDICINE | Facility: HOSPITAL | Age: 38
End: 2018-02-15
Payer: COMMERCIAL

## 2018-02-15 ENCOUNTER — OUTPATIENT (OUTPATIENT)
Dept: OUTPATIENT SERVICES | Facility: HOSPITAL | Age: 38
LOS: 1 days | End: 2018-02-15

## 2018-02-15 DIAGNOSIS — Z98.89 OTHER SPECIFIED POSTPROCEDURAL STATES: Chronic | ICD-10-CM

## 2018-02-15 DIAGNOSIS — Z98.51 TUBAL LIGATION STATUS: Chronic | ICD-10-CM

## 2018-02-15 DIAGNOSIS — N05.1 UNSPECIFIED NEPHRITIC SYNDROME WITH FOCAL AND SEGMENTAL GLOMERULAR LESIONS: ICD-10-CM

## 2018-02-15 PROCEDURE — 99214 OFFICE O/P EST MOD 30 MIN: CPT

## 2018-02-16 DIAGNOSIS — Z23 ENCOUNTER FOR IMMUNIZATION: ICD-10-CM

## 2018-02-16 DIAGNOSIS — Z00.00 ENCOUNTER FOR GENERAL ADULT MEDICAL EXAMINATION WITHOUT ABNORMAL FINDINGS: ICD-10-CM

## 2018-02-16 DIAGNOSIS — D57.1 SICKLE-CELL DISEASE WITHOUT CRISIS: ICD-10-CM

## 2018-02-20 DIAGNOSIS — M87.059 IDIOPATHIC ASEPTIC NECROSIS OF UNSPECIFIED FEMUR: ICD-10-CM

## 2018-02-20 DIAGNOSIS — N18.5 CHRONIC KIDNEY DISEASE, STAGE 5: ICD-10-CM

## 2018-02-20 DIAGNOSIS — N05.1 UNSPECIFIED NEPHRITIC SYNDROME WITH FOCAL AND SEGMENTAL GLOMERULAR LESIONS: ICD-10-CM

## 2018-02-22 ENCOUNTER — APPOINTMENT (OUTPATIENT)
Dept: ORTHOPEDIC SURGERY | Facility: CLINIC | Age: 38
End: 2018-02-22
Payer: COMMERCIAL

## 2018-02-22 VITALS
SYSTOLIC BLOOD PRESSURE: 111 MMHG | DIASTOLIC BLOOD PRESSURE: 74 MMHG | BODY MASS INDEX: 21.16 KG/M2 | HEIGHT: 65 IN | HEART RATE: 83 BPM | WEIGHT: 127 LBS

## 2018-02-22 DIAGNOSIS — M54.5 LOW BACK PAIN: ICD-10-CM

## 2018-02-22 DIAGNOSIS — M25.552 PAIN IN LEFT HIP: ICD-10-CM

## 2018-02-22 PROCEDURE — 99214 OFFICE O/P EST MOD 30 MIN: CPT

## 2018-02-22 PROCEDURE — 73502 X-RAY EXAM HIP UNI 2-3 VIEWS: CPT | Mod: LT

## 2018-02-22 PROCEDURE — 72100 X-RAY EXAM L-S SPINE 2/3 VWS: CPT

## 2018-03-08 ENCOUNTER — RX RENEWAL (OUTPATIENT)
Age: 38
End: 2018-03-08

## 2018-03-22 NOTE — H&P PST ADULT - LAST ECHOCARDIOGRAM
Products of conception hand delivered to the lab.      Aline Hdz RN  03/21/18 [id number]     Summer 2017 at [facility] -- patient on transplant list

## 2018-03-25 ENCOUNTER — INPATIENT (INPATIENT)
Facility: HOSPITAL | Age: 38
LOS: 5 days | Discharge: HOME | End: 2018-03-31
Attending: HOSPITALIST | Admitting: HOSPITALIST
Payer: COMMERCIAL

## 2018-03-25 VITALS
HEART RATE: 99 BPM | SYSTOLIC BLOOD PRESSURE: 103 MMHG | RESPIRATION RATE: 16 BRPM | OXYGEN SATURATION: 95 % | DIASTOLIC BLOOD PRESSURE: 71 MMHG | TEMPERATURE: 99 F

## 2018-03-25 DIAGNOSIS — Z98.89 OTHER SPECIFIED POSTPROCEDURAL STATES: Chronic | ICD-10-CM

## 2018-03-25 DIAGNOSIS — D57.00 HB-SS DISEASE WITH CRISIS, UNSPECIFIED: ICD-10-CM

## 2018-03-25 DIAGNOSIS — E83.51 HYPOCALCEMIA: ICD-10-CM

## 2018-03-25 DIAGNOSIS — N18.6 END STAGE RENAL DISEASE: ICD-10-CM

## 2018-03-25 DIAGNOSIS — Z98.51 TUBAL LIGATION STATUS: Chronic | ICD-10-CM

## 2018-03-25 DIAGNOSIS — Z29.9 ENCOUNTER FOR PROPHYLACTIC MEASURES, UNSPECIFIED: ICD-10-CM

## 2018-03-25 LAB
ALBUMIN SERPL ELPH-MCNC: 3 G/DL — LOW (ref 3.3–5)
ALP SERPL-CCNC: 157 U/L — HIGH (ref 40–120)
ALT FLD-CCNC: 45 U/L — HIGH (ref 4–33)
ANISOCYTOSIS BLD QL: SLIGHT — SIGNIFICANT CHANGE UP
AST SERPL-CCNC: 46 U/L — HIGH (ref 4–32)
BASOPHILS # BLD AUTO: 0.09 K/UL — SIGNIFICANT CHANGE UP (ref 0–0.2)
BASOPHILS NFR BLD AUTO: 0.7 % — SIGNIFICANT CHANGE UP (ref 0–2)
BASOPHILS NFR SPEC: 0 % — SIGNIFICANT CHANGE UP (ref 0–2)
BILIRUB SERPL-MCNC: 0.8 MG/DL — SIGNIFICANT CHANGE UP (ref 0.2–1.2)
BUN SERPL-MCNC: 74 MG/DL — HIGH (ref 7–23)
CA-I BLD-SCNC: SIGNIFICANT CHANGE UP MMOL/L (ref 1.03–1.23)
CALCIUM SERPL-MCNC: 6.5 MG/DL — CRITICAL LOW (ref 8.4–10.5)
CHLORIDE SERPL-SCNC: 96 MMOL/L — LOW (ref 98–107)
CO2 SERPL-SCNC: 15 MMOL/L — LOW (ref 22–31)
CREAT SERPL-MCNC: 11.21 MG/DL — HIGH (ref 0.5–1.3)
EOSINOPHIL # BLD AUTO: 0.96 K/UL — HIGH (ref 0–0.5)
EOSINOPHIL NFR BLD AUTO: 7.3 % — HIGH (ref 0–6)
EOSINOPHIL NFR FLD: 5.3 % — SIGNIFICANT CHANGE UP (ref 0–6)
GIANT PLATELETS BLD QL SMEAR: PRESENT — SIGNIFICANT CHANGE UP
GLUCOSE SERPL-MCNC: 94 MG/DL — SIGNIFICANT CHANGE UP (ref 70–99)
HCG SERPL-ACNC: < 5 MIU/ML — SIGNIFICANT CHANGE UP
HCT VFR BLD CALC: 29.3 % — LOW (ref 34.5–45)
HGB BLD-MCNC: 9.7 G/DL — LOW (ref 11.5–15.5)
IMM GRANULOCYTES # BLD AUTO: 0.75 # — SIGNIFICANT CHANGE UP
IMM GRANULOCYTES NFR BLD AUTO: 5.7 % — HIGH (ref 0–1.5)
LYMPHOCYTES # BLD AUTO: 2.64 K/UL — SIGNIFICANT CHANGE UP (ref 1–3.3)
LYMPHOCYTES # BLD AUTO: 20 % — SIGNIFICANT CHANGE UP (ref 13–44)
LYMPHOCYTES NFR SPEC AUTO: 15.9 % — SIGNIFICANT CHANGE UP (ref 13–44)
MACROCYTES BLD QL: SLIGHT — SIGNIFICANT CHANGE UP
MCHC RBC-ENTMCNC: 26.1 PG — LOW (ref 27–34)
MCHC RBC-ENTMCNC: 33.1 % — SIGNIFICANT CHANGE UP (ref 32–36)
MCV RBC AUTO: 78.8 FL — LOW (ref 80–100)
MONOCYTES # BLD AUTO: 1.35 K/UL — HIGH (ref 0–0.9)
MONOCYTES NFR BLD AUTO: 10.2 % — SIGNIFICANT CHANGE UP (ref 2–14)
MONOCYTES NFR BLD: 9.7 % — HIGH (ref 2–9)
NEUTROPHIL AB SER-ACNC: 62 % — SIGNIFICANT CHANGE UP (ref 43–77)
NEUTROPHILS # BLD AUTO: 7.39 K/UL — SIGNIFICANT CHANGE UP (ref 1.8–7.4)
NEUTROPHILS NFR BLD AUTO: 56.1 % — SIGNIFICANT CHANGE UP (ref 43–77)
NEUTS BAND # BLD: 2.7 % — SIGNIFICANT CHANGE UP (ref 0–6)
NRBC # FLD: 0.29 — SIGNIFICANT CHANGE UP
NRBC FLD-RTO: 2.2 — SIGNIFICANT CHANGE UP
PLATELET # BLD AUTO: 255 K/UL — SIGNIFICANT CHANGE UP (ref 150–400)
PLATELET COUNT - ESTIMATE: NORMAL — SIGNIFICANT CHANGE UP
PMV BLD: 10.3 FL — SIGNIFICANT CHANGE UP (ref 7–13)
POLYCHROMASIA BLD QL SMEAR: SLIGHT — SIGNIFICANT CHANGE UP
POTASSIUM SERPL-MCNC: 4.7 MMOL/L — SIGNIFICANT CHANGE UP (ref 3.5–5.3)
POTASSIUM SERPL-SCNC: 4.7 MMOL/L — SIGNIFICANT CHANGE UP (ref 3.5–5.3)
PROT SERPL-MCNC: 6.9 G/DL — SIGNIFICANT CHANGE UP (ref 6–8.3)
RBC # BLD: 3.72 M/UL — LOW (ref 3.8–5.2)
RBC # FLD: 19 % — HIGH (ref 10.3–14.5)
RETICS #: 155 K/UL — HIGH (ref 25–125)
RETICS/RBC NFR: 4.1 % — HIGH (ref 0.5–2.5)
SICKLE CELLS BLD QL SMEAR: SLIGHT — SIGNIFICANT CHANGE UP
SODIUM SERPL-SCNC: 137 MMOL/L — SIGNIFICANT CHANGE UP (ref 135–145)
TARGETS BLD QL SMEAR: SLIGHT — SIGNIFICANT CHANGE UP
VARIANT LYMPHS # BLD: 4.4 % — SIGNIFICANT CHANGE UP
WBC # BLD: 13.18 K/UL — HIGH (ref 3.8–10.5)
WBC # FLD AUTO: 13.18 K/UL — HIGH (ref 3.8–10.5)

## 2018-03-25 PROCEDURE — 71046 X-RAY EXAM CHEST 2 VIEWS: CPT | Mod: 26

## 2018-03-25 PROCEDURE — 99223 1ST HOSP IP/OBS HIGH 75: CPT

## 2018-03-25 RX ORDER — CALCIUM ACETATE 667 MG
4 TABLET ORAL
Qty: 0 | Refills: 0 | COMMUNITY

## 2018-03-25 RX ORDER — NALOXONE HYDROCHLORIDE 4 MG/.1ML
0.1 SPRAY NASAL
Qty: 0 | Refills: 0 | Status: DISCONTINUED | OUTPATIENT
Start: 2018-03-25 | End: 2018-03-31

## 2018-03-25 RX ORDER — HYDROMORPHONE HYDROCHLORIDE 2 MG/ML
1 INJECTION INTRAMUSCULAR; INTRAVENOUS; SUBCUTANEOUS ONCE
Qty: 0 | Refills: 0 | Status: DISCONTINUED | OUTPATIENT
Start: 2018-03-25 | End: 2018-03-25

## 2018-03-25 RX ORDER — HYDROMORPHONE HYDROCHLORIDE 2 MG/ML
2 INJECTION INTRAMUSCULAR; INTRAVENOUS; SUBCUTANEOUS ONCE
Qty: 0 | Refills: 0 | Status: DISCONTINUED | OUTPATIENT
Start: 2018-03-25 | End: 2018-03-25

## 2018-03-25 RX ORDER — SODIUM CHLORIDE 0.65 %
1 AEROSOL, SPRAY (ML) NASAL
Qty: 0 | Refills: 0 | Status: DISCONTINUED | OUTPATIENT
Start: 2018-03-25 | End: 2018-03-31

## 2018-03-25 RX ORDER — CALCITRIOL 0.5 UG/1
1 CAPSULE ORAL DAILY
Qty: 0 | Refills: 0 | Status: DISCONTINUED | OUTPATIENT
Start: 2018-03-25 | End: 2018-03-31

## 2018-03-25 RX ORDER — HYDROMORPHONE HYDROCHLORIDE 2 MG/ML
1.5 INJECTION INTRAMUSCULAR; INTRAVENOUS; SUBCUTANEOUS
Qty: 0 | Refills: 0 | Status: DISCONTINUED | OUTPATIENT
Start: 2018-03-25 | End: 2018-03-25

## 2018-03-25 RX ORDER — CALCITRIOL 0.5 UG/1
2 CAPSULE ORAL
Qty: 0 | Refills: 0 | COMMUNITY

## 2018-03-25 RX ORDER — CALCIUM GLUCONATE 100 MG/ML
2 VIAL (ML) INTRAVENOUS ONCE
Qty: 0 | Refills: 0 | Status: COMPLETED | OUTPATIENT
Start: 2018-03-25 | End: 2018-03-25

## 2018-03-25 RX ORDER — FOLIC ACID 0.8 MG
1 TABLET ORAL DAILY
Qty: 0 | Refills: 0 | Status: DISCONTINUED | OUTPATIENT
Start: 2018-03-25 | End: 2018-03-31

## 2018-03-25 RX ORDER — HEPARIN SODIUM 5000 [USP'U]/ML
5000 INJECTION INTRAVENOUS; SUBCUTANEOUS EVERY 8 HOURS
Qty: 0 | Refills: 0 | Status: DISCONTINUED | OUTPATIENT
Start: 2018-03-25 | End: 2018-03-31

## 2018-03-25 RX ORDER — DIPHENHYDRAMINE HCL 50 MG
25 CAPSULE ORAL EVERY 6 HOURS
Qty: 0 | Refills: 0 | Status: DISCONTINUED | OUTPATIENT
Start: 2018-03-25 | End: 2018-03-31

## 2018-03-25 RX ORDER — CALCIUM ACETATE 667 MG
667 TABLET ORAL
Qty: 0 | Refills: 0 | Status: DISCONTINUED | OUTPATIENT
Start: 2018-03-25 | End: 2018-03-26

## 2018-03-25 RX ORDER — SODIUM CHLORIDE 9 MG/ML
1000 INJECTION, SOLUTION INTRAVENOUS
Qty: 0 | Refills: 0 | Status: DISCONTINUED | OUTPATIENT
Start: 2018-03-25 | End: 2018-03-25

## 2018-03-25 RX ORDER — HYDROMORPHONE HYDROCHLORIDE 2 MG/ML
30 INJECTION INTRAMUSCULAR; INTRAVENOUS; SUBCUTANEOUS
Qty: 0 | Refills: 0 | Status: DISCONTINUED | OUTPATIENT
Start: 2018-03-25 | End: 2018-03-31

## 2018-03-25 RX ORDER — HYDROMORPHONE HYDROCHLORIDE 2 MG/ML
1 INJECTION INTRAMUSCULAR; INTRAVENOUS; SUBCUTANEOUS EVERY 4 HOURS
Qty: 0 | Refills: 0 | Status: DISCONTINUED | OUTPATIENT
Start: 2018-03-25 | End: 2018-03-25

## 2018-03-25 RX ORDER — ONDANSETRON 8 MG/1
4 TABLET, FILM COATED ORAL EVERY 6 HOURS
Qty: 0 | Refills: 0 | Status: DISCONTINUED | OUTPATIENT
Start: 2018-03-25 | End: 2018-03-31

## 2018-03-25 RX ADMIN — HYDROMORPHONE HYDROCHLORIDE 2 MILLIGRAM(S): 2 INJECTION INTRAMUSCULAR; INTRAVENOUS; SUBCUTANEOUS at 11:56

## 2018-03-25 RX ADMIN — HYDROMORPHONE HYDROCHLORIDE 1 MILLIGRAM(S): 2 INJECTION INTRAMUSCULAR; INTRAVENOUS; SUBCUTANEOUS at 03:00

## 2018-03-25 RX ADMIN — HYDROMORPHONE HYDROCHLORIDE 1 MILLIGRAM(S): 2 INJECTION INTRAMUSCULAR; INTRAVENOUS; SUBCUTANEOUS at 09:44

## 2018-03-25 RX ADMIN — Medication 200 GRAM(S): at 06:33

## 2018-03-25 RX ADMIN — HYDROMORPHONE HYDROCHLORIDE 30 MILLILITER(S): 2 INJECTION INTRAMUSCULAR; INTRAVENOUS; SUBCUTANEOUS at 12:44

## 2018-03-25 RX ADMIN — Medication 1 MILLIGRAM(S): at 13:47

## 2018-03-25 RX ADMIN — HYDROMORPHONE HYDROCHLORIDE 1 MILLIGRAM(S): 2 INJECTION INTRAMUSCULAR; INTRAVENOUS; SUBCUTANEOUS at 06:24

## 2018-03-25 RX ADMIN — HYDROMORPHONE HYDROCHLORIDE 30 MILLILITER(S): 2 INJECTION INTRAMUSCULAR; INTRAVENOUS; SUBCUTANEOUS at 19:36

## 2018-03-25 RX ADMIN — HYDROMORPHONE HYDROCHLORIDE 2 MILLIGRAM(S): 2 INJECTION INTRAMUSCULAR; INTRAVENOUS; SUBCUTANEOUS at 08:12

## 2018-03-25 RX ADMIN — HYDROMORPHONE HYDROCHLORIDE 1 MILLIGRAM(S): 2 INJECTION INTRAMUSCULAR; INTRAVENOUS; SUBCUTANEOUS at 09:58

## 2018-03-25 RX ADMIN — HEPARIN SODIUM 5000 UNIT(S): 5000 INJECTION INTRAVENOUS; SUBCUTANEOUS at 23:21

## 2018-03-25 RX ADMIN — HEPARIN SODIUM 5000 UNIT(S): 5000 INJECTION INTRAVENOUS; SUBCUTANEOUS at 13:47

## 2018-03-25 RX ADMIN — Medication 1 TABLET(S): at 13:46

## 2018-03-25 RX ADMIN — CALCITRIOL 1 MICROGRAM(S): 0.5 CAPSULE ORAL at 18:39

## 2018-03-25 RX ADMIN — HYDROMORPHONE HYDROCHLORIDE 1 MILLIGRAM(S): 2 INJECTION INTRAMUSCULAR; INTRAVENOUS; SUBCUTANEOUS at 03:15

## 2018-03-25 RX ADMIN — SODIUM CHLORIDE 1000 MILLILITER(S): 9 INJECTION, SOLUTION INTRAVENOUS at 06:11

## 2018-03-25 RX ADMIN — Medication 25 MILLIGRAM(S): at 15:19

## 2018-03-25 RX ADMIN — HYDROMORPHONE HYDROCHLORIDE 1 MILLIGRAM(S): 2 INJECTION INTRAMUSCULAR; INTRAVENOUS; SUBCUTANEOUS at 03:40

## 2018-03-25 RX ADMIN — HYDROMORPHONE HYDROCHLORIDE 30 MILLILITER(S): 2 INJECTION INTRAMUSCULAR; INTRAVENOUS; SUBCUTANEOUS at 23:39

## 2018-03-25 RX ADMIN — HYDROMORPHONE HYDROCHLORIDE 2 MILLIGRAM(S): 2 INJECTION INTRAMUSCULAR; INTRAVENOUS; SUBCUTANEOUS at 12:08

## 2018-03-25 RX ADMIN — HYDROMORPHONE HYDROCHLORIDE 2 MILLIGRAM(S): 2 INJECTION INTRAMUSCULAR; INTRAVENOUS; SUBCUTANEOUS at 06:10

## 2018-03-25 NOTE — H&P ADULT - PROBLEM SELECTOR PLAN 2
ESRD 2/2 parvovirus. Now on PD. Last session completed yesterday. CO2 15 on chemistry, K 4.7. No signs of hypervolemia on exam.     -renal consultation  -c/w calcium acetate, nephro-jay

## 2018-03-25 NOTE — H&P ADULT - ASSESSMENT
37 W PMH sickle cell disease, h/o acute chest syndrome 12/2017, ESRD on PD, presents with several hours of low back pain. VS normal, stable. On exam, patient is writhing in pain; alert, oriented, no peripheral edema. Labs with WBC 13.18, Hgb 9.7, CO2 15, K4.7, Ca 6.5, albumin 3.0, Tbili 0.8. CXR without opacities.

## 2018-03-25 NOTE — H&P ADULT - PROBLEM SELECTOR PLAN 1
Severe low back pain is typical of patient's pain crises per her report. Hgb 9.7 with appropriate reticulocytosis. No opacities on CXR or complaints of dyspnea/chest pain to raise concern for ACS.    -IV hydromorphone 1.5mg q1h until PCA set up  -hydromorphone PCA  -will refrain from empiric IV hydration in light of ESRD  -c/w folate

## 2018-03-25 NOTE — ED ADULT NURSE NOTE - OBJECTIVE STATEMENT
pt aox3 sickle cell patient; reports she is in sickle cell crisis; reports pain in her back and head started at 12pm.  Reports pain 10/10.  pt goes for peritoneal dialysis; site to abdomen clean dry and intact; reports she is unable to make urine. VSS; 22g IV placed in left hand.

## 2018-03-25 NOTE — ED ADULT TRIAGE NOTE - CHIEF COMPLAINT QUOTE
pt c/o sickle cell crisis in her back and neck. pt endorses difficulty walking due to back pain. denies chest pain. no relief from tylenol & codeine

## 2018-03-25 NOTE — ED PROVIDER NOTE - OBJECTIVE STATEMENT
36yo female with pmh of HBSS, PD, AVN, Acute Chest presents with back and hip pain. Pt states for the past couple of days worsening pain, took tylenol with codiene and pain persists, No chest pain/sob/palp, abd pain/n/v/d, recent travel.

## 2018-03-25 NOTE — H&P ADULT - HISTORY OF PRESENT ILLNESS
HPI:    37 W PMH sickle cell disease, h/o acute chest syndrome 2017, ESRD on PD, presents with several hours of low back pain. Patient states pain is in her back bilaterally, 10/10 in severity, sharp in character. Did not improve with Tylenol with Codeine. Minimally improves with lying absolutely still. Pain is worse with movement. She states this pain is typical of her sickle cell crises. She does not know what predisposed her to this particular crisis. Reports no chest pain or shortness of breath. No fevers. Last underwent PD yesterday.     PAST MEDICAL & SURGICAL HISTORY:  Umbilical hernia: 2017  CRF (chronic renal failure): 2-, insertion of peritoneal dialysis  Kidney dysfunction: &quot;have minimal function in one kidney&quot; -- patient not sure which one -- started dialysis in  with insertion of peritoneal dialysis  Cholelithiasis  Anemia  Sickle cell disease  Chronic kidney disease: started dialysis 2017 with etiology from Parvo Virus 2016  Parvovirus B19 infection: 2016 resulting in chronic renal disease  Nephrotic syndrome  HPV (Human Papillomavirus)  Right Hip Pain- Surgery : at PAST appointment on 17, patient states surgery was   History of hip surgery: R hip due to necrosis in --bone graft from right tibia  H/O tubal ligation: in , along with   H/O: : 014  S/P Laparoscopic Cholecystectomy      Review of Systems:   CONSTITUTIONAL: No fever, weight loss, or fatigue  EYES: No eye pain, visual disturbances, or discharge  ENMT:  No difficulty hearing, tinnitus, vertigo; No sinus or throat pain  NECK: No pain or stiffness  BREASTS: No pain, masses, or nipple discharge  RESPIRATORY: No cough, wheezing, chills or hemoptysis; No shortness of breath  CARDIOVASCULAR: No chest pain, palpitations, dizziness, or leg swelling  GASTROINTESTINAL: No abdominal or epigastric pain. No nausea, vomiting, or hematemesis; No diarrhea or constipation. No melena or hematochezia.  GENITOURINARY: No dysuria, frequency, hematuria, or incontinence  NEUROLOGICAL: No headaches, memory loss, loss of strength, numbness, or tremors  SKIN: No itching, burning, rashes, or lesions   LYMPH NODES: No enlarged glands  ENDOCRINE: No heat or cold intolerance; No hair loss  MUSCULOSKELETAL: No joint pain or swelling; No muscle, or extremity pain; +back pain  PSYCHIATRIC: No depression, anxiety, mood swings, or difficulty sleeping  HEME/LYMPH: No easy bruising, or bleeding gums  ALLERGY AND IMMUNOLOGIC: No hives or eczema    Allergies    morphine (Other)    Intolerances        Social History:   Lives with children. Works as a  at [employer]. Does not smoke, drink, or use drugs.    FAMILY HISTORY:  Family history of sarcoidosis (Sibling): Sister with HgbSS and sarcoidosis  Family history of sickle cell disease (Sibling): Sister with HgbSS and sarcoidosis  Family history of sickle cell trait in mother  Family history of sickle cell trait in father      MEDICATIONS  (STANDING):  HYDROmorphone  Injectable 2 milliGRAM(s) IV Push once  HYDROmorphone PCA (1 mG/mL) 30 milliLiter(s) PCA Continuous PCA Continuous  sodium chloride 0.45%. 1000 milliLiter(s) (1000 mL/Hr) IV Continuous <Continuous>    MEDICATIONS  (PRN):  naloxone Injectable 0.1 milliGRAM(s) IV Push every 3 minutes PRN For ANY of the following changes in patient status:  A. RR LESS THAN 10 breaths per minute, B. Oxygen saturation LESS THAN 90%, C. Sedation score of 6  ondansetron Injectable 4 milliGRAM(s) IV Push every 6 hours PRN Nausea      T(C): 36.8 (18 @ 10:28), Max: 37.1 (18 @ 00:49)  HR: 98 (18 @ 10:28) (91 - 99)  BP: 111/70 (18 @ 10:28) (103/71 - 111/70)  RR: 18 (18 @ 10:28) (15 - 18)  SpO2: 100% (18 @ 10:28) (95% - 100%)    CAPILLARY BLOOD GLUCOSE        I&O's Summary      PHYSICAL EXAM:  GENERAL: WN/WD woman in hospital bed, writhing in pain  HEAD:  Atraumatic, Normocephalic  EYES: EOMI, PERRLA, conjunctiva and sclera clear  NECK: Supple, No elevated JVD  CHEST/LUNG: Clear to auscultation bilaterally; No wheeze  HEART: Regular rate and rhythm; No murmurs, rubs, or gallops  ABDOMEN: Soft, Nontender, Nondistended; Bowel sounds present, PD catheter in palce  EXTREMITIES:  2+ Peripheral Pulses, No clubbing, cyanosis, or edema  PSYCH: AAOx3  NEUROLOGY: CN II-XII grossly intact, moving all extremities  SKIN: No rashes or lesions    LABS:                        9.7    13.18 )-----------( 255      ( 25 Mar 2018 02:03 )             29.3         137  |  96<L>  |  74<H>  ----------------------------<  94  4.7   |  15<L>  |  11.21<H>    Ca    6.5<LL>      25 Mar 2018 02:03    TPro  6.9  /  Alb  3.0<L>  /  TBili  0.8  /  DBili  x   /  AST  46<H>  /  ALT  45<H>  /  AlkPhos  157<H>                  RADIOLOGY & ADDITIONAL TESTS:    ECG Personally Reviewed -     Imaging Personally Reviewed: CXR - clear lungs    Consultant(s) Notes Reviewed:      Care Discussed with Consultants/Other Providers:

## 2018-03-25 NOTE — ED PROVIDER NOTE - ATTENDING CONTRIBUTION TO CARE
36 y/o F with h/o SCD, ESRD on PD, previous AVN of right hip, recent admission in Dec 2017 for acute chest syndrome here with 1 day of diffuse back pain and headache.  Pt reports the pain is typical for previous pain crises.  No associated fever, chills, cough, sob, palpitations, cp, abd pain, n/v/d, dysuria, leg pain or swelling.  Well appearing, lying comfortably in stretcher, awake and alert, nontoxic.  VSS.  Lungs cta bl.  Cards nl S1/S2, RRR, no MRG.  Abd soft ntnd (+)pd catheter in place.  No focal neuro deficits.  Likely pain crises, will obtain labs, cxr r/o acute chest given recent hx and back pain, pain control, reassess.  Poss admission for pain control.

## 2018-03-25 NOTE — ED ADULT NURSE REASSESSMENT NOTE - NS ED NURSE REASSESS COMMENT FT1
pt resting comfortable at this time; IVF infusing as per order; pt reports relief of pain to a level of 7.  Will  continue to monitor/assess

## 2018-03-26 DIAGNOSIS — R09.02 HYPOXEMIA: ICD-10-CM

## 2018-03-26 LAB
ALBUMIN SERPL ELPH-MCNC: 2.9 G/DL — LOW (ref 3.3–5)
ALP SERPL-CCNC: 170 U/L — HIGH (ref 40–120)
ALT FLD-CCNC: 41 U/L — HIGH (ref 4–33)
AST SERPL-CCNC: 46 U/L — HIGH (ref 4–32)
BASOPHILS # BLD AUTO: 0.08 K/UL — SIGNIFICANT CHANGE UP (ref 0–0.2)
BASOPHILS NFR BLD AUTO: 0.4 % — SIGNIFICANT CHANGE UP (ref 0–2)
BILIRUB SERPL-MCNC: 1.4 MG/DL — HIGH (ref 0.2–1.2)
BUN SERPL-MCNC: 88 MG/DL — HIGH (ref 7–23)
CALCIUM SERPL-MCNC: 6.7 MG/DL — LOW (ref 8.4–10.5)
CHLORIDE SERPL-SCNC: 92 MMOL/L — LOW (ref 98–107)
CO2 SERPL-SCNC: 17 MMOL/L — LOW (ref 22–31)
CREAT SERPL-MCNC: 12.73 MG/DL — HIGH (ref 0.5–1.3)
EOSINOPHIL # BLD AUTO: 0.42 K/UL — SIGNIFICANT CHANGE UP (ref 0–0.5)
EOSINOPHIL NFR BLD AUTO: 2.2 % — SIGNIFICANT CHANGE UP (ref 0–6)
GLUCOSE SERPL-MCNC: 70 MG/DL — SIGNIFICANT CHANGE UP (ref 70–99)
HCT VFR BLD CALC: 23.5 % — LOW (ref 34.5–45)
HGB BLD-MCNC: 8.3 G/DL — LOW (ref 11.5–15.5)
IMM GRANULOCYTES # BLD AUTO: 1.17 # — SIGNIFICANT CHANGE UP
IMM GRANULOCYTES NFR BLD AUTO: 6 % — HIGH (ref 0–1.5)
LDH SERPL L TO P-CCNC: 742 U/L — HIGH (ref 135–225)
LYMPHOCYTES # BLD AUTO: 14.9 % — SIGNIFICANT CHANGE UP (ref 13–44)
LYMPHOCYTES # BLD AUTO: 2.91 K/UL — SIGNIFICANT CHANGE UP (ref 1–3.3)
MANUAL SMEAR VERIFICATION: SIGNIFICANT CHANGE UP
MCHC RBC-ENTMCNC: 27.4 PG — SIGNIFICANT CHANGE UP (ref 27–34)
MCHC RBC-ENTMCNC: 35.3 % — SIGNIFICANT CHANGE UP (ref 32–36)
MCV RBC AUTO: 77.6 FL — LOW (ref 80–100)
MONOCYTES # BLD AUTO: 2.07 K/UL — HIGH (ref 0–0.9)
MONOCYTES NFR BLD AUTO: 10.6 % — SIGNIFICANT CHANGE UP (ref 2–14)
NEUTROPHILS # BLD AUTO: 12.84 K/UL — HIGH (ref 1.8–7.4)
NEUTROPHILS NFR BLD AUTO: 65.9 % — SIGNIFICANT CHANGE UP (ref 43–77)
NRBC # FLD: 0.68 — SIGNIFICANT CHANGE UP
NRBC FLD-RTO: 3.5 — SIGNIFICANT CHANGE UP
PLATELET # BLD AUTO: 136 K/UL — LOW (ref 150–400)
PMV BLD: 11.2 FL — SIGNIFICANT CHANGE UP (ref 7–13)
POTASSIUM SERPL-MCNC: 4.8 MMOL/L — SIGNIFICANT CHANGE UP (ref 3.5–5.3)
POTASSIUM SERPL-SCNC: 4.8 MMOL/L — SIGNIFICANT CHANGE UP (ref 3.5–5.3)
PROT SERPL-MCNC: 6.6 G/DL — SIGNIFICANT CHANGE UP (ref 6–8.3)
RBC # BLD: 3.03 M/UL — LOW (ref 3.8–5.2)
RBC # FLD: 20.8 % — HIGH (ref 10.3–14.5)
RETICS #: 164 K/UL — HIGH (ref 25–125)
RETICS/RBC NFR: 5.3 % — HIGH (ref 0.5–2.5)
SODIUM SERPL-SCNC: 138 MMOL/L — SIGNIFICANT CHANGE UP (ref 135–145)
WBC # BLD: 19.49 K/UL — HIGH (ref 3.8–10.5)
WBC # FLD AUTO: 19.49 K/UL — HIGH (ref 3.8–10.5)

## 2018-03-26 PROCEDURE — 99255 IP/OBS CONSLTJ NEW/EST HI 80: CPT | Mod: GC

## 2018-03-26 PROCEDURE — 99233 SBSQ HOSP IP/OBS HIGH 50: CPT

## 2018-03-26 RX ORDER — PIPERACILLIN AND TAZOBACTAM 4; .5 G/20ML; G/20ML
3.38 INJECTION, POWDER, LYOPHILIZED, FOR SOLUTION INTRAVENOUS ONCE
Qty: 0 | Refills: 0 | Status: COMPLETED | OUTPATIENT
Start: 2018-03-26 | End: 2018-03-26

## 2018-03-26 RX ORDER — HYDROMORPHONE HYDROCHLORIDE 2 MG/ML
0.5 INJECTION INTRAMUSCULAR; INTRAVENOUS; SUBCUTANEOUS ONCE
Qty: 0 | Refills: 0 | Status: DISCONTINUED | OUTPATIENT
Start: 2018-03-26 | End: 2018-03-26

## 2018-03-26 RX ORDER — ACETAMINOPHEN 500 MG
650 TABLET ORAL EVERY 6 HOURS
Qty: 0 | Refills: 0 | Status: DISCONTINUED | OUTPATIENT
Start: 2018-03-26 | End: 2018-03-31

## 2018-03-26 RX ORDER — VANCOMYCIN HCL 1 G
500 VIAL (EA) INTRAVENOUS ONCE
Qty: 0 | Refills: 0 | Status: COMPLETED | OUTPATIENT
Start: 2018-03-26 | End: 2018-03-27

## 2018-03-26 RX ORDER — GENTAMICIN SULFATE 0.1 %
1 OINTMENT (GRAM) TOPICAL
Qty: 0 | Refills: 0 | Status: DISCONTINUED | OUTPATIENT
Start: 2018-03-26 | End: 2018-03-31

## 2018-03-26 RX ORDER — CALCIUM ACETATE 667 MG
1334 TABLET ORAL
Qty: 0 | Refills: 0 | Status: DISCONTINUED | OUTPATIENT
Start: 2018-03-26 | End: 2018-03-31

## 2018-03-26 RX ADMIN — HYDROMORPHONE HYDROCHLORIDE 0.5 MILLIGRAM(S): 2 INJECTION INTRAMUSCULAR; INTRAVENOUS; SUBCUTANEOUS at 03:23

## 2018-03-26 RX ADMIN — HEPARIN SODIUM 5000 UNIT(S): 5000 INJECTION INTRAVENOUS; SUBCUTANEOUS at 13:33

## 2018-03-26 RX ADMIN — Medication 1 TABLET(S): at 13:33

## 2018-03-26 RX ADMIN — HYDROMORPHONE HYDROCHLORIDE 30 MILLILITER(S): 2 INJECTION INTRAMUSCULAR; INTRAVENOUS; SUBCUTANEOUS at 08:33

## 2018-03-26 RX ADMIN — PIPERACILLIN AND TAZOBACTAM 200 GRAM(S): 4; .5 INJECTION, POWDER, LYOPHILIZED, FOR SOLUTION INTRAVENOUS at 21:44

## 2018-03-26 RX ADMIN — Medication 667 MILLIGRAM(S): at 13:33

## 2018-03-26 RX ADMIN — Medication 1 SPRAY(S): at 05:51

## 2018-03-26 RX ADMIN — Medication 1334 MILLIGRAM(S): at 19:11

## 2018-03-26 RX ADMIN — Medication 667 MILLIGRAM(S): at 09:50

## 2018-03-26 RX ADMIN — HEPARIN SODIUM 5000 UNIT(S): 5000 INJECTION INTRAVENOUS; SUBCUTANEOUS at 05:51

## 2018-03-26 RX ADMIN — HYDROMORPHONE HYDROCHLORIDE 30 MILLILITER(S): 2 INJECTION INTRAMUSCULAR; INTRAVENOUS; SUBCUTANEOUS at 19:10

## 2018-03-26 RX ADMIN — Medication 1 MILLIGRAM(S): at 13:33

## 2018-03-26 RX ADMIN — Medication 650 MILLIGRAM(S): at 23:56

## 2018-03-26 RX ADMIN — CALCITRIOL 1 MICROGRAM(S): 0.5 CAPSULE ORAL at 13:33

## 2018-03-26 NOTE — PROGRESS NOTE ADULT - SUBJECTIVE AND OBJECTIVE BOX
Patient is a 37y old  Female who presents with a chief complaint of Pain crisis (25 Mar 2018 11:54)      SUBJECTIVE / OVERNIGHT EVENTS: Improved pain per pt. No CP or SOB. No fever    MEDICATIONS  (STANDING):  calcitriol   Capsule 1 MICROGram(s) Oral daily  calcium acetate 667 milliGRAM(s) Oral three times a day with meals  folic acid 1 milliGRAM(s) Oral daily  gentamicin 0.1% Cream 1 Application(s) Topical <User Schedule>  heparin  Injectable 5000 Unit(s) SubCutaneous every 8 hours  HYDROmorphone PCA (1 mG/mL) 30 milliLiter(s) PCA Continuous PCA Continuous  Nephro-jay 1 Tablet(s) Oral daily    MEDICATIONS  (PRN):  diphenhydrAMINE   Capsule 25 milliGRAM(s) Oral every 6 hours PRN Rash and/or Itching  naloxone Injectable 0.1 milliGRAM(s) IV Push every 3 minutes PRN For ANY of the following changes in patient status:  A. RR LESS THAN 10 breaths per minute, B. Oxygen saturation LESS THAN 90%, C. Sedation score of 6  ondansetron Injectable 4 milliGRAM(s) IV Push every 6 hours PRN Nausea  sodium chloride 0.65% Nasal 1 Spray(s) Both Nostrils two times a day PRN Nasal Congestion      T(C): 37.4 (03-26-18 @ 11:27), Max: 37.6 (03-26-18 @ 07:18)  HR: 110 (03-26-18 @ 11:27) (104 - 119)  BP: 106/72 (03-26-18 @ 11:27) (106/69 - 115/76)  RR: 18 (03-26-18 @ 11:27) (18 - 18)  SpO2: 92% (03-26-18 @ 11:00) (92% - 96%)  CAPILLARY BLOOD GLUCOSE        I&O's Summary    26 Mar 2018 07:01  -  26 Mar 2018 12:02  --------------------------------------------------------  IN: 2000 mL / OUT: 250 mL / NET: 1750 mL        PHYSICAL EXAM:  GENERAL: NAD,   HEAD:  Normocephalic  EYES: EOMI,  conjunctiva and sclera clear  NECK: Supple,   CHEST/LUNG: Clear to auscultation bilaterally; No wheeze  HEART:  s1 s2 + Regular rate and rhythm;   ABDOMEN: Soft, Nontender, Nondistended; Bowel sounds present  EXTREMITIES:   No clubbing, cyanosis  NEURO: AAOx3      LABS:                        8.3    19.49 )-----------( 136      ( 26 Mar 2018 06:01 )             23.5     03-26    138  |  92<L>  |  88<H>  ----------------------------<  70  4.8   |  17<L>  |  12.73<H>    Ca    6.7<L>      26 Mar 2018 06:01    TPro  6.6  /  Alb  2.9<L>  /  TBili  1.4<H>  /  DBili  x   /  AST  46<H>  /  ALT  41<H>  /  AlkPhos  170<H>  03-26

## 2018-03-26 NOTE — CONSULT NOTE ADULT - PROBLEM SELECTOR RECOMMENDATION 2
Pt with low serum calcium. Recommend ionized calcium level, serum phosphorus and PTH level. Replete IV as needed. Pt with low serum calcium. Recommend to check ionized calcium level, serum phosphorus and PTH level. Recommend to increase dose of phoslo to 2 tabs TID with meals (home dose) and continue calcitriol. Replete IV as needed.

## 2018-03-26 NOTE — CONSULT NOTE ADULT - PROBLEM SELECTOR RECOMMENDATION 9
ESRD on PD. Pt clinically stable. Labs reviewed. Plan for 3 session of CAPD today of 2L of 2.5% today with dwell of 3 hours ESRD on PD. Pt clinically stable. Labs reviewed. Plan for 3 session of CAPD today of 2L of 2.5% today with dwell of 3 hours. Pt. tolerated first session well. Plan ro continue CAPD while inpatient.

## 2018-03-26 NOTE — PROGRESS NOTE ADULT - PROBLEM SELECTOR PLAN 3
ESRD 2/2 parvovirus. Now on PD. Last session completed yesterday. CO2 15 on chemistry, K 4.7. No signs of hypervolemia on exam.   -renal following  -c/w calcium acetate, nephro-jay

## 2018-03-26 NOTE — CONSULT NOTE ADULT - ASSESSMENT
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

## 2018-03-26 NOTE — PROGRESS NOTE ADULT - PROBLEM SELECTOR PLAN 2
likely due to splinting from sickle cell crisis, however given concurrent tachycardia, will order CTA chest and r/o PE

## 2018-03-26 NOTE — CHART NOTE - NSCHARTNOTEFT_GEN_A_CORE
Notified by RN that pt having temp 101.6. Pt receiving PD at present in no resp distress.    37 W PMH sickle cell disease, h/o acute chest syndrome 12/2017, ESRD on PD, presents with several hours of low back pain. VS normal, stable. CXR without opacities now developed fevers.    Assessment / Plan    - ordered blood Cx, UA, urine Cx to be sent now  - Tylenol, supportive care  - ordered Vanco / Zosyn x1 dose to be administered today  - monitor VS q4 hrs, c/w IS as ordered, bowel regimen  - will cont to monitor pts clinical status

## 2018-03-26 NOTE — PROGRESS NOTE ADULT - PROBLEM SELECTOR PLAN 1
-improving pain, plan to continue IV dilauadid PCA  - incentive spirometry  - No opacities on CXR or complaints of dyspnea/chest pain to raise concern for ACS.  -will refrain from empiric IV hydration in light of ESRD  -c/w folate

## 2018-03-26 NOTE — CONSULT NOTE ADULT - SUBJECTIVE AND OBJECTIVE BOX
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 Pt. has been on PD since 3/2017. Pt. nephrologist is Dr. Larsen. Pt. states she has not had any problems with PD. PD catheter is working well. Pt. states her last PD was Friday night, tolerated well without any complications. Pt. was unable to do PD on Saturday and  due to pain. Pt. says she is on CCPD 3 exchanges (fill volume: 2000cc with 2.5% dextrose dianeal fluid and a last fill extraneal). Patient seen and examined. Pt. denies any SOB, abdominal pain, N/V.     PAST HISTORY  --------------------------------------------------------------------------------  PAST MEDICAL & SURGICAL HISTORY:  Umbilical hernia: 2017  CRF (chronic renal failure): -, insertion of peritoneal dialysis  Kidney dysfunction: &quot;have minimal function in one kidney&quot; -- patient not sure which one -- started dialysis in 2017 with insertion of peritoneal dialysis  Cholelithiasis  Anemia  Sickle cell disease  Chronic kidney disease: started dialysis 2017 with etiology from Parvo Virus 2016  Parvovirus B19 infection: 2016 resulting in chronic renal disease  Nephrotic syndrome  HPV (Human Papillomavirus)  Right Hip Pain- Surgery : at PAST appointment on 17, patient states surgery was   History of hip surgery: R hip due to necrosis in --bone graft from right tibia  H/O tubal ligation: in , along with   H/O: : 014  S/P Laparoscopic Cholecystectomy    FAMILY HISTORY:  Family history of sarcoidosis (Sibling): Sister with HgbSS and sarcoidosis  Family history of sickle cell disease (Sibling): Sister with HgbSS and sarcoidosis  Family history of sickle cell trait in mother  Family history of sickle cell trait in father    PAST SOCIAL HISTORY:    ALLERGIES & MEDICATIONS  --------------------------------------------------------------------------------  Allergies    morphine (Other)    Intolerances      Standing Inpatient Medications  calcitriol   Capsule 1 MICROGram(s) Oral daily  calcium acetate 667 milliGRAM(s) Oral three times a day with meals  folic acid 1 milliGRAM(s) Oral daily  gentamicin 0.1% Cream 1 Application(s) Topical <User Schedule>  heparin  Injectable 5000 Unit(s) SubCutaneous every 8 hours  HYDROmorphone PCA (1 mG/mL) 30 milliLiter(s) PCA Continuous PCA Continuous  Nephro-jay 1 Tablet(s) Oral daily    PRN Inpatient Medications  diphenhydrAMINE   Capsule 25 milliGRAM(s) Oral every 6 hours PRN  naloxone Injectable 0.1 milliGRAM(s) IV Push every 3 minutes PRN  ondansetron Injectable 4 milliGRAM(s) IV Push every 6 hours PRN  sodium chloride 0.65% Nasal 1 Spray(s) Both Nostrils two times a day PRN      REVIEW OF SYSTEMS  --------------------------------------------------------------------------------  Gen: No weakness  Skin: No rashes  Head/Eyes/Ears/Mouth: No headache  Respiratory: No dyspnea  CV: No chest pain, PND, orthopnea  GI: No abdominal pain, diarrhea  : No increased frequency  MSK: No edema  Neuro: No dizziness/lightheadedness  Heme: No bleeding    All other systems were reviewed and are negative, except as noted.    VITALS/PHYSICAL EXAM  --------------------------------------------------------------------------------  T(C): 37.4 (18 @ 11:27), Max: 37.6 (18 @ 07:18)  HR: 110 (18 11:27) (104 - 119)  BP: 106/72 (18 @ 11:27) (106/69 - 115/76)  RR: 18 (18 11:27) (18 - 18)  SpO2: 92% (18 @ 11:00) (92% - 96%)  Wt(kg): --  Height (cm): 165.1 (18 @ 07:18)  Weight (kg): 67.2 (18 07:18)  BMI (kg/m2): 24.7 (18 07:18)  BSA (m2): 1.74 (18 @ 07:18)      18 @ 07:01  -  18 @ 13:07  --------------------------------------------------------  IN: 2000 mL / OUT: 250 mL / NET: 1750 mL    Physical Exam:  	Gen: Resting in bed  	HEENT: supple neck   	Pulm: CTA B/L  	CV: RRR  	Abd: soft + PD catheter   	: No suprapubic tenderness  	LE: Warm, no edema  	Neuro: Awake and alert   	Psych: Normal affect and mood  	Skin: Warm, without rashes  	Vascular access: + PD catheter, dressing clean/ dry/ intact     LABS/STUDIES  --------------------------------------------------------------------------------              8.3    19.49 >-----------<  136      [18 06:01]              23.5     138  |  92  |  88  ----------------------------<  70      [18 06:01]  4.8   |  17  |  12.73        Ca     6.7     [18 06:01]      iCa    Test not performed Quantity not sufficient.     [ 02:03]    TPro  6.6  /  Alb  2.9  /  TBili  1.4  /  DBili  x   /  AST  46  /  ALT  41  /  AlkPhos  170  [18 @ 06:01]              [18 06:01]    Creatinine Trend:  SCr 12.73 [:01]  SCr 11.21 [ 02:03]    Urinalysis - [05-10-16 @ 19:10]      Color PLYEL / Appearance CLEAR / SG 1.011 / pH 7.5      Gluc 50 / Ketone NEGATIVE  / Bili NEGATIVE / Urobili NORMAL       Blood SMALL / Protein >600 / Leuk Est NEGATIVE / Nitrite NEGATIVE      RBC 0-2 / WBC 10-25 / Hyaline 2-5 / Gran  / Sq Epi OCC / Non Sq Epi  / Bacteria FEW      Iron 71, TIBC 165, %sat --      [17 @ 18:30]  Ferritin 2501      [17 @ 18:30]  Vitamin D (25OH) 6.1      [17 @ 06:40]  HbA1c 3.8      [17 @ 11:35]    HBsAg Nonreactive      [17 @ 11:35]  HCV 0.11, Nonreactive Hepatitis C AB  S/CO Ratio                        Interpretation  < 1.0                                     Non-Reactive  1.0 - 4.9                           Weakly-Reactive  > 5.0                                 Reactive  Non-Reactive: Aperson with a non-reactive HCV antibody  result is considered uninfected.  No further action is  needed unless recent infection is suspected.  In these  cases, consider repeat testing later to detect  seroconversion..  Weakly-Reactive: HCV antibody test is abnormal, HCV RNA  Qualitative test will follow.  Reactive: HCV antibody test is abnormal, HCV RNA  Qualitative test will follow.  Note: HCV antibody testing is performed on the [employer]   system.      [17 @ 11:35]    dsDNA <12      [16 @ 06:20]  Rheumatoid Factor 8.6      [16 @ 06:20]  Syphilis Screen (Treponema Pallidum Ab) Negative      [14 @ 17:15]

## 2018-03-27 DIAGNOSIS — R65.10 SYSTEMIC INFLAMMATORY RESPONSE SYNDROME (SIRS) OF NON-INFECTIOUS ORIGIN WITHOUT ACUTE ORGAN DYSFUNCTION: ICD-10-CM

## 2018-03-27 LAB
ALBUMIN SERPL ELPH-MCNC: 2.7 G/DL — LOW (ref 3.3–5)
ALP SERPL-CCNC: 177 U/L — HIGH (ref 40–120)
ALT FLD-CCNC: 36 U/L — HIGH (ref 4–33)
AST SERPL-CCNC: 47 U/L — HIGH (ref 4–32)
BASOPHILS # BLD AUTO: 0.05 K/UL — SIGNIFICANT CHANGE UP (ref 0–0.2)
BASOPHILS NFR BLD AUTO: 0.3 % — SIGNIFICANT CHANGE UP (ref 0–2)
BILIRUB SERPL-MCNC: 1.3 MG/DL — HIGH (ref 0.2–1.2)
BODY FLUID TYPE: SIGNIFICANT CHANGE UP
BUN SERPL-MCNC: 93 MG/DL — HIGH (ref 7–23)
CA-I BLD-SCNC: 0.9 MMOL/L — LOW (ref 1.03–1.23)
CALCIUM SERPL-MCNC: 7 MG/DL — LOW (ref 8.4–10.5)
CHLORIDE SERPL-SCNC: 91 MMOL/L — LOW (ref 98–107)
CLARITY SPEC: CLEAR — SIGNIFICANT CHANGE UP
CO2 SERPL-SCNC: 23 MMOL/L — SIGNIFICANT CHANGE UP (ref 22–31)
COLOR FLD: COLORLESS — SIGNIFICANT CHANGE UP
CREAT SERPL-MCNC: 11.84 MG/DL — HIGH (ref 0.5–1.3)
EOSINOPHIL # BLD AUTO: 0.25 K/UL — SIGNIFICANT CHANGE UP (ref 0–0.5)
EOSINOPHIL # FLD: 1 % — SIGNIFICANT CHANGE UP
EOSINOPHIL NFR BLD AUTO: 1.4 % — SIGNIFICANT CHANGE UP (ref 0–6)
GLUCOSE SERPL-MCNC: 102 MG/DL — HIGH (ref 70–99)
HCT VFR BLD CALC: 20.2 % — CRITICAL LOW (ref 34.5–45)
HGB BLD-MCNC: 7.2 G/DL — LOW (ref 11.5–15.5)
IMM GRANULOCYTES # BLD AUTO: 0.97 # — SIGNIFICANT CHANGE UP
IMM GRANULOCYTES NFR BLD AUTO: 5.3 % — HIGH (ref 0–1.5)
LDH SERPL L TO P-CCNC: 904 U/L — HIGH (ref 135–225)
LYMPHOCYTES # BLD AUTO: 18.2 % — SIGNIFICANT CHANGE UP (ref 13–44)
LYMPHOCYTES # BLD AUTO: 3.34 K/UL — HIGH (ref 1–3.3)
LYMPHOCYTES NFR FLD: 15 % — SIGNIFICANT CHANGE UP
MACROPHAGES # FLD: 57 % — SIGNIFICANT CHANGE UP
MAGNESIUM SERPL-MCNC: 1.6 MG/DL — SIGNIFICANT CHANGE UP (ref 1.6–2.6)
MANUAL SMEAR VERIFICATION: SIGNIFICANT CHANGE UP
MCHC RBC-ENTMCNC: 26.7 PG — LOW (ref 27–34)
MCHC RBC-ENTMCNC: 35.6 % — SIGNIFICANT CHANGE UP (ref 32–36)
MCV RBC AUTO: 74.8 FL — LOW (ref 80–100)
MESOTHL CELL # FLD: 3 % — SIGNIFICANT CHANGE UP
MONOCYTES # BLD AUTO: 2.31 K/UL — HIGH (ref 0–0.9)
MONOCYTES # FLD: 9 % — SIGNIFICANT CHANGE UP
MONOCYTES NFR BLD AUTO: 12.6 % — SIGNIFICANT CHANGE UP (ref 2–14)
NEUTROPHILS # BLD AUTO: 11.4 K/UL — HIGH (ref 1.8–7.4)
NEUTROPHILS NFR BLD AUTO: 62.2 % — SIGNIFICANT CHANGE UP (ref 43–77)
NEUTS SEG NFR FLD MANUAL: 15 % — SIGNIFICANT CHANGE UP
NRBC # FLD: 1.2 — SIGNIFICANT CHANGE UP
NRBC FLD-RTO: 6.6 — SIGNIFICANT CHANGE UP
PHOSPHATE SERPL-MCNC: 6.6 MG/DL — HIGH (ref 2.5–4.5)
PLATELET # BLD AUTO: 152 K/UL — SIGNIFICANT CHANGE UP (ref 150–400)
PMV BLD: 11 FL — SIGNIFICANT CHANGE UP (ref 7–13)
POTASSIUM SERPL-MCNC: 4.4 MMOL/L — SIGNIFICANT CHANGE UP (ref 3.5–5.3)
POTASSIUM SERPL-SCNC: 4.4 MMOL/L — SIGNIFICANT CHANGE UP (ref 3.5–5.3)
PROT SERPL-MCNC: 5.6 G/DL — LOW (ref 6–8.3)
PTH-INTACT SERPL-MCNC: 765.2 PG/ML — HIGH (ref 15–65)
RBC # BLD: 2.7 M/UL — LOW (ref 3.8–5.2)
RBC # FLD: 22.1 % — HIGH (ref 10.3–14.5)
RCV VOL RI: > 1000 CELL/UL — HIGH (ref 0–5)
RETICS #: 132 K/UL — HIGH (ref 25–125)
RETICS/RBC NFR: 4.9 % — HIGH (ref 0.5–2.5)
SODIUM SERPL-SCNC: 134 MMOL/L — LOW (ref 135–145)
SPECIMEN SOURCE: SIGNIFICANT CHANGE UP
SPECIMEN SOURCE: SIGNIFICANT CHANGE UP
TOTAL CELLS COUNTED, BODY FLUID: 100 CELLS — SIGNIFICANT CHANGE UP
TOTAL NUCLEATED CELL COUNT, BODY FLUID: 40 CELL/UL — HIGH (ref 0–5)
WBC # BLD: 18.32 K/UL — HIGH (ref 3.8–10.5)
WBC # FLD AUTO: 18.32 K/UL — HIGH (ref 3.8–10.5)

## 2018-03-27 PROCEDURE — 99233 SBSQ HOSP IP/OBS HIGH 50: CPT | Mod: GC

## 2018-03-27 PROCEDURE — 71275 CT ANGIOGRAPHY CHEST: CPT | Mod: 26

## 2018-03-27 PROCEDURE — 90945 DIALYSIS ONE EVALUATION: CPT | Mod: GC

## 2018-03-27 RX ORDER — VANCOMYCIN HCL 1 G
500 VIAL (EA) INTRAVENOUS ONCE
Qty: 0 | Refills: 0 | Status: COMPLETED | OUTPATIENT
Start: 2018-03-27 | End: 2018-03-27

## 2018-03-27 RX ORDER — SENNA PLUS 8.6 MG/1
2 TABLET ORAL AT BEDTIME
Qty: 0 | Refills: 0 | Status: DISCONTINUED | OUTPATIENT
Start: 2018-03-27 | End: 2018-03-31

## 2018-03-27 RX ORDER — POLYETHYLENE GLYCOL 3350 17 G/17G
17 POWDER, FOR SOLUTION ORAL ONCE
Qty: 0 | Refills: 0 | Status: COMPLETED | OUTPATIENT
Start: 2018-03-27 | End: 2018-03-27

## 2018-03-27 RX ORDER — DOCUSATE SODIUM 100 MG
100 CAPSULE ORAL THREE TIMES A DAY
Qty: 0 | Refills: 0 | Status: DISCONTINUED | OUTPATIENT
Start: 2018-03-27 | End: 2018-03-31

## 2018-03-27 RX ORDER — PIPERACILLIN AND TAZOBACTAM 4; .5 G/20ML; G/20ML
3.38 INJECTION, POWDER, LYOPHILIZED, FOR SOLUTION INTRAVENOUS EVERY 12 HOURS
Qty: 0 | Refills: 0 | Status: DISCONTINUED | OUTPATIENT
Start: 2018-03-27 | End: 2018-03-31

## 2018-03-27 RX ADMIN — PIPERACILLIN AND TAZOBACTAM 25 GRAM(S): 4; .5 INJECTION, POWDER, LYOPHILIZED, FOR SOLUTION INTRAVENOUS at 17:27

## 2018-03-27 RX ADMIN — Medication 1 TABLET(S): at 13:13

## 2018-03-27 RX ADMIN — CALCITRIOL 1 MICROGRAM(S): 0.5 CAPSULE ORAL at 13:07

## 2018-03-27 RX ADMIN — HYDROMORPHONE HYDROCHLORIDE 30 MILLILITER(S): 2 INJECTION INTRAMUSCULAR; INTRAVENOUS; SUBCUTANEOUS at 07:48

## 2018-03-27 RX ADMIN — HEPARIN SODIUM 5000 UNIT(S): 5000 INJECTION INTRAVENOUS; SUBCUTANEOUS at 00:29

## 2018-03-27 RX ADMIN — Medication 100 MILLIGRAM(S): at 00:30

## 2018-03-27 RX ADMIN — HEPARIN SODIUM 5000 UNIT(S): 5000 INJECTION INTRAVENOUS; SUBCUTANEOUS at 13:07

## 2018-03-27 RX ADMIN — Medication 650 MILLIGRAM(S): at 19:15

## 2018-03-27 RX ADMIN — HEPARIN SODIUM 5000 UNIT(S): 5000 INJECTION INTRAVENOUS; SUBCUTANEOUS at 05:33

## 2018-03-27 RX ADMIN — Medication 1 MILLIGRAM(S): at 13:07

## 2018-03-27 RX ADMIN — Medication 1334 MILLIGRAM(S): at 13:08

## 2018-03-27 RX ADMIN — HYDROMORPHONE HYDROCHLORIDE 30 MILLILITER(S): 2 INJECTION INTRAMUSCULAR; INTRAVENOUS; SUBCUTANEOUS at 20:43

## 2018-03-27 RX ADMIN — HEPARIN SODIUM 5000 UNIT(S): 5000 INJECTION INTRAVENOUS; SUBCUTANEOUS at 22:50

## 2018-03-27 RX ADMIN — Medication 100 MILLIGRAM(S): at 16:38

## 2018-03-27 RX ADMIN — POLYETHYLENE GLYCOL 3350 17 GRAM(S): 17 POWDER, FOR SOLUTION ORAL at 13:07

## 2018-03-27 RX ADMIN — Medication 100 MILLIGRAM(S): at 22:47

## 2018-03-27 RX ADMIN — Medication 100 MILLIGRAM(S): at 13:16

## 2018-03-27 NOTE — PROGRESS NOTE ADULT - ASSESSMENT
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 Pt. with ESRD on PD admitted with sickle cell crisis. Continue with PD during current hospital stay.

## 2018-03-27 NOTE — PROGRESS NOTE ADULT - PROBLEM SELECTOR PLAN 1
fever, leukocytosis, tachycardia  unclear source of infection  f/u PD catheter Cx  f/u BCx  Empiric Abx, Vanc after HD, Vanc level in the AM

## 2018-03-27 NOTE — PROGRESS NOTE ADULT - PROBLEM SELECTOR PLAN 1
ESRD on PD. Pt clinically stable. Labs reviewed. Plan for 3 session of CAPD today of 2L of 2.5% today with dwell of 3 hours. Pt. tolerated first session well. Plan ro continue CAPD while inpatient. ESRD on PD. Pt clinically stable. Labs reviewed. Plan for 4 sessions of CAPD today of 2L of 1.5% today with dwell of 3 hours. Pt. tolerated first session well. Plan to continue CAPD while inpatient. Pt. with ESRD on CAPD during current hospital stay at [facility]. Pt. clinically stable.  Labs reviewed. Pt. tolerating CAPD. BP stable during CAPD. PD catheter functioning well. Plan for PD exchanges today

## 2018-03-27 NOTE — PROGRESS NOTE ADULT - SUBJECTIVE AND OBJECTIVE BOX
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 Pt. has been on PD since 3/2017. Pt. nephrologist is Dr. Larsen. Pt. states she has not had any problems with PD. PD catheter is working well. Pt. states her last PD was Friday night prior to admission. Pt. was unable to do PD on Saturday and Sunday due to pain. Pt. says she is on CCPD 3 exchanges (fill volume: 2000cc with 2.5% dextrose dianeal fluid and a last fill extraneal). Patient seen and examined. Pt had 3 exchanges, 3 hour dwells of 2.5% 2L fill yesterday which was tolerated well. Pt. denies any SOB, abdominal pain, N/V. Patient admits to constipation. Pt had a fever overnight     PAST HISTORY  --------------------------------------------------------------------------------  No significant changes to PMH, PSH, FHx, SHx, unless otherwise noted    ALLERGIES & MEDICATIONS  --------------------------------------------------------------------------------  Allergies    morphine (Other)    Intolerances      Standing Inpatient Medications  calcitriol   Capsule 1 MICROGram(s) Oral daily  calcium acetate 1334 milliGRAM(s) Oral three times a day with meals  docusate sodium 100 milliGRAM(s) Oral three times a day  folic acid 1 milliGRAM(s) Oral daily  gentamicin 0.1% Cream 1 Application(s) Topical <User Schedule>  gentamicin 0.1% Ointment 1 Application(s) Topical <User Schedule>  heparin  Injectable 5000 Unit(s) SubCutaneous every 8 hours  HYDROmorphone PCA (1 mG/mL) 30 milliLiter(s) PCA Continuous PCA Continuous  Nephro-jay 1 Tablet(s) Oral daily  piperacillin/tazobactam IVPB. 3.375 Gram(s) IV Intermittent every 12 hours  polyethylene glycol 3350 17 Gram(s) Oral once  senna 2 Tablet(s) Oral at bedtime  vancomycin  IVPB 500 milliGRAM(s) IV Intermittent once    PRN Inpatient Medications  acetaminophen   Tablet 650 milliGRAM(s) Oral every 6 hours PRN  diphenhydrAMINE   Capsule 25 milliGRAM(s) Oral every 6 hours PRN  naloxone Injectable 0.1 milliGRAM(s) IV Push every 3 minutes PRN  ondansetron Injectable 4 milliGRAM(s) IV Push every 6 hours PRN  sodium chloride 0.65% Nasal 1 Spray(s) Both Nostrils two times a day PRN      REVIEW OF SYSTEMS  --------------------------------------------------------------------------------  Gen: No weakness  Skin: No rashes  Head/Eyes/Ears/Mouth: No headache  Respiratory: No dyspnea, cough  CV: No chest pain, PND, orthopnea  GI: No abdominal pain, diarrhea  MSK: No edema  Neuro: No dizziness/lightheadedness  Heme: No bleeding    All other systems were reviewed and are negative, except as noted.    VITALS/PHYSICAL EXAM  --------------------------------------------------------------------------------  T(C): 37.4 (03-27-18 @ 11:30), Max: 38.7 (03-26-18 @ 18:00)  HR: 111 (03-27-18 @ 11:30) (109 - 123)  BP: 106/74 (03-27-18 @ 11:30) (94/62 - 119/79)  RR: 16 (03-27-18 @ 11:30) (16 - 17)  SpO2: 100% (03-27-18 @ 11:30) (91% - 100%)  Wt(kg): --  Height (cm): 165.1 (03-26-18 @ 07:18)  Weight (kg): 67.2 (03-26-18 @ 07:18)  BMI (kg/m2): 24.7 (03-26-18 @ 07:18)  BSA (m2): 1.74 (03-26-18 @ 07:18)      03-26-18 @ 07:01  -  03-27-18 @ 07:00  --------------------------------------------------------  IN: 6000 mL / OUT: 4450 mL / NET: 1550 mL    03-27-18 @ 07:01  -  03-27-18 @ 13:07  --------------------------------------------------------  IN: 2000 mL / OUT: 2000 mL / NET: 0 mL    Physical Exam:  	Gen: NAD  	HEENT: supple neck   	Pulm: + crackles at bases B/L  	CV: RRR  	Abd: soft + PD catheter   	: No suprapubic tenderness  	LE: Warm, no edema  	Neuro: Awake and alert   	Psych: Normal affect and mood  	Skin: Warm, without rashes  	Vascular access: + PD catheter, dressing clean/ dry/ intact     LABS/STUDIES  --------------------------------------------------------------------------------              7.2    18.32 >-----------<  152      [03-27-18 @ 05:30]              20.2     134  |  91  |  93  ----------------------------<  102      [03-27-18 @ 05:30]  4.4   |  23  |  11.84        Ca     7.0     [03-27-18 @ 05:30]      iCa    0.90     [03-27 @ 05:30]      Mg     1.6     [03-27-18 @ 05:30]      Phos  6.6     [03-27-18 @ 05:30]    TPro  5.6  /  Alb  2.7  /  TBili  1.3  /  DBili  x   /  AST  47  /  ALT  36  /  AlkPhos  177  [03-27-18 @ 05:30]          [03-27-18 @ 05:30]    Creatinine Trend:  SCr 11.84 [03-27 @ 05:30]  SCr 12.73 [03-26 @ 06:01]  SCr 11.21 [03-25 @ 02:03]    Iron 71, TIBC 165, %sat --      [08-06-17 @ 18:30]  Ferritin 2501      [08-06-17 @ 18:30]  .2 (Ca --)      [03-27-18 @ 05:30]   --  Vitamin D (25OH) 6.1      [08-08-17 @ 06:40]  HbA1c 3.8      [01-13-17 @ 11:35] [facility] DIVISION OF KIDNEY DISEASES AND HYPERTENSION -- FOLLOW UP NOTE  --------------------------------------------------------------------------------  HPI: 37 year old female with ESRD on PD, and sickle cell disease admitted for sickle cell crisis. Pt. has been on PD since 3/2017. Pt. outpatient nephrologist is Dr. Larsen. Patient seen and examined during PD exchange today. Pt. had 3 PD exchanges (3 hour dwells of 2.5% 2L) yesterday. Pt. feels better and denies abdominal pain, nausea or vomiting. Patient admits to constipation and had fever overnight.     PAST HISTORY  --------------------------------------------------------------------------------  No significant changes to PMH, PSH, FHx, SHx, unless otherwise noted    ALLERGIES & MEDICATIONS  --------------------------------------------------------------------------------  Allergies    morphine (Other)    Intolerances      Standing Inpatient Medications  calcitriol   Capsule 1 MICROGram(s) Oral daily  calcium acetate 1334 milliGRAM(s) Oral three times a day with meals  docusate sodium 100 milliGRAM(s) Oral three times a day  folic acid 1 milliGRAM(s) Oral daily  gentamicin 0.1% Cream 1 Application(s) Topical <User Schedule>  gentamicin 0.1% Ointment 1 Application(s) Topical <User Schedule>  heparin  Injectable 5000 Unit(s) SubCutaneous every 8 hours  HYDROmorphone PCA (1 mG/mL) 30 milliLiter(s) PCA Continuous PCA Continuous  Nephro-jay 1 Tablet(s) Oral daily  piperacillin/tazobactam IVPB. 3.375 Gram(s) IV Intermittent every 12 hours  polyethylene glycol 3350 17 Gram(s) Oral once  senna 2 Tablet(s) Oral at bedtime  vancomycin  IVPB 500 milliGRAM(s) IV Intermittent once    PRN Inpatient Medications  acetaminophen   Tablet 650 milliGRAM(s) Oral every 6 hours PRN  diphenhydrAMINE   Capsule 25 milliGRAM(s) Oral every 6 hours PRN  naloxone Injectable 0.1 milliGRAM(s) IV Push every 3 minutes PRN  ondansetron Injectable 4 milliGRAM(s) IV Push every 6 hours PRN  sodium chloride 0.65% Nasal 1 Spray(s) Both Nostrils two times a day PRN    REVIEW OF SYSTEMS  --------------------------------------------------------------------------------  Gen: See HPI  Skin: No rash  Head/Eyes/Ears/Mouth: No headache  Respiratory: No dyspnea  CV: No chest pain  GI: No abdominal pain  MSK: No edema  Neuro: No dizziness  Heme: No bleeding    VITALS/PHYSICAL EXAM  --------------------------------------------------------------------------------  T(C): 37.4 (03-27-18 @ 11:30), Max: 38.7 (03-26-18 @ 18:00)  HR: 111 (03-27-18 @ 11:30) (109 - 123)  BP: 106/74 (03-27-18 @ 11:30) (94/62 - 119/79)  RR: 16 (03-27-18 @ 11:30) (16 - 17)  SpO2: 100% (03-27-18 @ 11:30) (91% - 100%)  Wt(kg): --  Height (cm): 165.1 (03-26-18 @ 07:18)  Weight (kg): 67.2 (03-26-18 @ 07:18)  BMI (kg/m2): 24.7 (03-26-18 @ 07:18)  BSA (m2): 1.74 (03-26-18 @ 07:18)    03-26-18 @ 07:01  -  03-27-18 @ 07:00  --------------------------------------------------------  IN: 6000 mL / OUT: 4450 mL / NET: 1550 mL    03-27-18 @ 07:01  -  03-27-18 @ 13:07  --------------------------------------------------------  IN: 2000 mL / OUT: 2000 mL / NET: 0 mL    Physical Exam:  	Gen: NAD  	HEENT: No JVD   	Pulm: + crackles at bases B/L  	CV: S1S2+  	Abd: soft, +PD catheter site: clean, no discharge  	: No suprapubic tenderness  	LE: Warm, no edema  	Neuro: Awake and alert   	Psych: Normal affect and mood  	Skin: Warm    LABS/STUDIES  --------------------------------------------------------------------------------              7.2    18.32 >-----------<  152      [03-27-18 @ 05:30]              20.2     134  |  91  |  93  ----------------------------<  102      [03-27-18 @ 05:30]  4.4   |  23  |  11.84        Ca     7.0     [03-27-18 @ 05:30]      iCa    0.90     [03-27 @ 05:30]      Mg     1.6     [03-27-18 @ 05:30]      Phos  6.6     [03-27-18 @ 05:30]    TPro  5.6  /  Alb  2.7  /  TBili  1.3  /  DBili  x   /  AST  47  /  ALT  36  /  AlkPhos  177  [03-27-18 @ 05:30]          [03-27-18 @ 05:30]    Creatinine Trend:  SCr 11.84 [03-27 @ 05:30]  SCr 12.73 [03-26 @ 06:01]  SCr 11.21 [03-25 @ 02:03]

## 2018-03-27 NOTE — PROGRESS NOTE ADULT - PROBLEM SELECTOR PLAN 2
Pt with low serum calcium. Pt with low ionized calcium in the setting of elevated PTH, low vitamin D and elevated serum phosphorus. Recommend to continue dose of phoslo to 2 tabs TID with meals (home dose) and continue calcitriol. Recommend IV calcium repletion. Monitor ionized calcium daily. Will adjust PD dialysate calcium bath Pt. with low ionized calcium. Continue with Vitamin D analog (calcitriol). Will order PD solution with higher calcium concentration. Monitor ionized calcium daily

## 2018-03-27 NOTE — PROGRESS NOTE ADULT - SUBJECTIVE AND OBJECTIVE BOX
Patient is a 37y old  Female who presents with a chief complaint of Pain crisis (25 Mar 2018 11:54)      SUBJECTIVE / OVERNIGHT EVENTS: Improved back pain however mild discomfort with breathing. Pt spiked fever overnight.     MEDICATIONS  (STANDING):  calcitriol   Capsule 1 MICROGram(s) Oral daily  calcium acetate 1334 milliGRAM(s) Oral three times a day with meals  folic acid 1 milliGRAM(s) Oral daily  gentamicin 0.1% Cream 1 Application(s) Topical <User Schedule>  gentamicin 0.1% Ointment 1 Application(s) Topical <User Schedule>  heparin  Injectable 5000 Unit(s) SubCutaneous every 8 hours  HYDROmorphone PCA (1 mG/mL) 30 milliLiter(s) PCA Continuous PCA Continuous  Nephro-jay 1 Tablet(s) Oral daily  piperacillin/tazobactam IVPB. 3.375 Gram(s) IV Intermittent every 12 hours  vancomycin  IVPB 500 milliGRAM(s) IV Intermittent once    MEDICATIONS  (PRN):  acetaminophen   Tablet 650 milliGRAM(s) Oral every 6 hours PRN For Temp greater than 38 C (100.4 F)  diphenhydrAMINE   Capsule 25 milliGRAM(s) Oral every 6 hours PRN Rash and/or Itching  naloxone Injectable 0.1 milliGRAM(s) IV Push every 3 minutes PRN For ANY of the following changes in patient status:  A. RR LESS THAN 10 breaths per minute, B. Oxygen saturation LESS THAN 90%, C. Sedation score of 6  ondansetron Injectable 4 milliGRAM(s) IV Push every 6 hours PRN Nausea  sodium chloride 0.65% Nasal 1 Spray(s) Both Nostrils two times a day PRN Nasal Congestion      T(C): 37.4 (03-27-18 @ 09:00), Max: 38.7 (03-26-18 @ 18:00)  HR: 113 (03-27-18 @ 09:00) (109 - 123)  BP: 114/71 (03-27-18 @ 09:00) (94/62 - 119/79)  RR: 16 (03-27-18 @ 09:00) (16 - 18)  SpO2: 93% (03-27-18 @ 07:30) (91% - 93%)  CAPILLARY BLOOD GLUCOSE        I&O's Summary    26 Mar 2018 07:01  -  27 Mar 2018 07:00  --------------------------------------------------------  IN: 6000 mL / OUT: 4450 mL / NET: 1550 mL    27 Mar 2018 07:01  -  27 Mar 2018 11:12  --------------------------------------------------------  IN: 2000 mL / OUT: 2000 mL / NET: 0 mL        PHYSICAL EXAM:  GENERAL: NAD,   HEAD:  Normocephalic  EYES: EOMI,  conjunctiva and sclera clear  NECK: Supple,   CHEST/LUNG: bibasilar crackles  HEART:  s1 s2 + Regular rate and rhythm;   ABDOMEN: Soft, Nontender, Nondistended; Bowel sounds present +PD dressing  EXTREMITIES:   No clubbing, cyanosis  NEURO: AAOx3      LABS:                        7.2    18.32 )-----------( 152      ( 27 Mar 2018 05:30 )             20.2     03-27    134<L>  |  91<L>  |  93<H>  ----------------------------<  102<H>  4.4   |  23  |  11.84<H>    Ca    7.0<L>      27 Mar 2018 05:30  Phos  6.6     03-27  Mg     1.6     03-27    TPro  5.6<L>  /  Alb  2.7<L>  /  TBili  1.3<H>  /  DBili  x   /  AST  47<H>  /  ALT  36<H>  /  AlkPhos  177<H>  03-27              Care Discussed with Consultants/Other Providers: dr. Hampton ( Nephrology)

## 2018-03-27 NOTE — PROGRESS NOTE ADULT - PROBLEM SELECTOR PLAN 2
-improving pain, plan to continue IV Dilaudid PCA  - incentive spirometry  - No opacities on CXR or complaints of dyspnea/chest pain to raise concern for ACS.  -will refrain from empiric IV hydration in light of ESRD  -c/w folate

## 2018-03-27 NOTE — PROGRESS NOTE ADULT - PROBLEM SELECTOR PLAN 3
likely due to splinting from sickle cell crisis, however given concurrent tachycardia, awaiting  CTA chest and r/o PE

## 2018-03-28 DIAGNOSIS — A41.9 SEPSIS, UNSPECIFIED ORGANISM: ICD-10-CM

## 2018-03-28 LAB
ALBUMIN SERPL ELPH-MCNC: 2.3 G/DL — LOW (ref 3.3–5)
ALP SERPL-CCNC: 150 U/L — HIGH (ref 40–120)
ALT FLD-CCNC: 31 U/L — SIGNIFICANT CHANGE UP (ref 4–33)
AST SERPL-CCNC: 30 U/L — SIGNIFICANT CHANGE UP (ref 4–32)
B PERT DNA SPEC QL NAA+PROBE: SIGNIFICANT CHANGE UP
BASOPHILS # BLD AUTO: 0.04 K/UL — SIGNIFICANT CHANGE UP (ref 0–0.2)
BASOPHILS NFR BLD AUTO: 0.2 % — SIGNIFICANT CHANGE UP (ref 0–2)
BILIRUB SERPL-MCNC: 1.2 MG/DL — SIGNIFICANT CHANGE UP (ref 0.2–1.2)
BUN SERPL-MCNC: 79 MG/DL — HIGH (ref 7–23)
C PNEUM DNA SPEC QL NAA+PROBE: NOT DETECTED — SIGNIFICANT CHANGE UP
CALCIUM SERPL-MCNC: 7.3 MG/DL — LOW (ref 8.4–10.5)
CHLORIDE SERPL-SCNC: 87 MMOL/L — LOW (ref 98–107)
CO2 SERPL-SCNC: 23 MMOL/L — SIGNIFICANT CHANGE UP (ref 22–31)
CREAT SERPL-MCNC: 10.28 MG/DL — HIGH (ref 0.5–1.3)
EOSINOPHIL # BLD AUTO: 0.46 K/UL — SIGNIFICANT CHANGE UP (ref 0–0.5)
EOSINOPHIL NFR BLD AUTO: 2.5 % — SIGNIFICANT CHANGE UP (ref 0–6)
FLUAV H1 2009 PAND RNA SPEC QL NAA+PROBE: NOT DETECTED — SIGNIFICANT CHANGE UP
FLUAV H1 RNA SPEC QL NAA+PROBE: NOT DETECTED — SIGNIFICANT CHANGE UP
FLUAV H3 RNA SPEC QL NAA+PROBE: NOT DETECTED — SIGNIFICANT CHANGE UP
FLUAV SUBTYP SPEC NAA+PROBE: SIGNIFICANT CHANGE UP
FLUBV RNA SPEC QL NAA+PROBE: NOT DETECTED — SIGNIFICANT CHANGE UP
GLUCOSE SERPL-MCNC: 101 MG/DL — HIGH (ref 70–99)
GRAM STN FLD: SIGNIFICANT CHANGE UP
HADV DNA SPEC QL NAA+PROBE: NOT DETECTED — SIGNIFICANT CHANGE UP
HCOV 229E RNA SPEC QL NAA+PROBE: NOT DETECTED — SIGNIFICANT CHANGE UP
HCOV HKU1 RNA SPEC QL NAA+PROBE: NOT DETECTED — SIGNIFICANT CHANGE UP
HCOV NL63 RNA SPEC QL NAA+PROBE: NOT DETECTED — SIGNIFICANT CHANGE UP
HCOV OC43 RNA SPEC QL NAA+PROBE: NOT DETECTED — SIGNIFICANT CHANGE UP
HCT VFR BLD CALC: 21.1 % — LOW (ref 34.5–45)
HGB BLD-MCNC: 7.7 G/DL — LOW (ref 11.5–15.5)
HMPV RNA SPEC QL NAA+PROBE: NOT DETECTED — SIGNIFICANT CHANGE UP
HPIV1 RNA SPEC QL NAA+PROBE: NOT DETECTED — SIGNIFICANT CHANGE UP
HPIV2 RNA SPEC QL NAA+PROBE: NOT DETECTED — SIGNIFICANT CHANGE UP
HPIV3 RNA SPEC QL NAA+PROBE: NOT DETECTED — SIGNIFICANT CHANGE UP
HPIV4 RNA SPEC QL NAA+PROBE: NOT DETECTED — SIGNIFICANT CHANGE UP
IMM GRANULOCYTES # BLD AUTO: 0.76 # — SIGNIFICANT CHANGE UP
IMM GRANULOCYTES NFR BLD AUTO: 4.1 % — HIGH (ref 0–1.5)
LDH SERPL L TO P-CCNC: 693 U/L — HIGH (ref 135–225)
LYMPHOCYTES # BLD AUTO: 12.1 % — LOW (ref 13–44)
LYMPHOCYTES # BLD AUTO: 2.23 K/UL — SIGNIFICANT CHANGE UP (ref 1–3.3)
M PNEUMO DNA SPEC QL NAA+PROBE: NOT DETECTED — SIGNIFICANT CHANGE UP
MAGNESIUM SERPL-MCNC: 1.5 MG/DL — LOW (ref 1.6–2.6)
MCHC RBC-ENTMCNC: 26.8 PG — LOW (ref 27–34)
MCHC RBC-ENTMCNC: 36.5 % — HIGH (ref 32–36)
MCV RBC AUTO: 73.5 FL — LOW (ref 80–100)
MONOCYTES # BLD AUTO: 1.55 K/UL — HIGH (ref 0–0.9)
MONOCYTES NFR BLD AUTO: 8.4 % — SIGNIFICANT CHANGE UP (ref 2–14)
NEUTROPHILS # BLD AUTO: 13.42 K/UL — HIGH (ref 1.8–7.4)
NEUTROPHILS NFR BLD AUTO: 72.7 % — SIGNIFICANT CHANGE UP (ref 43–77)
NRBC # FLD: 1.18 — SIGNIFICANT CHANGE UP
NRBC FLD-RTO: 6.4 — SIGNIFICANT CHANGE UP
PHOSPHATE SERPL-MCNC: 5.6 MG/DL — HIGH (ref 2.5–4.5)
PLATELET # BLD AUTO: 260 K/UL — SIGNIFICANT CHANGE UP (ref 150–400)
PMV BLD: 10.7 FL — SIGNIFICANT CHANGE UP (ref 7–13)
POTASSIUM SERPL-MCNC: 3.8 MMOL/L — SIGNIFICANT CHANGE UP (ref 3.5–5.3)
POTASSIUM SERPL-SCNC: 3.8 MMOL/L — SIGNIFICANT CHANGE UP (ref 3.5–5.3)
PROT SERPL-MCNC: 6.3 G/DL — SIGNIFICANT CHANGE UP (ref 6–8.3)
RBC # BLD: 2.87 M/UL — LOW (ref 3.8–5.2)
RBC # FLD: 23.2 % — HIGH (ref 10.3–14.5)
RETICS #: 151 K/UL — HIGH (ref 25–125)
RETICS/RBC NFR: 5.3 % — HIGH (ref 0.5–2.5)
RSV RNA SPEC QL NAA+PROBE: NOT DETECTED — SIGNIFICANT CHANGE UP
RV+EV RNA SPEC QL NAA+PROBE: NOT DETECTED — SIGNIFICANT CHANGE UP
SODIUM SERPL-SCNC: 134 MMOL/L — LOW (ref 135–145)
SPECIMEN SOURCE: SIGNIFICANT CHANGE UP
VANCOMYCIN FLD-MCNC: 14 UG/ML — SIGNIFICANT CHANGE UP
WBC # BLD: 18.46 K/UL — HIGH (ref 3.8–10.5)
WBC # FLD AUTO: 18.46 K/UL — HIGH (ref 3.8–10.5)

## 2018-03-28 PROCEDURE — 90945 DIALYSIS ONE EVALUATION: CPT | Mod: GC

## 2018-03-28 PROCEDURE — 99254 IP/OBS CNSLTJ NEW/EST MOD 60: CPT | Mod: GC

## 2018-03-28 PROCEDURE — 99233 SBSQ HOSP IP/OBS HIGH 50: CPT

## 2018-03-28 RX ORDER — VANCOMYCIN HCL 1 G
500 VIAL (EA) INTRAVENOUS ONCE
Qty: 0 | Refills: 0 | Status: DISCONTINUED | OUTPATIENT
Start: 2018-03-28 | End: 2018-03-28

## 2018-03-28 RX ADMIN — Medication 1 MILLIGRAM(S): at 12:40

## 2018-03-28 RX ADMIN — HYDROMORPHONE HYDROCHLORIDE 30 MILLILITER(S): 2 INJECTION INTRAMUSCULAR; INTRAVENOUS; SUBCUTANEOUS at 20:22

## 2018-03-28 RX ADMIN — Medication 1 TABLET(S): at 12:40

## 2018-03-28 RX ADMIN — PIPERACILLIN AND TAZOBACTAM 25 GRAM(S): 4; .5 INJECTION, POWDER, LYOPHILIZED, FOR SOLUTION INTRAVENOUS at 05:44

## 2018-03-28 RX ADMIN — Medication 200 MILLIGRAM(S): at 12:41

## 2018-03-28 RX ADMIN — CALCITRIOL 1 MICROGRAM(S): 0.5 CAPSULE ORAL at 12:41

## 2018-03-28 RX ADMIN — HEPARIN SODIUM 5000 UNIT(S): 5000 INJECTION INTRAVENOUS; SUBCUTANEOUS at 14:47

## 2018-03-28 RX ADMIN — PIPERACILLIN AND TAZOBACTAM 25 GRAM(S): 4; .5 INJECTION, POWDER, LYOPHILIZED, FOR SOLUTION INTRAVENOUS at 17:46

## 2018-03-28 RX ADMIN — HEPARIN SODIUM 5000 UNIT(S): 5000 INJECTION INTRAVENOUS; SUBCUTANEOUS at 05:45

## 2018-03-28 RX ADMIN — HYDROMORPHONE HYDROCHLORIDE 30 MILLILITER(S): 2 INJECTION INTRAMUSCULAR; INTRAVENOUS; SUBCUTANEOUS at 07:49

## 2018-03-28 RX ADMIN — Medication 1 APPLICATION(S): at 09:02

## 2018-03-28 NOTE — PROGRESS NOTE ADULT - PROBLEM SELECTOR PLAN 1
Pt. with ESRD on CAPD during current hospital stay at [facility]. Pt. clinically stable.  Labs reviewed. Pt. tolerating CAPD. BP stable during CAPD. PD catheter functioning well. Plan for PD exchanges today Pt. with ESRD on CAPD during current hospital stay at [facility]. Pt. clinically stable.  Labs reviewed. Pt. tolerating CAPD. BP stable during CAPD. PD catheter functioning well. Continue with current PD prescription

## 2018-03-28 NOTE — PROGRESS NOTE ADULT - PROBLEM SELECTOR PLAN 2
-improving pain, plan to continue IV Dilaudid PCA  - incentive spirometry  -will refrain from empiric IV hydration in light of ESRD  -c/w folate

## 2018-03-28 NOTE — CONSULT NOTE ADULT - ASSESSMENT
37 W PMH sickle cell disease, h/o acute chest syndrome 12/2017, ESRD on PD admitted 03/25 for sickle cell crises. On 03/26 started to spike fever to 101 and started on vancomycin and Zosyn. On 03/27 she underwent CTA which shows bibasilar pneumonia, PD fluid with only 40 nucleated cells and >1000 RBC-which is not consistent with peritonitis      # Hospital acquired pneumonia  Continue with zosyn3.375g iv q12h  DC vancomycin  Check RVP  Discussed with team [id number] 37 W PMH sickle cell disease, h/o acute chest syndrome 12/2017, ESRD on PD admitted 03/25 for sickle cell crises. On 03/26 started to spike fever to 101 and started on vancomycin and Zosyn. On 03/27 she underwent CTA which shows bibasilar pneumonia, PD fluid with only 40 nucleated cells and >1000 RBC-which is not consistent with peritonitis.   Overall sepsis ( fever, tachycardia, leucocytosis) due to B/L pneumonia.       # Pneumonia  Continue with zosyn3.375g iv q12h  D/C vancomycin  Check RVP  Follow up peritoneal fluid cx  Follow up prelim Blood cx.       Discussed with team [id number]

## 2018-03-28 NOTE — PROGRESS NOTE ADULT - SUBJECTIVE AND OBJECTIVE BOX
[facility] DIVISION OF KIDNEY DISEASES AND HYPERTENSION -- FOLLOW UP NOTE  --------------------------------------------------------------------------------    HPI: 37 year old female with ESRD on PD, and sickle cell disease admitted for sickle cell crisis. Pt. has been on PD since 3/2017. Pt. outpatient nephrologist is Dr. Larsen. Patient seen and examined during PD exchange today. Pt. had 4 PD exchanges (3 hour dwells of 1.5% 2L) yesterday. Pt. admits to some back pain today. Pt had Ct chest done on 3/27 which revealed bibasilar PNA. Denies CP, SOB or LE edema. Tolerating PD well.     PAST HISTORY  --------------------------------------------------------------------------------  No significant changes to PMH, PSH, FHx, SHx, unless otherwise noted    ALLERGIES & MEDICATIONS  --------------------------------------------------------------------------------  Allergies    morphine (Other)    Intolerances      Standing Inpatient Medications  calcitriol   Capsule 1 MICROGram(s) Oral daily  calcium acetate 1334 milliGRAM(s) Oral three times a day with meals  docusate sodium 100 milliGRAM(s) Oral three times a day  folic acid 1 milliGRAM(s) Oral daily  gentamicin 0.1% Cream 1 Application(s) Topical <User Schedule>  gentamicin 0.1% Ointment 1 Application(s) Topical <User Schedule>  guaiFENesin   Syrup  (Sugar-Free) 200 milliGRAM(s) Oral every 6 hours  heparin  Injectable 5000 Unit(s) SubCutaneous every 8 hours  HYDROmorphone PCA (1 mG/mL) 30 milliLiter(s) PCA Continuous PCA Continuous  Nephro-jay 1 Tablet(s) Oral daily  piperacillin/tazobactam IVPB. 3.375 Gram(s) IV Intermittent every 12 hours  senna 2 Tablet(s) Oral at bedtime  vancomycin  IVPB 500 milliGRAM(s) IV Intermittent once    PRN Inpatient Medications  acetaminophen   Tablet 650 milliGRAM(s) Oral every 6 hours PRN  diphenhydrAMINE   Capsule 25 milliGRAM(s) Oral every 6 hours PRN  naloxone Injectable 0.1 milliGRAM(s) IV Push every 3 minutes PRN  ondansetron Injectable 4 milliGRAM(s) IV Push every 6 hours PRN  sodium chloride 0.65% Nasal 1 Spray(s) Both Nostrils two times a day PRN      REVIEW OF SYSTEMS  --------------------------------------------------------------------------------  Gen: No weakness  Skin: No rashes  Head/Eyes/Ears/Mouth: No headache  Respiratory: No dyspnea  CV: No chest pain  GI: No abdominal pain  : No increased frequency  MSK: No edema  Neuro: No dizziness/lightheadedness    All other systems were reviewed and are negative, except as noted.    VITALS/PHYSICAL EXAM  --------------------------------------------------------------------------------  T(C): 37.1 (03-28-18 @ 11:30), Max: 38.3 (03-27-18 @ 19:15)  HR: 108 (03-28-18 @ 11:30) (88 - 117)  BP: 101/66 (03-28-18 @ 11:30) (94/60 - 124/66)  RR: 16 (03-28-18 @ 11:30) (16 - 18)  SpO2: 96% (03-28-18 @ 11:30) (96% - 99%)  Wt(kg): --      03-27-18 @ 07:01  -  03-28-18 @ 07:00  --------------------------------------------------------  IN: 2000 mL / OUT: 2000 mL / NET: 0 mL    03-28-18 @ 07:01  -  03-28-18 @ 11:44  --------------------------------------------------------  IN: 2000 mL / OUT: 2000 mL / NET: 0 mL    Physical Exam:  	Gen: NAD  	HEENT: No JVD   	Pulm: + crackles at bases B/L  	CV: S1S2+  	Abd: soft, +PD catheter site: clean, no discharge  	: No suprapubic tenderness  	LE: Warm, no edema  	Neuro: Awake and alert   	Psych: Normal affect and mood  	Skin: Warm    LABS/STUDIES  --------------------------------------------------------------------------------              7.7    18.46 >-----------<  260      [03-28-18 @ 06:00]              21.1     134  |  87  |  79  ----------------------------<  101      [03-28-18 @ 06:00]  3.8   |  23  |  10.28        Ca     7.3     [03-28-18 @ 06:00]      iCa    0.90     [03-27 @ 05:30]      Mg     1.5     [03-28-18 @ 06:00]      Phos  5.6     [03-28-18 @ 06:00]    TPro  6.3  /  Alb  2.3  /  TBili  1.2  /  DBili  x   /  AST  30  /  ALT  31  /  AlkPhos  150  [03-28-18 @ 06:00]          [03-28-18 @ 06:00]    Creatinine Trend:  SCr 10.28 [03-28 @ 06:00]  SCr 11.84 [03-27 @ 05:30]  SCr 12.73 [03-26 @ 06:01]  SCr 11.21 [03-25 @ 02:03]    Iron 71, TIBC 165, %sat --      [08-06-17 @ 18:30]  Ferritin 2501      [08-06-17 @ 18:30]  .2 (Ca --)      [03-27-18 @ 05:30]   --  Vitamin D (25OH) 6.1      [08-08-17 @ 06:40]  HbA1c 3.8      [01-13-17 @ 11:35] [facility] DIVISION OF KIDNEY DISEASES AND HYPERTENSION -- FOLLOW UP NOTE  --------------------------------------------------------------------------------  HPI: 37-year-old female with ESRD on PD, and sickle cell disease admitted for sickle cell crisis. Pt. has been on PD since 3/2017. Pt. outpatient nephrologist is Dr. Larsen. Patient seen and examined during PD exchange today. Pt. had 4 PD exchanges (3 hour dwells of 1.5% 2L) yesterday. Pt. admits to some back pain today. Pt had CT chest done yesterday (3/27). Denies CP, SOB or LE edema. Tolerating PD well.     PAST HISTORY  --------------------------------------------------------------------------------  No significant changes to PMH, PSH, FHx, SHx, unless otherwise noted    ALLERGIES & MEDICATIONS  --------------------------------------------------------------------------------  Allergies    morphine (Other)    Intolerances    Standing Inpatient Medications  calcitriol   Capsule 1 MICROGram(s) Oral daily  calcium acetate 1334 milliGRAM(s) Oral three times a day with meals  docusate sodium 100 milliGRAM(s) Oral three times a day  folic acid 1 milliGRAM(s) Oral daily  gentamicin 0.1% Cream 1 Application(s) Topical <User Schedule>  gentamicin 0.1% Ointment 1 Application(s) Topical <User Schedule>  guaiFENesin   Syrup  (Sugar-Free) 200 milliGRAM(s) Oral every 6 hours  heparin  Injectable 5000 Unit(s) SubCutaneous every 8 hours  HYDROmorphone PCA (1 mG/mL) 30 milliLiter(s) PCA Continuous PCA Continuous  Nephro-jay 1 Tablet(s) Oral daily  piperacillin/tazobactam IVPB. 3.375 Gram(s) IV Intermittent every 12 hours  senna 2 Tablet(s) Oral at bedtime  vancomycin  IVPB 500 milliGRAM(s) IV Intermittent once    PRN Inpatient Medications  acetaminophen   Tablet 650 milliGRAM(s) Oral every 6 hours PRN  diphenhydrAMINE   Capsule 25 milliGRAM(s) Oral every 6 hours PRN  naloxone Injectable 0.1 milliGRAM(s) IV Push every 3 minutes PRN  ondansetron Injectable 4 milliGRAM(s) IV Push every 6 hours PRN  sodium chloride 0.65% Nasal 1 Spray(s) Both Nostrils two times a day PRN    REVIEW OF SYSTEMS  --------------------------------------------------------------------------------  Gen: See HPI  Skin: No rash  Head/Eyes/Ears/Mouth: No headache  Respiratory: No dyspnea  CV: No chest pain  GI: No abdominal pain  MSK: See HPI, No edema  Neuro: No dizziness  Heme: No bleeding    VITALS/PHYSICAL EXAM  --------------------------------------------------------------------------------  T(C): 37.1 (03-28-18 @ 11:30), Max: 38.3 (03-27-18 @ 19:15)  HR: 108 (03-28-18 @ 11:30) (88 - 117)  BP: 101/66 (03-28-18 @ 11:30) (94/60 - 124/66)  RR: 16 (03-28-18 @ 11:30) (16 - 18)  SpO2: 96% (03-28-18 @ 11:30) (96% - 99%)  Wt(kg): --    03-27-18 @ 07:01  -  03-28-18 @ 07:00  --------------------------------------------------------  IN: 2000 mL / OUT: 2000 mL / NET: 0 mL    03-28-18 @ 07:01  -  03-28-18 @ 11:44  --------------------------------------------------------  IN: 2000 mL / OUT: 2000 mL / NET: 0 mL    Physical Exam:  	Gen: NAD  	HEENT: No JVD   	Pulm: + crackles at bases B/L  	CV: S1S2+  	Abd: soft, +PD catheter site: clean, no discharge  	: No suprapubic tenderness  	LE: Warm, no edema  	Neuro: Awake and alert   	Psych: Normal affect and mood  	Skin: Warm    LABS/STUDIES  --------------------------------------------------------------------------------              7.7    18.46 >-----------<  260      [03-28-18 @ 06:00]              21.1     134  |  87  |  79  ----------------------------<  101      [03-28-18 @ 06:00]  3.8   |  23  |  10.28        Ca     7.3     [03-28-18 @ 06:00]      iCa    0.90     [03-27 @ 05:30]      Mg     1.5     [03-28-18 @ 06:00]      Phos  5.6     [03-28-18 @ 06:00]    TPro  6.3  /  Alb  2.3  /  TBili  1.2  /  DBili  x   /  AST  30  /  ALT  31  /  AlkPhos  150  [03-28-18 @ 06:00]          [03-28-18 @ 06:00]    Creatinine Trend:  SCr 10.28 [03-28 @ 06:00]  SCr 11.84 [03-27 @ 05:30]  SCr 12.73 [03-26 @ 06:01]  SCr 11.21 [03-25 @ 02:03]

## 2018-03-28 NOTE — PROGRESS NOTE ADULT - PROBLEM SELECTOR PLAN 2
Pt. with low calcium. Continue with Vitamin D analog (calcitriol). Will continue PD solution with higher calcium concentration. Monitor calcium daily

## 2018-03-28 NOTE — PROGRESS NOTE ADULT - PROBLEM SELECTOR PLAN 1
fever, leukocytosis, tachycardia  Sepsis due to bibasilar pneumonia  f/u PD catheter Cx  f/u BCx  Empiric Abx, Vanc after HD, Vanc level monitoring  ID consult given nucleated cells in PD catheter fluid

## 2018-03-28 NOTE — CONSULT NOTE ADULT - SUBJECTIVE AND OBJECTIVE BOX
HPI:    37 W PMH sickle cell disease, h/o acute chest syndrome 2017, ESRD on PD, presents with several hours of low back pain. Patient states pain is in her back bilaterally, 10/10 in severity, sharp in character. Did not improve with Tylenol with Codeine. Minimally improves with lying absolutely still. Pain is worse with movement. She states this pain is typical of her sickle cell crises. She does not know what predisposed her to this particular crisis. Reports no chest pain or shortness of breath. No fevers. Last underwent PD yesterday.     PAST MEDICAL & SURGICAL HISTORY:  Umbilical hernia: 2017  CRF (chronic renal failure): 2-, insertion of peritoneal dialysis  Kidney dysfunction: &quot;have minimal function in one kidney&quot; -- patient not sure which one -- started dialysis in  with insertion of peritoneal dialysis  Cholelithiasis  Anemia  Sickle cell disease  Chronic kidney disease: started dialysis 2017 with etiology from Parvo Virus 2016  Parvovirus B19 infection: 2016 resulting in chronic renal disease  Nephrotic syndrome  HPV (Human Papillomavirus)  Right Hip Pain- Surgery : at PAST appointment on 17, patient states surgery was   History of hip surgery: R 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 3.375 every 12 hours  vancomycin  IVPB 500 once      OTHER MEDS: MEDICATIONS  (STANDING):  acetaminophen   Tablet 650 every 6 hours PRN  diphenhydrAMINE   Capsule 25 every 6 hours PRN  docusate sodium 100 three times a day  guaiFENesin   Syrup  (Sugar-Free) 200 every 6 hours  heparin  Injectable 5000 every 8 hours  HYDROmorphone PCA (1 mG/mL) 30 PCA Continuous  ondansetron Injectable 4 every 6 hours PRN  senna 2 at bedtime      SOCIAL HISTORY:  [ ] etoh [ ] tobacco [ ] former smoker [ ] IVDU    FAMILY HISTORY:  Family history of sarcoidosis (Sibling): Sister with HgbSS and sarcoidosis  Family history of sickle cell disease (Sibling): Sister with HgbSS and sarcoidosis  Family history of sickle cell trait in mother  Family history of sickle cell trait in father      REVIEW OF SYSTEMS  [  ] ROS unobtainable because:    [ x ] All other systems negative except as noted below:	    Constitutional:  [ ] fever [ ] chills  [ ] weight loss  [ ] weakness  Skin:  [ ] rash [ ] phlebitis	  Eyes: [ ] icterus [ ] pain  [ ] discharge	  ENMT: [ ] sore throat  [ ] thrush [ ] ulcers [ ] exudates  Respiratory: [ ] dyspnea [ ] hemoptysis [ ] cough [ ] sputum	  Cardiovascular:  [ ] chest pain [ ] palpitations [ ] edema	  Gastrointestinal:  [ ] nausea [ ] vomiting [ ] diarrhea [ ] constipation [ ] pain	  Genitourinary:  [ ] dysuria [ ] frequency [ ] hematuria [ ] discharge [ ] flank pain  [ ] incontinence  Musculoskeletal:  [ ] myalgias [ ] arthralgias [ ] arthritis  [ ] back pain  Neurological:  [ ] headache [ ] seizures  [ ] confusion/altered mental status  Psychiatric:  [ ] anxiety [ ] depression	  Hematology/Lymphatics:  [ ] lymphadenopathy  Endocrine:  [ ] adrenal [ ] thyroid  Allergic/Immunologic:	 [ ] transplant [ ] seasonal    Vital Signs Last 24 Hrs  T(F): 98.7 (18 @ 11:30), Max: 101.6 (18 @ 18:00)    Vital Signs Last 24 Hrs  HR: 108 (18 @ 11:30) (88 - 117)  BP: 101/66 (18 @ 11:30) (94/60 - 124/66)  RR: 16 (18 @ 11:30)  SpO2: 96% (18 @ 11:30) (96% - 99%)  Wt(kg): --    PHYSICAL EXAM:  General: non-toxic  HEAD/EYES: anicteric, PERRL  ENT:  supple  Cardiovascular:   S1, S2  Respiratory:  clear bilaterally  GI:  soft, non-tender, normal bowel sounds  :  no CVA tenderness   Musculoskeletal:  no synovitis  Neurologic:  grossly non-focal  Skin:  no rash  Lymph: no lymphadenopathy  Psychiatric:  appropriate affect  Vascular:  no phlebitis                                7.7    18.46 )-----------( 260      ( 28 Mar 2018 06:00 )             21.1           134<L>  |  87<L>  |  79<H>  ----------------------------<  101<H>  3.8   |  23  |  10.28<H>    Ca    7.3<L>      28 Mar 2018 06:00  Phos  5.6       Mg     1.5         TPro  6.3  /  Alb  2.3<L>  /  TBili  1.2  /  DBili  x   /  AST  30  /  ALT  31  /  AlkPhos  150<H>            MICROBIOLOGY:  PERITONEAL FLUID  18 --  --    WBC^White Blood Cells  QNTY CELLS IN GRAM STAIN: NO CELLS SEEN  NOS^No Organisms Seen      BLOOD VENOUS  18 --  --  --      BLOOD PERIPHERAL  18 --  --  --              Vancomycin Level, Random: 14.0 ug/mL (18 @ 06:00)  v            RADIOLOGY:  < from: Xray Chest 2 Views PA/Lat (18 @ 03:52) >    FINDINGS:  Status post cholecystectomy.  Unchanged elevation of the left hemidiaphragm.  Bibasilar subsegmental atelectasis.  The cardiomediastinal silhouette is within normal limits.  The visualized osseous and soft tissue structures demonstrate no acute  pathology.    IMPRESSION:   Bibasilar subsegmental atelectasis.     < end of copied text >    < from: CT Angio Chest w/ IV Cont (18 @ 15:40) >  FINDINGS:    CHEST:     LUNGS AND LARGE AIRWAYS: Patent central airways. Bibasilar pneumonia,   predominantly in the left lower lobe with extension into the lingula, and   a smaller region involving the anterior segment of the right lower lobe.   Small areas of adjacent and interspersed atelectasis are also present.   PLEURA: No pleural effusion.  VESSELS: There is no pulmonary embolus.  HEART: Heart size is normal. No pericardial effusion.  MEDIASTINUM AND KAR: No lymphadenopathy.  CHEST WALL AND LOWER NECK: Within normal limits.  VISUALIZED UPPER ABDOMEN: Status post cholecystectomy. Small to moderate   amount of ascites, left renal cysts, and a calcified shrunken spleen are   seen.   BONES: Vertebral body heterogeneity secondary to sickle cell related to   degeneration.    IMPRESSION:   No pulmonary embolism.    Bibasilar pneumoniawith associated areas of atelectasis. Recommend a one   month follow-up.    Small to moderate amount of ascites.    < end of copied text > HPI:    37 W PMH sickle cell disease, h/o acute chest syndrome 2017, ESRD on PD, presents with several hours of low back pain. Patient states pain is in her back bilaterally, 10/10 in severity, sharp in character. Did not improve with Tylenol with Codeine. Minimally improves with lying absolutely still. Pain is worse with movement. She states this pain is typical of her sickle cell crises. She does not know what predisposed her to this particular crisis. Reports no chest pain or shortness of breath. No fevers. Last underwent PD yesterday.     ID note-above reviewed. Patient presented with back pain in mid lower back, without fever chills or any other symptoms. Her pain was typical of a crises and she presented the same day. The week prior she had diarrhea 3-4 x per day loose, resolved with imodium. Her 3 and 6 year old have been sick recently with flu like symptoms. She had no fever prior to arrival. Fever started on . Today she noted dry cough in the morning, so sore throat or chest pain. She was given robitussin x 1 and she promptly threw up green vomitus. She says her low back pain has resolved but now she has a left flank pain that is sharp. She still passes a little urine but has not had any urinary complaints. She has been on PD since last year and has no trouble with the catheter, deneis pain or drainage.      PAST MEDICAL & SURGICAL HISTORY:  Umbilical hernia: 2017  CRF (chronic renal failure): 2-, insertion of peritoneal dialysis  Kidney dysfunction: &quot;have minimal function in one kidney&quot; -- patient not sure which one -- started dialysis in 2017 with insertion of peritoneal dialysis  Cholelithiasis  Anemia  Sickle cell disease  Chronic kidney disease: started dialysis 2017 with etiology from Parvo Virus 2016  Parvovirus B19 infection: 2016 resulting in chronic renal disease  Nephrotic syndrome  HPV (Human Papillomavirus)  Right Hip Pain- Surgery : at PAST appointment on 17, patient states surgery was   History of hip surgery: R 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 3.375 every 12 hours  vancomycin  IVPB 500 once      OTHER MEDS: MEDICATIONS  (STANDING):  acetaminophen   Tablet 650 every 6 hours PRN  diphenhydrAMINE   Capsule 25 every 6 hours PRN  docusate sodium 100 three times a day  guaiFENesin   Syrup  (Sugar-Free) 200 every 6 hours  heparin  Injectable 5000 every 8 hours  HYDROmorphone PCA (1 mG/mL) 30 PCA Continuous  ondansetron Injectable 4 every 6 hours PRN  senna 2 at bedtime      SOCIAL HISTORY:  [ ] etoh [ ] tobacco [ ] former smoker [ ] IVDU    FAMILY HISTORY:  Family history of sarcoidosis (Sibling): Sister with HgbSS and sarcoidosis  Family history of sickle cell disease (Sibling): Sister with HgbSS and sarcoidosis  Family history of sickle cell trait in mother  Family history of sickle cell trait in father      REVIEW OF SYSTEMS  [  ] ROS unobtainable because:    [ x ] All other systems negative except as noted below:	    Constitutional:  [x ] fever [ ] chills  [ ] weight loss  [ ] weakness  Skin:  [ ] rash [ ] phlebitis	  Eyes: [ ] icterus [ ] pain  [ ] discharge	  ENMT: [ ] sore throat  [ ] thrush [ ] ulcers [ ] exudates  Respiratory: [ ] dyspnea [ ] hemoptysis [ x] cough [ ] sputum	  Cardiovascular:  [ ] chest pain [ ] palpitations [ ] edema	  Gastrointestinal:  [ ] nausea [ x] vomiting [ ] diarrhea [ ] constipation [ ] pain	  Genitourinary:  [ ] dysuria [ ] frequency [ ] hematuria [ ] discharge [x ] flank pain  [ ] incontinence  Musculoskeletal:  [ ] myalgias [ ] arthralgias [ ] arthritis  [ ] back pain  Neurological:  [ ] headache [ ] seizures  [ ] confusion/altered mental status  Psychiatric:  [ ] anxiety [ ] depression	  Hematology/Lymphatics:  [ ] lymphadenopathy  Endocrine:  [ ] adrenal [ ] thyroid  Allergic/Immunologic:	 [ ] transplant [ ] seasonal    Vital Signs Last 24 Hrs  T(F): 98.7 (18 @ 11:30), Max: 101.6 (18 @ 18:00)    Vital Signs Last 24 Hrs  HR: 108 (18 @ 11:30) (88 - 117)  BP: 101/66 (18 @ 11:30) (94/60 - 124/66)  RR: 16 (18 @ 11:30)  SpO2: 96% (18 @ 11:30) (96% - 99%)  Wt(kg): --    PHYSICAL EXAM:  General: non-toxic  HEAD/EYES: anicteric, PERRL  ENT:  supple  Cardiovascular:   S1, S2 regular tachycardic  Respiratory:  b/l inspiratory crackles  GI:  soft, non-tender, distended, PD site LLQ clean normal bowel sounds  :  no CVA tenderness   Musculoskeletal:  no synovitis  Neurologic:  grossly non-focal  Skin:  no rash  Lymph: no lymphadenopathy  Psychiatric:  appropriate affect  Vascular:  no phlebitis                                7.7    18.46 )-----------( 260      ( 28 Mar 2018 06:00 )             21.1           134<L>  |  87<L>  |  79<H>  ----------------------------<  101<H>  3.8   |  23  |  10.28<H>    Ca    7.3<L>      28 Mar 2018 06:00  Phos  5.6       Mg     1.5         TPro  6.3  /  Alb  2.3<L>  /  TBili  1.2  /  DBili  x   /  AST  30  /  ALT  31  /  AlkPhos  150<H>            MICROBIOLOGY:  PERITONEAL FLUID  18 --  --    WBC^White Blood Cells  QNTY CELLS IN GRAM STAIN: NO CELLS SEEN  NOS^No Organisms Seen      BLOOD VENOUS  18 --  --  --      BLOOD PERIPHERAL  18 --  --  --              Vancomycin Level, Random: 14.0 ug/mL (18 @ 06:00)  v            RADIOLOGY:  < from: Xray Chest 2 Views PA/Lat (18 @ 03:52) >    FINDINGS:  Status post cholecystectomy.  Unchanged elevation of the left hemidiaphragm.  Bibasilar subsegmental atelectasis.  The cardiomediastinal silhouette is within normal limits.  The visualized osseous and soft tissue structures demonstrate no acute  pathology.    IMPRESSION:   Bibasilar subsegmental atelectasis.     < end of copied text >    < from: CT Angio Chest w/ IV Cont (18 @ 15:40) >  FINDINGS:    CHEST:     LUNGS AND LARGE AIRWAYS: Patent central airways. Bibasilar pneumonia,   predominantly in the left lower lobe with extension into the lingula, and   a smaller region involving the anterior segment of the right lower lobe.   Small areas of adjacent and interspersed atelectasis are also present.   PLEURA: No pleural effusion.  VESSELS: There is no pulmonary embolus.  HEART: Heart size is normal. No pericardial effusion.  MEDIASTINUM AND KAR: No lymphadenopathy.  CHEST WALL AND LOWER NECK: Within normal limits.  VISUALIZED UPPER ABDOMEN: Status post cholecystectomy. Small to moderate   amount of ascites, left renal cysts, and a calcified shrunken spleen are   seen.   BONES: Vertebral body heterogeneity secondary to sickle cell related to   degeneration.    IMPRESSION:   No pulmonary embolism.    Bibasilar pneumoniawith associated areas of atelectasis. Recommend a one   month follow-up.    Small to moderate amount of ascites.    < end of copied text > HPI:    37 W PMH sickle cell disease, h/o acute chest syndrome 2017, ESRD on PD, presents with several hours of low back pain. Patient states pain is in her back bilaterally, 10/10 in severity, sharp in character. Did not improve with Tylenol with Codeine. Minimally improves with lying absolutely still. Pain is worse with movement. She states this pain is typical of her sickle cell crises. She does not know what predisposed her to this particular crisis. Reports no chest pain or shortness of breath. No fevers. Last underwent PD yesterday.     ID note-above reviewed. Patient presented with back pain in mid lower back, without fever chills or any other symptoms. Her pain was typical of a crises and she presented the same day. The week prior she had diarrhea 3-4 x per day loose, resolved with imodium. Her 3 and 6 year old have been sick recently with flu like symptoms. She had no fever prior to arrival. Fever started on . Today she noted dry cough in the morning, so sore throat. She was given robitussin x 1 and she promptly threw up green vomitus. She says her low back pain has resolved but now she has a left sided pleuritic pain that is sharp. She still passes a little urine but has not had any urinary complaints. She has been on PD since last year and has no trouble with the catheter, denies pain or drainage. No abdominal pain.       PAST MEDICAL & SURGICAL HISTORY:  Umbilical hernia: 2017  Cholelithiasis  Sickle cell disease  Chronic kidney disease: started dialysis 2017 with etiology from Parvo Virus 2016  Parvovirus B19 infection: 2016 resulting in chronic renal disease  Nephrotic syndrome  HPV (Human Papillomavirus)  Right Hip Pain- Surgery : at PAST appointment on 17, patient states surgery was   History of hip surgery: R 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 3.375 every 12 hours  vancomycin  IVPB 500 once      OTHER MEDS: MEDICATIONS  (STANDING):  acetaminophen   Tablet 650 every 6 hours PRN  diphenhydrAMINE   Capsule 25 every 6 hours PRN  docusate sodium 100 three times a day  guaiFENesin   Syrup  (Sugar-Free) 200 every 6 hours  heparin  Injectable 5000 every 8 hours  HYDROmorphone PCA (1 mG/mL) 30 PCA Continuous  ondansetron Injectable 4 every 6 hours PRN  senna 2 at bedtime      SOCIAL HISTORY:   Lives with family, no smoking.       FAMILY HISTORY:  Family history of sarcoidosis (Sibling): Sister with HgbSS and sarcoidosis  Family history of sickle cell disease (Sibling): Sister with HgbSS and sarcoidosis  Family history of sickle cell trait in mother  Family history of sickle cell trait in father      REVIEW OF SYSTEMS  [  ] ROS unobtainable because:    [ x ] All other systems negative except as noted below:	    Constitutional:  [x ] fever [ ] chills  [ ] weight loss  [ ] weakness  Skin:  [ ] rash [ ] phlebitis	  Eyes: [ ] icterus [ ] pain  [ ] discharge	  ENMT: [ ] sore throat  [ ] thrush [ ] ulcers [ ] exudates  Respiratory: [ ] dyspnea [ ] hemoptysis [ x] cough [ ] sputum	  Cardiovascular:  [x ] chest pain [ ] palpitations [ ] edema	  Gastrointestinal:  [ ] nausea [ x] vomiting [ ] diarrhea [ ] constipation [ ] pain	  Genitourinary:  [ ] dysuria [ ] frequency [ ] hematuria [ ] discharge [ ] flank pain  [ ] incontinence  Musculoskeletal:  [ ] myalgias [ ] arthralgias [ ] arthritis  [x ] back pain  Neurological:  [ ] headache [ ] seizures  [ ] confusion/altered mental status  Psychiatric:  [ ] anxiety [ ] depression	  Hematology/Lymphatics:  [ ] lymphadenopathy  Endocrine:  [ ] adrenal [ ] thyroid  Allergic/Immunologic:	 [ ] transplant [ ] seasonal    Vital Signs Last 24 Hrs  T(F): 98.7 (18 @ 11:30), Max: 101.6 (18 @ 18:00)    Vital Signs Last 24 Hrs  HR: 108 (18 @ 11:30) (88 - 117)  BP: 101/66 (18 @ 11:30) (94/60 - 124/66)  RR: 16 (18 @ 11:30)  SpO2: 96% (18 @ 11:30) (96% - 99%)      PHYSICAL EXAM:  General: non-toxic  HEAD/EYES: anicteric, PERRL  ENT:  supple  Cardiovascular:  S1, S2 regular tachycardic, ? + murmur  Respiratory:  b/l crackles up to midlung b/l with decreased entry.   GI:  soft, non-tender, distended, PD site LLQ clean normal bowel sounds  :  no CVA tenderness   Musculoskeletal:  no synovitis  Neurologic:  grossly non-focal  Skin:  no rash  Lymph: no lymphadenopathy  Psychiatric:  appropriate affect  Vascular:  no phlebitis                                7.7    18.46 )-----------( 260      ( 28 Mar 2018 06:00 )             21.1           134<L>  |  87<L>  |  79<H>  ----------------------------<  101<H>  3.8   |  23  |  10.28<H>    Ca    7.3<L>      28 Mar 2018 06:00  Phos  5.6       Mg     1.5         TPro  6.3  /  Alb  2.3<L>  /  TBili  1.2  /  DBili  x   /  AST  30  /  ALT  31  /  AlkPhos  150<H>        Cell Count, Body Fluid (18 @ 09:45)    Body Fluid Type: PERITONEAL    Color - Body Fluid: COLORLESS    Clarity Body Fluid: CLEAR    Total Nucleated Cell Count, Body Fluid: 40 cell/uL    Total RBC Count: < 1000: 18 1131:  TOT. RBC CT. previously reported as: > 1000  H  cell/uL      MICROBIOLOGY:  PERITONEAL FLUID  18 --  --    WBC^White Blood Cells  QNTY CELLS IN GRAM STAIN: NO CELLS SEEN  NOS^No Organisms Seen      BLOOD VENOUS  18 --  --  --      BLOOD PERIPHERAL  18 --  --  --      Vancomycin Level, Random: 14.0 ug/mL (18 @ 06:00)        RADIOLOGY: Imaging reviewed personally.   < from: Xray Chest 2 Views PA/Lat (18 @ 03:52) >    FINDINGS:  Status post cholecystectomy.  Unchanged elevation of the left hemidiaphragm.  Bibasilar subsegmental atelectasis.  The cardiomediastinal silhouette is within normal limits.  The visualized osseous and soft tissue structures demonstrate no acute  pathology.    IMPRESSION:   Bibasilar subsegmental atelectasis.       < from: CT Angio Chest w/ IV Cont (03.27.18 @ 15:40) >  FINDINGS:    CHEST:     LUNGS AND LARGE AIRWAYS: Patent central airways. Bibasilar pneumonia,   predominantly in the left lower lobe with extension into the lingula, and   a smaller region involving the anterior segment of the right lower lobe.   Small areas of adjacent and interspersed atelectasis are also present.   PLEURA: No pleural effusion.  VESSELS: There is no pulmonary embolus.  HEART: Heart size is normal. No pericardial effusion.  MEDIASTINUM AND KAR: No lymphadenopathy.  CHEST WALL AND LOWER NECK: Within normal limits.  VISUALIZED UPPER ABDOMEN: Status post cholecystectomy. Small to moderate   amount of ascites, left renal cysts, and a calcified shrunken spleen are   seen.   BONES: Vertebral body heterogeneity secondary to sickle cell related to   degeneration.    IMPRESSION:   No pulmonary embolism.    Bibasilar pneumonia with associated areas of atelectasis. Recommend a one   month follow-up.    Small to moderate amount of ascites.

## 2018-03-28 NOTE — PROGRESS NOTE ADULT - ASSESSMENT
Pt. with ESRD on PD admitted with sickle cell crisis. Continue with PD during current hospital stay.

## 2018-03-28 NOTE — PROGRESS NOTE ADULT - SUBJECTIVE AND OBJECTIVE BOX
Patient is a 37y old  Female who presents with a chief complaint of Pain crisis (25 Mar 2018 11:54)      SUBJECTIVE / OVERNIGHT EVENTS: Improved back pain. Pt reports cough and pleuritic CP    MEDICATIONS  (STANDING):  calcitriol   Capsule 1 MICROGram(s) Oral daily  calcium acetate 1334 milliGRAM(s) Oral three times a day with meals  docusate sodium 100 milliGRAM(s) Oral three times a day  folic acid 1 milliGRAM(s) Oral daily  gentamicin 0.1% Cream 1 Application(s) Topical <User Schedule>  gentamicin 0.1% Ointment 1 Application(s) Topical <User Schedule>  heparin  Injectable 5000 Unit(s) SubCutaneous every 8 hours  HYDROmorphone PCA (1 mG/mL) 30 milliLiter(s) PCA Continuous PCA Continuous  Nephro-jay 1 Tablet(s) Oral daily  piperacillin/tazobactam IVPB. 3.375 Gram(s) IV Intermittent every 12 hours  senna 2 Tablet(s) Oral at bedtime  vancomycin  IVPB 500 milliGRAM(s) IV Intermittent once    MEDICATIONS  (PRN):  acetaminophen   Tablet 650 milliGRAM(s) Oral every 6 hours PRN For Temp greater than 38 C (100.4 F)  diphenhydrAMINE   Capsule 25 milliGRAM(s) Oral every 6 hours PRN Rash and/or Itching  naloxone Injectable 0.1 milliGRAM(s) IV Push every 3 minutes PRN For ANY of the following changes in patient status:  A. RR LESS THAN 10 breaths per minute, B. Oxygen saturation LESS THAN 90%, C. Sedation score of 6  ondansetron Injectable 4 milliGRAM(s) IV Push every 6 hours PRN Nausea  sodium chloride 0.65% Nasal 1 Spray(s) Both Nostrils two times a day PRN Nasal Congestion      T(C): 36.9 (03-28-18 @ 08:41), Max: 38.3 (03-27-18 @ 19:15)  HR: 112 (03-28-18 @ 08:41) (88 - 117)  BP: 100/64 (03-28-18 @ 08:41) (94/60 - 124/66)  RR: 18 (03-28-18 @ 08:41) (16 - 18)  SpO2: 99% (03-28-18 @ 07:30) (96% - 99%)  CAPILLARY BLOOD GLUCOSE        I&O's Summary    27 Mar 2018 07:01  -  28 Mar 2018 07:00  --------------------------------------------------------  IN: 2000 mL / OUT: 2000 mL / NET: 0 mL    28 Mar 2018 07:01  -  28 Mar 2018 11:32  --------------------------------------------------------  IN: 2000 mL / OUT: 2000 mL / NET: 0 mL        PHYSICAL EXAM:  GENERAL: NAD,   HEAD:  Normocephalic  EYES: EOMI,  conjunctiva and sclera clear  NECK: Supple,   CHEST/LUNG: improved bibasilar crackles  HEART:  s1 s2 + Regular rate and rhythm;   ABDOMEN: Soft, Nontender, Nondistended; Bowel sounds present  EXTREMITIES:   No clubbing, cyanosis  NEURO: AAOx3      LABS:                        7.7    18.46 )-----------( 260      ( 28 Mar 2018 06:00 )             21.1     03-28    134<L>  |  87<L>  |  79<H>  ----------------------------<  101<H>  3.8   |  23  |  10.28<H>    Ca    7.3<L>      28 Mar 2018 06:00  Phos  5.6     03-28  Mg     1.5     03-28    TPro  6.3  /  Alb  2.3<L>  /  TBili  1.2  /  DBili  x   /  AST  30  /  ALT  31  /  AlkPhos  150<H>  03-28              Culture - Body Fluid with Gram Stain (collected 03-27-18 @ 23:12)  Source: PERITONEAL FLUID  Gram Stain (03-28-18 @ 00:18):    WBC^White Blood Cells    QNTY CELLS IN GRAM STAIN: NO CELLS SEEN    NOS^No Organisms Seen        Consultant(s) Notes Reviewed:  Nephrology

## 2018-03-29 ENCOUNTER — APPOINTMENT (OUTPATIENT)
Dept: INTERNAL MEDICINE | Facility: HOSPITAL | Age: 38
End: 2018-03-29

## 2018-03-29 DIAGNOSIS — D64.9 ANEMIA, UNSPECIFIED: ICD-10-CM

## 2018-03-29 LAB
ALBUMIN SERPL ELPH-MCNC: 2.1 G/DL — LOW (ref 3.3–5)
ALP SERPL-CCNC: 136 U/L — HIGH (ref 40–120)
ALT FLD-CCNC: 24 U/L — SIGNIFICANT CHANGE UP (ref 4–33)
ANISOCYTOSIS BLD QL: SIGNIFICANT CHANGE UP
AST SERPL-CCNC: 25 U/L — SIGNIFICANT CHANGE UP (ref 4–32)
BASOPHILS # BLD AUTO: 0.03 K/UL — SIGNIFICANT CHANGE UP (ref 0–0.2)
BASOPHILS NFR BLD AUTO: 0.2 % — SIGNIFICANT CHANGE UP (ref 0–2)
BASOPHILS NFR SPEC: 0 % — SIGNIFICANT CHANGE UP (ref 0–2)
BILIRUB SERPL-MCNC: 1.1 MG/DL — SIGNIFICANT CHANGE UP (ref 0.2–1.2)
BLASTS # FLD: 0 % — SIGNIFICANT CHANGE UP (ref 0–0)
BLD GP AB SCN SERPL QL: NEGATIVE — SIGNIFICANT CHANGE UP
BUN SERPL-MCNC: 74 MG/DL — HIGH (ref 7–23)
CALCIUM SERPL-MCNC: 7.2 MG/DL — LOW (ref 8.4–10.5)
CHLORIDE SERPL-SCNC: 92 MMOL/L — LOW (ref 98–107)
CO2 SERPL-SCNC: 24 MMOL/L — SIGNIFICANT CHANGE UP (ref 22–31)
CREAT SERPL-MCNC: 9.91 MG/DL — HIGH (ref 0.5–1.3)
EOSINOPHIL # BLD AUTO: 0.53 K/UL — HIGH (ref 0–0.5)
EOSINOPHIL NFR BLD AUTO: 3 % — SIGNIFICANT CHANGE UP (ref 0–6)
EOSINOPHIL NFR FLD: 4.5 % — SIGNIFICANT CHANGE UP (ref 0–6)
GIANT PLATELETS BLD QL SMEAR: PRESENT — SIGNIFICANT CHANGE UP
GLUCOSE SERPL-MCNC: 95 MG/DL — SIGNIFICANT CHANGE UP (ref 70–99)
HCT VFR BLD CALC: 18.7 % — CRITICAL LOW (ref 34.5–45)
HCT VFR BLD CALC: 18.8 % — CRITICAL LOW (ref 34.5–45)
HGB BLD-MCNC: 6.5 G/DL — CRITICAL LOW (ref 11.5–15.5)
HGB BLD-MCNC: 6.7 G/DL — CRITICAL LOW (ref 11.5–15.5)
IMM GRANULOCYTES # BLD AUTO: 0.69 # — SIGNIFICANT CHANGE UP
IMM GRANULOCYTES NFR BLD AUTO: 3.9 % — HIGH (ref 0–1.5)
LDH SERPL L TO P-CCNC: 644 U/L — HIGH (ref 135–225)
LYMPHOCYTES # BLD AUTO: 13.9 % — SIGNIFICANT CHANGE UP (ref 13–44)
LYMPHOCYTES # BLD AUTO: 2.46 K/UL — SIGNIFICANT CHANGE UP (ref 1–3.3)
LYMPHOCYTES NFR SPEC AUTO: 18.2 % — SIGNIFICANT CHANGE UP (ref 13–44)
MACROCYTES BLD QL: SLIGHT — SIGNIFICANT CHANGE UP
MAGNESIUM SERPL-MCNC: 1.4 MG/DL — LOW (ref 1.6–2.6)
MCHC RBC-ENTMCNC: 26.3 PG — LOW (ref 27–34)
MCHC RBC-ENTMCNC: 26.4 PG — LOW (ref 27–34)
MCHC RBC-ENTMCNC: 34.8 % — SIGNIFICANT CHANGE UP (ref 32–36)
MCHC RBC-ENTMCNC: 35.6 % — SIGNIFICANT CHANGE UP (ref 32–36)
MCV RBC AUTO: 74 FL — LOW (ref 80–100)
MCV RBC AUTO: 75.7 FL — LOW (ref 80–100)
METAMYELOCYTES # FLD: 0 % — SIGNIFICANT CHANGE UP (ref 0–1)
MICROCYTES BLD QL: SLIGHT — SIGNIFICANT CHANGE UP
MONOCYTES # BLD AUTO: 1.71 K/UL — HIGH (ref 0–0.9)
MONOCYTES NFR BLD AUTO: 9.7 % — SIGNIFICANT CHANGE UP (ref 2–14)
MONOCYTES NFR BLD: 2.3 % — SIGNIFICANT CHANGE UP (ref 2–9)
MYELOCYTES NFR BLD: 2.3 % — HIGH (ref 0–0)
NEUTROPHIL AB SER-ACNC: 68.2 % — SIGNIFICANT CHANGE UP (ref 43–77)
NEUTROPHILS # BLD AUTO: 12.25 K/UL — HIGH (ref 1.8–7.4)
NEUTROPHILS NFR BLD AUTO: 69.3 % — SIGNIFICANT CHANGE UP (ref 43–77)
NEUTS BAND # BLD: 1.1 % — SIGNIFICANT CHANGE UP (ref 0–6)
NRBC # FLD: 0.92 — SIGNIFICANT CHANGE UP
NRBC # FLD: 1.01 — SIGNIFICANT CHANGE UP
NRBC FLD-RTO: 5.7 — SIGNIFICANT CHANGE UP
NRBC FLD-RTO: 6 — SIGNIFICANT CHANGE UP
OTHER - HEMATOLOGY %: 0 — SIGNIFICANT CHANGE UP
PHOSPHATE SERPL-MCNC: 5.1 MG/DL — HIGH (ref 2.5–4.5)
PLATELET # BLD AUTO: 216 K/UL — SIGNIFICANT CHANGE UP (ref 150–400)
PLATELET # BLD AUTO: 235 K/UL — SIGNIFICANT CHANGE UP (ref 150–400)
PLATELET COUNT - ESTIMATE: NORMAL — SIGNIFICANT CHANGE UP
PMV BLD: 10.2 FL — SIGNIFICANT CHANGE UP (ref 7–13)
PMV BLD: 9.8 FL — SIGNIFICANT CHANGE UP (ref 7–13)
POIKILOCYTOSIS BLD QL AUTO: SIGNIFICANT CHANGE UP
POLYCHROMASIA BLD QL SMEAR: SIGNIFICANT CHANGE UP
POTASSIUM SERPL-MCNC: 3.4 MMOL/L — LOW (ref 3.5–5.3)
POTASSIUM SERPL-SCNC: 3.4 MMOL/L — LOW (ref 3.5–5.3)
PROMYELOCYTES # FLD: 0 % — SIGNIFICANT CHANGE UP (ref 0–0)
PROT SERPL-MCNC: 6.1 G/DL — SIGNIFICANT CHANGE UP (ref 6–8.3)
RBC # BLD: 2.47 M/UL — LOW (ref 3.8–5.2)
RBC # BLD: 2.54 M/UL — LOW (ref 3.8–5.2)
RBC # FLD: 22.6 % — HIGH (ref 10.3–14.5)
RBC # FLD: 23.9 % — HIGH (ref 10.3–14.5)
RETICS #: 148 K/UL — HIGH (ref 25–125)
RETICS/RBC NFR: 5.9 % — HIGH (ref 0.5–2.5)
RH IG SCN BLD-IMP: NEGATIVE — SIGNIFICANT CHANGE UP
SICKLE CELLS BLD QL SMEAR: SIGNIFICANT CHANGE UP
SMUDGE CELLS # BLD: PRESENT — SIGNIFICANT CHANGE UP
SODIUM SERPL-SCNC: 133 MMOL/L — LOW (ref 135–145)
TARGETS BLD QL SMEAR: SIGNIFICANT CHANGE UP
VARIANT LYMPHS # BLD: 3.4 % — SIGNIFICANT CHANGE UP
WBC # BLD: 15.35 K/UL — HIGH (ref 3.8–10.5)
WBC # BLD: 17.67 K/UL — HIGH (ref 3.8–10.5)
WBC # FLD AUTO: 15.35 K/UL — HIGH (ref 3.8–10.5)
WBC # FLD AUTO: 17.67 K/UL — HIGH (ref 3.8–10.5)

## 2018-03-29 PROCEDURE — 99232 SBSQ HOSP IP/OBS MODERATE 35: CPT

## 2018-03-29 PROCEDURE — 90945 DIALYSIS ONE EVALUATION: CPT | Mod: GC

## 2018-03-29 PROCEDURE — 99233 SBSQ HOSP IP/OBS HIGH 50: CPT

## 2018-03-29 PROCEDURE — 99222 1ST HOSP IP/OBS MODERATE 55: CPT | Mod: GC

## 2018-03-29 RX ORDER — LACTOBACILLUS ACIDOPHILUS 100MM CELL
1 CAPSULE ORAL DAILY
Qty: 0 | Refills: 0 | Status: DISCONTINUED | OUTPATIENT
Start: 2018-03-29 | End: 2018-03-31

## 2018-03-29 RX ORDER — DIPHENHYDRAMINE HCL 50 MG
25 CAPSULE ORAL ONCE
Qty: 0 | Refills: 0 | Status: COMPLETED | OUTPATIENT
Start: 2018-03-29 | End: 2018-03-29

## 2018-03-29 RX ORDER — ACETAMINOPHEN 500 MG
650 TABLET ORAL ONCE
Qty: 0 | Refills: 0 | Status: COMPLETED | OUTPATIENT
Start: 2018-03-29 | End: 2018-03-29

## 2018-03-29 RX ADMIN — Medication 1 TABLET(S): at 17:20

## 2018-03-29 RX ADMIN — CALCITRIOL 1 MICROGRAM(S): 0.5 CAPSULE ORAL at 13:29

## 2018-03-29 RX ADMIN — PIPERACILLIN AND TAZOBACTAM 25 GRAM(S): 4; .5 INJECTION, POWDER, LYOPHILIZED, FOR SOLUTION INTRAVENOUS at 17:20

## 2018-03-29 RX ADMIN — HYDROMORPHONE HYDROCHLORIDE 30 MILLILITER(S): 2 INJECTION INTRAMUSCULAR; INTRAVENOUS; SUBCUTANEOUS at 19:48

## 2018-03-29 RX ADMIN — HYDROMORPHONE HYDROCHLORIDE 30 MILLILITER(S): 2 INJECTION INTRAMUSCULAR; INTRAVENOUS; SUBCUTANEOUS at 14:16

## 2018-03-29 RX ADMIN — Medication 100 MILLIGRAM(S): at 22:15

## 2018-03-29 RX ADMIN — Medication 100 MILLIGRAM(S): at 14:02

## 2018-03-29 RX ADMIN — HYDROMORPHONE HYDROCHLORIDE 30 MILLILITER(S): 2 INJECTION INTRAMUSCULAR; INTRAVENOUS; SUBCUTANEOUS at 07:36

## 2018-03-29 RX ADMIN — HEPARIN SODIUM 5000 UNIT(S): 5000 INJECTION INTRAVENOUS; SUBCUTANEOUS at 05:26

## 2018-03-29 RX ADMIN — Medication 100 MILLIGRAM(S): at 13:29

## 2018-03-29 RX ADMIN — HEPARIN SODIUM 5000 UNIT(S): 5000 INJECTION INTRAVENOUS; SUBCUTANEOUS at 00:54

## 2018-03-29 RX ADMIN — Medication 650 MILLIGRAM(S): at 13:50

## 2018-03-29 RX ADMIN — Medication 1 MILLIGRAM(S): at 13:29

## 2018-03-29 RX ADMIN — HEPARIN SODIUM 5000 UNIT(S): 5000 INJECTION INTRAVENOUS; SUBCUTANEOUS at 22:15

## 2018-03-29 RX ADMIN — Medication 1 TABLET(S): at 13:29

## 2018-03-29 RX ADMIN — Medication 25 MILLIGRAM(S): at 13:49

## 2018-03-29 RX ADMIN — HEPARIN SODIUM 5000 UNIT(S): 5000 INJECTION INTRAVENOUS; SUBCUTANEOUS at 13:30

## 2018-03-29 RX ADMIN — PIPERACILLIN AND TAZOBACTAM 25 GRAM(S): 4; .5 INJECTION, POWDER, LYOPHILIZED, FOR SOLUTION INTRAVENOUS at 05:27

## 2018-03-29 NOTE — PROGRESS NOTE ADULT - SUBJECTIVE AND OBJECTIVE BOX
Patient is a 37y old  Female who presents with a chief complaint of Pain crisis (25 Mar 2018 11:54)      SUBJECTIVE / OVERNIGHT EVENTS: Pt reports improved pain, pt endorses worsening lethargy. She is unable to tolerate robitussin    MEDICATIONS  (STANDING):  calcitriol   Capsule 1 MICROGram(s) Oral daily  calcium acetate 1334 milliGRAM(s) Oral three times a day with meals  docusate sodium 100 milliGRAM(s) Oral three times a day  folic acid 1 milliGRAM(s) Oral daily  gentamicin 0.1% Cream 1 Application(s) Topical <User Schedule>  gentamicin 0.1% Ointment 1 Application(s) Topical <User Schedule>  guaiFENesin   Syrup  (Sugar-Free) 200 milliGRAM(s) Oral every 6 hours  heparin  Injectable 5000 Unit(s) SubCutaneous every 8 hours  HYDROmorphone PCA (1 mG/mL) 30 milliLiter(s) PCA Continuous PCA Continuous  Nephro-jay 1 Tablet(s) Oral daily  piperacillin/tazobactam IVPB. 3.375 Gram(s) IV Intermittent every 12 hours  senna 2 Tablet(s) Oral at bedtime    MEDICATIONS  (PRN):  acetaminophen   Tablet 650 milliGRAM(s) Oral every 6 hours PRN For Temp greater than 38 C (100.4 F)  diphenhydrAMINE   Capsule 25 milliGRAM(s) Oral every 6 hours PRN Rash and/or Itching  naloxone Injectable 0.1 milliGRAM(s) IV Push every 3 minutes PRN For ANY of the following changes in patient status:  A. RR LESS THAN 10 breaths per minute, B. Oxygen saturation LESS THAN 90%, C. Sedation score of 6  ondansetron Injectable 4 milliGRAM(s) IV Push every 6 hours PRN Nausea  sodium chloride 0.65% Nasal 1 Spray(s) Both Nostrils two times a day PRN Nasal Congestion      T(C): 37.4 (03-29-18 @ 08:00), Max: 38 (03-28-18 @ 23:45)  HR: 103 (03-29-18 @ 08:00) (100 - 119)  BP: 91/51 (03-29-18 @ 08:00) (91/51 - 113/73)  RR: 16 (03-29-18 @ 08:00) (16 - 18)  SpO2: 93% (03-29-18 @ 04:54) (93% - 98%)  CAPILLARY BLOOD GLUCOSE        I&O's Summary    28 Mar 2018 07:01  -  29 Mar 2018 07:00  --------------------------------------------------------  IN: 8000 mL / OUT: 7500 mL / NET: 500 mL    29 Mar 2018 07:01  -  29 Mar 2018 11:34  --------------------------------------------------------  IN: 2000 mL / OUT: 2200 mL / NET: -200 mL        PHYSICAL EXAM:  GENERAL: NAD,   HEAD:  Normocephalic  EYES: EOMI,  conjunctiva and sclera clear  NECK: Supple,   CHEST/LUNG: Improved bibasilar crackles R>L  HEART:  s1 s2 + Regular rate and rhythm;   ABDOMEN: Soft, Nontender, Nondistended; Bowel sounds present  EXTREMITIES:   No clubbing, cyanosis  NEURO: AAOx3      LABS:                        6.5    15.35 )-----------( 216      ( 29 Mar 2018 10:48 )             18.7     03-29    133<L>  |  92<L>  |  74<H>  ----------------------------<  95  3.4<L>   |  24  |  9.91<H>    Ca    7.2<L>      29 Mar 2018 07:33  Phos  5.1     03-29  Mg     1.4     03-29    TPro  6.1  /  Alb  2.1<L>  /  TBili  1.1  /  DBili  x   /  AST  25  /  ALT  24  /  AlkPhos  136<H>  03-29

## 2018-03-29 NOTE — PROGRESS NOTE ADULT - PROBLEM SELECTOR PLAN 1
Pt. with ESRD on CAPD during current hospital stay at [facility]. Pt. clinically stable.  Labs reviewed. Pt. tolerating CAPD. BP stable during CAPD. PD catheter functioning well. Continue with current PD prescription

## 2018-03-29 NOTE — CONSULT NOTE ADULT - SUBJECTIVE AND OBJECTIVE BOX
Hematology Consult Note    HPI:    37 W PMH sickle cell disease, h/o acute chest syndrome 2017, ESRD on PD, presents with several hours of low back pain. Patient states pain is in her back bilaterally, 10/10 in severity, sharp in character. Did not improve with Tylenol with Codeine. Minimally improves with lying absolutely still. Pain is worse with movement. She states this pain is typical of her sickle cell crises. She does not know what predisposed her to this particular crisis. Reports no chest pain or shortness of breath. No fevers. Last underwent PD yesterday.     PAST MEDICAL & SURGICAL HISTORY:  Umbilical hernia: 2017  CRF (chronic renal failure): 2-, insertion of peritoneal dialysis  Kidney dysfunction: &quot;have minimal function in one kidney&quot; -- patient not sure which one -- started dialysis in  with insertion of peritoneal dialysis  Cholelithiasis  Anemia  Sickle cell disease  Chronic kidney disease: started dialysis 2017 with etiology from Parvo Virus 2016  Parvovirus B19 infection: 2016 resulting in chronic renal disease  Nephrotic syndrome  HPV (Human Papillomavirus)  Right Hip Pain- Surgery : at PAST appointment on 17, patient states surgery was   History of hip surgery: R hip due to necrosis in --bone graft from right tibia  H/O tubal ligation: in , along with   H/O: : 014  S/P Laparoscopic Cholecystectomy      Review of Systems:   CONSTITUTIONAL: No fever, weight loss, or fatigue  EYES: No eye pain, visual disturbances, or discharge  ENMT:  No difficulty hearing, tinnitus, vertigo; No sinus or throat pain  NECK: No pain or stiffness  BREASTS: No pain, masses, or nipple discharge  RESPIRATORY: No cough, wheezing, chills or hemoptysis; No shortness of breath  CARDIOVASCULAR: No chest pain, palpitations, dizziness, or leg swelling  GASTROINTESTINAL: No abdominal or epigastric pain. No nausea, vomiting, or hematemesis; No diarrhea or constipation. No melena or hematochezia.  GENITOURINARY: No dysuria, frequency, hematuria, or incontinence  NEUROLOGICAL: No headaches, memory loss, loss of strength, numbness, or tremors  SKIN: No itching, burning, rashes, or lesions   LYMPH NODES: No enlarged glands  ENDOCRINE: No heat or cold intolerance; No hair loss  MUSCULOSKELETAL: No joint pain or swelling; No muscle, or extremity pain; +back pain  PSYCHIATRIC: No depression, anxiety, mood swings, or difficulty sleeping  HEME/LYMPH: No easy bruising, or bleeding gums  ALLERGY AND IMMUNOLOGIC: No hives or eczema    Allergies    morphine (Other)    Intolerances        Social History:   Lives with children. Works as a  at [employer]. Does not smoke, drink, or use drugs.    FAMILY HISTORY:  Family history of sarcoidosis (Sibling): Sister with HgbSS and sarcoidosis  Family history of sickle cell disease (Sibling): Sister with HgbSS and sarcoidosis  Family history of sickle cell trait in mother  Family history of sickle cell trait in father      MEDICATIONS  (STANDING):  HYDROmorphone  Injectable 2 milliGRAM(s) IV Push once  HYDROmorphone PCA (1 mG/mL) 30 milliLiter(s) PCA Continuous PCA Continuous  sodium chloride 0.45%. 1000 milliLiter(s) (1000 mL/Hr) IV Continuous <Continuous>    MEDICATIONS  (PRN):  naloxone Injectable 0.1 milliGRAM(s) IV Push every 3 minutes PRN For ANY of the following changes in patient status:  A. RR LESS THAN 10 breaths per minute, B. Oxygen saturation LESS THAN 90%, C. Sedation score of 6  ondansetron Injectable 4 milliGRAM(s) IV Push every 6 hours PRN Nausea      T(C): 36.8 (18 @ 10:28), Max: 37.1 (18 @ 00:49)  HR: 98 (18 @ 10:28) (91 - 99)  BP: 111/70 (18 @ 10:28) (103/71 - 111/70)  RR: 18 (18 @ 10:28) (15 - 18)  SpO2: 100% (18 @ 10:28) (95% - 100%)    CAPILLARY BLOOD GLUCOSE        I&O's Summary      PHYSICAL EXAM:  GENERAL: WN/WD woman in hospital bed, writhing in pain  HEAD:  Atraumatic, Normocephalic  EYES: EOMI, PERRLA, conjunctiva and sclera clear  NECK: Supple, No elevated JVD  CHEST/LUNG: Clear to auscultation bilaterally; No wheeze  HEART: Regular rate and rhythm; No murmurs, rubs, or gallops  ABDOMEN: Soft, Nontender, Nondistended; Bowel sounds present, PD catheter in palce  EXTREMITIES:  2+ Peripheral Pulses, No clubbing, cyanosis, or edema  PSYCH: AAOx3  NEUROLOGY: CN II-XII grossly intact, moving all extremities  SKIN: No rashes or lesions    LABS:                        9.7    13.18 )-----------( 255      ( 25 Mar 2018 02:03 )             29.3         137  |  96<L>  |  74<H>  ----------------------------<  94  4.7   |  15<L>  |  11.21<H>    Ca    6.5<LL>      25 Mar 2018 02:03    TPro  6.9  /  Alb  3.0<L>  /  TBili  0.8  /  DBili  x   /  AST  46<H>  /  ALT  45<H>  /  AlkPhos  157<H>                  RADIOLOGY & ADDITIONAL TESTS:    ECG Personally Reviewed -     Imaging Personally Reviewed: CXR - clear lungs    Consultant(s) Notes Reviewed:      Care Discussed with Consultants/Other Providers: (25 Mar 2018 11:54)      Allergies    morphine (Other)    Intolerances        MEDICATIONS  (STANDING):  benzonatate 100 milliGRAM(s) Oral every 8 hours  calcitriol   Capsule 1 MICROGram(s) Oral daily  calcium acetate 1334 milliGRAM(s) Oral three times a day with meals  docusate sodium 100 milliGRAM(s) Oral three times a day  folic acid 1 milliGRAM(s) Oral daily  gentamicin 0.1% Cream 1 Application(s) Topical <User Schedule>  gentamicin 0.1% Ointment 1 Application(s) Topical <User Schedule>  heparin  Injectable 5000 Unit(s) SubCutaneous every 8 hours  HYDROmorphone PCA (1 mG/mL) 30 milliLiter(s) PCA Continuous PCA Continuous  Nephro-jay 1 Tablet(s) Oral daily  piperacillin/tazobactam IVPB. 3.375 Gram(s) IV Intermittent every 12 hours  senna 2 Tablet(s) Oral at bedtime    MEDICATIONS  (PRN):  acetaminophen   Tablet 650 milliGRAM(s) Oral every 6 hours PRN For Temp greater than 38 C (100.4 F)  diphenhydrAMINE   Capsule 25 milliGRAM(s) Oral every 6 hours PRN Rash and/or Itching  naloxone Injectable 0.1 milliGRAM(s) IV Push every 3 minutes PRN For ANY of the following changes in patient status:  A. RR LESS THAN 10 breaths per minute, B. Oxygen saturation LESS THAN 90%, C. Sedation score of 6  ondansetron Injectable 4 milliGRAM(s) IV Push every 6 hours PRN Nausea  sodium chloride 0.65% Nasal 1 Spray(s) Both Nostrils two times a day PRN Nasal Congestion      PAST MEDICAL & SURGICAL HISTORY:  Umbilical hernia: 2017  CRF (chronic renal failure): -, insertion of peritoneal dialysis  Kidney dysfunction: &quot;have minimal function in one kidney&quot; -- patient not sure which one -- started dialysis in  with insertion of peritoneal dialysis  Cholelithiasis  Anemia  Sickle cell disease  Chronic kidney disease: started dialysis 2017 with etiology from Parvo Virus   Parvovirus B19 infection:  resulting in chronic renal disease  Nephrotic syndrome  HPV (Human Papillomavirus)  Right Hip Pain- Surgery 1991: at PAST appointment on 17, patient states surgery was   History of hip surgery: R hip due to necrosis in --bone graft from right tibia  H/O tubal ligation: in , along with   H/O: : 014  S/P Laparoscopic Cholecystectomy      FAMILY HISTORY:  Family history of sarcoidosis (Sibling): Sister with HgbSS and sarcoidosis  Family history of sickle cell disease (Sibling): Sister with HgbSS and sarcoidosis  Family history of sickle cell trait in mother  Family history of sickle cell trait in father      SOCIAL HISTORY: No EtOH, no tobacco    REVIEW OF SYSTEMS:    CONSTITUTIONAL: No weakness, fevers or chills  EYES/ENT: No visual changes;  No vertigo or throat pain   NECK: No pain or stiffness  RESPIRATORY: No cough, wheezing, hemoptysis; No shortness of breath  CARDIOVASCULAR: No chest pain or palpitations  GASTROINTESTINAL: No abdominal or epigastric pain. No nausea, vomiting, or hematemesis; No diarrhea or constipation. No melena or hematochezia.  GENITOURINARY: No dysuria, frequency or hematuria  NEUROLOGICAL: No numbness or weakness  SKIN: No itching, burning, rashes, or lesions   All other review of systems is negative unless indicated above.        T(F): 98.8 (18 @ 11:50), Max: 100.4 (18 @ 23:45)  HR: 105 (18 @ 11:50)  BP: 104/65 (18 @ 11:50)  RR: 17 (18 @ 11:50)  SpO2: 100% (18 @ 11:50)  Wt(kg): --    GENERAL: NAD, well-developed  HEAD:  Atraumatic, Normocephalic  EYES: EOMI, PERRLA, conjunctiva and sclera clear  NECK: Supple, No JVD  CHEST/LUNG: Clear to auscultation bilaterally; No wheeze  HEART: Regular rate and rhythm; No murmurs, rubs, or gallops  ABDOMEN: Soft, Nontender, Nondistended; Bowel sounds present  EXTREMITIES:  2+ Peripheral Pulses, No clubbing, cyanosis, or edema  NEUROLOGY: non-focal  SKIN: No rashes or lesions                          6.5    15.35 )-----------( 216      ( 29 Mar 2018 10:48 )             18.7           133<L>  |  92<L>  |  74<H>  ----------------------------<  95  3.4<L>   |  24  |  9.91<H>    Ca    7.2<L>      29 Mar 2018 07:33  Phos  5.1       Mg     1.4         TPro  6.1  /  Alb  2.1<L>  /  TBili  1.1  /  DBili  x   /  AST  25  /  ALT  24  /  AlkPhos  136<H>        Phosphorus Level, Serum: 5.1 mg/dL ( @ 07:33)  Magnesium, Serum: 1.4 mg/dL ( @ 07:33)  Lactate Dehydrogenase, Serum: 644 U/L ( @ 07:33)

## 2018-03-29 NOTE — PROGRESS NOTE ADULT - PROBLEM SELECTOR PLAN 2
Pt. with low calcium. Continue with Vitamin D analog (calcitriol). Will continue PD solution with higher calcium concentration. Monitor calcium daily Pt. with low calcium levels. Continue with Vitamin D analog (calcitriol). Will continue PD solution with higher calcium concentration. Monitor calcium daily

## 2018-03-29 NOTE — PROGRESS NOTE ADULT - PROBLEM SELECTOR PLAN 3
Anemia in the setting of ESRD and Acute sickle cell crisis. Monitor Hb closely Anemia in the setting of ESRD and sickle cell crisis. Monitor Hb closely

## 2018-03-29 NOTE — CONSULT NOTE ADULT - ASSESSMENT
37 W PMH sickle cell disease, h/o acute chest syndrome 12/2017, ESRD on PD who is currently being managed for VOE:    - clinically, pt's acute crisis pain is resolving. She has pleuritic chest pain on the left side that is likely 2/2 pna.  - hemolysis labs not worsening, T bili is 1.1, retic cnt and LDH down-trending  - hgb of 6.7 alone without symptoms is not reason enough to transfuse. She clinically does not warrant it at this point.  - discussed with patient about holding off on transfusion for now and she is agreeable  - ok to check CBC once a day as frequent blood draws will cause iatrogenic hgb drop

## 2018-03-29 NOTE — PROGRESS NOTE ADULT - PROBLEM SELECTOR PLAN 2
-improving pain, plan to continue IV Dilaudid PCA  - givne worsening anemia, will transfuse 1pRBc  - incentive spirometry  -will refrain from empiric IV hydration in light of ESRD  -c/w folate

## 2018-03-29 NOTE — PROGRESS NOTE ADULT - SUBJECTIVE AND OBJECTIVE BOX
[facility] DIVISION OF KIDNEY DISEASES AND HYPERTENSION -- FOLLOW UP NOTE  --------------------------------------------------------------------------------    HPI: 37-year-old female with ESRD on PD, and sickle cell disease admitted for sickle cell crisis. Pt. has been on PD since 3/2017. Pt. outpatient nephrologist is Dr. Larsen. Patient seen and examined during PD exchange today. Pt. had 4 PD exchanges (4 hour dwells of 1.5% 2L) yesterday. Pt feels well. Pt had CT chest done on  3/27. Pt admits to cough overnight. Denies CP, SOB or LE edema. Tolerating PD well.     PAST HISTORY  --------------------------------------------------------------------------------  No significant changes to PMH, PSH, FHx, SHx, unless otherwise noted    ALLERGIES & MEDICATIONS  --------------------------------------------------------------------------------  Allergies    morphine (Other)    Intolerances    Standing Inpatient Medications  benzonatate 100 milliGRAM(s) Oral every 8 hours  calcitriol   Capsule 1 MICROGram(s) Oral daily  calcium acetate 1334 milliGRAM(s) Oral three times a day with meals  docusate sodium 100 milliGRAM(s) Oral three times a day  folic acid 1 milliGRAM(s) Oral daily  gentamicin 0.1% Cream 1 Application(s) Topical <User Schedule>  gentamicin 0.1% Ointment 1 Application(s) Topical <User Schedule>  heparin  Injectable 5000 Unit(s) SubCutaneous every 8 hours  HYDROmorphone PCA (1 mG/mL) 30 milliLiter(s) PCA Continuous PCA Continuous  Nephro-jay 1 Tablet(s) Oral daily  piperacillin/tazobactam IVPB. 3.375 Gram(s) IV Intermittent every 12 hours  senna 2 Tablet(s) Oral at bedtime    PRN Inpatient Medications  acetaminophen   Tablet 650 milliGRAM(s) Oral every 6 hours PRN  diphenhydrAMINE   Capsule 25 milliGRAM(s) Oral every 6 hours PRN  naloxone Injectable 0.1 milliGRAM(s) IV Push every 3 minutes PRN  ondansetron Injectable 4 milliGRAM(s) IV Push every 6 hours PRN  sodium chloride 0.65% Nasal 1 Spray(s) Both Nostrils two times a day PRN      REVIEW OF SYSTEMS  --------------------------------------------------------------------------------  Gen: No weakness  Skin: No rashes  Head/Eyes/Ears/Mouth: No headache  Respiratory: No dyspnea, +cough  CV: No chest pain  GI: No abdominal pain, diarrhea  MSK: No edema  Neuro: No dizziness/lightheadedness    All other systems were reviewed and are negative, except as noted.    VITALS/PHYSICAL EXAM  --------------------------------------------------------------------------------  T(C): 37.1 (03-29-18 @ 11:50), Max: 38 (03-28-18 @ 23:45)  HR: 105 (03-29-18 @ 11:50) (100 - 119)  BP: 104/65 (03-29-18 @ 11:50) (91/51 - 113/73)  RR: 17 (03-29-18 @ 11:50) (16 - 18)  SpO2: 100% (03-29-18 @ 11:50) (93% - 100%)  Wt(kg): --    03-28-18 @ 07:01  -  03-29-18 @ 07:00  --------------------------------------------------------  IN: 8000 mL / OUT: 7500 mL / NET: 500 mL    03-29-18 @ 07:01  -  03-29-18 @ 13:06  --------------------------------------------------------  IN: 4000 mL / OUT: 4200 mL / NET: -200 mL    Physical Exam:  	Gen: NAD  	HEENT: No JVD   	Pulm: + crackles at bases B/L  	CV: S1S2+  	Abd: soft, +PD catheter site: clean, no discharge  	: No suprapubic tenderness  	LE: Warm, no edema  	Neuro: Awake and alert   	Psych: Normal affect and mood  	Skin: Warm    LABS/STUDIES  --------------------------------------------------------------------------------              6.5    15.35 >-----------<  216      [03-29-18 @ 10:48]              18.7     133  |  92  |  74  ----------------------------<  95      [03-29-18 @ 07:33]  3.4   |  24  |  9.91        Ca     7.2     [03-29-18 @ 07:33]      Mg     1.4     [03-29-18 @ 07:33]      Phos  5.1     [03-29-18 @ 07:33]    TPro  6.1  /  Alb  2.1  /  TBili  1.1  /  DBili  x   /  AST  25  /  ALT  24  /  AlkPhos  136  [03-29-18 @ 07:33]          [03-29-18 @ 07:33]    Creatinine Trend:  SCr 9.91 [03-29 @ 07:33]  SCr 10.28 [03-28 @ 06:00]  SCr 11.84 [03-27 @ 05:30]  SCr 12.73 [03-26 @ 06:01]  SCr 11.21 [03-25 @ 02:03]    Iron 71, TIBC 165, %sat --      [08-06-17 @ 18:30]  Ferritin 2501      [08-06-17 @ 18:30]  .2 (Ca --)      [03-27-18 @ 05:30]   --  Vitamin D (25OH) 6.1      [08-08-17 @ 06:40]  HbA1c 3.8      [01-13-17 @ 11:35] [facility] DIVISION OF KIDNEY DISEASES AND HYPERTENSION -- FOLLOW UP NOTE  --------------------------------------------------------------------------------  HPI: 37-year-old female with ESRD on PD, and sickle cell disease admitted for sickle cell crisis. Pt. has been on PD since 3/2017. Pt.'s outpatient nephrologist is Dr. Larsen. Patient seen and examined during PD exchange today. Pt. had 4 PD exchanges (4 hour dwells of 1.5% 2L) yesterday. Pt feels well. Pt had CT chest done on  3/27. Pt. admits to cough overnight. Denies CP, SOB or LE edema. Tolerating PD well.     PAST HISTORY  --------------------------------------------------------------------------------  No significant changes to PMH, PSH, FHx, SHx, unless otherwise noted    ALLERGIES & MEDICATIONS  --------------------------------------------------------------------------------  Allergies    morphine (Other)    Intolerances    Standing Inpatient Medications  benzonatate 100 milliGRAM(s) Oral every 8 hours  calcitriol   Capsule 1 MICROGram(s) Oral daily  calcium acetate 1334 milliGRAM(s) Oral three times a day with meals  docusate sodium 100 milliGRAM(s) Oral three times a day  folic acid 1 milliGRAM(s) Oral daily  gentamicin 0.1% Cream 1 Application(s) Topical <User Schedule>  gentamicin 0.1% Ointment 1 Application(s) Topical <User Schedule>  heparin  Injectable 5000 Unit(s) SubCutaneous every 8 hours  HYDROmorphone PCA (1 mG/mL) 30 milliLiter(s) PCA Continuous PCA Continuous  Nephro-jay 1 Tablet(s) Oral daily  piperacillin/tazobactam IVPB. 3.375 Gram(s) IV Intermittent every 12 hours  senna 2 Tablet(s) Oral at bedtime    PRN Inpatient Medications  acetaminophen   Tablet 650 milliGRAM(s) Oral every 6 hours PRN  diphenhydrAMINE   Capsule 25 milliGRAM(s) Oral every 6 hours PRN  naloxone Injectable 0.1 milliGRAM(s) IV Push every 3 minutes PRN  ondansetron Injectable 4 milliGRAM(s) IV Push every 6 hours PRN  sodium chloride 0.65% Nasal 1 Spray(s) Both Nostrils two times a day PRN    REVIEW OF SYSTEMS  --------------------------------------------------------------------------------  Gen: No weakness  Skin: No rash  Head/Eyes/Ears/Mouth: No headache  Respiratory: No dyspnea, +cough  CV: No chest pain  GI: No abdominal pain, diarrhea  MSK: No edema +back pain  Neuro: No dizziness/lightheadedness    All other systems were reviewed and are negative, except as noted.    VITALS/PHYSICAL EXAM  --------------------------------------------------------------------------------  T(C): 37.1 (03-29-18 @ 11:50), Max: 38 (03-28-18 @ 23:45)  HR: 105 (03-29-18 @ 11:50) (100 - 119)  BP: 104/65 (03-29-18 @ 11:50) (91/51 - 113/73)  RR: 17 (03-29-18 @ 11:50) (16 - 18)  SpO2: 100% (03-29-18 @ 11:50) (93% - 100%)  Wt(kg): --    03-28-18 @ 07:01  -  03-29-18 @ 07:00  --------------------------------------------------------  IN: 8000 mL / OUT: 7500 mL / NET: 500 mL    03-29-18 @ 07:01  -  03-29-18 @ 13:06  --------------------------------------------------------  IN: 4000 mL / OUT: 4200 mL / NET: -200 mL    Physical Exam:  	Gen: NAD  	HEENT: No JVD   	Pulm: fair air entry B/L  	CV: S1S2+  	Abd: soft, +PD catheter site: clean, no discharge  	: No suprapubic tenderness  	LE: Warm, no edema  	Neuro: Awake and alert   	Psych: Normal affect and mood  	Skin: Warm    LABS/STUDIES  --------------------------------------------------------------------------------              6.5    15.35 >-----------<  216      [03-29-18 @ 10:48]              18.7     133  |  92  |  74  ----------------------------<  95      [03-29-18 @ 07:33]  3.4   |  24  |  9.91        Ca     7.2     [03-29-18 @ 07:33]      Mg     1.4     [03-29-18 @ 07:33]      Phos  5.1     [03-29-18 @ 07:33]    TPro  6.1  /  Alb  2.1  /  TBili  1.1  /  DBili  x   /  AST  25  /  ALT  24  /  AlkPhos  136  [03-29-18 @ 07:33]    Creatinine Trend:  SCr 9.91 [03-29 @ 07:33]  SCr 10.28 [03-28 @ 06:00]  SCr 11.84 [03-27 @ 05:30]  SCr 12.73 [03-26 @ 06:01]  SCr 11.21 [03-25 @ 02:03]    Iron 71, TIBC 165, %sat --      [08-06-17 @ 18:30]  Ferritin 2501      [08-06-17 @ 18:30]  .2 (Ca --)      [03-27-18 @ 05:30]   --  Vitamin D (25OH) 6.1      [08-08-17 @ 06:40]  HbA1c 3.8      [01-13-17 @ 11:35]

## 2018-03-29 NOTE — PROGRESS NOTE ADULT - PROBLEM SELECTOR PLAN 1
fever, leukocytosis, tachycardia  Sepsis due to bibasilar pneumonia  PD Catheter cytology does not suggest infection  f/u PD catheter Cx  f/u BCx  Zosyn per ID recs

## 2018-03-29 NOTE — CONSULT NOTE ADULT - ATTENDING COMMENTS
37 W PMH sickle cell disease, h/o acute chest syndrome 12/2017, ESRD on PD, presented w pnuemonia, noted to have lower Hgb. Otherwise asymptomatic and stable despite her low hemoglobin. We try to avoid repeat exposure to blood products so we would advise against blood transfusions as these can create long-term complications.
Misael Madrigal  Pager: [phone]. If no response or past 5 pm call [phone].

## 2018-03-29 NOTE — PROGRESS NOTE ADULT - SUBJECTIVE AND OBJECTIVE BOX
37y old  Female who presents with a chief complaint of Pain crisis       Interval history:  Febrile overnight, dry cough, no SOB, no N/V/D, no abdominal pain, denies dysuria, lt sided pleuritic chest pain.        Antimicrobials:    piperacillin/tazobactam IVPB. 3.375 Gram(s) IV Intermittent every 12 hours    REVIEW OF SYSTEMS:    No chest pain or palpitations  No rash.     Vital Signs Last 24 Hrs  T(C): 37.1 (03-29-18 @ 11:50), Max: 38 (03-28-18 @ 23:45)  T(F): 98.8 (03-29-18 @ 11:50), Max: 100.4 (03-28-18 @ 23:45)  HR: 105 (03-29-18 @ 11:50) (100 - 119)  BP: 104/65 (03-29-18 @ 11:50) (91/51 - 113/73)  RR: 17 (03-29-18 @ 11:50) (16 - 18)  SpO2: 100% (03-29-18 @ 11:50) (93% - 100%)      PHYSICAL EXAM:  Patient in no acute distress. AAOX3.  No icterus, no oral ulcers.  Cardiovascular: S1S2 normal, tachycardic.   Lungs: rales b/l up to midlung.  Gastrointestinal: soft, nontender, nondistended.  Extremities: no edema.  IV sites not inflamed.                           6.5    15.35 )-----------( 216      ( 29 Mar 2018 10:48 )             18.7   03-29    133<L>  |  92<L>  |  74<H>  ----------------------------<  95  3.4<L>   |  24  |  9.91<H>    Ca    7.2<L>      29 Mar 2018 07:33  Phos  5.1     03-29  Mg     1.4     03-29    TPro  6.1  /  Alb  2.1<L>  /  TBili  1.1  /  DBili  x   /  AST  25  /  ALT  24  /  AlkPhos  136<H>  03-29      LIVER FUNCTIONS - ( 29 Mar 2018 07:33 )  Alb: 2.1 g/dL / Pro: 6.1 g/dL / ALK PHOS: 136 u/L / ALT: 24 u/L / AST: 25 u/L / GGT: x               Culture - Body Fluid with Gram Stain (collected 27 Mar 2018 23:12)  Source: PERITONEAL FLUID  Gram Stain (28 Mar 2018 00:18):    WBC^White Blood Cells    QNTY CELLS IN GRAM STAIN: NO CELLS SEEN    NOS^No Organisms Seen  Preliminary Report (29 Mar 2018 09:56):    NO ORGANISMS ISOLATED AT 24 HOURS    Culture - Blood (collected 26 Mar 2018 22:10)  Source: BLOOD VENOUS  Preliminary Report (28 Mar 2018 22:10):    NO ORGANISMS ISOLATED    NO ORGANISMS ISOLATED AT 48 HRS.    Culture - Blood (collected 26 Mar 2018 21:00)  Source: BLOOD PERIPHERAL  Preliminary Report (28 Mar 2018 21:01):    NO ORGANISMS ISOLATED    NO ORGANISMS ISOLATED AT 48 HRS.

## 2018-03-29 NOTE — PROGRESS NOTE ADULT - ASSESSMENT
37 W PMH sickle cell disease, h/o acute chest syndrome 12/2017, ESRD on PD admitted 03/25 for sickle cell crises. On 03/26 started to spike fever to 101 and started on vancomycin and Zosyn. On 03/27 she underwent CTA which shows bibasilar pneumonia, PD fluid with only 40 nucleated cells and >1000 RBC-which is not consistent with peritonitis.   Overall sepsis ( fever, tachycardia, leucocytosis) due to B/L pneumonia. Fever, leucocytosis could be also contributed by sickle cell crisis too.       # Pneumonia  Continue with zosyn3.375g iv q12h  RVP negative.   Follow up peritoneal fluid cx  Follow up prelim Blood cx.

## 2018-03-30 LAB
ALBUMIN SERPL ELPH-MCNC: 2 G/DL — LOW (ref 3.3–5)
ALP SERPL-CCNC: 140 U/L — HIGH (ref 40–120)
ALT FLD-CCNC: 22 U/L — SIGNIFICANT CHANGE UP (ref 4–33)
AST SERPL-CCNC: 21 U/L — SIGNIFICANT CHANGE UP (ref 4–32)
BASOPHILS # BLD AUTO: 0.03 K/UL — SIGNIFICANT CHANGE UP (ref 0–0.2)
BASOPHILS NFR BLD AUTO: 0.2 % — SIGNIFICANT CHANGE UP (ref 0–2)
BILIRUB SERPL-MCNC: 1.1 MG/DL — SIGNIFICANT CHANGE UP (ref 0.2–1.2)
BUN SERPL-MCNC: 67 MG/DL — HIGH (ref 7–23)
CALCIUM SERPL-MCNC: 7.5 MG/DL — LOW (ref 8.4–10.5)
CHLORIDE SERPL-SCNC: 91 MMOL/L — LOW (ref 98–107)
CO2 SERPL-SCNC: 26 MMOL/L — SIGNIFICANT CHANGE UP (ref 22–31)
CREAT SERPL-MCNC: 9.24 MG/DL — HIGH (ref 0.5–1.3)
EOSINOPHIL # BLD AUTO: 0.69 K/UL — HIGH (ref 0–0.5)
EOSINOPHIL NFR BLD AUTO: 4.5 % — SIGNIFICANT CHANGE UP (ref 0–6)
GLUCOSE SERPL-MCNC: 87 MG/DL — SIGNIFICANT CHANGE UP (ref 70–99)
HCT VFR BLD CALC: 20.2 % — CRITICAL LOW (ref 34.5–45)
HGB BLD-MCNC: 7.2 G/DL — LOW (ref 11.5–15.5)
IMM GRANULOCYTES # BLD AUTO: 0.59 # — SIGNIFICANT CHANGE UP
IMM GRANULOCYTES NFR BLD AUTO: 3.8 % — HIGH (ref 0–1.5)
LDH SERPL L TO P-CCNC: 565 U/L — HIGH (ref 135–225)
LYMPHOCYTES # BLD AUTO: 13.8 % — SIGNIFICANT CHANGE UP (ref 13–44)
LYMPHOCYTES # BLD AUTO: 2.13 K/UL — SIGNIFICANT CHANGE UP (ref 1–3.3)
MANUAL SMEAR VERIFICATION: SIGNIFICANT CHANGE UP
MCHC RBC-ENTMCNC: 26.6 PG — LOW (ref 27–34)
MCHC RBC-ENTMCNC: 35.6 % — SIGNIFICANT CHANGE UP (ref 32–36)
MCV RBC AUTO: 74.5 FL — LOW (ref 80–100)
MONOCYTES # BLD AUTO: 1.78 K/UL — HIGH (ref 0–0.9)
MONOCYTES NFR BLD AUTO: 11.6 % — SIGNIFICANT CHANGE UP (ref 2–14)
NEUTROPHILS # BLD AUTO: 10.19 K/UL — HIGH (ref 1.8–7.4)
NEUTROPHILS NFR BLD AUTO: 66.1 % — SIGNIFICANT CHANGE UP (ref 43–77)
NRBC # FLD: 0.82 — SIGNIFICANT CHANGE UP
NRBC FLD-RTO: 5.3 — SIGNIFICANT CHANGE UP
PLATELET # BLD AUTO: 266 K/UL — SIGNIFICANT CHANGE UP (ref 150–400)
PMV BLD: 10.3 FL — SIGNIFICANT CHANGE UP (ref 7–13)
POTASSIUM SERPL-MCNC: 3.2 MMOL/L — LOW (ref 3.5–5.3)
POTASSIUM SERPL-SCNC: 3.2 MMOL/L — LOW (ref 3.5–5.3)
PROT SERPL-MCNC: 5.9 G/DL — LOW (ref 6–8.3)
RBC # BLD: 2.71 M/UL — LOW (ref 3.8–5.2)
RBC # FLD: 22.3 % — HIGH (ref 10.3–14.5)
RETICS #: 138 K/UL — HIGH (ref 25–125)
RETICS/RBC NFR: 5.2 % — HIGH (ref 0.5–2.5)
SODIUM SERPL-SCNC: 134 MMOL/L — LOW (ref 135–145)
SPECIMEN SOURCE: SIGNIFICANT CHANGE UP
WBC # BLD: 15.41 K/UL — HIGH (ref 3.8–10.5)
WBC # FLD AUTO: 15.41 K/UL — HIGH (ref 3.8–10.5)

## 2018-03-30 PROCEDURE — 90945 DIALYSIS ONE EVALUATION: CPT | Mod: GC

## 2018-03-30 PROCEDURE — 99233 SBSQ HOSP IP/OBS HIGH 50: CPT

## 2018-03-30 PROCEDURE — 99232 SBSQ HOSP IP/OBS MODERATE 35: CPT

## 2018-03-30 RX ORDER — ERYTHROPOIETIN 10000 [IU]/ML
20000 INJECTION, SOLUTION INTRAVENOUS; SUBCUTANEOUS ONCE
Qty: 0 | Refills: 0 | Status: COMPLETED | OUTPATIENT
Start: 2018-03-30 | End: 2018-03-30

## 2018-03-30 RX ORDER — POTASSIUM CHLORIDE 20 MEQ
40 PACKET (EA) ORAL EVERY 4 HOURS
Qty: 0 | Refills: 0 | Status: COMPLETED | OUTPATIENT
Start: 2018-03-30 | End: 2018-03-30

## 2018-03-30 RX ADMIN — ERYTHROPOIETIN 20000 UNIT(S): 10000 INJECTION, SOLUTION INTRAVENOUS; SUBCUTANEOUS at 12:50

## 2018-03-30 RX ADMIN — PIPERACILLIN AND TAZOBACTAM 25 GRAM(S): 4; .5 INJECTION, POWDER, LYOPHILIZED, FOR SOLUTION INTRAVENOUS at 05:09

## 2018-03-30 RX ADMIN — Medication 650 MILLIGRAM(S): at 23:02

## 2018-03-30 RX ADMIN — Medication 100 MILLIGRAM(S): at 05:09

## 2018-03-30 RX ADMIN — PIPERACILLIN AND TAZOBACTAM 25 GRAM(S): 4; .5 INJECTION, POWDER, LYOPHILIZED, FOR SOLUTION INTRAVENOUS at 17:50

## 2018-03-30 RX ADMIN — Medication 1 MILLIGRAM(S): at 12:50

## 2018-03-30 RX ADMIN — HEPARIN SODIUM 5000 UNIT(S): 5000 INJECTION INTRAVENOUS; SUBCUTANEOUS at 23:03

## 2018-03-30 RX ADMIN — CALCITRIOL 1 MICROGRAM(S): 0.5 CAPSULE ORAL at 12:49

## 2018-03-30 RX ADMIN — Medication 1 APPLICATION(S): at 07:18

## 2018-03-30 RX ADMIN — Medication 40 MILLIEQUIVALENT(S): at 12:51

## 2018-03-30 RX ADMIN — Medication 1 TABLET(S): at 12:50

## 2018-03-30 RX ADMIN — Medication 40 MILLIEQUIVALENT(S): at 17:49

## 2018-03-30 RX ADMIN — HEPARIN SODIUM 5000 UNIT(S): 5000 INJECTION INTRAVENOUS; SUBCUTANEOUS at 05:09

## 2018-03-30 RX ADMIN — HYDROMORPHONE HYDROCHLORIDE 30 MILLILITER(S): 2 INJECTION INTRAMUSCULAR; INTRAVENOUS; SUBCUTANEOUS at 08:21

## 2018-03-30 RX ADMIN — HYDROMORPHONE HYDROCHLORIDE 30 MILLILITER(S): 2 INJECTION INTRAMUSCULAR; INTRAVENOUS; SUBCUTANEOUS at 20:14

## 2018-03-30 RX ADMIN — HEPARIN SODIUM 5000 UNIT(S): 5000 INJECTION INTRAVENOUS; SUBCUTANEOUS at 14:21

## 2018-03-30 NOTE — PROGRESS NOTE ADULT - PROBLEM SELECTOR PLAN 1
Pt. with ESRD on CAPD during current hospital stay at [facility]. Pt. clinically stable.  Labs reviewed. Pt. tolerating CAPD. BP stable during CAPD. PD catheter functioning well. Continue with current PD prescription

## 2018-03-30 NOTE — PROGRESS NOTE ADULT - SUBJECTIVE AND OBJECTIVE BOX
[facility] DIVISION OF KIDNEY DISEASES AND HYPERTENSION -- FOLLOW UP NOTE  --------------------------------------------------------------------------------    HPI: 37-year-old female with ESRD on PD, and sickle cell disease admitted for sickle cell crisis. Pt. has been on PD since 3/2017. Pt.'s outpatient nephrologist is Dr. Larsen. Patient seen and examined during PD exchange today. Pt. had 4 PD exchanges (4 hour dwells of 1.5% 2L) yesterday. Pt feels well. Pt had CT chest done on  3/27. Pt. admits to improving but persistent cough. Denies CP, SOB or LE edema. Tolerating PD well.     PAST HISTORY  --------------------------------------------------------------------------------  No significant changes to PMH, PSH, FHx, SHx, unless otherwise noted    ALLERGIES & MEDICATIONS  --------------------------------------------------------------------------------  Allergies    morphine (Other)    Intolerances      Standing Inpatient Medications  calcitriol   Capsule 1 MICROGram(s) Oral daily  calcium acetate 1334 milliGRAM(s) Oral three times a day with meals  docusate sodium 100 milliGRAM(s) Oral three times a day  folic acid 1 milliGRAM(s) Oral daily  gentamicin 0.1% Cream 1 Application(s) Topical <User Schedule>  gentamicin 0.1% Ointment 1 Application(s) Topical <User Schedule>  heparin  Injectable 5000 Unit(s) SubCutaneous every 8 hours  HYDROmorphone PCA (1 mG/mL) 30 milliLiter(s) PCA Continuous PCA Continuous  lactobacillus acidophilus 1 Tablet(s) Oral daily  Nephro-jay 1 Tablet(s) Oral daily  piperacillin/tazobactam IVPB. 3.375 Gram(s) IV Intermittent every 12 hours  senna 2 Tablet(s) Oral at bedtime    PRN Inpatient Medications  acetaminophen   Tablet 650 milliGRAM(s) Oral every 6 hours PRN  diphenhydrAMINE   Capsule 25 milliGRAM(s) Oral every 6 hours PRN  naloxone Injectable 0.1 milliGRAM(s) IV Push every 3 minutes PRN  ondansetron Injectable 4 milliGRAM(s) IV Push every 6 hours PRN  sodium chloride 0.65% Nasal 1 Spray(s) Both Nostrils two times a day PRN      REVIEW OF SYSTEMS  --------------------------------------------------------------------------------  Gen: No weakness  Skin: No rash  Head/Eyes/Ears/Mouth: No headache  Respiratory: No dyspnea, +cough  CV: No chest pain  GI: No abdominal pain, diarrhea  MSK: No edema +back pain  Neuro: No dizziness/lightheadedness    All other systems were reviewed and are negative, except as noted.    VITALS/PHYSICAL EXAM  --------------------------------------------------------------------------------  T(C): 37.3 (03-30-18 @ 08:24), Max: 37.6 (03-29-18 @ 20:20)  HR: 101 (03-30-18 @ 08:24) (101 - 113)  BP: 107/72 (03-30-18 @ 08:24) (95/58 - 113/70)  RR: 17 (03-30-18 @ 08:24) (16 - 18)  SpO2: 94% (03-30-18 @ 08:24) (93% - 100%)  Wt(kg): --    03-29-18 @ 07:01  -  03-30-18 @ 07:00  --------------------------------------------------------  IN: 8000 mL / OUT: 8700 mL / NET: -700 mL    03-30-18 @ 07:01  -  03-30-18 @ 09:34  --------------------------------------------------------  IN: 2000 mL / OUT: 1700 mL / NET: 300 mL    Physical Exam:  	Gen: NAD  	HEENT: No JVD   	Pulm: fair air entry B/L, + crackles left base   	CV: S1S2+  	Abd: soft, +PD catheter site: clean, no discharge  	: No suprapubic tenderness  	LE: Warm, no edema  	Neuro: Awake and alert   	Psych: Normal affect and mood  	Skin: Warm    LABS/STUDIES  --------------------------------------------------------------------------------              7.2    15.41 >-----------<  266      [03-30-18 @ 05:46]              20.2     134  |  91  |  67  ----------------------------<  87      [03-30-18 @ 05:46]  3.2   |  26  |  9.24        Ca     7.5     [03-30-18 @ 05:46]      Mg     1.4     [03-29-18 @ 07:33]      Phos  5.1     [03-29-18 @ 07:33]    TPro  5.9  /  Alb  2.0  /  TBili  1.1  /  DBili  x   /  AST  21  /  ALT  22  /  AlkPhos  140  [03-30-18 @ 05:46]          [03-30-18 @ 05:46]    Creatinine Trend:  SCr 9.24 [03-30 @ 05:46]  SCr 9.91 [03-29 @ 07:33]  SCr 10.28 [03-28 @ 06:00]  SCr 11.84 [03-27 @ 05:30]  SCr 12.73 [03-26 @ 06:01]    Iron 71, TIBC 165, %sat --      [08-06-17 @ 18:30]  Ferritin 2501      [08-06-17 @ 18:30]  .2 (Ca --)      [03-27-18 @ 05:30]   --  Vitamin D (25OH) 6.1      [08-08-17 @ 06:40]  HbA1c 3.8      [01-13-17 @ 11:35] [facility] DIVISION OF KIDNEY DISEASES AND HYPERTENSION -- FOLLOW UP NOTE  --------------------------------------------------------------------------------  HPI: 37-year-old female with ESRD on PD, and sickle cell disease admitted for sickle cell crisis. Pt. has been on PD since 3/2017. Pt.'s outpatient nephrologist is Dr. Larsen. Patient seen and examined today. Pt. had 4 PD exchanges (4 hour dwells of 1.5% 2L) yesterday. Pt. feels well but has intermittent left sided CP on inspiration. Pt. denies SOB or LE edema. Tolerating PD well.     PAST HISTORY  --------------------------------------------------------------------------------  No significant changes to PMH, PSH, FHx, SHx, unless otherwise noted    ALLERGIES & MEDICATIONS  --------------------------------------------------------------------------------  Allergies    morphine (Other)    Intolerances    Standing Inpatient Medications  calcitriol   Capsule 1 MICROGram(s) Oral daily  calcium acetate 1334 milliGRAM(s) Oral three times a day with meals  docusate sodium 100 milliGRAM(s) Oral three times a day  folic acid 1 milliGRAM(s) Oral daily  gentamicin 0.1% Cream 1 Application(s) Topical <User Schedule>  gentamicin 0.1% Ointment 1 Application(s) Topical <User Schedule>  heparin  Injectable 5000 Unit(s) SubCutaneous every 8 hours  HYDROmorphone PCA (1 mG/mL) 30 milliLiter(s) PCA Continuous PCA Continuous  lactobacillus acidophilus 1 Tablet(s) Oral daily  Nephro-jay 1 Tablet(s) Oral daily  piperacillin/tazobactam IVPB. 3.375 Gram(s) IV Intermittent every 12 hours  senna 2 Tablet(s) Oral at bedtime    REVIEW OF SYSTEMS  --------------------------------------------------------------------------------  Gen: No weakness  Skin: No rash  Head/Eyes/Ears/Mouth: No headache  Respiratory: No dyspnea, +cough  CV: See HPI  GI: No abdominal pain, diarrhea  MSK: No edema +back pain  Neuro: No dizziness      VITALS/PHYSICAL EXAM  --------------------------------------------------------------------------------  T(C): 37.3 (03-30-18 @ 08:24), Max: 37.6 (03-29-18 @ 20:20)  HR: 101 (03-30-18 @ 08:24) (101 - 113)  BP: 107/72 (03-30-18 @ 08:24) (95/58 - 113/70)  RR: 17 (03-30-18 @ 08:24) (16 - 18)  SpO2: 94% (03-30-18 @ 08:24) (93% - 100%)  Wt(kg): --    03-29-18 @ 07:01  -  03-30-18 @ 07:00  --------------------------------------------------------  IN: 8000 mL / OUT: 8700 mL / NET: -700 mL    03-30-18 @ 07:01  -  03-30-18 @ 09:34  --------------------------------------------------------  IN: 2000 mL / OUT: 1700 mL / NET: 300 mL    Physical Exam:  	Gen: NAD  	HEENT: No JVD   	Pulm: fair air entry B/L, + crackles left base   	CV: S1S2+  	Abd: soft, +PD catheter site: clean, no discharge  	: No suprapubic tenderness  	LE: Warm, no edema  	Neuro: Awake and alert   	Psych: Normal affect and mood  	Skin: Warm    LABS/STUDIES  --------------------------------------------------------------------------------              7.2    15.41 >-----------<  266      [03-30-18 @ 05:46]              20.2     134  |  91  |  67  ----------------------------<  87      [03-30-18 @ 05:46]  3.2   |  26  |  9.24        Ca     7.5     [03-30-18 @ 05:46]      Mg     1.4     [03-29-18 @ 07:33]      Phos  5.1     [03-29-18 @ 07:33]    TPro  5.9  /  Alb  2.0  /  TBili  1.1  /  DBili  x   /  AST  21  /  ALT  22  /  AlkPhos  140  [03-30-18 @ 05:46]          [03-30-18 @ 05:46]    Creatinine Trend:  SCr 9.24 [03-30 @ 05:46]  SCr 9.91 [03-29 @ 07:33]  SCr 10.28 [03-28 @ 06:00]  SCr 11.84 [03-27 @ 05:30]  SCr 12.73 [03-26 @ 06:01]

## 2018-03-30 NOTE — PROGRESS NOTE ADULT - SUBJECTIVE AND OBJECTIVE BOX
37y old  Female who presents with a chief complaint of Pain crisis       Interval history:  Afebrile, continues to have dry cough, no SOB, no N/V/D, no abdominal pain, denies dysuria, lt sided pleuritic pain improved.       Antimicrobials:    piperacillin/tazobactam IVPB. 3.375 Gram(s) IV Intermittent every 12 hours    REVIEW OF SYSTEMS:    No chest pain or palpitations  No rash.     Vital Signs Last 24 Hrs  T(C): 37.4 (03-30-18 @ 12:44), Max: 37.6 (03-29-18 @ 20:20)  T(F): 99.4 (03-30-18 @ 12:44), Max: 99.6 (03-29-18 @ 20:20)  HR: 111 (03-30-18 @ 12:44) (101 - 111)  BP: 113/78 (03-30-18 @ 12:44) (95/58 - 113/78)  RR: 17 (03-30-18 @ 12:44) (16 - 18)  SpO2: 94% (03-30-18 @ 12:44) (93% - 100%)      PHYSICAL EXAM:  Patient in no acute distress. AAOX3, SITTING IN CHAIR.  No icterus, no oral ulcers.  Cardiovascular: S1S2 normal, tachycardic.   Lungs: improved entry b/l bases with rales.   Gastrointestinal: soft, nontender, nondistended, + PD catheter.   Extremities: no edema.  IV sites not inflamed.                             7.2    15.41 )-----------( 266      ( 30 Mar 2018 05:46 )             20.2   03-30    134<L>  |  91<L>  |  67<H>  ----------------------------<  87  3.2<L>   |  26  |  9.24<H>    Ca    7.5<L>      30 Mar 2018 05:46  Phos  5.1     03-29  Mg     1.4     03-29    TPro  5.9<L>  /  Alb  2.0<L>  /  TBili  1.1  /  DBili  x   /  AST  21  /  ALT  22  /  AlkPhos  140<H>  03-30      LIVER FUNCTIONS - ( 30 Mar 2018 05:46 )  Alb: 2.0 g/dL / Pro: 5.9 g/dL / ALK PHOS: 140 u/L / ALT: 22 u/L / AST: 21 u/L / GGT: x               Culture - Yeast and Fungus (collected 27 Mar 2018 23:12)  Source: PERITONEAL FLUID  Preliminary Report (30 Mar 2018 14:17):    CULTURE NEGATIVE FOR YEASTS AND MOLDS AFTER 1 DAY    Culture - Body Fluid with Gram Stain (collected 27 Mar 2018 23:12)  Source: PERITONEAL FLUID  Gram Stain (28 Mar 2018 00:18):    WBC^White Blood Cells    QNTY CELLS IN GRAM STAIN: NO CELLS SEEN    NOS^No Organisms Seen  Preliminary Report (30 Mar 2018 08:38):    NO ORGANISMS ISOLATED AT 24 HOURS    NO ORGANISMS ISOLATED AT 48 HRS.

## 2018-03-30 NOTE — PROGRESS NOTE ADULT - PROBLEM SELECTOR PLAN 2
-improving pain, plan to continue IV Dilaudid PCA  - s/p 1pRBc, Hb slightly improved, will trend till AM, if Hb stable and Pt is ok, likely d/c on Saturday, pt aware.   - incentive spirometry  -will refrain from empiric IV hydration in light of ESRD  -c/w folate

## 2018-03-30 NOTE — PROGRESS NOTE ADULT - ASSESSMENT
37 W PMH sickle cell disease, h/o acute chest syndrome 12/2017, ESRD on PD admitted 03/25 for sickle cell crises. On 03/26 started to spike fever to 101 and started on vancomycin and Zosyn. On 03/27 she underwent CTA which shows bibasilar pneumonia, PD fluid with only 40 nucleated cells and >1000 RBC-which is not consistent with peritonitis.   Overall sepsis ( fever, tachycardia, leucocytosis) due to B/L pneumonia. Fever, leucocytosis could be contributed by sickle cell crisis too. Improved clinically, afebrile today.       # Pneumonia  Continue with zosyn3.375g iv q12h  Day 4/7 of therapy today.   Can switch to Levaquin 500 mg q48h on discharge to complete therapy until 4/2/18.    Follow up peritoneal fluid cx  Follow up prelim Blood cx.

## 2018-03-30 NOTE — PROGRESS NOTE ADULT - PROBLEM SELECTOR PLAN 2
Pt. with low calcium levels. Continue with Vitamin D analog (calcitriol). Will continue PD solution with higher calcium concentration. Monitor calcium daily Pt. with improving calcium levels. Continue with Vitamin D analog (calcitriol). Will continue PD solution with higher calcium concentration. Monitor calcium daily

## 2018-03-30 NOTE — PROGRESS NOTE ADULT - SUBJECTIVE AND OBJECTIVE BOX
Patient is a 37y old  Female who presents with a chief complaint of Pain crisis (25 Mar 2018 11:54)      SUBJECTIVE / OVERNIGHT EVENTS: Pt reports improved lethargy and pain. Cough persisting    MEDICATIONS  (STANDING):  calcitriol   Capsule 1 MICROGram(s) Oral daily  calcium acetate 1334 milliGRAM(s) Oral three times a day with meals  docusate sodium 100 milliGRAM(s) Oral three times a day  epoetin mechelle Injectable 74072 Unit(s) SubCutaneous once  folic acid 1 milliGRAM(s) Oral daily  gentamicin 0.1% Cream 1 Application(s) Topical <User Schedule>  gentamicin 0.1% Ointment 1 Application(s) Topical <User Schedule>  heparin  Injectable 5000 Unit(s) SubCutaneous every 8 hours  HYDROmorphone PCA (1 mG/mL) 30 milliLiter(s) PCA Continuous PCA Continuous  lactobacillus acidophilus 1 Tablet(s) Oral daily  Nephro-jay 1 Tablet(s) Oral daily  piperacillin/tazobactam IVPB. 3.375 Gram(s) IV Intermittent every 12 hours  potassium chloride    Tablet ER 40 milliEquivalent(s) Oral every 4 hours  senna 2 Tablet(s) Oral at bedtime    MEDICATIONS  (PRN):  acetaminophen   Tablet 650 milliGRAM(s) Oral every 6 hours PRN For Temp greater than 38 C (100.4 F)  diphenhydrAMINE   Capsule 25 milliGRAM(s) Oral every 6 hours PRN Rash and/or Itching  naloxone Injectable 0.1 milliGRAM(s) IV Push every 3 minutes PRN For ANY of the following changes in patient status:  A. RR LESS THAN 10 breaths per minute, B. Oxygen saturation LESS THAN 90%, C. Sedation score of 6  ondansetron Injectable 4 milliGRAM(s) IV Push every 6 hours PRN Nausea  sodium chloride 0.65% Nasal 1 Spray(s) Both Nostrils two times a day PRN Nasal Congestion      T(C): 36.7 (03-30-18 @ 11:39), Max: 37.6 (03-29-18 @ 20:20)  HR: 105 (03-30-18 @ 11:39) (101 - 113)  BP: 108/82 (03-30-18 @ 11:39) (95/58 - 113/70)  RR: 18 (03-30-18 @ 11:39) (16 - 18)  SpO2: 94% (03-30-18 @ 08:24) (93% - 100%)  CAPILLARY BLOOD GLUCOSE        I&O's Summary    29 Mar 2018 07:01  -  30 Mar 2018 07:00  --------------------------------------------------------  IN: 8000 mL / OUT: 8700 mL / NET: -700 mL    30 Mar 2018 07:01  -  30 Mar 2018 11:42  --------------------------------------------------------  IN: 4000 mL / OUT: 4500 mL / NET: -500 mL        PHYSICAL EXAM:  GENERAL: NAD,   HEAD:  Normocephalic  EYES: EOMI,  conjunctiva and sclera clear  NECK: Supple,   CHEST/LUNG: Clear to auscultation bilaterally; No wheeze  HEART:  s1 s2 + Regular rate and rhythm;   ABDOMEN: Soft, Nontender, Nondistended; Bowel sounds present  EXTREMITIES:   No clubbing, cyanosis  NEURO: AAOx3      LABS:                        7.2    15.41 )-----------( 266      ( 30 Mar 2018 05:46 )             20.2     03-30    134<L>  |  91<L>  |  67<H>  ----------------------------<  87  3.2<L>   |  26  |  9.24<H>    Ca    7.5<L>      30 Mar 2018 05:46  Phos  5.1     03-29  Mg     1.4     03-29    TPro  5.9<L>  /  Alb  2.0<L>  /  TBili  1.1  /  DBili  x   /  AST  21  /  ALT  22  /  AlkPhos  140<H>  03-30                Consultant(s) Notes Reviewed:  hematology and ID

## 2018-03-30 NOTE — PROGRESS NOTE ADULT - PROBLEM SELECTOR PLAN 1
fever, leukocytosis, tachycardia  improved fever, still mildly hypoxic, pt feels better  c/w Zosyn, switch to levaquin to complete 7 -10 days course upon dc ( ID agrees)  Sepsis due to bibasilar pneumonia  PD Catheter cytology does not suggest infection   PD catheter Cx neg   BCx neg

## 2018-03-31 ENCOUNTER — TRANSCRIPTION ENCOUNTER (OUTPATIENT)
Age: 38
End: 2018-03-31

## 2018-03-31 VITALS — OXYGEN SATURATION: 95 % | RESPIRATION RATE: 16 BRPM | HEART RATE: 100 BPM | TEMPERATURE: 99 F

## 2018-03-31 LAB
ALBUMIN SERPL ELPH-MCNC: 2.6 G/DL — LOW (ref 3.3–5)
ALP SERPL-CCNC: 155 U/L — HIGH (ref 40–120)
ALT FLD-CCNC: 23 U/L — SIGNIFICANT CHANGE UP (ref 4–33)
AST SERPL-CCNC: 21 U/L — SIGNIFICANT CHANGE UP (ref 4–32)
BACTERIA BLD CULT: SIGNIFICANT CHANGE UP
BACTERIA BLD CULT: SIGNIFICANT CHANGE UP
BASOPHILS # BLD AUTO: 0.06 K/UL — SIGNIFICANT CHANGE UP (ref 0–0.2)
BASOPHILS NFR BLD AUTO: 0.4 % — SIGNIFICANT CHANGE UP (ref 0–2)
BILIRUB SERPL-MCNC: 1 MG/DL — SIGNIFICANT CHANGE UP (ref 0.2–1.2)
BUN SERPL-MCNC: 64 MG/DL — HIGH (ref 7–23)
CALCIUM SERPL-MCNC: 8.5 MG/DL — SIGNIFICANT CHANGE UP (ref 8.4–10.5)
CHLORIDE SERPL-SCNC: 93 MMOL/L — LOW (ref 98–107)
CO2 SERPL-SCNC: 27 MMOL/L — SIGNIFICANT CHANGE UP (ref 22–31)
CREAT SERPL-MCNC: 9.08 MG/DL — HIGH (ref 0.5–1.3)
EOSINOPHIL # BLD AUTO: 0.87 K/UL — HIGH (ref 0–0.5)
EOSINOPHIL NFR BLD AUTO: 6.3 % — HIGH (ref 0–6)
GLUCOSE SERPL-MCNC: 87 MG/DL — SIGNIFICANT CHANGE UP (ref 70–99)
HCT VFR BLD CALC: 23.1 % — LOW (ref 34.5–45)
HGB BLD-MCNC: 7.9 G/DL — LOW (ref 11.5–15.5)
IMM GRANULOCYTES # BLD AUTO: 1.05 # — SIGNIFICANT CHANGE UP
IMM GRANULOCYTES NFR BLD AUTO: 7.5 % — HIGH (ref 0–1.5)
LDH SERPL L TO P-CCNC: 609 U/L — HIGH (ref 135–225)
LYMPHOCYTES # BLD AUTO: 1.44 K/UL — SIGNIFICANT CHANGE UP (ref 1–3.3)
LYMPHOCYTES # BLD AUTO: 10.4 % — LOW (ref 13–44)
MCHC RBC-ENTMCNC: 26.6 PG — LOW (ref 27–34)
MCHC RBC-ENTMCNC: 34.2 % — SIGNIFICANT CHANGE UP (ref 32–36)
MCV RBC AUTO: 77.8 FL — LOW (ref 80–100)
MONOCYTES # BLD AUTO: 1.57 K/UL — HIGH (ref 0–0.9)
MONOCYTES NFR BLD AUTO: 11.3 % — SIGNIFICANT CHANGE UP (ref 2–14)
NEUTROPHILS # BLD AUTO: 8.92 K/UL — HIGH (ref 1.8–7.4)
NEUTROPHILS NFR BLD AUTO: 64.1 % — SIGNIFICANT CHANGE UP (ref 43–77)
NRBC # FLD: 0.87 — SIGNIFICANT CHANGE UP
NRBC FLD-RTO: 6.3 — SIGNIFICANT CHANGE UP
PLATELET # BLD AUTO: 352 K/UL — SIGNIFICANT CHANGE UP (ref 150–400)
PMV BLD: 10 FL — SIGNIFICANT CHANGE UP (ref 7–13)
POTASSIUM SERPL-MCNC: 3.7 MMOL/L — SIGNIFICANT CHANGE UP (ref 3.5–5.3)
POTASSIUM SERPL-SCNC: 3.7 MMOL/L — SIGNIFICANT CHANGE UP (ref 3.5–5.3)
PROT SERPL-MCNC: 6.9 G/DL — SIGNIFICANT CHANGE UP (ref 6–8.3)
RBC # BLD: 2.97 M/UL — LOW (ref 3.8–5.2)
RBC # FLD: 22.7 % — HIGH (ref 10.3–14.5)
RETICS #: 189 K/UL — HIGH (ref 25–125)
RETICS/RBC NFR: 6.4 % — HIGH (ref 0.5–2.5)
SODIUM SERPL-SCNC: 137 MMOL/L — SIGNIFICANT CHANGE UP (ref 135–145)
WBC # BLD: 13.91 K/UL — HIGH (ref 3.8–10.5)
WBC # FLD AUTO: 13.91 K/UL — HIGH (ref 3.8–10.5)

## 2018-03-31 PROCEDURE — 90945 DIALYSIS ONE EVALUATION: CPT

## 2018-03-31 PROCEDURE — 99239 HOSP IP/OBS DSCHRG MGMT >30: CPT

## 2018-03-31 RX ORDER — ACETAMINOPHEN WITH CODEINE 300MG-30MG
2 TABLET ORAL
Qty: 40 | Refills: 0 | OUTPATIENT
Start: 2018-03-31 | End: 2018-04-04

## 2018-03-31 RX ORDER — GENTAMICIN SULFATE 0.1 %
1 OINTMENT (GRAM) TOPICAL
Qty: 0 | Refills: 0 | COMMUNITY
Start: 2018-03-31

## 2018-03-31 RX ORDER — FOLIC ACID 0.8 MG
1 TABLET ORAL
Qty: 0 | Refills: 0 | COMMUNITY

## 2018-03-31 RX ORDER — LACTOBACILLUS ACIDOPHILUS 100MM CELL
1 CAPSULE ORAL
Qty: 5 | Refills: 0 | OUTPATIENT
Start: 2018-03-31 | End: 2018-04-04

## 2018-03-31 RX ORDER — FOLIC ACID 0.8 MG
1 TABLET ORAL
Qty: 0 | Refills: 0 | COMMUNITY
Start: 2018-03-31

## 2018-03-31 RX ORDER — SENNA PLUS 8.6 MG/1
2 TABLET ORAL
Qty: 60 | Refills: 0 | OUTPATIENT
Start: 2018-03-31 | End: 2018-04-29

## 2018-03-31 RX ORDER — ACETAMINOPHEN WITH CODEINE 300MG-30MG
1 TABLET ORAL
Qty: 0 | Refills: 0 | COMMUNITY

## 2018-03-31 RX ORDER — CIPROFLOXACIN LACTATE 400MG/40ML
500 VIAL (ML) INTRAVENOUS
Qty: 2 | Refills: 0 | OUTPATIENT
Start: 2018-03-31 | End: 2018-04-03

## 2018-03-31 RX ORDER — CIPROFLOXACIN LACTATE 400MG/40ML
500 VIAL (ML) INTRAVENOUS
Qty: 500 | Refills: 0 | OUTPATIENT
Start: 2018-03-31 | End: 2018-04-01

## 2018-03-31 RX ORDER — DOCUSATE SODIUM 100 MG
1 CAPSULE ORAL
Qty: 90 | Refills: 0 | OUTPATIENT
Start: 2018-03-31 | End: 2018-04-29

## 2018-03-31 RX ORDER — ACETAMINOPHEN WITH CODEINE 300MG-30MG
2 TABLET ORAL EVERY 6 HOURS
Qty: 0 | Refills: 0 | Status: DISCONTINUED | OUTPATIENT
Start: 2018-03-31 | End: 2018-03-31

## 2018-03-31 RX ADMIN — PIPERACILLIN AND TAZOBACTAM 25 GRAM(S): 4; .5 INJECTION, POWDER, LYOPHILIZED, FOR SOLUTION INTRAVENOUS at 07:40

## 2018-03-31 RX ADMIN — Medication 2 TABLET(S): at 10:15

## 2018-03-31 RX ADMIN — Medication 1 MILLIGRAM(S): at 13:36

## 2018-03-31 RX ADMIN — Medication 2 TABLET(S): at 10:00

## 2018-03-31 RX ADMIN — Medication 1 TABLET(S): at 13:36

## 2018-03-31 RX ADMIN — HEPARIN SODIUM 5000 UNIT(S): 5000 INJECTION INTRAVENOUS; SUBCUTANEOUS at 05:13

## 2018-03-31 RX ADMIN — HYDROMORPHONE HYDROCHLORIDE 30 MILLILITER(S): 2 INJECTION INTRAMUSCULAR; INTRAVENOUS; SUBCUTANEOUS at 07:36

## 2018-03-31 RX ADMIN — HEPARIN SODIUM 5000 UNIT(S): 5000 INJECTION INTRAVENOUS; SUBCUTANEOUS at 13:36

## 2018-03-31 RX ADMIN — CALCITRIOL 1 MICROGRAM(S): 0.5 CAPSULE ORAL at 13:36

## 2018-03-31 NOTE — PROGRESS NOTE ADULT - PROBLEM SELECTOR PLAN 2
Pt. with improving calcium levels. Continue with Vitamin D analog (calcitriol). Will continue PD solution with higher calcium concentration. Monitor calcium daily

## 2018-03-31 NOTE — PROGRESS NOTE ADULT - ATTENDING COMMENTS
Misael Madrigal  Pager: [phone]. If no response or past 5 pm call [phone].
Misael Madrigal  Pager: [phone]. If no response or past 5 pm call [phone].    Please call ID service for questions over weekend at [phone].
Dispo: stable for DC home. DC time: 32 min

## 2018-03-31 NOTE — DISCHARGE NOTE ADULT - CARE PROVIDER_API CALL
Saniya Velásquez), Internal Medicine  [address] [place] [address]  Phone: [phone]  Fax: [phone]    Donovan Hampton), Nephrology  [address] [place] [address]  Phone: [phone]  Fax: [phone]

## 2018-03-31 NOTE — DISCHARGE NOTE ADULT - MEDICATION SUMMARY - MEDICATIONS TO TAKE
I will START or STAY ON the medications listed below when I get home from the hospital:    acetaminophen-codeine 300 mg-30 mg oral tablet  -- 2 tab(s) by mouth every 6 hours, As needed, Severe Pain (7 - 10) MDD:8 tabs  -- Indication: For Sickle cell pain crisis    gentamicin 0.1% topical cream  -- 1 application on skin   -- Indication: For Prophylactic measure    gentamicin 0.1% topical ointment  -- 1 application on skin   -- Indication: For Prophylactic measure    Epogen  -- injectable with dialysis  -- Indication: For ESRD (end stage renal disease) on dialysis    senna oral tablet  -- 2 tab(s) by mouth once a day (at bedtime)  -- Indication: For Prophylactic measure    docusate sodium 100 mg oral capsule  -- 1 cap(s) by mouth 3 times a day  -- Indication: For Prophylactic measure    calcium acetate 667 mg oral tablet  -- 2 tab(s) by mouth 3 times a day with each meal  -- Indication: For ESRD (end stage renal disease) on dialysis    lactobacillus acidophilus oral capsule  -- 1  by mouth once a day   -- Indication: For Prophylactic measure    Levaquin 500 mg oral tablet  -- 500 tab(s) by mouth every 48 hours take monday   -- Avoid prolonged or excessive exposure to direct and/or artificial sunlight while taking this medication.  Do not take dairy products, antacids, or iron preparations within one hour of this medication.  Finish all this medication unless otherwise directed by prescriber.  May cause drowsiness or dizziness.  Medication should be taken with plenty of water.    -- Indication: For SIRS (systemic inflammatory response syndrome)    Cristin-Marguerite oral tablet  -- 1 tab(s) by mouth once a day  -- Indication: For ESRD (end stage renal disease) on dialysis    calcitriol 0.5 mcg oral capsule  -- 2 cap(s) by mouth once a day  -- Indication: For ESRD (end stage renal disease) on dialysis    folic acid 1 mg oral tablet  -- 1 tab(s) by mouth once a day  -- Indication: For Sickle cell pain crisis

## 2018-03-31 NOTE — PROGRESS NOTE ADULT - PROVIDER SPECIALTY LIST ADULT
Hospitalist
Infectious Disease
Infectious Disease
Nephrology
Hospitalist
Nephrology
Hospitalist
Nephrology
Hospitalist

## 2018-03-31 NOTE — PROGRESS NOTE ADULT - PROBLEM SELECTOR PROBLEM 1
ESRD (end stage renal disease) on dialysis
SIRS (systemic inflammatory response syndrome)
Sepsis, due to unspecified organism
Sickle cell pain crisis
Sepsis, due to unspecified organism

## 2018-03-31 NOTE — DISCHARGE NOTE ADULT - CARE PROVIDERS DIRECT ADDRESSES
[email or web address]

## 2018-03-31 NOTE — PROGRESS NOTE ADULT - PROBLEM SELECTOR PLAN 4
ESRD 2/2 parvovirus. Now on PD. HD per Renal  -renal following  -c/w calcium acetate, nephro-jay  - Renal for epo dosing
ESRD 2/2 parvovirus. Now on PD. HD per Renal  -renal following  -c/w calcium acetate, nephro-jay
ESRD 2/2 parvovirus. Now on PD. Last session completed yesterday. CO2 15 on chemistry, K 4.7. No signs of hypervolemia on exam.   -renal following  -c/w calcium acetate, nephro-jay
Improving  c/w calcium acetate and ? fixing Ca in her dialysate
ESRD 2/2 parvovirus. Now on PD. HD per Renal  -renal following  -c/w calcium acetate, nephro-jay  - Renal for epo dosing
ESRD 2/2 parvovirus. Now on PD. HD per Renal  -renal following  -c/w calcium acetate, nephro-jay  - Renal for epo dosing

## 2018-03-31 NOTE — PROGRESS NOTE ADULT - SUBJECTIVE AND OBJECTIVE BOX
Patient is a 37y old  Female who presents with a chief complaint of Pain crisis (31 Mar 2018 11:22)      SUBJECTIVE / OVERNIGHT EVENTS: No acute events overnight. Seen during HD. No chest pain, SOB, N/V/D. Feels well and wants to go home. Low grade temp of 100.4 overnight.    MEDICATIONS  (STANDING):  calcitriol   Capsule 1 MICROGram(s) Oral daily  calcium acetate 1334 milliGRAM(s) Oral three times a day with meals  docusate sodium 100 milliGRAM(s) Oral three times a day  folic acid 1 milliGRAM(s) Oral daily  gentamicin 0.1% Cream 1 Application(s) Topical <User Schedule>  gentamicin 0.1% Ointment 1 Application(s) Topical <User Schedule>  heparin  Injectable 5000 Unit(s) SubCutaneous every 8 hours  lactobacillus acidophilus 1 Tablet(s) Oral daily  Nephro-jay 1 Tablet(s) Oral daily  piperacillin/tazobactam IVPB. 3.375 Gram(s) IV Intermittent every 12 hours  senna 2 Tablet(s) Oral at bedtime    MEDICATIONS  (PRN):  acetaminophen   Tablet 650 milliGRAM(s) Oral every 6 hours PRN For Temp greater than 38 C (100.4 F)  acetaminophen 300 mG/codeine 30 mG 2 Tablet(s) Oral every 6 hours PRN Severe Pain (7 - 10)  diphenhydrAMINE   Capsule 25 milliGRAM(s) Oral every 6 hours PRN Rash and/or Itching  naloxone Injectable 0.1 milliGRAM(s) IV Push every 3 minutes PRN For ANY of the following changes in patient status:  A. RR LESS THAN 10 breaths per minute, B. Oxygen saturation LESS THAN 90%, C. Sedation score of 6  ondansetron Injectable 4 milliGRAM(s) IV Push every 6 hours PRN Nausea  sodium chloride 0.65% Nasal 1 Spray(s) Both Nostrils two times a day PRN Nasal Congestion      T(C): 36.9 (03-31-18 @ 11:00), Max: 38 (03-30-18 @ 21:45)  HR: 106 (03-31-18 @ 11:00) (93 - 116)  BP: 115/79 (03-31-18 @ 11:00) (98/62 - 121/85)  RR: 16 (03-31-18 @ 11:00) (16 - 18)  SpO2: 100% (03-31-18 @ 09:00) (93% - 100%)  CAPILLARY BLOOD GLUCOSE        I&O's Summary    30 Mar 2018 07:01  -  31 Mar 2018 07:00  --------------------------------------------------------  IN: 6000 mL / OUT: 6400 mL / NET: -400 mL    31 Mar 2018 07:01  -  31 Mar 2018 13:38  --------------------------------------------------------  IN: 2000 mL / OUT: 2100 mL / NET: -100 mL        PHYSICAL EXAM:  GENERAL: no apparent distress, on room air  EYES: sclera clear b/l  CHEST/LUNG: Clear to auscultation bilaterally; No wheezing or crackles  HEART: Regular rate and rhythm; No murmurs, rubs, or gallops  ABDOMEN: Soft, Nontender, Nondistended; Bowel sounds present, + PD catheter site  EXTREMITIES:  2+ Peripheral Pulses, No clubbing, cyanosis, or edema  NEUROLOGY: awake, alert, responds to Qs appropriately, no focal deficits  SKIN: No rashes or lesions    LABS:                        7.9    13.91 )-----------( 352      ( 31 Mar 2018 09:32 )             23.1     03-31    137  |  93<L>  |  64<H>  ----------------------------<  87  3.7   |  27  |  9.08<H>    Ca    8.5      31 Mar 2018 09:32    TPro  6.9  /  Alb  2.6<L>  /  TBili  1.0  /  DBili  x   /  AST  21  /  ALT  23  /  AlkPhos  155<H>  03-31              RADIOLOGY & ADDITIONAL TESTS:

## 2018-03-31 NOTE — DISCHARGE NOTE ADULT - HOSPITAL COURSE
37 W PMH sickle cell disease, h/o acute chest syndrome 12/2017, ESRD on PD, presents with several hours of low back pain. VS normal, stable. CXR without opacities.    SCC    - PCA Dilaudid  - will refrain from empiric IV hydration in light of ESRD  - c/w Folate, IS  - CXR- Bibasilar subsegmental atelectasis.    -3/29- s/p 1 unit prbc    SIRS  -started on Vanco, Zosyn   - Blood cx: neg  - Peritoneal fluid-only 40 nucleated cells and >1000 RBC-which is not consistent with peritonitis. Cultures negative   - rvp neg  -CTA-Bibasilar pneumonia  -ID consulted-continue Zosyn   to complete levaquin 500mg q48hr to complete 4/2    Hypoxia.  - likely from sickle cell crisis, however given concurrent tachycardia  - CT Angio Chest- No pulmonary embolism. Bibasilar pneumonia with associated areas of atelectasis. Recommend a one month follow-up. Small to moderate amount of ascites.    ESRD on dialysis.   - Now on PD.   - Renal consulted house  - c/w Calcium acetate, Nephro-jay.     Hypocalcemia.   - ionized calcium level -0.90 low, serum phos- high, PTH level-765.2.   - increased dose of phoslo to 2 tabs TID with meals (home dose)    - continue Calcitriol.

## 2018-03-31 NOTE — DISCHARGE NOTE ADULT - PLAN OF CARE
resolving Stay hydrated with water. Continue medications as prescribed. Follow up with your PCP for further evaluation and refills of pain medication. Please call to make an appointment Complete antibiotic therapy as directed, one more dose Monday 4/2.  Monitor for any further signs and symptoms of further infection including but not limited to fevers, rigors, chills and or difficulty breathing. Continue PD. Continue medications. Follow up with your PCP for further evaluation and management. Please call to make an appointment within 1-2 weeks of discharge.

## 2018-03-31 NOTE — PROGRESS NOTE ADULT - NSHPATTENDINGPLANDISCUSS_GEN_ALL_CORE
Pt
medicine attending
Pt
Pt, NP Maryan
patient and PD RN
patient and PD RN
patient
patient and PD RN
patient and PD RN
Pt

## 2018-03-31 NOTE — PROGRESS NOTE ADULT - PROBLEM SELECTOR PLAN 2
-improving pain, d/c PCA, continue tylenol #3  h/h stable  - incentive spirometry, RVP negative  -will refrain from empiric IV hydration in light of ESRD  -c/w folate

## 2018-03-31 NOTE — PROGRESS NOTE ADULT - PROBLEM SELECTOR PLAN 3
resolved, saturating well on RA  likely due to splinting from sickle cell crisis, and Pneumonia  Improving, c/w monitoring

## 2018-03-31 NOTE — PROGRESS NOTE ADULT - PROBLEM SELECTOR PLAN 6
IMPROVE 0; however at increased risk DVT given h/o SCD. Will initiate HSQ tid for DVT ppx.

## 2018-03-31 NOTE — PROGRESS NOTE ADULT - SUBJECTIVE AND OBJECTIVE BOX
[facility] DIVISION OF KIDNEY DISEASES AND HYPERTENSION --   --------------------------------------------------------------------------------  Chief Complaint: ESRD/Ongoing peritoneal dialysis requirement    HPI: 37-year-old female with ESRD on PD, and sickle cell disease admitted for sickle cell crisis. Pt. has been on PD since 3/2017. Pt.'s outpatient nephrologist is Dr. Larsen. Patient seen and examined today. Pt. had 4 PD exchanges (4 hour dwells of 1.5% 2L) yesterday. Pt. feels well and wants to go home. Pt. denies SOB or LE edema. Tolerating PD well.     PAST HISTORY  --------------------------------------------------------------------------------  No significant changes to PMH, PSH, FHx, SHx, unless otherwise noted    ALLERGIES & MEDICATIONS  --------------------------------------------------------------------------------  Allergies    morphine (Other)    Intolerances    Standing Inpatient Medications  calcitriol   Capsule 1 MICROGram(s) Oral daily  calcium acetate 1334 milliGRAM(s) Oral three times a day with meals  docusate sodium 100 milliGRAM(s) Oral three times a day  folic acid 1 milliGRAM(s) Oral daily  gentamicin 0.1% Cream 1 Application(s) Topical <User Schedule>  gentamicin 0.1% Ointment 1 Application(s) Topical <User Schedule>  heparin  Injectable 5000 Unit(s) SubCutaneous every 8 hours  lactobacillus acidophilus 1 Tablet(s) Oral daily  Nephro-jay 1 Tablet(s) Oral daily  piperacillin/tazobactam IVPB. 3.375 Gram(s) IV Intermittent every 12 hours  senna 2 Tablet(s) Oral at bedtime    REVIEW OF SYSTEMS  --------------------------------------------------------------------------------  Gen: No weakness  Skin: No rash  Head: No headache  Respiratory: No dyspnea, +cough  CV: See HPI  GI: No abdominal pain, diarrhea  MSK: No edema, +improved back pain  Neuro: No dizziness    VITALS/PHYSICAL EXAM  --------------------------------------------------------------------------------  T(C): 36.9 (03-31-18 @ 07:00), Max: 38 (03-30-18 @ 21:45)  HR: 106 (03-31-18 @ 07:00) (93 - 116)  BP: 121/85 (03-31-18 @ 07:00) (99/62 - 121/85)  RR: 16 (03-31-18 @ 07:00) (16 - 18)  SpO2: 95% (03-31-18 @ 04:55) (93% - 100%)  Wt(kg): --    03-30-18 @ 07:01  -  03-31-18 @ 07:00  --------------------------------------------------------  IN: 6000 mL / OUT: 6400 mL / NET: -400 mL    Physical Exam:  	Gen: NAD  	HEENT: No JVD   	Pulm: fair air entry B/L, + crackles left base   	CV: S1S2+  	Abd: soft, +PD catheter site: clean, no discharge  	: No suprapubic tenderness  	LE: Warm, no edema  	Neuro: Awake and alert   	Psych: Normal affect and mood  	Skin: Warm    LABS/STUDIES  --------------------------------------------------------------------------------              7.2    15.41 >-----------<  266      [03-30-18 @ 05:46]              20.2     134  |  91  |  67  ----------------------------<  87      [03-30-18 @ 05:46]  3.2   |  26  |  9.24        Ca     7.5     [03-30-18 @ 05:46]    TPro  5.9  /  Alb  2.0  /  TBili  1.1  /  DBili  x   /  AST  21  /  ALT  22  /  AlkPhos  140  [03-30-18 @ 05:46]    .2 (Ca --)      [03-27-18 @ 05:30]   -- [facility] DIVISION OF KIDNEY DISEASES AND HYPERTENSION --   --------------------------------------------------------------------------------  Chief Complaint: ESRD/Ongoing peritoneal dialysis requirement    HPI: 37-year-old female with ESRD on PD, and sickle cell disease admitted for sickle cell crisis. Pt. has been on PD since 3/2017. Pt.'s outpatient nephrologist is Dr. Larsen. Patient seen and examined today. Pt. had 4 PD exchanges (4 hour dwells of 1.5% 2L) yesterday. Pt. feels well and wants to go home. Pt. denies SOB or LE edema. Tolerating PD well.     PAST HISTORY  --------------------------------------------------------------------------------  No significant changes to PMH, PSH, FHx, SHx, unless otherwise noted    ALLERGIES & MEDICATIONS  --------------------------------------------------------------------------------  Allergies    morphine (Other)    Intolerances    Standing Inpatient Medications  calcitriol   Capsule 1 MICROGram(s) Oral daily  calcium acetate 1334 milliGRAM(s) Oral three times a day with meals  docusate sodium 100 milliGRAM(s) Oral three times a day  folic acid 1 milliGRAM(s) Oral daily  gentamicin 0.1% Cream 1 Application(s) Topical <User Schedule>  gentamicin 0.1% Ointment 1 Application(s) Topical <User Schedule>  heparin  Injectable 5000 Unit(s) SubCutaneous every 8 hours  lactobacillus acidophilus 1 Tablet(s) Oral daily  Nephro-jay 1 Tablet(s) Oral daily  piperacillin/tazobactam IVPB. 3.375 Gram(s) IV Intermittent every 12 hours  senna 2 Tablet(s) Oral at bedtime    REVIEW OF SYSTEMS  --------------------------------------------------------------------------------  Gen: No weakness  Skin: No rash  Head: No headache  Respiratory: No dyspnea, +cough  CV: See HPI  GI: No abdominal pain, diarrhea  MSK: No edema, +improved back pain  Neuro: No dizziness    VITALS/PHYSICAL EXAM  --------------------------------------------------------------------------------  T(C): 36.9 (03-31-18 @ 07:00), Max: 38 (03-30-18 @ 21:45)  HR: 106 (03-31-18 @ 07:00) (93 - 116)  BP: 121/85 (03-31-18 @ 07:00) (99/62 - 121/85)  RR: 16 (03-31-18 @ 07:00) (16 - 18)  SpO2: 95% (03-31-18 @ 04:55) (93% - 100%)  Wt(kg): --    03-30-18 @ 07:01  -  03-31-18 @ 07:00  --------------------------------------------------------  IN: 6000 mL / OUT: 6400 mL / NET: -400 mL    Physical Exam:  	Gen: NAD  	HEENT: No JVD   	Pulm: fair air entry B/L, + crackles left base   	CV: S1S2+  	Abd: soft, +PD catheter site: clean, no discharge  	: No suprapubic tenderness  	LE: Warm, no edema  	Neuro: Awake and alert   	Psych: Normal affect and mood  	Skin: Warm    LABS/STUDIES  --------------------------------------------------------------------------------              7.2    15.41 >-----------<  266      [03-30-18 @ 05:46]              20.2     134  |  91  |  67  ----------------------------<  87      [03-30-18 @ 05:46]  3.2   |  26  |  9.24        Ca     7.5     [03-30-18 @ 05:46]    TPro  5.9  /  Alb  2.0  /  TBili  1.1  /  DBili  x   /  AST  21  /  ALT  22  /  AlkPhos  140  [03-30-18 @ 05:46]    .2 (Ca --)      [03-27-18 @ 05:30]   --

## 2018-03-31 NOTE — PROGRESS NOTE ADULT - PROBLEM SELECTOR PROBLEM 4
ESRD (end stage renal disease) on dialysis
Hypocalcemia
ESRD (end stage renal disease) on dialysis
ESRD (end stage renal disease) on dialysis

## 2018-03-31 NOTE — DISCHARGE NOTE ADULT - CARE PLAN
Principal Discharge DX:	Sickle cell pain crisis  Goal:	resolving  Assessment and plan of treatment:	Stay hydrated with water. Continue medications as prescribed. Follow up with your PCP for further evaluation and refills of pain medication. Please call to make an appointment  Secondary Diagnosis:	SIRS (systemic inflammatory response syndrome)  Assessment and plan of treatment:	Complete antibiotic therapy as directed, one more dose Monday 4/2.  Monitor for any further signs and symptoms of further infection including but not limited to fevers, rigors, chills and or difficulty breathing.  Secondary Diagnosis:	ESRD (end stage renal disease) on dialysis  Assessment and plan of treatment:	Continue PD. Continue medications. Follow up with your PCP for further evaluation and management. Please call to make an appointment within 1-2 weeks of discharge.

## 2018-03-31 NOTE — PROGRESS NOTE ADULT - PROBLEM SELECTOR PLAN 5
Improving  c/w calcium acetate
Improving  c/w calcium acetate
IMPROVE 0; however at increased risk DVT given h/o SCD. Will initiate HSQ tid for DVT ppx.
Improving  c/w calcium acetate and ? fixing Ca in her dialysate
Improving  c/w calcium acetate and ? fixing Ca in her dialysate
Improving  c/w calcium acetate

## 2018-03-31 NOTE — PROGRESS NOTE ADULT - PROBLEM SELECTOR PLAN 1
Pt. with ESRD on CAPD during current hospital stay at [facility]. Pt. clinically stable.  Labs reviewed. Pt. tolerating CAPD. BP stable during CAPD. PD catheter functioning well. Continue with current PD prescription

## 2018-03-31 NOTE — PROGRESS NOTE ADULT - PROBLEM SELECTOR PROBLEM 3
Anemia, unspecified type
ESRD (end stage renal disease) on dialysis
Hypoxia

## 2018-03-31 NOTE — PROGRESS NOTE ADULT - PROBLEM SELECTOR PLAN 1
fever, leukocytosis, tachycardia  improved  c/w Zosyn, switch to levaquin to complete 7 -10 days course upon dc ( ID agrees)  Sepsis due to bibasilar pneumonia  PD Catheter cytology does not suggest infection   PD catheter Cx neg   BCx neg

## 2018-03-31 NOTE — DISCHARGE NOTE ADULT - PATIENT PORTAL LINK FT
You can access the [facility], offered by [facility], by registering with the following website: [email or web address]

## 2018-03-31 NOTE — PROGRESS NOTE ADULT - PROBLEM SELECTOR PROBLEM 2
Hypocalcemia
Hypoxia
Sickle cell pain crisis

## 2018-04-02 LAB — BACTERIA FLD CULT: SIGNIFICANT CHANGE UP

## 2018-04-06 ENCOUNTER — APPOINTMENT (OUTPATIENT)
Dept: INTERNAL MEDICINE | Facility: HOSPITAL | Age: 38
End: 2018-04-06
Payer: COMMERCIAL

## 2018-04-06 ENCOUNTER — OUTPATIENT (OUTPATIENT)
Dept: OUTPATIENT SERVICES | Facility: HOSPITAL | Age: 38
LOS: 1 days | End: 2018-04-06

## 2018-04-06 VITALS — SYSTOLIC BLOOD PRESSURE: 96 MMHG | DIASTOLIC BLOOD PRESSURE: 73 MMHG | OXYGEN SATURATION: 100 %

## 2018-04-06 DIAGNOSIS — Z98.51 TUBAL LIGATION STATUS: Chronic | ICD-10-CM

## 2018-04-06 DIAGNOSIS — Z98.89 OTHER SPECIFIED POSTPROCEDURAL STATES: Chronic | ICD-10-CM

## 2018-04-06 PROCEDURE — 99213 OFFICE O/P EST LOW 20 MIN: CPT

## 2018-04-09 DIAGNOSIS — D57.1 SICKLE-CELL DISEASE WITHOUT CRISIS: ICD-10-CM

## 2018-04-13 NOTE — PHYSICAL THERAPY INITIAL EVALUATION ADULT - SITTING BALANCE: DYNAMIC
MRN:[id number]                      After Visit Summary   4/13/2018    Jailene New    MRN: [id number]           Thank you!     Thank you for choosing [facility] for your care. Our goal is always to provide you with excellent care. Hearing back from our patients is one way we can continue to improve our services. Please take a few minutes to complete the written survey that you may receive in the mail after you visit with us. Thank you!        Patient Information     Date Of Birth          1981        About your hospital stay     You were admitted on:  April 13, 2018 You last received care in the:  [facility]    You were discharged on:  April 13, 2018       Who to Call     For medical emergencies, please call 911.  For non-urgent questions about your medical care, please call your primary care provider or clinic, [phone]  For questions related to your surgery, please call your surgery clinic        Attending Provider     Provider Specialty    Bobby Jones MD Surgery       Primary Care Provider Office Phone # Fax #    Kayla JOHNSTON Meng Olvera [phone]      After Care Instructions     Discharge Instructions       Follow up appointment as instructed by Surgeon and or RN            Discharge Instructions       Patient to follow up with appointment in 2 weeks            Ice to affected area       Ice to operative site PRN            No lifting        No lifting over 20 lbs and no strenuous physical activity for 4 weeks            Shower       No shower for 24 hours post procedure. May shower Postoperative Day (POD)  1                  Your next 10 appointments already scheduled     Apr 26, 2018  3:40 PM CDT   Return Visit with Bobby Jones MD   [facility] ([facility])    [address]   [facility] [id number] [phone]              Further instructions from your care team        [facility]   Adult Discharge Orders & Instructions     For 24 hours after surgery    1. Get plenty of rest.  A responsible adult must stay with you for at least 24 hours after you leave the hospital.   2. Do not drive or use heavy equipment.  If you have weakness or tingling, don't drive or use heavy equipment until this feeling goes away.  3. Do not drink alcohol.  4. Avoid strenuous or risky activities.  Ask for help when climbing stairs.   5. You may feel lightheaded.  IF so, sit for a few minutes before standing.  Have someone help you get up.   6. If you have nausea (feel sick to your stomach): Drink only clear liquids such as apple juice, ginger ale, broth or 7-Up.  Rest may also help.  Be sure to drink enough fluids.  Move to a regular diet as you feel able.  7. You may have a slight fever. Call the doctor if your fever is over 101 F (38.3 C) (taken under the tongue) or lasts longer than 24 hours.  8. You may have a dry mouth, a sore throat, muscle aches or trouble sleeping.  These should go away after 24 hours.  9. Do not make important or legal decisions.   Call your doctor for any of the followin.  Signs of infection (fever, growing tenderness at the surgery site, a large amount of drainage or bleeding, severe pain, foul-smelling drainage, redness, swelling).    2. It has been over 8 to 10 hours since surgery and you are still not able to urinate (pass water).    3.  Headache for over 24 hours.    4.  Numbness, tingling or weakness the day after surgery (if you had spinal anesthesia).  To contact a doctor, call    [phone]___________________________    Pending Results     Date and Time Order Name Status Description    2018 0812 Surgical pathology exam In process             Admission Information     Date & Time Provider Department Dept. Phone    2018 Bobby Jones MD [facility] [phone]      Your Vitals Were     Blood Pressure Temperature  "Respirations Weight Pulse Oximetry BMI (Body Mass Index)    153/85 98.8  F (37.1  C) (Tympanic) 19 58.8 kg (129 lb 9.6 oz) 99% 20 kg/m2      [employer] Information     [employer] lets you send messages to your doctor, view your test results, renew your prescriptions, schedule appointments and more. To sign up, go to [email or web address] . Click on \"Log in\" on the left side of the screen, which will take you to the Welcome page. Then click on \"Sign up Now\" on the right side of the page.     You will be asked to enter the access code listed below, as well as some personal information. Please follow the directions to create your username and password.     Your access code is: [email or web address]  Expires: 2018  4:30 PM     Your access code will  in 90 days. If you need help or a new code, please call your [facility] clinic or [phone].        Care EveryWhere ID     This is your Care EveryWhere ID. This could be used by other organizations to access your [facility] medical records  [id number]        Equal Access to Services     ARDEN HDZ : Hadii kira Khan, wanataliada kareen, qairmata kaalmada bruce, michelle obregon. So [facility] [phone].    ATENCIÓN: Si habla español, tiene a torre disposición servicios gratuitos de asistencia lingüística. Tomas al [phone].    We comply with applicable federal civil rights laws and [place] laws. We do not discriminate on the basis of race, color, national origin, age, disability, sex, sexual orientation, or gender identity.               Review of your medicines      START taking        Dose / Directions    oxyCODONE-acetaminophen 5-325 MG per tablet   Commonly known as:  PERCOCET   Used for:  S/P epigastric hernia repair, follow-up exam        Dose:  1-2 tablet   Take 1-2 tablets by mouth every 4 hours as needed for pain (moderate to severe)   Quantity:  30 tablet   Refills:  0         CONTINUE these medicines which have NOT CHANGED     "    Dose / Directions    * ADDERALL PO        Dose:  30 mg   Take 30 mg by mouth daily In am   Refills:  0       * ADDERALL XR 20 MG per 24 hr capsule   Generic drug:  amphetamine-dextroamphetamine        TAKE 1/2  CAPSULE BY MOUTH EVERY DAY  IN THE MORNING AND TAKE 1 CAPSULE BY MOUTH EVERY DAY AT 2 pm(FILL AFTER 11/1/2016)   Refills:  0       ibuprofen 600 MG tablet   Commonly known as:  ADVIL/MOTRIN        Dose:  600 mg   Take 600 mg by mouth every 6 hours as needed   Refills:  0       valACYclovir 500 MG tablet   Commonly known as:  VALTREX        Dose:  500 mg   Take 500 mg by mouth As needed for cold sores   Refills:  0       * Notice:  This list has 2 medication(s) that are the same as other medications prescribed for you. Read the directions carefully, and ask your doctor or other care provider to review them with you.      STOP taking     TRINESSA LO 0.18/0.215/0.25 MG-25 MCG per tablet   Generic drug:  norgestim-eth estrad triphasic                Where to get your medicines      Some of these will need a paper prescription and others can be bought over the counter. Ask your nurse if you have questions.     Bring a paper prescription for each of these medications     oxyCODONE-acetaminophen 5-325 MG per tablet                Protect others around you: Learn how to safely use, store and throw away your medicines at www.disposemymeds.org.        Information about OPIOIDS     PRESCRIPTION OPIOIDS: WHAT YOU NEED TO KNOW    Prescription opioids can be used to help relieve moderate to severe pain and are often prescribed following a surgery or injury, or for certain health conditions. These medications can be an important part of treatment but also come with serious risks. It is important to work with your health care provider to make sure you are getting the safest, most effective care.    WHAT ARE THE RISKS AND SIDE EFFECTS OF OPIOID USE?  Prescription opioids carry serious risks of addiction and overdose,  especially with prolonged use. An opioid overdose, often marked by slowed breathing can cause sudden death. The use of prescription opioids can have a number of side effects as well, even when taken as directed:      Tolerance - meaning you might need to take more of a medication for the same pain relief    Physical dependence - meaning you have symptoms of withdrawal when a medication is stopped    Increased sensitivity to pain    Constipation    Nausea, vomiting, and dry mouth    Sleepiness and dizziness    Confusion    Depression    Low levels of testosterone that can result in lower sex drive, energy, and strength    Itching and sweating    RISKS ARE GREATER WITH:    History of drug misuse, substance use disorder, or overdose    Mental health conditions (such as depression or anxiety)    Sleep apnea    Older age (65 years or older)    Pregnancy    Avoid alcohol while taking prescription opioids.   Also, unless specifically advised by your health care provider, medications to avoid include:    Benzodiazepines (such as Xanax or Valium)    Muscle relaxants (such as Soma or Flexeril)    Hypnotics (such as Ambien or Lunesta)    Other prescription opioids    KNOW YOUR OPTIONS:  Talk to your health care provider about ways to manage your pain that do not involve prescription opioids. Some of these options may actually work better and have fewer risks and side effects:    Pain relievers such as acetaminophen, ibuprofen, and naproxen    Some medications that are also used for depression or seizures    Physical therapy and exercise    Cognitive behavioral therapy, a psychological, goal-directed approach, in which patients learn how to modify physical, behavioral, and emotional triggers of pain and stress    IF YOU ARE PRESCRIBED OPIOIDS FOR PAIN:    Never take opioids in greater amounts or more often than prescribed    Follow up with your primary health care provider and work together to create a plan on how to manage  your pain.    Talk about ways to help manage your pain that do not involve prescription opioids    Talk about all concerns and side effects    Help prevent misuse and abuse    Never sell or share prescription opioids    Never use another person's prescription opioids    Store prescription opioids in a secure place and out of reach of others (this may include visitors, children, friends, and family)    Visit www.cdc.gov/drugoverdose to learn about risks of opioid abuse and overdose    If you believe you may be struggling with addiction, tell your health care provider and ask for guidance or call [facility]'s National Helpline at 0-740-317-HELP    LEARN MORE / www.cdc.gov/drugoverdose/prescribing/guideline.html    Safely dispose of unused prescription opioids: Find your local drug take-back programs and more information about the importance of safe disposal at www.doseofreality.[place].gov             Medication List: This is a list of all your medications and when to take them. Check marks below indicate your daily home schedule. Keep this list as a reference.      Medications           Morning Afternoon Evening Bedtime As Needed    * ADDERALL PO   Take 30 mg by mouth daily In am                                * ADDERALL XR 20 MG per 24 hr capsule   TAKE 1/2  CAPSULE BY MOUTH EVERY DAY  IN THE MORNING AND TAKE 1 CAPSULE BY MOUTH EVERY DAY AT 2 pm(FILL AFTER 11/1/2016)   Generic drug:  amphetamine-dextroamphetamine                                ibuprofen 600 MG tablet   Commonly known as:  ADVIL/MOTRIN   Take 600 mg by mouth every 6 hours as needed                                oxyCODONE-acetaminophen 5-325 MG per tablet   Commonly known as:  PERCOCET   Take 1-2 tablets by mouth every 4 hours as needed for pain (moderate to severe)   Last time this was given:  1 tablet on 4/13/2018  9:38 AM                                valACYclovir 500 MG tablet   Commonly known as:  VALTREX   Take 500 mg by mouth As needed for cold  russel                                * Notice:  This list has 2 medication(s) that are the same as other medications prescribed for you. Read the directions carefully, and ask your doctor or other care provider to review them with you.       fair balance

## 2018-04-18 ENCOUNTER — FORM ENCOUNTER (OUTPATIENT)
Age: 38
End: 2018-04-18

## 2018-04-19 ENCOUNTER — APPOINTMENT (OUTPATIENT)
Dept: RADIOLOGY | Facility: CLINIC | Age: 38
End: 2018-04-19
Payer: COMMERCIAL

## 2018-04-19 ENCOUNTER — OUTPATIENT (OUTPATIENT)
Dept: OUTPATIENT SERVICES | Facility: HOSPITAL | Age: 38
LOS: 1 days | End: 2018-04-19
Payer: COMMERCIAL

## 2018-04-19 DIAGNOSIS — Z98.51 TUBAL LIGATION STATUS: Chronic | ICD-10-CM

## 2018-04-19 DIAGNOSIS — Z98.89 OTHER SPECIFIED POSTPROCEDURAL STATES: Chronic | ICD-10-CM

## 2018-04-19 DIAGNOSIS — M87.052 IDIOPATHIC ASEPTIC NECROSIS OF LEFT FEMUR: ICD-10-CM

## 2018-04-19 DIAGNOSIS — M87.059 IDIOPATHIC ASEPTIC NECROSIS OF UNSPECIFIED FEMUR: ICD-10-CM

## 2018-04-19 PROCEDURE — 77002 NEEDLE LOCALIZATION BY XRAY: CPT | Mod: 26

## 2018-04-19 PROCEDURE — 20610 DRAIN/INJ JOINT/BURSA W/O US: CPT

## 2018-04-19 PROCEDURE — 20610 DRAIN/INJ JOINT/BURSA W/O US: CPT | Mod: LT

## 2018-04-19 PROCEDURE — 77002 NEEDLE LOCALIZATION BY XRAY: CPT

## 2018-04-23 ENCOUNTER — RX RENEWAL (OUTPATIENT)
Age: 38
End: 2018-04-23

## 2018-04-24 LAB — FUNGUS SPEC QL CULT: SIGNIFICANT CHANGE UP

## 2018-04-26 ENCOUNTER — APPOINTMENT (OUTPATIENT)
Dept: ORTHOPEDIC SURGERY | Facility: CLINIC | Age: 38
End: 2018-04-26
Payer: COMMERCIAL

## 2018-04-26 VITALS — SYSTOLIC BLOOD PRESSURE: 108 MMHG | DIASTOLIC BLOOD PRESSURE: 71 MMHG | HEART RATE: 84 BPM

## 2018-04-26 DIAGNOSIS — Z09 ENCOUNTER FOR FOLLOW-UP EXAMINATION AFTER COMPLETED TREATMENT FOR CONDITIONS OTHER THAN MALIGNANT NEOPLASM: ICD-10-CM

## 2018-04-26 DIAGNOSIS — M16.9 OSTEOARTHRITIS OF HIP, UNSPECIFIED: ICD-10-CM

## 2018-04-26 PROCEDURE — 99215 OFFICE O/P EST HI 40 MIN: CPT

## 2018-04-26 PROCEDURE — 73521 X-RAY EXAM HIPS BI 2 VIEWS: CPT

## 2018-05-01 ENCOUNTER — RX RENEWAL (OUTPATIENT)
Age: 38
End: 2018-05-01

## 2018-05-03 ENCOUNTER — LABORATORY RESULT (OUTPATIENT)
Age: 38
End: 2018-05-03

## 2018-05-03 ENCOUNTER — OUTPATIENT (OUTPATIENT)
Dept: OUTPATIENT SERVICES | Facility: HOSPITAL | Age: 38
LOS: 1 days | End: 2018-05-03

## 2018-05-03 ENCOUNTER — APPOINTMENT (OUTPATIENT)
Dept: INTERNAL MEDICINE | Facility: HOSPITAL | Age: 38
End: 2018-05-03
Payer: COMMERCIAL

## 2018-05-03 DIAGNOSIS — Z98.89 OTHER SPECIFIED POSTPROCEDURAL STATES: Chronic | ICD-10-CM

## 2018-05-03 DIAGNOSIS — Z98.51 TUBAL LIGATION STATUS: Chronic | ICD-10-CM

## 2018-05-03 LAB
ALBUMIN SERPL ELPH-MCNC: 3.6 G/DL — SIGNIFICANT CHANGE UP (ref 3.3–5)
ALP SERPL-CCNC: 113 U/L — SIGNIFICANT CHANGE UP (ref 40–120)
ALT FLD-CCNC: 13 U/L — SIGNIFICANT CHANGE UP (ref 4–33)
ANISOCYTOSIS BLD QL: SIGNIFICANT CHANGE UP
AST SERPL-CCNC: 25 U/L — SIGNIFICANT CHANGE UP (ref 4–32)
BASOPHILS # BLD AUTO: 0.05 K/UL — SIGNIFICANT CHANGE UP (ref 0–0.2)
BASOPHILS NFR BLD AUTO: 0.7 % — SIGNIFICANT CHANGE UP (ref 0–2)
BASOPHILS NFR SPEC: 0 % — SIGNIFICANT CHANGE UP (ref 0–2)
BILIRUB SERPL-MCNC: 0.4 MG/DL — SIGNIFICANT CHANGE UP (ref 0.2–1.2)
BLD GP AB SCN SERPL QL: NEGATIVE — SIGNIFICANT CHANGE UP
BUN SERPL-MCNC: 45 MG/DL — HIGH (ref 7–23)
CALCIUM SERPL-MCNC: 8.8 MG/DL — SIGNIFICANT CHANGE UP (ref 8.4–10.5)
CHLORIDE SERPL-SCNC: 92 MMOL/L — LOW (ref 98–107)
CO2 SERPL-SCNC: 27 MMOL/L — SIGNIFICANT CHANGE UP (ref 22–31)
CREAT SERPL-MCNC: 9.32 MG/DL — HIGH (ref 0.5–1.3)
EOSINOPHIL # BLD AUTO: 0.75 K/UL — HIGH (ref 0–0.5)
EOSINOPHIL NFR BLD AUTO: 10.4 % — HIGH (ref 0–6)
EOSINOPHIL NFR FLD: 9.3 % — HIGH (ref 0–6)
GIANT PLATELETS BLD QL SMEAR: PRESENT — SIGNIFICANT CHANGE UP
GLUCOSE SERPL-MCNC: 80 MG/DL — SIGNIFICANT CHANGE UP (ref 70–99)
HCT VFR BLD CALC: 32.2 % — LOW (ref 34.5–45)
HGB BLD-MCNC: 10.1 G/DL — LOW (ref 11.5–15.5)
IMM GRANULOCYTES # BLD AUTO: 0.04 # — SIGNIFICANT CHANGE UP
IMM GRANULOCYTES NFR BLD AUTO: 0.6 % — SIGNIFICANT CHANGE UP (ref 0–1.5)
INR BLD: 1.08 — SIGNIFICANT CHANGE UP (ref 0.88–1.17)
LYMPHOCYTES # BLD AUTO: 1.35 K/UL — SIGNIFICANT CHANGE UP (ref 1–3.3)
LYMPHOCYTES # BLD AUTO: 18.7 % — SIGNIFICANT CHANGE UP (ref 13–44)
LYMPHOCYTES NFR SPEC AUTO: 13.9 % — SIGNIFICANT CHANGE UP (ref 13–44)
MACROCYTES BLD QL: SIGNIFICANT CHANGE UP
MCHC RBC-ENTMCNC: 29.1 PG — SIGNIFICANT CHANGE UP (ref 27–34)
MCHC RBC-ENTMCNC: 31.4 % — LOW (ref 32–36)
MCV RBC AUTO: 92.8 FL — SIGNIFICANT CHANGE UP (ref 80–100)
MONOCYTES # BLD AUTO: 0.69 K/UL — SIGNIFICANT CHANGE UP (ref 0–0.9)
MONOCYTES NFR BLD AUTO: 9.6 % — SIGNIFICANT CHANGE UP (ref 2–14)
MONOCYTES NFR BLD: 5.5 % — SIGNIFICANT CHANGE UP (ref 2–9)
MYELOCYTES NFR BLD: 0.9 % — HIGH (ref 0–0)
NEUTROPHIL AB SER-ACNC: 60.2 % — SIGNIFICANT CHANGE UP (ref 43–77)
NEUTROPHILS # BLD AUTO: 4.34 K/UL — SIGNIFICANT CHANGE UP (ref 1.8–7.4)
NEUTROPHILS NFR BLD AUTO: 60 % — SIGNIFICANT CHANGE UP (ref 43–77)
NRBC # FLD: 0.25 — SIGNIFICANT CHANGE UP
NRBC FLD-RTO: 3.5 — SIGNIFICANT CHANGE UP
PLATELET # BLD AUTO: 512 K/UL — HIGH (ref 150–400)
PLATELET COUNT - ESTIMATE: NORMAL — SIGNIFICANT CHANGE UP
PMV BLD: 10.1 FL — SIGNIFICANT CHANGE UP (ref 7–13)
POLYCHROMASIA BLD QL SMEAR: SIGNIFICANT CHANGE UP
POTASSIUM SERPL-MCNC: 4.1 MMOL/L — SIGNIFICANT CHANGE UP (ref 3.5–5.3)
POTASSIUM SERPL-SCNC: 4.1 MMOL/L — SIGNIFICANT CHANGE UP (ref 3.5–5.3)
PROT SERPL-MCNC: 7.4 G/DL — SIGNIFICANT CHANGE UP (ref 6–8.3)
PROTHROM AB SERPL-ACNC: 12 SEC — SIGNIFICANT CHANGE UP (ref 9.8–13.1)
RBC # BLD: 3.47 M/UL — LOW (ref 3.8–5.2)
RBC # FLD: 18 % — HIGH (ref 10.3–14.5)
RH IG SCN BLD-IMP: NEGATIVE — SIGNIFICANT CHANGE UP
SCHISTOCYTES BLD QL AUTO: SLIGHT — SIGNIFICANT CHANGE UP
SODIUM SERPL-SCNC: 141 MMOL/L — SIGNIFICANT CHANGE UP (ref 135–145)
TARGETS BLD QL SMEAR: SIGNIFICANT CHANGE UP
VARIANT LYMPHS # BLD: 10.2 % — SIGNIFICANT CHANGE UP
WBC # BLD: 7.22 K/UL — SIGNIFICANT CHANGE UP (ref 3.8–10.5)
WBC # FLD AUTO: 7.22 K/UL — SIGNIFICANT CHANGE UP (ref 3.8–10.5)

## 2018-05-03 PROCEDURE — 99214 OFFICE O/P EST MOD 30 MIN: CPT

## 2018-05-03 RX ORDER — AMOXICILLIN 500 MG/1
500 TABLET, FILM COATED ORAL
Qty: 4 | Refills: 6 | Status: ACTIVE | COMMUNITY
Start: 2017-06-05 | End: 1900-01-01

## 2018-05-03 RX ORDER — FUROSEMIDE 20 MG/1
20 TABLET ORAL TWICE DAILY
Qty: 180 | Refills: 0 | Status: DISCONTINUED | COMMUNITY
Start: 2017-06-22 | End: 2018-05-03

## 2018-05-03 RX ORDER — TRAMADOL HYDROCHLORIDE 50 MG/1
50 TABLET, COATED ORAL
Qty: 90 | Refills: 0 | Status: DISCONTINUED | COMMUNITY
Start: 2018-04-23 | End: 2018-05-03

## 2018-05-04 LAB
HGB A MFR BLD: 12 % — LOW
HGB A2 MFR BLD: 4.5 % — HIGH (ref 2.4–3.5)
HGB F MFR BLD: < 1 % — SIGNIFICANT CHANGE UP (ref 0–1.5)
HGB S MFR BLD: 82.9 % — HIGH (ref 0–0)

## 2018-05-07 DIAGNOSIS — N18.5 CHRONIC KIDNEY DISEASE, STAGE 5: ICD-10-CM

## 2018-05-07 DIAGNOSIS — M87.052 IDIOPATHIC ASEPTIC NECROSIS OF LEFT FEMUR: ICD-10-CM

## 2018-05-07 DIAGNOSIS — D57.1 SICKLE-CELL DISEASE WITHOUT CRISIS: ICD-10-CM

## 2018-05-07 LAB — HGB ELECT COMMENT: SIGNIFICANT CHANGE UP

## 2018-05-10 ENCOUNTER — APPOINTMENT (OUTPATIENT)
Dept: ORTHOPEDIC SURGERY | Facility: CLINIC | Age: 38
End: 2018-05-10
Payer: COMMERCIAL

## 2018-05-10 VITALS — DIASTOLIC BLOOD PRESSURE: 66 MMHG | SYSTOLIC BLOOD PRESSURE: 98 MMHG | HEART RATE: 93 BPM

## 2018-05-10 PROCEDURE — 99214 OFFICE O/P EST MOD 30 MIN: CPT

## 2018-05-15 ENCOUNTER — OUTPATIENT (OUTPATIENT)
Dept: OUTPATIENT SERVICES | Facility: HOSPITAL | Age: 38
LOS: 1 days | End: 2018-05-15
Payer: COMMERCIAL

## 2018-05-15 VITALS
OXYGEN SATURATION: 99 % | HEART RATE: 96 BPM | SYSTOLIC BLOOD PRESSURE: 94 MMHG | DIASTOLIC BLOOD PRESSURE: 60 MMHG | RESPIRATION RATE: 16 BRPM | TEMPERATURE: 98 F | HEIGHT: 65 IN | WEIGHT: 134.92 LBS

## 2018-05-15 DIAGNOSIS — Z98.890 OTHER SPECIFIED POSTPROCEDURAL STATES: Chronic | ICD-10-CM

## 2018-05-15 DIAGNOSIS — Z98.51 TUBAL LIGATION STATUS: Chronic | ICD-10-CM

## 2018-05-15 DIAGNOSIS — Z98.89 OTHER SPECIFIED POSTPROCEDURAL STATES: Chronic | ICD-10-CM

## 2018-05-15 DIAGNOSIS — M16.12 UNILATERAL PRIMARY OSTEOARTHRITIS, LEFT HIP: ICD-10-CM

## 2018-05-15 DIAGNOSIS — M19.90 UNSPECIFIED OSTEOARTHRITIS, UNSPECIFIED SITE: ICD-10-CM

## 2018-05-15 DIAGNOSIS — D57.1 SICKLE-CELL DISEASE WITHOUT CRISIS: ICD-10-CM

## 2018-05-15 LAB
BASOPHILS # BLD AUTO: 0.07 K/UL — SIGNIFICANT CHANGE UP (ref 0–0.2)
BASOPHILS NFR BLD AUTO: 0.9 % — SIGNIFICANT CHANGE UP (ref 0–2)
BLD GP AB SCN SERPL QL: NEGATIVE — SIGNIFICANT CHANGE UP
EOSINOPHIL # BLD AUTO: 0.56 K/UL — HIGH (ref 0–0.5)
EOSINOPHIL NFR BLD AUTO: 6.8 % — HIGH (ref 0–6)
HBA1C BLD-MCNC: 3.5 % — LOW (ref 4–5.6)
HCT VFR BLD CALC: 37.2 % — SIGNIFICANT CHANGE UP (ref 34.5–45)
HGB BLD-MCNC: 12.1 G/DL — SIGNIFICANT CHANGE UP (ref 11.5–15.5)
IMM GRANULOCYTES # BLD AUTO: 0.06 # — SIGNIFICANT CHANGE UP
IMM GRANULOCYTES NFR BLD AUTO: 0.7 % — SIGNIFICANT CHANGE UP (ref 0–1.5)
LYMPHOCYTES # BLD AUTO: 1.47 K/UL — SIGNIFICANT CHANGE UP (ref 1–3.3)
LYMPHOCYTES # BLD AUTO: 17.9 % — SIGNIFICANT CHANGE UP (ref 13–44)
MCHC RBC-ENTMCNC: 28.7 PG — SIGNIFICANT CHANGE UP (ref 27–34)
MCHC RBC-ENTMCNC: 32.5 % — SIGNIFICANT CHANGE UP (ref 32–36)
MCV RBC AUTO: 88.4 FL — SIGNIFICANT CHANGE UP (ref 80–100)
MONOCYTES # BLD AUTO: 0.76 K/UL — SIGNIFICANT CHANGE UP (ref 0–0.9)
MONOCYTES NFR BLD AUTO: 9.2 % — SIGNIFICANT CHANGE UP (ref 2–14)
NEUTROPHILS # BLD AUTO: 5.3 K/UL — SIGNIFICANT CHANGE UP (ref 1.8–7.4)
NEUTROPHILS NFR BLD AUTO: 64.5 % — SIGNIFICANT CHANGE UP (ref 43–77)
NRBC # FLD: 0.56 — SIGNIFICANT CHANGE UP
NRBC FLD-RTO: 6.8 — SIGNIFICANT CHANGE UP
PLATELET # BLD AUTO: 451 K/UL — HIGH (ref 150–400)
PMV BLD: 10.1 FL — SIGNIFICANT CHANGE UP (ref 7–13)
RBC # BLD: 4.21 M/UL — SIGNIFICANT CHANGE UP (ref 3.8–5.2)
RBC # FLD: 16.2 % — HIGH (ref 10.3–14.5)
RH IG SCN BLD-IMP: NEGATIVE — SIGNIFICANT CHANGE UP
WBC # BLD: 8.22 K/UL — SIGNIFICANT CHANGE UP (ref 3.8–10.5)
WBC # FLD AUTO: 8.22 K/UL — SIGNIFICANT CHANGE UP (ref 3.8–10.5)

## 2018-05-15 PROCEDURE — 93010 ELECTROCARDIOGRAM REPORT: CPT

## 2018-05-15 RX ORDER — GENTAMICIN SULFATE 0.1 %
1 OINTMENT (GRAM) TOPICAL
Qty: 0 | Refills: 0 | COMMUNITY

## 2018-05-15 RX ORDER — ACETAMINOPHEN 500 MG
975 TABLET ORAL ONCE
Qty: 0 | Refills: 0 | Status: COMPLETED | OUTPATIENT
Start: 2018-06-04 | End: 2018-06-04

## 2018-05-15 RX ORDER — CALCIUM ACETATE 667 MG
2 TABLET ORAL
Qty: 0 | Refills: 0 | COMMUNITY

## 2018-05-15 RX ORDER — PANTOPRAZOLE SODIUM 20 MG/1
40 TABLET, DELAYED RELEASE ORAL ONCE
Qty: 0 | Refills: 0 | Status: DISCONTINUED | OUTPATIENT
Start: 2018-06-04 | End: 2018-06-06

## 2018-05-15 RX ORDER — ERYTHROPOIETIN 10000 [IU]/ML
0 INJECTION, SOLUTION INTRAVENOUS; SUBCUTANEOUS
Qty: 0 | Refills: 0 | COMMUNITY

## 2018-05-15 RX ORDER — SODIUM CHLORIDE 9 MG/ML
3 INJECTION INTRAMUSCULAR; INTRAVENOUS; SUBCUTANEOUS EVERY 8 HOURS
Qty: 0 | Refills: 0 | Status: DISCONTINUED | OUTPATIENT
Start: 2018-06-04 | End: 2018-06-06

## 2018-05-15 RX ORDER — FUROSEMIDE 40 MG
100 TABLET ORAL
Qty: 0 | Refills: 0 | COMMUNITY

## 2018-05-15 NOTE — H&P PST ADULT - PMH
Anemia    Cholelithiasis    Chronic kidney disease  started dialysis March 2017 with etiology from Parvo Virus 2016  CRF (chronic renal failure)  2-2017, insertion of peritoneal dialysis  HPV (Human Papillomavirus)    Kidney dysfunction  "have minimal function in one kidney" -- patient not sure which one -- started dialysis in 2017 with insertion of peritoneal dialysis  Nephrotic syndrome    Parvovirus B19 infection  2016 resulting in chronic renal disease  Peritoneal dialysis catheter in place    Sickle cell disease    Umbilical hernia  November 2017 Anemia    Cholelithiasis    Chronic kidney disease  started dialysis March 2017 with etiology from Parvo Virus 2016  HPV (Human Papillomavirus)    Nephrotic syndrome    Parvovirus B19 infection  2016 resulting in chronic renal disease  Peritoneal dialysis catheter in place    Sickle cell disease    Umbilical hernia  November 2017

## 2018-05-15 NOTE — H&P PST ADULT - HISTORY OF PRESENT ILLNESS
37 year old female with hx of sickle cell disease and ESRD on peritoneal dialysis, with avascular necrosis of hips. pt presents today for presurgical evaluation for ... 37 year old female with hx of sickle cell disease and ESRD on peritoneal dialysis, states she has avascular necrosis of hips, left worse than right. Pt presents today for presurgical evaluation for Left Total Hip Replacement, Direct Anterior Approach scheduled on 6/4/18.

## 2018-05-15 NOTE — H&P PST ADULT - PROBLEM SELECTOR PLAN 1
Left Total Hip Replacement, Direct Anterior Approach scheduled on 6/4/18.  Pre-op instructions provided. Pt verbalized understanding.   Chlorhexidine wash provided and instructions given.

## 2018-05-15 NOTE — H&P PST ADULT - NEGATIVE ENMT SYMPTOMS
no throat pain/no dysphagia/no hearing difficulty/no sinus symptoms/no nasal congestion/no tinnitus/no vertigo/no ear pain

## 2018-05-15 NOTE — H&P PST ADULT - PSH
H/O tubal ligation  in , along with   H/O umbilical hernia repair    H/O:   014  History of hip surgery  R hip due to necrosis in --bone graft from right tibia  History of peritoneal dialysis    S/P Laparoscopic Cholecystectomy H/O tubal ligation  in , along with   H/O umbilical hernia repair    H/O:   014  History of hip surgery  R hip due to necrosis in --bone graft from right tibia  History of peritoneal dialysis  3/2017  S/P Laparoscopic Cholecystectomy

## 2018-05-16 LAB
GRAM STN SPT: SIGNIFICANT CHANGE UP
SPECIMEN SOURCE: SIGNIFICANT CHANGE UP

## 2018-05-17 LAB — BACTERIA SPT RESP CULT: SIGNIFICANT CHANGE UP

## 2018-05-18 ENCOUNTER — LABORATORY RESULT (OUTPATIENT)
Age: 38
End: 2018-05-18

## 2018-05-18 ENCOUNTER — APPOINTMENT (OUTPATIENT)
Dept: INTERNAL MEDICINE | Facility: HOSPITAL | Age: 38
End: 2018-05-18
Payer: COMMERCIAL

## 2018-05-18 ENCOUNTER — APPOINTMENT (OUTPATIENT)
Dept: RADIOLOGY | Facility: HOSPITAL | Age: 38
End: 2018-05-18

## 2018-05-18 ENCOUNTER — OUTPATIENT (OUTPATIENT)
Dept: OUTPATIENT SERVICES | Facility: HOSPITAL | Age: 38
LOS: 1 days | End: 2018-05-18
Payer: COMMERCIAL

## 2018-05-18 VITALS — DIASTOLIC BLOOD PRESSURE: 60 MMHG | HEART RATE: 99 BPM | SYSTOLIC BLOOD PRESSURE: 101 MMHG | OXYGEN SATURATION: 100 %

## 2018-05-18 DIAGNOSIS — Z98.890 OTHER SPECIFIED POSTPROCEDURAL STATES: Chronic | ICD-10-CM

## 2018-05-18 DIAGNOSIS — Z98.51 TUBAL LIGATION STATUS: Chronic | ICD-10-CM

## 2018-05-18 DIAGNOSIS — R07.9 CHEST PAIN, UNSPECIFIED: ICD-10-CM

## 2018-05-18 DIAGNOSIS — N18.5 CHRONIC KIDNEY DISEASE, STAGE 5: ICD-10-CM

## 2018-05-18 DIAGNOSIS — Z98.89 OTHER SPECIFIED POSTPROCEDURAL STATES: Chronic | ICD-10-CM

## 2018-05-18 LAB
ANISOCYTOSIS BLD QL: SLIGHT — SIGNIFICANT CHANGE UP
BASOPHILS # BLD AUTO: 0.04 K/UL — SIGNIFICANT CHANGE UP (ref 0–0.2)
BASOPHILS NFR BLD AUTO: 0.4 % — SIGNIFICANT CHANGE UP (ref 0–2)
BASOPHILS NFR SPEC: 1.8 % — SIGNIFICANT CHANGE UP (ref 0–2)
BLD GP AB SCN SERPL QL: NEGATIVE — SIGNIFICANT CHANGE UP
ELLIPTOCYTES BLD QL SMEAR: SLIGHT — SIGNIFICANT CHANGE UP
EOSINOPHIL # BLD AUTO: 0.51 K/UL — HIGH (ref 0–0.5)
EOSINOPHIL NFR BLD AUTO: 4.9 % — SIGNIFICANT CHANGE UP (ref 0–6)
EOSINOPHIL NFR FLD: 8.8 % — HIGH (ref 0–6)
GIANT PLATELETS BLD QL SMEAR: PRESENT — SIGNIFICANT CHANGE UP
HCT VFR BLD CALC: 36.6 % — SIGNIFICANT CHANGE UP (ref 34.5–45)
HGB BLD-MCNC: 12 G/DL — SIGNIFICANT CHANGE UP (ref 11.5–15.5)
HYPOCHROMIA BLD QL: SLIGHT — SIGNIFICANT CHANGE UP
IMM GRANULOCYTES # BLD AUTO: 0.05 # — SIGNIFICANT CHANGE UP
IMM GRANULOCYTES NFR BLD AUTO: 0.5 % — SIGNIFICANT CHANGE UP (ref 0–1.5)
LYMPHOCYTES # BLD AUTO: 1.56 K/UL — SIGNIFICANT CHANGE UP (ref 1–3.3)
LYMPHOCYTES # BLD AUTO: 14.9 % — SIGNIFICANT CHANGE UP (ref 13–44)
LYMPHOCYTES NFR SPEC AUTO: 9.6 % — LOW (ref 13–44)
MACROCYTES BLD QL: SLIGHT — SIGNIFICANT CHANGE UP
MANUAL SMEAR VERIFICATION: SIGNIFICANT CHANGE UP
MCHC RBC-ENTMCNC: 28.2 PG — SIGNIFICANT CHANGE UP (ref 27–34)
MCHC RBC-ENTMCNC: 32.8 % — SIGNIFICANT CHANGE UP (ref 32–36)
MCV RBC AUTO: 86.1 FL — SIGNIFICANT CHANGE UP (ref 80–100)
MONOCYTES # BLD AUTO: 1.26 K/UL — HIGH (ref 0–0.9)
MONOCYTES NFR BLD AUTO: 12 % — SIGNIFICANT CHANGE UP (ref 2–14)
MONOCYTES NFR BLD: 9.6 % — HIGH (ref 2–9)
NEUTROPHIL AB SER-ACNC: 70.2 % — SIGNIFICANT CHANGE UP (ref 43–77)
NEUTROPHILS # BLD AUTO: 7.04 K/UL — SIGNIFICANT CHANGE UP (ref 1.8–7.4)
NEUTROPHILS NFR BLD AUTO: 67.3 % — SIGNIFICANT CHANGE UP (ref 43–77)
NRBC # FLD: 0.2 — SIGNIFICANT CHANGE UP
NRBC FLD-RTO: 1.9 — SIGNIFICANT CHANGE UP
PLATELET # BLD AUTO: 423 K/UL — HIGH (ref 150–400)
PLATELET COUNT - ESTIMATE: NORMAL — SIGNIFICANT CHANGE UP
PMV BLD: 10.4 FL — SIGNIFICANT CHANGE UP (ref 7–13)
POIKILOCYTOSIS BLD QL AUTO: SLIGHT — SIGNIFICANT CHANGE UP
POLYCHROMASIA BLD QL SMEAR: SLIGHT — SIGNIFICANT CHANGE UP
RBC # BLD: 4.25 M/UL — SIGNIFICANT CHANGE UP (ref 3.8–5.2)
RBC # FLD: 16.2 % — HIGH (ref 10.3–14.5)
RETICS #: 277 K/UL — HIGH (ref 25–125)
RETICS/RBC NFR: 6.5 % — HIGH (ref 0.5–2.5)
RH IG SCN BLD-IMP: NEGATIVE — SIGNIFICANT CHANGE UP
SICKLE CELLS BLD QL SMEAR: SLIGHT — SIGNIFICANT CHANGE UP
TARGETS BLD QL SMEAR: SLIGHT — SIGNIFICANT CHANGE UP
WBC # BLD: 10.46 K/UL — SIGNIFICANT CHANGE UP (ref 3.8–10.5)
WBC # FLD AUTO: 10.46 K/UL — SIGNIFICANT CHANGE UP (ref 3.8–10.5)

## 2018-05-18 PROCEDURE — 99213 OFFICE O/P EST LOW 20 MIN: CPT

## 2018-05-18 PROCEDURE — 71046 X-RAY EXAM CHEST 2 VIEWS: CPT | Mod: 26

## 2018-05-21 ENCOUNTER — INPATIENT (INPATIENT)
Facility: HOSPITAL | Age: 38
LOS: 1 days | Discharge: HOME HEALTH | End: 2018-05-23
Attending: INTERNAL MEDICINE | Admitting: INTERNAL MEDICINE
Payer: COMMERCIAL

## 2018-05-21 VITALS
TEMPERATURE: 98 F | HEART RATE: 78 BPM | DIASTOLIC BLOOD PRESSURE: 74 MMHG | OXYGEN SATURATION: 100 % | SYSTOLIC BLOOD PRESSURE: 102 MMHG | RESPIRATION RATE: 16 BRPM

## 2018-05-21 DIAGNOSIS — Z98.89 OTHER SPECIFIED POSTPROCEDURAL STATES: Chronic | ICD-10-CM

## 2018-05-21 DIAGNOSIS — D57.1 SICKLE-CELL DISEASE WITHOUT CRISIS: ICD-10-CM

## 2018-05-21 DIAGNOSIS — N18.5 CHRONIC KIDNEY DISEASE, STAGE 5: ICD-10-CM

## 2018-05-21 DIAGNOSIS — Z98.890 OTHER SPECIFIED POSTPROCEDURAL STATES: Chronic | ICD-10-CM

## 2018-05-21 DIAGNOSIS — Z98.51 TUBAL LIGATION STATUS: Chronic | ICD-10-CM

## 2018-05-21 DIAGNOSIS — R41.82 ALTERED MENTAL STATUS, UNSPECIFIED: ICD-10-CM

## 2018-05-21 DIAGNOSIS — N18.6 END STAGE RENAL DISEASE: ICD-10-CM

## 2018-05-21 DIAGNOSIS — R07.9 CHEST PAIN, UNSPECIFIED: ICD-10-CM

## 2018-05-21 DIAGNOSIS — N25.0 RENAL OSTEODYSTROPHY: ICD-10-CM

## 2018-05-21 DIAGNOSIS — Z29.9 ENCOUNTER FOR PROPHYLACTIC MEASURES, UNSPECIFIED: ICD-10-CM

## 2018-05-21 DIAGNOSIS — D57.00 HB-SS DISEASE WITH CRISIS, UNSPECIFIED: ICD-10-CM

## 2018-05-21 LAB
ALBUMIN SERPL ELPH-MCNC: 2.8 G/DL — LOW (ref 3.3–5)
ALP SERPL-CCNC: 179 U/L — HIGH (ref 40–120)
ALT FLD-CCNC: 20 U/L — SIGNIFICANT CHANGE UP (ref 4–33)
AMMONIA BLD-MCNC: 16 UMOL/L — SIGNIFICANT CHANGE UP (ref 11–55)
APAP SERPL-MCNC: < 15 UG/ML — LOW (ref 15–25)
AST SERPL-CCNC: 94 U/L — HIGH (ref 4–32)
BASE EXCESS BLDV CALC-SCNC: 2.9 MMOL/L — SIGNIFICANT CHANGE UP
BASOPHILS # BLD AUTO: 0.06 K/UL — SIGNIFICANT CHANGE UP (ref 0–0.2)
BASOPHILS NFR BLD AUTO: 0.6 % — SIGNIFICANT CHANGE UP (ref 0–2)
BILIRUB SERPL-MCNC: 0.4 MG/DL — SIGNIFICANT CHANGE UP (ref 0.2–1.2)
BLOOD GAS VENOUS - CREATININE: 12.8 MG/DL — HIGH (ref 0.5–1.3)
BUN SERPL-MCNC: 61 MG/DL — HIGH (ref 7–23)
BUN SERPL-MCNC: 61 MG/DL — HIGH (ref 7–23)
CALCIUM SERPL-MCNC: 7.7 MG/DL — LOW (ref 8.4–10.5)
CALCIUM SERPL-MCNC: 8.1 MG/DL — LOW (ref 8.4–10.5)
CHLORIDE BLDV-SCNC: 102 MMOL/L — SIGNIFICANT CHANGE UP (ref 96–108)
CHLORIDE SERPL-SCNC: 92 MMOL/L — LOW (ref 98–107)
CHLORIDE SERPL-SCNC: 93 MMOL/L — LOW (ref 98–107)
CO2 SERPL-SCNC: 23 MMOL/L — SIGNIFICANT CHANGE UP (ref 22–31)
CO2 SERPL-SCNC: 25 MMOL/L — SIGNIFICANT CHANGE UP (ref 22–31)
CREAT SERPL-MCNC: 11.72 MG/DL — HIGH (ref 0.5–1.3)
CREAT SERPL-MCNC: 11.79 MG/DL — HIGH (ref 0.5–1.3)
EOSINOPHIL # BLD AUTO: 0.17 K/UL — SIGNIFICANT CHANGE UP (ref 0–0.5)
EOSINOPHIL NFR BLD AUTO: 1.7 % — SIGNIFICANT CHANGE UP (ref 0–6)
ETHANOL BLD-MCNC: < 10 MG/DL — SIGNIFICANT CHANGE UP
GAS PNL BLDV: 136 MMOL/L — SIGNIFICANT CHANGE UP (ref 136–146)
GLUCOSE BLDV-MCNC: 79 — SIGNIFICANT CHANGE UP (ref 70–99)
GLUCOSE SERPL-MCNC: 74 MG/DL — SIGNIFICANT CHANGE UP (ref 70–99)
GLUCOSE SERPL-MCNC: 78 MG/DL — SIGNIFICANT CHANGE UP (ref 70–99)
HCG SERPL-ACNC: < 5 MIU/ML — SIGNIFICANT CHANGE UP
HCO3 BLDV-SCNC: 25 MMOL/L — SIGNIFICANT CHANGE UP (ref 20–27)
HCT VFR BLD CALC: 35.6 % — SIGNIFICANT CHANGE UP (ref 34.5–45)
HCT VFR BLDV CALC: 39.7 % — SIGNIFICANT CHANGE UP (ref 34.5–45)
HGB BLD-MCNC: 12 G/DL — SIGNIFICANT CHANGE UP (ref 11.5–15.5)
HGB BLDV-MCNC: 12.9 G/DL — SIGNIFICANT CHANGE UP (ref 11.5–15.5)
IMM GRANULOCYTES # BLD AUTO: 0.04 # — SIGNIFICANT CHANGE UP
IMM GRANULOCYTES NFR BLD AUTO: 0.4 % — SIGNIFICANT CHANGE UP (ref 0–1.5)
LACTATE BLDV-MCNC: 1.2 MMOL/L — SIGNIFICANT CHANGE UP (ref 0.5–2)
LYMPHOCYTES # BLD AUTO: 1.51 K/UL — SIGNIFICANT CHANGE UP (ref 1–3.3)
LYMPHOCYTES # BLD AUTO: 15 % — SIGNIFICANT CHANGE UP (ref 13–44)
MCHC RBC-ENTMCNC: 28.2 PG — SIGNIFICANT CHANGE UP (ref 27–34)
MCHC RBC-ENTMCNC: 33.7 % — SIGNIFICANT CHANGE UP (ref 32–36)
MCV RBC AUTO: 83.6 FL — SIGNIFICANT CHANGE UP (ref 80–100)
MONOCYTES # BLD AUTO: 1.48 K/UL — HIGH (ref 0–0.9)
MONOCYTES NFR BLD AUTO: 14.7 % — HIGH (ref 2–14)
NEUTROPHILS # BLD AUTO: 6.79 K/UL — SIGNIFICANT CHANGE UP (ref 1.8–7.4)
NEUTROPHILS NFR BLD AUTO: 67.6 % — SIGNIFICANT CHANGE UP (ref 43–77)
NRBC # FLD: 0.1 — SIGNIFICANT CHANGE UP
NRBC FLD-RTO: 1 — SIGNIFICANT CHANGE UP
PCO2 BLDV: 51 MMHG — SIGNIFICANT CHANGE UP (ref 41–51)
PH BLDV: 7.36 PH — SIGNIFICANT CHANGE UP (ref 7.32–7.43)
PLATELET # BLD AUTO: 431 K/UL — HIGH (ref 150–400)
PMV BLD: 10.4 FL — SIGNIFICANT CHANGE UP (ref 7–13)
PO2 BLDV: 36 MMHG — SIGNIFICANT CHANGE UP (ref 35–40)
POTASSIUM BLDV-SCNC: 3.9 MMOL/L — SIGNIFICANT CHANGE UP (ref 3.4–4.5)
POTASSIUM SERPL-MCNC: 5.1 MMOL/L — SIGNIFICANT CHANGE UP (ref 3.5–5.3)
POTASSIUM SERPL-MCNC: SIGNIFICANT CHANGE UP MMOL/L (ref 3.5–5.3)
POTASSIUM SERPL-SCNC: 5.1 MMOL/L — SIGNIFICANT CHANGE UP (ref 3.5–5.3)
POTASSIUM SERPL-SCNC: SIGNIFICANT CHANGE UP MMOL/L (ref 3.5–5.3)
PROT SERPL-MCNC: 6.9 G/DL — SIGNIFICANT CHANGE UP (ref 6–8.3)
RBC # BLD: 4.26 M/UL — SIGNIFICANT CHANGE UP (ref 3.8–5.2)
RBC # FLD: 17.2 % — HIGH (ref 10.3–14.5)
SALICYLATES SERPL-MCNC: < 5 MG/DL — LOW (ref 15–30)
SAO2 % BLDV: 57 % — LOW (ref 60–85)
SODIUM SERPL-SCNC: 142 MMOL/L — SIGNIFICANT CHANGE UP (ref 135–145)
SODIUM SERPL-SCNC: 144 MMOL/L — SIGNIFICANT CHANGE UP (ref 135–145)
TSH SERPL-MCNC: 1.62 UIU/ML — SIGNIFICANT CHANGE UP (ref 0.27–4.2)
WBC # BLD: 10.05 K/UL — SIGNIFICANT CHANGE UP (ref 3.8–10.5)
WBC # FLD AUTO: 10.05 K/UL — SIGNIFICANT CHANGE UP (ref 3.8–10.5)

## 2018-05-21 PROCEDURE — 99255 IP/OBS CONSLTJ NEW/EST HI 80: CPT | Mod: GC

## 2018-05-21 PROCEDURE — 71045 X-RAY EXAM CHEST 1 VIEW: CPT | Mod: 26

## 2018-05-21 PROCEDURE — 99223 1ST HOSP IP/OBS HIGH 75: CPT

## 2018-05-21 PROCEDURE — 70450 CT HEAD/BRAIN W/O DYE: CPT | Mod: 26

## 2018-05-21 RX ORDER — OXYCODONE HYDROCHLORIDE 5 MG/1
10 TABLET ORAL EVERY 12 HOURS
Qty: 0 | Refills: 0 | Status: DISCONTINUED | OUTPATIENT
Start: 2018-05-21 | End: 2018-05-23

## 2018-05-21 RX ORDER — HEPARIN SODIUM 5000 [USP'U]/ML
5000 INJECTION INTRAVENOUS; SUBCUTANEOUS EVERY 8 HOURS
Qty: 0 | Refills: 0 | Status: DISCONTINUED | OUTPATIENT
Start: 2018-05-21 | End: 2018-05-23

## 2018-05-21 RX ORDER — HYDROMORPHONE HYDROCHLORIDE 2 MG/ML
1 INJECTION INTRAMUSCULAR; INTRAVENOUS; SUBCUTANEOUS
Qty: 0 | Refills: 0 | COMMUNITY

## 2018-05-21 RX ORDER — GENTAMICIN SULFATE 0.1 %
1 OINTMENT (GRAM) TOPICAL ONCE
Qty: 0 | Refills: 0 | Status: COMPLETED | OUTPATIENT
Start: 2018-05-21 | End: 2018-05-22

## 2018-05-21 RX ORDER — CALCITRIOL 0.5 UG/1
1 CAPSULE ORAL DAILY
Qty: 0 | Refills: 0 | Status: DISCONTINUED | OUTPATIENT
Start: 2018-05-21 | End: 2018-05-23

## 2018-05-21 RX ORDER — FOLIC ACID 0.8 MG
1 TABLET ORAL DAILY
Qty: 0 | Refills: 0 | Status: DISCONTINUED | OUTPATIENT
Start: 2018-05-21 | End: 2018-05-23

## 2018-05-21 RX ADMIN — Medication 1 MILLIGRAM(S): at 18:59

## 2018-05-21 RX ADMIN — OXYCODONE HYDROCHLORIDE 10 MILLIGRAM(S): 5 TABLET ORAL at 18:59

## 2018-05-21 NOTE — ED ADULT NURSE REASSESSMENT NOTE - NS ED NURSE REASSESS COMMENT FT1
Report received.  Pt received to rm #19.  Awake, alert and oriented.  Affect somewhat euphoric/loopy.  Skin warm and dry.  Resp even,non-labored.  Pt family reports receiving a phone call at 4:30am this morning "very confused."  Family was worried and brought pt here.  "she may have taken too much dilaudid."  Pt denies suicidal/homicidal ideation.  "I have babies" is her response to most things.  seen and eval'd by MD.  SL noted to rt hand.  MD placed #20g angio via sono guidance to left AC w good blood return noted.  Add'l labs sent off as ordered.  CAPD cath (gallego system) intact to pts abd.  Pt states she hasn't done any exchanges x 2 days.  does not void at all anymore.  VSS. c/o 8/10 LLE pain that pt describes as sickle cell pain.  pt positioning self for comfort at this time. To ct head in nad.  will cont to monitor.

## 2018-05-21 NOTE — H&P ADULT - NSHPPHYSICALEXAM_GEN_ALL_CORE
Vital Signs Last 24 Hrs  T(C): 36.7 (21 May 2018 16:12), Max: 36.9 (21 May 2018 05:32)  T(F): 98 (21 May 2018 16:12), Max: 98.5 (21 May 2018 05:32)  HR: 67 (21 May 2018 16:12) (67 - 87)  BP: 102/64 (21 May 2018 16:12) (97/62 - 120/62)  BP(mean): --  RR: 18 (21 May 2018 16:12) (16 - 18)  SpO2: 99% (21 May 2018 16:12) (99% - 100%)    PHYSICAL EXAM:  GENERAL: No Acute Distress  EYES: AL, conjunctiva and sclera clear  ENMT: Moist mucous membranes   NECK: Supple  CHEST/LUNG: Clear to auscultation bilaterally  HEART: Regular rate and rhythm; No murmurs, rubs, or gallops  ABDOMEN: Soft, Nontender, Nondistended; Bowel sounds normal  EXTREMITIES:   No clubbing, cyanosis, or pedal edema  PSYCH: mildly slowed speech  NEUROLOGY:   - Mental status: oriented x 3, but somnolent   SKIN: No rashes or lesions  MUSCULOSKELETAL: No joint swelling

## 2018-05-21 NOTE — ED ADULT NURSE REASSESSMENT NOTE - NS ED NURSE REASSESS COMMENT FT1
Pt sleeping.  easily arousable to name call.  Sedated affect remains.  VS as charted.  MAR at bedside.  pt to be getting a room in [facility] as per throughput RN.

## 2018-05-21 NOTE — ED PROVIDER NOTE - PROGRESS NOTE DETAILS
Wil Ayala, PGY2: Pt received at sign out. EKG with no ischemic changes, no peaked T waves, normal QRS. CBC with no leukocytosis or significant decrease in hemoglobin Discussed Dr. Saniya Velásquez, pt was prescribed 120 pills of dialudid 2mg tablets 2-3 weeks ago by another provider and Ms. Lucero finished them by earlier this week. She was then prescribed 30 percocet and 60 tylenol #3. Pending [facility], will admit for altered mental status and unsafe discharge as she has not performed her PD x 2 days.

## 2018-05-21 NOTE — H&P ADULT - PROBLEM SELECTOR PLAN 2
outpatient heme notes reviewed  - hold off on short acting opiates as above  - continue Oxycontin as above  - once MS improves to baseline, will restart 2mg PO Dilaudid.  If needed, can start PCA

## 2018-05-21 NOTE — H&P ADULT - PROBLEM SELECTOR PLAN 1
Likely overmedication with narcotics and muscle relaxant.  Lower suspicion for uremia  - will hold home Dilaudid, and tylenol codeine   - continue oxycodone ER for baseline analgesia to prevent withdrawal

## 2018-05-21 NOTE — CONSULT NOTE ADULT - PROBLEM SELECTOR RECOMMENDATION 9
Will continue PD at this time.  Four cycles of 2 L peritoneal dialysate 1.5% dianeal per day.  Monitor BMP.  Patient reports missing 2-3 days of PD, but not clinically overloaded.  Will increase volume removal if signs of overload become evident. Will continue PD at this time.  Four cycles of 2 L peritoneal dialysate 1.5% dianeal per day.  Monitor BMP.  Patient reports missing 2 days of PD, but not clinically overloaded, and with stable electrolyte levels.  Will increase volume removal if signs of overload become evident.

## 2018-05-21 NOTE — ED ADULT NURSE NOTE - OBJECTIVE STATEMENT
pt received spot 19, a&ox3. c.o AMS. brought in by family at bedside. pt tearful. c.o sickle cell crisis this week, took home meds tonight but unsure how much she took. denies SI/HI. pt also missed her past 2 dialysis treatments. denies cp sob dizziness nausea. respirations even unlabored. 20G IV placed in right forearm. labs sent. will monitor

## 2018-05-21 NOTE — ED PROVIDER NOTE - MEDICAL DECISION MAKING DETAILS
AMS, likely etiology is tox vs. metabolic vs. infectious vs. psychiatric; Plan: FS glucose, EKG, CXR, labs including CBC, CMP, lactate, ammonia, HCG, UA, urine culture, psych consult if not medical explanation for AMS elucidated in workup. Pt denies SI and HI.

## 2018-05-21 NOTE — H&P ADULT - PSH
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

## 2018-05-21 NOTE — CONSULT NOTE ADULT - ASSESSMENT
Patient is a 38 y/o woman with ESRD on PD, sickle cell disease w/ avascular necrosis of hips who presents with altered mental status after taking increased doses of opiates for sickle cell pain crisis.

## 2018-05-21 NOTE — CONSULT NOTE ADULT - SUBJECTIVE AND OBJECTIVE BOX
[facility] DIVISION OF KIDNEY DISEASES AND HYPERTENSION -- INITIAL CONSULT NOTE  --------------------------------------------------------------------------------  HPI:  Patient is a 36 y/o woman with ESRD on PD who presents with altered mental status due to increased opiate intake.  Patient has CKD from sickle cell disease, started on PD in 2017.  Patient reports that for the past 3-4 days she has been experiencing severe left sided pain, on her left lower extremity and in her left chest.  Her chest pain makes it difficult to take deep breaths.  She reports that in attempts to control her pain, she took an increased amount of dilaudid, muscle relaxer and possibly morphine although her mother says that she has no prescription for morphine.  Patient became altered after her increased dose of dilaudid and her mother called EMS who brought the patient to [facility].  Patient is much more alert at this time, tearful and stating that the only reason she lives is for her children.      PAST HISTORY  --------------------------------------------------------------------------------  PAST MEDICAL & SURGICAL HISTORY:  Peritoneal dialysis catheter in place  Umbilical hernia: 2017  Cholelithiasis  Anemia  Sickle cell disease  Chronic kidney disease: started dialysis 2017 with etiology from Parvo Virus 2016  Parvovirus B19 infection: 2016 resulting in chronic renal disease  Nephrotic syndrome  HPV (Human Papillomavirus)  History of peritoneal dialysis: 3/2017  H/O umbilical hernia repair:   History of hip surgery: R hip due to necrosis in --bone graft from right tibia  H/O tubal ligation: in , along with   H/O: : 014  S/P Laparoscopic Cholecystectomy    FAMILY HISTORY:  Family history of sarcoidosis (Sibling): Sister with HgbSS and sarcoidosis  Family history of sickle cell disease (Sibling): Sister with HgbSS and sarcoidosis  Family history of sickle cell trait in mother  Family history of sickle cell trait in father    PAST SOCIAL HISTORY:  denies any illicit substance abuse    ALLERGIES & MEDICATIONS  --------------------------------------------------------------------------------  Allergies    morphine (Other)    Intolerances      Standing Inpatient Medications    PRN Inpatient Medications      REVIEW OF SYSTEMS  --------------------------------------------------------------------------------  Gen: No fevers/chills, weakness, fatigue  Skin: No rashes  Head/Eyes/Ears/Mouth: No headache, no sore throat  Respiratory: No dyspnea, cough  CV: +left chest pain worse with breathing, no orthopnea  GI: No abdominal pain, diarrhea  : No increased frequency, dysuria  MSK: No joint pain/swelling; no edema  Neuro: +confusion, +altered mental status  Heme: No easy bruising or bleeding  Endo: No heat/cold intolerance  Psych: No significant nervousness, +depression    All other systems were reviewed and are negative, except as noted.    VITALS/PHYSICAL EXAM  --------------------------------------------------------------------------------  T(C): 36.7 (18 @ 16:12), Max: 36.9 (18 @ 05:32)  HR: 67 (18 @ 16:12) (67 - 87)  BP: 102/64 (18 @ 16:12) (97/62 - 120/62)  RR: 18 (18 @ 16:12) (16 - 18)  SpO2: 99% (18 @ 16:12) (99% - 100%)  Wt(kg): --        Physical Exam:  	Gen: NAD  	HEENT: MMM  	Pulm: CTA B/L  	CV: RRR, S1S2; no rub  	Abd: +BS, soft, nontender/nondistended  	: No suprapubic tenderness  	UE: Warm, no edema  	LE: Warm, no pitting edema  	Neuro: No focal deficits  	Psych: Normal affect and mood  	Skin: Warm, without rashes    LABS/STUDIES  --------------------------------------------------------------------------------              12.0   10.05 >-----------<  431      [18 @ 05:51]              35.6     144  |  93  |  61  ----------------------------<  74      [18 @ 08:20]  5.1   |  25  |  11.79        Ca     8.1     [18 @ 08:20] [facility] DIVISION OF KIDNEY DISEASES AND HYPERTENSION -- INITIAL CONSULT NOTE  --------------------------------------------------------------------------------  HPI:  Patient is a 36 y/o woman with 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  Patient started on PD in 2017.  Patient reports that for the past 3-4 days she has been experiencing severe left sided pain, on her left lower extremity and in her left chest.  Her chest pain makes it difficult to take deep breaths.  She reports that in attempts to control her pain, she took an increased amount of dilaudid, muscle relaxer and possibly morphine although her mother says that she has no prescription for morphine.  Patient became altered after her increased dose of dilaudid and her mother called EMS who brought the patient to [facility]  Patient is much more alert at this time, tearful and stating that the only reason she lives is for her children.      PAST HISTORY  --------------------------------------------------------------------------------  PAST MEDICAL & SURGICAL HISTORY:  Peritoneal dialysis catheter in place  Umbilical hernia: 2017  Cholelithiasis  Anemia  Sickle cell disease  Chronic kidney disease: started dialysis 2017 with etiology from Parvo Virus 2016  Parvovirus B19 infection: 2016 resulting in chronic renal disease  Nephrotic syndrome  HPV (Human Papillomavirus)  History of peritoneal dialysis: 3/2017  H/O umbilical hernia repair:   History of hip surgery: R hip due to necrosis in --bone graft from right tibia  H/O tubal ligation: in , along with   H/O: : 014  S/P Laparoscopic Cholecystectomy    FAMILY HISTORY:  Family history of sarcoidosis (Sibling): Sister with HgbSS and sarcoidosis  Family history of sickle cell disease (Sibling): Sister with HgbSS and sarcoidosis  Family history of sickle cell trait in mother  Family history of sickle cell trait in father    PAST SOCIAL HISTORY:  denies any illicit substance abuse    ALLERGIES & MEDICATIONS  --------------------------------------------------------------------------------  Allergies    morphine (Other)    Intolerances      Standing Inpatient Medications    PRN Inpatient Medications      REVIEW OF SYSTEMS  --------------------------------------------------------------------------------  Gen: No fevers/chills, weakness, fatigue  Skin: No rashes  Head/Eyes/Ears/Mouth: No headache, no sore throat  Respiratory: No dyspnea, cough  CV: +left chest pain worse with breathing, no orthopnea  GI: No abdominal pain, diarrhea  : No increased frequency, dysuria  MSK: No joint pain/swelling; no edema  Neuro: +confusion, +altered mental status  Heme: No easy bruising or bleeding  Endo: No heat/cold intolerance  Psych: No significant nervousness, +depression    All other systems were reviewed and are negative, except as noted.    VITALS/PHYSICAL EXAM  --------------------------------------------------------------------------------  T(C): 36.7 (18 @ 16:12), Max: 36.9 (18 @ 05:32)  HR: 67 (18 @ 16:12) (67 - 87)  BP: 102/64 (18 @ 16:12) (97/62 - 120/62)  RR: 18 (18 @ 16:12) (16 - 18)  SpO2: 99% (18 @ 16:12) (99% - 100%)  Wt(kg): --        Physical Exam:  	Gen: NAD  	HEENT: MMM  	Pulm: CTA B/L  	CV: RRR, S1S2; no rub  	Abd: +BS, soft, nontender/nondistended  	: No suprapubic tenderness  	UE: Warm, no edema  	LE: Warm, no pitting edema  	Neuro: No focal deficits  	Psych: Normal affect and mood  	Skin: Warm, without rashes    LABS/STUDIES  --------------------------------------------------------------------------------              12.0   10.05 >-----------<  431      [18 @ 05:51]              35.6     144  |  93  |  61  ----------------------------<  74      [18 @ 08:20]  5.1   |  25  |  11.79        Ca     8.1     [18 @ 08:20]

## 2018-05-21 NOTE — ED PROVIDER NOTE - ATTENDING CONTRIBUTION TO CARE
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

## 2018-05-21 NOTE — H&P ADULT - NSHPLABSRESULTS_GEN_ALL_CORE
05-21    144  |  93<L>  |  61<H>  ----------------------------<  74  5.1   |  25  |  11.79<H>    Ca    8.1<L>      21 May 2018 08:20    TPro  6.9  /  Alb  2.8<L>  /  TBili  0.4  /  DBili  x   /  AST  94<H>  /  ALT  20  /  AlkPhos  179<H>  05-21                            12.0   10.05 )-----------( 431      ( 21 May 2018 05:51 )             35.6    EKG tracing reviewed: NSR    < from: CT Head No Cont (05.21.18 @ 08:53) >    Stable exam. Mild cerebral volume loss.  No acute intracranial hemorrhage.    < end of copied text >        < from: Xray Chest 1 View- PORTABLE-Urgent (05.21.18 @ 07:58) >    Stable elevation of the left hemidiaphragm. No focal consolidations.   Bibasilar streaky and linear opacities unchanged consistent with   atelectasis or fibrosis. Heart is not obviously enlarged.    < end of copied text >

## 2018-05-21 NOTE — ED ADULT TRIAGE NOTE - CHIEF COMPLAINT QUOTE
pt comes to ED by EMS for multiple medical complaints pt took her Dilaudid for SCC for pain she powers not know if she took to much pt thinks she took 2 2mg Dilaudid pills and a muscle relaxer. pt denies si/hi pt appears confused in triage and lethargic not making sense crying .  pt has PD HD everyday but was not able to do it today. FS in triage 73 VSS EKG obtained

## 2018-05-21 NOTE — HISTORY OF PRESENT ILLNESS
[de-identified] : 36 yo female with HGB SS, RF on PD, awaiting THR (left. Here today for pain in back of chest. Has had back pain X 1 day. + dyspnea due to pain. No fever, no cough, PNA 2 months ago, ACS 12/17. Last transfusion was in March. \par PE: gen- thin female, appears to cause painj to take deep breaths\par O2 sat- 100 percent RA, VSS\par anicteric\par lungs- cta, but decreased BS bilaterally in lower lungs\par chest wall non-tender to palpation\par cor: tachy, rrr+1/6 cristhian\par abd- benign, PD cath site cleana and dry\par ext: -c/c/e, no ulcers\par \par a/P- 36 yo female with HGB SS, on PD, now with CP with inspiration- no ovious need for ED visit\par 1. CXR, CBC\par 2.Tylenol #3- #60\par ISTOP checked\par patient uses tylenol #3 during day, and Percocet at nite.\par 3. advise incentive spirometry\par 4. f/u by phone tonight post CXR\par Saniya Velásquez MD

## 2018-05-21 NOTE — H&P ADULT - HISTORY OF PRESENT ILLNESS
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  Pt states "they brought me here because they thought I ."  Pt's mother at bedside reports that noticed that pt was very drowsy, and brought her to the ED.  Pt acknowledges that her family was worried that she took too much of her pain medication.  Pt states she is unsure of how much she took, but states she took her Dilaudid, Tylenol with codeine, and Flexeril.  Pt has chronic hip pain 2/2 AVN, and also developed back pain on inspiration 4-5 days ago.  She also did not give herself hemodialysis for the past 2-3 days.  Pt's mother reports that her lethargy has improved since arrival to the hospital, but is not completely at baseline.  Pt remains somewhat confused stating she hears "sounds in my head," and when questioned further states she heard doors open and close, voices outside of her ER room, and beeps.  She is unsure if these were ambient ER noises.

## 2018-05-21 NOTE — ED PROVIDER NOTE - OBJECTIVE STATEMENT
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 Patient states "I  tonight" as her chief complaint, is tearful with poor eye contact and very labile and dramatic affect. States she has been having a sickle cell pain crisis x5 days and took Dilaudid for SCC for pain last night. Unsure of dose but reported to triage nurse she took at least two 2 mg dilaudids and an unspecified amount of Tylenol that she did not recall the dose of. Also endorsed taking a muscle relaxant to triage nurse but not to physicians. Pt denies si/hi pt appears confused. FS 73 in triage. Pt indicates she missed two days of peritoneal dialysis, which is unusual for her, secondary to sickle cell pain in the LLE and the back. Pt administers her own PD dialysis at home. Denies CP, SOB, cough, palpitations, LOC, headache.   Pt is known to the ED nursing staff, they indicate she has had multiple visits to the ED, many times under different names, and has a history of bizarre affect. 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 Patient states "I  tonight" as her chief complaint, is tearful with poor eye contact and very labile and dramatic affect. States she has been having a sickle cell pain crisis x5 days and took Dilaudid for SCC for pain last night. Unsure of dose but reported to triage nurse she took at least two 2 mg dilaudids and an unspecified amount of Tylenol that she did not recall the dose of. Also endorsed taking a muscle relaxant to triage nurse but not to physicians. Pt denies si/hi pt appears confused. FS 73 in triage. Pt indicates she missed two days of peritoneal dialysis, which is unusual for her, secondary to sickle cell pain in the LLE and the back. Pt administers her own PD dialysis at home. Denies CP, SOB, cough, palpitations, LOC, headache.   Pt is known to the ED nursing staff, they indicate she has had multiple visits to the ED, many times under different names, and has a history of bizarre affect.

## 2018-05-21 NOTE — ED PROVIDER NOTE - PMH
Anemia    Cholelithiasis    Chronic kidney disease  started dialysis March 2017 with etiology from Parvo Virus 2016  HPV (Human Papillomavirus)    Nephrotic syndrome    Parvovirus B19 infection  2016 resulting in chronic renal disease  Peritoneal dialysis catheter in place    Sickle cell disease    Umbilical hernia  November 2017

## 2018-05-22 PROCEDURE — 99233 SBSQ HOSP IP/OBS HIGH 50: CPT

## 2018-05-22 PROCEDURE — 99233 SBSQ HOSP IP/OBS HIGH 50: CPT | Mod: GC

## 2018-05-22 RX ADMIN — HEPARIN SODIUM 5000 UNIT(S): 5000 INJECTION INTRAVENOUS; SUBCUTANEOUS at 21:30

## 2018-05-22 RX ADMIN — OXYCODONE HYDROCHLORIDE 10 MILLIGRAM(S): 5 TABLET ORAL at 05:41

## 2018-05-22 RX ADMIN — OXYCODONE HYDROCHLORIDE 10 MILLIGRAM(S): 5 TABLET ORAL at 06:37

## 2018-05-22 RX ADMIN — Medication 1 APPLICATION(S): at 00:30

## 2018-05-22 RX ADMIN — HEPARIN SODIUM 5000 UNIT(S): 5000 INJECTION INTRAVENOUS; SUBCUTANEOUS at 14:22

## 2018-05-22 RX ADMIN — OXYCODONE HYDROCHLORIDE 10 MILLIGRAM(S): 5 TABLET ORAL at 17:30

## 2018-05-22 RX ADMIN — Medication 1 MILLIGRAM(S): at 14:21

## 2018-05-22 RX ADMIN — CALCITRIOL 1 MICROGRAM(S): 0.5 CAPSULE ORAL at 14:22

## 2018-05-22 RX ADMIN — HEPARIN SODIUM 5000 UNIT(S): 5000 INJECTION INTRAVENOUS; SUBCUTANEOUS at 05:38

## 2018-05-22 NOTE — PROGRESS NOTE ADULT - PROBLEM SELECTOR PLAN 1
Will continue PD at this time.  Four cycles of 2 L peritoneal dialysate 1.5% dianeal per day.  Monitor BMP.  Patient reports missing 2 days of PD, but not clinically overloaded, and with stable electrolyte levels.  Will increase volume removal if signs of overload become evident.

## 2018-05-22 NOTE — PROGRESS NOTE ADULT - SUBJECTIVE AND OBJECTIVE BOX
Patient is a 37y old  Female who presents with a chief complaint of confusion (21 May 2018 17:26)      SUBJECTIVE / OVERNIGHT EVENTS:  Pt is now back to her baseline mental status. awake, alert, oriented. she attributed AMS due to opiate.     MEDICATIONS  (STANDING):  calcitriol   Capsule 1 MICROGram(s) Oral daily  folic acid 1 milliGRAM(s) Oral daily  heparin  Injectable 5000 Unit(s) SubCutaneous every 8 hours  multivitamin 1 Tablet(s) Oral daily  oxyCODONE  ER Tablet 10 milliGRAM(s) Oral every 12 hours    MEDICATIONS  (PRN):      T(C): 36.7 (05-22-18 @ 08:45), Max: 36.8 (05-21-18 @ 20:42)  HR: 85 (05-22-18 @ 13:00) (83 - 92)  BP: 98/68 (05-22-18 @ 13:00) (98/68 - 109/71)  RR: 18 (05-22-18 @ 13:00) (17 - 18)  SpO2: 100% (05-22-18 @ 08:45) (99% - 100%)  CAPILLARY BLOOD GLUCOSE        I&O's Summary    21 May 2018 07:01  -  22 May 2018 07:00  --------------------------------------------------------  IN: 2000 mL / OUT: 2000 mL / NET: 0 mL    22 May 2018 07:01  -  22 May 2018 16:19  --------------------------------------------------------  IN: 4000 mL / OUT: 3500 mL / NET: 500 mL        PHYSICAL EXAM:  GENERAL: NAD, well-developed  HEAD:  Atraumatic, Normocephalic  EYES: EOMI, PERRLA, conjunctiva and sclera clear  NECK: Supple, No JVD  CHEST/LUNG: Clear to auscultation bilaterally; No wheeze  HEART: s1 s2, regular rhythm and rate   ABDOMEN: Soft, Nontender, Nondistended; Bowel sounds present  EXTREMITIES:  2+ Peripheral Pulses, No clubbing, cyanosis, or edema  PSYCH: AAOx3, calm   NEUROLOGY: non-focal  SKIN: No rashes or lesions    LABS:                        12.0   10.05 )-----------( 431      ( 21 May 2018 05:51 )             35.6     05-21    144  |  93<L>  |  61<H>  ----------------------------<  74  5.1   |  25  |  11.79<H>    Ca    8.1<L>      21 May 2018 08:20    TPro  6.9  /  Alb  2.8<L>  /  TBili  0.4  /  DBili  x   /  AST  94<H>  /  ALT  20  /  AlkPhos  179<H>  05-21              RADIOLOGY & ADDITIONAL TESTS:    Imaging Personally Reviewed:    Consultant(s) Notes Reviewed:      Care Discussed with Consultants/Other Providers:

## 2018-05-22 NOTE — PROGRESS NOTE ADULT - PROBLEM SELECTOR PLAN 1
Likely overmedication with narcotics and muscle relaxant.  Lower suspicion for uremia  - will hold home Dilaudid, and tylenol codeine   - continue oxycodone ER for baseline analgesia to prevent withdrawal  Pt remains awake, alert, comfortable

## 2018-05-22 NOTE — PROGRESS NOTE ADULT - SUBJECTIVE AND OBJECTIVE BOX
[facility] DIVISION OF KIDNEY DISEASES AND HYPERTENSION -- FOLLOW UP NOTE  --------------------------------------------------------------------------------  Chief Complaint:  ESRD on PD    24 hour events/subjective:  Patient reports continued pain, improved from yesterday.        PAST HISTORY  --------------------------------------------------------------------------------  No significant changes to PMH, PSH, FHx, SHx, unless otherwise noted    ALLERGIES & MEDICATIONS  --------------------------------------------------------------------------------  Allergies    morphine (Other)    Intolerances      Standing Inpatient Medications  calcitriol   Capsule 1 MICROGram(s) Oral daily  folic acid 1 milliGRAM(s) Oral daily  heparin  Injectable 5000 Unit(s) SubCutaneous every 8 hours  multivitamin 1 Tablet(s) Oral daily  oxyCODONE  ER Tablet 10 milliGRAM(s) Oral every 12 hours    PRN Inpatient Medications      REVIEW OF SYSTEMS  --------------------------------------------------------------------------------  Gen: No fevers/chills  Skin: No rashes  Head/Eyes/Ears/Mouth: No headache  Respiratory: No dyspnea  CV: No chest pain  GI: No abdominal pain  : No dysuria  MSK: No edema, + left sided pain persistent  Neuro: No dizziness/lightheadedness    VITALS/PHYSICAL EXAM  --------------------------------------------------------------------------------  T(C): 36.7 (05-22-18 @ 08:45), Max: 36.8 (05-21-18 @ 20:42)  HR: 85 (05-22-18 @ 13:00) (67 - 92)  BP: 98/68 (05-22-18 @ 13:00) (98/68 - 109/71)  RR: 18 (05-22-18 @ 13:00) (17 - 18)  SpO2: 100% (05-22-18 @ 08:45) (99% - 100%)  Wt(kg): --  Height (cm): 165.1 (05-21-18 @ 16:12)  Weight (kg): 63.1 (05-21-18 @ 16:12)  BMI (kg/m2): 23.1 (05-21-18 @ 16:12)  BSA (m2): 1.7 (05-21-18 @ 16:12)      05-21-18 @ 07:01  -  05-22-18 @ 07:00  --------------------------------------------------------  IN: 2000 mL / OUT: 2000 mL / NET: 0 mL    05-22-18 @ 07:01  -  05-22-18 @ 14:15  --------------------------------------------------------  IN: 4000 mL / OUT: 3500 mL / NET: 500 mL      Physical Exam:  	Gen: NAD, well-appearing  	HEENT: MMM  	Pulm: CTA B/L  	CV: RRR, S1S2; no rub  	Abd: +BS, soft, nontender/nondistended  	: No suprapubic tenderness  	UE:  no edema  	LE:  no pitting edema  	Neuro: No focal deficits  	Psych: Normal affect and mood  	Skin: Warm    LABS/STUDIES  --------------------------------------------------------------------------------              12.0   10.05 >-----------<  431      [05-21-18 @ 05:51]              35.6     144  |  93  |  61  ----------------------------<  74      [05-21-18 @ 08:20]  5.1   |  25  |  11.79        Ca     8.1     [05-21-18 @ 08:20]

## 2018-05-22 NOTE — PROGRESS NOTE ADULT - PROBLEM SELECTOR PLAN 2
outpatient heme notes reviewed  - hold off on short acting opiates as above  - continue Oxycontin as above

## 2018-05-23 ENCOUNTER — TRANSCRIPTION ENCOUNTER (OUTPATIENT)
Age: 38
End: 2018-05-23

## 2018-05-23 VITALS
SYSTOLIC BLOOD PRESSURE: 101 MMHG | HEART RATE: 96 BPM | RESPIRATION RATE: 18 BRPM | DIASTOLIC BLOOD PRESSURE: 67 MMHG | TEMPERATURE: 99 F

## 2018-05-23 LAB
ALBUMIN SERPL ELPH-MCNC: 2 G/DL — LOW (ref 3.3–5)
ALP SERPL-CCNC: 115 U/L — SIGNIFICANT CHANGE UP (ref 40–120)
ALT FLD-CCNC: 12 U/L — SIGNIFICANT CHANGE UP (ref 4–33)
AST SERPL-CCNC: 54 U/L — HIGH (ref 4–32)
BILIRUB SERPL-MCNC: 0.3 MG/DL — SIGNIFICANT CHANGE UP (ref 0.2–1.2)
BUN SERPL-MCNC: 55 MG/DL — HIGH (ref 7–23)
BUN SERPL-MCNC: 55 MG/DL — HIGH (ref 7–23)
CALCIUM SERPL-MCNC: 7.3 MG/DL — LOW (ref 8.4–10.5)
CALCIUM SERPL-MCNC: 7.6 MG/DL — LOW (ref 8.4–10.5)
CHLORIDE SERPL-SCNC: 92 MMOL/L — LOW (ref 98–107)
CHLORIDE SERPL-SCNC: 94 MMOL/L — LOW (ref 98–107)
CO2 SERPL-SCNC: 25 MMOL/L — SIGNIFICANT CHANGE UP (ref 22–31)
CO2 SERPL-SCNC: 28 MMOL/L — SIGNIFICANT CHANGE UP (ref 22–31)
CREAT SERPL-MCNC: 10.44 MG/DL — HIGH (ref 0.5–1.3)
CREAT SERPL-MCNC: 10.55 MG/DL — HIGH (ref 0.5–1.3)
GLUCOSE SERPL-MCNC: 92 MG/DL — SIGNIFICANT CHANGE UP (ref 70–99)
GLUCOSE SERPL-MCNC: 93 MG/DL — SIGNIFICANT CHANGE UP (ref 70–99)
HCT VFR BLD CALC: 31 % — LOW (ref 34.5–45)
HGB BLD-MCNC: 10.6 G/DL — LOW (ref 11.5–15.5)
MAGNESIUM SERPL-MCNC: 1.7 MG/DL — SIGNIFICANT CHANGE UP (ref 1.6–2.6)
MCHC RBC-ENTMCNC: 27.7 PG — SIGNIFICANT CHANGE UP (ref 27–34)
MCHC RBC-ENTMCNC: 34.2 % — SIGNIFICANT CHANGE UP (ref 32–36)
MCV RBC AUTO: 81.2 FL — SIGNIFICANT CHANGE UP (ref 80–100)
NRBC # FLD: 0.05 — SIGNIFICANT CHANGE UP
PHOSPHATE SERPL-MCNC: 6.7 MG/DL — HIGH (ref 2.5–4.5)
PLATELET # BLD AUTO: 424 K/UL — HIGH (ref 150–400)
PMV BLD: 10.1 FL — SIGNIFICANT CHANGE UP (ref 7–13)
POTASSIUM SERPL-MCNC: 3.6 MMOL/L — SIGNIFICANT CHANGE UP (ref 3.5–5.3)
POTASSIUM SERPL-MCNC: 6.5 MMOL/L — CRITICAL HIGH (ref 3.5–5.3)
POTASSIUM SERPL-SCNC: 3.6 MMOL/L — SIGNIFICANT CHANGE UP (ref 3.5–5.3)
POTASSIUM SERPL-SCNC: 6.5 MMOL/L — CRITICAL HIGH (ref 3.5–5.3)
PROT SERPL-MCNC: 6 G/DL — SIGNIFICANT CHANGE UP (ref 6–8.3)
RBC # BLD: 3.82 M/UL — SIGNIFICANT CHANGE UP (ref 3.8–5.2)
RBC # FLD: 17.4 % — HIGH (ref 10.3–14.5)
SODIUM SERPL-SCNC: 136 MMOL/L — SIGNIFICANT CHANGE UP (ref 135–145)
SODIUM SERPL-SCNC: 137 MMOL/L — SIGNIFICANT CHANGE UP (ref 135–145)
WBC # BLD: 7.41 K/UL — SIGNIFICANT CHANGE UP (ref 3.8–10.5)
WBC # FLD AUTO: 7.41 K/UL — SIGNIFICANT CHANGE UP (ref 3.8–10.5)

## 2018-05-23 PROCEDURE — 90945 DIALYSIS ONE EVALUATION: CPT | Mod: GC

## 2018-05-23 PROCEDURE — 99239 HOSP IP/OBS DSCHRG MGMT >30: CPT

## 2018-05-23 PROCEDURE — 93970 EXTREMITY STUDY: CPT | Mod: 26

## 2018-05-23 RX ORDER — HYDROMORPHONE HYDROCHLORIDE 2 MG/ML
1 INJECTION INTRAMUSCULAR; INTRAVENOUS; SUBCUTANEOUS
Qty: 0 | Refills: 0 | COMMUNITY

## 2018-05-23 RX ORDER — OXYCODONE HYDROCHLORIDE 5 MG/1
1 TABLET ORAL
Qty: 0 | Refills: 0 | COMMUNITY

## 2018-05-23 RX ORDER — ACETAMINOPHEN WITH CODEINE 300MG-30MG
2 TABLET ORAL
Qty: 0 | Refills: 0 | COMMUNITY

## 2018-05-23 RX ORDER — OXYCODONE HYDROCHLORIDE 5 MG/1
1 TABLET ORAL
Qty: 10 | Refills: 0 | OUTPATIENT
Start: 2018-05-23 | End: 2018-05-27

## 2018-05-23 RX ORDER — TRAMADOL HYDROCHLORIDE 50 MG/1
1 TABLET ORAL
Qty: 0 | Refills: 0 | COMMUNITY

## 2018-05-23 RX ADMIN — HEPARIN SODIUM 5000 UNIT(S): 5000 INJECTION INTRAVENOUS; SUBCUTANEOUS at 13:15

## 2018-05-23 RX ADMIN — HEPARIN SODIUM 5000 UNIT(S): 5000 INJECTION INTRAVENOUS; SUBCUTANEOUS at 05:59

## 2018-05-23 RX ADMIN — Medication 1 MILLIGRAM(S): at 13:10

## 2018-05-23 RX ADMIN — OXYCODONE HYDROCHLORIDE 10 MILLIGRAM(S): 5 TABLET ORAL at 05:38

## 2018-05-23 RX ADMIN — CALCITRIOL 1 MICROGRAM(S): 0.5 CAPSULE ORAL at 13:10

## 2018-05-23 RX ADMIN — OXYCODONE HYDROCHLORIDE 10 MILLIGRAM(S): 5 TABLET ORAL at 17:07

## 2018-05-23 NOTE — PROGRESS NOTE ADULT - SUBJECTIVE AND OBJECTIVE BOX
[facility] DIVISION OF KIDNEY DISEASES AND HYPERTENSION -- FOLLOW UP NOTE  --------------------------------------------------------------------------------  Chief Complaint:  ESRD on PD    24 hour events/subjective:  Patient reports that pain has improved and she is feeling better today.  Denies any dyspnea, orthopnea.        PAST HISTORY  --------------------------------------------------------------------------------  No significant changes to PMH, PSH, FHx, SHx, unless otherwise noted    ALLERGIES & MEDICATIONS  --------------------------------------------------------------------------------  Allergies    morphine (Other)    Intolerances      Standing Inpatient Medications  calcitriol   Capsule 1 MICROGram(s) Oral daily  folic acid 1 milliGRAM(s) Oral daily  heparin  Injectable 5000 Unit(s) SubCutaneous every 8 hours  multivitamin 1 Tablet(s) Oral daily  oxyCODONE  ER Tablet 10 milliGRAM(s) Oral every 12 hours    PRN Inpatient Medications      REVIEW OF SYSTEMS  --------------------------------------------------------------------------------  Gen: No fevers/chills  Skin: No rashes  Head/Eyes/Ears/Mouth: No headache  Respiratory: No dyspnea  CV: +left chest pain improved  GI: No abdominal pain  : No dysuria  MSK: +left sided pain improved  Neuro: No dizziness/lightheadedness    VITALS/PHYSICAL EXAM  --------------------------------------------------------------------------------  T(C): 37.1 (05-23-18 @ 08:30), Max: 37.3 (05-22-18 @ 21:05)  HR: 88 (05-23-18 @ 08:30) (85 - 90)  BP: 99/63 (05-23-18 @ 08:30) (98/68 - 107/50)  RR: 18 (05-23-18 @ 08:30) (16 - 18)  SpO2: 100% (05-23-18 @ 05:44) (98% - 100%)  Wt(kg): --  Height (cm): 165.1 (05-21-18 @ 16:12)  Weight (kg): 63.1 (05-21-18 @ 16:12)  BMI (kg/m2): 23.1 (05-21-18 @ 16:12)  BSA (m2): 1.7 (05-21-18 @ 16:12)      05-22-18 @ 07:01  -  05-23-18 @ 07:00  --------------------------------------------------------  IN: 8000 mL / OUT: 7500 mL / NET: 500 mL    05-23-18 @ 07:01  -  05-23-18 @ 11:20  --------------------------------------------------------  IN: 2000 mL / OUT: 2000 mL / NET: 0 mL      Physical Exam:  	Gen: NAD, well-appearing  	HEENT: MMM  	Pulm: CTA B/L  	CV: RRR, S1S2; no rub  	Abd: +BS, soft, nontender/nondistended  	: No suprapubic tenderness  	UE:  no edema  	LE:  no edema  	Neuro: No focal deficits  	Psych: Normal affect and mood  	Skin: Warm    LABS/STUDIES  --------------------------------------------------------------------------------              10.6   7.41  >-----------<  424      [05-23-18 @ 05:30]              31.0     137  |  94  |  55  ----------------------------<  92      [05-23-18 @ 08:04]  3.6   |  28  |  10.55        Ca     7.6     [05-23-18 @ 08:04]      Mg     1.7     [05-23-18 @ 05:30]      Phos  6.7     [05-23-18 @ 05:30]    Creatinine Trend:  SCr 10.55 [05-23 @ 08:04]  SCr 10.44 [05-23 @ 05:30]  SCr 11.79 [05-21 @ 08:20]  SCr 11.72 [05-21 @ 05:51]  SCr 9.32 [05-03 @ 15:50]

## 2018-05-23 NOTE — PROGRESS NOTE ADULT - PROBLEM SELECTOR PLAN 2
outpatient heme notes reviewed  - hold off on short acting opiates as above  - continue Oxycontin as above, pain well controlled.   Duplex neg for DVT. Wells' score 0

## 2018-05-23 NOTE — DISCHARGE NOTE ADULT - MEDICATION SUMMARY - MEDICATIONS TO TAKE
I will START or STAY ON the medications listed below when I get home from the hospital:    OxyCONTIN 10 mg oral tablet, extended release  -- 1 tab(s) by mouth every 12 hours MDD:2  -- Indication: For Hb-SS disease with crisis    Epogen 10,000 units/mL preservative-free injectable solution  -- 2 x weekly Mon Thur  -- Indication: For Hb-SS disease with crisis    Cristin-Marguerite oral tablet  -- 1 tab(s) by mouth once a day last dose 5/28  -- Indication: For Supplement    calcitriol 0.5 mcg oral capsule  -- 2 cap(s) by mouth once a day  -- Indication: For Supplement    folic acid 1 mg oral tablet  -- 1 tab(s) by mouth once a day  -- Indication: For Supplement

## 2018-05-23 NOTE — PHYSICAL THERAPY INITIAL EVALUATION ADULT - CRITERIA FOR SKILLED THERAPEUTIC INTERVENTIONS
rehab potential/therapy frequency/predicted duration of therapy intervention/anticipated equipment needs at discharge/anticipated discharge recommendation/impairments found

## 2018-05-23 NOTE — PROGRESS NOTE ADULT - PROBLEM SELECTOR PLAN 1
Likely overmedication with narcotics and muscle relaxant.  Lower suspicion for uremia. AMS due to metabolic encephalopathy, resolved quickly   - will hold home Dilaudid, and tylenol codeine   - continue oxycodone ER for baseline analgesia to prevent withdrawal  Pt remains awake, alert, comfortable

## 2018-05-23 NOTE — DISCHARGE NOTE ADULT - MEDICATION SUMMARY - MEDICATIONS TO STOP TAKING
I will STOP taking the medications listed below when I get home from the hospital:    Tylenol with Codeine #3 oral tablet  -- 2 tab(s) by mouth 1 to 2 times a day, As Needed    Dilaudid 2 mg oral tablet  -- 1 tab(s) by mouth every 4 hours, As Needed    Percocet 5/325 oral tablet  -- 1 tab(s) by mouth every 4 hours    traMADol 50 mg oral tablet  -- 1 tab(s) by mouth 3 times a day

## 2018-05-23 NOTE — DISCHARGE NOTE ADULT - HOME CARE AGENCY
[facility]  [phone].  Nurse to call and visit day after discharge and Physical Therapy to Follow

## 2018-05-23 NOTE — DISCHARGE NOTE ADULT - PLAN OF CARE
likely due to overmedication. Resolution of AMS AMS resolved. Continue with pain medications as prescribed. Follow up with Dr. Velásquez within 1-2 weeks for further medical recommendations. Follow up with Dr. Velásquez within 1-2 weeks for further medical recommendations. Continue with dialysis as scheduled. AMS resolved. Dilaudid held due to AMS. Continue with pain medications as prescribed. Follow up with Dr. Velásquez within 1-2 weeks for further medical recommendations. Follow up with Dr. Velásquez within 1-2 weeks for further medical recommendations.  Follow up with AVN L Hip Surgery 6/4/18 as scheduled.

## 2018-05-23 NOTE — PHYSICAL THERAPY INITIAL EVALUATION ADULT - MANUAL MUSCLE TESTING RESULTS, REHAB EVAL
Grossly >3/5 throughout through ability to perform functional mobility; Left hip not formally assessed

## 2018-05-23 NOTE — DISCHARGE NOTE ADULT - HOSPITAL COURSE
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  If needed, can start PCA    AVN L hip  - Scheduled for surgery June 4th      ESRD on peritoneal dialysis  - Nephrology consult reviewed  - PD 5/21    Renal osteodystrophy  - Continue home dose calcitriol  - Monitor phosphorus and calcium.       Prophylactic measure  - HSQ.     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  If needed, can start PCA    AVN L hip  - Scheduled for surgery June 4th      ESRD on peritoneal dialysis  - Nephrology consult reviewed  - PD 5/21    Renal osteodystrophy  - Continue home dose calcitriol  - Monitor phosphorus and calcium.       Prophylactic measure  - HSQ.     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 ACS, ESRD on daily PD p/w AMS likely 2/2 overmedication    hospital course    Altered mental status, resolved quickly in the hospital   - Likely overmedication with narcotics and muscle relaxant and lower suspicion for uremia  - Hold home Dilaudid, and tylenol codeine   - Continue oxycodone ER for baseline analgesia to prevent withdrawal     Hb-SS disease with crisis  - Outpatient heme notes reviewed  - Hold off on short acting opiates as above  - Continue Oxycontin as above      AVN L hip  - Scheduled for surgery June 4th      ESRD on peritoneal dialysis  PD in the hospital per renal     Renal osteodystrophy  - Continue home dose calcitriol  - Monitor phosphorus and calcium.     dispo home pt

## 2018-05-23 NOTE — DISCHARGE NOTE ADULT - PATIENT PORTAL LINK FT
You can access the [facility] Patient Portal, offered by [facility], by registering with the following website: [email or web address]

## 2018-05-23 NOTE — PHYSICAL THERAPY INITIAL EVALUATION ADULT - PLANNED THERAPY INTERVENTIONS, PT EVAL
gait training/postural re-education/transfer training/balance training/bed mobility training/strengthening

## 2018-05-23 NOTE — PHYSICAL THERAPY INITIAL EVALUATION ADULT - GAIT DEVIATIONS NOTED, PT EVAL
decreased josefa/decreased velocity of limb motion/decreased step length/decreased stride length/increased time in double stance

## 2018-05-23 NOTE — DISCHARGE NOTE ADULT - CARE PROVIDER_API CALL
Saniya Velásquez), Internal Medicine  [address] [address] [place], [place] [address]  Phone: [phone]  Fax: [phone]

## 2018-05-23 NOTE — PROGRESS NOTE ADULT - SUBJECTIVE AND OBJECTIVE BOX
Patient is a 37y old  Female who presents with a chief complaint of confusion (23 May 2018 11:43)      SUBJECTIVE / OVERNIGHT EVENTS:  Pt awake, alert, oriented, MS back to baseline. comfortable. She reports pain in left posterior thoracic region improved a lot. no sob/fever/cough. Pt requests discharge today     MEDICATIONS  (STANDING):  calcitriol   Capsule 1 MICROGram(s) Oral daily  folic acid 1 milliGRAM(s) Oral daily  heparin  Injectable 5000 Unit(s) SubCutaneous every 8 hours  multivitamin 1 Tablet(s) Oral daily  oxyCODONE  ER Tablet 10 milliGRAM(s) Oral every 12 hours    MEDICATIONS  (PRN):      T(C): 37.3 (05-23-18 @ 14:39), Max: 37.3 (05-22-18 @ 21:05)  HR: 96 (05-23-18 @ 14:39) (87 - 96)  BP: 101/67 (05-23-18 @ 14:39) (91/59 - 107/50)  RR: 18 (05-23-18 @ 14:39) (16 - 19)  SpO2: 99% (05-23-18 @ 13:29) (98% - 100%)  CAPILLARY BLOOD GLUCOSE        I&O's Summary    22 May 2018 07:01  -  23 May 2018 07:00  --------------------------------------------------------  IN: 8000 mL / OUT: 7500 mL / NET: 500 mL    23 May 2018 07:01  -  23 May 2018 15:54  --------------------------------------------------------  IN: 4000 mL / OUT: 4000 mL / NET: 0 mL        PHYSICAL EXAM:  GENERAL: NAD, well-developed  HEAD:  Atraumatic, Normocephalic  EYES: EOMI, PERRLA, conjunctiva and sclera clear  NECK: Supple, No JVD  CHEST/LUNG: Clear to auscultation bilaterally; No wheeze  HEART: s1 s2, regular rhythm and rate   ABDOMEN: Soft, Nontender, Nondistended; Bowel sounds present  EXTREMITIES:  2+ Peripheral Pulses, No clubbing, cyanosis, or edema  PSYCH: AAOx3, calm   NEUROLOGY: non-focal      LABS:                        10.6   7.41  )-----------( 424      ( 23 May 2018 05:30 )             31.0     05-23    137  |  94<L>  |  55<H>  ----------------------------<  92  3.6   |  28  |  10.55<H>    Ca    7.6<L>      23 May 2018 08:04  Phos  6.7     05-23  Mg     1.7     05-23    TPro  6.0  /  Alb  2.0<L>  /  TBili  0.3  /  DBili  x   /  AST  54<H>  /  ALT  12  /  AlkPhos  115  05-23              RADIOLOGY & ADDITIONAL TESTS:    Imaging Personally Reviewed:    Consultant(s) Notes Reviewed:      Care Discussed with Consultants/Other Providers:

## 2018-05-23 NOTE — PHYSICAL THERAPY INITIAL EVALUATION ADULT - PERTINENT HX OF CURRENT PROBLEM, REHAB EVAL
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  Pt states "they brought me here because they thought I ."  Pt's mother at bedside reports that noticed that pt was very drowsy, and brought her to the ED.

## 2018-05-23 NOTE — PHYSICAL THERAPY INITIAL EVALUATION ADULT - DIAGNOSIS, PT EVAL
Hb-sS crisis; ESRD, Left hip avascular necrosis. Pt scheduled for total hip replacement 6/4/2018. (-) CT head

## 2018-05-23 NOTE — PROGRESS NOTE ADULT - PROBLEM SELECTOR PLAN 1
Will continue PD at this time.  Four cycles of 2 L peritoneal dialysate 1.5% dianeal per day.  Monitor BMP.  Patient requesting 2.5% dialysate, however would not remove more fluid at this time as volume contraction can exacerbate pain. Will continue CAPD at this time.  Four cycles of 2 L peritoneal dialysate 1.5% dianeal per day.  Monitor BMP.  Patient requesting 2.5% dialysate, however would not remove more fluid at this time as volume contraction can exacerbate pain.

## 2018-05-23 NOTE — DISCHARGE NOTE ADULT - CARE PLAN
Principal Discharge DX:	Altered mental status, unspecified altered mental status type  Goal:	likely due to overmedication. Resolution of AMS  Assessment and plan of treatment:	AMS resolved. Continue with pain medications as prescribed. Follow up with Dr. Velásquez within 1-2 weeks for further medical recommendations.  Secondary Diagnosis:	Hb-SS disease with crisis  Assessment and plan of treatment:	Follow up with Dr. Velásquez within 1-2 weeks for further medical recommendations.  Secondary Diagnosis:	ESRD on peritoneal dialysis  Assessment and plan of treatment:	Continue with dialysis as scheduled. Principal Discharge DX:	Altered mental status, unspecified altered mental status type  Goal:	likely due to overmedication. Resolution of AMS  Assessment and plan of treatment:	AMS resolved. Dilaudid held due to AMS. Continue with pain medications as prescribed. Follow up with Dr. Velásquez within 1-2 weeks for further medical recommendations.  Secondary Diagnosis:	Hb-SS disease with crisis  Assessment and plan of treatment:	Follow up with Dr. Velásquez within 1-2 weeks for further medical recommendations.  Secondary Diagnosis:	ESRD on peritoneal dialysis  Assessment and plan of treatment:	Continue with dialysis as scheduled. Principal Discharge DX:	Altered mental status, unspecified altered mental status type  Goal:	likely due to overmedication. Resolution of AMS  Assessment and plan of treatment:	AMS resolved. Dilaudid held due to AMS. Continue with pain medications as prescribed. Follow up with Dr. Velásquez within 1-2 weeks for further medical recommendations.  Secondary Diagnosis:	Hb-SS disease with crisis  Assessment and plan of treatment:	Follow up with Dr. Velásquez within 1-2 weeks for further medical recommendations.  Follow up with AVN L Hip Surgery 6/4/18 as scheduled.  Secondary Diagnosis:	ESRD on peritoneal dialysis  Assessment and plan of treatment:	Continue with dialysis as scheduled.

## 2018-05-23 NOTE — PROGRESS NOTE ADULT - ASSESSMENT
Patient is a 36 y/o woman with ESRD on PD, sickle cell disease w/ avascular necrosis of hips who presents with altered mental status after taking increased doses of opiates for sickle cell pain crisis.

## 2018-05-23 NOTE — PHYSICAL THERAPY INITIAL EVALUATION ADULT - RANGE OF MOTION EXAMINATION, REHAB EVAL
Except Left hip not formally assessed/bilateral upper extremity ROM was WFL (within functional limits)/bilateral lower extremity ROM was WFL (within functional limits)

## 2018-05-24 RX ORDER — SEVELAMER CARBONATE 2400 MG/1
2.4 POWDER, FOR SUSPENSION ORAL 3 TIMES DAILY
Qty: 90 | Refills: 0 | Status: ACTIVE | COMMUNITY
Start: 2018-04-26 | End: 1900-01-01

## 2018-05-29 ENCOUNTER — OTHER (OUTPATIENT)
Age: 38
End: 2018-05-29

## 2018-05-29 ENCOUNTER — OUTPATIENT (OUTPATIENT)
Dept: OUTPATIENT SERVICES | Facility: HOSPITAL | Age: 38
LOS: 1 days | End: 2018-05-29
Payer: COMMERCIAL

## 2018-05-29 DIAGNOSIS — Z98.89 OTHER SPECIFIED POSTPROCEDURAL STATES: Chronic | ICD-10-CM

## 2018-05-29 DIAGNOSIS — Z98.51 TUBAL LIGATION STATUS: Chronic | ICD-10-CM

## 2018-05-29 DIAGNOSIS — Z98.890 OTHER SPECIFIED POSTPROCEDURAL STATES: Chronic | ICD-10-CM

## 2018-05-29 DIAGNOSIS — D57.1 SICKLE-CELL DISEASE WITHOUT CRISIS: ICD-10-CM

## 2018-05-29 LAB
BLD GP AB SCN SERPL QL: NEGATIVE — SIGNIFICANT CHANGE UP
RH IG SCN BLD-IMP: NEGATIVE — SIGNIFICANT CHANGE UP

## 2018-05-29 PROCEDURE — 86850 RBC ANTIBODY SCREEN: CPT

## 2018-05-29 PROCEDURE — 86901 BLOOD TYPING SEROLOGIC RH(D): CPT

## 2018-05-29 PROCEDURE — 86900 BLOOD TYPING SEROLOGIC ABO: CPT

## 2018-05-29 PROCEDURE — 86923 COMPATIBILITY TEST ELECTRIC: CPT

## 2018-05-29 RX ORDER — MUPIROCIN 20 MG/G
2 OINTMENT TOPICAL
Qty: 22 | Refills: 0 | Status: ACTIVE | COMMUNITY
Start: 2018-05-29 | End: 1900-01-01

## 2018-05-30 ENCOUNTER — OTHER (OUTPATIENT)
Age: 38
End: 2018-05-30

## 2018-05-30 DIAGNOSIS — M87.059 IDIOPATHIC ASEPTIC NECROSIS OF UNSPECIFIED FEMUR: ICD-10-CM

## 2018-05-30 RX ORDER — OXYCODONE HYDROCHLORIDE 10 MG/1
10 TABLET, FILM COATED, EXTENDED RELEASE ORAL
Qty: 14 | Refills: 0 | Status: ACTIVE | COMMUNITY
Start: 2018-05-30 | End: 1900-01-01

## 2018-05-31 ENCOUNTER — OUTPATIENT (OUTPATIENT)
Dept: OUTPATIENT SERVICES | Facility: HOSPITAL | Age: 38
LOS: 1 days | End: 2018-05-31
Payer: COMMERCIAL

## 2018-05-31 DIAGNOSIS — Z98.89 OTHER SPECIFIED POSTPROCEDURAL STATES: Chronic | ICD-10-CM

## 2018-05-31 DIAGNOSIS — D57.1 SICKLE-CELL DISEASE WITHOUT CRISIS: ICD-10-CM

## 2018-05-31 DIAGNOSIS — M87.052 IDIOPATHIC ASEPTIC NECROSIS OF LEFT FEMUR: ICD-10-CM

## 2018-05-31 DIAGNOSIS — Z98.890 OTHER SPECIFIED POSTPROCEDURAL STATES: Chronic | ICD-10-CM

## 2018-05-31 DIAGNOSIS — D57.01 HB-SS DISEASE WITH ACUTE CHEST SYNDROME: ICD-10-CM

## 2018-05-31 DIAGNOSIS — Z98.51 TUBAL LIGATION STATUS: Chronic | ICD-10-CM

## 2018-05-31 DIAGNOSIS — Z86.2 PERSONAL HISTORY OF DISEASES OF THE BLOOD AND BLOOD-FORMING ORGANS AND CERTAIN DISORDERS INVOLVING THE IMMUNE MECHANISM: ICD-10-CM

## 2018-05-31 DIAGNOSIS — Z87.448 PERSONAL HISTORY OF OTHER DISEASES OF URINARY SYSTEM: ICD-10-CM

## 2018-05-31 LAB
HCT VFR BLD CALC: 31.1 % — LOW (ref 34.5–45)
HCT VFR BLD CALC: 36.4 % — SIGNIFICANT CHANGE UP (ref 34.5–45)
HGB BLD-MCNC: 10.4 G/DL — LOW (ref 11.5–15.5)
HGB BLD-MCNC: 11.3 G/DL — LOW (ref 11.5–15.5)
MCHC RBC-ENTMCNC: 26.7 PG — LOW (ref 27–34)
MCHC RBC-ENTMCNC: 29.6 PG — SIGNIFICANT CHANGE UP (ref 27–34)
MCHC RBC-ENTMCNC: 31 GM/DL — LOW (ref 32–36)
MCHC RBC-ENTMCNC: 33.5 GM/DL — SIGNIFICANT CHANGE UP (ref 32–36)
MCV RBC AUTO: 86 FL — SIGNIFICANT CHANGE UP (ref 80–100)
MCV RBC AUTO: 88.4 FL — SIGNIFICANT CHANGE UP (ref 80–100)
PLATELET # BLD AUTO: 244 K/UL — SIGNIFICANT CHANGE UP (ref 150–400)
PLATELET # BLD AUTO: 750 K/UL — HIGH (ref 150–400)
RBC # BLD: 3.51 M/UL — LOW (ref 3.8–5.2)
RBC # BLD: 4.23 M/UL — SIGNIFICANT CHANGE UP (ref 3.8–5.2)
RBC # FLD: 14.7 % — HIGH (ref 10.3–14.5)
RBC # FLD: 19.4 % — HIGH (ref 10.3–14.5)
WBC # BLD: 5.8 K/UL — SIGNIFICANT CHANGE UP (ref 3.8–10.5)
WBC # BLD: 9.2 K/UL — SIGNIFICANT CHANGE UP (ref 3.8–10.5)
WBC # FLD AUTO: 5.8 K/UL — SIGNIFICANT CHANGE UP (ref 3.8–10.5)
WBC # FLD AUTO: 9.2 K/UL — SIGNIFICANT CHANGE UP (ref 3.8–10.5)

## 2018-05-31 PROCEDURE — 86923 COMPATIBILITY TEST ELECTRIC: CPT

## 2018-05-31 PROCEDURE — 36430 TRANSFUSION BLD/BLD COMPNT: CPT

## 2018-05-31 PROCEDURE — 77001 FLUOROGUIDE FOR VEIN DEVICE: CPT | Mod: 26

## 2018-05-31 PROCEDURE — 36556 INSERT NON-TUNNEL CV CATH: CPT

## 2018-05-31 PROCEDURE — C1752: CPT

## 2018-05-31 PROCEDURE — C1894: CPT

## 2018-05-31 PROCEDURE — P9040: CPT

## 2018-05-31 PROCEDURE — 83020 HEMOGLOBIN ELECTROPHORESIS: CPT

## 2018-05-31 PROCEDURE — 36512 APHERESIS RBC: CPT

## 2018-05-31 PROCEDURE — 76937 US GUIDE VASCULAR ACCESS: CPT | Mod: 26

## 2018-05-31 PROCEDURE — 85027 COMPLETE CBC AUTOMATED: CPT

## 2018-05-31 PROCEDURE — 99214 OFFICE O/P EST MOD 30 MIN: CPT | Mod: 25

## 2018-05-31 PROCEDURE — 77001 FLUOROGUIDE FOR VEIN DEVICE: CPT

## 2018-05-31 PROCEDURE — 83020 HEMOGLOBIN ELECTROPHORESIS: CPT | Mod: 26

## 2018-05-31 PROCEDURE — 76937 US GUIDE VASCULAR ACCESS: CPT

## 2018-05-31 PROCEDURE — 85660 RBC SICKLE CELL TEST: CPT

## 2018-05-31 PROCEDURE — C1769: CPT

## 2018-05-31 RX ORDER — HYDROMORPHONE HYDROCHLORIDE 2 MG/1
2 TABLET ORAL
Qty: 180 | Refills: 0 | Status: DISCONTINUED | COMMUNITY
Start: 2018-05-01 | End: 2018-05-31

## 2018-05-31 RX ORDER — OXYCODONE HYDROCHLORIDE 10 MG/1
10 TABLET, FILM COATED, EXTENDED RELEASE ORAL
Qty: 28 | Refills: 0 | Status: DISCONTINUED | COMMUNITY
Start: 2018-04-06 | End: 2018-05-31

## 2018-05-31 RX ORDER — HYDROMORPHONE HYDROCHLORIDE 2 MG/1
2 TABLET ORAL
Qty: 40 | Refills: 0 | Status: DISCONTINUED | COMMUNITY
Start: 2017-12-07 | End: 2018-05-31

## 2018-06-01 LAB
HGB S BLD QL: POSITIVE
HGB S BLD QL: POSITIVE
SOLUBILITY: POSITIVE
SOLUBILITY: POSITIVE

## 2018-06-01 NOTE — HISTORY OF PRESENT ILLNESS
[Chronic Kidney Disease] : chronic kidney disease [Other: ____] : [unfilled] [( Patient denies any chest pain, claudication, dyspnea on exertion, orthopnea, palpitations or syncope )] : Patient denies any chest pain, claudication, dyspnea on exertion, orthopnea, palpitations or syncope. [Moderate (4-6 METs)] : Moderate (4-6 METs) [FreeTextEntry1] : total Hip replacement [FreeTextEntry2] : 6/4/18 [FreeTextEntry3] : HERB Modi [de-identified] : 36 yo female with HGB SS,left hip  AVN, and renal failure on peritoneal dialysis.\par No cough, no chest pain, no dyspnea. \par Performs PD Q nite. \par Had a recent hospitalization for excessive sleepiness due to increased pain medication\par vss\par gen: thin female in severe distress (pain-related), even when sitting\par ambulating with cane, severe limp- patient says that she uses a walker at home.\par anicteric\par lungs- cta\par cor: rrr-m\par abd- PD cath site clean and dry\par ext: -c/c/e, no ulcers\par left hip- poor ROM, pain with passive movement\par \par HGB- 12.1\par plt-451\par creat-9.32\par INT-1.08\par \par EKG-NSR, normal EKG\par \par A/P- 36 yo female with HGB SS, renal failure on PD, planning for L.THR. The patient is at moderate risk for this procedure, however is medically optimized. The benefits of total hip replacement outweigh the risk. She may go to surgery.\par 1. Patient is s/p exchange transfusion (5/31/18)\par Aim would be to keep her HGB @ 10...not higher because this would increase her risk for CVA\par 2. peritoneal dialysis- continue on QHS schedule. The PD team knows about the surgery and will follow up post op.\par 3. VTE prophylaxis- ASA, 325 mg BID (xarelto is not an option in this patient)\par 4. pain management- the patient is currently taking oxycontin 10 BID, with Percocet 5/325 for breakthrough\par Pain management as per ortho team, however, it would not be unreasonable to continue oxycontin 10 BID, with oxycodone for breakthrough ( in light of her recent hospitalization)\par 5. Early ambulation/aggressive incentive spirometry\par \par Saniya Velásquez MD

## 2018-06-03 ENCOUNTER — TRANSCRIPTION ENCOUNTER (OUTPATIENT)
Age: 38
End: 2018-06-03

## 2018-06-04 ENCOUNTER — APPOINTMENT (OUTPATIENT)
Dept: ORTHOPEDIC SURGERY | Facility: HOSPITAL | Age: 38
End: 2018-06-04

## 2018-06-04 ENCOUNTER — RESULT REVIEW (OUTPATIENT)
Age: 38
End: 2018-06-04

## 2018-06-04 ENCOUNTER — INPATIENT (INPATIENT)
Facility: HOSPITAL | Age: 38
LOS: 1 days | Discharge: HOME | End: 2018-06-06
Attending: ORTHOPAEDIC SURGERY | Admitting: ORTHOPAEDIC SURGERY
Payer: COMMERCIAL

## 2018-06-04 VITALS
RESPIRATION RATE: 16 BRPM | HEART RATE: 73 BPM | HEIGHT: 66 IN | SYSTOLIC BLOOD PRESSURE: 100 MMHG | DIASTOLIC BLOOD PRESSURE: 72 MMHG | TEMPERATURE: 98 F | WEIGHT: 139.99 LBS | OXYGEN SATURATION: 99 %

## 2018-06-04 DIAGNOSIS — Z98.890 OTHER SPECIFIED POSTPROCEDURAL STATES: Chronic | ICD-10-CM

## 2018-06-04 DIAGNOSIS — M16.12 UNILATERAL PRIMARY OSTEOARTHRITIS, LEFT HIP: ICD-10-CM

## 2018-06-04 DIAGNOSIS — Z98.51 TUBAL LIGATION STATUS: Chronic | ICD-10-CM

## 2018-06-04 DIAGNOSIS — D57.1 SICKLE-CELL DISEASE WITHOUT CRISIS: ICD-10-CM

## 2018-06-04 DIAGNOSIS — Z49.01 ENCOUNTER FOR FITTING AND ADJUSTMENT OF EXTRACORPOREAL DIALYSIS CATHETER: ICD-10-CM

## 2018-06-04 DIAGNOSIS — Z98.89 OTHER SPECIFIED POSTPROCEDURAL STATES: Chronic | ICD-10-CM

## 2018-06-04 LAB
BLD GP AB SCN SERPL QL: NEGATIVE — SIGNIFICANT CHANGE UP
BUN SERPL-MCNC: 43 MG/DL — HIGH (ref 7–23)
CALCIUM SERPL-MCNC: 8.1 MG/DL — LOW (ref 8.4–10.5)
CHLORIDE SERPL-SCNC: 97 MMOL/L — LOW (ref 98–107)
CO2 SERPL-SCNC: 23 MMOL/L — SIGNIFICANT CHANGE UP (ref 22–31)
CREAT SERPL-MCNC: 8.67 MG/DL — HIGH (ref 0.5–1.3)
GLUCOSE SERPL-MCNC: 135 MG/DL — HIGH (ref 70–99)
HCT VFR BLD CALC: 29.8 % — LOW (ref 34.5–45)
HEMOGLOBIN INTERPRETATION: SIGNIFICANT CHANGE UP
HEMOGLOBIN INTERPRETATION: SIGNIFICANT CHANGE UP
HGB A MFR BLD: 3.5 % — LOW (ref 95.8–98)
HGB A MFR BLD: 72.3 % — LOW (ref 95.8–98)
HGB A2 MFR BLD: 2.8 % — SIGNIFICANT CHANGE UP (ref 2–3.2)
HGB A2 MFR BLD: 4 % — HIGH (ref 2–3.2)
HGB BLD-MCNC: 9.9 G/DL — LOW (ref 11.5–15.5)
HGB F MFR BLD: 0.3 % — SIGNIFICANT CHANGE UP (ref 0–1)
HGB S MFR BLD: 24.9 % — HIGH
HGB S MFR BLD: 92.2 % — HIGH
MCHC RBC-ENTMCNC: 28.6 PG — SIGNIFICANT CHANGE UP (ref 27–34)
MCHC RBC-ENTMCNC: 33.2 % — SIGNIFICANT CHANGE UP (ref 32–36)
MCV RBC AUTO: 86.1 FL — SIGNIFICANT CHANGE UP (ref 80–100)
NRBC # FLD: 0.11 — SIGNIFICANT CHANGE UP
PLATELET # BLD AUTO: 506 K/UL — HIGH (ref 150–400)
PMV BLD: 10.7 FL — SIGNIFICANT CHANGE UP (ref 7–13)
POTASSIUM BLDV-SCNC: 3.8 MMOL/L — SIGNIFICANT CHANGE UP (ref 3.4–4.5)
POTASSIUM SERPL-MCNC: 3.5 MMOL/L — SIGNIFICANT CHANGE UP (ref 3.5–5.3)
POTASSIUM SERPL-SCNC: 3.5 MMOL/L — SIGNIFICANT CHANGE UP (ref 3.5–5.3)
RBC # BLD: 3.46 M/UL — LOW (ref 3.8–5.2)
RBC # FLD: 19.2 % — HIGH (ref 10.3–14.5)
RH IG SCN BLD-IMP: NEGATIVE — SIGNIFICANT CHANGE UP
SODIUM SERPL-SCNC: 141 MMOL/L — SIGNIFICANT CHANGE UP (ref 135–145)
WBC # BLD: 21.05 K/UL — HIGH (ref 3.8–10.5)
WBC # FLD AUTO: 21.05 K/UL — HIGH (ref 3.8–10.5)

## 2018-06-04 PROCEDURE — 73502 X-RAY EXAM HIP UNI 2-3 VIEWS: CPT | Mod: 26,LT

## 2018-06-04 PROCEDURE — 88311 DECALCIFY TISSUE: CPT | Mod: 26

## 2018-06-04 PROCEDURE — 27130 TOTAL HIP ARTHROPLASTY: CPT | Mod: LT

## 2018-06-04 PROCEDURE — 88305 TISSUE EXAM BY PATHOLOGIST: CPT | Mod: 26

## 2018-06-04 RX ORDER — ASPIRIN/CALCIUM CARB/MAGNESIUM 324 MG
325 TABLET ORAL
Qty: 0 | Refills: 0 | Status: DISCONTINUED | OUTPATIENT
Start: 2018-06-04 | End: 2018-06-06

## 2018-06-04 RX ORDER — SODIUM CHLORIDE 9 MG/ML
1000 INJECTION INTRAMUSCULAR; INTRAVENOUS; SUBCUTANEOUS
Qty: 0 | Refills: 0 | Status: DISCONTINUED | OUTPATIENT
Start: 2018-06-04 | End: 2018-06-04

## 2018-06-04 RX ORDER — FOLIC ACID 0.8 MG
1 TABLET ORAL DAILY
Qty: 0 | Refills: 0 | Status: DISCONTINUED | OUTPATIENT
Start: 2018-06-04 | End: 2018-06-06

## 2018-06-04 RX ORDER — TRAMADOL HYDROCHLORIDE 50 MG/1
50 TABLET ORAL EVERY 8 HOURS
Qty: 0 | Refills: 0 | Status: DISCONTINUED | OUTPATIENT
Start: 2018-06-04 | End: 2018-06-06

## 2018-06-04 RX ORDER — PANTOPRAZOLE SODIUM 20 MG/1
40 TABLET, DELAYED RELEASE ORAL DAILY
Qty: 0 | Refills: 0 | Status: DISCONTINUED | OUTPATIENT
Start: 2018-06-04 | End: 2018-06-06

## 2018-06-04 RX ORDER — PANTOPRAZOLE SODIUM 20 MG/1
40 TABLET, DELAYED RELEASE ORAL
Qty: 0 | Refills: 0 | Status: DISCONTINUED | OUTPATIENT
Start: 2018-06-04 | End: 2018-06-06

## 2018-06-04 RX ORDER — OXYCODONE HYDROCHLORIDE 5 MG/1
10 TABLET ORAL EVERY 4 HOURS
Qty: 0 | Refills: 0 | Status: DISCONTINUED | OUTPATIENT
Start: 2018-06-04 | End: 2018-06-06

## 2018-06-04 RX ORDER — POLYETHYLENE GLYCOL 3350 17 G/17G
17 POWDER, FOR SOLUTION ORAL DAILY
Qty: 0 | Refills: 0 | Status: DISCONTINUED | OUTPATIENT
Start: 2018-06-04 | End: 2018-06-06

## 2018-06-04 RX ORDER — ACETAMINOPHEN 500 MG
975 TABLET ORAL EVERY 8 HOURS
Qty: 0 | Refills: 0 | Status: DISCONTINUED | OUTPATIENT
Start: 2018-06-04 | End: 2018-06-06

## 2018-06-04 RX ORDER — HYDROMORPHONE HYDROCHLORIDE 2 MG/ML
1 INJECTION INTRAMUSCULAR; INTRAVENOUS; SUBCUTANEOUS
Qty: 0 | Refills: 0 | Status: DISCONTINUED | OUTPATIENT
Start: 2018-06-04 | End: 2018-06-04

## 2018-06-04 RX ORDER — OXYCODONE HYDROCHLORIDE 5 MG/1
5 TABLET ORAL EVERY 4 HOURS
Qty: 0 | Refills: 0 | Status: DISCONTINUED | OUTPATIENT
Start: 2018-06-04 | End: 2018-06-06

## 2018-06-04 RX ORDER — OXYCODONE HYDROCHLORIDE 5 MG/1
10 TABLET ORAL EVERY 12 HOURS
Qty: 0 | Refills: 0 | Status: DISCONTINUED | OUTPATIENT
Start: 2018-06-04 | End: 2018-06-06

## 2018-06-04 RX ORDER — MAGNESIUM HYDROXIDE 400 MG/1
30 TABLET, CHEWABLE ORAL DAILY
Qty: 0 | Refills: 0 | Status: DISCONTINUED | OUTPATIENT
Start: 2018-06-04 | End: 2018-06-06

## 2018-06-04 RX ORDER — DOCUSATE SODIUM 100 MG
100 CAPSULE ORAL THREE TIMES A DAY
Qty: 0 | Refills: 0 | Status: DISCONTINUED | OUTPATIENT
Start: 2018-06-04 | End: 2018-06-06

## 2018-06-04 RX ORDER — SODIUM CHLORIDE 9 MG/ML
1000 INJECTION INTRAMUSCULAR; INTRAVENOUS; SUBCUTANEOUS
Qty: 0 | Refills: 0 | Status: DISCONTINUED | OUTPATIENT
Start: 2018-06-04 | End: 2018-06-06

## 2018-06-04 RX ORDER — ONDANSETRON 8 MG/1
4 TABLET, FILM COATED ORAL EVERY 6 HOURS
Qty: 0 | Refills: 0 | Status: DISCONTINUED | OUTPATIENT
Start: 2018-06-04 | End: 2018-06-04

## 2018-06-04 RX ORDER — HYDROMORPHONE HYDROCHLORIDE 2 MG/ML
0.5 INJECTION INTRAMUSCULAR; INTRAVENOUS; SUBCUTANEOUS
Qty: 0 | Refills: 0 | Status: DISCONTINUED | OUTPATIENT
Start: 2018-06-04 | End: 2018-06-04

## 2018-06-04 RX ORDER — MORPHINE SULFATE 50 MG/1
2 CAPSULE, EXTENDED RELEASE ORAL
Qty: 0 | Refills: 0 | Status: DISCONTINUED | OUTPATIENT
Start: 2018-06-04 | End: 2018-06-06

## 2018-06-04 RX ORDER — ONDANSETRON 8 MG/1
4 TABLET, FILM COATED ORAL EVERY 6 HOURS
Qty: 0 | Refills: 0 | Status: DISCONTINUED | OUTPATIENT
Start: 2018-06-04 | End: 2018-06-06

## 2018-06-04 RX ORDER — TRAMADOL HYDROCHLORIDE 50 MG/1
50 TABLET ORAL ONCE
Qty: 0 | Refills: 0 | Status: DISCONTINUED | OUTPATIENT
Start: 2018-06-04 | End: 2018-06-04

## 2018-06-04 RX ORDER — SENNA PLUS 8.6 MG/1
2 TABLET ORAL AT BEDTIME
Qty: 0 | Refills: 0 | Status: DISCONTINUED | OUTPATIENT
Start: 2018-06-04 | End: 2018-06-06

## 2018-06-04 RX ORDER — CEFAZOLIN SODIUM 1 G
2000 VIAL (EA) INJECTION EVERY 8 HOURS
Qty: 0 | Refills: 0 | Status: COMPLETED | OUTPATIENT
Start: 2018-06-04 | End: 2018-06-05

## 2018-06-04 RX ORDER — ACETAMINOPHEN 500 MG
650 TABLET ORAL EVERY 6 HOURS
Qty: 0 | Refills: 0 | Status: DISCONTINUED | OUTPATIENT
Start: 2018-06-04 | End: 2018-06-06

## 2018-06-04 RX ORDER — CALCITRIOL 0.5 UG/1
1 CAPSULE ORAL DAILY
Qty: 0 | Refills: 0 | Status: DISCONTINUED | OUTPATIENT
Start: 2018-06-04 | End: 2018-06-06

## 2018-06-04 RX ADMIN — Medication 975 MILLIGRAM(S): at 22:00

## 2018-06-04 RX ADMIN — TRAMADOL HYDROCHLORIDE 50 MILLIGRAM(S): 50 TABLET ORAL at 21:50

## 2018-06-04 RX ADMIN — Medication 100 MILLIGRAM(S): at 21:42

## 2018-06-04 RX ADMIN — HYDROMORPHONE HYDROCHLORIDE 0.5 MILLIGRAM(S): 2 INJECTION INTRAMUSCULAR; INTRAVENOUS; SUBCUTANEOUS at 19:35

## 2018-06-04 RX ADMIN — Medication 150 MILLIGRAM(S): at 13:07

## 2018-06-04 RX ADMIN — HYDROMORPHONE HYDROCHLORIDE 0.5 MILLIGRAM(S): 2 INJECTION INTRAMUSCULAR; INTRAVENOUS; SUBCUTANEOUS at 19:26

## 2018-06-04 RX ADMIN — Medication 975 MILLIGRAM(S): at 21:47

## 2018-06-04 RX ADMIN — SODIUM CHLORIDE 30 MILLILITER(S): 9 INJECTION INTRAMUSCULAR; INTRAVENOUS; SUBCUTANEOUS at 19:00

## 2018-06-04 RX ADMIN — SODIUM CHLORIDE 3 MILLILITER(S): 9 INJECTION INTRAMUSCULAR; INTRAVENOUS; SUBCUTANEOUS at 21:46

## 2018-06-04 RX ADMIN — PANTOPRAZOLE SODIUM 40 MILLIGRAM(S): 20 TABLET, DELAYED RELEASE ORAL at 13:07

## 2018-06-04 RX ADMIN — Medication 975 MILLIGRAM(S): at 13:07

## 2018-06-04 RX ADMIN — Medication 325 MILLIGRAM(S): at 23:40

## 2018-06-04 RX ADMIN — TRAMADOL HYDROCHLORIDE 50 MILLIGRAM(S): 50 TABLET ORAL at 13:07

## 2018-06-04 RX ADMIN — TRAMADOL HYDROCHLORIDE 50 MILLIGRAM(S): 50 TABLET ORAL at 22:00

## 2018-06-04 RX ADMIN — SODIUM CHLORIDE 60 MILLILITER(S): 9 INJECTION INTRAMUSCULAR; INTRAVENOUS; SUBCUTANEOUS at 18:00

## 2018-06-04 RX ADMIN — Medication 100 MILLIGRAM(S): at 23:04

## 2018-06-04 NOTE — PATIENT PROFILE ADULT. - NS PRO PASSIVE SMOKE EXP
No
Breath sounds are clear, no distress present, no wheeze, rales, rhonchi or tachypnea. Normal rate and effort.

## 2018-06-04 NOTE — CHART NOTE - NSCHARTNOTEFT_GEN_A_CORE
No acute events overnight in PACU. Pain controlled.     PE:  Vital Signs Last 24 Hrs  T(C): 35.5 (04 Jun 2018 19:00), Max: 36.7 (04 Jun 2018 12:37)  T(F): 95.9 (04 Jun 2018 19:00), Max: 98.1 (04 Jun 2018 12:37)  HR: 82 (04 Jun 2018 19:00) (70 - 85)  BP: 104/63 (04 Jun 2018 19:00) (92/60 - 110/73)  BP(mean): --  RR: 14 (04 Jun 2018 19:00) (12 - 21)  SpO2: 100% (04 Jun 2018 19:00) (99% - 100%)    Exam:  Gen: NAD  LLE:  Motor: 5/5 EHL/FHL/TA/Gastrocnemius  Sensory: SILT DP/SP/S/S/T nerve distributions  Vascular: 2+ Dorsalis Pedis pulse      Labs:            Assessment/Plan:  37yFemale with L hip AVN s/p L KARIS.     -pain control  -PT  -WBAT  -OOB  -DVT ppx  -dispo plan

## 2018-06-05 ENCOUNTER — TRANSCRIPTION ENCOUNTER (OUTPATIENT)
Age: 38
End: 2018-06-05

## 2018-06-05 DIAGNOSIS — D72.829 ELEVATED WHITE BLOOD CELL COUNT, UNSPECIFIED: ICD-10-CM

## 2018-06-05 DIAGNOSIS — N18.6 END STAGE RENAL DISEASE: ICD-10-CM

## 2018-06-05 DIAGNOSIS — M87.052 IDIOPATHIC ASEPTIC NECROSIS OF LEFT FEMUR: ICD-10-CM

## 2018-06-05 DIAGNOSIS — N25.0 RENAL OSTEODYSTROPHY: ICD-10-CM

## 2018-06-05 DIAGNOSIS — D57.1 SICKLE-CELL DISEASE WITHOUT CRISIS: ICD-10-CM

## 2018-06-05 LAB
BUN SERPL-MCNC: 46 MG/DL — HIGH (ref 7–23)
CALCIUM SERPL-MCNC: 7.8 MG/DL — LOW (ref 8.4–10.5)
CHLORIDE SERPL-SCNC: 96 MMOL/L — LOW (ref 98–107)
CO2 SERPL-SCNC: 21 MMOL/L — LOW (ref 22–31)
CREAT SERPL-MCNC: 9.43 MG/DL — HIGH (ref 0.5–1.3)
GLUCOSE SERPL-MCNC: 101 MG/DL — HIGH (ref 70–99)
HCT VFR BLD CALC: 29.6 % — LOW (ref 34.5–45)
HGB BLD-MCNC: 9.5 G/DL — LOW (ref 11.5–15.5)
MCHC RBC-ENTMCNC: 28.5 PG — SIGNIFICANT CHANGE UP (ref 27–34)
MCHC RBC-ENTMCNC: 32.1 % — SIGNIFICANT CHANGE UP (ref 32–36)
MCV RBC AUTO: 88.9 FL — SIGNIFICANT CHANGE UP (ref 80–100)
NRBC # FLD: 0.13 — SIGNIFICANT CHANGE UP
PLATELET # BLD AUTO: 596 K/UL — HIGH (ref 150–400)
PMV BLD: 10.1 FL — SIGNIFICANT CHANGE UP (ref 7–13)
POTASSIUM SERPL-MCNC: 4.5 MMOL/L — SIGNIFICANT CHANGE UP (ref 3.5–5.3)
POTASSIUM SERPL-SCNC: 4.5 MMOL/L — SIGNIFICANT CHANGE UP (ref 3.5–5.3)
RBC # BLD: 3.33 M/UL — LOW (ref 3.8–5.2)
RBC # FLD: 18.7 % — HIGH (ref 10.3–14.5)
SODIUM SERPL-SCNC: 140 MMOL/L — SIGNIFICANT CHANGE UP (ref 135–145)
WBC # BLD: 14.9 K/UL — HIGH (ref 3.8–10.5)
WBC # FLD AUTO: 14.9 K/UL — HIGH (ref 3.8–10.5)

## 2018-06-05 PROCEDURE — 99222 1ST HOSP IP/OBS MODERATE 55: CPT | Mod: 25,GC

## 2018-06-05 PROCEDURE — 99223 1ST HOSP IP/OBS HIGH 75: CPT | Mod: AI

## 2018-06-05 PROCEDURE — 90945 DIALYSIS ONE EVALUATION: CPT

## 2018-06-05 RX ORDER — GENTAMICIN SULFATE 0.1 %
1 OINTMENT (GRAM) TOPICAL
Qty: 0 | Refills: 0 | Status: DISCONTINUED | OUTPATIENT
Start: 2018-06-05 | End: 2018-06-06

## 2018-06-05 RX ADMIN — Medication 325 MILLIGRAM(S): at 12:51

## 2018-06-05 RX ADMIN — TRAMADOL HYDROCHLORIDE 50 MILLIGRAM(S): 50 TABLET ORAL at 05:04

## 2018-06-05 RX ADMIN — OXYCODONE HYDROCHLORIDE 5 MILLIGRAM(S): 5 TABLET ORAL at 23:58

## 2018-06-05 RX ADMIN — Medication 975 MILLIGRAM(S): at 05:05

## 2018-06-05 RX ADMIN — SODIUM CHLORIDE 3 MILLILITER(S): 9 INJECTION INTRAMUSCULAR; INTRAVENOUS; SUBCUTANEOUS at 13:28

## 2018-06-05 RX ADMIN — Medication 1 APPLICATION(S): at 20:24

## 2018-06-05 RX ADMIN — SODIUM CHLORIDE 3 MILLILITER(S): 9 INJECTION INTRAMUSCULAR; INTRAVENOUS; SUBCUTANEOUS at 04:57

## 2018-06-05 RX ADMIN — POLYETHYLENE GLYCOL 3350 17 GRAM(S): 17 POWDER, FOR SOLUTION ORAL at 12:51

## 2018-06-05 RX ADMIN — Medication 975 MILLIGRAM(S): at 13:26

## 2018-06-05 RX ADMIN — CALCITRIOL 1 MICROGRAM(S): 0.5 CAPSULE ORAL at 12:51

## 2018-06-05 RX ADMIN — Medication 100 MILLIGRAM(S): at 22:25

## 2018-06-05 RX ADMIN — Medication 1 TABLET(S): at 12:50

## 2018-06-05 RX ADMIN — SODIUM CHLORIDE 3 MILLILITER(S): 9 INJECTION INTRAMUSCULAR; INTRAVENOUS; SUBCUTANEOUS at 22:13

## 2018-06-05 RX ADMIN — Medication 325 MILLIGRAM(S): at 23:50

## 2018-06-05 RX ADMIN — Medication 100 MILLIGRAM(S): at 13:26

## 2018-06-05 RX ADMIN — Medication 100 MILLIGRAM(S): at 06:18

## 2018-06-05 RX ADMIN — OXYCODONE HYDROCHLORIDE 10 MILLIGRAM(S): 5 TABLET ORAL at 05:04

## 2018-06-05 RX ADMIN — Medication 1 MILLIGRAM(S): at 12:51

## 2018-06-05 RX ADMIN — Medication 1 APPLICATION(S): at 23:52

## 2018-06-05 RX ADMIN — Medication 975 MILLIGRAM(S): at 22:25

## 2018-06-05 RX ADMIN — TRAMADOL HYDROCHLORIDE 50 MILLIGRAM(S): 50 TABLET ORAL at 13:26

## 2018-06-05 RX ADMIN — Medication 100 MILLIGRAM(S): at 05:04

## 2018-06-05 RX ADMIN — OXYCODONE HYDROCHLORIDE 10 MILLIGRAM(S): 5 TABLET ORAL at 17:09

## 2018-06-05 RX ADMIN — TRAMADOL HYDROCHLORIDE 50 MILLIGRAM(S): 50 TABLET ORAL at 22:25

## 2018-06-05 RX ADMIN — PANTOPRAZOLE SODIUM 40 MILLIGRAM(S): 20 TABLET, DELAYED RELEASE ORAL at 05:04

## 2018-06-05 NOTE — CONSULT NOTE ADULT - ASSESSMENT
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

## 2018-06-05 NOTE — CONSULT NOTE ADULT - SUBJECTIVE AND OBJECTIVE BOX
Patient is a 37y old  Female who presents with a chief complaint of left total hip arthroplasty (2018 06:50)      HPI:  37 year old female w/ Hx of sickle cell disease c/b avascular necrosis of hips, parvovirus B19 in 2016 c/w ESRD on peritoneal dialysis p/w worsening B/L Hip pain left worse than right now s/p Left Total Hip Replacement, Direct Anterior Approach on 18.           Pain Symptoms if applicable:             	                         none	   mild         moderate         severe  Pain:	                            0	    1-3	     4-6	         7-10  Location:	  Modifying factors:	  Associated symptoms:	    Function: [ x] Independent  [ ] Assistance  [ ] Total care  [ ] Non-ambulatory    Allergies    morphine (Other)    Intolerances        HOME MEDICATIONS: [ x] Reviewed  folic acid 1 mg oral tablet: Last Dose Taken:  , 1 tab(s) orally once a day  calcitriol 0.5 mcg oral capsule: Last Dose Taken:  , 2 cap(s) orally once a day  Epogen 10,000 units/mL preservative-free injectable solution: Last Dose Taken:  , 2 x weekly Mon Th  Cristin-Marguerite oral tablet: Last Dose Taken:  , 1 tab(s) orally once a day last dose   Tylenol with Codeine #3 oral tablet: Last Dose Taken:  , 2 tab(s) orally 1 to 2 times a day, As Needed  Dilaudid 2 mg oral tablet: Last Dose Taken:  , 1 tab(s) orally 1 to 2 times a day, As Needed    MEDICATIONS  (STANDING):  acetaminophen   Tablet. 975 milliGRAM(s) Oral every 8 hours  aspirin enteric coated 325 milliGRAM(s) Oral two times a day  calcitriol   Capsule 1 MICROGram(s) Oral daily  docusate sodium 100 milliGRAM(s) Oral three times a day  folic acid 1 milliGRAM(s) Oral daily  Nephro-marguerite 1 Tablet(s) Oral daily  oxyCODONE  ER Tablet 10 milliGRAM(s) Oral every 12 hours  pantoprazole    Tablet 40 milliGRAM(s) Oral once  pantoprazole    Tablet 40 milliGRAM(s) Oral before breakfast  pantoprazole    Tablet 40 milliGRAM(s) Oral daily  polyethylene glycol 3350 17 Gram(s) Oral daily  sodium chloride 0.9% lock flush 3 milliLiter(s) IV Push every 8 hours  sodium chloride 0.9%. 1000 milliLiter(s) (30 mL/Hr) IV Continuous <Continuous>  traMADol 50 milliGRAM(s) Oral every 8 hours    MEDICATIONS  (PRN):  acetaminophen   Tablet 650 milliGRAM(s) Oral every 6 hours PRN For Temp greater than 38 C (100.4 F)  aluminum hydroxide/magnesium hydroxide/simethicone Suspension 30 milliLiter(s) Oral four times a day PRN Indigestion  magnesium hydroxide Suspension 30 milliLiter(s) Oral daily PRN Constipation  morphine  - Injectable 2 milliGRAM(s) IV Push every 3 hours PRN Breakthrough  ondansetron Injectable 4 milliGRAM(s) IV Push every 6 hours PRN Nausea and/or Vomiting  oxyCODONE    IR 5 milliGRAM(s) Oral every 4 hours PRN Moderate Pain (4 - 6)  oxyCODONE    IR 10 milliGRAM(s) Oral every 4 hours PRN Severe Pain (7 - 10)  senna 2 Tablet(s) Oral at bedtime PRN Constipation      PAST MEDICAL & SURGICAL HISTORY:  Peritoneal dialysis catheter in place  Umbilical hernia: 2017  Cholelithiasis  Anemia  Sickle cell disease  Chronic kidney disease: started dialysis 2017 with etiology from Parvo Virus 2016  Parvovirus B19 infection: 2016 resulting in chronic renal disease  Nephrotic syndrome  HPV (Human Papillomavirus)  History of peritoneal dialysis: 3/2017  H/O umbilical hernia repair:   History of hip surgery: R hip due to necrosis in --bone graft from right tibia  H/O tubal ligation: in , along with   H/O: : 014  S/P Laparoscopic Cholecystectomy  [ x] Reviewed     SOCIAL HISTORY:  Residence: [ ] [facility]  [ ] [facility]  [x ] Community  [ x] Substance abuse: none  [x ] Tobacco: none  [ x] Alcohol use: none    FAMILY HISTORY:  Family history of sarcoidosis (Sibling): Sister with HgbSS and sarcoidosis  Family history of sickle cell disease (Sibling): Sister with HgbSS and sarcoidosis  Family history of sickle cell trait in mother  Family history of sickle cell trait in father      REVIEW OF SYSTEMS:    CONSTITUTIONAL: No fever, weight loss, or fatigue  EYES: No eye pain, visual disturbances, or discharge  ENMT:  No difficulty hearing, tinnitus, vertigo; No sinus or throat pain  NECK: No pain or stiffness  BREASTS: No pain, masses, or nipple discharge  RESPIRATORY: No cough, wheezing, chills or hemoptysis; No shortness of breath  CARDIOVASCULAR: No chest pain, palpitations, dizziness, or leg swelling  GASTROINTESTINAL: No abdominal or epigastric pain. No nausea, vomiting, or hematemesis; No diarrhea or constipation. No melena or hematochezia.  GENITOURINARY: No dysuria, frequency, hematuria, or incontinence  NEUROLOGICAL: No headaches, memory loss, loss of strength, numbness, or tremors  SKIN: No itching, burning, rashes, or lesions   LYMPH NODES: No enlarged glands  ENDOCRINE: No heat or cold intolerance; No hair loss  MUSCULOSKELETAL: Left hip pain  PSYCHIATRIC: No depression, anxiety, mood swings, or difficulty sleeping  HEME/LYMPH: No easy bruising, or bleeding gums  ALLERGY AND IMMUNOLOGIC: No hives or eczema    [x  ] All other ROS negative  [  ] Unable to obtain due to poor mental status    Vital Signs Last 24 Hrs  T(C): 36.8 (2018 09:20), Max: 36.8 (2018 20:00)  T(F): 98.2 (2018 09:20), Max: 98.2 (2018 20:00)  HR: 86 (2018 10:04) (70 - 88)  BP: 96/59 (2018 10:04) (92/60 - 115/77)  BP(mean): --  RR: 16 (2018 10:04) (12 - 21)  SpO2: 100% (2018 09:20) (99% - 100%)    PHYSICAL EXAM:    GENERAL: NAD, well-groomed, well-developed  HEAD:  Atraumatic, Normocephalic  EYES: EOMI, PERRLA, conjunctiva and sclera clear  ENMT: Moist mucous membranes  NECK: Supple, No JVD  RESPIRATORY: Clear to auscultation bilaterally; No rales, rhonchi, wheezing, or rubs  CARDIOVASCULAR: Regular rate and rhythm; No murmurs, rubs, or gallops  GASTROINTESTINAL: Soft, Nontender, Nondistended; Bowel sounds present  GENITOURINARY: Not examined  EXTREMITIES:  2+ Peripheral Pulses, No clubbing, cyanosis, or edema  NERVOUS SYSTEM:  Alert & Oriented X3; Moving all 4 extremities; No gross sensory deficits  HEME/LYMPH: No lymphadenopathy noted  SKIN: No rashes or lesions; Incisions C/D/I    LABS:                        9.5    14.90 )-----------( 596      ( 2018 06:30 )             29.6     06-05    140  |  96<L>  |  46<H>  ----------------------------<  101<H>  4.5   |  21<L>  |  9.43<H>    Ca    7.8<L>      2018 06:30          CAPILLARY BLOOD GLUCOSE          RADIOLOGY & ADDITIONAL STUDIES:    EKG:   Personally Reviewed:  [ ] YES     Imaging: < from: US Duplex Venous Lower Ext Complete, Bilateral (18 @ 14:30) >  IMPRESSION:     No evidence of deep venous thrombosis in the visualized bilateral lower   extremities.    < end of copied text >    Personally Reviewed:  [ x] YES               Consultant(s) notes reviewed:    Care Discussed with Consultant(s)/Other Providers: Ortho Patient is a 37y old  Female who presents with a chief complaint of left total hip arthroplasty (2018 06:50)      HPI:  37 year old female w/ Hx of sickle cell disease c/b avascular necrosis of hips, parvovirus B19 in 2016 c/w ESRD on peritoneal dialysis p/w worsening B/L Hip pain left worse than right now s/p Left Total Hip Replacement, Direct Anterior Approach on 18. Patient feels well after surgery. Patient has no new complaints. Denies cp, SOB, abdominal pain, N/V/D/cough/chills/dysuria.           Pain Symptoms if applicable:             	                         none	   mild         moderate         severe  Pain: 3	                            0	    1-3	     4-6	         7-10  Location: left hip	  Modifying factors: movement	  Associated symptoms:	    Function: [ x] Independent  [ ] Assistance  [ ] Total care  [ ] Non-ambulatory    Allergies    morphine (Other)    Intolerances        HOME MEDICATIONS: [ x] Reviewed  folic acid 1 mg oral tablet: Last Dose Taken:  , 1 tab(s) orally once a day  calcitriol 0.5 mcg oral capsule: Last Dose Taken:  , 2 cap(s) orally once a day  Epogen 10,000 units/mL preservative-free injectable solution: Last Dose Taken:  , 2 x weekly Mon Thur  Cristin-Marguerite oral tablet: Last Dose Taken:  , 1 tab(s) orally once a day last dose   Tylenol with Codeine #3 oral tablet: Last Dose Taken:  , 2 tab(s) orally 1 to 2 times a day, As Needed  Dilaudid 2 mg oral tablet: Last Dose Taken:  , 1 tab(s) orally 1 to 2 times a day, As Needed    MEDICATIONS  (STANDING):  acetaminophen   Tablet. 975 milliGRAM(s) Oral every 8 hours  aspirin enteric coated 325 milliGRAM(s) Oral two times a day  calcitriol   Capsule 1 MICROGram(s) Oral daily  docusate sodium 100 milliGRAM(s) Oral three times a day  folic acid 1 milliGRAM(s) Oral daily  Nephro-marguerite 1 Tablet(s) Oral daily  oxyCODONE  ER Tablet 10 milliGRAM(s) Oral every 12 hours  pantoprazole    Tablet 40 milliGRAM(s) Oral once  pantoprazole    Tablet 40 milliGRAM(s) Oral before breakfast  pantoprazole    Tablet 40 milliGRAM(s) Oral daily  polyethylene glycol 3350 17 Gram(s) Oral daily  sodium chloride 0.9% lock flush 3 milliLiter(s) IV Push every 8 hours  sodium chloride 0.9%. 1000 milliLiter(s) (30 mL/Hr) IV Continuous <Continuous>  traMADol 50 milliGRAM(s) Oral every 8 hours    MEDICATIONS  (PRN):  acetaminophen   Tablet 650 milliGRAM(s) Oral every 6 hours PRN For Temp greater than 38 C (100.4 F)  aluminum hydroxide/magnesium hydroxide/simethicone Suspension 30 milliLiter(s) Oral four times a day PRN Indigestion  magnesium hydroxide Suspension 30 milliLiter(s) Oral daily PRN Constipation  morphine  - Injectable 2 milliGRAM(s) IV Push every 3 hours PRN Breakthrough  ondansetron Injectable 4 milliGRAM(s) IV Push every 6 hours PRN Nausea and/or Vomiting  oxyCODONE    IR 5 milliGRAM(s) Oral every 4 hours PRN Moderate Pain (4 - 6)  oxyCODONE    IR 10 milliGRAM(s) Oral every 4 hours PRN Severe Pain (7 - 10)  senna 2 Tablet(s) Oral at bedtime PRN Constipation      PAST MEDICAL & SURGICAL HISTORY:  Peritoneal dialysis catheter in place  Umbilical hernia: 2017  Cholelithiasis  Anemia  Sickle cell disease  Chronic kidney disease: started dialysis 2017 with etiology from Parvo Virus 2016  Parvovirus B19 infection: 2016 resulting in chronic renal disease  Nephrotic syndrome  HPV (Human Papillomavirus)  History of peritoneal dialysis: 3/2017  H/O umbilical hernia repair:   History of hip surgery: R hip due to necrosis in --bone graft from right tibia  H/O tubal ligation: in , along with   H/O: : 014  S/P Laparoscopic Cholecystectomy  [ x] Reviewed     SOCIAL HISTORY:  Residence: [ ] [facility]  [ ] [facility]  [x ] Community  [ x] Substance abuse: none  [x ] Tobacco: none  [ x] Alcohol use: none    FAMILY HISTORY:  Family history of sarcoidosis (Sibling): Sister with HgbSS and sarcoidosis  Family history of sickle cell disease (Sibling): Sister with HgbSS and sarcoidosis  Family history of sickle cell trait in mother  Family history of sickle cell trait in father      REVIEW OF SYSTEMS:    CONSTITUTIONAL: No fever, weight loss, or fatigue  EYES: No eye pain, visual disturbances, or discharge  ENMT:  No difficulty hearing, tinnitus, vertigo; No sinus or throat pain  NECK: No pain or stiffness  BREASTS: No pain, masses, or nipple discharge  RESPIRATORY: No cough, wheezing, chills or hemoptysis; No shortness of breath  CARDIOVASCULAR: No chest pain, palpitations, dizziness, or leg swelling  GASTROINTESTINAL: No abdominal or epigastric pain. No nausea, vomiting, or hematemesis; No diarrhea or constipation. No melena or hematochezia.  GENITOURINARY: No dysuria, frequency, hematuria, or incontinence  NEUROLOGICAL: No headaches, memory loss, loss of strength, numbness, or tremors  SKIN: No itching, burning, rashes, or lesions   LYMPH NODES: No enlarged glands  ENDOCRINE: No heat or cold intolerance; No hair loss  MUSCULOSKELETAL: Left hip pain  PSYCHIATRIC: No depression, anxiety, mood swings, or difficulty sleeping  HEME/LYMPH: No easy bruising, or bleeding gums  ALLERGY AND IMMUNOLOGIC: No hives or eczema    [x  ] All other ROS negative  [  ] Unable to obtain due to poor mental status    Vital Signs Last 24 Hrs  T(C): 36.8 (2018 09:20), Max: 36.8 (2018 20:00)  T(F): 98.2 (2018 09:20), Max: 98.2 (2018 20:00)  HR: 86 (2018 10:04) (70 - 88)  BP: 96/59 (2018 10:04) (92/60 - 115/77)  BP(mean): --  RR: 16 (2018 10:04) (12 - 21)  SpO2: 100% (2018 09:20) (99% - 100%)    PHYSICAL EXAM:    GENERAL: NAD, well-groomed, well-developed  HEAD:  Atraumatic, Normocephalic  EYES: EOMI, PERRLA, conjunctiva and sclera clear  ENMT: Moist mucous membranes  NECK: Supple, No JVD  RESPIRATORY: Clear to auscultation bilaterally; No rales, rhonchi, wheezing, or rubs  CARDIOVASCULAR: Regular rate and rhythm; No murmurs, rubs, or gallops  GASTROINTESTINAL: Soft, Nontender, Nondistended; Bowel sounds present  GENITOURINARY: Not examined  EXTREMITIES:  2+ Peripheral Pulses, No clubbing, cyanosis, or edema  NERVOUS SYSTEM:  Alert & Oriented X3; Moving all 4 extremities; No gross sensory deficits  HEME/LYMPH: No lymphadenopathy noted  SKIN: No rashes or lesions; Incisions C/D/I    LABS:                        9.5    14.90 )-----------( 596      ( 2018 06:30 )             29.6     06-05    140  |  96<L>  |  46<H>  ----------------------------<  101<H>  4.5   |  21<L>  |  9.43<H>    Ca    7.8<L>      2018 06:30          CAPILLARY BLOOD GLUCOSE          RADIOLOGY & ADDITIONAL STUDIES:    EKG:   Personally Reviewed:  [ ] YES     Imaging: < from: US Duplex Venous Lower Ext Complete, Bilateral (18 @ 14:30) >  IMPRESSION:     No evidence of deep venous thrombosis in the visualized bilateral lower   extremities.    < end of copied text >    Personally Reviewed:  [ x] YES               Consultant(s) notes reviewed:    Care Discussed with Consultant(s)/Other Providers: Ortho

## 2018-06-05 NOTE — CONSULT NOTE ADULT - ATTENDING COMMENTS
Hip surgery, ESRD, seen on PD  1.  ESRD--Well educated PD patient currently on CAPD while hospitalized.  2.  Renal osteodystrophy--Ca, Vit D

## 2018-06-05 NOTE — DISCHARGE NOTE ADULT - HOSPITAL COURSE
Pt is s/p left total hip arthroplasty on 6/4/18 without any intraoperative complications.  Pt is doing well and stable for discharge.  Pt is tolerating physical therapy: WBAT with cane/walker, ANTERIOR TOTAL HIP PRECAUTIONS, gait training.  Leave dressing on until post op office visit(POD#14).  Have sutures/staples removed POD#14 if present.  Pt is on enteric coated ASA 325mg PO BID for DVT prophylaxis take for 6 weeks unless otherwise directed by surgeon.  follow up with Dr. Modi in two weeks.  Follow up with primary care doctor in 1-2 weeks for continuity of care. Pt is s/p left total hip arthroplasty on 6/4/18 without any intraoperative complications.  Pt is doing well and stable for discharge.  Pt is tolerating physical therapy: WBAT with cane/walker, ANTERIOR TOTAL HIP PRECAUTIONS, gait training.  Leave dressing on until post op office visit(POD#14).  Have sutures/staples removed POD#14 if present.  Pt is on enteric coated ASA 325mg PO BID for DVT prophylaxis take for 6 weeks unless otherwise directed by surgeon.  follow up with Dr. Modi in two weeks.  Follow up with primary care doctor in 1-2 weeks for continuity of care. Follow up with your outpatient nephrologist in 1-2 weeks for continuity of care.

## 2018-06-05 NOTE — CONSULT NOTE ADULT - PROBLEM SELECTOR RECOMMENDATION 2
C/w calcitriol 1 mcg daily. Monitor serum phosphorus and calcium levels.
Hb stable  no signs of sickle cell crisis  Monitoring H/H

## 2018-06-05 NOTE — CONSULT NOTE ADULT - PROBLEM SELECTOR RECOMMENDATION 9
Patient with ESRD on PD. Pt clinically stable. Labs reviewed. Will arrange for  4 session of CAPD today of 2L of 1.5% today with dwell of 4 hours. Monitor BMP daily.
s/p Left THR on 6/4/18  management as per ortho  pain controlled with oxy IR/morphine prn  ASA 325mg bid for dvt ppx

## 2018-06-05 NOTE — DISCHARGE NOTE ADULT - INSTRUCTIONS
no change Call MD for any complain of swelling, numbness, tingling to leg, or if pain not relieved after taking pain medications and fever.  You have a post op appointment with Dr. Modi on June 19, 2018 @8:15 AM @[facility]., if unable to attend, please call office to reschedule.  Take over the counter stool softener to prevent constipation that can be caused by taking pain medication.  Leave dressing on till you see doctor at office.

## 2018-06-05 NOTE — PHYSICAL THERAPY INITIAL EVALUATION ADULT - RANGE OF MOTION EXAMINATION, REHAB EVAL
no ROM deficits were identified/Except left hip unable to be fully assessed secondary to hip precautions as patient was received and left in sitting. Follow progress with PT.

## 2018-06-05 NOTE — DISCHARGE NOTE ADULT - MEDICATION SUMMARY - MEDICATIONS TO TAKE
I will START or STAY ON the medications listed below when I get home from the hospital:    traMADol 50 mg oral tablet  -- 1 tab(s) by mouth every 8 hours MDD:3  -- Indication: For Pain    Percocet 5/325 oral tablet  -- 1-2 tab(s) by mouth every 4-6 hours prn pain MDD:10  -- Caution federal law prohibits the transfer of this drug to any person other  than the person for whom it was prescribed.  May cause drowsiness.  Alcohol may intensify this effect.  Use care when operating dangerous machinery.  This prescription cannot be refilled.  This product contains acetaminophen.  Do not use  with any other product containing acetaminophen to prevent possible liver damage.  Using more of this medication than prescribed may cause serious breathing problems.    -- Indication: For Pain    aspirin 325 mg oral delayed release tablet  -- 1 tab(s) by mouth 2 times a day  -- Indication: For blood clot prevention    gentamicin 0.1% topical cream  -- 1 application on skin 4 times a day  -- Indication: For derm agent    Epogen 10,000 units/mL preservative-free injectable solution  -- 2 x weekly Mon Thur  -- Indication: For Home med    docusate sodium 100 mg oral capsule  -- 1 cap(s) by mouth 3 times a day  -- Indication: For stool softener    senna oral tablet  -- 2 tab(s) by mouth once a day (at bedtime), As needed, Constipation  -- Indication: For Laxative    pantoprazole 40 mg oral delayed release tablet  -- 1 tab(s) by mouth once a day  -- Indication: For GI protection, over the counter med, take while taking aspirin    Cristin-Marguerite oral tablet  -- 1 tab(s) by mouth once a day last dose 5/28  -- Indication: For Home med    calcitriol 0.5 mcg oral capsule  -- 2 cap(s) by mouth once a day  -- Indication: For Home med    folic acid 1 mg oral tablet  -- 1 tab(s) by mouth once a day  -- Indication: For Home med

## 2018-06-05 NOTE — DISCHARGE NOTE ADULT - NS AS DC FOLLOWUP STROKE INST
Smoking Cessation total hip, anterior precautions, exercise worksheet, tramadol, percocet, aspirin/Smoking Cessation

## 2018-06-05 NOTE — CONSULT NOTE ADULT - PROBLEM SELECTOR RECOMMENDATION 3
Patient get PD as home @ night for 8 hours.  However in the hospital is getting it 4 x/day.  c/w renal diet  c/w epogen  c/w calcitriol

## 2018-06-05 NOTE — DISCHARGE NOTE ADULT - CARE PROVIDER_API CALL
Long Modi), Orthopaedic Surgery  [address] [address]  Phone: [phone]  Fax: [phone]

## 2018-06-05 NOTE — PROGRESS NOTE ADULT - SUBJECTIVE AND OBJECTIVE BOX
ORTHO PROGRESS NOTE     Pt seen and examined at bedside, denies SOB, CP, Dizziness. N/V/D /HA.  No significant overnight events. Pain well controlled.    Vital Signs Last 24 Hrs  T(C): 36.5 (05 Jun 2018 05:00), Max: 36.8 (04 Jun 2018 20:00)  T(F): 97.7 (05 Jun 2018 05:00), Max: 98.2 (04 Jun 2018 20:00)  HR: 78 (05 Jun 2018 05:00) (70 - 87)  BP: 105/68 (05 Jun 2018 05:00) (92/60 - 115/77)  BP(mean): --  RR: 16 (05 Jun 2018 05:00) (12 - 21)  SpO2: 100% (05 Jun 2018 05:00) (99% - 100%)    Gen: No apparent distress, alert  LLE:  Dressing C/D/I          +DF/PF. moving toes          SILT, wwp at foot          compartments soft    Labs:  CBC Full  -  ( 04 Jun 2018 19:10 )  WBC Count : 21.05 K/uL  Hemoglobin : 9.9 g/dL  Hematocrit : 29.8 %  Platelet Count - Automated : 506 K/uL  Mean Cell Volume : 86.1 fL  Mean Cell Hemoglobin : 28.6 pg  Mean Cell Hemoglobin Concentration : 33.2 %  Auto Neutrophil # : x  Auto Lymphocyte # : x  Auto Monocyte # : x  Auto Eosinophil # : x  Auto Basophil # : x  Auto Neutrophil % : x  Auto Lymphocyte % : x  Auto Monocyte % : x  Auto Eosinophil % : x  Auto Basophil % : x      06-04    141  |  97<L>  |  43<H>  ----------------------------<  135<H>  3.5   |  23  |  8.67<H>    Ca    8.1<L>      04 Jun 2018 19:10        A/P  Pt is a 37yyo Female s/p L total hip Arthroplasty    - Pain control/ Analgesia  - DVT ppx  - PT/OT - wbat/oob    - WBAT

## 2018-06-05 NOTE — DISCHARGE NOTE ADULT - PLAN OF CARE
pain control, surgical site healing, ambulation, return to ADL's Pt is s/p left total hip arthroplasty on 6/4/18 without any intraoperative complications.  Pt is doing well and stable for discharge.  Pt is tolerating physical therapy: WBAT with cane/walker, ANTERIOR TOTAL HIP PRECAUTIONS, gait training.  Leave dressing on until post op office visit(POD#14).  Have sutures/staples removed POD#14 if present.  Pt is on enteric coated ASA 325mg PO BID for DVT prophylaxis take for 6 weeks unless otherwise directed by surgeon.  follow up with Dr. Modi in two weeks.  Follow up with primary care doctor in 1-2 weeks for continuity of care. Pt is s/p left total hip arthroplasty on 6/4/18 without any intraoperative complications.  Pt is doing well and stable for discharge.  Pt is tolerating physical therapy: WBAT with cane/walker, ANTERIOR TOTAL HIP PRECAUTIONS, gait training.  Leave dressing on until post op office visit(POD#14).  Have sutures/staples removed POD#14 if present.  Pt is on enteric coated ASA 325mg PO BID for DVT prophylaxis take for 6 weeks unless otherwise directed by surgeon.  follow up with Dr. Modi in two weeks.  Follow up with primary care doctor in 1-2 weeks for continuity of care. Follow up with your outpatient nephrologist in 1-2 weeks for continuity of care.

## 2018-06-05 NOTE — DISCHARGE NOTE ADULT - CONDITIONS AT DISCHARGE
Left hip dressing clean, dry and intact.  Toes warm and mobile.  +NVS  Tolerated po and fluids  +void.  Stable

## 2018-06-05 NOTE — DISCHARGE NOTE ADULT - MEDICATION SUMMARY - MEDICATIONS TO STOP TAKING
I will STOP taking the medications listed below when I get home from the hospital:    OxyCONTIN 10 mg oral tablet, extended release  -- 1 tab(s) by mouth every 12 hours MDD:2

## 2018-06-05 NOTE — DISCHARGE NOTE ADULT - PATIENT PORTAL LINK FT
You can access the [facility] Portal, offered by [facility], by registering with the following website: [email or web address]

## 2018-06-05 NOTE — CONSULT NOTE ADULT - ASSESSMENT
37 year old female w/ Hx of sickle cell disease c/b avascular necrosis of hips, parvovirus B19 in 2016 c/w ESRD on peritoneal dialysis p/w worsening B/L Hip pain left worse than right now s/p Left Total Hip Replacement, Direct Anterior Approach on 6/4/18.

## 2018-06-05 NOTE — CONSULT NOTE ADULT - SUBJECTIVE AND OBJECTIVE BOX
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 Pt. has been on PD since 3/2017. Pt. nephrologist is Dr. Lasren. Pt. states she has not had any problems with PD. PD catheter is working well. Currently patient denies c/o SOB, CP, abdominal pain, nausea, vomiting.     PAST HISTORY  --------------------------------------------------------------------------------  PAST MEDICAL & SURGICAL HISTORY:  Peritoneal dialysis catheter in place  Umbilical hernia: 2017  Cholelithiasis  Anemia  Sickle cell disease  Chronic kidney disease: started dialysis 2017 with etiology from Parvo Virus 2016  Parvovirus B19 infection: 2016 resulting in chronic renal disease  Nephrotic syndrome  HPV (Human Papillomavirus)  History of peritoneal dialysis: 3/2017  H/O umbilical hernia repair:   History of hip surgery: R hip due to necrosis in --bone graft from right tibia  H/O tubal ligation: in , along with   H/O: : 014  S/P Laparoscopic Cholecystectomy    FAMILY HISTORY:  Family history of sarcoidosis (Sibling): Sister with HgbSS and sarcoidosis  Family history of sickle cell disease (Sibling): Sister with HgbSS and sarcoidosis  Family history of sickle cell trait in mother  Family history of sickle cell trait in father    PAST SOCIAL HISTORY:    ALLERGIES & MEDICATIONS  --------------------------------------------------------------------------------  Allergies    morphine (Other)    Intolerances      Standing Inpatient Medications  acetaminophen   Tablet. 975 milliGRAM(s) Oral every 8 hours  aspirin enteric coated 325 milliGRAM(s) Oral two times a day  calcitriol   Capsule 1 MICROGram(s) Oral daily  docusate sodium 100 milliGRAM(s) Oral three times a day  folic acid 1 milliGRAM(s) Oral daily  gentamicin 0.1% Cream 1 Application(s) Topical four times a day  Nephro-jay 1 Tablet(s) Oral daily  oxyCODONE  ER Tablet 10 milliGRAM(s) Oral every 12 hours  pantoprazole    Tablet 40 milliGRAM(s) Oral once  pantoprazole    Tablet 40 milliGRAM(s) Oral before breakfast  pantoprazole    Tablet 40 milliGRAM(s) Oral daily  polyethylene glycol 3350 17 Gram(s) Oral daily  sodium chloride 0.9% lock flush 3 milliLiter(s) IV Push every 8 hours  sodium chloride 0.9%. 1000 milliLiter(s) IV Continuous <Continuous>  traMADol 50 milliGRAM(s) Oral every 8 hours    PRN Inpatient Medications  acetaminophen   Tablet 650 milliGRAM(s) Oral every 6 hours PRN  aluminum hydroxide/magnesium hydroxide/simethicone Suspension 30 milliLiter(s) Oral four times a day PRN  magnesium hydroxide Suspension 30 milliLiter(s) Oral daily PRN  morphine  - Injectable 2 milliGRAM(s) IV Push every 3 hours PRN  ondansetron Injectable 4 milliGRAM(s) IV Push every 6 hours PRN  oxyCODONE    IR 5 milliGRAM(s) Oral every 4 hours PRN  oxyCODONE    IR 10 milliGRAM(s) Oral every 4 hours PRN  senna 2 Tablet(s) Oral at bedtime PRN      REVIEW OF SYSTEMS  --------------------------------------------------------------------------------  Gen: No  fevers/chills, weakness  Skin: No rashes  Head/Eyes/Ears/Mouth: No headache; Normal hearing; Normal vision w/o blurriness;   Respiratory: No dyspnea, cough, wheezing, hemoptysis  CV: No chest pain,   GI: No abdominal pain, diarrhea, constipation, nausea, vomiting  : No increased frequency, dysuria, hematuria, nocturia  MSK: No joint pain/swelling;   Neuro: No dizziness/lightheadedness, weakness, seizures    All other systems were reviewed and are negative, except as noted.    VITALS/PHYSICAL EXAM  --------------------------------------------------------------------------------  T(C): 36.3 (18 @ 14:48), Max: 36.8 (18 @ 20:00)  HR: 84 (18 @ 14:48) (70 - 90)  BP: 103/69 (18 @ 14:48) (92/60 - 115/77)  RR: 17 (18 14:48) (12 - 21)  SpO2: 100% (18 14:48) (99% - 100%)  Wt(kg): --  Height (cm): 167.64 (18 22:43)  Weight (kg): 63.8 (18 22:43)  BMI (kg/m2): 22.7 (18 22:43)  BSA (m2): 1.72 (18 22:43)      18 @ 07:01  -  18 @ 07:00  --------------------------------------------------------  IN: 410 mL / OUT: 0 mL / NET: 410 mL    06-05-18 @ 07:01  -  18 @ 16:26  --------------------------------------------------------  IN: 4000 mL / OUT: 2200 mL / NET: 1800 mL      Physical Exam:  	  Gen: Resting in bed  	HEENT: supple neck   	Pulm: CTA B/L  	CV: RRR  	Abd: soft + PD catheter   	: No suprapubic tenderness  	LE: Warm, no edema  	Neuro: Awake and alert   	Psych: Normal affect and mood  	Skin: Warm, without rashes  	Vascular access: + PD catheter, dressing clean/ dry/ intact     LABS/STUDIES  --------------------------------------------------------------------------------              9.5    14.90 >-----------<  596      [18 @ 06:30]              29.6     140  |  96  |  46  ----------------------------<  101      [18 @ 06:30]  4.5   |  21  |  9.43        Ca     7.8     [18 @ 06:30]            Creatinine Trend:  SCr 9.43 [ 06:30]  SCr 8.67 [ 19:10]  SCr 10.55 [ @ 08:04]  SCr 10.44 [ @ 05:30]  SCr 11.79 [ @ 08:20]

## 2018-06-05 NOTE — DISCHARGE NOTE ADULT - CARE PLAN
Principal Discharge DX:	Osteoarthritis  Goal:	pain control, surgical site healing, ambulation, return to ADL's  Assessment and plan of treatment:	Pt is s/p left total hip arthroplasty on 6/4/18 without any intraoperative complications.  Pt is doing well and stable for discharge.  Pt is tolerating physical therapy: WBAT with cane/walker, ANTERIOR TOTAL HIP PRECAUTIONS, gait training.  Leave dressing on until post op office visit(POD#14).  Have sutures/staples removed POD#14 if present.  Pt is on enteric coated ASA 325mg PO BID for DVT prophylaxis take for 6 weeks unless otherwise directed by surgeon.  follow up with Dr. Modi in two weeks.  Follow up with primary care doctor in 1-2 weeks for continuity of care. Principal Discharge DX:	Osteoarthritis  Goal:	pain control, surgical site healing, ambulation, return to ADL's  Assessment and plan of treatment:	Pt is s/p left total hip arthroplasty on 6/4/18 without any intraoperative complications.  Pt is doing well and stable for discharge.  Pt is tolerating physical therapy: WBAT with cane/walker, ANTERIOR TOTAL HIP PRECAUTIONS, gait training.  Leave dressing on until post op office visit(POD#14).  Have sutures/staples removed POD#14 if present.  Pt is on enteric coated ASA 325mg PO BID for DVT prophylaxis take for 6 weeks unless otherwise directed by surgeon.  follow up with Dr. Modi in two weeks.  Follow up with primary care doctor in 1-2 weeks for continuity of care. Follow up with your outpatient nephrologist in 1-2 weeks for continuity of care.

## 2018-06-05 NOTE — PHYSICAL THERAPY INITIAL EVALUATION ADULT - GENERAL OBSERVATIONS, REHAB EVAL
Received sitting up at the edge of the bed with OT and left sitting in chair with IV in place and call bell in reach on the right.

## 2018-06-05 NOTE — OCCUPATIONAL THERAPY INITIAL EVALUATION ADULT - PERTINENT HX OF CURRENT PROBLEM, REHAB EVAL
37 year old female with hx of sickle cell disease and ESRD on peritoneal dialysis, states she has avascular necrosis of hips, left worse than right. Pt now s/p Left Total Hip Replacement.

## 2018-06-06 VITALS
TEMPERATURE: 98 F | OXYGEN SATURATION: 100 % | RESPIRATION RATE: 17 BRPM | SYSTOLIC BLOOD PRESSURE: 107 MMHG | DIASTOLIC BLOOD PRESSURE: 71 MMHG | HEART RATE: 76 BPM

## 2018-06-06 PROCEDURE — 99232 SBSQ HOSP IP/OBS MODERATE 35: CPT | Mod: GC

## 2018-06-06 PROCEDURE — 99233 SBSQ HOSP IP/OBS HIGH 50: CPT

## 2018-06-06 RX ORDER — GENTAMICIN SULFATE 0.1 %
1 OINTMENT (GRAM) TOPICAL
Qty: 0 | Refills: 0 | COMMUNITY
Start: 2018-06-06

## 2018-06-06 RX ORDER — ASPIRIN/CALCIUM CARB/MAGNESIUM 324 MG
1 TABLET ORAL
Qty: 84 | Refills: 0 | OUTPATIENT
Start: 2018-06-06 | End: 2018-07-17

## 2018-06-06 RX ORDER — SENNA PLUS 8.6 MG/1
2 TABLET ORAL
Qty: 0 | Refills: 0 | COMMUNITY
Start: 2018-06-06

## 2018-06-06 RX ORDER — TRAMADOL HYDROCHLORIDE 50 MG/1
1 TABLET ORAL
Qty: 21 | Refills: 0 | OUTPATIENT
Start: 2018-06-06 | End: 2018-06-12

## 2018-06-06 RX ORDER — PANTOPRAZOLE SODIUM 20 MG/1
1 TABLET, DELAYED RELEASE ORAL
Qty: 0 | Refills: 0 | COMMUNITY
Start: 2018-06-06

## 2018-06-06 RX ORDER — DOCUSATE SODIUM 100 MG
1 CAPSULE ORAL
Qty: 0 | Refills: 0 | COMMUNITY
Start: 2018-06-06

## 2018-06-06 RX ADMIN — Medication 1 TABLET(S): at 14:45

## 2018-06-06 RX ADMIN — Medication 100 MILLIGRAM(S): at 06:12

## 2018-06-06 RX ADMIN — CALCITRIOL 1 MICROGRAM(S): 0.5 CAPSULE ORAL at 14:45

## 2018-06-06 RX ADMIN — Medication 1 APPLICATION(S): at 16:00

## 2018-06-06 RX ADMIN — Medication 1 APPLICATION(S): at 12:00

## 2018-06-06 RX ADMIN — OXYCODONE HYDROCHLORIDE 10 MILLIGRAM(S): 5 TABLET ORAL at 15:00

## 2018-06-06 RX ADMIN — Medication 1 MILLIGRAM(S): at 18:06

## 2018-06-06 RX ADMIN — Medication 325 MILLIGRAM(S): at 14:45

## 2018-06-06 RX ADMIN — OXYCODONE HYDROCHLORIDE 10 MILLIGRAM(S): 5 TABLET ORAL at 15:45

## 2018-06-06 RX ADMIN — Medication 650 MILLIGRAM(S): at 14:45

## 2018-06-06 RX ADMIN — Medication 975 MILLIGRAM(S): at 15:25

## 2018-06-06 RX ADMIN — Medication 100 MILLIGRAM(S): at 18:05

## 2018-06-06 RX ADMIN — SODIUM CHLORIDE 3 MILLILITER(S): 9 INJECTION INTRAMUSCULAR; INTRAVENOUS; SUBCUTANEOUS at 06:08

## 2018-06-06 RX ADMIN — SODIUM CHLORIDE 3 MILLILITER(S): 9 INJECTION INTRAMUSCULAR; INTRAVENOUS; SUBCUTANEOUS at 14:10

## 2018-06-06 RX ADMIN — Medication 975 MILLIGRAM(S): at 06:12

## 2018-06-06 RX ADMIN — OXYCODONE HYDROCHLORIDE 10 MILLIGRAM(S): 5 TABLET ORAL at 06:11

## 2018-06-06 RX ADMIN — Medication 1 APPLICATION(S): at 06:13

## 2018-06-06 NOTE — PROGRESS NOTE ADULT - SUBJECTIVE AND OBJECTIVE BOX
S/B Orthopaedic Attending - Long Modi MD    S/P Left THR DAA - POD #1    Afebrile  Vital Signs Stable    Left Hip: Wound Dry                        No Swelling                        Nil NVD                        Pain - controlled    PA- Soft  Calves - Soft    Plan:   1. Physical Therapy  2. WBAT Walker  3. Hip Precautions - Anterior  4. Abduction Pillow  5. Static Quads  6. Gluteal Sets  7. SCDS  8. Incentive spirometer  9. Ice Compress q4h for 20 minutes  10. Elevation - 2 pillows under heels.  11. Anticoagulation - Aspirin 325mg PO q12 x 6 weeks  12. D/C Planning - Home    13. Office Review 2 weeks postop

## 2018-06-06 NOTE — PROGRESS NOTE ADULT - PROBLEM SELECTOR PLAN 1
Recommendation: Patient with ESRD on PD. Pt clinically stable. Labs reviewed Will arrange for  4 session of CAPD today of 2L of 1.5% today with dwell of 4 hours. Check BMP today.

## 2018-06-06 NOTE — PROGRESS NOTE ADULT - ATTENDING COMMENTS
Feeling improved.  S/P hip surgery.  Tolerating PD  1.  ESRD--on CAPD without problem.  Regimen reviewed and optimizing for volume, BP

## 2018-06-06 NOTE — PROGRESS NOTE ADULT - SUBJECTIVE AND OBJECTIVE BOX
Patient is a 37y old  Female who presents with a chief complaint of left total hip arthroplasty (05 Jun 2018 06:50)      SUBJECTIVE / OVERNIGHT EVENTS:    MEDICATIONS  (STANDING):  acetaminophen   Tablet. 975 milliGRAM(s) Oral every 8 hours  aspirin enteric coated 325 milliGRAM(s) Oral two times a day  calcitriol   Capsule 1 MICROGram(s) Oral daily  docusate sodium 100 milliGRAM(s) Oral three times a day  folic acid 1 milliGRAM(s) Oral daily  gentamicin 0.1% Cream 1 Application(s) Topical four times a day  Nephro-jay 1 Tablet(s) Oral daily  oxyCODONE  ER Tablet 10 milliGRAM(s) Oral every 12 hours  pantoprazole    Tablet 40 milliGRAM(s) Oral once  pantoprazole    Tablet 40 milliGRAM(s) Oral before breakfast  pantoprazole    Tablet 40 milliGRAM(s) Oral daily  polyethylene glycol 3350 17 Gram(s) Oral daily  sodium chloride 0.9% lock flush 3 milliLiter(s) IV Push every 8 hours  sodium chloride 0.9%. 1000 milliLiter(s) (30 mL/Hr) IV Continuous <Continuous>  traMADol 50 milliGRAM(s) Oral every 8 hours    MEDICATIONS  (PRN):  acetaminophen   Tablet 650 milliGRAM(s) Oral every 6 hours PRN For Temp greater than 38 C (100.4 F)  aluminum hydroxide/magnesium hydroxide/simethicone Suspension 30 milliLiter(s) Oral four times a day PRN Indigestion  bisacodyl Suppository 10 milliGRAM(s) Rectal daily PRN If no bowel movement by POD#2  magnesium hydroxide Suspension 30 milliLiter(s) Oral daily PRN Constipation  morphine  - Injectable 2 milliGRAM(s) IV Push every 3 hours PRN Breakthrough  ondansetron Injectable 4 milliGRAM(s) IV Push every 6 hours PRN Nausea and/or Vomiting  oxyCODONE    IR 5 milliGRAM(s) Oral every 4 hours PRN Moderate Pain (4 - 6)  oxyCODONE    IR 10 milliGRAM(s) Oral every 4 hours PRN Severe Pain (7 - 10)  senna 2 Tablet(s) Oral at bedtime PRN Constipation      Vital Signs Last 24 Hrs  T(C): 36.7 (06 Jun 2018 09:06), Max: 36.8 (05 Jun 2018 19:00)  T(F): 98.1 (06 Jun 2018 09:06), Max: 98.2 (05 Jun 2018 19:00)  HR: 81 (06 Jun 2018 09:06) (79 - 90)  BP: 100/62 (06 Jun 2018 09:06) (94/58 - 106/83)  BP(mean): --  RR: 16 (06 Jun 2018 09:06) (16 - 18)  SpO2: 100% (06 Jun 2018 09:06) (100% - 100%)  CAPILLARY BLOOD GLUCOSE        I&O's Summary    05 Jun 2018 07:01  -  06 Jun 2018 07:00  --------------------------------------------------------  IN: 23063 mL / OUT: 8400 mL / NET: 1600 mL        PHYSICAL EXAM:  GENERAL: NAD, well-developed  HEAD:  Atraumatic, Normocephalic  EYES: EOMI, PERRLA, conjunctiva and sclera clear  NECK: Supple, No JVD  CHEST/LUNG: Clear to auscultation bilaterally; No wheeze  HEART: Regular rate and rhythm; No murmurs, rubs, or gallops  ABDOMEN: Soft, Nontender, Nondistended; Bowel sounds present  EXTREMITIES:  2+ Peripheral Pulses, No clubbing, cyanosis, or edema  PSYCH: AAOx3  NEUROLOGY: non-focal  SKIN: No rashes or lesions    LABS:                        9.5    14.90 )-----------( 596      ( 05 Jun 2018 06:30 )             29.6     06-05    140  |  96<L>  |  46<H>  ----------------------------<  101<H>  4.5   |  21<L>  |  9.43<H>    Ca    7.8<L>      05 Jun 2018 06:30                RADIOLOGY & ADDITIONAL TESTS:    Imaging Personally Reviewed:    Consultant(s) Notes Reviewed:      Care Discussed with Consultants/Other Providers: Ortho Patient is a 37y old  Female who presents with a chief complaint of left total hip arthroplasty (05 Jun 2018 06:50)      SUBJECTIVE / OVERNIGHT EVENTS:  Patient has no new complaints. Denies cp, SOB, abdominal pain, N/V/D     MEDICATIONS  (STANDING):  acetaminophen   Tablet. 975 milliGRAM(s) Oral every 8 hours  aspirin enteric coated 325 milliGRAM(s) Oral two times a day  calcitriol   Capsule 1 MICROGram(s) Oral daily  docusate sodium 100 milliGRAM(s) Oral three times a day  folic acid 1 milliGRAM(s) Oral daily  gentamicin 0.1% Cream 1 Application(s) Topical four times a day  Nephro-jay 1 Tablet(s) Oral daily  oxyCODONE  ER Tablet 10 milliGRAM(s) Oral every 12 hours  pantoprazole    Tablet 40 milliGRAM(s) Oral once  pantoprazole    Tablet 40 milliGRAM(s) Oral before breakfast  pantoprazole    Tablet 40 milliGRAM(s) Oral daily  polyethylene glycol 3350 17 Gram(s) Oral daily  sodium chloride 0.9% lock flush 3 milliLiter(s) IV Push every 8 hours  sodium chloride 0.9%. 1000 milliLiter(s) (30 mL/Hr) IV Continuous <Continuous>  traMADol 50 milliGRAM(s) Oral every 8 hours    MEDICATIONS  (PRN):  acetaminophen   Tablet 650 milliGRAM(s) Oral every 6 hours PRN For Temp greater than 38 C (100.4 F)  aluminum hydroxide/magnesium hydroxide/simethicone Suspension 30 milliLiter(s) Oral four times a day PRN Indigestion  bisacodyl Suppository 10 milliGRAM(s) Rectal daily PRN If no bowel movement by POD#2  magnesium hydroxide Suspension 30 milliLiter(s) Oral daily PRN Constipation  morphine  - Injectable 2 milliGRAM(s) IV Push every 3 hours PRN Breakthrough  ondansetron Injectable 4 milliGRAM(s) IV Push every 6 hours PRN Nausea and/or Vomiting  oxyCODONE    IR 5 milliGRAM(s) Oral every 4 hours PRN Moderate Pain (4 - 6)  oxyCODONE    IR 10 milliGRAM(s) Oral every 4 hours PRN Severe Pain (7 - 10)  senna 2 Tablet(s) Oral at bedtime PRN Constipation      Vital Signs Last 24 Hrs  T(C): 36.7 (06 Jun 2018 09:06), Max: 36.8 (05 Jun 2018 19:00)  T(F): 98.1 (06 Jun 2018 09:06), Max: 98.2 (05 Jun 2018 19:00)  HR: 81 (06 Jun 2018 09:06) (79 - 90)  BP: 100/62 (06 Jun 2018 09:06) (94/58 - 106/83)  BP(mean): --  RR: 16 (06 Jun 2018 09:06) (16 - 18)  SpO2: 100% (06 Jun 2018 09:06) (100% - 100%)  CAPILLARY BLOOD GLUCOSE        I&O's Summary    05 Jun 2018 07:01  -  06 Jun 2018 07:00  --------------------------------------------------------  IN: 05478 mL / OUT: 8400 mL / NET: 1600 mL        PHYSICAL EXAM:  GENERAL: NAD, well-developed  HEAD:  Atraumatic, Normocephalic  EYES: EOMI, PERRLA, conjunctiva and sclera clear  NECK: Supple, No JVD  CHEST/LUNG: Clear to auscultation bilaterally; No wheeze  HEART: Regular rate and rhythm; No murmurs, rubs, or gallops  ABDOMEN: Soft, Nontender, Nondistended; Bowel sounds present  EXTREMITIES:  2+ Peripheral Pulses, No clubbing, cyanosis, or edema  PSYCH: AAOx3  NEUROLOGY: non-focal  SKIN: No rashes or lesions    LABS:                        9.5    14.90 )-----------( 596      ( 05 Jun 2018 06:30 )             29.6     06-05    140  |  96<L>  |  46<H>  ----------------------------<  101<H>  4.5   |  21<L>  |  9.43<H>    Ca    7.8<L>      05 Jun 2018 06:30                RADIOLOGY & ADDITIONAL TESTS:    Imaging Personally Reviewed:    Consultant(s) Notes Reviewed:      Care Discussed with Consultants/Other Providers: Ortho

## 2018-06-06 NOTE — PROGRESS NOTE ADULT - PROBLEM SELECTOR PLAN 3
Patient get PD as home @ night for 8 hours.  However in the hospital is getting it 4 x/day.  c/w renal diet  c/w epogen  c/w calcitriol.

## 2018-06-06 NOTE — PROGRESS NOTE ADULT - PROBLEM SELECTOR PLAN 1
s/p Left THR on 6/4/18  management as per ortho  pain controlled with oxy IR/morphine prn  ASA 325mg bid for dvt ppx. s/p Left THR on 6/4/18  management as per ortho  pain controlled with oxy IR/morphine prn  ASA 325mg bid for dvt ppx.  d/c planning as per primary team

## 2018-06-06 NOTE — PROGRESS NOTE ADULT - SUBJECTIVE AND OBJECTIVE BOX
ORTHO PROGRESS NOTE     Pt seen and examined at bedside, denies SOB, CP, Dizziness. N/V/D /HA.  No significant overnight events. Pain well controlled.    Vital Signs Last 24 Hrs  T(C): 36.6 (06 Jun 2018 06:06), Max: 36.8 (05 Jun 2018 09:20)  T(F): 97.8 (06 Jun 2018 06:06), Max: 98.2 (05 Jun 2018 09:20)  HR: 88 (06 Jun 2018 06:06) (79 - 90)  BP: 95/59 (06 Jun 2018 06:06) (94/58 - 106/83)  BP(mean): --  RR: 16 (06 Jun 2018 06:06) (16 - 18)  SpO2: 100% (06 Jun 2018 06:06) (100% - 100%)    Gen: No apparent distress, alert  LLE:  Dressing C/D/I          +DF/PF. moving toes          SILT, wwp at foot          compartments soft    Labs:  CBC Full  -  ( 05 Jun 2018 06:30 )  WBC Count : 14.90 K/uL  Hemoglobin : 9.5 g/dL  Hematocrit : 29.6 %  Platelet Count - Automated : 596 K/uL  Mean Cell Volume : 88.9 fL  Mean Cell Hemoglobin : 28.5 pg  Mean Cell Hemoglobin Concentration : 32.1 %  Auto Neutrophil # : x  Auto Lymphocyte # : x  Auto Monocyte # : x  Auto Eosinophil # : x  Auto Basophil # : x  Auto Neutrophil % : x  Auto Lymphocyte % : x  Auto Monocyte % : x  Auto Eosinophil % : x  Auto Basophil % : x      06-05    140  |  96<L>  |  46<H>  ----------------------------<  101<H>  4.5   |  21<L>  |  9.43<H>    Ca    7.8<L>      05 Jun 2018 06:30        A/P  Pt is a 37yyo Female s/p L KARIS    - Pain control/ Analgesia  - DVT ppx  - PT/OT    - WBAT  - DC planning

## 2018-06-06 NOTE — PROGRESS NOTE ADULT - SUBJECTIVE AND OBJECTIVE BOX
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 Patient seen and evaluated today; denies any complaints at this time.       PAST HISTORY  --------------------------------------------------------------------------------  No significant changes to PMH, PSH, FHx, SHx, unless otherwise noted    ALLERGIES & MEDICATIONS  --------------------------------------------------------------------------------  Allergies    morphine (Other)    Intolerances      Standing Inpatient Medications  acetaminophen   Tablet. 975 milliGRAM(s) Oral every 8 hours  aspirin enteric coated 325 milliGRAM(s) Oral two times a day  calcitriol   Capsule 1 MICROGram(s) Oral daily  docusate sodium 100 milliGRAM(s) Oral three times a day  folic acid 1 milliGRAM(s) Oral daily  gentamicin 0.1% Cream 1 Application(s) Topical four times a day  Nephro-jay 1 Tablet(s) Oral daily  oxyCODONE  ER Tablet 10 milliGRAM(s) Oral every 12 hours  pantoprazole    Tablet 40 milliGRAM(s) Oral once  pantoprazole    Tablet 40 milliGRAM(s) Oral before breakfast  pantoprazole    Tablet 40 milliGRAM(s) Oral daily  polyethylene glycol 3350 17 Gram(s) Oral daily  sodium chloride 0.9% lock flush 3 milliLiter(s) IV Push every 8 hours  sodium chloride 0.9%. 1000 milliLiter(s) IV Continuous <Continuous>  traMADol 50 milliGRAM(s) Oral every 8 hours    PRN Inpatient Medications  acetaminophen   Tablet 650 milliGRAM(s) Oral every 6 hours PRN  aluminum hydroxide/magnesium hydroxide/simethicone Suspension 30 milliLiter(s) Oral four times a day PRN  bisacodyl Suppository 10 milliGRAM(s) Rectal daily PRN  magnesium hydroxide Suspension 30 milliLiter(s) Oral daily PRN  morphine  - Injectable 2 milliGRAM(s) IV Push every 3 hours PRN  ondansetron Injectable 4 milliGRAM(s) IV Push every 6 hours PRN  oxyCODONE    IR 5 milliGRAM(s) Oral every 4 hours PRN  oxyCODONE    IR 10 milliGRAM(s) Oral every 4 hours PRN  senna 2 Tablet(s) Oral at bedtime PRN      REVIEW OF SYSTEMS  --------------------------------------------------------------------------------  Gen: No  fevers/chills  Skin: No rashes  Head/Eyes/Ears/Mouth: No headache; Normal hearing; Normal vision w/o blurriness  Respiratory: No dyspnea, cough, wheezing, hemoptysis  CV: No chest pain, PND, orthopnea  GI: No abdominal pain, diarrhea, constipation, nausea, vomiting  : No increased frequency, dysuria, hematuria, nocturia  MSK: No joint pain/swelling; no back pain; no edema  Neuro: No dizziness/lightheadedness, weakness, seizures, numbness, tingling      All other systems were reviewed and are negative, except as noted.    VITALS/PHYSICAL EXAM  --------------------------------------------------------------------------------  T(C): 36.7 (06-06-18 @ 06:57), Max: 36.8 (06-05-18 @ 09:20)  HR: 82 (06-06-18 @ 06:57) (79 - 90)  BP: 100/51 (06-06-18 @ 06:57) (94/58 - 106/83)  RR: 16 (06-06-18 @ 06:57) (16 - 18)  SpO2: 100% (06-06-18 @ 06:06) (100% - 100%)  Wt(kg): --  Height (cm): 167.64 (06-04-18 @ 22:43)  Weight (kg): 63.8 (06-04-18 @ 22:43)  BMI (kg/m2): 22.7 (06-04-18 @ 22:43)  BSA (m2): 1.72 (06-04-18 @ 22:43)      06-05-18 @ 07:01  -  06-06-18 @ 07:00  --------------------------------------------------------  IN: 59208 mL / OUT: 8400 mL / NET: 1600 mL      Physical Exam:  	  Gen: Resting in bed  	HEENT: supple neck   	Pulm: CTA B/L  	CV: RRR  	Abd: soft + PD catheter   	: No suprapubic tenderness  	LE: Warm, no edema  	Neuro: Awake and alert   	Psych: Normal affect and mood  	Skin: Warm, without rashes  	Vascular access: + PD catheter, dressing clean/ dry/ intact       LABS/STUDIES  --------------------------------------------------------------------------------              9.5    14.90 >-----------<  596      [06-05-18 @ 06:30]              29.6     140  |  96  |  46  ----------------------------<  101      [06-05-18 @ 06:30]  4.5   |  21  |  9.43        Ca     7.8     [06-05-18 @ 06:30]            Creatinine Trend:  SCr 9.43 [06-05 @ 06:30]  SCr 8.67 [06-04 @ 19:10]  SCr 10.55 [05-23 @ 08:04]  SCr 10.44 [05-23 @ 05:30]  SCr 11.79 [05-21 @ 08:20]

## 2018-06-11 ENCOUNTER — RX RENEWAL (OUTPATIENT)
Age: 38
End: 2018-06-11

## 2018-06-11 LAB — SURGICAL PATHOLOGY STUDY: SIGNIFICANT CHANGE UP

## 2018-06-11 RX ORDER — PANTOPRAZOLE 40 MG/1
40 TABLET, DELAYED RELEASE ORAL DAILY
Qty: 30 | Refills: 1 | Status: ACTIVE | COMMUNITY
Start: 2018-06-11 | End: 1900-01-01

## 2018-06-13 DIAGNOSIS — Z96.642 PRESENCE OF LEFT ARTIFICIAL HIP JOINT: ICD-10-CM

## 2018-06-14 ENCOUNTER — APPOINTMENT (OUTPATIENT)
Dept: INTERNAL MEDICINE | Facility: HOSPITAL | Age: 38
End: 2018-06-14

## 2018-06-19 ENCOUNTER — APPOINTMENT (OUTPATIENT)
Dept: ORTHOPEDIC SURGERY | Facility: CLINIC | Age: 38
End: 2018-06-19
Payer: COMMERCIAL

## 2018-06-19 PROCEDURE — 99024 POSTOP FOLLOW-UP VISIT: CPT

## 2018-06-19 PROCEDURE — 73502 X-RAY EXAM HIP UNI 2-3 VIEWS: CPT | Mod: LT

## 2018-06-19 RX ORDER — TRAMADOL HYDROCHLORIDE 50 MG/1
50 TABLET, COATED ORAL
Qty: 90 | Refills: 0 | Status: ACTIVE | COMMUNITY
Start: 2018-06-19 | End: 1900-01-01

## 2018-07-13 ENCOUNTER — TRANSCRIPTION ENCOUNTER (OUTPATIENT)
Age: 38
End: 2018-07-13

## 2018-07-16 PROBLEM — N18.9 CHRONIC KIDNEY DISEASE, UNSPECIFIED: Chronic | Status: ACTIVE | Noted: 2017-02-09

## 2018-07-16 PROBLEM — I10 ESSENTIAL (PRIMARY) HYPERTENSION: Chronic | Status: INACTIVE | Noted: 2017-11-09 | Resolved: 2017-12-09

## 2018-07-19 RX ORDER — CALCITRIOL 0.5 UG/1
0.5 CAPSULE, LIQUID FILLED ORAL DAILY
Qty: 270 | Refills: 0 | Status: ACTIVE | COMMUNITY
Start: 2018-07-19 | End: 1900-01-01

## 2018-07-19 RX ORDER — SEVELAMER CARBONATE 800 MG/1
800 TABLET, FILM COATED ORAL
Qty: 540 | Refills: 0 | Status: ACTIVE | COMMUNITY
Start: 2018-07-19 | End: 1900-01-01

## 2018-08-02 ENCOUNTER — APPOINTMENT (OUTPATIENT)
Dept: ORTHOPEDIC SURGERY | Facility: CLINIC | Age: 38
End: 2018-08-02
Payer: COMMERCIAL

## 2018-08-02 VITALS
HEART RATE: 102 BPM | WEIGHT: 129 LBS | BODY MASS INDEX: 21.47 KG/M2 | SYSTOLIC BLOOD PRESSURE: 90 MMHG | DIASTOLIC BLOOD PRESSURE: 59 MMHG

## 2018-08-02 PROCEDURE — 99024 POSTOP FOLLOW-UP VISIT: CPT

## 2018-08-02 RX ORDER — TRAMADOL HYDROCHLORIDE 50 MG/1
50 TABLET, COATED ORAL
Qty: 90 | Refills: 0 | Status: ACTIVE | COMMUNITY
Start: 2018-08-02 | End: 1900-01-01

## 2018-08-02 RX ORDER — MELOXICAM 15 MG/1
15 TABLET ORAL
Qty: 30 | Refills: 1 | Status: ACTIVE | COMMUNITY
Start: 2018-08-02 | End: 1900-01-01

## 2018-08-10 PROBLEM — D57.1 SICKLE-CELL DISEASE WITHOUT CRISIS: Chronic | Status: ACTIVE | Noted: 2017-02-09

## 2018-08-10 PROBLEM — K80.20 CALCULUS OF GALLBLADDER WITHOUT CHOLECYSTITIS WITHOUT OBSTRUCTION: Chronic | Status: ACTIVE | Noted: 2017-11-09

## 2018-08-10 PROBLEM — D64.9 ANEMIA, UNSPECIFIED: Chronic | Status: ACTIVE | Noted: 2017-11-09

## 2018-08-10 PROBLEM — K42.9 UMBILICAL HERNIA WITHOUT OBSTRUCTION OR GANGRENE: Chronic | Status: ACTIVE | Noted: 2017-11-09

## 2018-08-10 PROBLEM — Z99.2 DEPENDENCE ON RENAL DIALYSIS: Chronic | Status: ACTIVE | Noted: 2018-05-15

## 2018-08-16 RX ORDER — CINACALCET HYDROCHLORIDE 30 MG/1
30 TABLET, COATED ORAL
Qty: 90 | Refills: 3 | Status: ACTIVE | COMMUNITY
Start: 2018-07-19 | End: 1900-01-01

## 2018-08-16 RX ORDER — POTASSIUM CHLORIDE 1500 MG/1
20 TABLET, EXTENDED RELEASE ORAL
Qty: 60 | Refills: 0 | Status: ACTIVE | COMMUNITY
Start: 2018-08-16 | End: 1900-01-01

## 2018-08-20 ENCOUNTER — RX RENEWAL (OUTPATIENT)
Age: 38
End: 2018-08-20

## 2018-08-20 RX ORDER — DICLOFENAC SODIUM 75 MG/1
75 TABLET, DELAYED RELEASE ORAL
Qty: 60 | Refills: 0 | Status: ACTIVE | COMMUNITY
Start: 2018-08-20 | End: 1900-01-01

## 2018-08-21 RX ORDER — DICLOFENAC SODIUM 75 MG/1
75 TABLET, DELAYED RELEASE ORAL
Qty: 60 | Refills: 1 | Status: ACTIVE | COMMUNITY
Start: 2018-08-21 | End: 1900-01-01

## 2018-09-02 ENCOUNTER — FORM ENCOUNTER (OUTPATIENT)
Age: 38
End: 2018-09-02

## 2018-09-03 ENCOUNTER — INPATIENT (INPATIENT)
Facility: HOSPITAL | Age: 38
LOS: 3 days | Discharge: HOME | End: 2018-09-07
Attending: HOSPITALIST | Admitting: HOSPITALIST
Payer: COMMERCIAL

## 2018-09-03 VITALS
TEMPERATURE: 99 F | HEART RATE: 113 BPM | DIASTOLIC BLOOD PRESSURE: 66 MMHG | RESPIRATION RATE: 16 BRPM | OXYGEN SATURATION: 100 % | SYSTOLIC BLOOD PRESSURE: 102 MMHG

## 2018-09-03 DIAGNOSIS — Z98.890 OTHER SPECIFIED POSTPROCEDURAL STATES: Chronic | ICD-10-CM

## 2018-09-03 DIAGNOSIS — Z98.51 TUBAL LIGATION STATUS: Chronic | ICD-10-CM

## 2018-09-03 DIAGNOSIS — Z98.89 OTHER SPECIFIED POSTPROCEDURAL STATES: Chronic | ICD-10-CM

## 2018-09-03 LAB
ALBUMIN SERPL ELPH-MCNC: 3.1 G/DL — LOW (ref 3.3–5)
ALP SERPL-CCNC: 268 U/L — HIGH (ref 40–120)
ALT FLD-CCNC: 303 U/L — HIGH (ref 4–33)
AST SERPL-CCNC: 282 U/L — HIGH (ref 4–32)
BASOPHILS # BLD AUTO: 0.14 K/UL — SIGNIFICANT CHANGE UP (ref 0–0.2)
BASOPHILS NFR BLD AUTO: 0.9 % — SIGNIFICANT CHANGE UP (ref 0–2)
BILIRUB SERPL-MCNC: 1.2 MG/DL — SIGNIFICANT CHANGE UP (ref 0.2–1.2)
BUN SERPL-MCNC: 67 MG/DL — HIGH (ref 7–23)
CALCIUM SERPL-MCNC: 8.9 MG/DL — SIGNIFICANT CHANGE UP (ref 8.4–10.5)
CHLORIDE SERPL-SCNC: 87 MMOL/L — LOW (ref 98–107)
CO2 SERPL-SCNC: 17 MMOL/L — LOW (ref 22–31)
CREAT SERPL-MCNC: 10.64 MG/DL — HIGH (ref 0.5–1.3)
EOSINOPHIL # BLD AUTO: 0.43 K/UL — SIGNIFICANT CHANGE UP (ref 0–0.5)
EOSINOPHIL NFR BLD AUTO: 2.8 % — SIGNIFICANT CHANGE UP (ref 0–6)
GLUCOSE SERPL-MCNC: 107 MG/DL — HIGH (ref 70–99)
HAPTOGLOB SERPL-MCNC: 29 MG/DL — LOW (ref 34–200)
HCG SERPL-ACNC: < 5 MIU/ML — SIGNIFICANT CHANGE UP
HCT VFR BLD CALC: 34.5 % — SIGNIFICANT CHANGE UP (ref 34.5–45)
HGB BLD-MCNC: 12 G/DL — SIGNIFICANT CHANGE UP (ref 11.5–15.5)
IMM GRANULOCYTES # BLD AUTO: 0.84 # — SIGNIFICANT CHANGE UP
IMM GRANULOCYTES NFR BLD AUTO: 5.4 % — HIGH (ref 0–1.5)
LYMPHOCYTES # BLD AUTO: 1.19 K/UL — SIGNIFICANT CHANGE UP (ref 1–3.3)
LYMPHOCYTES # BLD AUTO: 7.7 % — LOW (ref 13–44)
MCHC RBC-ENTMCNC: 28 PG — SIGNIFICANT CHANGE UP (ref 27–34)
MCHC RBC-ENTMCNC: 34.8 % — SIGNIFICANT CHANGE UP (ref 32–36)
MCV RBC AUTO: 80.6 FL — SIGNIFICANT CHANGE UP (ref 80–100)
MONOCYTES # BLD AUTO: 1.2 K/UL — HIGH (ref 0–0.9)
MONOCYTES NFR BLD AUTO: 7.7 % — SIGNIFICANT CHANGE UP (ref 2–14)
NEUTROPHILS # BLD AUTO: 11.72 K/UL — HIGH (ref 1.8–7.4)
NEUTROPHILS NFR BLD AUTO: 75.5 % — SIGNIFICANT CHANGE UP (ref 43–77)
NRBC # FLD: 5.5 — SIGNIFICANT CHANGE UP
NRBC FLD-RTO: 35.4 — SIGNIFICANT CHANGE UP
PLATELET # BLD AUTO: 183 K/UL — SIGNIFICANT CHANGE UP (ref 150–400)
PMV BLD: 10.3 FL — SIGNIFICANT CHANGE UP (ref 7–13)
POTASSIUM SERPL-MCNC: 5.4 MMOL/L — HIGH (ref 3.5–5.3)
POTASSIUM SERPL-SCNC: 5.4 MMOL/L — HIGH (ref 3.5–5.3)
PROT SERPL-MCNC: 7.6 G/DL — SIGNIFICANT CHANGE UP (ref 6–8.3)
RBC # BLD: 4.28 M/UL — SIGNIFICANT CHANGE UP (ref 3.8–5.2)
RBC # FLD: 22 % — HIGH (ref 10.3–14.5)
RETICS #: 243 K/UL — HIGH (ref 25–125)
RETICS/RBC NFR: 5.7 % — HIGH (ref 0.5–2.5)
REVIEW TO FOLLOW: YES — SIGNIFICANT CHANGE UP
SODIUM SERPL-SCNC: 133 MMOL/L — LOW (ref 135–145)
WBC # BLD: 15.52 K/UL — HIGH (ref 3.8–10.5)
WBC # FLD AUTO: 15.52 K/UL — HIGH (ref 3.8–10.5)

## 2018-09-03 PROCEDURE — 70450 CT HEAD/BRAIN W/O DYE: CPT | Mod: 26

## 2018-09-03 RX ORDER — SODIUM CHLORIDE 9 MG/ML
1000 INJECTION INTRAMUSCULAR; INTRAVENOUS; SUBCUTANEOUS ONCE
Qty: 0 | Refills: 0 | Status: DISCONTINUED | OUTPATIENT
Start: 2018-09-03 | End: 2018-09-03

## 2018-09-03 RX ORDER — HYDROMORPHONE HYDROCHLORIDE 2 MG/ML
0.5 INJECTION INTRAMUSCULAR; INTRAVENOUS; SUBCUTANEOUS ONCE
Qty: 0 | Refills: 0 | Status: DISCONTINUED | OUTPATIENT
Start: 2018-09-03 | End: 2018-09-03

## 2018-09-03 RX ORDER — KETOROLAC TROMETHAMINE 30 MG/ML
15 SYRINGE (ML) INJECTION ONCE
Qty: 0 | Refills: 0 | Status: DISCONTINUED | OUTPATIENT
Start: 2018-09-03 | End: 2018-09-03

## 2018-09-03 RX ORDER — HYDROMORPHONE HYDROCHLORIDE 2 MG/ML
1 INJECTION INTRAMUSCULAR; INTRAVENOUS; SUBCUTANEOUS ONCE
Qty: 0 | Refills: 0 | Status: DISCONTINUED | OUTPATIENT
Start: 2018-09-03 | End: 2018-09-03

## 2018-09-03 RX ORDER — SODIUM CHLORIDE 9 MG/ML
500 INJECTION INTRAMUSCULAR; INTRAVENOUS; SUBCUTANEOUS ONCE
Qty: 0 | Refills: 0 | Status: COMPLETED | OUTPATIENT
Start: 2018-09-03 | End: 2018-09-03

## 2018-09-03 RX ADMIN — SODIUM CHLORIDE 500 MILLILITER(S): 9 INJECTION INTRAMUSCULAR; INTRAVENOUS; SUBCUTANEOUS at 23:22

## 2018-09-03 RX ADMIN — SODIUM CHLORIDE 500 MILLILITER(S): 9 INJECTION INTRAMUSCULAR; INTRAVENOUS; SUBCUTANEOUS at 21:12

## 2018-09-03 RX ADMIN — Medication 15 MILLIGRAM(S): at 21:51

## 2018-09-03 RX ADMIN — HYDROMORPHONE HYDROCHLORIDE 1 MILLIGRAM(S): 2 INJECTION INTRAMUSCULAR; INTRAVENOUS; SUBCUTANEOUS at 21:12

## 2018-09-03 RX ADMIN — HYDROMORPHONE HYDROCHLORIDE 1 MILLIGRAM(S): 2 INJECTION INTRAMUSCULAR; INTRAVENOUS; SUBCUTANEOUS at 21:30

## 2018-09-03 RX ADMIN — HYDROMORPHONE HYDROCHLORIDE 1 MILLIGRAM(S): 2 INJECTION INTRAMUSCULAR; INTRAVENOUS; SUBCUTANEOUS at 23:44

## 2018-09-03 RX ADMIN — Medication 15 MILLIGRAM(S): at 22:05

## 2018-09-03 NOTE — ED ADULT NURSE NOTE - NSIMPLEMENTINTERV_GEN_ALL_ED
Implemented All Fall with Harm Risk Interventions:  [place] to call system. Call bell, personal items and telephone within reach. Instruct patient to call for assistance. Room bathroom lighting operational. Non-slip footwear when patient is off stretcher. Physically safe environment: no spills, clutter or unnecessary equipment. Stretcher in lowest position, wheels locked, appropriate side rails in place. Provide visual cue, wrist band, yellow gown, etc. Monitor gait and stability. Monitor for mental status changes and reorient to person, place, and time. Review medications for side effects contributing to fall risk. Reinforce activity limits and safety measures with patient and family. Provide visual clues: red socks.

## 2018-09-03 NOTE — ED PROVIDER NOTE - ATTENDING CONTRIBUTION TO CARE
Dr. Mata: 36 yo female with ESRD on PD, sickle cell disease with avascular necrosis of hips, in ED with pain to hips and headache, both consistent with previous sickle cell crises.  No CP/SOB, N/V/D or abdominal pain.  On exam pt uncomfortable appearing, in moderate distress due to pain, heart RRR, lungs CTAB, abd NTND, extremities without swelling, bilateral hip/thighs TTP but no swelling or erythema, strength 5/5 in all extremities when laying in bed and skin without rash.

## 2018-09-03 NOTE — ED ADULT TRIAGE NOTE - CHIEF COMPLAINT QUOTE
Pt c/o of severe sickle cell pain to the arms, legs, neck and her head pt appears very uncomfortable in triage.

## 2018-09-03 NOTE — ED PROVIDER NOTE - OBJECTIVE STATEMENT
Gisel Slaughter, DO: 37F with hx of PD, SCD c/b avascular necrosis requiring R hip replacement here for sickle cell crisis of b/l hips a/w severe headache. No focal motor or sensory symptoms, visual changes, hearing changes. No nausea/vomiting, chest pain, cough, SOB. LMP August 2018.

## 2018-09-03 NOTE — ED PROVIDER NOTE - PROGRESS NOTE DETAILS
[place]: Labs notable for transaminitis (new), other labs grossly at pt's baseline. Has no abdominal complaints, but given transaminitis an US was ordered, still pending. CTH no acute pathology. Admitting for for further symptom control.

## 2018-09-03 NOTE — ED PROVIDER NOTE - PHYSICAL EXAMINATION
Gisel Slaughter, DO:  Gen: uncomfortable appearing & tearful, NAD  Head: NCAT  HEENT: PERRL, MMM, normal conjunctiva, anicteric, neck supple  Lung: CTAB, no adventitious sounds  CV: tachycardic with regular rhythm, no murmurs  Abd: soft, NTND, no rebound or guarding, no CVAT  MSK: No edema, no visible deformities  Neuro: CN II-XII intact. 3/5 strength of b/l hips 2/2 pain and avascular necrosis, 5/5 strength of b/l feet & UE and normal sensation in all extremities. Ambulatory with stable gait.   Skin: Warm and dry, no evidence of rash  Psych: normal mood and affect

## 2018-09-03 NOTE — ED PROVIDER NOTE - MEDICAL DECISION MAKING DETAILS
Gisel Slaughter, DO: 37F with hx of acute chest without symptomology consistent with acute chest here for b/l hip, LE pain & HA. No focal neuro deficits. Pain control, labs & reassessment with possible need for CT if HA not well controlled.

## 2018-09-04 DIAGNOSIS — N18.9 CHRONIC KIDNEY DISEASE, UNSPECIFIED: ICD-10-CM

## 2018-09-04 DIAGNOSIS — N18.6 END STAGE RENAL DISEASE: ICD-10-CM

## 2018-09-04 DIAGNOSIS — D57.00 HB-SS DISEASE WITH CRISIS, UNSPECIFIED: ICD-10-CM

## 2018-09-04 DIAGNOSIS — R74.0 NONSPECIFIC ELEVATION OF LEVELS OF TRANSAMINASE AND LACTIC ACID DEHYDROGENASE [LDH]: ICD-10-CM

## 2018-09-04 LAB
ALBUMIN SERPL ELPH-MCNC: 2.9 G/DL — LOW (ref 3.3–5)
ALP SERPL-CCNC: 269 U/L — HIGH (ref 40–120)
ALT FLD-CCNC: 242 U/L — HIGH (ref 4–33)
ANISOCYTOSIS BLD QL: SIGNIFICANT CHANGE UP
AST SERPL-CCNC: 161 U/L — HIGH (ref 4–32)
BASOPHILS NFR SPEC: 0 % — SIGNIFICANT CHANGE UP (ref 0–2)
BILIRUB SERPL-MCNC: 1 MG/DL — SIGNIFICANT CHANGE UP (ref 0.2–1.2)
BLASTS # FLD: 0 % — SIGNIFICANT CHANGE UP (ref 0–0)
BUN SERPL-MCNC: 74 MG/DL — HIGH (ref 7–23)
CALCIUM SERPL-MCNC: 8.3 MG/DL — LOW (ref 8.4–10.5)
CHLORIDE SERPL-SCNC: 89 MMOL/L — LOW (ref 98–107)
CO2 SERPL-SCNC: 22 MMOL/L — SIGNIFICANT CHANGE UP (ref 22–31)
CREAT SERPL-MCNC: 11.42 MG/DL — HIGH (ref 0.5–1.3)
DACRYOCYTES BLD QL SMEAR: SLIGHT — SIGNIFICANT CHANGE UP
ELLIPTOCYTES BLD QL SMEAR: SLIGHT — SIGNIFICANT CHANGE UP
EOSINOPHIL NFR FLD: 1.8 % — SIGNIFICANT CHANGE UP (ref 0–6)
GIANT PLATELETS BLD QL SMEAR: PRESENT — SIGNIFICANT CHANGE UP
GLUCOSE SERPL-MCNC: 97 MG/DL — SIGNIFICANT CHANGE UP (ref 70–99)
HCT VFR BLD CALC: 30.2 % — LOW (ref 34.5–45)
HGB BLD-MCNC: 10.9 G/DL — LOW (ref 11.5–15.5)
HYPOCHROMIA BLD QL: SIGNIFICANT CHANGE UP
LDH SERPL L TO P-CCNC: 1231 U/L — HIGH (ref 135–225)
LDH SERPL L TO P-CCNC: 1409 U/L — HIGH (ref 135–225)
LYMPHOCYTES NFR SPEC AUTO: 6.1 % — LOW (ref 13–44)
MACROCYTES BLD QL: SIGNIFICANT CHANGE UP
MANUAL SMEAR VERIFICATION: SIGNIFICANT CHANGE UP
MCHC RBC-ENTMCNC: 29 PG — SIGNIFICANT CHANGE UP (ref 27–34)
MCHC RBC-ENTMCNC: 36.1 % — HIGH (ref 32–36)
MCV RBC AUTO: 80.3 FL — SIGNIFICANT CHANGE UP (ref 80–100)
METAMYELOCYTES # FLD: 0 % — SIGNIFICANT CHANGE UP (ref 0–1)
MICROCYTES BLD QL: SLIGHT — SIGNIFICANT CHANGE UP
MONOCYTES NFR BLD: 6.1 % — SIGNIFICANT CHANGE UP (ref 2–9)
MYELOCYTES NFR BLD: 0 % — SIGNIFICANT CHANGE UP (ref 0–0)
NEUTROPHIL AB SER-ACNC: 81.6 % — HIGH (ref 43–77)
NEUTS BAND # BLD: 0.9 % — SIGNIFICANT CHANGE UP (ref 0–6)
NRBC # FLD: 8.02 — SIGNIFICANT CHANGE UP
NRBC FLD-RTO: 52.1 — SIGNIFICANT CHANGE UP
OTHER - HEMATOLOGY %: 0 — SIGNIFICANT CHANGE UP
PLATELET # BLD AUTO: 142 K/UL — LOW (ref 150–400)
PLATELET COUNT - ESTIMATE: NORMAL — SIGNIFICANT CHANGE UP
PMV BLD: 10.3 FL — SIGNIFICANT CHANGE UP (ref 7–13)
POIKILOCYTOSIS BLD QL AUTO: SIGNIFICANT CHANGE UP
POLYCHROMASIA BLD QL SMEAR: SIGNIFICANT CHANGE UP
POTASSIUM SERPL-MCNC: 3.7 MMOL/L — SIGNIFICANT CHANGE UP (ref 3.5–5.3)
POTASSIUM SERPL-SCNC: 3.7 MMOL/L — SIGNIFICANT CHANGE UP (ref 3.5–5.3)
PROMYELOCYTES # FLD: 0 % — SIGNIFICANT CHANGE UP (ref 0–0)
PROT SERPL-MCNC: 6.8 G/DL — SIGNIFICANT CHANGE UP (ref 6–8.3)
RBC # BLD: 3.76 M/UL — LOW (ref 3.8–5.2)
RBC # FLD: 21.6 % — HIGH (ref 10.3–14.5)
RETICS #: 199 K/UL — HIGH (ref 25–125)
RETICS/RBC NFR: 5.3 % — HIGH (ref 0.5–2.5)
SICKLE CELLS BLD QL SMEAR: SLIGHT — SIGNIFICANT CHANGE UP
SMUDGE CELLS # BLD: PRESENT — SIGNIFICANT CHANGE UP
SODIUM SERPL-SCNC: 134 MMOL/L — LOW (ref 135–145)
SPHEROCYTES BLD QL SMEAR: SLIGHT — SIGNIFICANT CHANGE UP
TARGETS BLD QL SMEAR: SLIGHT — SIGNIFICANT CHANGE UP
VARIANT LYMPHS # BLD: 3.5 % — SIGNIFICANT CHANGE UP
WBC # BLD: 15.4 K/UL — HIGH (ref 3.8–10.5)
WBC # FLD AUTO: 15.4 K/UL — HIGH (ref 3.8–10.5)

## 2018-09-04 PROCEDURE — 12345: CPT | Mod: NC

## 2018-09-04 PROCEDURE — 99222 1ST HOSP IP/OBS MODERATE 55: CPT | Mod: GC

## 2018-09-04 PROCEDURE — 99223 1ST HOSP IP/OBS HIGH 75: CPT

## 2018-09-04 PROCEDURE — 76705 ECHO EXAM OF ABDOMEN: CPT | Mod: 26

## 2018-09-04 RX ORDER — TRAMADOL HYDROCHLORIDE 50 MG/1
50 TABLET ORAL EVERY 12 HOURS
Qty: 0 | Refills: 0 | Status: DISCONTINUED | OUTPATIENT
Start: 2018-09-04 | End: 2018-09-05

## 2018-09-04 RX ORDER — HYDROMORPHONE HYDROCHLORIDE 2 MG/ML
0.5 INJECTION INTRAMUSCULAR; INTRAVENOUS; SUBCUTANEOUS ONCE
Qty: 0 | Refills: 0 | Status: DISCONTINUED | OUTPATIENT
Start: 2018-09-04 | End: 2018-09-04

## 2018-09-04 RX ORDER — ACETAMINOPHEN 500 MG
1000 TABLET ORAL ONCE
Qty: 0 | Refills: 0 | Status: COMPLETED | OUTPATIENT
Start: 2018-09-04 | End: 2018-09-04

## 2018-09-04 RX ORDER — HYDROMORPHONE HYDROCHLORIDE 2 MG/ML
2 INJECTION INTRAMUSCULAR; INTRAVENOUS; SUBCUTANEOUS EVERY 4 HOURS
Qty: 0 | Refills: 0 | Status: DISCONTINUED | OUTPATIENT
Start: 2018-09-04 | End: 2018-09-04

## 2018-09-04 RX ORDER — HYDROMORPHONE HYDROCHLORIDE 2 MG/ML
1.5 INJECTION INTRAMUSCULAR; INTRAVENOUS; SUBCUTANEOUS EVERY 4 HOURS
Qty: 0 | Refills: 0 | Status: DISCONTINUED | OUTPATIENT
Start: 2018-09-04 | End: 2018-09-04

## 2018-09-04 RX ORDER — METOCLOPRAMIDE HCL 10 MG
10 TABLET ORAL ONCE
Qty: 0 | Refills: 0 | Status: COMPLETED | OUTPATIENT
Start: 2018-09-04 | End: 2018-09-04

## 2018-09-04 RX ORDER — TRAMADOL HYDROCHLORIDE 50 MG/1
50 TABLET ORAL EVERY 6 HOURS
Qty: 0 | Refills: 0 | Status: DISCONTINUED | OUTPATIENT
Start: 2018-09-04 | End: 2018-09-04

## 2018-09-04 RX ORDER — FOLIC ACID 0.8 MG
1 TABLET ORAL DAILY
Qty: 0 | Refills: 0 | Status: DISCONTINUED | OUTPATIENT
Start: 2018-09-04 | End: 2018-09-07

## 2018-09-04 RX ORDER — GENTAMICIN SULFATE 0.1 %
1 OINTMENT (GRAM) TOPICAL ONCE
Qty: 0 | Refills: 0 | Status: DISCONTINUED | OUTPATIENT
Start: 2018-09-04 | End: 2018-09-07

## 2018-09-04 RX ORDER — HYDROMORPHONE HYDROCHLORIDE 2 MG/ML
2 INJECTION INTRAMUSCULAR; INTRAVENOUS; SUBCUTANEOUS EVERY 4 HOURS
Qty: 0 | Refills: 0 | Status: DISCONTINUED | OUTPATIENT
Start: 2018-09-04 | End: 2018-09-05

## 2018-09-04 RX ORDER — HYDROMORPHONE HYDROCHLORIDE 2 MG/ML
1 INJECTION INTRAMUSCULAR; INTRAVENOUS; SUBCUTANEOUS ONCE
Qty: 0 | Refills: 0 | Status: DISCONTINUED | OUTPATIENT
Start: 2018-09-04 | End: 2018-09-04

## 2018-09-04 RX ORDER — GENTAMICIN SULFATE 0.1 %
1 OINTMENT (GRAM) TOPICAL
Qty: 0 | Refills: 0 | Status: DISCONTINUED | OUTPATIENT
Start: 2018-09-04 | End: 2018-09-04

## 2018-09-04 RX ORDER — DIPHENHYDRAMINE HCL 50 MG
25 CAPSULE ORAL ONCE
Qty: 0 | Refills: 0 | Status: COMPLETED | OUTPATIENT
Start: 2018-09-04 | End: 2018-09-04

## 2018-09-04 RX ORDER — CALCITRIOL 0.5 UG/1
1 CAPSULE ORAL DAILY
Qty: 0 | Refills: 0 | Status: DISCONTINUED | OUTPATIENT
Start: 2018-09-04 | End: 2018-09-07

## 2018-09-04 RX ORDER — ERYTHROPOIETIN 10000 [IU]/ML
0 INJECTION, SOLUTION INTRAVENOUS; SUBCUTANEOUS
Qty: 0 | Refills: 0 | COMMUNITY

## 2018-09-04 RX ADMIN — Medication 25 MILLIGRAM(S): at 11:55

## 2018-09-04 RX ADMIN — HYDROMORPHONE HYDROCHLORIDE 0.5 MILLIGRAM(S): 2 INJECTION INTRAMUSCULAR; INTRAVENOUS; SUBCUTANEOUS at 20:48

## 2018-09-04 RX ADMIN — HYDROMORPHONE HYDROCHLORIDE 2 MILLIGRAM(S): 2 INJECTION INTRAMUSCULAR; INTRAVENOUS; SUBCUTANEOUS at 18:30

## 2018-09-04 RX ADMIN — HYDROMORPHONE HYDROCHLORIDE 1 MILLIGRAM(S): 2 INJECTION INTRAMUSCULAR; INTRAVENOUS; SUBCUTANEOUS at 04:53

## 2018-09-04 RX ADMIN — HYDROMORPHONE HYDROCHLORIDE 2 MILLIGRAM(S): 2 INJECTION INTRAMUSCULAR; INTRAVENOUS; SUBCUTANEOUS at 07:02

## 2018-09-04 RX ADMIN — HYDROMORPHONE HYDROCHLORIDE 0.5 MILLIGRAM(S): 2 INJECTION INTRAMUSCULAR; INTRAVENOUS; SUBCUTANEOUS at 09:26

## 2018-09-04 RX ADMIN — HYDROMORPHONE HYDROCHLORIDE 1 MILLIGRAM(S): 2 INJECTION INTRAMUSCULAR; INTRAVENOUS; SUBCUTANEOUS at 04:17

## 2018-09-04 RX ADMIN — HYDROMORPHONE HYDROCHLORIDE 1 MILLIGRAM(S): 2 INJECTION INTRAMUSCULAR; INTRAVENOUS; SUBCUTANEOUS at 00:01

## 2018-09-04 RX ADMIN — HYDROMORPHONE HYDROCHLORIDE 0.5 MILLIGRAM(S): 2 INJECTION INTRAMUSCULAR; INTRAVENOUS; SUBCUTANEOUS at 21:03

## 2018-09-04 RX ADMIN — HYDROMORPHONE HYDROCHLORIDE 2 MILLIGRAM(S): 2 INJECTION INTRAMUSCULAR; INTRAVENOUS; SUBCUTANEOUS at 11:35

## 2018-09-04 RX ADMIN — HYDROMORPHONE HYDROCHLORIDE 0.5 MILLIGRAM(S): 2 INJECTION INTRAMUSCULAR; INTRAVENOUS; SUBCUTANEOUS at 04:33

## 2018-09-04 RX ADMIN — Medication 400 MILLIGRAM(S): at 16:25

## 2018-09-04 RX ADMIN — HYDROMORPHONE HYDROCHLORIDE 0.5 MILLIGRAM(S): 2 INJECTION INTRAMUSCULAR; INTRAVENOUS; SUBCUTANEOUS at 04:54

## 2018-09-04 RX ADMIN — HYDROMORPHONE HYDROCHLORIDE 2 MILLIGRAM(S): 2 INJECTION INTRAMUSCULAR; INTRAVENOUS; SUBCUTANEOUS at 07:20

## 2018-09-04 RX ADMIN — HYDROMORPHONE HYDROCHLORIDE 2 MILLIGRAM(S): 2 INJECTION INTRAMUSCULAR; INTRAVENOUS; SUBCUTANEOUS at 23:31

## 2018-09-04 RX ADMIN — HYDROMORPHONE HYDROCHLORIDE 2 MILLIGRAM(S): 2 INJECTION INTRAMUSCULAR; INTRAVENOUS; SUBCUTANEOUS at 18:08

## 2018-09-04 RX ADMIN — HYDROMORPHONE HYDROCHLORIDE 0.5 MILLIGRAM(S): 2 INJECTION INTRAMUSCULAR; INTRAVENOUS; SUBCUTANEOUS at 09:00

## 2018-09-04 RX ADMIN — HYDROMORPHONE HYDROCHLORIDE 2 MILLIGRAM(S): 2 INJECTION INTRAMUSCULAR; INTRAVENOUS; SUBCUTANEOUS at 23:46

## 2018-09-04 RX ADMIN — Medication 10 MILLIGRAM(S): at 12:02

## 2018-09-04 RX ADMIN — HYDROMORPHONE HYDROCHLORIDE 2 MILLIGRAM(S): 2 INJECTION INTRAMUSCULAR; INTRAVENOUS; SUBCUTANEOUS at 12:16

## 2018-09-04 NOTE — H&P ADULT - NSHPREVIEWOFSYSTEMS_GEN_ALL_CORE
Constitutional Symptoms: No weakness, fevers, chills, weight loss  Eyes: +headache, no visual changes, eye pain, double vision  Ears, Nose, Mouth, Throat: No runny nose, sinus pain, ear pain, tinnitus, sore throat, dysphagia, odynophagia  Cardiovascular: No chest pain, palpitations, edema  Respiratory: No cough, wheezing, hemoptysis, shortness of breath  Gastrointestinal: No abdominal pain, dysphagia, anorexia, nausea/vomiting, diarrhea/constipation, hematemesis, BRBPR, melaena  Genitourinary: No dysuria, frequency, hematuria  Musculoskeletal: +back pain, B/L hip pain, no joint swelling, decreased ROM  Skin: No pruritus, rashes, lesions, wounds  Neurologic:  +headache, no seizures, paraesthesiae, numbness, limb weakness  Psychiatric: No depression, anxiety, difficulty concentrating, anhedonia, lack of energy    Positives and pertinent negatives noted and all other systems negative.

## 2018-09-04 NOTE — H&P ADULT - HISTORY OF PRESENT ILLNESS
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 Patient reports that this is typical for her sickle cell crisis. Pain began yesterday afternoon. Headache is severe, frontal, constant, without photo/phonophobia, N/V. Low back pain and B/L hip pain is also severe, constant, received dilaudid 1 mg IV in the ED, however, severe pain persisting.

## 2018-09-04 NOTE — CONSULT NOTE ADULT - PROBLEM SELECTOR RECOMMENDATION 9
ESRD 2/2 FSGS, on PD since 3/2017. Follows at [place] with Dr. Larsen for Nephrology. Is on a cycler: 3 cycles of 2L with alternating solution (1.5% and 2.5%) for 6 hours overnight. Will place on CAPD while in-house: 3 cycles, 2L, alternating 1.5 and 2.5%, with 4 hr dwells, and leave dry overnight.

## 2018-09-04 NOTE — PROGRESS NOTE ADULT - PROBLEM SELECTOR PLAN 1
s/p 1.5 L IVNS, will hold off on 1/2 NS as patient is ESRD on PD, does not produce urine. Defer PCA at this time as she reports difficulty using it. Will attempt to re-educate when arrives to floor.  ISTOP performed ref# [id number]. Pt last dispensed 30d supply of tramadol 50mg (quantity 60) on 8/21 by CRISTÓBAL Colbert  - C/w folate, MV  - trend blood counts, LDH, retic count, bilirubin  - pt given one time dose of Benadryl 25mg IV followed by Reglan 10mg IV for headache relief  - continue Dilaudid 2mg IV q4h for severe pain for now  - add tramadol 50mg q6h PRN as well for moderate pain

## 2018-09-04 NOTE — H&P ADULT - PROBLEM SELECTOR PLAN 1
s/p 1.5 L IVNS, will hold off on 1/2 NS as patient is ESRD on PD, does not produce urine, titrated dilaudid from 1 to 1.5 mg, pain persistent, will increase by ~50% to 2 mg IV and re-assess. Patient declining PCA at this time as she reports difficulty using it. Will attempt to re-educate when arrives to floor. C/w folate, MV

## 2018-09-04 NOTE — CONSULT NOTE ADULT - SUBJECTIVE AND OBJECTIVE BOX
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 Admitted for sickle cell crisis. Patient follows at [place] with Dr. Larsen for nephrology. Is on a cycler, and receives 3 cycles of 2L with alternating solution (1.5% and 2.5%, depending on blood pressure) for 6 hours overnight. Denies any issues with her PD.     received pain medications in the ER. Pain in her body improved but still has a headache.     PAST HISTORY  --------------------------------------------------------------------------------  PAST MEDICAL & SURGICAL HISTORY:  Peritoneal dialysis catheter in place  Umbilical hernia: 2017  Cholelithiasis  Anemia  Sickle cell disease  Chronic kidney disease: started dialysis 2017 with etiology from Parvo Virus 2016  Parvovirus B19 infection: 2016 resulting in chronic renal disease  Nephrotic syndrome  HPV (Human Papillomavirus)  History of peritoneal dialysis: 3/2017  H/O umbilical hernia repair:   History of hip surgery: R hip due to necrosis in --bone graft from right tibia  H/O tubal ligation: in , along with   H/O: : 014  S/P Laparoscopic Cholecystectomy    FAMILY HISTORY:  Family history of sarcoidosis (Sibling): Sister with HgbSS and sarcoidosis  Family history of sickle cell disease (Sibling): Sister with HgbSS and sarcoidosis  Family history of sickle cell trait in mother  Family history of sickle cell trait in father    PAST SOCIAL HISTORY:    ALLERGIES & MEDICATIONS  --------------------------------------------------------------------------------  Allergies    morphine (Other)    Intolerances      Standing Inpatient Medications  calcitriol   Capsule 1 MICROGram(s) Oral daily  folic acid 1 milliGRAM(s) Oral daily  gentamicin 0.1% Cream 1 Application(s) Topical <User Schedule>  Nephro-jay 1 Tablet(s) Oral daily    PRN Inpatient Medications  HYDROmorphone  Injectable 2 milliGRAM(s) IV Push every 4 hours PRN  traMADol 50 milliGRAM(s) Oral every 12 hours PRN      REVIEW OF SYSTEMS  --------------------------------------------------------------------------------  Gen: No weight changes, fatigue, fevers/chills  Skin: No rashes  Head/Eyes/Ears/Mouth: + headache  Respiratory: No dyspnea, cough  CV: No chest pain, orthopnea  GI: No abdominal pain, diarrhea, constipation, nausea, vomiting  : No increased frequency  MSK: + joint pain, no edema  Neuro: No dizziness  Heme: No easy bruising or bleeding  Endo: No heat/cold intolerance  Psych: No significant nervousness    All other systems were reviewed and are negative, except as noted.    VITALS/PHYSICAL EXAM  --------------------------------------------------------------------------------  T(C): 36.7 (18 @ 18:00), Max: 36.9 (18 @ 21:17)  HR: 72 (18 @ 18:00) (72 - 99)  BP: 113/72 (18 @ 18:00) (97/65 - 113/72)  RR: 17 (18 @ 18:00) (16 - 20)  SpO2: 98% (18 @ 18:00) (95% - 100%)  Wt(kg): --  Height (cm): 165.1 (18 16:28)  Weight (kg): 59 (18 16:28)  BMI (kg/m2): 21.6 (18 16:28)  BSA (m2): 1.65 (18 16:28)      Physical Exam:  	Gen: NAD, young female  	HEENT: anicteric  	Pulm: CTA B/L  	CV: RRR, S1S2; no rub  	Back: No spinal or CVA tenderness  	Abd: +BS, soft, nontender/nondistended  	: No suprapubic tenderness  	UE: Warm, FROM  	LE: Warm, no edema  	Neuro: follows commands  	Psych: Normal affect and mood  	Skin: Warm, without rashes  	Vascular access:  PD catheter in situ    LABS/STUDIES  --------------------------------------------------------------------------------              10.9   15.40 >-----------<  142      [18 @ 10:17]              30.2     134  |  89  |  74  ----------------------------<  97      [18 @ 10:17]  3.7   |  22  |  11.42        Ca     8.3     [18 @ 10:17]    TPro  6.8  /  Alb  2.9  /  TBili  1.0  /  DBili  x   /  AST  161  /  ALT  242  /  AlkPhos  269  [18 @ 10:17]        LDH 1231      [18 @ 10:17]    Creatinine Trend:  SCr 11.42 [09-04 @ 10:17]  SCr 10.64 [ @ 21:03]    Urinalysis - [05-10-16 @ 19:10]      Color PLYEL / Appearance CLEAR / SG 1.011 / pH 7.5      Gluc 50 / Ketone NEGATIVE  / Bili NEGATIVE / Urobili NORMAL       Blood SMALL / Protein >600 / Leuk Est NEGATIVE / Nitrite NEGATIVE      RBC 0-2 / WBC 10-25 / Hyaline 2-5 / Gran  / Sq Epi OCC / Non Sq Epi  / Bacteria FEW      .2 (Ca --)      [18 @ 05:30]   --  HbA1c 3.5      [05-15-18 @ 10:40]  TSH 1.62      [18 @ 05:51]    HBsAg Nonreactive      [17 @ 11:35]  HCV 0.11, Nonreactive Hepatitis C AB  S/CO Ratio                        Interpretation  < 1.0                                     Non-Reactive  1.0 - 4.9                           Weakly-Reactive  > 5.0                                 Reactive  Non-Reactive: Aperson with a non-reactive HCV antibody  result is considered uninfected.  No further action is  needed unless recent infection is suspected.  In these  cases, consider repeat testing later to detect  seroconversion..  Weakly-Reactive: HCV antibody test is abnormal, HCV RNA  Qualitative test will follow.  Reactive: HCV antibody test is abnormal, HCV RNA  Qualitative test will follow.  Note: HCV antibody testing is performed on the [employer] system.      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  Patient reports that the pain is bifrontal and similar in intensity and quality to previous crises he has had before. Admitted for sickle cell crisis. Patient follows at [place] with Dr. Larsen for nephrology. Is on a cycler, and receives 3 cycles of 2L with alternating solution (1.5% and 2.5%, depending on blood pressure) for 6 hours overnight. Denies any issues with her PD.     received pain medications in the ER. Pain in her body improved but still has a headache.     PAST HISTORY  --------------------------------------------------------------------------------  PAST MEDICAL & SURGICAL HISTORY:  Peritoneal dialysis catheter in place  Umbilical hernia: 2017  Cholelithiasis  Anemia  Sickle cell disease  Chronic kidney disease: started dialysis 2017 with etiology from Parvo Virus 2016  Parvovirus B19 infection: 2016 resulting in chronic renal disease  Nephrotic syndrome  HPV (Human Papillomavirus)  History of peritoneal dialysis: 3/2017  H/O umbilical hernia repair:   History of hip surgery: R hip due to necrosis in --bone graft from right tibia  H/O tubal ligation: in , along with   H/O: : 014  S/P Laparoscopic Cholecystectomy    FAMILY HISTORY:  Family history of sarcoidosis (Sibling): Sister with HgbSS and sarcoidosis  Family history of sickle cell disease (Sibling): Sister with HgbSS and sarcoidosis  Family history of sickle cell trait in mother  Family history of sickle cell trait in father    PAST SOCIAL HISTORY:    ALLERGIES & MEDICATIONS  --------------------------------------------------------------------------------  Allergies    morphine (Other)    Intolerances      Standing Inpatient Medications  calcitriol   Capsule 1 MICROGram(s) Oral daily  folic acid 1 milliGRAM(s) Oral daily  gentamicin 0.1% Cream 1 Application(s) Topical <User Schedule>  Nephro-jay 1 Tablet(s) Oral daily    PRN Inpatient Medications  HYDROmorphone  Injectable 2 milliGRAM(s) IV Push every 4 hours PRN  traMADol 50 milliGRAM(s) Oral every 12 hours PRN      REVIEW OF SYSTEMS  --------------------------------------------------------------------------------  Gen: No weight changes, fatigue, fevers/chills  Skin: No rashes  Head/Eyes/Ears/Mouth: + headache  Respiratory: No dyspnea, cough  CV: No chest pain, orthopnea  GI: No abdominal pain, diarrhea, constipation, nausea, vomiting  : No increased frequency  MSK: + joint pain, no edema  Neuro: No dizziness  Heme: No easy bruising or bleeding  Endo: No heat/cold intolerance  Psych: No significant nervousness    All other systems were reviewed and are negative, except as noted.    VITALS/PHYSICAL EXAM  --------------------------------------------------------------------------------  T(C): 36.7 (18 @ 18:00), Max: 36.9 (18 @ 21:17)  HR: 72 (18 @ 18:00) (72 - 99)  BP: 113/72 (18 @ 18:00) (97/65 - 113/72)  RR: 17 (18 @ 18:00) (16 - 20)  SpO2: 98% (18 18:00) (95% - 100%)  Wt(kg): --  Height (cm): 165.1 (18 @ 16:28)  Weight (kg): 59 (18 16:28)  BMI (kg/m2): 21.6 (18:28)  BSA (m2): 1.65 (18:28)      Physical Exam:  	Gen: NAD, young female  	HEENT: anicteric  	Pulm: CTA B/L  	CV: RRR, S1S2; no rub  	Back: No spinal or CVA tenderness  	Abd: +BS, soft, nontender/nondistended  	: No suprapubic tenderness  	UE: Warm,  	LE: Warm, no edema  	Neuro: follows commands  	Psych: Normal affect and mood  	Skin: Warm, without rashes  	Vascular access:  PD catheter in situ    LABS/STUDIES  --------------------------------------------------------------------------------              10.9   15.40 >-----------<  142      [18 @ 10:17]              30.2     134  |  89  |  74  ----------------------------<  97      [18 @ 10:17]  3.7   |  22  |  11.42        Ca     8.3     [18 @ 10:17]    TPro  6.8  /  Alb  2.9  /  TBili  1.0  /  DBili  x   /  AST  161  /  ALT  242  /  AlkPhos  269  [09-04-18 @ 10:17]        LDH 1231      [18 @ 10:17]    Creatinine Trend:  SCr 11.42 [ 10:17]  SCr 10.64 [ @ 21:03]    Urinalysis - [05-10-16 @ 19:10]      Color PLYEL / Appearance CLEAR / SG 1.011 / pH 7.5      Gluc 50 / Ketone NEGATIVE  / Bili NEGATIVE / Urobili NORMAL       Blood SMALL / Protein >600 / Leuk Est NEGATIVE / Nitrite NEGATIVE      RBC 0-2 / WBC 10-25 / Hyaline 2-5 / Gran  / Sq Epi OCC / Non Sq Epi  / Bacteria FEW      .2 (Ca --)      [18 @ 05:30]   --  HbA1c 3.5      [05-15-18 @ 10:40]  TSH 1.62      [18 @ 05:51]    HBsAg Nonreactive      [17 @ 11:35]  HCV 0.11, Nonreactive Hepatitis C AB  S/CO Ratio                        Interpretation  < 1.0                                     Non-Reactive  1.0 - 4.9                           Weakly-Reactive  > 5.0                                 Reactive  Non-Reactive: Aperson with a non-reactive HCV antibody  result is considered uninfected.  No further action is  needed unless recent infection is suspected.  In these  cases, consider repeat testing later to detect  seroconversion..  Weakly-Reactive: HCV antibody test is abnormal, HCV RNA  Qualitative test will follow.  Reactive: HCV antibody test is abnormal, HCV RNA  Qualitative test will follow.  Note: HCV antibody testing is performed on the [employer]   system.      [17 @ 11:35]    dsDNA <12      [16 @ 06:20]  Rheumatoid Factor 8.6      [16 @ 06:20]  Syphilis Screen (Treponema Pallidum Ab) Negative      [14 @ 17:15]

## 2018-09-04 NOTE — H&P ADULT - NSHPPHYSICALEXAM_GEN_ALL_CORE
T(C): 36.7 (09-04-18 @ 04:15), Max: 37 (09-03-18 @ 19:59)  HR: 90 (09-04-18 @ 04:15) (90 - 113)  BP: 102/70 (09-04-18 @ 04:15) (97/65 - 105/72)  RR: 16 (09-04-18 @ 04:15) (16 - 20)  SpO2: 100% (09-04-18 @ 04:15) (97% - 100%)    Constitutional: distress 2/2 pain, well-developed, well-nourished  Ears, Nose, Mouth, and Throat: normal external ears and nose, normal hearing, moist oral mucosa  Eyes: normal conjunctiva, EOMI, PERRLA  Neck: supple, no JVD  Respiratory: Clear to auscultation bilaterally. No wheezes, rales or rhonchi. Normal respiratory effort  Cardiovascular: tachycardia, no M/R/G, no edema, 2+ Peripheral Pulses  Gastrointestinal: soft, nontender, nondistended, +BS, no hernia  Skin: warm, dry, no rash  Neurologic: sensation grossly intact, CN grossly intact, non-focal exam  Musculoskeletal: no clubbing, no cyanosis, no joint swelling  Psychiatric: AOX3, normal mood, affect

## 2018-09-04 NOTE — H&P ADULT - ASSESSMENT
Patient is a 36 y/o F PMH Sickle cell disease w/ B/L AVN of hips s/p L total hip replacement 6/18, acute chest 12/17, ESRD 2/2 FSGS on PD p/w

## 2018-09-04 NOTE — H&P ADULT - NSHPLABSRESULTS_GEN_ALL_CORE
09-03    133<L>  |  87<L>  |  67<H>  ----------------------------<  107<H>  5.4<H>   |  17<L>  |  10.64<H>    Ca    8.9      03 Sep 2018 21:03    TPro  7.6  /  Alb  3.1<L>  /  TBili  1.2  /  DBili  x   /  AST  282<H>  /  ALT  303<H>  /  AlkPhos  268<H>  09-03                            12.0   15.52 )-----------( 183      ( 03 Sep 2018 21:03 )             34.5             LIVER FUNCTIONS - ( 03 Sep 2018 21:03 )  Alb: 3.1 g/dL / Pro: 7.6 g/dL / ALK PHOS: 268 u/L / ALT: 303 u/L / AST: 282 u/L / GGT: x

## 2018-09-05 LAB
ALBUMIN SERPL ELPH-MCNC: 2.6 G/DL — LOW (ref 3.3–5)
ALP SERPL-CCNC: 286 U/L — HIGH (ref 40–120)
ALT FLD-CCNC: 189 U/L — HIGH (ref 4–33)
AST SERPL-CCNC: 93 U/L — HIGH (ref 4–32)
BILIRUB SERPL-MCNC: 1.1 MG/DL — SIGNIFICANT CHANGE UP (ref 0.2–1.2)
BUN SERPL-MCNC: 76 MG/DL — HIGH (ref 7–23)
CALCIUM SERPL-MCNC: 8.3 MG/DL — LOW (ref 8.4–10.5)
CHLORIDE SERPL-SCNC: 89 MMOL/L — LOW (ref 98–107)
CO2 SERPL-SCNC: 20 MMOL/L — LOW (ref 22–31)
CREAT SERPL-MCNC: 11.02 MG/DL — HIGH (ref 0.5–1.3)
GLUCOSE SERPL-MCNC: 88 MG/DL — SIGNIFICANT CHANGE UP (ref 70–99)
HCT VFR BLD CALC: 29.1 % — LOW (ref 34.5–45)
HGB BLD-MCNC: 10.4 G/DL — LOW (ref 11.5–15.5)
LDH SERPL L TO P-CCNC: 1202 U/L — HIGH (ref 135–225)
MANUAL SMEAR VERIFICATION: SIGNIFICANT CHANGE UP
MCHC RBC-ENTMCNC: 28.5 PG — SIGNIFICANT CHANGE UP (ref 27–34)
MCHC RBC-ENTMCNC: 35.7 % — SIGNIFICANT CHANGE UP (ref 32–36)
MCV RBC AUTO: 79.7 FL — LOW (ref 80–100)
NRBC # FLD: 10.7 — SIGNIFICANT CHANGE UP
NRBC FLD-RTO: 83.3 — SIGNIFICANT CHANGE UP
PLATELET # BLD AUTO: 124 K/UL — LOW (ref 150–400)
PMV BLD: 11.1 FL — SIGNIFICANT CHANGE UP (ref 7–13)
POTASSIUM SERPL-MCNC: 4.1 MMOL/L — SIGNIFICANT CHANGE UP (ref 3.5–5.3)
POTASSIUM SERPL-SCNC: 4.1 MMOL/L — SIGNIFICANT CHANGE UP (ref 3.5–5.3)
PROT SERPL-MCNC: 6.8 G/DL — SIGNIFICANT CHANGE UP (ref 6–8.3)
RBC # BLD: 3.65 M/UL — LOW (ref 3.8–5.2)
RBC # FLD: 21.2 % — HIGH (ref 10.3–14.5)
RETICS #: 125 K/UL — SIGNIFICANT CHANGE UP (ref 25–125)
RETICS/RBC NFR: 3.4 % — HIGH (ref 0.5–2.5)
SODIUM SERPL-SCNC: 135 MMOL/L — SIGNIFICANT CHANGE UP (ref 135–145)
WBC # BLD: 12.85 K/UL — HIGH (ref 3.8–10.5)
WBC # FLD AUTO: 12.85 K/UL — HIGH (ref 3.8–10.5)

## 2018-09-05 PROCEDURE — 99233 SBSQ HOSP IP/OBS HIGH 50: CPT

## 2018-09-05 PROCEDURE — 90945 DIALYSIS ONE EVALUATION: CPT

## 2018-09-05 RX ORDER — ONDANSETRON 8 MG/1
4 TABLET, FILM COATED ORAL EVERY 6 HOURS
Qty: 0 | Refills: 0 | Status: DISCONTINUED | OUTPATIENT
Start: 2018-09-05 | End: 2018-09-07

## 2018-09-05 RX ORDER — NALOXONE HYDROCHLORIDE 4 MG/.1ML
0.1 SPRAY NASAL
Qty: 0 | Refills: 0 | Status: DISCONTINUED | OUTPATIENT
Start: 2018-09-05 | End: 2018-09-07

## 2018-09-05 RX ORDER — SODIUM CHLORIDE 9 MG/ML
1000 INJECTION, SOLUTION INTRAVENOUS
Qty: 0 | Refills: 0 | Status: DISCONTINUED | OUTPATIENT
Start: 2018-09-05 | End: 2018-09-06

## 2018-09-05 RX ORDER — POLYETHYLENE GLYCOL 3350 17 G/17G
17 POWDER, FOR SOLUTION ORAL DAILY
Qty: 0 | Refills: 0 | Status: DISCONTINUED | OUTPATIENT
Start: 2018-09-05 | End: 2018-09-07

## 2018-09-05 RX ORDER — DOCUSATE SODIUM 100 MG
100 CAPSULE ORAL
Qty: 0 | Refills: 0 | Status: DISCONTINUED | OUTPATIENT
Start: 2018-09-05 | End: 2018-09-07

## 2018-09-05 RX ORDER — SENNA PLUS 8.6 MG/1
2 TABLET ORAL AT BEDTIME
Qty: 0 | Refills: 0 | Status: DISCONTINUED | OUTPATIENT
Start: 2018-09-05 | End: 2018-09-07

## 2018-09-05 RX ORDER — HYDROMORPHONE HYDROCHLORIDE 2 MG/ML
30 INJECTION INTRAMUSCULAR; INTRAVENOUS; SUBCUTANEOUS
Qty: 0 | Refills: 0 | Status: DISCONTINUED | OUTPATIENT
Start: 2018-09-05 | End: 2018-09-07

## 2018-09-05 RX ORDER — ACETAMINOPHEN 500 MG
1000 TABLET ORAL ONCE
Qty: 0 | Refills: 0 | Status: COMPLETED | OUTPATIENT
Start: 2018-09-05 | End: 2018-09-05

## 2018-09-05 RX ADMIN — Medication 1 MILLIGRAM(S): at 12:20

## 2018-09-05 RX ADMIN — Medication 400 MILLIGRAM(S): at 02:22

## 2018-09-05 RX ADMIN — HYDROMORPHONE HYDROCHLORIDE 2 MILLIGRAM(S): 2 INJECTION INTRAMUSCULAR; INTRAVENOUS; SUBCUTANEOUS at 08:25

## 2018-09-05 RX ADMIN — Medication 100 MILLIGRAM(S): at 17:15

## 2018-09-05 RX ADMIN — HYDROMORPHONE HYDROCHLORIDE 2 MILLIGRAM(S): 2 INJECTION INTRAMUSCULAR; INTRAVENOUS; SUBCUTANEOUS at 04:13

## 2018-09-05 RX ADMIN — Medication 1000 MILLIGRAM(S): at 02:50

## 2018-09-05 RX ADMIN — HYDROMORPHONE HYDROCHLORIDE 2 MILLIGRAM(S): 2 INJECTION INTRAMUSCULAR; INTRAVENOUS; SUBCUTANEOUS at 08:10

## 2018-09-05 RX ADMIN — HYDROMORPHONE HYDROCHLORIDE 30 MILLILITER(S): 2 INJECTION INTRAMUSCULAR; INTRAVENOUS; SUBCUTANEOUS at 19:09

## 2018-09-05 RX ADMIN — CALCITRIOL 1 MICROGRAM(S): 0.5 CAPSULE ORAL at 12:20

## 2018-09-05 RX ADMIN — SODIUM CHLORIDE 60 MILLILITER(S): 9 INJECTION, SOLUTION INTRAVENOUS at 11:08

## 2018-09-05 RX ADMIN — HYDROMORPHONE HYDROCHLORIDE 2 MILLIGRAM(S): 2 INJECTION INTRAMUSCULAR; INTRAVENOUS; SUBCUTANEOUS at 03:58

## 2018-09-05 RX ADMIN — Medication 1 TABLET(S): at 12:20

## 2018-09-05 RX ADMIN — HYDROMORPHONE HYDROCHLORIDE 30 MILLILITER(S): 2 INJECTION INTRAMUSCULAR; INTRAVENOUS; SUBCUTANEOUS at 11:04

## 2018-09-05 RX ADMIN — SENNA PLUS 2 TABLET(S): 8.6 TABLET ORAL at 23:46

## 2018-09-05 NOTE — PROGRESS NOTE ADULT - PROBLEM SELECTOR PLAN 1
ESRD 2/2 FSGS, on PD since 3/2017. Follows at [facility] with Dr. Larsen for Nephrology. Is on a cycler: 3 cycles of 2L with alternating solution (1.5% and 2.5%) for 6 hours overnight. Will place on CAPD while in-house: 3 cycles, 2L, alternating 1.5 and 2.5%, with 4 hr dwells, and leave dry overnight.  Need to monitor fluid status closely if on IVF while on PD.

## 2018-09-05 NOTE — PROGRESS NOTE ADULT - PROBLEM SELECTOR PLAN 2
likely 2/2 acute crisis  LFTs trending down.   liver sono - s/p cholecystectomy wo concerning findings

## 2018-09-05 NOTE — PROVIDER CONTACT NOTE (OTHER) - REASON
Patient c/o 8/10 pain at this time
Patient c/o 9/10 head pain r/t SCC
Pt c/o continuing pain unrelieved 8/10 r/t SCC
Pt's lactate dehydrogenase level 1231
Patient c/o 9/10 breakthrough pain at this time

## 2018-09-05 NOTE — PROGRESS NOTE ADULT - PROBLEM SELECTOR PLAN 1
agreeable to PCA - will start Dilaudid PCA with DD of 0.8 mg and 4H limit of 10 mg. DC PRN Dilaudid and Tramadol. she is not on any long acting agents at home  d/w renal, no objection to gentle hydration in setting of VOC and renal will adjust PD accordingly  cw FA, monitor hemolysis labs.   no indication for transfusion at this time. no s/s of acute chest.   leukocytosis and thrombocytopenia wo obvious s/.s or source of infx is likely reactive to VOC.   will consider heme eval depending on course with conservative tx.  add bowel regimen PRN

## 2018-09-05 NOTE — PROVIDER CONTACT NOTE (OTHER) - ACTION/TREATMENT ORDERED:
As per ADS Zoe Faria to give one time dose of 0.5mg IVP Dilaudid at this time for breakthrough pain. Continuing to monitor.
Provider notified. No treatments ordered
As per ADS ALIN Faria to administer PRN IVP 2mg Dilaudid at this time for pain. ADS to come to bedside for pt assessment. Continuing to monitor.
As per Zoe Espinoza to administer IVPB Tylenol at this time as ordered. Continuing to monitor patient and VS.
As per Zoe Espinoza to resume PRN 2mg IVP Dilaudid q4h at this time for patient c/o pain. Continuing to monitor.

## 2018-09-05 NOTE — PROVIDER CONTACT NOTE (OTHER) - RECOMMENDATIONS
As per ADS Zoe Faria to give one time dose of 0.5mg IVP Dilaudid at this time for breakthrough pain. Continue to monitor.
As per ADS ALIN Faria to administer PRN IVP 2mg Dilaudid at this time for pain. ADS to come to bedside for pt assessment. Continuing to monitor.
As per Zoe Espinoza to administer IVPB Tylenol at this time as ordered. Continuing to monitor patient and VS.
As per Zoe Espinoza to resume PRN 2mg IVP Dilaudid q4h at this time for patient c/o pain. Continue to monitor.

## 2018-09-05 NOTE — PROVIDER CONTACT NOTE (OTHER) - SITUATION
Patient c/o 9/10 breakthrough pain at this time
Patient c/o 8/10 pain at this time
Patient c/o 9/10 head pain r/t SCC. PRN IVP Dilaudid not due at this time.
Pt c/o continuing pain unrelieved 8/10 r/t SCC
Pt's lactate dehydrogenase level is 1231.

## 2018-09-05 NOTE — PROVIDER CONTACT NOTE (OTHER) - BACKGROUND
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
Pt admitted for sickle cell crisis.

## 2018-09-05 NOTE — PROVIDER CONTACT NOTE (OTHER) - ASSESSMENT
Patient c/o 9/10 breakthrough pain to posterior and anterior head at this time. PRN 2mg IVP Dilaudid q4h not due at this time. ADS Zoe Faria aware. VS obtained and stable, recorded in flow sheet.
Pt A&Ox4 laying in bed c/o continuous unrelieved pain 8/10 r/t SCC at this time. Pt states "the IVPB Tylenol helped for an hour now I am in pain again. I want to speak with a doctor regarding my plan and my pain right now." Zoe Espinoza notified and asked to come to patient bedside at this time to speak with patient. As per ALIN Espinoza "day team will round on patient in AM regarding plan." VS obtained and recorded in flow.
Pt A&Ox4 laying in bed c/o pain 9/10 r/t SCC at this time. PRN IVP 2mg Dilaudid q4h not due at this time. Pt requesting IVPB Tylenol. Zoe Espinoza notified. VS obtained and recorded in flow.
Pt A&Ox4 returning to 7N from dialysis c/o pain 8/10 at this time. One time dose of 0.5mg IVP Dilaudid given at 2050 as per ADS Zoe mckenna. PRN 2mg IVP Dilaudid q4h due at this time. VS obtained and recorded in flow.

## 2018-09-06 LAB
ALBUMIN SERPL ELPH-MCNC: 2.3 G/DL — LOW (ref 3.3–5)
ALP SERPL-CCNC: 286 U/L — HIGH (ref 40–120)
ALT FLD-CCNC: 123 U/L — HIGH (ref 4–33)
AST SERPL-CCNC: 44 U/L — HIGH (ref 4–32)
BASOPHILS # BLD AUTO: 0.1 K/UL — SIGNIFICANT CHANGE UP (ref 0–0.2)
BASOPHILS NFR BLD AUTO: 0.8 % — SIGNIFICANT CHANGE UP (ref 0–2)
BILIRUB SERPL-MCNC: 1.1 MG/DL — SIGNIFICANT CHANGE UP (ref 0.2–1.2)
BUN SERPL-MCNC: 69 MG/DL — HIGH (ref 7–23)
CALCIUM SERPL-MCNC: 7.9 MG/DL — LOW (ref 8.4–10.5)
CHLORIDE SERPL-SCNC: 88 MMOL/L — LOW (ref 98–107)
CO2 SERPL-SCNC: 21 MMOL/L — LOW (ref 22–31)
CREAT SERPL-MCNC: 9.53 MG/DL — HIGH (ref 0.5–1.3)
EOSINOPHIL # BLD AUTO: 0.32 K/UL — SIGNIFICANT CHANGE UP (ref 0–0.5)
EOSINOPHIL NFR BLD AUTO: 2.7 % — SIGNIFICANT CHANGE UP (ref 0–6)
GLUCOSE SERPL-MCNC: 100 MG/DL — HIGH (ref 70–99)
HCT VFR BLD CALC: 26.6 % — LOW (ref 34.5–45)
HGB BLD-MCNC: 9.4 G/DL — LOW (ref 11.5–15.5)
IMM GRANULOCYTES # BLD AUTO: 0.97 # — SIGNIFICANT CHANGE UP
IMM GRANULOCYTES NFR BLD AUTO: 8.2 % — HIGH (ref 0–1.5)
LDH SERPL L TO P-CCNC: 1094 U/L — HIGH (ref 135–225)
LYMPHOCYTES # BLD AUTO: 0.12 K/UL — LOW (ref 1–3.3)
LYMPHOCYTES # BLD AUTO: 1 % — LOW (ref 13–44)
MCHC RBC-ENTMCNC: 27.6 PG — SIGNIFICANT CHANGE UP (ref 27–34)
MCHC RBC-ENTMCNC: 35.3 % — SIGNIFICANT CHANGE UP (ref 32–36)
MCV RBC AUTO: 78 FL — LOW (ref 80–100)
MONOCYTES # BLD AUTO: 1.53 K/UL — HIGH (ref 0–0.9)
MONOCYTES NFR BLD AUTO: 13 % — SIGNIFICANT CHANGE UP (ref 2–14)
NEUTROPHILS # BLD AUTO: 8.73 K/UL — HIGH (ref 1.8–7.4)
NEUTROPHILS NFR BLD AUTO: 74.3 % — SIGNIFICANT CHANGE UP (ref 43–77)
NRBC # FLD: 13.84 — SIGNIFICANT CHANGE UP
NRBC FLD-RTO: 117.6 — SIGNIFICANT CHANGE UP
PLATELET # BLD AUTO: 141 K/UL — LOW (ref 150–400)
PMV BLD: 10.5 FL — SIGNIFICANT CHANGE UP (ref 7–13)
POTASSIUM SERPL-MCNC: 3.4 MMOL/L — LOW (ref 3.5–5.3)
POTASSIUM SERPL-SCNC: 3.4 MMOL/L — LOW (ref 3.5–5.3)
PROT SERPL-MCNC: 6.4 G/DL — SIGNIFICANT CHANGE UP (ref 6–8.3)
RBC # BLD: 3.41 M/UL — LOW (ref 3.8–5.2)
RBC # FLD: 22 % — HIGH (ref 10.3–14.5)
RETICS #: 175 K/UL — HIGH (ref 25–125)
RETICS/RBC NFR: 5.1 % — HIGH (ref 0.5–2.5)
SODIUM SERPL-SCNC: 133 MMOL/L — LOW (ref 135–145)
WBC # BLD: 11.77 K/UL — HIGH (ref 3.8–10.5)
WBC # FLD AUTO: 11.77 K/UL — HIGH (ref 3.8–10.5)

## 2018-09-06 PROCEDURE — 90945 DIALYSIS ONE EVALUATION: CPT

## 2018-09-06 PROCEDURE — 99233 SBSQ HOSP IP/OBS HIGH 50: CPT

## 2018-09-06 RX ORDER — ERYTHROPOIETIN 10000 [IU]/ML
15000 INJECTION, SOLUTION INTRAVENOUS; SUBCUTANEOUS ONCE
Qty: 0 | Refills: 0 | Status: DISCONTINUED | OUTPATIENT
Start: 2018-09-06 | End: 2018-09-07

## 2018-09-06 RX ORDER — SODIUM CHLORIDE 9 MG/ML
1000 INJECTION, SOLUTION INTRAVENOUS
Qty: 0 | Refills: 0 | Status: DISCONTINUED | OUTPATIENT
Start: 2018-09-06 | End: 2018-09-07

## 2018-09-06 RX ADMIN — SODIUM CHLORIDE 60 MILLILITER(S): 9 INJECTION, SOLUTION INTRAVENOUS at 08:57

## 2018-09-06 RX ADMIN — HYDROMORPHONE HYDROCHLORIDE 30 MILLILITER(S): 2 INJECTION INTRAMUSCULAR; INTRAVENOUS; SUBCUTANEOUS at 08:57

## 2018-09-06 RX ADMIN — CALCITRIOL 1 MICROGRAM(S): 0.5 CAPSULE ORAL at 11:55

## 2018-09-06 RX ADMIN — HYDROMORPHONE HYDROCHLORIDE 30 MILLILITER(S): 2 INJECTION INTRAMUSCULAR; INTRAVENOUS; SUBCUTANEOUS at 19:36

## 2018-09-06 RX ADMIN — Medication 1 TABLET(S): at 11:55

## 2018-09-06 RX ADMIN — SENNA PLUS 2 TABLET(S): 8.6 TABLET ORAL at 21:36

## 2018-09-06 RX ADMIN — SODIUM CHLORIDE 10 MILLILITER(S): 9 INJECTION, SOLUTION INTRAVENOUS at 18:44

## 2018-09-06 RX ADMIN — Medication 1 MILLIGRAM(S): at 11:55

## 2018-09-06 RX ADMIN — Medication 100 MILLIGRAM(S): at 09:02

## 2018-09-06 RX ADMIN — Medication 100 MILLIGRAM(S): at 18:42

## 2018-09-06 NOTE — PROGRESS NOTE ADULT - PROBLEM SELECTOR PLAN 2
Hgb to 9.4. will give her weekly epo. Hgb low to 9.4. Epogen was on hold outpt as Hgb was at goal. Will start epogen weekly. Will give 15k units epogen today. Monitor H/H.

## 2018-09-06 NOTE — PROGRESS NOTE ADULT - PROBLEM SELECTOR PLAN 1
much improved pain on PCA   slightly more anemic today but in her case this is multifactorial in setting of ESRD and on EPO as out-pt  overall resolving crisis, no s/s of acute chest and no indication for PRBC  cw current PCA dosing, will DC IVF later today in setting of ESRD and aneuric  cw FA, bowel regimen  likely DC planning with transition to oral Dilaudid eduardo

## 2018-09-06 NOTE — PROGRESS NOTE ADULT - PROBLEM SELECTOR PLAN 1
ESRD 2/2 FSGS, on PD since 3/2017. Follows at [place] with Dr. Larsen for Nephrology. Is on a cycler: 3 cycles of 2L with alternating solution (1.5% and 2.5%) for 6 hours overnight. Will continue on CAPD while in-house: 3 cycles, 2L, alternating 1.5 and 2.5%, with 4 hr dwells, and leave dry overnight.  Need to monitor fluid status closely if on IVF while on PD. ESRD 2/2 FSGS, on PD since 3/2017. Follows at [place] with Dr. Larsen for Nephrology. Is on a cycler: 3 cycles of 2L with alternating solution (1.5% and 2.5%) for 6 hours overnight with day dwell of 2L. Will continue on CAPD while in-house: 3 cycles, 2L, alternating 1.5 and 2.5%, with 4 hr dwells, and 2L dwell overnight.  Need to monitor fluid status closely if on IVF while on PD.

## 2018-09-07 ENCOUNTER — TRANSCRIPTION ENCOUNTER (OUTPATIENT)
Age: 38
End: 2018-09-07

## 2018-09-07 VITALS
DIASTOLIC BLOOD PRESSURE: 78 MMHG | RESPIRATION RATE: 18 BRPM | SYSTOLIC BLOOD PRESSURE: 115 MMHG | HEART RATE: 92 BPM | TEMPERATURE: 98 F

## 2018-09-07 LAB
BUN SERPL-MCNC: 66 MG/DL — HIGH (ref 7–23)
CALCIUM SERPL-MCNC: 8.2 MG/DL — LOW (ref 8.4–10.5)
CHLORIDE SERPL-SCNC: 88 MMOL/L — LOW (ref 98–107)
CO2 SERPL-SCNC: 22 MMOL/L — SIGNIFICANT CHANGE UP (ref 22–31)
CREAT SERPL-MCNC: 8.56 MG/DL — HIGH (ref 0.5–1.3)
GLUCOSE SERPL-MCNC: 90 MG/DL — SIGNIFICANT CHANGE UP (ref 70–99)
HCT VFR BLD CALC: 25.2 % — LOW (ref 34.5–45)
HGB BLD-MCNC: 8.8 G/DL — LOW (ref 11.5–15.5)
LDH SERPL L TO P-CCNC: 968 U/L — HIGH (ref 135–225)
MCHC RBC-ENTMCNC: 27.5 PG — SIGNIFICANT CHANGE UP (ref 27–34)
MCHC RBC-ENTMCNC: 34.9 % — SIGNIFICANT CHANGE UP (ref 32–36)
MCV RBC AUTO: 78.8 FL — LOW (ref 80–100)
NRBC # FLD: 12.11 — SIGNIFICANT CHANGE UP
NRBC FLD-RTO: 124.5 — SIGNIFICANT CHANGE UP
PLATELET # BLD AUTO: 178 K/UL — SIGNIFICANT CHANGE UP (ref 150–400)
PMV BLD: 10.6 FL — SIGNIFICANT CHANGE UP (ref 7–13)
POTASSIUM SERPL-MCNC: 3.7 MMOL/L — SIGNIFICANT CHANGE UP (ref 3.5–5.3)
POTASSIUM SERPL-SCNC: 3.7 MMOL/L — SIGNIFICANT CHANGE UP (ref 3.5–5.3)
RBC # BLD: 3.2 M/UL — LOW (ref 3.8–5.2)
RBC # FLD: 21.8 % — HIGH (ref 10.3–14.5)
RETICS #: 148 K/UL — HIGH (ref 25–125)
RETICS/RBC NFR: 4.6 % — HIGH (ref 0.5–2.5)
SODIUM SERPL-SCNC: 132 MMOL/L — LOW (ref 135–145)
WBC # BLD: 9.73 K/UL — SIGNIFICANT CHANGE UP (ref 3.8–10.5)
WBC # FLD AUTO: 9.73 K/UL — SIGNIFICANT CHANGE UP (ref 3.8–10.5)

## 2018-09-07 PROCEDURE — 90945 DIALYSIS ONE EVALUATION: CPT

## 2018-09-07 PROCEDURE — 99239 HOSP IP/OBS DSCHRG MGMT >30: CPT

## 2018-09-07 RX ORDER — DOCUSATE SODIUM 100 MG
1 CAPSULE ORAL
Qty: 60 | Refills: 0 | OUTPATIENT
Start: 2018-09-07 | End: 2018-10-06

## 2018-09-07 RX ORDER — POLYETHYLENE GLYCOL 3350 17 G/17G
17 POWDER, FOR SOLUTION ORAL
Qty: 30 | Refills: 0 | OUTPATIENT
Start: 2018-09-07 | End: 2018-10-06

## 2018-09-07 RX ORDER — CALCITRIOL 0.5 UG/1
2 CAPSULE ORAL
Qty: 0 | Refills: 0 | COMMUNITY

## 2018-09-07 RX ORDER — HYDROMORPHONE HYDROCHLORIDE 2 MG/ML
1.5 INJECTION INTRAMUSCULAR; INTRAVENOUS; SUBCUTANEOUS
Qty: 48 | Refills: 0 | OUTPATIENT
Start: 2018-09-07 | End: 2018-09-10

## 2018-09-07 RX ORDER — FOLIC ACID 0.8 MG
1 TABLET ORAL
Qty: 30 | Refills: 0 | OUTPATIENT
Start: 2018-09-07 | End: 2018-10-06

## 2018-09-07 RX ORDER — CALCITRIOL 0.5 UG/1
2 CAPSULE ORAL
Qty: 60 | Refills: 0 | OUTPATIENT
Start: 2018-09-07 | End: 2018-10-06

## 2018-09-07 RX ORDER — ERYTHROPOIETIN 10000 [IU]/ML
15000 INJECTION, SOLUTION INTRAVENOUS; SUBCUTANEOUS ONCE
Qty: 0 | Refills: 0 | Status: COMPLETED | OUTPATIENT
Start: 2018-09-07 | End: 2018-09-07

## 2018-09-07 RX ADMIN — Medication 1 MILLIGRAM(S): at 13:23

## 2018-09-07 RX ADMIN — HYDROMORPHONE HYDROCHLORIDE 30 MILLILITER(S): 2 INJECTION INTRAMUSCULAR; INTRAVENOUS; SUBCUTANEOUS at 07:31

## 2018-09-07 RX ADMIN — ERYTHROPOIETIN 15000 UNIT(S): 10000 INJECTION, SOLUTION INTRAVENOUS; SUBCUTANEOUS at 15:35

## 2018-09-07 RX ADMIN — CALCITRIOL 1 MICROGRAM(S): 0.5 CAPSULE ORAL at 13:22

## 2018-09-07 RX ADMIN — Medication 1 TABLET(S): at 13:22

## 2018-09-07 NOTE — DISCHARGE NOTE ADULT - MEDICATION SUMMARY - MEDICATIONS TO TAKE
I will START or STAY ON the medications listed below when I get home from the hospital:    Dilaudid 4 mg oral tablet  -- 1.5 tab(s) by mouth every 3 hours, As Needed MDD:MDD 9mg  -- Caution federal law prohibits the transfer of this drug to any person other  than the person for whom it was prescribed.  May cause drowsiness.  Alcohol may intensify this effect.  Use care when operating dangerous machinery.  This prescription cannot be refilled.  Using more of this medication than prescribed may cause serious breathing problems.    -- Indication: For Sickle cell crisis    Epogen 10,000 units/mL preservative-free injectable solution  -- 2 x weekly Mon Thur  -- Indication: For Anemia due to chronic kidney disease    polyethylene glycol 3350 oral powder for reconstitution  -- 17 gram(s) by mouth once a day, As needed, Constipation  -- Indication: For bowel regimen     docusate sodium 100 mg oral capsule  -- 1 cap(s) by mouth 2 times a day  -- Indication: For bowel regimen     Cristin-Marguerite oral tablet  -- 1 tab(s) by mouth once a day last dose 5/28  -- Indication: For Supplement     folic acid 1 mg oral tablet  -- 1 tab(s) by mouth once a day  -- Indication: For Supplement     calcitriol 0.5 mcg oral capsule  -- 2 cap(s) by mouth once a day  -- Indication: For Supplement

## 2018-09-07 NOTE — DISCHARGE NOTE ADULT - HOSPITAL COURSE
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  Plan: much improved pain on PCA   slightly more anemic today but in her case this is multifactorial in setting of ESRD and on EPO as out-pt  overall resolving crisis, no s/s of acute chest and no indication for PRBC  cw current PCA dosing  cw FA, bowel regimen  DC home today with transition to oral Dilaudid.      Problem/Plan - 2:  ·  Problem: Transaminitis.  Plan: likely 2/2 acute crisis  LFTs trending down.   liver sono - s/p cholecystectomy wo concerning findings.      Problem/Plan - 3:  ·  Problem: ESRD (end stage renal disease) on dialysis.  Plan: PD per renal.     Attending Attestation:   Time spent on DC planning 35 min.

## 2018-09-07 NOTE — PROGRESS NOTE ADULT - PROBLEM SELECTOR PROBLEM 2
Transaminitis
Anemia due to chronic kidney disease
Transaminitis

## 2018-09-07 NOTE — DISCHARGE NOTE ADULT - CARE PROVIDER_API CALL
Saniya Velásquez), Internal Medicine  [address] [address] [place], [place] [address]  Phone: [phone]  Fax: [phone]

## 2018-09-07 NOTE — PROGRESS NOTE ADULT - PROBLEM SELECTOR PLAN 1
ESRD 2/2 FSGS, on PD since 3/2017. Follows at [place] with Dr. Larsen for Nephrology. Is on a cycler: 3 cycles of 2L with alternating solution (1.5% and 2.5%) for 6 hours overnight with day dwell of 2L. Will continue on CAPD while in-house: 3 cycles, 2L, alternating 1.5 and 2.5%, with 4 hr dwells, and 2L dwell overnight.  Patient tolerating PD well.

## 2018-09-07 NOTE — DISCHARGE NOTE ADULT - PATIENT PORTAL LINK FT
You can access the [facility], offered by [facility], by registering with the following website: [email or web address]

## 2018-09-07 NOTE — PROGRESS NOTE ADULT - PROBLEM SELECTOR PLAN 2
Hgb low . Epogen was on hold outpt as Hgb was at goal. Will start epogen weekly. Will give 15k units epogen today. Monitor H/H.

## 2018-09-07 NOTE — PROGRESS NOTE ADULT - PROBLEM SELECTOR PROBLEM 1
Sickle cell crisis
ESRD (end stage renal disease) on dialysis
Sickle cell crisis

## 2018-09-07 NOTE — DISCHARGE NOTE ADULT - CARE PLAN
Principal Discharge DX:	Sickle cell crisis  Goal:	remain free of pain  Assessment and plan of treatment:	Please follow up with your pcp within 1 week of discharge.  Please call to make an appointment within 1 week of discharge.  Secondary Diagnosis:	ESRD (end stage renal disease) on dialysis  Goal:	continue peritoneal dialysis  Assessment and plan of treatment:	Please continue HD as prescribed.  Please follow up with your nephrologist within 1 week of discharge.  Please call to make an appointment within 1 week of discharge.  Secondary Diagnosis:	Anemia  Goal:	remain stable  Assessment and plan of treatment:	Receives Epogen weekly as outpt  Continue to monitor cbc   Please follow up with your pcp within 1 week of discharge.  Please call to make an appointment within 1 week of discharge

## 2018-09-07 NOTE — PROGRESS NOTE ADULT - SUBJECTIVE AND OBJECTIVE BOX
Patient is a 37y old  Female who presents with a chief complaint of pain (05 Sep 2018 16:59)      SUBJECTIVE / OVERNIGHT EVENTS: feels much better today, HA has resolved, no CP, SOB, no fevers. tolerating PCA well, 7/7 attempts. no issues with PD     ROS:  No HA/DZ  No Vision changes   No CP, SOB  No N/V/D  No Edema  No Rash  NO weakness, numbness    MEDICATIONS  (STANDING):  calcitriol   Capsule 1 MICROGram(s) Oral daily  docusate sodium 100 milliGRAM(s) Oral two times a day  folic acid 1 milliGRAM(s) Oral daily  gentamicin 0.1% Ointment 1 Application(s) Topical once  HYDROmorphone PCA (1 mG/mL) 30 milliLiter(s) PCA Continuous PCA Continuous  Nephro-jay 1 Tablet(s) Oral daily  senna 2 Tablet(s) Oral at bedtime  sodium chloride 0.45%. 1000 milliLiter(s) (60 mL/Hr) IV Continuous <Continuous>    MEDICATIONS  (PRN):  naloxone Injectable 0.1 milliGRAM(s) IV Push every 3 minutes PRN For ANY of the following changes in patient status:  A. RR LESS THAN 10 breaths per minute, B. Oxygen saturation LESS THAN 90%, C. Sedation score of 6  ondansetron Injectable 4 milliGRAM(s) IV Push every 6 hours PRN Nausea  polyethylene glycol 3350 17 Gram(s) Oral daily PRN Constipation      T(C): 36.7 (09-06-18 @ 06:30)  HR: 90 (09-06-18 @ 06:30)  BP: 113/73 (09-06-18 @ 06:30)  RR: 18 (09-06-18 @ 06:30)  SpO2: 95% (09-06-18 @ 03:03)  CAPILLARY BLOOD GLUCOSE        I&O's Summary    05 Sep 2018 07:01  -  06 Sep 2018 07:00  --------------------------------------------------------  IN: 8000 mL / OUT: 8300 mL / NET: -300 mL    06 Sep 2018 07:01  -  06 Sep 2018 08:40  --------------------------------------------------------  IN: 2000 mL / OUT: 1800 mL / NET: 200 mL        PHYSICAL EXAM:  GENERAL: NAD, well-developed, AOx3  HEAD:  Atraumatic, Normocephalic  EYES: EOMI, PERRL, conjunctiva and sclera clear  NECK: Supple, No JVD  CHEST/LUNG: Clear to auscultation bilaterally  HEART: Regular rate and rhythm; No murmurs, rubs, or gallops, No Edema  ABDOMEN: Soft, Nontender, Nondistended; Bowel sounds present  EXTREMITIES:  2+ Peripheral Pulses, No clubbing, cyanosis  PSYCH: No SI/HI  NEUROLOGY: non-focal  SKIN: No rashes or lesions    LABS:                        9.4    11.77 )-----------( 141      ( 06 Sep 2018 06:00 )             26.6     09-06    133<L>  |  88<L>  |  69<H>  ----------------------------<  100<H>  3.4<L>   |  21<L>  |  9.53<H>    Ca    7.9<L>      06 Sep 2018 06:00    TPro  6.4  /  Alb  2.3<L>  /  TBili  1.1  /  DBili  x   /  AST  44<H>  /  ALT  123<H>  /  AlkPhos  286<H>  09-06                  RADIOLOGY & ADDITIONAL TESTS:    Imaging Personally Reviewed:    Consultant(s) Notes Reviewed:      Care Discussed with Consultants/Other Providers: renal - Dr Castillo
[facility] Division of Kidney Diseases & Hypertension  HEMODIALYSIS NOTE  --------------------------------------------------------------------------------  Chief Complaint: ESRD/Ongoing hemodialysis requirement    24 hour events/subjective: Patient seen and evaluated today.  No complaints.  Headache has resolved.  No issues with peritoneal dialysis.        PAST HISTORY  --------------------------------------------------------------------------------  No significant changes to PMH, PSH, FHx, SHx, unless otherwise noted    ALLERGIES & MEDICATIONS  --------------------------------------------------------------------------------  Allergies    morphine (Other)    Intolerances      Standing Inpatient Medications  calcitriol   Capsule 1 MICROGram(s) Oral daily  docusate sodium 100 milliGRAM(s) Oral two times a day  epoetin mechelle Injectable 83144 Unit(s) SubCutaneous once  folic acid 1 milliGRAM(s) Oral daily  gentamicin 0.1% Ointment 1 Application(s) Topical once  HYDROmorphone PCA (1 mG/mL) 30 milliLiter(s) PCA Continuous PCA Continuous  Nephro-jay 1 Tablet(s) Oral daily  senna 2 Tablet(s) Oral at bedtime  sodium chloride 0.45%. 1000 milliLiter(s) IV Continuous <Continuous>    PRN Inpatient Medications  naloxone Injectable 0.1 milliGRAM(s) IV Push every 3 minutes PRN  ondansetron Injectable 4 milliGRAM(s) IV Push every 6 hours PRN  polyethylene glycol 3350 17 Gram(s) Oral daily PRN      REVIEW OF SYSTEMS  --------------------------------------------------------------------------------  Gen: No weight changes, fatigue, fevers/chills, weakness  Skin: No rashes  Head/Eyes/Ears/Mouth: No headache; Normal hearing; Normal vision w/o blurriness; No sinus pain/discomfort, sore throat  Respiratory: No dyspnea, cough, wheezing, hemoptysis  CV: No chest pain, PND, orthopnea  GI: No abdominal pain, diarrhea, constipation, nausea, vomiting, melena, hematochezia  : No increased frequency, dysuria, hematuria, nocturia  MSK: No joint pain/swelling; no back pain; no edema  Neuro: No dizziness/lightheadedness, weakness, seizures, numbness, tingling  Heme: No easy bruising or bleeding  Endo: No heat/cold intolerance  Psych: No significant nervousness, anxiety, stress, depression    All other systems were reviewed and are negative, except as noted.    VITALS/PHYSICAL EXAM  --------------------------------------------------------------------------------  T(C): 37.1 (09-07-18 @ 07:40), Max: 37.8 (09-06-18 @ 21:00)  HR: 93 (09-07-18 @ 07:40) (92 - 103)  BP: 104/70 (09-07-18 @ 07:40) (94/65 - 117/68)  RR: 18 (09-07-18 @ 07:40) (16 - 18)  SpO2: 97% (09-07-18 @ 05:00) (92% - 99%)  Wt(kg): --        09-06-18 @ 07:01  -  09-07-18 @ 07:00  --------------------------------------------------------  IN: 8000 mL / OUT: 8300 mL / NET: -300 mL    09-07-18 @ 07:01  -  09-07-18 @ 11:21  --------------------------------------------------------  IN: 2000 mL / OUT: 1900 mL / NET: 100 mL      Physical Exam:    	Gen: NAD, young female  	HEENT: anicteric  	Pulm: CTA B/L  	CV: RRR, S1S2; no rub  	Abd: +BS, soft, nontender/nondistended  	: No suprapubic tenderness  	LE: Warm, no edema  	Neuro: follows commands  	Psych: Normal affect and mood  	Skin: Warm, without rashes  	Vascular access:  PD catheter in situ    LABS/STUDIES  --------------------------------------------------------------------------------              8.8    9.73  >-----------<  178      [09-07-18 @ 06:00]              25.2     132  |  88  |  66  ----------------------------<  90      [09-07-18 @ 06:00]  3.7   |  22  |  8.56        Ca     8.2     [09-07-18 @ 06:00]    TPro  6.4  /  Alb  2.3  /  TBili  1.1  /  DBili  x   /  AST  44  /  ALT  123  /  AlkPhos  286  [09-06-18 @ 06:00]              [09-07-18 @ 06:00]
[facility] DIVISION OF KIDNEY DISEASES AND HYPERTENSION -- FOLLOW UP NOTE  --------------------------------------------------------------------------------  Chief Complaint: ESRD/Ongoing hemodialysis requirement    24 hour events/subjective: Patient seen and evaluated at bedside after PD exchange.  She reports feeling well.  She is not in volume overload has no difficulty breathing has no edema.  Still does not urinate.      PAST HISTORY  --------------------------------------------------------------------------------  No significant changes to PMH, PSH, FHx, SHx, unless otherwise noted    ALLERGIES & MEDICATIONS  --------------------------------------------------------------------------------  Allergies    morphine (Other)    Intolerances      Standing Inpatient Medications  calcitriol   Capsule 1 MICROGram(s) Oral daily  docusate sodium 100 milliGRAM(s) Oral two times a day  folic acid 1 milliGRAM(s) Oral daily  gentamicin 0.1% Ointment 1 Application(s) Topical once  HYDROmorphone PCA (1 mG/mL) 30 milliLiter(s) PCA Continuous PCA Continuous  Nephro-jay 1 Tablet(s) Oral daily  senna 2 Tablet(s) Oral at bedtime    PRN Inpatient Medications  naloxone Injectable 0.1 milliGRAM(s) IV Push every 3 minutes PRN  ondansetron Injectable 4 milliGRAM(s) IV Push every 6 hours PRN  polyethylene glycol 3350 17 Gram(s) Oral daily PRN      REVIEW OF SYSTEMS  --------------------------------------------------------------------------------  Gen: No fever  CV: no cp  Pulm: no dyspnea  GI: no abdominal pain    VITALS/PHYSICAL EXAM  --------------------------------------------------------------------------------  T(C): 37.2 (09-06-18 @ 15:30), Max: 37.2 (09-06-18 @ 15:30)  HR: 103 (09-06-18 @ 15:30) (88 - 103)  BP: 109/68 (09-06-18 @ 15:30) (94/65 - 113/73)  RR: 18 (09-06-18 @ 15:30) (16 - 18)  SpO2: 92% (09-06-18 @ 13:45) (92% - 100%)  Wt(kg): --        09-05-18 @ 07:01  -  09-06-18 @ 07:00  --------------------------------------------------------  IN: 8000 mL / OUT: 8300 mL / NET: -300 mL    09-06-18 @ 07:01  -  09-06-18 @ 17:37  --------------------------------------------------------  IN: 6000 mL / OUT: 6100 mL / NET: -100 mL      Physical Exam:  	Gen: NAD, young female  	HEENT: anicteric  	Pulm: CTA B/L  	CV: RRR, S1S2; no rub  	Abd: +BS, soft, nontender/nondistended  	: No suprapubic tenderness  	LE: Warm, no edema  	Neuro: follows commands  	Psych: Normal affect and mood  	Skin: Warm, without rashes  	Vascular access:  PD catheter in situ      LABS/STUDIES  --------------------------------------------------------------------------------              9.4    11.77 >-----------<  141      [09-06-18 @ 06:00]              26.6     133  |  88  |  69  ----------------------------<  100      [09-06-18 @ 06:00]  3.4   |  21  |  9.53        Ca     7.9     [09-06-18 @ 06:00]    TPro  6.4  /  Alb  2.3  /  TBili  1.1  /  DBili  x   /  AST  44  /  ALT  123  /  AlkPhos  286  [09-06-18 @ 06:00]        LDH 1094      [09-06-18 @ 06:00]    Creatinine Trend:  SCr 9.53 [09-06 @ 06:00]  SCr 11.02 [09-05 @ 05:26]  SCr 11.42 [09-04 @ 10:17]  SCr 10.64 [09-03 @ 21:03]        .2 (Ca --)      [03-27-18 @ 05:30]   --  HbA1c 3.5      [05-15-18 @ 10:40]  TSH 1.62      [05-21-18 @ 05:51]
Patient is a 37y old  Female who presents with a chief complaint of pain (04 Sep 2018 04:39)    SUBJECTIVE / OVERNIGHT EVENTS:  Patient complaining of severe headache, that's throbbing in nature, in the front and back of her head. No affected by sound or light. Patient has had hx of similar headache in the past associated with sickle cell crisis but it was not this persistent. Dilaudid 2mg IV does help with the pain. Patient w/o chest pain or SOB. No focal numbness or weakness. Pt denies hx of migraines.    MEDICATIONS  (STANDING):  calcitriol   Capsule 1 MICROGram(s) Oral daily  folic acid 1 milliGRAM(s) Oral daily  Nephro-jay 1 Tablet(s) Oral daily    MEDICATIONS  (PRN):  HYDROmorphone  Injectable 2 milliGRAM(s) IV Push every 4 hours PRN moderate-severe pain      Vital Signs Last 24 Hrs  T(C): 36.7 (04 Sep 2018 06:53), Max: 37 (03 Sep 2018 19:59)  T(F): 98 (04 Sep 2018 06:53), Max: 98.6 (03 Sep 2018 19:59)  HR: 90 (04 Sep 2018 11:34) (90 - 113)  BP: 101/70 (04 Sep 2018 11:34) (97/65 - 105/72)  BP(mean): --  RR: 17 (04 Sep 2018 11:34) (16 - 20)  SpO2: 95% (04 Sep 2018 11:34) (95% - 100%)          PHYSICAL EXAM  GENERAL: Appears younger than stated age, in mild distress 2/2 pain, well-developed  HEAD:  Atraumatic, Normocephalic  EYES: EOMI, PERRLA, conjunctiva and sclera clear  CHEST/LUNG: Clear to auscultation bilaterally; No wheeze  HEART: Regular rate and rhythm; No murmurs, rubs, or gallops  ABDOMEN: Soft, Nontender, Nondistended; Bowel sounds present; PD site bandaged, not TTP and without erythema  EXTREMITIES:  2+ Peripheral Pulses, No clubbing, cyanosis, or edema  PSYCH: AAOx3  NEURO: no focal deficits      LABS:                        10.9   15.40 )-----------( 142      ( 04 Sep 2018 10:17 )             30.2     09-04    134<L>  |  89<L>  |  74<H>  ----------------------------<  97  3.7   |  22  |  11.42<H>    Ca    8.3<L>      04 Sep 2018 10:17    TPro  6.8  /  Alb  2.9<L>  /  TBili  1.0  /  DBili  x   /  AST  161<H>  /  ALT  242<H>  /  AlkPhos  269<H>  09-04          Consultant(s) Notes Reviewed:    Care Discussed with Consultants/Other Providers: Yes, ADS NP
Patient is a 37y old  Female who presents with a chief complaint of pain (04 Sep 2018 20:50)      SUBJECTIVE / OVERNIGHT EVENTS: c/o sever HA which is not unusual for her crisis but is worse than usual. agreeable to PCA. states that PRN Dilaudid would help but not last long enough. denies CP or SOB. no focal weakness. underwent PD yesterday wo issues     ROS:  No Vision changes   No CP, SOB  No N/V/D  No Edema  No Rash  NO weakness, numbness    MEDICATIONS  (STANDING):  calcitriol   Capsule 1 MICROGram(s) Oral daily  folic acid 1 milliGRAM(s) Oral daily  gentamicin 0.1% Ointment 1 Application(s) Topical once  HYDROmorphone PCA (1 mG/mL) 30 milliLiter(s) PCA Continuous PCA Continuous  Nephro-jay 1 Tablet(s) Oral daily  sodium chloride 0.45%. 1000 milliLiter(s) (60 mL/Hr) IV Continuous <Continuous>    MEDICATIONS  (PRN):  naloxone Injectable 0.1 milliGRAM(s) IV Push every 3 minutes PRN For ANY of the following changes in patient status:  A. RR LESS THAN 10 breaths per minute, B. Oxygen saturation LESS THAN 90%, C. Sedation score of 6  ondansetron Injectable 4 milliGRAM(s) IV Push every 6 hours PRN Nausea  traMADol 50 milliGRAM(s) Oral every 12 hours PRN Moderate Pain (4 - 6)      T(C): 36.9 (09-05-18 @ 10:53)  HR: 90 (09-05-18 @ 10:53)  BP: 105/75 (09-05-18 @ 10:53)  RR: 18 (09-05-18 @ 10:53)  SpO2: 95% (09-05-18 @ 10:53)  CAPILLARY BLOOD GLUCOSE        I&O's Summary    04 Sep 2018 07:01  -  05 Sep 2018 07:00  --------------------------------------------------------  IN: 4000 mL / OUT: 3500 mL / NET: 500 mL    05 Sep 2018 07:01  -  05 Sep 2018 12:40  --------------------------------------------------------  IN: 2000 mL / OUT: 1900 mL / NET: 100 mL        PHYSICAL EXAM:  GENERAL: NAD, well-developed, AOx3  HEAD:  Atraumatic, Normocephalic  EYES: EOMI, PERRL, conjunctiva and sclera clear  NECK: Supple, No JVD  CHEST/LUNG: Clear to auscultation bilaterally  HEART: Regular rate and rhythm; No murmurs, rubs, or gallops, No Edema  ABDOMEN: Soft, Nontender, Nondistended; Bowel sounds present  EXTREMITIES:  2+ Peripheral Pulses, No clubbing, cyanosis  PSYCH: No SI/HI  NEUROLOGY: non-focal  SKIN: No rashes or lesions    LABS:                        10.4   12.85 )-----------( 124      ( 05 Sep 2018 05:26 )             29.1     09-05    135  |  89<L>  |  76<H>  ----------------------------<  88  4.1   |  20<L>  |  11.02<H>    Ca    8.3<L>      05 Sep 2018 05:26    TPro  6.8  /  Alb  2.6<L>  /  TBili  1.1  /  DBili  x   /  AST  93<H>  /  ALT  189<H>  /  AlkPhos  286<H>  09-05                  RADIOLOGY & ADDITIONAL TESTS:    Imaging Personally Reviewed: [facility] - no acute pathology     Consultant(s) Notes Reviewed:      Care Discussed with Consultants/Other Providers: renal fellow - Dr Roberts
Patient is a 37y old  Female who presents with a chief complaint of pain (06 Sep 2018 17:37)      SUBJECTIVE / OVERNIGHT EVENTS: feels well, only slight HA    ROS:  No DZ  No Vision changes   No CP, SOB  No N/V/D  No Edema  No Rash  NO weakness, numbness    MEDICATIONS  (STANDING):  calcitriol   Capsule 1 MICROGram(s) Oral daily  docusate sodium 100 milliGRAM(s) Oral two times a day  epoetin mechelle Injectable 81722 Unit(s) SubCutaneous once  folic acid 1 milliGRAM(s) Oral daily  gentamicin 0.1% Ointment 1 Application(s) Topical once  HYDROmorphone PCA (1 mG/mL) 30 milliLiter(s) PCA Continuous PCA Continuous  Nephro-jay 1 Tablet(s) Oral daily  senna 2 Tablet(s) Oral at bedtime  sodium chloride 0.45%. 1000 milliLiter(s) (10 mL/Hr) IV Continuous <Continuous>    MEDICATIONS  (PRN):  naloxone Injectable 0.1 milliGRAM(s) IV Push every 3 minutes PRN For ANY of the following changes in patient status:  A. RR LESS THAN 10 breaths per minute, B. Oxygen saturation LESS THAN 90%, C. Sedation score of 6  ondansetron Injectable 4 milliGRAM(s) IV Push every 6 hours PRN Nausea  polyethylene glycol 3350 17 Gram(s) Oral daily PRN Constipation      T(C): 37.1 (09-07-18 @ 07:40)  HR: 93 (09-07-18 @ 07:40)  BP: 104/70 (09-07-18 @ 07:40)  RR: 18 (09-07-18 @ 07:40)  SpO2: 97% (09-07-18 @ 05:00)  CAPILLARY BLOOD GLUCOSE        I&O's Summary    06 Sep 2018 07:01  -  07 Sep 2018 07:00  --------------------------------------------------------  IN: 8000 mL / OUT: 8300 mL / NET: -300 mL    07 Sep 2018 07:01  -  07 Sep 2018 11:05  --------------------------------------------------------  IN: 2000 mL / OUT: 1900 mL / NET: 100 mL        PHYSICAL EXAM:  GENERAL: NAD, well-developed, AOx3  HEAD:  Atraumatic, Normocephalic  EYES: EOMI, PERRL, conjunctiva and sclera clear  NECK: Supple, No JVD  CHEST/LUNG: Clear to auscultation bilaterally  HEART: Regular rate and rhythm; No murmurs, rubs, or gallops, No Edema  ABDOMEN: Soft, Nontender, Nondistended; Bowel sounds present  EXTREMITIES:  2+ Peripheral Pulses, No clubbing, cyanosis  PSYCH: No SI/HI  NEUROLOGY: non-focal  SKIN: No rashes or lesions    LABS:                        8.8    9.73  )-----------( 178      ( 07 Sep 2018 06:00 )             25.2     09-07    132<L>  |  88<L>  |  66<H>  ----------------------------<  90  3.7   |  22  |  8.56<H>    Ca    8.2<L>      07 Sep 2018 06:00    TPro  6.4  /  Alb  2.3<L>  /  TBili  1.1  /  DBili  x   /  AST  44<H>  /  ALT  123<H>  /  AlkPhos  286<H>  09-06                  RADIOLOGY & ADDITIONAL TESTS:    Imaging Personally Reviewed:    Consultant(s) Notes Reviewed:      Care Discussed with Consultants/Other Providers:
[facility] Division of Kidney Diseases & Hypertension  HEMODIALYSIS NOTE  --------------------------------------------------------------------------------  Chief Complaint: ESRD/Ongoing hemodialysis requirement    24 hour events/subjective: Patient seen and evaluated at bedside after PD exchange.  She reports feeling well.  She is not in volume overload has no difficulty breathing has no edema.        PAST HISTORY  --------------------------------------------------------------------------------  No significant changes to PMH, PSH, FHx, SHx, unless otherwise noted    ALLERGIES & MEDICATIONS  --------------------------------------------------------------------------------  Allergies    morphine (Other)    Intolerances      Standing Inpatient Medications  calcitriol   Capsule 1 MICROGram(s) Oral daily  docusate sodium 100 milliGRAM(s) Oral two times a day  folic acid 1 milliGRAM(s) Oral daily  gentamicin 0.1% Ointment 1 Application(s) Topical once  HYDROmorphone PCA (1 mG/mL) 30 milliLiter(s) PCA Continuous PCA Continuous  Nephro-jay 1 Tablet(s) Oral daily  senna 2 Tablet(s) Oral at bedtime  sodium chloride 0.45%. 1000 milliLiter(s) IV Continuous <Continuous>    PRN Inpatient Medications  naloxone Injectable 0.1 milliGRAM(s) IV Push every 3 minutes PRN  ondansetron Injectable 4 milliGRAM(s) IV Push every 6 hours PRN  polyethylene glycol 3350 17 Gram(s) Oral daily PRN      REVIEW OF SYSTEMS  --------------------------------------------------------------------------------  Gen: no fever  CV: no chest pain  Pulm: no dyspnea  GI: no abdominal pain.    VITALS/PHYSICAL EXAM  --------------------------------------------------------------------------------  T(C): 36.6 (09-05-18 @ 15:05), Max: 36.9 (09-05-18 @ 10:53)  HR: 89 (09-05-18 @ 15:05) (72 - 97)  BP: 106/74 (09-05-18 @ 15:05) (99/59 - 113/72)  RR: 18 (09-05-18 @ 15:05) (16 - 18)  SpO2: 96% (09-05-18 @ 15:05) (95% - 100%)  Wt(kg): --  Height (cm): 165.1 (09-04-18 @ 16:28)  Weight (kg): 59 (09-04-18 @ 16:28)  BMI (kg/m2): 21.6 (09-04-18 @ 16:28)  BSA (m2): 1.65 (09-04-18 @ 16:28)      09-04-18 @ 07:01  -  09-05-18 @ 07:00  --------------------------------------------------------  IN: 4000 mL / OUT: 3500 mL / NET: 500 mL    09-05-18 @ 07:01  -  09-05-18 @ 17:00  --------------------------------------------------------  IN: 4000 mL / OUT: 4000 mL / NET: 0 mL      Physical Exam:    	Gen: NAD, young female  	HEENT: anicteric  	Pulm: CTA B/L  	CV: RRR, S1S2; no rub  	Back: No spinal or CVA tenderness  	Abd: +BS, soft, nontender/nondistended  	: No suprapubic tenderness  	UE: Warm,  	LE: Warm, no edema  	Neuro: follows commands  	Psych: Normal affect and mood  	Skin: Warm, without rashes  	Vascular access:  PD catheter in situ        LABS/STUDIES  --------------------------------------------------------------------------------              10.4   12.85 >-----------<  124      [09-05-18 @ 05:26]              29.1     135  |  89  |  76  ----------------------------<  88      [09-05-18 @ 05:26]  4.1   |  20  |  11.02        Ca     8.3     [09-05-18 @ 05:26]    TPro  6.8  /  Alb  2.6  /  TBili  1.1  /  DBili  x   /  AST  93  /  ALT  189  /  AlkPhos  286  [09-05-18 @ 05:26]        LDH 1202      [09-05-18 @ 05:26]

## 2018-09-07 NOTE — PROGRESS NOTE ADULT - ASSESSMENT
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
36 y/o F PMH Sickle cell disease p/w sickle cell crisis.
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

## 2018-09-07 NOTE — PROGRESS NOTE ADULT - PROBLEM SELECTOR PLAN 1
much improved pain on PCA   slightly more anemic today but in her case this is multifactorial in setting of ESRD and on EPO as out-pt  overall resolving crisis, no s/s of acute chest and no indication for PRBC  cw current PCA dosing  cw FA, bowel regimen  DC home today with transition to oral Dilaudid

## 2018-09-07 NOTE — DISCHARGE NOTE ADULT - PLAN OF CARE
remain free of pain Please follow up with your pcp within 1 week of discharge.  Please call to make an appointment within 1 week of discharge. continue peritoneal dialysis Please continue HD as prescribed.  Please follow up with your nephrologist within 1 week of discharge.  Please call to make an appointment within 1 week of discharge. remain stable Receives Epogen weekly as outpt  Continue to monitor cbc   Please follow up with your pcp within 1 week of discharge.  Please call to make an appointment within 1 week of discharge

## 2018-09-13 RX ORDER — GENTAMICIN SULFATE 1 MG/G
0.1 CREAM TOPICAL DAILY
Qty: 1 | Refills: 0 | Status: ACTIVE | COMMUNITY
Start: 2018-09-13 | End: 1900-01-01

## 2018-09-20 ENCOUNTER — APPOINTMENT (OUTPATIENT)
Dept: DERMATOLOGY | Facility: CLINIC | Age: 38
End: 2018-09-20
Payer: COMMERCIAL

## 2018-09-20 VITALS
WEIGHT: 126 LBS | HEIGHT: 65 IN | BODY MASS INDEX: 20.99 KG/M2 | DIASTOLIC BLOOD PRESSURE: 52 MMHG | SYSTOLIC BLOOD PRESSURE: 94 MMHG

## 2018-09-20 DIAGNOSIS — L63.9 ALOPECIA AREATA, UNSPECIFIED: ICD-10-CM

## 2018-09-20 PROCEDURE — 99202 OFFICE O/P NEW SF 15 MIN: CPT

## 2018-09-20 RX ORDER — BETAMETHASONE DIPROPIONATE 0.5 MG/G
0.05 OINTMENT TOPICAL
Qty: 1 | Refills: 0 | Status: ACTIVE | COMMUNITY
Start: 2018-09-20 | End: 1900-01-01

## 2018-09-22 PROBLEM — L63.9 ALOPECIA AREATA: Status: ACTIVE | Noted: 2018-09-20

## 2018-09-27 ENCOUNTER — OUTPATIENT (OUTPATIENT)
Dept: OUTPATIENT SERVICES | Facility: HOSPITAL | Age: 38
LOS: 1 days | End: 2018-09-27

## 2018-09-27 ENCOUNTER — APPOINTMENT (OUTPATIENT)
Dept: INTERNAL MEDICINE | Facility: HOSPITAL | Age: 38
End: 2018-09-27
Payer: COMMERCIAL

## 2018-09-27 DIAGNOSIS — R51 HEADACHE: ICD-10-CM

## 2018-09-27 DIAGNOSIS — Z98.890 OTHER SPECIFIED POSTPROCEDURAL STATES: Chronic | ICD-10-CM

## 2018-09-27 DIAGNOSIS — Z98.89 OTHER SPECIFIED POSTPROCEDURAL STATES: Chronic | ICD-10-CM

## 2018-09-27 DIAGNOSIS — G89.29 OTHER CHRONIC PAIN: ICD-10-CM

## 2018-09-27 DIAGNOSIS — Z98.51 TUBAL LIGATION STATUS: Chronic | ICD-10-CM

## 2018-09-27 PROCEDURE — 99214 OFFICE O/P EST MOD 30 MIN: CPT

## 2018-09-27 RX ORDER — OXYCODONE 10 MG/1
10 TABLET ORAL EVERY 6 HOURS
Qty: 20 | Refills: 0 | Status: ACTIVE | COMMUNITY
Start: 2018-09-27 | End: 1900-01-01

## 2018-09-27 RX ORDER — FAMOTIDINE 20 MG/1
20 TABLET, FILM COATED ORAL
Qty: 30 | Refills: 3 | Status: ACTIVE | COMMUNITY
Start: 2018-09-27 | End: 1900-01-01

## 2018-09-28 DIAGNOSIS — D57.1 SICKLE-CELL DISEASE WITHOUT CRISIS: ICD-10-CM

## 2018-09-28 DIAGNOSIS — N18.5 CHRONIC KIDNEY DISEASE, STAGE 5: ICD-10-CM

## 2018-09-28 DIAGNOSIS — R07.9 CHEST PAIN, UNSPECIFIED: ICD-10-CM

## 2018-10-01 PROBLEM — R51 HEADACHE, ACUTE: Status: ACTIVE | Noted: 2018-10-01

## 2018-10-01 PROBLEM — G89.29 CHRONIC PAIN: Status: ACTIVE | Noted: 2018-10-01

## 2018-10-01 NOTE — HISTORY OF PRESENT ILLNESS
[de-identified] : 36 yo female with HGB SS, RF with dialysis, s/p left THR, s/p 1 week crisis ( head). CT without contrast was done (neg), no other studies done. The patient's headache was new, not associated with new neurological sx. Today, the patient feels well, and does not have a headache.\par Still has some pain in her left hip, mostly controlled with tylenol #3, but occasionally requiring oxycodone\par Pe: gen- thin female in nad\par vss\par mild icterus\par lungs- cta\par cor: rrr+2/6 cristhian\par abd- benign, soft, PD cath in place, clean and dry\par ext: -c/c/e, no ulcers\par neuro- A and O x 3, non-focal\par \par A/P- 36 yo female with HGB SS, RF on PD, s/p 3 days of horrific HA, no MrA/MRIs performed\par 1. MRI/MRA- more urgent than not\par 2. pain control-Tylenol #3- #60\par Oxycodone- 10 mg tab- #20-for severe pain\par ISTOP checked\par 3. f/u by phone as soon as MRs are done, otherwise for routine appointment in  3 months\par Saniya Velásquez

## 2018-10-02 DIAGNOSIS — R51 HEADACHE: ICD-10-CM

## 2018-10-04 ENCOUNTER — TRANSCRIPTION ENCOUNTER (OUTPATIENT)
Age: 38
End: 2018-10-04

## 2018-10-05 ENCOUNTER — INPATIENT (INPATIENT)
Facility: HOSPITAL | Age: 38
LOS: 9 days | Discharge: HOME | End: 2018-10-15
Attending: SURGERY | Admitting: SURGERY
Payer: COMMERCIAL

## 2018-10-05 VITALS
TEMPERATURE: 98 F | DIASTOLIC BLOOD PRESSURE: 61 MMHG | OXYGEN SATURATION: 100 % | RESPIRATION RATE: 22 BRPM | HEART RATE: 92 BPM | SYSTOLIC BLOOD PRESSURE: 92 MMHG

## 2018-10-05 DIAGNOSIS — Z98.89 OTHER SPECIFIED POSTPROCEDURAL STATES: Chronic | ICD-10-CM

## 2018-10-05 DIAGNOSIS — Z98.890 OTHER SPECIFIED POSTPROCEDURAL STATES: Chronic | ICD-10-CM

## 2018-10-05 DIAGNOSIS — N18.6 END STAGE RENAL DISEASE: ICD-10-CM

## 2018-10-05 DIAGNOSIS — Z98.51 TUBAL LIGATION STATUS: Chronic | ICD-10-CM

## 2018-10-05 DIAGNOSIS — K42.0 UMBILICAL HERNIA WITH OBSTRUCTION, WITHOUT GANGRENE: ICD-10-CM

## 2018-10-05 DIAGNOSIS — E83.51 HYPOCALCEMIA: ICD-10-CM

## 2018-10-05 LAB
ALBUMIN SERPL ELPH-MCNC: 3 G/DL — LOW (ref 3.3–5)
ALBUMIN SERPL ELPH-MCNC: 3.4 G/DL — SIGNIFICANT CHANGE UP (ref 3.3–5)
ALP SERPL-CCNC: 139 U/L — HIGH (ref 40–120)
ALP SERPL-CCNC: 141 U/L — HIGH (ref 40–120)
ALT FLD-CCNC: 11 U/L — SIGNIFICANT CHANGE UP (ref 4–33)
ALT FLD-CCNC: 99 U/L — HIGH (ref 4–33)
ANISOCYTOSIS BLD QL: SIGNIFICANT CHANGE UP
APTT BLD: 28.9 SEC — SIGNIFICANT CHANGE UP (ref 27.5–37.4)
AST SERPL-CCNC: 15 U/L — SIGNIFICANT CHANGE UP (ref 4–32)
AST SERPL-CCNC: SIGNIFICANT CHANGE UP U/L (ref 4–32)
BASE EXCESS BLDA CALC-SCNC: 0.8 MMOL/L — SIGNIFICANT CHANGE UP
BASE EXCESS BLDA CALC-SCNC: 1.9 MMOL/L — SIGNIFICANT CHANGE UP
BASE EXCESS BLDA CALC-SCNC: 2.8 MMOL/L — SIGNIFICANT CHANGE UP
BASE EXCESS BLDV CALC-SCNC: 4.3 MMOL/L — SIGNIFICANT CHANGE UP
BASOPHILS # BLD AUTO: 0.05 K/UL — SIGNIFICANT CHANGE UP (ref 0–0.2)
BASOPHILS NFR BLD AUTO: 0.6 % — SIGNIFICANT CHANGE UP (ref 0–2)
BASOPHILS NFR SPEC: 0.9 % — SIGNIFICANT CHANGE UP (ref 0–2)
BILIRUB SERPL-MCNC: 0.5 MG/DL — SIGNIFICANT CHANGE UP (ref 0.2–1.2)
BILIRUB SERPL-MCNC: 1 MG/DL — SIGNIFICANT CHANGE UP (ref 0.2–1.2)
BLASTS # FLD: 0 % — SIGNIFICANT CHANGE UP (ref 0–0)
BLD GP AB SCN SERPL QL: NEGATIVE — SIGNIFICANT CHANGE UP
BLOOD GAS VENOUS - CREATININE: 12.8 MG/DL — HIGH (ref 0.5–1.3)
BUN SERPL-MCNC: 52 MG/DL — HIGH (ref 7–23)
BUN SERPL-MCNC: 53 MG/DL — HIGH (ref 7–23)
CA-I BLDA-SCNC: 0.86 MMOL/L — LOW (ref 1.15–1.29)
CA-I BLDA-SCNC: 0.92 MMOL/L — LOW (ref 1.15–1.29)
CALCIUM SERPL-MCNC: 6.7 MG/DL — LOW (ref 8.4–10.5)
CALCIUM SERPL-MCNC: 6.9 MG/DL — LOW (ref 8.4–10.5)
CHLORIDE BLDV-SCNC: 95 MMOL/L — LOW (ref 96–108)
CHLORIDE SERPL-SCNC: 84 MMOL/L — LOW (ref 98–107)
CHLORIDE SERPL-SCNC: 93 MMOL/L — LOW (ref 98–107)
CO2 SERPL-SCNC: 21 MMOL/L — LOW (ref 22–31)
CO2 SERPL-SCNC: 28 MMOL/L — SIGNIFICANT CHANGE UP (ref 22–31)
CREAT SERPL-MCNC: 11 MG/DL — HIGH (ref 0.5–1.3)
CREAT SERPL-MCNC: 9.66 MG/DL — HIGH (ref 0.5–1.3)
EOSINOPHIL # BLD AUTO: 0.05 K/UL — SIGNIFICANT CHANGE UP (ref 0–0.5)
EOSINOPHIL NFR BLD AUTO: 0.6 % — SIGNIFICANT CHANGE UP (ref 0–6)
EOSINOPHIL NFR FLD: 0 % — SIGNIFICANT CHANGE UP (ref 0–6)
GAS PNL BLDV: 122 MMOL/L — LOW (ref 136–146)
GIANT PLATELETS BLD QL SMEAR: PRESENT — SIGNIFICANT CHANGE UP
GLUCOSE BLDA-MCNC: 100 MG/DL — HIGH (ref 70–99)
GLUCOSE BLDA-MCNC: 104 MG/DL — HIGH (ref 70–99)
GLUCOSE BLDA-MCNC: 107 MG/DL — HIGH (ref 70–99)
GLUCOSE BLDV-MCNC: 87 — SIGNIFICANT CHANGE UP (ref 70–99)
GLUCOSE SERPL-MCNC: 89 MG/DL — SIGNIFICANT CHANGE UP (ref 70–99)
GLUCOSE SERPL-MCNC: 95 MG/DL — SIGNIFICANT CHANGE UP (ref 70–99)
HCG SERPL-ACNC: < 5 MIU/ML — SIGNIFICANT CHANGE UP
HCO3 BLDA-SCNC: 26 MMOL/L — SIGNIFICANT CHANGE UP (ref 22–26)
HCO3 BLDA-SCNC: 27 MMOL/L — HIGH (ref 22–26)
HCO3 BLDA-SCNC: 28 MMOL/L — HIGH (ref 22–26)
HCO3 BLDV-SCNC: 26 MMOL/L — SIGNIFICANT CHANGE UP (ref 20–27)
HCT VFR BLD CALC: 38.3 % — SIGNIFICANT CHANGE UP (ref 34.5–45)
HCT VFR BLDA CALC: 34.1 % — LOW (ref 34.5–46.5)
HCT VFR BLDA CALC: 34.8 % — SIGNIFICANT CHANGE UP (ref 34.5–46.5)
HCT VFR BLDA CALC: 35.7 % — SIGNIFICANT CHANGE UP (ref 34.5–46.5)
HCT VFR BLDV CALC: 41.2 % — SIGNIFICANT CHANGE UP (ref 34.5–45)
HGB BLD-MCNC: 12.5 G/DL — SIGNIFICANT CHANGE UP (ref 11.5–15.5)
HGB BLDA-MCNC: 11.1 G/DL — LOW (ref 11.5–15.5)
HGB BLDA-MCNC: 11.3 G/DL — LOW (ref 11.5–15.5)
HGB BLDA-MCNC: 11.6 G/DL — SIGNIFICANT CHANGE UP (ref 11.5–15.5)
HGB BLDV-MCNC: 13.4 G/DL — SIGNIFICANT CHANGE UP (ref 11.5–15.5)
IMM GRANULOCYTES # BLD AUTO: 0.11 # — SIGNIFICANT CHANGE UP
IMM GRANULOCYTES NFR BLD AUTO: 1.3 % — SIGNIFICANT CHANGE UP (ref 0–1.5)
INR BLD: 1.04 — SIGNIFICANT CHANGE UP (ref 0.88–1.17)
LACTATE BLDV-MCNC: 4.1 MMOL/L — CRITICAL HIGH (ref 0.5–2)
LYMPHOCYTES # BLD AUTO: 0.26 K/UL — LOW (ref 1–3.3)
LYMPHOCYTES # BLD AUTO: 3 % — LOW (ref 13–44)
LYMPHOCYTES NFR SPEC AUTO: 6.9 % — LOW (ref 13–44)
MACROCYTES BLD QL: SIGNIFICANT CHANGE UP
MAGNESIUM SERPL-MCNC: 1.7 MG/DL — SIGNIFICANT CHANGE UP (ref 1.6–2.6)
MCHC RBC-ENTMCNC: 31 PG — SIGNIFICANT CHANGE UP (ref 27–34)
MCHC RBC-ENTMCNC: 32.6 % — SIGNIFICANT CHANGE UP (ref 32–36)
MCV RBC AUTO: 95 FL — SIGNIFICANT CHANGE UP (ref 80–100)
METAMYELOCYTES # FLD: 0 % — SIGNIFICANT CHANGE UP (ref 0–1)
MONOCYTES # BLD AUTO: 0.52 K/UL — SIGNIFICANT CHANGE UP (ref 0–0.9)
MONOCYTES NFR BLD AUTO: 5.9 % — SIGNIFICANT CHANGE UP (ref 2–14)
MONOCYTES NFR BLD: 2.6 % — SIGNIFICANT CHANGE UP (ref 2–9)
MYELOCYTES NFR BLD: 0 % — SIGNIFICANT CHANGE UP (ref 0–0)
NEUTROPHIL AB SER-ACNC: 88.7 % — HIGH (ref 43–77)
NEUTROPHILS # BLD AUTO: 7.77 K/UL — HIGH (ref 1.8–7.4)
NEUTROPHILS NFR BLD AUTO: 88.6 % — HIGH (ref 43–77)
NEUTS BAND # BLD: 0 % — SIGNIFICANT CHANGE UP (ref 0–6)
NRBC # BLD: 8.7 /100WBC — SIGNIFICANT CHANGE UP
NRBC # FLD: 1.25 — SIGNIFICANT CHANGE UP
NRBC FLD-RTO: 14.3 — SIGNIFICANT CHANGE UP
OTHER - HEMATOLOGY %: 0 — SIGNIFICANT CHANGE UP
PCO2 BLDA: 28 MMHG — LOW (ref 32–48)
PCO2 BLDA: 29 MMHG — LOW (ref 32–48)
PCO2 BLDA: 40 MMHG — SIGNIFICANT CHANGE UP (ref 32–48)
PCO2 BLDV: 46 MMHG — SIGNIFICANT CHANGE UP (ref 41–51)
PH BLDA: 7.44 PH — SIGNIFICANT CHANGE UP (ref 7.35–7.45)
PH BLDA: 7.54 PH — HIGH (ref 7.35–7.45)
PH BLDA: 7.54 PH — HIGH (ref 7.35–7.45)
PH BLDV: 7.41 PH — SIGNIFICANT CHANGE UP (ref 7.32–7.43)
PHOSPHATE SERPL-MCNC: 6.2 MG/DL — HIGH (ref 2.5–4.5)
PLATELET # BLD AUTO: 571 K/UL — HIGH (ref 150–400)
PLATELET COUNT - ESTIMATE: SIGNIFICANT CHANGE UP
PMV BLD: 11.2 FL — SIGNIFICANT CHANGE UP (ref 7–13)
PO2 BLDA: 148 MMHG — HIGH (ref 83–108)
PO2 BLDA: 260 MMHG — HIGH (ref 83–108)
PO2 BLDA: 271 MMHG — HIGH (ref 83–108)
PO2 BLDV: 25 MMHG — LOW (ref 35–40)
POIKILOCYTOSIS BLD QL AUTO: SIGNIFICANT CHANGE UP
POLYCHROMASIA BLD QL SMEAR: SIGNIFICANT CHANGE UP
POTASSIUM BLDA-SCNC: 3.4 MMOL/L — SIGNIFICANT CHANGE UP (ref 3.4–4.5)
POTASSIUM BLDA-SCNC: 3.7 MMOL/L — SIGNIFICANT CHANGE UP (ref 3.4–4.5)
POTASSIUM BLDA-SCNC: 3.7 MMOL/L — SIGNIFICANT CHANGE UP (ref 3.4–4.5)
POTASSIUM BLDV-SCNC: SIGNIFICANT CHANGE UP MMOL/L (ref 3.4–4.5)
POTASSIUM SERPL-MCNC: 3.8 MMOL/L — SIGNIFICANT CHANGE UP (ref 3.5–5.3)
POTASSIUM SERPL-MCNC: SIGNIFICANT CHANGE UP MMOL/L (ref 3.5–5.3)
POTASSIUM SERPL-SCNC: 3.8 MMOL/L — SIGNIFICANT CHANGE UP (ref 3.5–5.3)
POTASSIUM SERPL-SCNC: SIGNIFICANT CHANGE UP MMOL/L (ref 3.5–5.3)
PROMYELOCYTES # FLD: 0 % — SIGNIFICANT CHANGE UP (ref 0–0)
PROT SERPL-MCNC: 6.3 G/DL — SIGNIFICANT CHANGE UP (ref 6–8.3)
PROT SERPL-MCNC: SIGNIFICANT CHANGE UP G/DL (ref 6–8.3)
PROTHROM AB SERPL-ACNC: 11.5 SEC — SIGNIFICANT CHANGE UP (ref 9.8–13.1)
RBC # BLD: 4.03 M/UL — SIGNIFICANT CHANGE UP (ref 3.8–5.2)
RBC # FLD: 24.6 % — HIGH (ref 10.3–14.5)
RETICS #: 414 K/UL — HIGH (ref 25–125)
RETICS/RBC NFR: 10.3 % — HIGH (ref 0.5–2.5)
RH IG SCN BLD-IMP: NEGATIVE — SIGNIFICANT CHANGE UP
SAO2 % BLDA: 100 % — HIGH (ref 95–99)
SAO2 % BLDA: 100 % — HIGH (ref 95–99)
SAO2 % BLDA: 99.6 % — HIGH (ref 95–99)
SAO2 % BLDV: 31 % — LOW (ref 60–85)
SODIUM BLDA-SCNC: 136 MMOL/L — SIGNIFICANT CHANGE UP (ref 136–146)
SODIUM BLDA-SCNC: 138 MMOL/L — SIGNIFICANT CHANGE UP (ref 136–146)
SODIUM BLDA-SCNC: 138 MMOL/L — SIGNIFICANT CHANGE UP (ref 136–146)
SODIUM SERPL-SCNC: 124 MMOL/L — LOW (ref 135–145)
SODIUM SERPL-SCNC: 141 MMOL/L — SIGNIFICANT CHANGE UP (ref 135–145)
TARGETS BLD QL SMEAR: SIGNIFICANT CHANGE UP
VARIANT LYMPHS # BLD: 0.9 % — SIGNIFICANT CHANGE UP
WBC # BLD: 8.76 K/UL — SIGNIFICANT CHANGE UP (ref 3.8–10.5)
WBC # FLD AUTO: 8.76 K/UL — SIGNIFICANT CHANGE UP (ref 3.8–10.5)

## 2018-10-05 RX ORDER — ONDANSETRON 8 MG/1
4 TABLET, FILM COATED ORAL ONCE
Qty: 0 | Refills: 0 | Status: DISCONTINUED | OUTPATIENT
Start: 2018-10-05 | End: 2018-10-05

## 2018-10-05 RX ORDER — SODIUM CHLORIDE 9 MG/ML
250 INJECTION INTRAMUSCULAR; INTRAVENOUS; SUBCUTANEOUS ONCE
Qty: 0 | Refills: 0 | Status: DISCONTINUED | OUTPATIENT
Start: 2018-10-05 | End: 2018-10-05

## 2018-10-05 RX ORDER — HYDROMORPHONE HYDROCHLORIDE 2 MG/ML
0.5 INJECTION INTRAMUSCULAR; INTRAVENOUS; SUBCUTANEOUS ONCE
Qty: 0 | Refills: 0 | Status: DISCONTINUED | OUTPATIENT
Start: 2018-10-05 | End: 2018-10-05

## 2018-10-05 RX ORDER — SODIUM CHLORIDE 9 MG/ML
1000 INJECTION INTRAMUSCULAR; INTRAVENOUS; SUBCUTANEOUS
Qty: 0 | Refills: 0 | Status: DISCONTINUED | OUTPATIENT
Start: 2018-10-05 | End: 2018-10-06

## 2018-10-05 RX ORDER — ACETAMINOPHEN 500 MG
1000 TABLET ORAL ONCE
Qty: 0 | Refills: 0 | Status: COMPLETED | OUTPATIENT
Start: 2018-10-05 | End: 2018-10-06

## 2018-10-05 RX ORDER — DOCUSATE SODIUM 100 MG
100 CAPSULE ORAL
Qty: 0 | Refills: 0 | Status: DISCONTINUED | OUTPATIENT
Start: 2018-10-05 | End: 2018-10-05

## 2018-10-05 RX ORDER — FOLIC ACID 0.8 MG
1 TABLET ORAL DAILY
Qty: 0 | Refills: 0 | Status: DISCONTINUED | OUTPATIENT
Start: 2018-10-05 | End: 2018-10-05

## 2018-10-05 RX ORDER — CALCITRIOL 0.5 UG/1
0.5 CAPSULE ORAL DAILY
Qty: 0 | Refills: 0 | Status: DISCONTINUED | OUTPATIENT
Start: 2018-10-05 | End: 2018-10-05

## 2018-10-05 RX ORDER — CALCIUM ACETATE 667 MG
3 TABLET ORAL
Qty: 0 | Refills: 0 | COMMUNITY

## 2018-10-05 RX ORDER — SODIUM CHLORIDE 9 MG/ML
1000 INJECTION, SOLUTION INTRAVENOUS
Qty: 0 | Refills: 0 | Status: DISCONTINUED | OUTPATIENT
Start: 2018-10-05 | End: 2018-10-05

## 2018-10-05 RX ORDER — CALCIUM ACETATE 667 MG
667 TABLET ORAL
Qty: 0 | Refills: 0 | Status: DISCONTINUED | OUTPATIENT
Start: 2018-10-05 | End: 2018-10-05

## 2018-10-05 RX ORDER — HEPARIN SODIUM 5000 [USP'U]/ML
5000 INJECTION INTRAVENOUS; SUBCUTANEOUS EVERY 8 HOURS
Qty: 0 | Refills: 0 | Status: DISCONTINUED | OUTPATIENT
Start: 2018-10-05 | End: 2018-10-15

## 2018-10-05 RX ORDER — FENTANYL CITRATE 50 UG/ML
25 INJECTION INTRAVENOUS
Qty: 0 | Refills: 0 | Status: DISCONTINUED | OUTPATIENT
Start: 2018-10-05 | End: 2018-10-05

## 2018-10-05 RX ORDER — HYDROMORPHONE HYDROCHLORIDE 2 MG/ML
0.5 INJECTION INTRAMUSCULAR; INTRAVENOUS; SUBCUTANEOUS EVERY 4 HOURS
Qty: 0 | Refills: 0 | Status: DISCONTINUED | OUTPATIENT
Start: 2018-10-05 | End: 2018-10-06

## 2018-10-05 RX ORDER — HYDROMORPHONE HYDROCHLORIDE 2 MG/ML
1 INJECTION INTRAMUSCULAR; INTRAVENOUS; SUBCUTANEOUS
Qty: 0 | Refills: 0 | Status: DISCONTINUED | OUTPATIENT
Start: 2018-10-05 | End: 2018-10-05

## 2018-10-05 RX ORDER — SODIUM CHLORIDE 9 MG/ML
1000 INJECTION INTRAMUSCULAR; INTRAVENOUS; SUBCUTANEOUS ONCE
Qty: 0 | Refills: 0 | Status: COMPLETED | OUTPATIENT
Start: 2018-10-05 | End: 2018-10-05

## 2018-10-05 RX ORDER — HYDROMORPHONE HYDROCHLORIDE 2 MG/ML
1 INJECTION INTRAMUSCULAR; INTRAVENOUS; SUBCUTANEOUS ONCE
Qty: 0 | Refills: 0 | Status: DISCONTINUED | OUTPATIENT
Start: 2018-10-05 | End: 2018-10-05

## 2018-10-05 RX ADMIN — HYDROMORPHONE HYDROCHLORIDE 0.5 MILLIGRAM(S): 2 INJECTION INTRAMUSCULAR; INTRAVENOUS; SUBCUTANEOUS at 23:03

## 2018-10-05 RX ADMIN — HYDROMORPHONE HYDROCHLORIDE 0.5 MILLIGRAM(S): 2 INJECTION INTRAMUSCULAR; INTRAVENOUS; SUBCUTANEOUS at 08:58

## 2018-10-05 RX ADMIN — HYDROMORPHONE HYDROCHLORIDE 1 MILLIGRAM(S): 2 INJECTION INTRAMUSCULAR; INTRAVENOUS; SUBCUTANEOUS at 16:20

## 2018-10-05 RX ADMIN — SODIUM CHLORIDE 1000 MILLILITER(S): 9 INJECTION INTRAMUSCULAR; INTRAVENOUS; SUBCUTANEOUS at 08:59

## 2018-10-05 RX ADMIN — HYDROMORPHONE HYDROCHLORIDE 0.5 MILLIGRAM(S): 2 INJECTION INTRAMUSCULAR; INTRAVENOUS; SUBCUTANEOUS at 23:18

## 2018-10-05 RX ADMIN — HYDROMORPHONE HYDROCHLORIDE 1 MILLIGRAM(S): 2 INJECTION INTRAMUSCULAR; INTRAVENOUS; SUBCUTANEOUS at 10:36

## 2018-10-05 RX ADMIN — HYDROMORPHONE HYDROCHLORIDE 1 MILLIGRAM(S): 2 INJECTION INTRAMUSCULAR; INTRAVENOUS; SUBCUTANEOUS at 16:09

## 2018-10-05 RX ADMIN — HYDROMORPHONE HYDROCHLORIDE 0.5 MILLIGRAM(S): 2 INJECTION INTRAMUSCULAR; INTRAVENOUS; SUBCUTANEOUS at 08:45

## 2018-10-05 RX ADMIN — HYDROMORPHONE HYDROCHLORIDE 0.5 MILLIGRAM(S): 2 INJECTION INTRAMUSCULAR; INTRAVENOUS; SUBCUTANEOUS at 09:12

## 2018-10-05 RX ADMIN — HYDROMORPHONE HYDROCHLORIDE 0.5 MILLIGRAM(S): 2 INJECTION INTRAMUSCULAR; INTRAVENOUS; SUBCUTANEOUS at 08:30

## 2018-10-05 RX ADMIN — HEPARIN SODIUM 5000 UNIT(S): 5000 INJECTION INTRAVENOUS; SUBCUTANEOUS at 23:03

## 2018-10-05 NOTE — ED ADULT TRIAGE NOTE - CHIEF COMPLAINT QUOTE
pt c/o severe pain to her abdomen since 6pm Yesterday. vomited five times. Noticed umbilical hernia since last night. pt is very uncomfortable. Denies fever. She gets peritoneal dialysis. PMH of sickle cell disease.

## 2018-10-05 NOTE — ED PROVIDER NOTE - PHYSICAL EXAMINATION
PHYSICAL EXAM:  GENERAL: NAD, speaks in full sentences, in distress squirming on bed  HEAD:  Atraumatic, Normocephalic  EYES: EOMI, PERRLA, conjunctiva and sclera clear  NECK: Supple, No JVD  CHEST/LUNG: Clear to auscultation bilaterally; No wheeze; No crackles; No accessory muscles used  HEART: Regular rate and rhythm; No murmurs;   ABDOMEN: Distended, tender umbilical hernia, hard, nonreducible. port for peritoneal dialysis  EXTREMITIES:  2+ Peripheral Pulses, No cyanosis or edema  PSYCH: AAOx3  NEUROLOGY: non-focal  SKIN: No rashes or lesions

## 2018-10-05 NOTE — BRIEF OPERATIVE NOTE - OPERATION/FINDINGS
Dusky loop of bowel within hernia that slowly reperfused after reduction. No bowel resection. Hernia repaired primarily with figure of 8 [place]. PD catheter left in place.

## 2018-10-05 NOTE — PROGRESS NOTE ADULT - ASSESSMENT
A/P: 38 yo F s/p Incisional hernia repair.  -Nephrology following for dialysis  IVF at 30cc  -Pain control  -Nause control  -NPO  -Monitor NGT output A/P: 38 yo F s/p Incisional hernia repair.  -Nephrology following for dialysis, recs appreciated  -IVF at 30cc  -Pain control  -Nause control  -NPO  -Monitor NGT output

## 2018-10-05 NOTE — CONSULT NOTE ADULT - PROBLEM SELECTOR RECOMMENDATION 2
Pt with low serum calcium. Recommend ionized calcium and IV calcium repletion as needed. Check IPTH. Serum phosphorus elevated. Restart home phosphate binders once pt is restarted on a diet. Monitor

## 2018-10-05 NOTE — H&P ADULT - ASSESSMENT
Assessment:   37F w/ sickle cell c/b ACS + avascular necrosis of hip s/p replacement and ESRD on peritoneal dialysis (last on 10/3) presents with sharp abdominal pain x12 hours concerning for incarcerated/strangulated hernia. Given patient's loss of bowel function, elevated lactate, extreme abdominal pain, and herniation w/ overlying skin changes, patient likely with incarcerated hernia vs. strangulated hernia.     - Admit to A team surgery  - Patient consented for umbilical hernia repair, possible bowel resection, possible PD catheter removal  - NPO + minimal IVF given ESRD  - SQH  - Pain control (pt takes tylenol w/ codeine regularly)  - Nephrology consulted     Maryan Hernandez PGY-2  Surgery pager Assessment:   37F w/ sickle cell c/b ACS + avascular necrosis of hip s/p replacement and ESRD on peritoneal dialysis (last on 10/3) presents with sharp abdominal pain x12 hours concerning for incarcerated/strangulated hernia. Given patient's loss of bowel function, elevated lactate, extreme abdominal pain, and herniation w/ overlying skin changes, patient likely with incarcerated hernia vs. strangulated hernia.     - Admit to A team surgery  - Patient consented for umbilical hernia repair, possible bowel resection, possible PD catheter removal  - NPO + minimal IVF given ESRD  - SQH  - Pain control (pt takes tylenol w/ codeine regularly)  - Nephrology consulted     Maryan Hernandez PGY-2  Surgery pager [phone]

## 2018-10-05 NOTE — ED ADULT NURSE NOTE - NSIMPLEMENTINTERV_GEN_ALL_ED
Implemented All Universal Safety Interventions:  [place] to call system. Call bell, personal items and telephone within reach. Instruct patient to call for assistance. Room bathroom lighting operational. Non-slip footwear when patient is off stretcher. Physically safe environment: no spills, clutter or unnecessary equipment. Stretcher in lowest position, wheels locked, appropriate side rails in place.

## 2018-10-05 NOTE — H&P ADULT - NSHPLABSRESULTS_GEN_ALL_CORE
CBC (10-05 @ 08:39)                          12.5                     8.76    )--------------(  571<H>     88.6<H>% Neuts, 3.0<L>% Lymphs, ANC: 7.77<H>                          38.3      BMP (10-05 @ 10:13)       141     |  93<L>   |  53<H> 			Ca++ --      Ca 6.9<L>       ---------------------------------( 95    		Mg 1.7          3.8     |  21<L>   |  11.00<H>			Ph 6.2<H>  BMP (10-05 @ 08:38)       124<L>  |  84<L>   |  52<H> 			Ca++ --      Ca 6.7<L>       ---------------------------------( 89    		Mg --           Test not performed SPECIMEN GROSSLY HEMOLYZED  |  28      |  9.66<H>			Ph --        LFTs (10-05 @ 10:13)      TPro 6.3 / Alb 3.0<L> / TBili 0.5 / DBili -- / AST 15 / ALT 11 / AlkPhos 141<H>  LFTs (10-05 @ 08:38)      TPro Test not performed SPECIMEN GROSSLY HEMOLYZED / Alb 3.4 / TBili 1.0 / DBili -- / AST Test not performed SPECIMEN GROSSLY HEMOLYZED / ALT 99<H> / AlkPhos 139<H>    Coags (10-05 @ 10:12)  aPTT 28.9 / INR 1.04 / PT 11.5      ABG (10-05 @ 10:17)     7.44 / 40 / 148<H> / 27<H> / 2.8 / 99.6<H>%     Lactate:      VBG (10-05 @ 08:38)     7.41 / 46 / 25<L> / 26 / 4.3 / 31.0<L>%      Lactate: 4.1<HH>

## 2018-10-05 NOTE — ED PROVIDER NOTE - PROGRESS NOTE DETAILS
MD Ramírez: Pt to be taken to OR for hernia repair. Spoke w Dr. Velásquez o/p heme ([phone]) who recommends 1 unit PRBC prior to surgery and renal c/s.

## 2018-10-05 NOTE — CONSULT NOTE ADULT - PROBLEM SELECTOR RECOMMENDATION 9
Pt with ESRD on PD. Pt is s/p abdominal hernia repair today. Plan to hold PD for one week. Explained to pt and mother at bedside. Pt currently refusing HD. Will reassess daily the need for HD.

## 2018-10-05 NOTE — BRIEF OPERATIVE NOTE - PROCEDURE
<<-----Click on this checkbox to enter Procedure Incisional hernia repair  10/05/2018    Active  [id number]

## 2018-10-05 NOTE — ED PROVIDER NOTE - NS ED ROS FT
CONSTITUTIONAL: No fevers, no chills  Eyes: no visual changes  Ears: no ear drainage, no ear pain  Nose: no nasal congestion  Mouth/Throat: no sore throat  Cardiovascular: No Chest pain  Respiratory: No SOB  Gastrointestinal: V+, D+, N+  Genitourinary: no dysuria, no hematuria  SKIN: no rashes.  NEURO: no headache  PSYCHIATRIC: no known mental health issues.

## 2018-10-05 NOTE — PROGRESS NOTE ADULT - SUBJECTIVE AND OBJECTIVE BOX
Post-Op Note    38 yo F s/p Incisional hernia repair. ABHISHEK since OR.       Vital Signs Last 24 Hrs  T(C): 36.8 (05 Oct 2018 18:30), Max: 37.2 (05 Oct 2018 16:00)  T(F): 98.2 (05 Oct 2018 18:30), Max: 99 (05 Oct 2018 16:00)  HR: 80 (05 Oct 2018 18:30) (73 - 102)  BP: 102/63 (05 Oct 2018 16:05) (92/61 - 108/66)  BP(mean): --  RR: 12 (05 Oct 2018 18:30) (12 - 22)  SpO2: 99% (05 Oct 2018 18:30) (95% - 100%)      PE: Gen: NAD  NGT in place  CV: RRR  Pulm: non-labored  Abd: soft/incisional tenderness. Dressing in place: c/d/i, Incision hemostatic    MEDICATIONS  (STANDING):  heparin  Injectable 5000 Unit(s) SubCutaneous every 8 hours  sodium chloride 0.9%. 1000 milliLiter(s) (30 mL/Hr) IV Continuous <Continuous>    MEDICATIONS  (PRN):  acetaminophen  IVPB .. 1000 milliGRAM(s) IV Intermittent once PRN Mild Pain (1 - 3)  fentaNYL    Injectable 25 MICROGram(s) IV Push every 5 minutes PRN Moderate Pain (4 - 6)  HYDROmorphone  Injectable 0.5 milliGRAM(s) IV Push every 4 hours PRN Moderate Pain (4 - 6)  HYDROmorphone  Injectable 1 milliGRAM(s) IV Push every 10 minutes PRN Severe Pain (7 - 10)  ondansetron Injectable 4 milliGRAM(s) IV Push once PRN Nausea and/or Vomiting

## 2018-10-05 NOTE — ED PROVIDER NOTE - OBJECTIVE STATEMENT
38yo F pmhx SS c/b acute chest syndrome in dec, BL AVN, s/p umbilical hernia repair in 2017, ESRD who p/w cc of Abdominal pain    Pt had sudden onset abdominal pain yesterday at 6:00pm, her umbilical hernia "popped out" and became hard and painful. Patient had 5x episodes of vomiting. Passing less flatus, had a small BM this AM. No F/C.

## 2018-10-05 NOTE — ED ADULT NURSE NOTE - OBJECTIVE STATEMENT
Pt arrives to ED c/o abd pain and N/V and reporting a previously repaired hernia "popped out." Pt has hx of sickle cell but denies that pain feels like a crisis.  Pt also performs peritoneal dialysis.  RN attempted iv placement with no success.  Pt reports typically her IV's are placed via U/S. MD notified and will place U/S guided iv.  Hot packs provided to Pt.  Pt awaiting MD.  Parent at bedside.

## 2018-10-05 NOTE — ED PROVIDER NOTE - MEDICAL DECISION MAKING DETAILS
37F h/o ESRD on PD presents with abd pain, exam concerning for incarcerated vs strangulated hernia. Plan for labs, CT and surg consult.

## 2018-10-05 NOTE — CONSULT NOTE ADULT - SUBJECTIVE AND OBJECTIVE BOX
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 Pt follows at [place] with Dr. Larsen for nephrology. Is on a cycler, and receives 3 cycles of 2L with alternating solution (1.5% and 2.5%, depending on blood pressure) for 6 hours overnight. Pt seen and examined in PACU. Surgeon currently recommends avoid using PD catheter for one week post-operatively which may require hemodialysis. Explained to pt and mother at bedside. Pt drowsy but awake and responsive.     PAST HISTORY  --------------------------------------------------------------------------------  PAST MEDICAL & SURGICAL HISTORY:  Peritoneal dialysis catheter in place  Umbilical hernia: 2017  Cholelithiasis  Anemia  Sickle cell disease  Chronic kidney disease: started dialysis 2017 with etiology from Parvo Virus 2016  Parvovirus B19 infection: 2016 resulting in chronic renal disease  Nephrotic syndrome  HPV (Human Papillomavirus)  History of peritoneal dialysis: 3/2017  H/O umbilical hernia repair:   History of hip surgery: R hip due to necrosis in --bone graft from right tibia  H/O tubal ligation: in , along with   H/O: : 014  S/P Laparoscopic Cholecystectomy    FAMILY HISTORY:  Family history of sarcoidosis (Sibling): Sister with HgbSS and sarcoidosis  Family history of sickle cell disease (Sibling): Sister with HgbSS and sarcoidosis  Family history of sickle cell trait in mother  Family history of sickle cell trait in father    PAST SOCIAL HISTORY:    ALLERGIES & MEDICATIONS  --------------------------------------------------------------------------------  Allergies    morphine (Other)    Intolerances      Standing Inpatient Medications  heparin  Injectable 5000 Unit(s) SubCutaneous every 8 hours  sodium chloride 0.9%. 1000 milliLiter(s) IV Continuous <Continuous>    PRN Inpatient Medications  acetaminophen  IVPB .. 1000 milliGRAM(s) IV Intermittent once PRN  fentaNYL    Injectable 25 MICROGram(s) IV Push every 5 minutes PRN  HYDROmorphone  Injectable 0.5 milliGRAM(s) IV Push every 4 hours PRN  HYDROmorphone  Injectable 1 milliGRAM(s) IV Push every 10 minutes PRN  ondansetron Injectable 4 milliGRAM(s) IV Push once PRN      REVIEW OF SYSTEMS  --------------------------------------------------------------------------------  Gen: + weakness  Skin: No rashes  Head/Eyes/Ears/Mouth: No headache  Respiratory: No dyspnea  CV: No chest pain, PND, orthopnea  GI: No abdominal pain, diarrhea  : No increased frequency  MSK: No edema  Neuro: No dizziness/lightheadedness    All other systems were reviewed and are negative, except as noted.    VITALS/PHYSICAL EXAM  --------------------------------------------------------------------------------  T(C): 37.2 (10-05-18 @ 16:00), Max: 37.2 (10-05-18 @ 16:00)  HR: 78 (10-05-18 @ 17:00) (73 - 102)  BP: 102/63 (10-05-18 @ 16:05) (92/61 - 108/66)  RR: 12 (10-05-18 @ 17:00) (12 - 22)  SpO2: 99% (10-05-18 @ 17:00) (95% - 100%)  Wt(kg): --  Height (cm): 165.1 (10-05-18 @ 11:28)  Weight (kg): 59 (10-05-18 @ 11:28)  BMI (kg/m2): 21.6 (10-05-18 @ 11:)  BSA (m2): 1.65 (10-05-18 @ 11:)      Physical Exam:  	Gen: NAD  	HEENT: + NGT  	Pulm: CTA B/L  	CV: RRR, S1S2  	Abd: +BS, soft, nontender/nondistended + Midline abdominal dressing  	UE: Warm, no asterixis  	LE: Warm, no edema  	Psych: Normal affect and mood  	Skin: Warm, without rashes  	Vascular access: + PD catheter, Dressing intact    LABS/STUDIES  --------------------------------------------------------------------------------              12.5   8.76  >-----------<  571      [10-05-18 @ 08:39]              38.3     141  |  93  |  53  ----------------------------<  95      [10-05-18 @ 10:13]  3.8   |  21  |  11.00        Ca     6.9     [10-05-18 @ 10:13]      Mg     1.7     [10-05-18 @ 10:13]      Phos  6.2     [10-05-18 @ 10:13]    TPro  6.3  /  Alb  3.0  /  TBili  0.5  /  DBili  x   /  AST  15  /  ALT  11  /  AlkPhos  141  [10-05-18 @ 10:13]    PT/INR: PT 11.5 , INR 1.04       [10-05-18 @ 10:12]  PTT: 28.9       [10-05-18 @ 10:12]      Creatinine Trend:  SCr 11.00 [10-05 @ 10:13]  SCr 9.66 [10-05 @ 08:38]  SCr 8.56 [ 06:00]  SCr 9.53 [ 06:00]    Urinalysis - [05-10-16 @ 19:10]      Color PLYEL / Appearance CLEAR / SG 1.011 / pH 7.5      Gluc 50 / Ketone NEGATIVE  / Bili NEGATIVE / Urobili NORMAL       Blood SMALL / Protein >600 / Leuk Est NEGATIVE / Nitrite NEGATIVE      RBC 0-2 / WBC 10-25 / Hyaline 2-5 / Gran  / Sq Epi OCC / Non Sq Epi  / Bacteria FEW      .2 (Ca --)      [18 @ 05:30]   --  HbA1c 3.5      [05-15-18 @ 10:40]  TSH 1.62      [18 @ 05:51]    HBsAg Nonreactive      [17 @ 11:35]  HCV 0.11, Nonreactive Hepatitis C AB  S/CO Ratio                        Interpretation  < 1.0                                     Non-Reactive  1.0 - 4.9                           Weakly-Reactive  > 5.0                                 Reactive  Non-Reactive: [place] with a non-reactive HCV antibody  result is considered uninfected.  No further action is  needed unless recent infection is suspected.  In these  cases, consider repeat testing later to detect  seroconversion..  Weakly-Reactive: HCV antibody test is abnormal, HCV RNA  Qualitative test will follow.  Reactive: HCV antibody test is abnormal, HCV RNA  Qualitative test will follow.  Note: HCV antibody testing is performed on the [employer]   system.      [17 @ 11:35]    dsDNA <12      [16 @ 06:20]  Rheumatoid Factor 8.6      [16 @ 06:20]  Syphilis Screen (Treponema Pallidum Ab) Negative      [14 @ 17:15]

## 2018-10-05 NOTE — ED PROVIDER NOTE - ATTENDING CONTRIBUTION TO CARE
MD Uriostegui:  I performed a face to face bedside interview with patient regarding history of present illness, review of symptoms and past medical history. I completed an independent physical exam(documented below).  I have discussed patient's plan of care with resident.   I agree with note as stated above, having amended the EMR as needed to reflect my findings. I have discussed the assessment and plan of care.  This includes during the time I functioned as the attending physician for this patient.  PE:  Gen: Alert, moderate distress  Head: NC, AT,  EOMI, normal lids/conjunctiva  ENT:  normal hearing, patent oropharynx without erythema/exudate  Neck: +supple, no tenderness/meningismus/JVD, +Trachea midline  Chest: no chest wall tenderness, equal chest rise  Pulm: Bilateral BS, normal resp effort, no wheeze/stridor/retractions  CV: RRR, no M/R/G, +dist pulses  Abd: +BS, soft, +diffuse ttp, hard umbilical mass (could not attempt reduction as pt currently guarding because of pain).  Rectal: deferred  Mskel: no edema/erythema/cyanosis  Skin: no rash  Neuro: AAOx3  MDM:  38yo F pmh of sickle cell dx w/ acute chest last year, ESRD (daily peritoneal dialysis), b/l hip AVN, and surgical hx inclusive of cholecystectomy, , and umbilical hernia repair, p/w abd pain X 1 day after feeling like her umbilical hernia "popped out", and reports it now "feels hard", associated w/ nbnb emesis. Last BM was this morning and "small", has been passing flatus. Labs, CT to r/o incarcerated hernia vs other intraabdominal pathology such as acute appy vs marquita cell pain crisis.

## 2018-10-05 NOTE — H&P ADULT - ATTENDING COMMENTS
Ms. Lucero is a 37 year old woman with a history of sickle cell disease. She has a history of a laparoscopic cholecystectomy, laparoscopic assisted PD catheter placement, as well as an umbilical hernia without mesh. She presents to [facility] today with complaints of abdominal pain and bulge by her umbilicus. She states the pain has been present since yesterday at 6pm, and has not improved. She states the hernia has not been able to be reduced. She states that she is nauseated, and has vomited. She cannot recall her last flatus or bowel movement, but knows it was not today. She states her last PD session was 2 days ago.     Vital Signs Last 24 Hrs  T(C): 35.6 (05 Oct 2018 11:28), Max: 36.7 (05 Oct 2018 06:04)  T(F): 96 (05 Oct 2018 11:28), Max: 98 (05 Oct 2018 06:04)  HR: 84 (05 Oct 2018 11:28) (73 - 92)  BP: 108/66 (05 Oct 2018 11:28) (92/61 - 108/66)  BP(mean): --  RR: 16 (05 Oct 2018 11:28) (16 - 22)  SpO2: 100% (05 Oct 2018 11:28) (100% - 100%)                          12.5   8.76  )-----------( 571      ( 05 Oct 2018 08:39 )             38.3     10-05    141  |  93<L>  |  53<H>  ----------------------------<  95  3.8   |  21<L>  |  11.00<H>    Venous lactate 4.1    General: uncomfortable in bed, NAD, AOx3  Abd: soft, umbilical bulge, tender, PD catheter in place    37 year old woman with incarcerated umbilical hernia  1. OR for umbilical hernia repair, possible bowel resection, possible removal of PD catheter  2. Nephrology consult    - I discussed the risks, benefits, and urgency of the operation with the patient and her mother at bedside. Consent was obtained and placed in the chart. I discussed the case with her PMD Dr. Velásquez.

## 2018-10-05 NOTE — H&P ADULT - NSHPPHYSICALEXAM_GEN_ALL_CORE
General: in acute distress, writhing in pain on bed  Resp: tachypneic, O2 sats 100  CV: HDS  GI: umbilical bulge noted, erythematous skin overlying, tender to palpation (unable to examine closely/attempt reduction 2/2 pain)  Extr: wwp

## 2018-10-05 NOTE — CONSULT NOTE ADULT - ASSESSMENT
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

## 2018-10-05 NOTE — H&P ADULT - HISTORY OF PRESENT ILLNESS
Patient seen and examined, full note to follow    Maryan Hernandez PGY-2  Surgery pager [phone] w/ sickle cell c/b ACS + avascular necrosis of hip s/p replacement and ESRD on peritoneal dialysis (last on 10/3) presents with sharp abdominal pain x12 hours. Patient had previous umbilical hernia repair in Nov. 2017 with Dr. Angulo without mesh. Patient states that last night, she was watching television when she had a sudden onset of sharp abdominal pain. The pain was sharp, focused in the umbilical region, and associated with nausea and vomiting. She also noted a new tender bulge in her umbilicus w/ erythema. She states that her last bowel movement was this morning (small) and that she has had less flatus than usual. She presented the ED for evaluation this morning. Surgery is consulted for possible incarcerated vs. strangulated ventral hernia.    In ED, patient w/ labs notable for lactate 4.1. After evaluation, patient noted to have absence of flatus for last few hours. Patient's home hematologist and primary care physician contacted.

## 2018-10-06 LAB
ALBUMIN SERPL ELPH-MCNC: 2.6 G/DL — LOW (ref 3.3–5)
ALP SERPL-CCNC: 131 U/L — HIGH (ref 40–120)
ALT FLD-CCNC: 9 U/L — SIGNIFICANT CHANGE UP (ref 4–33)
AST SERPL-CCNC: 15 U/L — SIGNIFICANT CHANGE UP (ref 4–32)
BILIRUB SERPL-MCNC: 0.4 MG/DL — SIGNIFICANT CHANGE UP (ref 0.2–1.2)
BUN SERPL-MCNC: 60 MG/DL — HIGH (ref 7–23)
CALCIUM SERPL-MCNC: 6.9 MG/DL — LOW (ref 8.4–10.5)
CHLORIDE SERPL-SCNC: 95 MMOL/L — LOW (ref 98–107)
CO2 SERPL-SCNC: 24 MMOL/L — SIGNIFICANT CHANGE UP (ref 22–31)
CREAT SERPL-MCNC: 11.73 MG/DL — HIGH (ref 0.5–1.3)
GLUCOSE SERPL-MCNC: 84 MG/DL — SIGNIFICANT CHANGE UP (ref 70–99)
HCT VFR BLD CALC: 36.2 % — SIGNIFICANT CHANGE UP (ref 34.5–45)
HGB BLD-MCNC: 11.7 G/DL — SIGNIFICANT CHANGE UP (ref 11.5–15.5)
MAGNESIUM SERPL-MCNC: 1.8 MG/DL — SIGNIFICANT CHANGE UP (ref 1.6–2.6)
MCHC RBC-ENTMCNC: 31.1 PG — SIGNIFICANT CHANGE UP (ref 27–34)
MCHC RBC-ENTMCNC: 32.3 % — SIGNIFICANT CHANGE UP (ref 32–36)
MCV RBC AUTO: 96.3 FL — SIGNIFICANT CHANGE UP (ref 80–100)
NRBC # FLD: 0.88 — SIGNIFICANT CHANGE UP
NRBC FLD-RTO: 7.1 — SIGNIFICANT CHANGE UP
PHOSPHATE SERPL-MCNC: 8 MG/DL — HIGH (ref 2.5–4.5)
PLATELET # BLD AUTO: 422 K/UL — HIGH (ref 150–400)
PMV BLD: 10.5 FL — SIGNIFICANT CHANGE UP (ref 7–13)
POTASSIUM SERPL-MCNC: 5 MMOL/L — SIGNIFICANT CHANGE UP (ref 3.5–5.3)
POTASSIUM SERPL-SCNC: 5 MMOL/L — SIGNIFICANT CHANGE UP (ref 3.5–5.3)
PROT SERPL-MCNC: 6 G/DL — SIGNIFICANT CHANGE UP (ref 6–8.3)
RBC # BLD: 3.76 M/UL — LOW (ref 3.8–5.2)
RBC # FLD: 18.8 % — HIGH (ref 10.3–14.5)
SODIUM SERPL-SCNC: 141 MMOL/L — SIGNIFICANT CHANGE UP (ref 135–145)
WBC # BLD: 12.43 K/UL — HIGH (ref 3.8–10.5)
WBC # FLD AUTO: 12.43 K/UL — HIGH (ref 3.8–10.5)

## 2018-10-06 PROCEDURE — 99222 1ST HOSP IP/OBS MODERATE 55: CPT | Mod: GC

## 2018-10-06 RX ORDER — TETRACAINE/BENZOCAINE/BUTAMBEN 2%-14%-2%
1 OINTMENT (GRAM) TOPICAL
Qty: 0 | Refills: 0 | Status: DISCONTINUED | OUTPATIENT
Start: 2018-10-06 | End: 2018-10-10

## 2018-10-06 RX ORDER — ACETAMINOPHEN 500 MG
1000 TABLET ORAL ONCE
Qty: 0 | Refills: 0 | Status: COMPLETED | OUTPATIENT
Start: 2018-10-06 | End: 2018-10-06

## 2018-10-06 RX ORDER — HYDROMORPHONE HYDROCHLORIDE 2 MG/ML
0.5 INJECTION INTRAMUSCULAR; INTRAVENOUS; SUBCUTANEOUS EVERY 4 HOURS
Qty: 0 | Refills: 0 | Status: DISCONTINUED | OUTPATIENT
Start: 2018-10-06 | End: 2018-10-08

## 2018-10-06 RX ORDER — BENZOCAINE AND MENTHOL 5; 1 G/100ML; G/100ML
1 LIQUID ORAL
Qty: 0 | Refills: 0 | Status: DISCONTINUED | OUTPATIENT
Start: 2018-10-06 | End: 2018-10-10

## 2018-10-06 RX ORDER — SODIUM CHLORIDE 9 MG/ML
1000 INJECTION INTRAMUSCULAR; INTRAVENOUS; SUBCUTANEOUS
Qty: 0 | Refills: 0 | Status: DISCONTINUED | OUTPATIENT
Start: 2018-10-06 | End: 2018-10-09

## 2018-10-06 RX ADMIN — HEPARIN SODIUM 5000 UNIT(S): 5000 INJECTION INTRAVENOUS; SUBCUTANEOUS at 06:08

## 2018-10-06 RX ADMIN — HYDROMORPHONE HYDROCHLORIDE 0.5 MILLIGRAM(S): 2 INJECTION INTRAMUSCULAR; INTRAVENOUS; SUBCUTANEOUS at 13:44

## 2018-10-06 RX ADMIN — Medication 400 MILLIGRAM(S): at 23:27

## 2018-10-06 RX ADMIN — Medication 1000 MILLIGRAM(S): at 06:20

## 2018-10-06 RX ADMIN — SODIUM CHLORIDE 50 MILLILITER(S): 9 INJECTION INTRAMUSCULAR; INTRAVENOUS; SUBCUTANEOUS at 13:34

## 2018-10-06 RX ADMIN — Medication 1 SPRAY(S): at 11:38

## 2018-10-06 RX ADMIN — BENZOCAINE AND MENTHOL 1 LOZENGE: 5; 1 LIQUID ORAL at 23:27

## 2018-10-06 RX ADMIN — Medication 1000 MILLIGRAM(S): at 23:42

## 2018-10-06 RX ADMIN — HYDROMORPHONE HYDROCHLORIDE 0.5 MILLIGRAM(S): 2 INJECTION INTRAMUSCULAR; INTRAVENOUS; SUBCUTANEOUS at 13:29

## 2018-10-06 RX ADMIN — Medication 1 SPRAY(S): at 14:38

## 2018-10-06 RX ADMIN — Medication 400 MILLIGRAM(S): at 06:05

## 2018-10-06 RX ADMIN — Medication 1 SPRAY(S): at 06:09

## 2018-10-06 RX ADMIN — Medication 400 MILLIGRAM(S): at 16:30

## 2018-10-06 RX ADMIN — SODIUM CHLORIDE 50 MILLILITER(S): 9 INJECTION INTRAMUSCULAR; INTRAVENOUS; SUBCUTANEOUS at 21:01

## 2018-10-06 RX ADMIN — HEPARIN SODIUM 5000 UNIT(S): 5000 INJECTION INTRAVENOUS; SUBCUTANEOUS at 13:29

## 2018-10-06 RX ADMIN — HEPARIN SODIUM 5000 UNIT(S): 5000 INJECTION INTRAVENOUS; SUBCUTANEOUS at 21:01

## 2018-10-06 NOTE — PROVIDER CONTACT NOTE (OTHER) - BACKGROUND
Pt admitted on 10/5/18 for abd hernia repair. PMH of HPV, sickle cell, CKD, and on peritoneal dialysis daily. NGT in place.

## 2018-10-06 NOTE — PROGRESS NOTE ADULT - SUBJECTIVE AND OBJECTIVE BOX
ANESTHESIA POSTOP CHECK    37y Female POSTOP DAY 1 S/P ventral hernia    Vital Signs Last 24 Hrs  T(C): 36.9 (06 Oct 2018 05:36), Max: 37.3 (05 Oct 2018 21:30)  T(F): 98.5 (06 Oct 2018 05:36), Max: 99.1 (05 Oct 2018 21:30)  HR: 95 (06 Oct 2018 07:07) (73 - 108)  BP: 85/56 (06 Oct 2018 07:07) (84/51 - 108/66)  BP(mean): --  RR: 14 (06 Oct 2018 05:36) (12 - 18)  SpO2: 97% (06 Oct 2018 05:36) (95% - 100%)  I&O's Summary    05 Oct 2018 07:01  -  06 Oct 2018 07:00  --------------------------------------------------------  IN: 0 mL / OUT: 0 mL / NET: 0 mL        [x ] NO APPARENT ANESTHESIA COMPLICATIONS

## 2018-10-06 NOTE — PROVIDER CONTACT NOTE (OTHER) - ASSESSMENT
Pt is NPO and denying n/v .Pt's BP at 9am was 82/51. Pt's HR is 100. Pt denies chest pain, SOB, H/A, or dizziness. Anuric. Pt is also refusing a STAT hep b blood work.

## 2018-10-06 NOTE — PROGRESS NOTE ADULT - ASSESSMENT
A/P: 38 yo F s/p Incisional hernia repair. Patient doing well, mildly hypotensive; however, patient's baseline BP runs low. Awaiting return of GI function.    -Nephrology following for dialysis, recs appreciated  -IVF at 30cc  -Pain control  -PRN Zofran for nausea  -NPO  -Monitor NGT output   -DVT ppx    A-team surgery  [id number]

## 2018-10-06 NOTE — PROVIDER CONTACT NOTE (OTHER) - ASSESSMENT
Pt is NPO and denying n/v. Pt's BP for AM VS was first 81/54 but then retaken and was 84/51. Pt's HR ranges between 100-108. Pt denies chest pain, SOB, H/A, or dizziness.

## 2018-10-06 NOTE — PROVIDER CONTACT NOTE (OTHER) - ACTION/TREATMENT ORDERED:
Dr. Bienvenido Crawford aware of above situation. Doctor states to monitor BP and it pt becomes more tachy then to notify him.

## 2018-10-06 NOTE — PROVIDER CONTACT NOTE (OTHER) - ACTION/TREATMENT ORDERED:
Dr. Dang suggest to start the pt on NS 30 ml/hr despite pt refusing IVF at first. Dr. Dang came to explain to pt that no nephrology note states that pt can not be on IVF. Pt agree to IVF.

## 2018-10-07 LAB
BUN SERPL-MCNC: 72 MG/DL — HIGH (ref 7–23)
CALCIUM SERPL-MCNC: 6.9 MG/DL — LOW (ref 8.4–10.5)
CHLORIDE SERPL-SCNC: 96 MMOL/L — LOW (ref 98–107)
CO2 SERPL-SCNC: 19 MMOL/L — LOW (ref 22–31)
CREAT SERPL-MCNC: 12.96 MG/DL — HIGH (ref 0.5–1.3)
GLUCOSE SERPL-MCNC: 73 MG/DL — SIGNIFICANT CHANGE UP (ref 70–99)
HCT VFR BLD CALC: 33.4 % — LOW (ref 34.5–45)
HGB BLD-MCNC: 10.7 G/DL — LOW (ref 11.5–15.5)
MAGNESIUM SERPL-MCNC: 1.8 MG/DL — SIGNIFICANT CHANGE UP (ref 1.6–2.6)
MCHC RBC-ENTMCNC: 30.4 PG — SIGNIFICANT CHANGE UP (ref 27–34)
MCHC RBC-ENTMCNC: 32 % — SIGNIFICANT CHANGE UP (ref 32–36)
MCV RBC AUTO: 94.9 FL — SIGNIFICANT CHANGE UP (ref 80–100)
NRBC # FLD: 0.7 — SIGNIFICANT CHANGE UP
NRBC FLD-RTO: 5.6 — SIGNIFICANT CHANGE UP
PHOSPHATE SERPL-MCNC: 9.2 MG/DL — HIGH (ref 2.5–4.5)
PLATELET # BLD AUTO: 364 K/UL — SIGNIFICANT CHANGE UP (ref 150–400)
PMV BLD: 10.8 FL — SIGNIFICANT CHANGE UP (ref 7–13)
POTASSIUM SERPL-MCNC: 4.6 MMOL/L — SIGNIFICANT CHANGE UP (ref 3.5–5.3)
POTASSIUM SERPL-SCNC: 4.6 MMOL/L — SIGNIFICANT CHANGE UP (ref 3.5–5.3)
RBC # BLD: 3.52 M/UL — LOW (ref 3.8–5.2)
RBC # FLD: 18.6 % — HIGH (ref 10.3–14.5)
SODIUM SERPL-SCNC: 140 MMOL/L — SIGNIFICANT CHANGE UP (ref 135–145)
WBC # BLD: 12.57 K/UL — HIGH (ref 3.8–10.5)
WBC # FLD AUTO: 12.57 K/UL — HIGH (ref 3.8–10.5)

## 2018-10-07 PROCEDURE — 99232 SBSQ HOSP IP/OBS MODERATE 35: CPT | Mod: GC

## 2018-10-07 RX ADMIN — SODIUM CHLORIDE 50 MILLILITER(S): 9 INJECTION INTRAMUSCULAR; INTRAVENOUS; SUBCUTANEOUS at 06:01

## 2018-10-07 RX ADMIN — HYDROMORPHONE HYDROCHLORIDE 0.5 MILLIGRAM(S): 2 INJECTION INTRAMUSCULAR; INTRAVENOUS; SUBCUTANEOUS at 21:51

## 2018-10-07 RX ADMIN — BENZOCAINE AND MENTHOL 1 LOZENGE: 5; 1 LIQUID ORAL at 05:39

## 2018-10-07 RX ADMIN — HEPARIN SODIUM 5000 UNIT(S): 5000 INJECTION INTRAVENOUS; SUBCUTANEOUS at 21:51

## 2018-10-07 RX ADMIN — HEPARIN SODIUM 5000 UNIT(S): 5000 INJECTION INTRAVENOUS; SUBCUTANEOUS at 14:07

## 2018-10-07 RX ADMIN — HEPARIN SODIUM 5000 UNIT(S): 5000 INJECTION INTRAVENOUS; SUBCUTANEOUS at 05:39

## 2018-10-07 RX ADMIN — HYDROMORPHONE HYDROCHLORIDE 0.5 MILLIGRAM(S): 2 INJECTION INTRAMUSCULAR; INTRAVENOUS; SUBCUTANEOUS at 17:55

## 2018-10-07 RX ADMIN — HYDROMORPHONE HYDROCHLORIDE 0.5 MILLIGRAM(S): 2 INJECTION INTRAMUSCULAR; INTRAVENOUS; SUBCUTANEOUS at 05:54

## 2018-10-07 RX ADMIN — HYDROMORPHONE HYDROCHLORIDE 0.5 MILLIGRAM(S): 2 INJECTION INTRAMUSCULAR; INTRAVENOUS; SUBCUTANEOUS at 17:40

## 2018-10-07 RX ADMIN — HYDROMORPHONE HYDROCHLORIDE 0.5 MILLIGRAM(S): 2 INJECTION INTRAMUSCULAR; INTRAVENOUS; SUBCUTANEOUS at 06:09

## 2018-10-07 RX ADMIN — HYDROMORPHONE HYDROCHLORIDE 0.5 MILLIGRAM(S): 2 INJECTION INTRAMUSCULAR; INTRAVENOUS; SUBCUTANEOUS at 12:36

## 2018-10-07 RX ADMIN — HYDROMORPHONE HYDROCHLORIDE 0.5 MILLIGRAM(S): 2 INJECTION INTRAMUSCULAR; INTRAVENOUS; SUBCUTANEOUS at 22:06

## 2018-10-07 RX ADMIN — SODIUM CHLORIDE 50 MILLILITER(S): 9 INJECTION INTRAMUSCULAR; INTRAVENOUS; SUBCUTANEOUS at 21:51

## 2018-10-07 RX ADMIN — HYDROMORPHONE HYDROCHLORIDE 0.5 MILLIGRAM(S): 2 INJECTION INTRAMUSCULAR; INTRAVENOUS; SUBCUTANEOUS at 12:21

## 2018-10-07 NOTE — PROGRESS NOTE ADULT - PROBLEM SELECTOR PLAN 1
Pt with ESRD on PD. Pt is s/p abdominal hernia repair today. Plan to hold PD for one week. Pt agreeable to start HD and have non-tunneled HD catheter placement tomorrow. Consent obtained and placed in the chart. No plan for HD today. Monitor BMP

## 2018-10-07 NOTE — PROGRESS NOTE ADULT - ASSESSMENT
A/P: 36 yo F s/p Incisional hernia repair. Patient doing well, awaiting return of GI function.  NGT clamp trial (10/7).     -Nephrology following for dialysis, recommended HD only if emergent.  PD to resume 1 week postoperatively.    -IVF (NS) @ 50cc/hr  -Pain control  -PRN Zofran for nausea  -NPO/NGT; NGT clamp trial this morning.  Will advance to clears if NGT residuals <~150-200ccs  -DVT ppx    A-team surgery  [id number] A/P: 36 yo F s/p Incisional hernia repair. Patient doing well, awaiting return of GI function.  NGT clamp trial (10/7).     -Patient now amenable to HD, will need shiley placement tomorrow followed by HD.  PD to resume 1 week postoperatively.    -IVF (NS) @ 50cc/hr  -Pain control  -PRN Zofran for nausea  -NPO/NGT; NGT clamp trial this morning.  Will advance to clears if NGT residuals <~150-200ccs  -DVT ppx    A-team surgery  [id number]

## 2018-10-07 NOTE — PROGRESS NOTE ADULT - PROBLEM SELECTOR PLAN 2
Pt with low serum calcium. Ionized Calcium was low at 0.86 on 8/5/18. Recommend repeat ionized calcium. Recommend IV calcium repletion as needed. Check IPTH. Serum phosphorus elevated to 9.2 today. Restart home phosphate binders once pt is restarted on a diet. Monitor calcium and phosphorus

## 2018-10-07 NOTE — PROGRESS NOTE ADULT - SUBJECTIVE AND OBJECTIVE BOX
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 Pt follows at [place] with Dr. Larsen for nephrology. Is on a cycler, and receives 3 cycles of 2L with alternating solution (1.5% and 2.5%, depending on blood pressure) for 6 hours overnight. Pt seen and examined. Surgeon currently recommends avoid using PD catheter for one week post-operatively which may require hemodialysis. Pt seen and examined today. Pt amenable to starting Hemodialysis tomorrow (Monday) after non-tunnelled HD catheter is placed tomorrow. Pt denies CP, SOB, LE or flatus.     PAST HISTORY  --------------------------------------------------------------------------------  No significant changes to PMH, PSH, FHx, SHx, unless otherwise noted    ALLERGIES & MEDICATIONS  --------------------------------------------------------------------------------  Allergies    morphine (Other)    Intolerances      Standing Inpatient Medications  heparin  Injectable 5000 Unit(s) SubCutaneous every 8 hours  sodium chloride 0.9%. 1000 milliLiter(s) IV Continuous <Continuous>    PRN Inpatient Medications  benzocaine 15 mG/menthol 3.6 mG Lozenge 1 Lozenge Oral two times a day PRN  HYDROmorphone  Injectable 0.5 milliGRAM(s) IV Push every 4 hours PRN  tetracaine/benzocaine/butamben Spray 1 Spray(s) Topical every 3 hours PRN      REVIEW OF SYSTEMS  --------------------------------------------------------------------------------  Gen: + weakness  Skin: No rashes  Head/Eyes/Ears/Mouth: No headache  Respiratory: No dyspnea  CV: No chest pain, PND, orthopnea  GI: + abdominal pain  MSK: No edema  Neuro: No dizziness/lightheadedness    All other systems were reviewed and are negative, except as noted.    VITALS/PHYSICAL EXAM  --------------------------------------------------------------------------------  T(C): 36.7 (10-07-18 @ 05:34), Max: 36.9 (10-06-18 @ 08:52)  HR: 100 (10-07-18 @ 05:34) (93 - 104)  BP: 98/60 (10-07-18 @ 05:34) (82/51 - 105/62)  RR: 14 (10-07-18 @ 05:34) (14 - 18)  SpO2: 98% (10-07-18 @ 05:34) (93% - 100%)  Wt(kg): --  Height (cm): 165.1 (10-05-18 @ 11:28)  Weight (kg): 59 (10-05-18 @ 11:28)  BMI (kg/m2): 21.6 (10-05-18 @ 11:28)  BSA (m2): 1.65 (10-05-18 @ 11:28)      10-06-18 @ 07:01  -  10-07-18 @ 07:00  --------------------------------------------------------  IN: 0 mL / OUT: 30 mL / NET: -30 mL    10-07-18 @ 07:01  -  10-07-18 @ 08:48  --------------------------------------------------------  IN: 0 mL / OUT: 120 mL / NET: -120 mL    Physical Exam:  	Gen: NAD  	HEENT: + NGT  	Pulm: CTA B/L  	CV: RRR, S1S2  	Abd: soft, nontender/nondistended + Midline abdominal dressing  	UE: Warm, no asterixis  	LE: Warm, no edema  	Psych: Normal affect and mood  	Skin: Warm, without rashes  	Vascular access: + PD catheter, Dressing intact    LABS/STUDIES  --------------------------------------------------------------------------------              10.7   12.57 >-----------<  364      [10-07-18 @ 07:30]              33.4     140  |  96  |  72  ----------------------------<  73      [10-07-18 @ 07:30]  4.6   |  19  |  12.96        Ca     6.9     [10-07-18 @ 07:30]      Mg     1.8     [10-07-18 @ 07:30]      Phos  9.2     [10-07-18 @ 07:30]    TPro  6.0  /  Alb  2.6  /  TBili  0.4  /  DBili  x   /  AST  15  /  ALT  9   /  AlkPhos  131  [10-06-18 @ 07:04]    PT/INR: PT 11.5 , INR 1.04       [10-05-18 @ 10:12]  PTT: 28.9       [10-05-18 @ 10:12]    Creatinine Trend:  SCr 12.96 [10-07 @ 07:30]  SCr 11.73 [10-06 @ 07:04]  SCr 11.00 [10-05 @ 10:13]  SCr 9.66 [10-05 @ 08:38]    .2 (Ca --)      [03-27-18 @ 05:30]   --  HbA1c 3.5      [05-15-18 @ 10:40]  TSH 1.62      [05-21-18 @ 05:51]

## 2018-10-07 NOTE — PROGRESS NOTE ADULT - SUBJECTIVE AND OBJECTIVE BOX
[facility] A-TE[facility] PROGRESS NOTE    S: Patient seen at bedside, dressings changed (POD#2).  Low NGT output overnight, clamped to check for residuals in 4h.  Endorses abdominal pain and throat irritation from NGT.  +Flatus/-BM.  Denies n/v.       Vital Signs Last 24 Hrs  T(C): 36.7 (07 Oct 2018 05:34), Max: 36.9 (06 Oct 2018 13:26)  T(F): 98.1 (07 Oct 2018 05:34), Max: 98.5 (06 Oct 2018 13:26)  HR: 100 (07 Oct 2018 05:34) (93 - 104)  BP: 98/60 (07 Oct 2018 05:34) (83/51 - 105/62)  BP(mean): --  RR: 14 (07 Oct 2018 05:34) (14 - 18)  SpO2: 98% (07 Oct 2018 05:34) (93% - 100%)    10-06-18 @ 07:01  -  10-07-18 @ 07:00  --------------------------------------------------------  IN: 0 mL / OUT: 30 mL / NET: -30 mL    10-07-18 @ 07:01  -  10-07-18 @ 09:08  --------------------------------------------------------  IN: 0 mL / OUT: 120 mL / NET: -120 mL      MEDICATIONS  (STANDING):  heparin  Injectable 5000 Unit(s) SubCutaneous every 8 hours  sodium chloride 0.9%. 1000 milliLiter(s) (50 mL/Hr) IV Continuous <Continuous>    MEDICATIONS  (PRN):  benzocaine 15 mG/menthol 3.6 mG Lozenge 1 Lozenge Oral two times a day PRN Sore Throat  HYDROmorphone  Injectable 0.5 milliGRAM(s) IV Push every 4 hours PRN Pain (1-10)  tetracaine/benzocaine/butamben Spray 1 Spray(s) Topical every 3 hours PRN throat pain      PE:   Gen: NAD  NGT in place  CV: RRR  Pulm: non-labored  Abd: soft/incisional tenderness. Dressing in place: c/d/i, Incision hemostatic.  PD catheter without drainage or erythema.      LABS:  CBC (10-07 @ 07:30)                              10.7<L>                         12.57<H>  )----------------(  364        --    % Neutrophils, --    % Lymphocytes, ANC: --                                  33.4<L>              CBC (10-06 @ 07:04)                              11.7                           12.43<H>  )----------------(  422<H>     --    % Neutrophils, --    % Lymphocytes, ANC: --                                  36.2                  BMP (10-07 @ 07:30)             140     |  96<L>   |  72<H> 		Ca++ --      Ca 6.9<L>             ---------------------------------( 73    		Mg 1.8                4.6     |  19<L>   |  12.96<H>			Ph 9.2<H>  BMP (10-06 @ 07:04)             141     |  95<L>   |  60<H> 		Ca++ --      Ca 6.9<L>             ---------------------------------( 84    		Mg 1.8                5.0     |  24      |  11.73<H>			Ph 8.0<H>    LFTs (10-06 @ 07:04)      TPro 6.0 / Alb 2.6<L> / TBili 0.4 / DBili -- / AST 15 / ALT 9 / AlkPhos 131<H> [facility] PROGRESS NOTE    S: Patient seen at bedside, dressings changed (POD#2). Patient now agreeing to HD, will need shiley placement. Low NGT output overnight, clamped to check for residuals in 4h.  Endorses abdominal pain and throat irritation from NGT.  +Flatus/-BM.  Denies n/v.       Vital Signs Last 24 Hrs  T(C): 36.7 (07 Oct 2018 05:34), Max: 36.9 (06 Oct 2018 13:26)  T(F): 98.1 (07 Oct 2018 05:34), Max: 98.5 (06 Oct 2018 13:26)  HR: 100 (07 Oct 2018 05:34) (93 - 104)  BP: 98/60 (07 Oct 2018 05:34) (83/51 - 105/62)  BP(mean): --  RR: 14 (07 Oct 2018 05:34) (14 - 18)  SpO2: 98% (07 Oct 2018 05:34) (93% - 100%)    10-06-18 @ 07:01  -  10-07-18 @ 07:00  --------------------------------------------------------  IN: 0 mL / OUT: 30 mL / NET: -30 mL    10-07-18 @ 07:01  -  10-07-18 @ 09:08  --------------------------------------------------------  IN: 0 mL / OUT: 120 mL / NET: -120 mL      MEDICATIONS  (STANDING):  heparin  Injectable 5000 Unit(s) SubCutaneous every 8 hours  sodium chloride 0.9%. 1000 milliLiter(s) (50 mL/Hr) IV Continuous <Continuous>    MEDICATIONS  (PRN):  benzocaine 15 mG/menthol 3.6 mG Lozenge 1 Lozenge Oral two times a day PRN Sore Throat  HYDROmorphone  Injectable 0.5 milliGRAM(s) IV Push every 4 hours PRN Pain (1-10)  tetracaine/benzocaine/butamben Spray 1 Spray(s) Topical every 3 hours PRN throat pain      PE:   Gen: NAD  NGT in place  CV: RRR  Pulm: non-labored  Abd: soft/incisional tenderness. Dressing in place: c/d/i, Incision hemostatic.  PD catheter without drainage or erythema.      LABS:  CBC (10-07 @ 07:30)                              10.7<L>                         12.57<H>  )----------------(  364        --    % Neutrophils, --    % Lymphocytes, ANC: --                                  33.4<L>              CBC (10-06 @ 07:04)                              11.7                           12.43<H>  )----------------(  422<H>     --    % Neutrophils, --    % Lymphocytes, ANC: --                                  36.2                  BMP (10-07 @ 07:30)             140     |  96<L>   |  72<H> 		Ca++ --      Ca 6.9<L>             ---------------------------------( 73    		Mg 1.8                4.6     |  19<L>   |  12.96<H>			Ph 9.2<H>  BMP (10-06 @ 07:04)             141     |  95<L>   |  60<H> 		Ca++ --      Ca 6.9<L>             ---------------------------------( 84    		Mg 1.8                5.0     |  24      |  11.73<H>			Ph 8.0<H>    LFTs (10-06 @ 07:04)      TPro 6.0 / Alb 2.6<L> / TBili 0.4 / DBili -- / AST 15 / ALT 9 / AlkPhos 131<H>

## 2018-10-08 LAB
APTT BLD: 23.2 SEC — LOW (ref 27.5–37.4)
BUN SERPL-MCNC: 74 MG/DL — HIGH (ref 7–23)
CA-I BLD-SCNC: 0.81 MMOL/L — LOW (ref 1.03–1.23)
CALCIUM SERPL-MCNC: 6.8 MG/DL — LOW (ref 8.4–10.5)
CHLORIDE SERPL-SCNC: 98 MMOL/L — SIGNIFICANT CHANGE UP (ref 98–107)
CO2 SERPL-SCNC: 19 MMOL/L — LOW (ref 22–31)
CREAT SERPL-MCNC: 13.41 MG/DL — HIGH (ref 0.5–1.3)
GLUCOSE SERPL-MCNC: 84 MG/DL — SIGNIFICANT CHANGE UP (ref 70–99)
HBV SURFACE AG SER-ACNC: NEGATIVE — SIGNIFICANT CHANGE UP
HCT VFR BLD CALC: 29.3 % — LOW (ref 34.5–45)
HGB BLD-MCNC: 9.6 G/DL — LOW (ref 11.5–15.5)
INR BLD: 1.11 — SIGNIFICANT CHANGE UP (ref 0.88–1.17)
MAGNESIUM SERPL-MCNC: 1.8 MG/DL — SIGNIFICANT CHANGE UP (ref 1.6–2.6)
MCHC RBC-ENTMCNC: 30.9 PG — SIGNIFICANT CHANGE UP (ref 27–34)
MCHC RBC-ENTMCNC: 32.8 % — SIGNIFICANT CHANGE UP (ref 32–36)
MCV RBC AUTO: 94.2 FL — SIGNIFICANT CHANGE UP (ref 80–100)
NRBC # FLD: 0.51 — SIGNIFICANT CHANGE UP
NRBC FLD-RTO: 5 — SIGNIFICANT CHANGE UP
PHOSPHATE SERPL-MCNC: 8.8 MG/DL — HIGH (ref 2.5–4.5)
PLATELET # BLD AUTO: 311 K/UL — SIGNIFICANT CHANGE UP (ref 150–400)
PMV BLD: 10.9 FL — SIGNIFICANT CHANGE UP (ref 7–13)
POTASSIUM SERPL-MCNC: 4.6 MMOL/L — SIGNIFICANT CHANGE UP (ref 3.5–5.3)
POTASSIUM SERPL-SCNC: 4.6 MMOL/L — SIGNIFICANT CHANGE UP (ref 3.5–5.3)
PROTHROM AB SERPL-ACNC: 12.8 SEC — SIGNIFICANT CHANGE UP (ref 9.8–13.1)
RBC # BLD: 3.11 M/UL — LOW (ref 3.8–5.2)
RBC # FLD: 18.4 % — HIGH (ref 10.3–14.5)
SODIUM SERPL-SCNC: 141 MMOL/L — SIGNIFICANT CHANGE UP (ref 135–145)
WBC # BLD: 10.25 K/UL — SIGNIFICANT CHANGE UP (ref 3.8–10.5)
WBC # FLD AUTO: 10.25 K/UL — SIGNIFICANT CHANGE UP (ref 3.8–10.5)

## 2018-10-08 PROCEDURE — 36556 INSERT NON-TUNNEL CV CATH: CPT

## 2018-10-08 PROCEDURE — 99232 SBSQ HOSP IP/OBS MODERATE 35: CPT | Mod: GC

## 2018-10-08 PROCEDURE — 76937 US GUIDE VASCULAR ACCESS: CPT | Mod: 26

## 2018-10-08 PROCEDURE — 77001 FLUOROGUIDE FOR VEIN DEVICE: CPT | Mod: 26,GC

## 2018-10-08 RX ORDER — OXYCODONE HYDROCHLORIDE 5 MG/1
5 TABLET ORAL EVERY 4 HOURS
Qty: 0 | Refills: 0 | Status: DISCONTINUED | OUTPATIENT
Start: 2018-10-08 | End: 2018-10-10

## 2018-10-08 RX ORDER — HYDROMORPHONE HYDROCHLORIDE 2 MG/ML
0.5 INJECTION INTRAMUSCULAR; INTRAVENOUS; SUBCUTANEOUS EVERY 4 HOURS
Qty: 0 | Refills: 0 | Status: DISCONTINUED | OUTPATIENT
Start: 2018-10-08 | End: 2018-10-10

## 2018-10-08 RX ORDER — ACETAMINOPHEN 500 MG
650 TABLET ORAL EVERY 6 HOURS
Qty: 0 | Refills: 0 | Status: DISCONTINUED | OUTPATIENT
Start: 2018-10-08 | End: 2018-10-15

## 2018-10-08 RX ADMIN — SODIUM CHLORIDE 50 MILLILITER(S): 9 INJECTION INTRAMUSCULAR; INTRAVENOUS; SUBCUTANEOUS at 06:21

## 2018-10-08 RX ADMIN — HYDROMORPHONE HYDROCHLORIDE 0.5 MILLIGRAM(S): 2 INJECTION INTRAMUSCULAR; INTRAVENOUS; SUBCUTANEOUS at 17:05

## 2018-10-08 RX ADMIN — HEPARIN SODIUM 5000 UNIT(S): 5000 INJECTION INTRAVENOUS; SUBCUTANEOUS at 13:37

## 2018-10-08 RX ADMIN — HEPARIN SODIUM 5000 UNIT(S): 5000 INJECTION INTRAVENOUS; SUBCUTANEOUS at 06:22

## 2018-10-08 RX ADMIN — HYDROMORPHONE HYDROCHLORIDE 0.5 MILLIGRAM(S): 2 INJECTION INTRAMUSCULAR; INTRAVENOUS; SUBCUTANEOUS at 06:21

## 2018-10-08 RX ADMIN — Medication 0.25 MILLIGRAM(S): at 19:11

## 2018-10-08 RX ADMIN — HYDROMORPHONE HYDROCHLORIDE 0.5 MILLIGRAM(S): 2 INJECTION INTRAMUSCULAR; INTRAVENOUS; SUBCUTANEOUS at 13:14

## 2018-10-08 RX ADMIN — HYDROMORPHONE HYDROCHLORIDE 0.5 MILLIGRAM(S): 2 INJECTION INTRAMUSCULAR; INTRAVENOUS; SUBCUTANEOUS at 16:50

## 2018-10-08 RX ADMIN — HYDROMORPHONE HYDROCHLORIDE 0.5 MILLIGRAM(S): 2 INJECTION INTRAMUSCULAR; INTRAVENOUS; SUBCUTANEOUS at 06:36

## 2018-10-08 RX ADMIN — HYDROMORPHONE HYDROCHLORIDE 0.5 MILLIGRAM(S): 2 INJECTION INTRAMUSCULAR; INTRAVENOUS; SUBCUTANEOUS at 13:29

## 2018-10-08 NOTE — PROGRESS NOTE ADULT - SUBJECTIVE AND OBJECTIVE BOX
Interventional Radiology Brief Post-Procedure Note    Procedure: Right internal jugular vein non-tunneled hemodialysis catheter placement    Operators: Kyara Bella    Anesthesia (type): Local lidocaine    Contrast: None.    EBL: Minimal.    Findings/Follow up Plan of Care: Status-post right IJ non-tunnelled hemodialysis catheter placement. Catheter may be used for hemodialysis. Full report to follow.    Specimens Removed: None.    Implants: 14 F x 15 cm double lumen hemodialysis catheter    Complications: none.    Condition/Disposition: To dialysis.    Please call Interventional Radiology x [phone] with any questions, concerns, or issues.

## 2018-10-08 NOTE — PROVIDER CONTACT NOTE (OTHER) - ASSESSMENT
Pt BP 87/54 HR 82  Pt is asymptomatic. Baseline BP systolic runs 80s-low 100s  all other vitals stable  pending hemodialysis

## 2018-10-08 NOTE — PROGRESS NOTE ADULT - SUBJECTIVE AND OBJECTIVE BOX
Ms. Lucero is comfortable in bed, she denies any nausea or vomiting, she tolerated clear liquids yesterday. She has been passing flatus. Her pain is controlled with medication. She is willing to undergo hemodialysis now.    Vital Signs Last 24 Hrs  T(C): 36.6 (08 Oct 2018 06:04), Max: 37.2 (08 Oct 2018 01:31)  T(F): 97.8 (08 Oct 2018 06:04), Max: 99 (08 Oct 2018 01:31)  HR: 98 (08 Oct 2018 06:04) (93 - 108)  BP: 100/66 (08 Oct 2018 06:04) (95/52 - 105/75)  BP(mean): --  RR: 16 (08 Oct 2018 06:04) (16 - 17)  SpO2: 98% (08 Oct 2018 06:04) (97% - 100%)      General: comfortable in bed  Abd: Soft, not distended, appropriately tender near incision, incision with dermabond is clean/dry/intact, PD catheter in place    37 year old woman s/p open repair of incarcerated incisional hernia   1. Plan for IR HD access today  2. Follow up with renal regarding HD  3. Advance to regular diet after procedure

## 2018-10-08 NOTE — PROGRESS NOTE ADULT - SUBJECTIVE AND OBJECTIVE BOX
[facility] DIVISION OF KIDNEY DISEASES AND HYPERTENSION -- FOLLOW UP NOTE  --------------------------------------------------------------------------------    HPI: 36 y/o F PMH Sickle cell disease w/ B/L AVN of hips s/p L total hip replacement 6/18, acute chest 12/17, ESRD 2/2 FSGS on PD admitted with abdominal pain from protruding incarcerated hernia which required urgent surgical repair on 10/5/18. Nephrology consulted for ESRD and dialysis management. Pt follows at [place] with Dr. Larsen for her PD monthly. Surgeon currently recommends to avoid using PD catheter for one week post-operatively which requires us to utilize hemodialysis. Pt seen and examined today. Plan for tunneled HD catheter placement today followed by a session of HD. Pt denies CP, SOB or LE.    PAST HISTORY  --------------------------------------------------------------------------------  No significant changes to PMH, PSH, FHx, SHx, unless otherwise noted    ALLERGIES & MEDICATIONS  --------------------------------------------------------------------------------  Allergies    morphine (Other)    Intolerances      Standing Inpatient Medications  heparin  Injectable 5000 Unit(s) SubCutaneous every 8 hours  sodium chloride 0.9%. 1000 milliLiter(s) IV Continuous <Continuous>    PRN Inpatient Medications  benzocaine 15 mG/menthol 3.6 mG Lozenge 1 Lozenge Oral two times a day PRN  HYDROmorphone  Injectable 0.5 milliGRAM(s) IV Push every 4 hours PRN  tetracaine/benzocaine/butamben Spray 1 Spray(s) Topical every 3 hours PRN      REVIEW OF SYSTEMS  --------------------------------------------------------------------------------  Gen: + weakness  Skin: No rashes  Head/Eyes/Ears/Mouth: No headache  Respiratory: No dyspnea  CV: No chest pain, PND, orthopnea  GI: no abdominal pain  MSK: No edema  Neuro: No dizziness/lightheadedness  All other systems were reviewed and are negative, except as noted.    VITALS/PHYSICAL EXAM  --------------------------------------------------------------------------------  T(C): 37 (10-08-18 @ 10:04), Max: 37.2 (10-08-18 @ 01:31)  HR: 82 (10-08-18 @ 10:04) (82 - 108)  BP: 87/54 (10-08-18 @ 10:04) (87/54 - 105/75)  RR: 16 (10-08-18 @ 10:04) (16 - 17)  SpO2: 100% (10-08-18 @ 10:04) (97% - 100%)  Wt(kg): --    10-07-18 @ 07:01  -  10-08-18 @ 07:00  --------------------------------------------------------  IN: 0 mL / OUT: 420 mL / NET: -420 mL    Physical Exam:  	Gen: NAD  	Pulm: CTA B/L  	CV: RRR, S1S2  	Abd: soft, nontender/nondistended + healing umbilical incision site  	UE: Warm, no asterixis  	LE: Warm, no edema  	Psych: Normal affect and mood  	Skin: Warm, without rashes              Vascular: + PD catheter, Dressing intact    LABS/STUDIES  --------------------------------------------------------------------------------              9.6    10.25 >-----------<  311      [10-08-18 @ 07:30]              29.3     141  |  98  |  74  ----------------------------<  84      [10-08-18 @ 07:30]  4.6   |  19  |  13.41        Ca     6.8     [10-08-18 @ 07:30]      iCa    0.81     [10-08 @ 07:30]      Mg     1.8     [10-08-18 @ 07:30]      Phos  8.8     [10-08-18 @ 07:30]    PT/INR: PT 12.8 , INR 1.11       [10-08-18 @ 07:08]  PTT: 23.2       [10-08-18 @ 07:08]  Creatinine Trend:  SCr 13.41 [10-08 @ 07:30]  SCr 12.96 [10-07 @ 07:30]  SCr 11.73 [10-06 @ 07:04]  SCr 11.00 [10-05 @ 10:13]  SCr 9.66 [10-05 @ 08:38]    .2 (Ca --)      [03-27-18 @ 05:30]   --  HbA1c 3.5      [05-15-18 @ 10:40]  TSH 1.62      [05-21-18 @ 05:51]    HBsAg NEGATIVE      [10-08-18 @ 07:30] [facility] DIVISION OF KIDNEY DISEASES AND HYPERTENSION -- FOLLOW UP NOTE  --------------------------------------------------------------------------------  HPI: 37-year-old female with PMH of ESRD on PD, sickle cell disease w/ B/L AVN of hips s/p L total hip replacement, admitted with abdominal pain from protruding incarcerated hernia which required urgent surgical repair on 10/5/18. Pt. being seen for ESRD/dialysis management. Pt.'s outpatient nephrologist is Dr. Larsen. PD held as pt.'s surgeon currently recommends to avoid using PD catheter for at least one week post-operatively. Plan to initiate pt. on HD after tunneled HD catheter placement by IR today. Pt. seen and examined today. Pt. feels better and denies CP, SOB or LE edema.    PAST HISTORY  --------------------------------------------------------------------------------  No significant changes to PMH, PSH, FHx, SHx, unless otherwise noted    ALLERGIES & MEDICATIONS  --------------------------------------------------------------------------------  Allergies    morphine (Other)    Intolerances    Standing Inpatient Medications  heparin  Injectable 5000 Unit(s) SubCutaneous every 8 hours  sodium chloride 0.9%. 1000 milliLiter(s) IV Continuous <Continuous>    PRN Inpatient Medications  benzocaine 15 mG/menthol 3.6 mG Lozenge 1 Lozenge Oral two times a day PRN  HYDROmorphone  Injectable 0.5 milliGRAM(s) IV Push every 4 hours PRN  tetracaine/benzocaine/butamben Spray 1 Spray(s) Topical every 3 hours PRN    REVIEW OF SYSTEMS  --------------------------------------------------------------------------------  Gen: + weakness  Skin: No rashes  Head/Eyes/Ears/Mouth: No headache  Respiratory: No dyspnea  CV: No chest pain  GI: See HPI, no abdominal pain  MSK: No edema  Neuro: No dizziness    All other systems were reviewed and are negative, except as noted.    VITALS/PHYSICAL EXAM  --------------------------------------------------------------------------------  T(C): 37 (10-08-18 @ 10:04), Max: 37.2 (10-08-18 @ 01:31)  HR: 82 (10-08-18 @ 10:04) (82 - 108)  BP: 87/54 (10-08-18 @ 10:04) (87/54 - 105/75)  RR: 16 (10-08-18 @ 10:04) (16 - 17)  SpO2: 100% (10-08-18 @ 10:04) (97% - 100%)  Wt(kg): --    10-07-18 @ 07:01  -  10-08-18 @ 07:00  --------------------------------------------------------  IN: 0 mL / OUT: 420 mL / NET: -420 mL    Physical Exam:  	Gen: resting, NAD  	Pulm: CTA B/L  	CV: RRR, S1S2+  	Abd: soft, nontender/nondistended, + healing umbilical surgical site, PD catheter site: dressing seen  	UE: Warm, no asterixis  	LE: Warm, no edema  	Psych: Normal affect and mood  	Skin: Warm                LABS/STUDIES  --------------------------------------------------------------------------------              9.6    10.25 >-----------<  311      [10-08-18 @ 07:30]              29.3     141  |  98  |  74  ----------------------------<  84      [10-08-18 @ 07:30]  4.6   |  19  |  13.41        Ca     6.8     [10-08-18 @ 07:30]      iCa    0.81     [10-08 @ 07:30]      Mg     1.8     [10-08-18 @ 07:30]      Phos  8.8     [10-08-18 @ 07:30]    Creatinine Trend:  SCr 13.41 [10-08 @ 07:30]  SCr 12.96 [10-07 @ 07:30]  SCr 11.73 [10-06 @ 07:04]  SCr 11.00 [10-05 @ 10:13]  SCr 9.66 [10-05 @ 08:38]    HBsAg NEGATIVE      [10-08-18 @ 07:30]

## 2018-10-08 NOTE — PROGRESS NOTE ADULT - PROBLEM SELECTOR PLAN 2
Pt with low serum calcium. Ionized Calcium was low at 0.81 today. Check IPTH. Serum phosphorus elevated to 8.8 today. Restart home phosphate binders once pt is restarted on a diet. Monitor calcium and phosphorus P.t with low serum calcium level. Ionized calcium low at 0.81 today. Check iPTH. Serum phosphorus elevated to 8.8 today. Resume home phosphate binders once pt. is restarted on oral diet. Monitor calcium and phosphorus

## 2018-10-08 NOTE — PROGRESS NOTE ADULT - PROBLEM SELECTOR PLAN 1
Pt with ESRD on PD. Pt is s/p abdominal hernia repair 10/5/18. Plan to hold PD for one week. Pt agreeable to start HD today after having a tunneled HD catheter placed by IR. Consent obtained and placed in the chart. Monitor BMP Pt with ESRD on PD. Pt. underwent urgent abdominal hernia repair on 10/5/18. Plan is to hold PD for at least one week. Pt. agreeable to start HD today after placement of tunneled HD catheter by IR. Consent obtained and placed in the chart. Pt. clinically stable. Labs reviewed. Monitor labs and BP

## 2018-10-08 NOTE — PROGRESS NOTE ADULT - ASSESSMENT
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 Pt. with ESRD on PD. Plan to initiate pt. on HD as PD catheter cannot be used currently.

## 2018-10-08 NOTE — PROGRESS NOTE ADULT - SUBJECTIVE AND OBJECTIVE BOX
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 Plan for hemodialysis catheter post abdominal surgery, and she presents to interventional radiology for image-guided non-tunneled hemodialysis catheter placement.     Allergies: morphine (Other)    Current Medications: acetaminophen   Tablet .. 650 milliGRAM(s) Oral every 6 hours PRN  benzocaine 15 mG/menthol 3.6 mG Lozenge 1 Lozenge Oral two times a day PRN  heparin  Injectable 5000 Unit(s) SubCutaneous every 8 hours  HYDROmorphone  Injectable 0.5 milliGRAM(s) IV Push every 4 hours PRN  oxyCODONE    IR 5 milliGRAM(s) Oral every 4 hours PRN  sodium chloride 0.9%. 1000 milliLiter(s) IV Continuous <Continuous>  tetracaine/benzocaine/butamben Spray 1 Spray(s) Topical every 3 hours PRN      Labs:                         9.6    10.25 )-----------( 311      ( 08 Oct 2018 07:30 )             29.3       10-08    141  |  98  |  74<H>  ----------------------------<  84  4.6   |  19<L>  |  13.41<H>    Ca    6.8<L>      08 Oct 2018 07:30  Phos  8.8     10-08  Mg     1.8     10-08        Assessment/Plan:   37 year old female presenting to IR for non-tunneled hemodialysis catheter placement.  Procedure/risks/benefits/goals/alternatives were explained. All questions answered. Informed content obtained from patient. Consent placed in chart. 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 Plan is for hemodialysis catheter post abdominal surgery, and she presents to interventional radiology for image-guided non-tunneled hemodialysis catheter placement.     Allergies: morphine (Other)    Current Medications: acetaminophen   Tablet .. 650 milliGRAM(s) Oral every 6 hours PRN  benzocaine 15 mG/menthol 3.6 mG Lozenge 1 Lozenge Oral two times a day PRN  heparin  Injectable 5000 Unit(s) SubCutaneous every 8 hours  HYDROmorphone  Injectable 0.5 milliGRAM(s) IV Push every 4 hours PRN  oxyCODONE    IR 5 milliGRAM(s) Oral every 4 hours PRN  sodium chloride 0.9%. 1000 milliLiter(s) IV Continuous <Continuous>  tetracaine/benzocaine/butamben Spray 1 Spray(s) Topical every 3 hours PRN      Labs:                         9.6    10.25 )-----------( 311      ( 08 Oct 2018 07:30 )             29.3       10-08    141  |  98  |  74<H>  ----------------------------<  84  4.6   |  19<L>  |  13.41<H>    Ca    6.8<L>      08 Oct 2018 07:30  Phos  8.8     10-08  Mg     1.8     10-08        Assessment/Plan:   37 year old female presenting to IR for non-tunneled hemodialysis catheter placement.  Procedure/risks/benefits/goals/alternatives were explained. All questions answered. Informed content obtained from patient. Consent placed in chart.

## 2018-10-08 NOTE — CHART NOTE - NSCHARTNOTEFT_GEN_A_CORE
PRE-INTERVENTIONAL RADIOLOGY PROCEDURE NOTE      Patient Age: 38yo    Patient Gender: Female    Procedure: IR insertion of Shiley Catheter    Diagnosis/Indication: urgent need for hemodialysis, no access, awaiting functioning PD catheter    Interventional Radiology Attending Physician:  Alexx    Ordering Attending Physician:   Tristin    Pertinent Medical History:   ESRD on HD    Pertinent labs:                      9.6    10.25 )-----------( 311      ( 08 Oct 2018 07:30 )             29.3       10-08    141  |  98  |  74<H>  ----------------------------<  84  4.6   |  19<L>  |  13.41<H>    Ca    6.8<L>      08 Oct 2018 07:30  Phos  8.8     10-08  Mg     1.8     10-08        PT/INR - ( 08 Oct 2018 07:08 )   PT: 12.8 SEC;   INR: 1.11          PTT - ( 08 Oct 2018 07:08 )  PTT:23.2 SEC      Patient and Family Aware ? Yes    A SURGERY  [id number]

## 2018-10-08 NOTE — PROVIDER CONTACT NOTE (OTHER) - ACTION/TREATMENT ORDERED:
MD aware. Okay to monitor BP for now and if at the next four hour check BP is still in the 80s or patient becomes symptomatic then MD will speak to nephrology for recommendations on how to treat

## 2018-10-09 PROCEDURE — 99232 SBSQ HOSP IP/OBS MODERATE 35: CPT | Mod: GC

## 2018-10-09 PROCEDURE — 71045 X-RAY EXAM CHEST 1 VIEW: CPT | Mod: 26

## 2018-10-09 RX ORDER — CALCITRIOL 0.5 UG/1
0.5 CAPSULE ORAL DAILY
Qty: 0 | Refills: 0 | Status: DISCONTINUED | OUTPATIENT
Start: 2018-10-09 | End: 2018-10-15

## 2018-10-09 RX ORDER — ACETAMINOPHEN 500 MG
1000 TABLET ORAL ONCE
Qty: 0 | Refills: 0 | Status: COMPLETED | OUTPATIENT
Start: 2018-10-09 | End: 2018-10-09

## 2018-10-09 RX ORDER — POLYETHYLENE GLYCOL 3350 17 G/17G
17 POWDER, FOR SOLUTION ORAL DAILY
Qty: 0 | Refills: 0 | Status: DISCONTINUED | OUTPATIENT
Start: 2018-10-09 | End: 2018-10-15

## 2018-10-09 RX ORDER — FOLIC ACID 0.8 MG
1 TABLET ORAL DAILY
Qty: 0 | Refills: 0 | Status: DISCONTINUED | OUTPATIENT
Start: 2018-10-09 | End: 2018-10-15

## 2018-10-09 RX ORDER — CALCIUM ACETATE 667 MG
667 TABLET ORAL
Qty: 0 | Refills: 0 | Status: DISCONTINUED | OUTPATIENT
Start: 2018-10-09 | End: 2018-10-15

## 2018-10-09 RX ORDER — DOCUSATE SODIUM 100 MG
100 CAPSULE ORAL
Qty: 0 | Refills: 0 | Status: DISCONTINUED | OUTPATIENT
Start: 2018-10-09 | End: 2018-10-15

## 2018-10-09 RX ORDER — SENNA PLUS 8.6 MG/1
1 TABLET ORAL DAILY
Qty: 0 | Refills: 0 | Status: DISCONTINUED | OUTPATIENT
Start: 2018-10-09 | End: 2018-10-15

## 2018-10-09 RX ADMIN — Medication 667 MILLIGRAM(S): at 17:49

## 2018-10-09 RX ADMIN — HEPARIN SODIUM 5000 UNIT(S): 5000 INJECTION INTRAVENOUS; SUBCUTANEOUS at 14:22

## 2018-10-09 RX ADMIN — HEPARIN SODIUM 5000 UNIT(S): 5000 INJECTION INTRAVENOUS; SUBCUTANEOUS at 22:03

## 2018-10-09 RX ADMIN — HYDROMORPHONE HYDROCHLORIDE 0.5 MILLIGRAM(S): 2 INJECTION INTRAMUSCULAR; INTRAVENOUS; SUBCUTANEOUS at 06:00

## 2018-10-09 RX ADMIN — HYDROMORPHONE HYDROCHLORIDE 0.5 MILLIGRAM(S): 2 INJECTION INTRAMUSCULAR; INTRAVENOUS; SUBCUTANEOUS at 13:36

## 2018-10-09 RX ADMIN — CALCITRIOL 0.5 MICROGRAM(S): 0.5 CAPSULE ORAL at 13:20

## 2018-10-09 RX ADMIN — HEPARIN SODIUM 5000 UNIT(S): 5000 INJECTION INTRAVENOUS; SUBCUTANEOUS at 06:00

## 2018-10-09 RX ADMIN — HYDROMORPHONE HYDROCHLORIDE 0.5 MILLIGRAM(S): 2 INJECTION INTRAMUSCULAR; INTRAVENOUS; SUBCUTANEOUS at 06:15

## 2018-10-09 RX ADMIN — HYDROMORPHONE HYDROCHLORIDE 0.5 MILLIGRAM(S): 2 INJECTION INTRAMUSCULAR; INTRAVENOUS; SUBCUTANEOUS at 00:46

## 2018-10-09 RX ADMIN — HYDROMORPHONE HYDROCHLORIDE 0.5 MILLIGRAM(S): 2 INJECTION INTRAMUSCULAR; INTRAVENOUS; SUBCUTANEOUS at 18:04

## 2018-10-09 RX ADMIN — Medication 1 MILLIGRAM(S): at 13:20

## 2018-10-09 RX ADMIN — Medication 667 MILLIGRAM(S): at 13:20

## 2018-10-09 RX ADMIN — SENNA PLUS 1 TABLET(S): 8.6 TABLET ORAL at 13:20

## 2018-10-09 RX ADMIN — HYDROMORPHONE HYDROCHLORIDE 0.5 MILLIGRAM(S): 2 INJECTION INTRAMUSCULAR; INTRAVENOUS; SUBCUTANEOUS at 13:21

## 2018-10-09 RX ADMIN — HYDROMORPHONE HYDROCHLORIDE 0.5 MILLIGRAM(S): 2 INJECTION INTRAMUSCULAR; INTRAVENOUS; SUBCUTANEOUS at 17:49

## 2018-10-09 RX ADMIN — HYDROMORPHONE HYDROCHLORIDE 0.5 MILLIGRAM(S): 2 INJECTION INTRAMUSCULAR; INTRAVENOUS; SUBCUTANEOUS at 01:05

## 2018-10-09 RX ADMIN — Medication 400 MILLIGRAM(S): at 19:52

## 2018-10-09 NOTE — PROGRESS NOTE ADULT - PROBLEM SELECTOR PLAN 1
Pt with ESRD on PD. Pt. underwent urgent abdominal hernia repair on 10/5/18. Plan is to hold PD for at least one week. Pt had a non-tunneled HD catheter placed followed by her first session of HD on 10/8/18. Pt. clinically stable. Labs reviewed. No plan for HD today. Plan for HD tomorrow. Advise to stop IVF today. Monitor labs and BP Pt with ESRD on PD. Pt. underwent urgent abdominal hernia repair on 10/5/18. Plan is to hold PD for at least one week. Pt. underwent non-tunneled HD catheter placement yesterday on 10/8/18. Pt. also received her first HD treatment yesterday (10/8/18). Pt. clinically stable. Labs reviewed. No plan for HD today. Plan for HD tomorrow. Advise to stop IVF today. Monitor labs and BP

## 2018-10-09 NOTE — PROGRESS NOTE ADULT - PROBLEM SELECTOR PLAN 2
P.t with low serum calcium level. Ionized calcium low at 0.81 yesterday. Serum phosphorus elevated to 8.8 yesterday. Resume home phosphate binders today. Monitor calcium and phosphorus Pt. with low serum calcium level. Serum phosphorus elevated to 8.8 yesterday. Resume home phosphate binders today. Monitor calcium and phosphorus

## 2018-10-09 NOTE — PROGRESS NOTE ADULT - SUBJECTIVE AND OBJECTIVE BOX
[facility], [place] DIVISION OF KIDNEY DISEASES AND HYPERTENSION -- FOLLOW UP NOTE  --------------------------------------------------------------------------------    HPI: 37-year-old female with PMH of ESRD on PD, sickle cell disease w/ B/L AVN of hips s/p L total hip replacement, admitted with abdominal pain from protruding incarcerated hernia which required urgent surgical repair on 10/5/18. Pt. being seen for ESRD/dialysis management. Pt.'s outpatient nephrologist is Dr. Larsen. PD held as pt.'s surgeon currently recommends to avoid using PD catheter for at least one week post-operatively. Pt had a non-tunneled HD catheter placed on 10/8/18. Pt had a session of HD on 10/8/18 which was tolerated well without any hypotension. Pt. seen and examined today. Pt. feels well. Had flatus but no bowel movement. Pt admits to some CP overnight which resolved. Denies current CP, SOB or LE edema.    PAST HISTORY  --------------------------------------------------------------------------------  No significant changes to PMH, PSH, FHx, SHx, unless otherwise noted    ALLERGIES & MEDICATIONS  --------------------------------------------------------------------------------  Allergies    morphine (Other)    Intolerances      Standing Inpatient Medications  heparin  Injectable 5000 Unit(s) SubCutaneous every 8 hours    PRN Inpatient Medications  acetaminophen   Tablet .. 650 milliGRAM(s) Oral every 6 hours PRN  benzocaine 15 mG/menthol 3.6 mG Lozenge 1 Lozenge Oral two times a day PRN  HYDROmorphone  Injectable 0.5 milliGRAM(s) IV Push every 4 hours PRN  oxyCODONE    IR 5 milliGRAM(s) Oral every 4 hours PRN  tetracaine/benzocaine/butamben Spray 1 Spray(s) Topical every 3 hours PRN      REVIEW OF SYSTEMS  --------------------------------------------------------------------------------  Gen: + weakness  Skin: No rashes  Head/Eyes/Ears/Mouth: No headache  Respiratory: No dyspnea  CV: No chest pain  GI: See HPI, no abdominal pain  MSK: No edema  Neuro: No dizziness    All other systems were reviewed and are negative, except as noted.    VITALS/PHYSICAL EXAM  --------------------------------------------------------------------------------  T(C): 37.2 (10-09-18 @ 05:55), Max: 37.2 (10-09-18 @ 05:55)  HR: 103 (10-09-18 @ 05:55) (65 - 105)  BP: 103/67 (10-09-18 @ 05:55) (87/54 - 111/78)  RR: 10 (10-09-18 @ 05:55) (10 - 22)  SpO2: 99% (10-09-18 @ 05:55) (90% - 100%)  Wt(kg): --      10-08-18 @ 07:01  -  10-09-18 @ 07:00  --------------------------------------------------------  IN: 400 mL / OUT: 113 mL / NET: 287 mL    Physical Exam:  	Gen:  NAD  	Pulm: Mild crackles right base   	CV: RRR, S1S2+  	Abd: soft, nontender/nondistended, + healing umbilical surgical site, PD catheter site: dressing seen  	UE: Warm, no asterixis  	LE: Warm, no edema  	Psych: Normal affect and mood  	Skin: Warm    LABS/STUDIES  --------------------------------------------------------------------------------              9.6    10.25 >-----------<  311      [10-08-18 @ 07:30]              29.3     141  |  98  |  74  ----------------------------<  84      [10-08-18 @ 07:30]  4.6   |  19  |  13.41        Ca     6.8     [10-08-18 @ 07:30]      iCa    0.81     [10-08 @ 07:30]      Mg     1.8     [10-08-18 @ 07:30]      Phos  8.8     [10-08-18 @ 07:30]      PT/INR: PT 12.8 , INR 1.11       [10-08-18 @ 07:08]  PTT: 23.2       [10-08-18 @ 07:08]    Creatinine Trend:  SCr 13.41 [10-08 @ 07:30]  SCr 12.96 [10-07 @ 07:30]  SCr 11.73 [10-06 @ 07:04]  SCr 11.00 [10-05 @ 10:13]  SCr 9.66 [10-05 @ 08:38]    .2 (Ca --)      [03-27-18 @ 05:30]   --  HbA1c 3.5      [05-15-18 @ 10:40]  TSH 1.62      [05-21-18 @ 05:51]    HBsAg NEGATIVE      [10-08-18 @ 07:30] [facility] DIVISION OF KIDNEY DISEASES AND HYPERTENSION -- FOLLOW UP NOTE  --------------------------------------------------------------------------------  HPI: 37-year-old female with PMH of ESRD on PD, sickle cell disease w/ B/L AVN of hips s/p L total hip replacement, admitted with abdominal pain from protruding incarcerated hernia which required urgent surgical repair on 10/5/18. Pt. being seen for ESRD/dialysis management. Pt.'s outpatient nephrologist is Dr. Larsen. PD held as pt.'s surgeon currently recommends to avoid using PD catheter for at least one week post-operatively. Pt had a non-tunneled HD catheter placed on 10/8/18. Pt had a session of HD on 10/8/18 which was tolerated well without any hypotension. Pt. seen and examined today. Pt. feels well. Had flatus but no bowel movement. Pt admits to some CP overnight which resolved. Denies current CP, SOB or LE edema.    PAST HISTORY  --------------------------------------------------------------------------------  No significant changes to PMH, PSH, FHx, SHx, unless otherwise noted    ALLERGIES & MEDICATIONS  --------------------------------------------------------------------------------  Allergies    morphine (Other)    Intolerances    Standing Inpatient Medications  heparin  Injectable 5000 Unit(s) SubCutaneous every 8 hours    PRN Inpatient Medications  acetaminophen   Tablet .. 650 milliGRAM(s) Oral every 6 hours PRN  benzocaine 15 mG/menthol 3.6 mG Lozenge 1 Lozenge Oral two times a day PRN  HYDROmorphone  Injectable 0.5 milliGRAM(s) IV Push every 4 hours PRN  oxyCODONE    IR 5 milliGRAM(s) Oral every 4 hours PRN  tetracaine/benzocaine/butamben Spray 1 Spray(s) Topical every 3 hours PRN    REVIEW OF SYSTEMS  --------------------------------------------------------------------------------  Gen: + weakness  Skin: No rashes  Head/Eyes/Ears/Mouth: No headache  Respiratory: No dyspnea  CV: No chest pain  GI: See HPI  MSK: No edema  Neuro: No dizziness    All other systems were reviewed and are negative, except as noted.    VITALS/PHYSICAL EXAM  --------------------------------------------------------------------------------  T(C): 37.2 (10-09-18 @ 05:55), Max: 37.2 (10-09-18 @ 05:55)  HR: 103 (10-09-18 @ 05:55) (65 - 105)  BP: 103/67 (10-09-18 @ 05:55) (87/54 - 111/78)  RR: 10 (10-09-18 @ 05:55) (10 - 22)  SpO2: 99% (10-09-18 @ 05:55) (90% - 100%)  Wt(kg): --    10-08-18 @ 07:01  -  10-09-18 @ 07:00  --------------------------------------------------------  IN: 400 mL / OUT: 113 mL / NET: 287 mL    Physical Exam:  	Gen:  NAD  	Pulm: Mild crackles right base   	CV: RRR, S1S2+  	Abd: soft, nontender/nondistended, + healing umbilical surgical site, PD catheter site: dressing seen  	UE: Warm, no asterixis  	LE: Warm, no edema  	Psych: Normal affect and mood  	Skin: Warm    LABS/STUDIES  --------------------------------------------------------------------------------              9.6    10.25 >-----------<  311      [10-08-18 @ 07:30]              29.3     141  |  98  |  74  ----------------------------<  84      [10-08-18 @ 07:30]  4.6   |  19  |  13.41        Ca     6.8     [10-08-18 @ 07:30]      iCa    0.81     [10-08 @ 07:30]      Mg     1.8     [10-08-18 @ 07:30]      Phos  8.8     [10-08-18 @ 07:30]    Creatinine Trend:  SCr 13.41 [10-08 @ 07:30]  SCr 12.96 [10-07 @ 07:30]  SCr 11.73 [10-06 @ 07:04]  SCr 11.00 [10-05 @ 10:13]  SCr 9.66 [10-05 @ 08:38]    .2 (Ca --)      [03-27-18 @ 05:30]   --  HbA1c 3.5      [05-15-18 @ 10:40]  TSH 1.62      [05-21-18 @ 05:51]    HBsAg NEGATIVE      [10-08-18 @ 07:30]

## 2018-10-09 NOTE — PROGRESS NOTE ADULT - SUBJECTIVE AND OBJECTIVE BOX
[facility] A-TEAM SURGERY PROGRESS NOTE    S: Patient seen at bedside. Doing well post shiley placement, post HD. Still c/o subcostal pain. Tolerating regular diet.  Shiley placed by IR yesterday evening and HD performed.      OBJECTIVE:    Vital Signs Last 24 Hrs  T(C): 36.4 (09 Oct 2018 00:42), Max: 37.1 (08 Oct 2018 21:26)  T(F): 97.6 (09 Oct 2018 00:42), Max: 98.7 (08 Oct 2018 21:26)  HR: 105 (09 Oct 2018 00:42) (65 - 105)  BP: 103/75 (09 Oct 2018 00:42) (87/54 - 111/78)  BP(mean): --  RR: 18 (09 Oct 2018 00:42) (16 - 22)  SpO2: 100% (09 Oct 2018 00:42) (90% - 100%)    I&O's Detail    07 Oct 2018 07:01  -  08 Oct 2018 07:00  --------------------------------------------------------  IN:  Total IN: 0 mL    OUT:    Nasoenteral Tube: 120 mL    Voided: 300 mL  Total OUT: 420 mL    Total NET: -420 mL      08 Oct 2018 07:01  -  09 Oct 2018 03:51  --------------------------------------------------------  IN:    Other: 400 mL  Total IN: 400 mL    OUT:    Other: 113 mL  Total OUT: 113 mL    Total NET: 287 mL      LABS:                        9.6    10.25 )-----------( 311      ( 08 Oct 2018 07:30 )             29.3     10-08    141  |  98  |  74<H>  ----------------------------<  84  4.6   |  19<L>  |  13.41<H>    Ca    6.8<L>      08 Oct 2018 07:30  Phos  8.8     10-08  Mg     1.8     10-08      PT/INR - ( 08 Oct 2018 07:08 )   PT: 12.8 SEC;   INR: 1.11          PTT - ( 08 Oct 2018 07:08 )  PTT:23.2 SEC      EXAM:  Gen: NAD  NGT in place  CV: RRR  Pulm: non-labored  Abd: soft/incisional tenderness. Dressing in place: c/d/i, Incision hemostatic.  PD catheter without drainage or erythema.

## 2018-10-09 NOTE — PROGRESS NOTE ADULT - ASSESSMENT
A/P: 38 yo F s/p Incisional hernia repair (10/5)    -Shiley placed yesterday, underwent HD per Nephro recs  -IVF (NS) @ 50cc/hr  -c/w regular diet  -Pain control  -DVT ppx    A-team surgery  [id number]

## 2018-10-09 NOTE — PROGRESS NOTE ADULT - ASSESSMENT
Pt. with ESRD on PD. Plan to initiate pt. on HD as PD catheter cannot be used currently. Pt. with ESRD on PD. Pt. initiated on HD as PD catheter cannot be used currently.

## 2018-10-09 NOTE — PROVIDER CONTACT NOTE (OTHER) - SITUATION
Pt requested to terminate dialysis treatment by 46 minutes due to personal reason. Pt states she is tired and wants to get off the machine.

## 2018-10-10 LAB
APTT BLD: 27.6 SEC — SIGNIFICANT CHANGE UP (ref 27.5–37.4)
APTT BLD: 29.5 SEC — SIGNIFICANT CHANGE UP (ref 27.5–37.4)
BUN SERPL-MCNC: 19 MG/DL — SIGNIFICANT CHANGE UP (ref 7–23)
BUN SERPL-MCNC: 46 MG/DL — HIGH (ref 7–23)
CALCIUM SERPL-MCNC: 6.6 MG/DL — LOW (ref 8.4–10.5)
CALCIUM SERPL-MCNC: 7.8 MG/DL — LOW (ref 8.4–10.5)
CHLORIDE SERPL-SCNC: 97 MMOL/L — LOW (ref 98–107)
CHLORIDE SERPL-SCNC: 99 MMOL/L — SIGNIFICANT CHANGE UP (ref 98–107)
CO2 SERPL-SCNC: 23 MMOL/L — SIGNIFICANT CHANGE UP (ref 22–31)
CO2 SERPL-SCNC: 27 MMOL/L — SIGNIFICANT CHANGE UP (ref 22–31)
CREAT SERPL-MCNC: 5.05 MG/DL — HIGH (ref 0.5–1.3)
CREAT SERPL-MCNC: 9.63 MG/DL — HIGH (ref 0.5–1.3)
GLUCOSE SERPL-MCNC: 77 MG/DL — SIGNIFICANT CHANGE UP (ref 70–99)
GLUCOSE SERPL-MCNC: 82 MG/DL — SIGNIFICANT CHANGE UP (ref 70–99)
HCT VFR BLD CALC: 27 % — LOW (ref 34.5–45)
HCT VFR BLD CALC: 28.5 % — LOW (ref 34.5–45)
HGB BLD-MCNC: 8.7 G/DL — LOW (ref 11.5–15.5)
HGB BLD-MCNC: 9.3 G/DL — LOW (ref 11.5–15.5)
INR BLD: 1.08 — SIGNIFICANT CHANGE UP (ref 0.88–1.17)
INR BLD: 1.09 — SIGNIFICANT CHANGE UP (ref 0.88–1.17)
LDH SERPL L TO P-CCNC: 247 U/L — HIGH (ref 135–225)
MAGNESIUM SERPL-MCNC: 1.7 MG/DL — SIGNIFICANT CHANGE UP (ref 1.6–2.6)
MCHC RBC-ENTMCNC: 29.5 PG — SIGNIFICANT CHANGE UP (ref 27–34)
MCHC RBC-ENTMCNC: 30.2 PG — SIGNIFICANT CHANGE UP (ref 27–34)
MCHC RBC-ENTMCNC: 32.2 % — SIGNIFICANT CHANGE UP (ref 32–36)
MCHC RBC-ENTMCNC: 32.6 % — SIGNIFICANT CHANGE UP (ref 32–36)
MCV RBC AUTO: 91.5 FL — SIGNIFICANT CHANGE UP (ref 80–100)
MCV RBC AUTO: 92.5 FL — SIGNIFICANT CHANGE UP (ref 80–100)
NRBC # FLD: 0.23 — SIGNIFICANT CHANGE UP
NRBC # FLD: 0.24 — SIGNIFICANT CHANGE UP
NRBC FLD-RTO: 2.6 — SIGNIFICANT CHANGE UP
NRBC FLD-RTO: 2.7 — SIGNIFICANT CHANGE UP
PHOSPHATE SERPL-MCNC: 5.5 MG/DL — HIGH (ref 2.5–4.5)
PLATELET # BLD AUTO: 285 K/UL — SIGNIFICANT CHANGE UP (ref 150–400)
PLATELET # BLD AUTO: 291 K/UL — SIGNIFICANT CHANGE UP (ref 150–400)
PMV BLD: 11 FL — SIGNIFICANT CHANGE UP (ref 7–13)
PMV BLD: 11.1 FL — SIGNIFICANT CHANGE UP (ref 7–13)
POTASSIUM SERPL-MCNC: 3.3 MMOL/L — LOW (ref 3.5–5.3)
POTASSIUM SERPL-MCNC: 3.9 MMOL/L — SIGNIFICANT CHANGE UP (ref 3.5–5.3)
POTASSIUM SERPL-SCNC: 3.3 MMOL/L — LOW (ref 3.5–5.3)
POTASSIUM SERPL-SCNC: 3.9 MMOL/L — SIGNIFICANT CHANGE UP (ref 3.5–5.3)
PROTHROM AB SERPL-ACNC: 12.1 SEC — SIGNIFICANT CHANGE UP (ref 9.8–13.1)
PROTHROM AB SERPL-ACNC: 12.4 SEC — SIGNIFICANT CHANGE UP (ref 9.8–13.1)
RBC # BLD: 2.95 M/UL — LOW (ref 3.8–5.2)
RBC # BLD: 3.08 M/UL — LOW (ref 3.8–5.2)
RBC # FLD: 19.2 % — HIGH (ref 10.3–14.5)
RBC # FLD: 19.3 % — HIGH (ref 10.3–14.5)
RETICS #: 80 K/UL — SIGNIFICANT CHANGE UP (ref 25–125)
RETICS/RBC NFR: 2.6 % — HIGH (ref 0.5–2.5)
SODIUM SERPL-SCNC: 139 MMOL/L — SIGNIFICANT CHANGE UP (ref 135–145)
SODIUM SERPL-SCNC: 140 MMOL/L — SIGNIFICANT CHANGE UP (ref 135–145)
WBC # BLD: 8.53 K/UL — SIGNIFICANT CHANGE UP (ref 3.8–10.5)
WBC # BLD: 9.33 K/UL — SIGNIFICANT CHANGE UP (ref 3.8–10.5)
WBC # FLD AUTO: 8.53 K/UL — SIGNIFICANT CHANGE UP (ref 3.8–10.5)
WBC # FLD AUTO: 9.33 K/UL — SIGNIFICANT CHANGE UP (ref 3.8–10.5)

## 2018-10-10 PROCEDURE — 77001 FLUOROGUIDE FOR VEIN DEVICE: CPT | Mod: 26,GC

## 2018-10-10 PROCEDURE — 90935 HEMODIALYSIS ONE EVALUATION: CPT

## 2018-10-10 PROCEDURE — 36558 INSERT TUNNELED CV CATH: CPT

## 2018-10-10 PROCEDURE — 99233 SBSQ HOSP IP/OBS HIGH 50: CPT | Mod: GC

## 2018-10-10 RX ORDER — HYDROMORPHONE HYDROCHLORIDE 2 MG/ML
0.5 INJECTION INTRAMUSCULAR; INTRAVENOUS; SUBCUTANEOUS EVERY 4 HOURS
Qty: 0 | Refills: 0 | Status: DISCONTINUED | OUTPATIENT
Start: 2018-10-10 | End: 2018-10-10

## 2018-10-10 RX ORDER — HYDROMORPHONE HYDROCHLORIDE 2 MG/ML
0.5 INJECTION INTRAMUSCULAR; INTRAVENOUS; SUBCUTANEOUS ONCE
Qty: 0 | Refills: 0 | Status: DISCONTINUED | OUTPATIENT
Start: 2018-10-10 | End: 2018-10-10

## 2018-10-10 RX ORDER — OXYCODONE HYDROCHLORIDE 5 MG/1
10 TABLET ORAL EVERY 4 HOURS
Qty: 0 | Refills: 0 | Status: DISCONTINUED | OUTPATIENT
Start: 2018-10-10 | End: 2018-10-10

## 2018-10-10 RX ORDER — HYDROMORPHONE HYDROCHLORIDE 2 MG/ML
1 INJECTION INTRAMUSCULAR; INTRAVENOUS; SUBCUTANEOUS EVERY 6 HOURS
Qty: 0 | Refills: 0 | Status: DISCONTINUED | OUTPATIENT
Start: 2018-10-10 | End: 2018-10-12

## 2018-10-10 RX ORDER — HYDROMORPHONE HYDROCHLORIDE 2 MG/ML
6 INJECTION INTRAMUSCULAR; INTRAVENOUS; SUBCUTANEOUS EVERY 4 HOURS
Qty: 0 | Refills: 0 | Status: DISCONTINUED | OUTPATIENT
Start: 2018-10-10 | End: 2018-10-15

## 2018-10-10 RX ORDER — HYDROMORPHONE HYDROCHLORIDE 2 MG/ML
0.5 INJECTION INTRAMUSCULAR; INTRAVENOUS; SUBCUTANEOUS
Qty: 0 | Refills: 0 | Status: DISCONTINUED | OUTPATIENT
Start: 2018-10-10 | End: 2018-10-10

## 2018-10-10 RX ORDER — HYDROMORPHONE HYDROCHLORIDE 2 MG/ML
4 INJECTION INTRAMUSCULAR; INTRAVENOUS; SUBCUTANEOUS EVERY 4 HOURS
Qty: 0 | Refills: 0 | Status: DISCONTINUED | OUTPATIENT
Start: 2018-10-10 | End: 2018-10-10

## 2018-10-10 RX ORDER — GENTAMICIN SULFATE 0.1 %
1 OINTMENT (GRAM) TOPICAL ONCE
Qty: 0 | Refills: 0 | Status: COMPLETED | OUTPATIENT
Start: 2018-10-10 | End: 2018-10-10

## 2018-10-10 RX ORDER — HYDROMORPHONE HYDROCHLORIDE 2 MG/ML
4 INJECTION INTRAMUSCULAR; INTRAVENOUS; SUBCUTANEOUS EVERY 4 HOURS
Qty: 0 | Refills: 0 | Status: DISCONTINUED | OUTPATIENT
Start: 2018-10-10 | End: 2018-10-15

## 2018-10-10 RX ADMIN — HEPARIN SODIUM 5000 UNIT(S): 5000 INJECTION INTRAVENOUS; SUBCUTANEOUS at 06:40

## 2018-10-10 RX ADMIN — OXYCODONE HYDROCHLORIDE 10 MILLIGRAM(S): 5 TABLET ORAL at 11:42

## 2018-10-10 RX ADMIN — HYDROMORPHONE HYDROCHLORIDE 1 MILLIGRAM(S): 2 INJECTION INTRAMUSCULAR; INTRAVENOUS; SUBCUTANEOUS at 21:49

## 2018-10-10 RX ADMIN — HEPARIN SODIUM 5000 UNIT(S): 5000 INJECTION INTRAVENOUS; SUBCUTANEOUS at 14:26

## 2018-10-10 RX ADMIN — OXYCODONE HYDROCHLORIDE 10 MILLIGRAM(S): 5 TABLET ORAL at 03:00

## 2018-10-10 RX ADMIN — Medication 667 MILLIGRAM(S): at 19:12

## 2018-10-10 RX ADMIN — HYDROMORPHONE HYDROCHLORIDE 0.5 MILLIGRAM(S): 2 INJECTION INTRAMUSCULAR; INTRAVENOUS; SUBCUTANEOUS at 17:15

## 2018-10-10 RX ADMIN — OXYCODONE HYDROCHLORIDE 10 MILLIGRAM(S): 5 TABLET ORAL at 06:40

## 2018-10-10 RX ADMIN — HYDROMORPHONE HYDROCHLORIDE 1 MILLIGRAM(S): 2 INJECTION INTRAMUSCULAR; INTRAVENOUS; SUBCUTANEOUS at 22:04

## 2018-10-10 RX ADMIN — HYDROMORPHONE HYDROCHLORIDE 0.5 MILLIGRAM(S): 2 INJECTION INTRAMUSCULAR; INTRAVENOUS; SUBCUTANEOUS at 18:00

## 2018-10-10 RX ADMIN — OXYCODONE HYDROCHLORIDE 10 MILLIGRAM(S): 5 TABLET ORAL at 12:15

## 2018-10-10 RX ADMIN — Medication 100 MILLIGRAM(S): at 19:12

## 2018-10-10 RX ADMIN — HEPARIN SODIUM 5000 UNIT(S): 5000 INJECTION INTRAVENOUS; SUBCUTANEOUS at 21:49

## 2018-10-10 RX ADMIN — Medication 1 APPLICATION(S): at 10:38

## 2018-10-10 RX ADMIN — HYDROMORPHONE HYDROCHLORIDE 0.5 MILLIGRAM(S): 2 INJECTION INTRAMUSCULAR; INTRAVENOUS; SUBCUTANEOUS at 17:30

## 2018-10-10 RX ADMIN — OXYCODONE HYDROCHLORIDE 10 MILLIGRAM(S): 5 TABLET ORAL at 02:13

## 2018-10-10 RX ADMIN — OXYCODONE HYDROCHLORIDE 10 MILLIGRAM(S): 5 TABLET ORAL at 07:31

## 2018-10-10 NOTE — PROGRESS NOTE ADULT - SUBJECTIVE AND OBJECTIVE BOX
[facility] DIVISION OF KIDNEY DISEASES AND HYPERTENSION --   --------------------------------------------------------------------------------  Chief Complaint: ESRD/Ongoing hemodialysis requirement     HPI: 37-year-old female with PMH of ESRD on PD, sickle cell disease w/ B/L AVN of hips s/p L total hip replacement, admitted with abdominal pain from incarcerated hernia which required urgent surgical repair on 10/5/18. Pt. being seen for ESRD/dialysis management. Pt.'s outpatient nephrologist is Dr. Larsen. PD held as pt.'s surgeon currently recommends to avoid using PD catheter for at least one week post-operatively. Pt had a non-tunneled HD catheter placed on 10/8/18. Pt. seen and examined during HD today. Pt. complaints of bilateral lower rib pain on deep inspiration. Pt. requesting more fluid removal with HD.     PAST HISTORY  --------------------------------------------------------------------------------  No significant changes to PMH, PSH, FHx, SHx, unless otherwise noted    ALLERGIES & MEDICATIONS  --------------------------------------------------------------------------------  Allergies    morphine (Other)    Intolerances    Standing Inpatient Medications  calcitriol   Capsule 0.5 MICROGram(s) Oral daily  calcium acetate 667 milliGRAM(s) Oral three times a day with meals  docusate sodium 100 milliGRAM(s) Oral two times a day  folic acid 1 milliGRAM(s) Oral daily  heparin  Injectable 5000 Unit(s) SubCutaneous every 8 hours  polyethylene glycol 3350 17 Gram(s) Oral daily  senna 1 Tablet(s) Oral daily    PRN Inpatient Medications  acetaminophen   Tablet .. 650 milliGRAM(s) Oral every 6 hours PRN  oxyCODONE    IR 5 milliGRAM(s) Oral every 4 hours PRN  oxyCODONE    IR 10 milliGRAM(s) Oral every 4 hours PRN    REVIEW OF SYSTEMS  --------------------------------------------------------------------------------  Gen: No  fever  Respiratory: See HPI  CV: See HPI  GI: No abdominal pain  MSK: No edema  Neuro: No dizziness    VITALS/PHYSICAL EXAM  --------------------------------------------------------------------------------  T(C): 36.8 (10-10-18 @ 07:10), Max: 37.3 (10-09-18 @ 10:15)  HR: 81 (10-10-18 @ 07:10) (81 - 106)  BP: 103/68 (10-10-18 @ 07:10) (88/60 - 112/78)  RR: 18 (10-10-18 @ 07:10) (15 - 18)  SpO2: 100% (10-10-18 @ 05:18) (96% - 100%)  Wt(kg): --    Physical Exam:  	Gen: resting              Neck: RIJ non-tunneled HD catheter site: no bleeding  	Pulm: bibasilar crackles heard   	CV: RRR, S1S2+  	Abd: soft, nontender/nondistended, + healing umbilical surgical site, PD catheter site: dressing seen  	UE: Warm, no asterixis  	LE: Warm, no edema  	Psych: Normal affect and mood  	Skin: Warm    LABS/STUDIES  --------------------------------------------------------------------------------              8.7    8.53  >-----------<  291      [10-10-18 @ 07:15]              27.0     140  |  99  |  46  ----------------------------<  82      [10-10-18 @ 07:15]  3.9   |  23  |  9.63        Ca     6.6     [10-10-18 @ 07:15]      Mg     1.7     [10-10-18 @ 07:15]      Phos  5.5     [10-10-18 @ 07:15]

## 2018-10-10 NOTE — CONSULT NOTE ADULT - SUBJECTIVE AND OBJECTIVE BOX
HPI:  37F HbSS with hx ACS 2017, avascular necrosis of hip s/p replacement, parvovirus infection c/b ESRD on peritoneal dialysis (last on 10/3) presented initially with abdominal pain, found to have incarcerated hernia. Patient underwent surgical repair in 10/5. She did not have exchange transfusion prior to surgical intervention. Today, patient reports back pain, hematology is consulted given concern for sickle cell crisis.     Patient was admitted to [facility] in 3/2018 for sickle cell crisis,  did not require exchange transfusion or           Allergies    morphine (Other)    Intolerances        MEDICATIONS  (STANDING):  calcitriol   Capsule 0.5 MICROGram(s) Oral daily  calcium acetate 667 milliGRAM(s) Oral three times a day with meals  docusate sodium 100 milliGRAM(s) Oral two times a day  folic acid 1 milliGRAM(s) Oral daily  heparin  Injectable 5000 Unit(s) SubCutaneous every 8 hours  polyethylene glycol 3350 17 Gram(s) Oral daily  senna 1 Tablet(s) Oral daily    MEDICATIONS  (PRN):  acetaminophen   Tablet .. 650 milliGRAM(s) Oral every 6 hours PRN Mild Pain (1 - 3)  oxyCODONE    IR 5 milliGRAM(s) Oral every 4 hours PRN Moderate Pain (4 - 6)  oxyCODONE    IR 10 milliGRAM(s) Oral every 4 hours PRN Severe Pain (7 - 10)      PAST MEDICAL & SURGICAL HISTORY:  Peritoneal dialysis catheter in place  Umbilical hernia: 2017  Cholelithiasis  Anemia  Sickle cell disease  Chronic kidney disease: started dialysis 2017 with etiology from Parvo Virus 2016  Parvovirus B19 infection: 2016 resulting in chronic renal disease  Nephrotic syndrome  HPV (Human Papillomavirus)  History of peritoneal dialysis: 3/2017  H/O umbilical hernia repair:   History of hip surgery: R hip due to necrosis in --bone graft from right tibia  H/O tubal ligation: in , along with   H/O: : 014  S/P Laparoscopic Cholecystectomy      FAMILY HISTORY:  Family history of sarcoidosis (Sibling): Sister with HgbSS and sarcoidosis  Family history of sickle cell disease (Sibling): Sister with HgbSS and sarcoidosis  Family history of sickle cell trait in mother  Family history of sickle cell trait in father      SOCIAL HISTORY: No EtOH, no tobacco    REVIEW OF SYSTEMS:    CONSTITUTIONAL: No weakness, fevers or chills  EYES/ENT: No visual changes;  No vertigo or throat pain   NECK: No pain or stiffness  RESPIRATORY: No cough, wheezing, hemoptysis; No shortness of breath  CARDIOVASCULAR: No chest pain or palpitations  GASTROINTESTINAL: No abdominal or epigastric pain. No nausea, vomiting, or hematemesis; No diarrhea or constipation. No melena or hematochezia.  GENITOURINARY: No dysuria, frequency or hematuria  NEUROLOGICAL: No numbness or weakness  SKIN: No itching, burning, rashes, or lesions   All other review of systems is negative unless indicated above.        T(F): 98 (10-10-18 @ 10:58), Max: 99 (10-09-18 @ 21:20)  HR: 91 (10-10-18 @ 10:58)  BP: 114/74 (10-10-18 @ 10:58)  RR: 18 (10-10-18 @ 10:58)  SpO2: 100% (10-10-18 @ 10:58)  Wt(kg): --    GENERAL: NAD, well-developed  HEAD:  Atraumatic, Normocephalic  EYES: EOMI, PERRLA, conjunctiva and sclera clear  NECK: Supple, No JVD  CHEST/LUNG: Clear to auscultation bilaterally; No wheeze  HEART: Regular rate and rhythm; No murmurs, rubs, or gallops  ABDOMEN: Soft, Nontender, Nondistended; Bowel sounds present  EXTREMITIES:  2+ Peripheral Pulses, No clubbing, cyanosis, or edema  NEUROLOGY: non-focal  SKIN: No rashes or lesions                          8.7    8.53  )-----------( 291      ( 10 Oct 2018 07:15 )             27.0       10-10    140  |  99  |  46<H>  ----------------------------<  82  3.9   |  23  |  9.63<H>    Ca    6.6<L>      10 Oct 2018 07:15  Phos  5.5     10-10  Mg     1.7     10-10        Phosphorus Level, Serum: 5.5 mg/dL (10-10 @ 07:15)  Magnesium, Serum: 1.7 mg/dL (10-10 @ 07:15) HPI:  37F HbSS with hx ACS 2017, avascular necrosis of hip s/p replacement, parvovirus infection c/b ESRD on peritoneal dialysis (last on 10/3) presented initially with abdominal pain, found to have incarcerated hernia. Patient underwent surgical repair in 10/5. She did not have exchange transfusion prior to surgical intervention. Today, patient reports back pain, hematology is consulted given concern for sickle cell crisis.     Patient today reports 'lung' pain radiating to her back that is 8/10 in intensity and worse than her abdominal pain. She states that this is not like her usual sickle cell pain, as she tends to have joint pain with her typical crisis.       Allergies    morphine (Other)    Intolerances        MEDICATIONS  (STANDING):  calcitriol   Capsule 0.5 MICROGram(s) Oral daily  calcium acetate 667 milliGRAM(s) Oral three times a day with meals  docusate sodium 100 milliGRAM(s) Oral two times a day  folic acid 1 milliGRAM(s) Oral daily  heparin  Injectable 5000 Unit(s) SubCutaneous every 8 hours  polyethylene glycol 3350 17 Gram(s) Oral daily  senna 1 Tablet(s) Oral daily    MEDICATIONS  (PRN):  acetaminophen   Tablet .. 650 milliGRAM(s) Oral every 6 hours PRN Mild Pain (1 - 3)  oxyCODONE    IR 5 milliGRAM(s) Oral every 4 hours PRN Moderate Pain (4 - 6)  oxyCODONE    IR 10 milliGRAM(s) Oral every 4 hours PRN Severe Pain (7 - 10)      PAST MEDICAL & SURGICAL HISTORY:  Peritoneal dialysis catheter in place  Umbilical hernia: 2017  Cholelithiasis  Anemia  Sickle cell disease  Chronic kidney disease: started dialysis 2017 with etiology from Parvo Virus 2016  Parvovirus B19 infection: 2016 resulting in chronic renal disease  Nephrotic syndrome  HPV (Human Papillomavirus)  History of peritoneal dialysis: 3/2017  H/O umbilical hernia repair:   History of hip surgery: R hip due to necrosis in --bone graft from right tibia  H/O tubal ligation: in , along with   H/O: : 014  S/P Laparoscopic Cholecystectomy      FAMILY HISTORY:  Family history of sarcoidosis (Sibling): Sister with HgbSS and sarcoidosis  Family history of sickle cell disease (Sibling): Sister with HgbSS and sarcoidosis  Family history of sickle cell trait in mother  Family history of sickle cell trait in father      SOCIAL HISTORY: No EtOH, no tobacco    REVIEW OF SYSTEMS: per HPI         T(F): 98 (10-10-18 @ 10:58), Max: 99 (10-09-18 @ 21:20)  HR: 91 (10-10-18 @ 10:58)  BP: 114/74 (10-10-18 @ 10:58)  RR: 18 (10-10-18 @ 10:58)  SpO2: 100% (10-10-18 @ 10:58)  Wt(kg): --    GENERAL: NAD, well-developed  HEAD:  Atraumatic, Normocephalic  EYES: EOMI, , conjunctiva and sclera clear  NECK: Supple  CHEST/LUNG: Decrease breath sounds at lung bases   HEART: Regular rate and rhythm; No murmurs, rubs, or gallops  ABDOMEN: Soft, surgical site c/d/i  EXTREMITIES: No clubbing, cyanosis, or edema  NEUROLOGY: non-focal  SKIN: No rashes or lesions                          8.7    8.53  )-----------( 291      ( 10 Oct 2018 07:15 )             27.0       10-10    140  |  99  |  46<H>  ----------------------------<  82  3.9   |  23  |  9.63<H>    Ca    6.6<L>      10 Oct 2018 07:15  Phos  5.5     10-10  Mg     1.7     10-10        Phosphorus Level, Serum: 5.5 mg/dL (10-10 @ 07:15)  Magnesium, Serum: 1.7 mg/dL (10-10 @ 07:15)

## 2018-10-10 NOTE — CHART NOTE - NSCHARTNOTEFT_GEN_A_CORE
Pre-Interventional Radiology Procedure Note    37y    Female    Procedure: permacath     Diagnosis/Indication: Patient is a 37y old  Female who presents with a chief complaint of Incarcerated hernia (10 Oct 2018 08:56)      Interventional Radiology Attending Physician: Alexx Huddleston Attending Physician: Tristin     PAST MEDICAL & SURGICAL HISTORY:  Peritoneal dialysis catheter in place  Umbilical hernia: 2017  Cholelithiasis  Anemia  Sickle cell disease  Chronic kidney disease: started dialysis 2017 with etiology from Parvo Virus 2016  Parvovirus B19 infection:  resulting in chronic renal disease  Nephrotic syndrome  HPV (Human Papillomavirus)  History of peritoneal dialysis: 3/2017  H/O umbilical hernia repair:   History of hip surgery: R hip due to necrosis in --bone graft from right tibia  H/O tubal ligation: in , along with   H/O: : 014  S/P Laparoscopic Cholecystectomy       CBC Full  -  ( 10 Oct 2018 07:15 )  WBC Count : 8.53 K/uL  Hemoglobin : 8.7 g/dL  Hematocrit : 27.0 %  Platelet Count - Automated : 291 K/uL  Mean Cell Volume : 91.5 fL  Mean Cell Hemoglobin : 29.5 pg  Mean Cell Hemoglobin Concentration : 32.2 %  Auto Neutrophil # : x  Auto Lymphocyte # : x  Auto Monocyte # : x  Auto Eosinophil # : x  Auto Basophil # : x  Auto Neutrophil % : x  Auto Lymphocyte % : x  Auto Monocyte % : x  Auto Eosinophil % : x  Auto Basophil % : x    10-10    140  |  99  |  46<H>  ----------------------------<  82  3.9   |  23  |  9.63<H>    Ca    6.6<L>      10 Oct 2018 07:15  Phos  5.5     10-10  Mg     1.7     10-10      PT/INR - ( 10 Oct 2018 07:15 )   PT: 12.4 SEC;   INR: 1.08          PTT - ( 10 Oct 2018 07:15 )  PTT:29.5 SEC

## 2018-10-10 NOTE — PROGRESS NOTE ADULT - ASSESSMENT
Pt. with ESRD on HD as PD catheter cannot be used currently. Pt. seen during HD rounds. /73 at time of HD rounds.

## 2018-10-10 NOTE — PROVIDER CONTACT NOTE (OTHER) - SITUATION
A team surgery notified that pt c/o pain @ this time. Also pt's peripheral IV was place 10/5, via ultrasound in ED. Attempted to place IV X2, pt appears to be a difficult stick.

## 2018-10-10 NOTE — PROGRESS NOTE ADULT - SUBJECTIVE AND OBJECTIVE BOX
[facility] A-TEAM SURGERY PROGRESS NOTE    S: Patient seen at bedside. Complaining of severe rib/abdominal pain.  NPO for IR permacath today.  Dialysis via shiley prior to procedure.        OBJECTIVE:  Vital Signs Last 24 Hrs  T(C): 36.7 (10 Oct 2018 10:58), Max: 37.2 (09 Oct 2018 21:20)  T(F): 98 (10 Oct 2018 10:58), Max: 99 (09 Oct 2018 21:20)  HR: 91 (10 Oct 2018 10:58) (81 - 101)  BP: 114/74 (10 Oct 2018 10:58) (88/60 - 114/74)  BP(mean): --  RR: 18 (10 Oct 2018 10:58) (15 - 18)  SpO2: 100% (10 Oct 2018 10:58) (96% - 100%)    MEDICATIONS  (STANDING):  calcitriol   Capsule 0.5 MICROGram(s) Oral daily  calcium acetate 667 milliGRAM(s) Oral three times a day with meals  docusate sodium 100 milliGRAM(s) Oral two times a day  folic acid 1 milliGRAM(s) Oral daily  heparin  Injectable 5000 Unit(s) SubCutaneous every 8 hours  polyethylene glycol 3350 17 Gram(s) Oral daily  senna 1 Tablet(s) Oral daily    MEDICATIONS  (PRN):  acetaminophen   Tablet .. 650 milliGRAM(s) Oral every 6 hours PRN Mild Pain (1 - 3)  oxyCODONE    IR 5 milliGRAM(s) Oral every 4 hours PRN Moderate Pain (4 - 6)  oxyCODONE    IR 10 milliGRAM(s) Oral every 4 hours PRN Severe Pain (7 - 10)      LABS:   CBC (10-10 @ 07:15)                              8.7<L>                         8.53    )----------------(  291        --    % Neutrophils, --    % Lymphocytes, ANC: --                                  27.0<L>                BMP (10-10 @ 07:15)             140     |  99      |  46<H> 		Ca++ --      Ca 6.6<L>             ---------------------------------( 82    		Mg 1.7                3.9     |  23      |  9.63<H>			Ph 5.5<H>      Coags (10-10 @ 07:15)  aPTT 29.5 / INR 1.08 / PT 12.4            EXAM:  Gen: NAD  NGT in place  CV: RRR  Pulm: non-labored  Abd/Chest: soft/incisional and rib tenderness Dressing in place: c/d/i, Incision hemostatic.  PD catheter without drainage or erythema.

## 2018-10-10 NOTE — PROVIDER CONTACT NOTE (OTHER) - ASSESSMENT
VSS (mildly hypotensive), afebrile. A&OX4. Pt c/o pain, described some relief from earlier dose of Oxy IR, states that pain is again high. C/o pain along rib area as well as abdomen.

## 2018-10-10 NOTE — PROGRESS NOTE ADULT - ASSESSMENT
A/P: 38 yo F s/p Incisional hernia repair (10/5).  Patient unable to use PD catheter, required shiley placement (10/8), plan for IR permacath for discharge.     -Shiley placed on 10/8.  Plan for IR permacath today.  -NPO for procedure, can c/w regular diet afterwards  -H/O consult for possible sickle cell crisis in setting of extreme pain  -Pain control  -DVT ppx    A-team surgery  [id number] A/P: 38 yo F s/p Incisional hernia repair (10/5).  Patient unable to use PD catheter, required shiley placement (10/8), plan for IR permacath for discharge.     -Shiley placed on 10/8.  Plan for IR permacath today.  -NPO for procedure, can c/w regular diet afterwards.  Plan for HD this AM.  -Heme/onc consult for possible sickle cell crisis in setting of extreme pain  -Pain control  -DVT ppx    A-team surgery  [id number]

## 2018-10-10 NOTE — PROGRESS NOTE ADULT - PROBLEM SELECTOR PLAN 1
Pt. underwent urgent abdominal hernia repair on 10/5/18. Plan is to hold PD for at least one week. Pt. underwent non-tunneled HD catheter placement yesterday on 10/8/18. Pt. tolerating HD. BP low but stable during HD rounds. UF goal increased. HD catheter functioning well. Monitor labs and BP

## 2018-10-10 NOTE — PROVIDER CONTACT NOTE (OTHER) - ACTION/TREATMENT ORDERED:
None @ this time, continue current regimen. IVSL intact currently flushes well, patient denies pain in site, no redness noted. A team surgery to monitor pain management.

## 2018-10-10 NOTE — CONSULT NOTE ADULT - ATTENDING COMMENTS
Ms. Lucero was seen and examined her in hematology consultation on study. This patient is previously known to me. She is sickle cell disease and is on dialysis. She was admitted to the hospital for an incarcerated hernia and had a repair performed. She complains of pain in the back area, and these pains are consistent with pleuritic chest pain. These are not typical of her sickle cell pains, which typically are in her joints.    Given her recent abdominal surgery, one must be concerned about the possibility of pulmonary emboli as the cause for this issue. Her recent chest x-ray reveals fluid overload, and she received dialysis earlier today. We recommend that she have Dopplers of her lower extremity, but more importantly, she should have imaging studies for the possibility of pulmonary emboli. Her oxygen saturation is normal. Either a PET scan or a CT angio (preferred method) should be performed. We recommend that we will have some input in regards to this as to when contrast can be administered in coordination with dialysis. She should be treated with some pain medication and she typically receives Dilaudid.    I agree with the assessment and plan as stated above by Dr. Leung.
Assessment and plan as noted.  Reviewed with patient at bedside.  Discussed short term hemodialysis due to need to rest peritoneum, S/P surgery.  Patient reluctant but asking appropriate questions regarding catheter placement.  Will need daily monitoring of labs.  Further recommendations per course.

## 2018-10-10 NOTE — CONSULT NOTE ADULT - ASSESSMENT
37F HbSS with hx ACS 12/2017, avascular necrosis of hip s/p replacement, parvovirus infection c/b ESRD on peritoneal dialysis (last on 10/3) presented initially with abdominal pain, found to have incarcerated hernia. Patient underwent surgical repair in 10/5. She did not have exchange transfusion prior to surgical intervention. Today, patient reports back pain, hematology is consulted given concern for sickle cell crisis    # Hx sickle cell now with back pain  - Pain atypical for her as she usually gets joint pains with her crisis  - Concern for PE given her hx of sickle cell, being post surgery and lateral pain   - We recommend: LE dopplers and CTA chest to r/o DVT/PE  - Pain control per primary team, consider pain and palliative consult for PCA pump as she is NPO and in 8/10 pain    Case seen and discussed with Dr. Armaan Leung  PGY4 Hematology Oncology  [phone] 37F HbSS with hx ACS 12/2017, avascular necrosis of hip s/p replacement, parvovirus infection c/b ESRD on peritoneal dialysis (last on 10/3) presented initially with abdominal pain, found to have incarcerated hernia. Patient underwent surgical repair in 10/5. She did not have exchange transfusion prior to surgical intervention. Today, patient reports back pain, hematology is consulted given concern for sickle cell crisis    # Hx sickle cell now with back pain  - Pain atypical for her as she usually gets joint pains with her crisis  - Concern for PE given her hx of sickle cell, being post surgery and lateral pain   - We recommend: LE dopplers and CTA chest to r/o DVT/PE  - Pain control per primary team, consider pain and palliative consult for PCA pump as she is NPO and in 8/10 pain  - Incentive spirometry  - continue folic acid    Case seen and discussed with Dr. Armaan Leung  PGY4 Hematology Oncology  [phone]

## 2018-10-11 DIAGNOSIS — D64.9 ANEMIA, UNSPECIFIED: ICD-10-CM

## 2018-10-11 LAB
HGB A MFR BLD: 0 % — LOW
HGB A2 MFR BLD: 5.1 % — HIGH (ref 2.4–3.5)
HGB F MFR BLD: < 1 % — SIGNIFICANT CHANGE UP (ref 0–1.5)
HGB S MFR BLD: 94.2 % — HIGH (ref 0–0)

## 2018-10-11 PROCEDURE — 71275 CT ANGIOGRAPHY CHEST: CPT | Mod: 26

## 2018-10-11 PROCEDURE — 99232 SBSQ HOSP IP/OBS MODERATE 35: CPT | Mod: GC

## 2018-10-11 PROCEDURE — 93970 EXTREMITY STUDY: CPT | Mod: 26

## 2018-10-11 RX ORDER — ERYTHROPOIETIN 10000 [IU]/ML
20000 INJECTION, SOLUTION INTRAVENOUS; SUBCUTANEOUS ONCE
Qty: 0 | Refills: 0 | Status: DISCONTINUED | OUTPATIENT
Start: 2018-10-11 | End: 2018-10-11

## 2018-10-11 RX ADMIN — HYDROMORPHONE HYDROCHLORIDE 6 MILLIGRAM(S): 2 INJECTION INTRAMUSCULAR; INTRAVENOUS; SUBCUTANEOUS at 22:29

## 2018-10-11 RX ADMIN — HYDROMORPHONE HYDROCHLORIDE 6 MILLIGRAM(S): 2 INJECTION INTRAMUSCULAR; INTRAVENOUS; SUBCUTANEOUS at 23:29

## 2018-10-11 RX ADMIN — Medication 100 MILLIGRAM(S): at 16:59

## 2018-10-11 RX ADMIN — HYDROMORPHONE HYDROCHLORIDE 6 MILLIGRAM(S): 2 INJECTION INTRAMUSCULAR; INTRAVENOUS; SUBCUTANEOUS at 00:55

## 2018-10-11 RX ADMIN — HYDROMORPHONE HYDROCHLORIDE 1 MILLIGRAM(S): 2 INJECTION INTRAMUSCULAR; INTRAVENOUS; SUBCUTANEOUS at 03:58

## 2018-10-11 RX ADMIN — HYDROMORPHONE HYDROCHLORIDE 6 MILLIGRAM(S): 2 INJECTION INTRAMUSCULAR; INTRAVENOUS; SUBCUTANEOUS at 09:55

## 2018-10-11 RX ADMIN — HYDROMORPHONE HYDROCHLORIDE 1 MILLIGRAM(S): 2 INJECTION INTRAMUSCULAR; INTRAVENOUS; SUBCUTANEOUS at 13:15

## 2018-10-11 RX ADMIN — HYDROMORPHONE HYDROCHLORIDE 1 MILLIGRAM(S): 2 INJECTION INTRAMUSCULAR; INTRAVENOUS; SUBCUTANEOUS at 19:51

## 2018-10-11 RX ADMIN — HYDROMORPHONE HYDROCHLORIDE 1 MILLIGRAM(S): 2 INJECTION INTRAMUSCULAR; INTRAVENOUS; SUBCUTANEOUS at 13:30

## 2018-10-11 RX ADMIN — Medication 667 MILLIGRAM(S): at 16:59

## 2018-10-11 RX ADMIN — Medication 667 MILLIGRAM(S): at 13:15

## 2018-10-11 RX ADMIN — HYDROMORPHONE HYDROCHLORIDE 1 MILLIGRAM(S): 2 INJECTION INTRAMUSCULAR; INTRAVENOUS; SUBCUTANEOUS at 04:50

## 2018-10-11 RX ADMIN — HEPARIN SODIUM 5000 UNIT(S): 5000 INJECTION INTRAVENOUS; SUBCUTANEOUS at 13:15

## 2018-10-11 RX ADMIN — HYDROMORPHONE HYDROCHLORIDE 6 MILLIGRAM(S): 2 INJECTION INTRAMUSCULAR; INTRAVENOUS; SUBCUTANEOUS at 16:59

## 2018-10-11 RX ADMIN — HEPARIN SODIUM 5000 UNIT(S): 5000 INJECTION INTRAVENOUS; SUBCUTANEOUS at 05:33

## 2018-10-11 RX ADMIN — CALCITRIOL 0.5 MICROGRAM(S): 0.5 CAPSULE ORAL at 13:15

## 2018-10-11 RX ADMIN — HYDROMORPHONE HYDROCHLORIDE 6 MILLIGRAM(S): 2 INJECTION INTRAMUSCULAR; INTRAVENOUS; SUBCUTANEOUS at 17:50

## 2018-10-11 RX ADMIN — HEPARIN SODIUM 5000 UNIT(S): 5000 INJECTION INTRAVENOUS; SUBCUTANEOUS at 22:28

## 2018-10-11 RX ADMIN — HYDROMORPHONE HYDROCHLORIDE 6 MILLIGRAM(S): 2 INJECTION INTRAMUSCULAR; INTRAVENOUS; SUBCUTANEOUS at 00:03

## 2018-10-11 RX ADMIN — HYDROMORPHONE HYDROCHLORIDE 6 MILLIGRAM(S): 2 INJECTION INTRAMUSCULAR; INTRAVENOUS; SUBCUTANEOUS at 10:55

## 2018-10-11 RX ADMIN — HYDROMORPHONE HYDROCHLORIDE 1 MILLIGRAM(S): 2 INJECTION INTRAMUSCULAR; INTRAVENOUS; SUBCUTANEOUS at 19:36

## 2018-10-11 RX ADMIN — Medication 1 MILLIGRAM(S): at 13:15

## 2018-10-11 NOTE — PROGRESS NOTE ADULT - SUBJECTIVE AND OBJECTIVE BOX
[facility]-TEAM SURGERY PROGRESS NOTE    S: Patient seen at bedside.  -  permacath placed yesterday, no issues  -  HD yesterday, next session Friday   -  persistent complaint of bilat back pain/rib pain    OBJECTIVE:    Vital Signs Last 24 Hrs  T(C): 37.2 (11 Oct 2018 05:30), Max: 37.2 (11 Oct 2018 05:30)  T(F): 98.9 (11 Oct 2018 05:30), Max: 98.9 (11 Oct 2018 05:30)  HR: 101 (11 Oct 2018 05:47) (79 - 103)  BP: 94/61 (11 Oct 2018 05:47) (89/60 - 116/75)  BP(mean): --  RR: 17 (11 Oct 2018 05:30) (17 - 18)  SpO2: 98% (11 Oct 2018 05:30) (96% - 100%)    I&O's Detail    10 Oct 2018 07:01  -  11 Oct 2018 07:00  --------------------------------------------------------  IN:    Other: 500 mL  Total IN: 500 mL    OUT:    Other: 1900 mL  Total OUT: 1900 mL    Total NET: -1400 mL      LABS:                        9.3    9.33  )-----------( 285      ( 10 Oct 2018 13:35 )             28.5     10-10    139  |  97<L>  |  19  ----------------------------<  77  3.3<L>   |  27  |  5.05<H>    Ca    7.8<L>      10 Oct 2018 13:35  Phos  5.5     10-10  Mg     1.7     10-10      PT/INR - ( 10 Oct 2018 13:35 )   PT: 12.1 SEC;   INR: 1.09          PTT - ( 10 Oct 2018 13:35 )  PTT:27.6 SEC      EXAM:  Gen:          NAD  CV:            RRR  Pulm:       non-labored respirations  Abd:       soft/incisional tenderness            Dressing in place: c/d/i            PD catheter without drainage or erythema.

## 2018-10-11 NOTE — PROGRESS NOTE ADULT - SUBJECTIVE AND OBJECTIVE BOX
ANESTHESIA POSTOP CHECK    37y Female POSTOP DAY 1 S/P     Vital Signs Last 24 Hrs  T(C): 37.3 (11 Oct 2018 16:45), Max: 37.3 (11 Oct 2018 16:45)  T(F): 99.2 (11 Oct 2018 16:45), Max: 99.2 (11 Oct 2018 16:45)  HR: 100 (11 Oct 2018 16:45) (79 - 113)  BP: 110/73 (11 Oct 2018 16:45) (89/60 - 116/75)  BP(mean): --  RR: 16 (11 Oct 2018 16:45) (16 - 18)  SpO2: 97% (11 Oct 2018 16:45) (95% - 100%)  I&O's Summary    10 Oct 2018 07:01  -  11 Oct 2018 07:00  --------------------------------------------------------  IN: 500 mL / OUT: 1900 mL / NET: -1400 mL        [X ] NO APPARENT ANESTHESIA COMPLICATIONS      Comments: Seen 11:01 am

## 2018-10-11 NOTE — PROGRESS NOTE ADULT - PROBLEM SELECTOR PLAN 1
Pt. underwent urgent abdominal hernia repair on 10/5/18. Plan is to hold PD for at least one week. Pt. underwent non-tunneled HD catheter placement on 10/8/18 which was exchanged for a tunneled catheter on 10/10/18. Pt clinically stable. Labs reviewed. No plan for HD today. Will arrange maintenance HD tomorrow.   Monitor labs and BP Pt. underwent urgent abdominal hernia repair on 10/5/18. Plan is to hold PD for at least one week. Pt. underwent non-tunneled HD catheter placement on 10/8/18. Pt. initiated on HD on 10/8/18. Pt. underwent tunneled HD catheter placement on 10/10/18. Pt. received HD yesterday. Pt. clinically stable. Labs reviewed. Will arrange for maintenance HD tomorrow. Monitor labs and BP

## 2018-10-11 NOTE — PROGRESS NOTE ADULT - ASSESSMENT
A/P: 38 yo F s/p Incisional hernia repair (10/5).  Patient unable to use PD catheter x1wk, requiring shiley placement (10/8), then permacath placed (10/10)    -CTA, bilat LE venous duplex today to r/o PE as cause of persistent tachy and chest/back pain  -RENL ok with IV contrast  -per HEME/ONC, no concern for sickle crisis as cause of pain    -HD Friday via permacath, appreciate NEPHRO recs    -c/w reg diet  -Pain control  -DVT ppx    A-team surgery  [id number]

## 2018-10-11 NOTE — PROGRESS NOTE ADULT - SUBJECTIVE AND OBJECTIVE BOX
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 Pt. being seen for ESRD/dialysis management. Pt.'s outpatient nephrologist is Dr. Larsen. PD held as pt.'s surgeon currently recommends to avoid using PD catheter for at least one week post-operatively. Pt had a non-tunneled HD catheter placed on 10/8/18. Catheter changed to tunneled HD catheter on 10/10/18. Pt. seen and examined today. Pt SOB with improved today. Denies current CP, SOB or LE edema.    PAST HISTORY  --------------------------------------------------------------------------------  No significant changes to PMH, PSH, FHx, SHx, unless otherwise noted    ALLERGIES & MEDICATIONS  --------------------------------------------------------------------------------  Allergies    morphine (Other)    Intolerances      Standing Inpatient Medications  calcitriol   Capsule 0.5 MICROGram(s) Oral daily  calcium acetate 667 milliGRAM(s) Oral three times a day with meals  docusate sodium 100 milliGRAM(s) Oral two times a day  folic acid 1 milliGRAM(s) Oral daily  heparin  Injectable 5000 Unit(s) SubCutaneous every 8 hours  polyethylene glycol 3350 17 Gram(s) Oral daily  senna 1 Tablet(s) Oral daily    PRN Inpatient Medications  acetaminophen   Tablet .. 650 milliGRAM(s) Oral every 6 hours PRN  HYDROmorphone   Tablet 6 milliGRAM(s) Oral every 4 hours PRN  HYDROmorphone   Tablet 4 milliGRAM(s) Oral every 4 hours PRN  HYDROmorphone  Injectable 1 milliGRAM(s) IV Push every 6 hours PRN      REVIEW OF SYSTEMS  --------------------------------------------------------------------------------  Gen: + weakness  Skin: No rashes  Head/Eyes/Ears/Mouth: No headache  Respiratory: No dyspnea  CV: No chest pain  GI: See HPI  MSK: No edema  Neuro: No dizziness    All other systems were reviewed and are negative, except as noted.    VITALS/PHYSICAL EXAM  --------------------------------------------------------------------------------  T(C): 37.2 (10-11-18 @ 13:15), Max: 37.2 (10-11-18 @ 05:30)  HR: 113 (10-11-18 @ 13:15) (79 - 113)  BP: 104/71 (10-11-18 @ 13:15) (89/60 - 116/75)  RR: 16 (10-11-18 @ 13:15) (16 - 18)  SpO2: 100% (10-11-18 @ 13:15) (95% - 100%)  Wt(kg): --    10-10-18 @ 07:01  -  10-11-18 @ 07:00  --------------------------------------------------------  IN: 500 mL / OUT: 1900 mL / NET: -1400 mL    Physical Exam:  	Gen: resting              Neck: RIJ non-tunneled HD catheter site: no bleeding  	Pulm: bibasilar crackles heard   	CV: RRR, S1S2+  	Abd: soft, nontender/nondistended, + healing umbilical surgical site, PD catheter site: dressing seen  	UE: Warm, no asterixis  	LE: Warm, no edema  	Psych: Normal affect and mood  	Skin: Warm  	Vascular access: +Tunneled HD catheter, dressing clean     LABS/STUDIES  --------------------------------------------------------------------------------              9.3    9.33  >-----------<  285      [10-10-18 @ 13:35]              28.5     139  |  97  |  19  ----------------------------<  77      [10-10-18 @ 13:35]  3.3   |  27  |  5.05        Ca     7.8     [10-10-18 @ 13:35]      Mg     1.7     [10-10-18 @ 07:15]      Phos  5.5     [10-10-18 @ 07:15]      PT/INR: PT 12.1 , INR 1.09       [10-10-18 @ 13:35]  PTT: 27.6       [10-10-18 @ 13:35]          [10-10-18 @ 13:35]    Creatinine Trend:  SCr 5.05 [10-10 @ 13:35]  SCr 9.63 [10-10 @ 07:15]  SCr 13.41 [10-08 @ 07:30]  SCr 12.96 [10-07 @ 07:30]  SCr 11.73 [10-06 @ 07:04]    .2 (Ca --)      [03-27-18 @ 05:30]   --  HbA1c 3.5      [05-15-18 @ 10:40]  TSH 1.62      [05-21-18 @ 05:51]    HBsAg NEGATIVE      [10-08-18 @ 07:30] [facility] DIVISION OF KIDNEY DISEASES AND HYPERTENSION -- FOLLOW UP NOTE  --------------------------------------------------------------------------------  HPI: 37-year-old female with PMH of ESRD on PD, sickle cell disease w/B/L AVN of hips s/p L total hip replacement, admitted with abdominal pain from protruding incarcerated hernia which required urgent surgical repair on 10/5/18. Pt. being seen for ESRD/dialysis management. Pt.'s outpatient nephrologist is Dr. Larsen. PD held as pt.'s surgeon currently recommends to avoid using PD catheter for at least one week post-operatively. Pt had a non-tunneled HD catheter placed on 10/8/18. Pt. initiated on HD on 10/8/18. Pt. underwent tunneled HD catheter placement on 10/10/18. Pt. seen and examined today. Denies current CP, SOB or LE edema.    PAST HISTORY  --------------------------------------------------------------------------------  No significant changes to PMH, PSH, FHx, SHx, unless otherwise noted    ALLERGIES & MEDICATIONS  --------------------------------------------------------------------------------  Allergies    morphine (Other)    Intolerances    Standing Inpatient Medications  calcitriol   Capsule 0.5 MICROGram(s) Oral daily  calcium acetate 667 milliGRAM(s) Oral three times a day with meals  docusate sodium 100 milliGRAM(s) Oral two times a day  folic acid 1 milliGRAM(s) Oral daily  heparin  Injectable 5000 Unit(s) SubCutaneous every 8 hours  polyethylene glycol 3350 17 Gram(s) Oral daily  senna 1 Tablet(s) Oral daily    PRN Inpatient Medications  acetaminophen   Tablet .. 650 milliGRAM(s) Oral every 6 hours PRN  HYDROmorphone   Tablet 6 milliGRAM(s) Oral every 4 hours PRN  HYDROmorphone   Tablet 4 milliGRAM(s) Oral every 4 hours PRN  HYDROmorphone  Injectable 1 milliGRAM(s) IV Push every 6 hours PRN    REVIEW OF SYSTEMS  --------------------------------------------------------------------------------  Gen: + weakness  Skin: No rashes  Head/Eyes/Ears/Mouth: No headache  Respiratory: No dyspnea  CV: No chest pain  GI: See HPI  MSK: No edema  Neuro: No dizziness    All other systems were reviewed and are negative, except as noted.    VITALS/PHYSICAL EXAM  --------------------------------------------------------------------------------  T(C): 37.2 (10-11-18 @ 13:15), Max: 37.2 (10-11-18 @ 05:30)  HR: 113 (10-11-18 @ 13:15) (79 - 113)  BP: 104/71 (10-11-18 @ 13:15) (89/60 - 116/75)  RR: 16 (10-11-18 @ 13:15) (16 - 18)  SpO2: 100% (10-11-18 @ 13:15) (95% - 100%)  Wt(kg): --    10-10-18 @ 07:01  -  10-11-18 @ 07:00  --------------------------------------------------------  IN: 500 mL / OUT: 1900 mL / NET: -1400 mL    Physical Exam:  	Gen: resting              Neck: RIJ non-tunneled HD catheter site: no bleeding  	Pulm: bibasilar crackles heard   	CV: RRR, S1S2+  	Abd: soft, nontender/nondistended, + healing umbilical surgical site, PD catheter site: dressing seen  	UE: Warm, no asterixis  	LE: Warm, no edema  	Psych: Normal affect and mood  	Skin: Warm  	Vascular access: right IJ tunneled HD catheter site: no bleeding     LABS/STUDIES  --------------------------------------------------------------------------------              9.3    9.33  >-----------<  285      [10-10-18 @ 13:35]              28.5     139  |  97  |  19  ----------------------------<  77      [10-10-18 @ 13:35]  3.3   |  27  |  5.05        Ca     7.8     [10-10-18 @ 13:35]      Mg     1.7     [10-10-18 @ 07:15]      Phos  5.5     [10-10-18 @ 07:15]    Creatinine Trend:  SCr 5.05 [10-10 @ 13:35]  SCr 9.63 [10-10 @ 07:15]  SCr 13.41 [10-08 @ 07:30]  SCr 12.96 [10-07 @ 07:30]  SCr 11.73 [10-06 @ 07:04]    .2 (Ca --)      [03-27-18 @ 05:30]   --  HbA1c 3.5      [05-15-18 @ 10:40]  TSH 1.62      [05-21-18 @ 05:51]    HBsAg NEGATIVE      [10-08-18 @ 07:30]

## 2018-10-12 ENCOUNTER — TRANSCRIPTION ENCOUNTER (OUTPATIENT)
Age: 38
End: 2018-10-12

## 2018-10-12 DIAGNOSIS — N18.6 END STAGE RENAL DISEASE: ICD-10-CM

## 2018-10-12 LAB
BUN SERPL-MCNC: 32 MG/DL — HIGH (ref 7–23)
CALCIUM SERPL-MCNC: 7.4 MG/DL — LOW (ref 8.4–10.5)
CHLORIDE SERPL-SCNC: 97 MMOL/L — LOW (ref 98–107)
CO2 SERPL-SCNC: 24 MMOL/L — SIGNIFICANT CHANGE UP (ref 22–31)
CREAT SERPL-MCNC: 8.14 MG/DL — HIGH (ref 0.5–1.3)
GLUCOSE SERPL-MCNC: 88 MG/DL — SIGNIFICANT CHANGE UP (ref 70–99)
HAV IGM SER-ACNC: NONREACTIVE — SIGNIFICANT CHANGE UP
HBV CORE IGM SER-ACNC: NONREACTIVE — SIGNIFICANT CHANGE UP
HBV SURFACE AB SER-ACNC: REACTIVE — SIGNIFICANT CHANGE UP
HBV SURFACE AG SER-ACNC: NONREACTIVE — SIGNIFICANT CHANGE UP
HCT VFR BLD CALC: 26 % — LOW (ref 34.5–45)
HCV AB S/CO SERPL IA: 0.14 S/CO — SIGNIFICANT CHANGE UP
HCV AB SERPL-IMP: SIGNIFICANT CHANGE UP
HGB BLD-MCNC: 8.5 G/DL — LOW (ref 11.5–15.5)
MAGNESIUM SERPL-MCNC: 1.8 MG/DL — SIGNIFICANT CHANGE UP (ref 1.6–2.6)
MCHC RBC-ENTMCNC: 30 PG — SIGNIFICANT CHANGE UP (ref 27–34)
MCHC RBC-ENTMCNC: 32.7 % — SIGNIFICANT CHANGE UP (ref 32–36)
MCV RBC AUTO: 91.9 FL — SIGNIFICANT CHANGE UP (ref 80–100)
NRBC # FLD: 0.15 — SIGNIFICANT CHANGE UP
NRBC FLD-RTO: 1.5 — SIGNIFICANT CHANGE UP
PHOSPHATE SERPL-MCNC: 4.5 MG/DL — SIGNIFICANT CHANGE UP (ref 2.5–4.5)
PLATELET # BLD AUTO: 291 K/UL — SIGNIFICANT CHANGE UP (ref 150–400)
PMV BLD: 11.9 FL — SIGNIFICANT CHANGE UP (ref 7–13)
POTASSIUM SERPL-MCNC: 3.7 MMOL/L — SIGNIFICANT CHANGE UP (ref 3.5–5.3)
POTASSIUM SERPL-SCNC: 3.7 MMOL/L — SIGNIFICANT CHANGE UP (ref 3.5–5.3)
RBC # BLD: 2.83 M/UL — LOW (ref 3.8–5.2)
RBC # FLD: 19.9 % — HIGH (ref 10.3–14.5)
SODIUM SERPL-SCNC: 138 MMOL/L — SIGNIFICANT CHANGE UP (ref 135–145)
WBC # BLD: 9.76 K/UL — SIGNIFICANT CHANGE UP (ref 3.8–10.5)
WBC # FLD AUTO: 9.76 K/UL — SIGNIFICANT CHANGE UP (ref 3.8–10.5)

## 2018-10-12 PROCEDURE — 71045 X-RAY EXAM CHEST 1 VIEW: CPT | Mod: 26

## 2018-10-12 PROCEDURE — 90935 HEMODIALYSIS ONE EVALUATION: CPT

## 2018-10-12 RX ORDER — ACETAMINOPHEN 500 MG
2 TABLET ORAL
Qty: 0 | Refills: 0 | COMMUNITY
Start: 2018-10-12

## 2018-10-12 RX ORDER — SENNA PLUS 8.6 MG/1
1 TABLET ORAL
Qty: 0 | Refills: 0 | COMMUNITY
Start: 2018-10-12

## 2018-10-12 RX ORDER — MAGNESIUM SULFATE 500 MG/ML
2 VIAL (ML) INJECTION ONCE
Qty: 0 | Refills: 0 | Status: COMPLETED | OUTPATIENT
Start: 2018-10-12 | End: 2018-10-12

## 2018-10-12 RX ORDER — ACETAMINOPHEN 500 MG
1000 TABLET ORAL ONCE
Qty: 0 | Refills: 0 | Status: COMPLETED | OUTPATIENT
Start: 2018-10-12 | End: 2018-10-12

## 2018-10-12 RX ORDER — ERYTHROPOIETIN 10000 [IU]/ML
20000 INJECTION, SOLUTION INTRAVENOUS; SUBCUTANEOUS ONCE
Qty: 0 | Refills: 0 | Status: COMPLETED | OUTPATIENT
Start: 2018-10-12 | End: 2018-10-12

## 2018-10-12 RX ORDER — GENTAMICIN SULFATE 0.1 %
1 OINTMENT (GRAM) TOPICAL ONCE
Qty: 0 | Refills: 0 | Status: COMPLETED | OUTPATIENT
Start: 2018-10-12 | End: 2018-10-12

## 2018-10-12 RX ADMIN — Medication 667 MILLIGRAM(S): at 18:34

## 2018-10-12 RX ADMIN — Medication 667 MILLIGRAM(S): at 10:45

## 2018-10-12 RX ADMIN — Medication 1 APPLICATION(S): at 10:20

## 2018-10-12 RX ADMIN — HYDROMORPHONE HYDROCHLORIDE 6 MILLIGRAM(S): 2 INJECTION INTRAMUSCULAR; INTRAVENOUS; SUBCUTANEOUS at 05:46

## 2018-10-12 RX ADMIN — CALCITRIOL 0.5 MICROGRAM(S): 0.5 CAPSULE ORAL at 12:53

## 2018-10-12 RX ADMIN — HEPARIN SODIUM 5000 UNIT(S): 5000 INJECTION INTRAVENOUS; SUBCUTANEOUS at 23:09

## 2018-10-12 RX ADMIN — HYDROMORPHONE HYDROCHLORIDE 6 MILLIGRAM(S): 2 INJECTION INTRAMUSCULAR; INTRAVENOUS; SUBCUTANEOUS at 19:30

## 2018-10-12 RX ADMIN — HYDROMORPHONE HYDROCHLORIDE 1 MILLIGRAM(S): 2 INJECTION INTRAMUSCULAR; INTRAVENOUS; SUBCUTANEOUS at 07:31

## 2018-10-12 RX ADMIN — HEPARIN SODIUM 5000 UNIT(S): 5000 INJECTION INTRAVENOUS; SUBCUTANEOUS at 14:02

## 2018-10-12 RX ADMIN — Medication 100 MILLIGRAM(S): at 05:46

## 2018-10-12 RX ADMIN — HYDROMORPHONE HYDROCHLORIDE 1 MILLIGRAM(S): 2 INJECTION INTRAMUSCULAR; INTRAVENOUS; SUBCUTANEOUS at 07:04

## 2018-10-12 RX ADMIN — Medication 1000 MILLIGRAM(S): at 00:42

## 2018-10-12 RX ADMIN — HEPARIN SODIUM 5000 UNIT(S): 5000 INJECTION INTRAVENOUS; SUBCUTANEOUS at 05:46

## 2018-10-12 RX ADMIN — HYDROMORPHONE HYDROCHLORIDE 6 MILLIGRAM(S): 2 INJECTION INTRAMUSCULAR; INTRAVENOUS; SUBCUTANEOUS at 11:29

## 2018-10-12 RX ADMIN — Medication 400 MILLIGRAM(S): at 00:21

## 2018-10-12 RX ADMIN — HYDROMORPHONE HYDROCHLORIDE 6 MILLIGRAM(S): 2 INJECTION INTRAMUSCULAR; INTRAVENOUS; SUBCUTANEOUS at 10:47

## 2018-10-12 RX ADMIN — HYDROMORPHONE HYDROCHLORIDE 6 MILLIGRAM(S): 2 INJECTION INTRAMUSCULAR; INTRAVENOUS; SUBCUTANEOUS at 15:45

## 2018-10-12 RX ADMIN — HYDROMORPHONE HYDROCHLORIDE 6 MILLIGRAM(S): 2 INJECTION INTRAMUSCULAR; INTRAVENOUS; SUBCUTANEOUS at 15:15

## 2018-10-12 RX ADMIN — Medication 1 MILLIGRAM(S): at 12:53

## 2018-10-12 RX ADMIN — Medication 667 MILLIGRAM(S): at 12:53

## 2018-10-12 RX ADMIN — ERYTHROPOIETIN 20000 UNIT(S): 10000 INJECTION, SOLUTION INTRAVENOUS; SUBCUTANEOUS at 07:54

## 2018-10-12 RX ADMIN — Medication 100 MILLIGRAM(S): at 18:34

## 2018-10-12 RX ADMIN — HYDROMORPHONE HYDROCHLORIDE 6 MILLIGRAM(S): 2 INJECTION INTRAMUSCULAR; INTRAVENOUS; SUBCUTANEOUS at 20:00

## 2018-10-12 RX ADMIN — Medication 50 GRAM(S): at 11:06

## 2018-10-12 NOTE — DISCHARGE NOTE ADULT - CARE PLAN
Principal Discharge DX:	Umbilical hernia, incarcerated  Goal:	Resolution of symptoms  Assessment and plan of treatment:	WOUND CARE:  Please keep incisions clean and dry. Please do not Scrub or rub incisions. Do not use lotion or powder on incisions.   BATHING: Please do not submerge wound underwater. You may shower and/or sponge bathe.  ACTIVITY: No heavy lifting or straining. Otherwise, you may return to your usual level of physical activity. If you are taking narcotic pain medication (such as Percocet) DO NOT drive a car, operate machinery or make important decisions.  DIET: Return to your usual diet.  NOTIFY YOUR SURGEON IF: You have any bleeding that does not stop, any pus draining from your wound(s), any fever (over 100.4 F) or chills, persistent nausea/vomiting, persistent diarrhea, or if your pain is not controlled on your discharge pain medications.  FOLLOW-UP: Please follow up with your primary care physician in one week regarding your hospitalization. Please follow-up with your surgeon, Dr. Crow within 7 days following discharge- please call to schedule an appointment.  Secondary Diagnosis:	ESRD (end stage renal disease) on dialysis  Assessment and plan of treatment:	Please follow-up with nephrologist, Dr. Hampton for continued hemodialysis via Permacath. Call [phone] within 1 week following discharge.   - Hold peritoneal dialysis until instructed otherwise.  Secondary Diagnosis:	Sickle cell disease  Assessment and plan of treatment:	Please follow-up with your hematologist for continued management of your sickle cell disease.

## 2018-10-12 NOTE — DISCHARGE NOTE ADULT - CARE PROVIDERS DIRECT ADDRESSES
,DirectAddress_Unknown,DirectAddress_Unknown,[email or web address]

## 2018-10-12 NOTE — DISCHARGE NOTE ADULT - CONDITIONS AT DISCHARGE
Abdominal incision clean dry and intact. Positive bowel sounds, ion po diet well. VS stable Afebrile. HD this AM via Right permacath. Abdominal incision clean dry and intact, Dermabond intact. Positive bowel sounds, ion po diet well. VS stable Afebrile. HD this AM via Right IJ Permacath. LLQ Peritoneal Dialysis catheter intact.

## 2018-10-12 NOTE — PROGRESS NOTE ADULT - SUBJECTIVE AND OBJECTIVE BOX
[facility] DIVISION OF KIDNEY DISEASES AND HYPERTENSION --   --------------------------------------------------------------------------------  Chief Complaint: ESRD/Ongoing hemodialysis requirement    HPI: 37-year-old female with PMH of ESRD on PD, sickle cell disease w/B/L AVN of hips s/p L total hip replacement, admitted with abdominal pain from protruding incarcerated hernia which required urgent surgical repair on 10/5/18. Pt. being seen for ESRD/dialysis management. Pt.'s outpatient nephrologist is Dr. Larsen. PD held as pt.'s surgeon currently recommends to avoid using PD catheter for at least one week post-operatively. Pt had a non-tunneled HD catheter placed on 10/8/18. Pt. initiated on HD on 10/8/18. Pt. underwent tunneled HD catheter placement on 10/10/18. Pt. seen and examined during HD today. Denies current CP, SOB or LE edema.    PAST HISTORY  --------------------------------------------------------------------------------  No significant changes to PMH, PSH, FHx, SHx, unless otherwise noted    ALLERGIES & MEDICATIONS  --------------------------------------------------------------------------------  Allergies    morphine (Other)    Intolerances      Standing Inpatient Medications  calcitriol   Capsule 0.5 MICROGram(s) Oral daily  calcium acetate 667 milliGRAM(s) Oral three times a day with meals  docusate sodium 100 milliGRAM(s) Oral two times a day  folic acid 1 milliGRAM(s) Oral daily  gentamicin 0.1% Cream 1 Application(s) Topical once  heparin  Injectable 5000 Unit(s) SubCutaneous every 8 hours  magnesium sulfate  IVPB 2 Gram(s) IV Intermittent once  polyethylene glycol 3350 17 Gram(s) Oral daily  senna 1 Tablet(s) Oral daily    PRN Inpatient Medications  acetaminophen   Tablet .. 650 milliGRAM(s) Oral every 6 hours PRN  HYDROmorphone   Tablet 6 milliGRAM(s) Oral every 4 hours PRN  HYDROmorphone   Tablet 4 milliGRAM(s) Oral every 4 hours PRN      REVIEW OF SYSTEMS  --------------------------------------------------------------------------------  Gen: + weakness  Skin: No rashes  Head/Eyes/Ears/Mouth: No headache  Respiratory: No dyspnea  CV: Bilateral lower rib pain on deep inspiration+   GI: See HPI  MSK: No edema  Neuro: No dizziness    All other systems were reviewed and are negative, except as noted.    VITALS/PHYSICAL EXAM  --------------------------------------------------------------------------------  T(C): 37 (10-12-18 @ 06:50), Max: 37.5 (10-11-18 @ 22:25)  HR: 97 (10-12-18 @ 06:50) (97 - 113)  BP: 109/76 (10-12-18 @ 06:50) (90/50 - 110/74)  RR: 16 (10-12-18 @ 06:50) (16 - 17)  SpO2: 97% (10-12-18 @ 05:44) (95% - 100%)  Wt(kg): --    10-11-18 @ 07:01  -  10-12-18 @ 07:00  --------------------------------------------------------  IN: 0 mL / OUT: 0 mL / NET: 0 mL    Physical Exam:  	Gen: resting              Pulm: Fair air entry B/L   	CV: RRR, S1S2+  	Abd: soft, nontender/nondistended, + healing umbilical surgical site, PD catheter site: dressing seen  	UE: Warm, no asterixis  	LE: Warm, no edema  	Psych: Normal affect and mood  	Skin: Warm  	Vascular access: right IJ tunneled HD catheter site: no bleeding       LABS/STUDIES  --------------------------------------------------------------------------------              8.5    9.76  >-----------<  291      [10-12-18 @ 06:40]              26.0     138  |  97  |  32  ----------------------------<  88      [10-12-18 @ 06:46]  3.7   |  24  |  8.14        Ca     7.4     [10-12-18 @ 06:46]      Mg     1.8     [10-12-18 @ 06:46]      Phos  4.5     [10-12-18 @ 06:46]      PT/INR: PT 12.1 , INR 1.09       [10-10-18 @ 13:35]  PTT: 27.6       [10-10-18 @ 13:35]          [10-10-18 @ 13:35]    .2 (Ca --)      [03-27-18 @ 05:30]   --  HbA1c 3.5      [05-15-18 @ 10:40]  TSH 1.62      [05-21-18 @ 05:51]    HBsAg NEGATIVE      [10-08-18 @ 07:30]

## 2018-10-12 NOTE — DISCHARGE NOTE ADULT - PATIENT PORTAL LINK FT
You can access the [facility] Patient Portal, offered by [facility], by registering with the following website: [email or web address]

## 2018-10-12 NOTE — DISCHARGE NOTE ADULT - NS AS DC FOLLOWUP STROKE INST
Medline and carenotes for surgical procedure as well as DC Medications and side effects literature for patient reference. Medline and carenotes for surgical procedure, diabetes as well as DC Medications and side effects literature for patient reference.

## 2018-10-12 NOTE — PROGRESS NOTE ADULT - ASSESSMENT
Pt. with ESRD on HD as PD catheter cannot be used currently. Pt. seen during HD rounds. /78 at time of HD rounds.

## 2018-10-12 NOTE — PROGRESS NOTE ADULT - SUBJECTIVE AND OBJECTIVE BOX
[facility] PROGRESS NOTE    S: Patient seen at bedside.  -  complaint of bilat back pain/rib pain improved  -  tolerating regular diet  -  pain controlled      OBJECTIVE:    Vital Signs Last 24 Hrs  T(C): 37 (12 Oct 2018 06:50), Max: 37.5 (11 Oct 2018 22:25)  T(F): 98.6 (12 Oct 2018 06:50), Max: 99.5 (11 Oct 2018 22:25)  HR: 97 (12 Oct 2018 06:50) (97 - 113)  BP: 109/76 (12 Oct 2018 06:50) (90/50 - 110/74)  BP(mean): --  RR: 16 (12 Oct 2018 06:50) (16 - 17)  SpO2: 97% (12 Oct 2018 05:44) (95% - 100%)    I&O's Detail    11 Oct 2018 07:01  -  12 Oct 2018 07:00  --------------------------------------------------------  IN:  Total IN: 0 mL    OUT:  Total OUT: 0 mL    Total NET: 0 mL      LABS:                        9.3    9.33  )-----------( 285      ( 10 Oct 2018 13:35 )             28.5     10-10    139  |  97<L>  |  19  ----------------------------<  77  3.3<L>   |  27  |  5.05<H>    Ca    7.8<L>      10 Oct 2018 13:35      PT/INR - ( 10 Oct 2018 13:35 )   PT: 12.1 SEC;   INR: 1.09          PTT - ( 10 Oct 2018 13:35 )  PTT:27.6 SEC    EXAM:  Gen:          NAD  CV:            RRR  Pulm:       non-labored respirations  Abd:       soft/incisional tenderness            Dressing in place: c/d/i            PD catheter without drainage or erythema.

## 2018-10-12 NOTE — DISCHARGE NOTE ADULT - MEDICATION SUMMARY - MEDICATIONS TO TAKE
I will START or STAY ON the medications listed below when I get home from the hospital:    Dilaudid 4 mg oral tablet  -- 1.5 tab(s) by mouth every 3 hours, As Needed MDD:MDD 9mg  -- Caution federal law prohibits the transfer of this drug to any person other  than the person for whom it was prescribed.  May cause drowsiness.  Alcohol may intensify this effect.  Use care when operating dangerous machinery.  This prescription cannot be refilled.  Using more of this medication than prescribed may cause serious breathing problems.    -- Indication: For pain    acetaminophen 325 mg oral tablet  -- 2 tab(s) by mouth every 6 hours, As needed, Mild Pain (1 - 3)  -- Indication: For pain    Epogen 10,000 units/mL preservative-free injectable solution  -- 2 x weekly Mon Thur  -- Indication: For ESRD (end stage renal disease) on dialysis    polyethylene glycol 3350 oral powder for reconstitution  -- 17 gram(s) by mouth once a day, As needed, Constipation  -- Indication: For bowel regimen    docusate sodium 100 mg oral capsule  -- 1 cap(s) by mouth 2 times a day  -- Indication: For bowel regimen    senna oral tablet  -- 1 tab(s) by mouth once a day  -- Indication: For bowel regimen    calcium acetate 667 mg oral tablet  -- 3 tab(s) by mouth 3 times a day  -- Indication: For phosphate binder    Cristin-Marguerite oral tablet  -- 1 tab(s) by mouth once a day last dose 5/28  -- Indication: For vitamin    folic acid 1 mg oral tablet  -- 1 tab(s) by mouth once a day  -- Indication: For vitamin    calcitriol 0.5 mcg oral capsule  -- 2 cap(s) by mouth once a day  -- Indication: For vitamin

## 2018-10-12 NOTE — DISCHARGE NOTE ADULT - PLAN OF CARE
Resolution of symptoms WOUND CARE:  Please keep incisions clean and dry. Please do not Scrub or rub incisions. Do not use lotion or powder on incisions.   BATHING: Please do not submerge wound underwater. You may shower and/or sponge bathe.  ACTIVITY: No heavy lifting or straining. Otherwise, you may return to your usual level of physical activity. If you are taking narcotic pain medication (such as Percocet) DO NOT drive a car, operate machinery or make important decisions.  DIET: Return to your usual diet.  NOTIFY YOUR SURGEON IF: You have any bleeding that does not stop, any pus draining from your wound(s), any fever (over 100.4 F) or chills, persistent nausea/vomiting, persistent diarrhea, or if your pain is not controlled on your discharge pain medications.  FOLLOW-UP: Please follow up with your primary care physician in one week regarding your hospitalization. Please follow-up with your surgeon, Dr. Crow within 7 days following discharge- please call to schedule an appointment. Please follow-up with nephrologist, Dr. Hampton for continued hemodialysis via Permacath. Call [phone] within 1 week following discharge.   - Hold peritoneal dialysis until instructed otherwise. Please follow-up with your hematologist for continued management of your sickle cell disease.

## 2018-10-12 NOTE — PROGRESS NOTE ADULT - ASSESSMENT
A/P: 38 yo F s/p Incisional hernia repair (10/5).  Patient unable to use PD catheter x1wk, requiring shiley placement (10/8), then permacath placed (10/10)    -CTA, venous duplex negative, no further wkup indicated  -per HEME/ONC, no concern for sickle crisis as cause of pain    -HD today via permacath, appreciate NEPHRO recs    -c/w reg diet  -Pain control  -DVT ppx    Dispo: home after HD today    A-team surgery  [id number]

## 2018-10-12 NOTE — PROGRESS NOTE ADULT - PROBLEM SELECTOR PLAN 1
Pt. underwent urgent abdominal hernia repair on 10/5/18. Plan is to hold PD for at least one week. Pt. underwent non-tunneled HD catheter placement on 10/8/18. Pt. initiated on HD on 10/8/18. Pt. underwent tunneled HD catheter placement on 10/10/18. Pt. tolerating HD. BP stable during HD rounds. HD catheter functioning well. Monitor labs and BP

## 2018-10-12 NOTE — DISCHARGE NOTE ADULT - INSTRUCTIONS
Maintain abdominal incisions clean dry and intact, steri strips will fall off on their own in 1-2 weeks. Call MD with any signs of infection ie fever/shaking chills, redness or drainage, or with signs of bleeding or persistent nausea. Continue to drink plenty of fluids and avoid straining and constipation which may be a side effect from taking narcotic pain meds. No heavy lifting greater than 10 pounds (ie a gallon of milk.) Follow-up with MD as well as PMD in office as instructed for continuity of care post-operatively, as per safe Dc plan. Maintain abdominal incision clean dry and intact. with Dermabond in place. Call MD with any signs of infection ie fever/shaking chills, redness or drainage, or with signs of bleeding or persistent nausea. Continue to drink plenty of fluids and avoid straining and constipation which may be a side effect from taking narcotic pain meds. No heavy lifting greater than 10 pounds (ie a gallon of milk.) Follow-up with MD as well as PMD in office as instructed for continuity of care post-operatively, as per safe Dc plan. Maintain abdominal incision clean dry and intact. with Dermabond in place. Call MD with any signs of infection ie fever/shaking chills, redness or drainage, or with signs of bleeding or persistent nausea. Continue to drink plenty of fluids and avoid straining and constipation which may be a side effect from taking narcotic pain meds. No heavy lifting greater than 10 pounds (ie a gallon of milk.) Continue Diabetes management, diet and glucose monitoring, know your A1C blood level and follow up with PMD for continuity of care. Remember hand hygiene, skin inspection for prevention of infection. Follow-up with MD as well as PMD in office as instructed for continuity of care post-operatively, as per safe Dc plan.

## 2018-10-12 NOTE — DISCHARGE NOTE ADULT - ADDITIONAL INSTRUCTIONS
Please follow-up with your surgeon, Dr. Crow within 7 days following discharge- please call [phone] to schedule an appointment.

## 2018-10-12 NOTE — DISCHARGE NOTE ADULT - CARE PROVIDER_API CALL
Andi Crow (MD), Surgery  [address]  [address]  [place] [address]  Phone: [phone]  Fax: [phone]    Rubio Llanos), ColonRectal Surgery; Surgery  [address]  [address]  [place] [address]  Phone: [phone]  Fax: [phone]    Donovan Hampton), Nephrology  [address] [place] [id number]  Phone: [phone]  Fax: [phone]

## 2018-10-13 LAB
BUN SERPL-MCNC: 17 MG/DL — SIGNIFICANT CHANGE UP (ref 7–23)
CALCIUM SERPL-MCNC: 7.6 MG/DL — LOW (ref 8.4–10.5)
CHLORIDE SERPL-SCNC: 98 MMOL/L — SIGNIFICANT CHANGE UP (ref 98–107)
CO2 SERPL-SCNC: 26 MMOL/L — SIGNIFICANT CHANGE UP (ref 22–31)
CREAT SERPL-MCNC: 5.15 MG/DL — HIGH (ref 0.5–1.3)
GLUCOSE SERPL-MCNC: 100 MG/DL — HIGH (ref 70–99)
HCT VFR BLD CALC: 26.5 % — LOW (ref 34.5–45)
HGB BLD-MCNC: 8.6 G/DL — LOW (ref 11.5–15.5)
MCHC RBC-ENTMCNC: 29.1 PG — SIGNIFICANT CHANGE UP (ref 27–34)
MCHC RBC-ENTMCNC: 32.5 % — SIGNIFICANT CHANGE UP (ref 32–36)
MCV RBC AUTO: 89.5 FL — SIGNIFICANT CHANGE UP (ref 80–100)
NRBC # FLD: 0.09 — SIGNIFICANT CHANGE UP
PLATELET # BLD AUTO: 288 K/UL — SIGNIFICANT CHANGE UP (ref 150–400)
PMV BLD: 10.6 FL — SIGNIFICANT CHANGE UP (ref 7–13)
POTASSIUM SERPL-MCNC: 3.8 MMOL/L — SIGNIFICANT CHANGE UP (ref 3.5–5.3)
POTASSIUM SERPL-SCNC: 3.8 MMOL/L — SIGNIFICANT CHANGE UP (ref 3.5–5.3)
RBC # BLD: 2.96 M/UL — LOW (ref 3.8–5.2)
RBC # FLD: 19.9 % — HIGH (ref 10.3–14.5)
SODIUM SERPL-SCNC: 138 MMOL/L — SIGNIFICANT CHANGE UP (ref 135–145)
WBC # BLD: 11.77 K/UL — HIGH (ref 3.8–10.5)
WBC # FLD AUTO: 11.77 K/UL — HIGH (ref 3.8–10.5)

## 2018-10-13 RX ORDER — HYDROMORPHONE HYDROCHLORIDE 2 MG/ML
0.5 INJECTION INTRAMUSCULAR; INTRAVENOUS; SUBCUTANEOUS ONCE
Qty: 0 | Refills: 0 | Status: DISCONTINUED | OUTPATIENT
Start: 2018-10-13 | End: 2018-10-13

## 2018-10-13 RX ORDER — ACETAMINOPHEN 500 MG
650 TABLET ORAL ONCE
Qty: 0 | Refills: 0 | Status: COMPLETED | OUTPATIENT
Start: 2018-10-13 | End: 2018-10-13

## 2018-10-13 RX ADMIN — CALCITRIOL 0.5 MICROGRAM(S): 0.5 CAPSULE ORAL at 13:07

## 2018-10-13 RX ADMIN — HYDROMORPHONE HYDROCHLORIDE 6 MILLIGRAM(S): 2 INJECTION INTRAMUSCULAR; INTRAVENOUS; SUBCUTANEOUS at 06:05

## 2018-10-13 RX ADMIN — HEPARIN SODIUM 5000 UNIT(S): 5000 INJECTION INTRAVENOUS; SUBCUTANEOUS at 15:00

## 2018-10-13 RX ADMIN — Medication 650 MILLIGRAM(S): at 00:18

## 2018-10-13 RX ADMIN — HYDROMORPHONE HYDROCHLORIDE 6 MILLIGRAM(S): 2 INJECTION INTRAMUSCULAR; INTRAVENOUS; SUBCUTANEOUS at 11:05

## 2018-10-13 RX ADMIN — HYDROMORPHONE HYDROCHLORIDE 6 MILLIGRAM(S): 2 INJECTION INTRAMUSCULAR; INTRAVENOUS; SUBCUTANEOUS at 22:55

## 2018-10-13 RX ADMIN — HEPARIN SODIUM 5000 UNIT(S): 5000 INJECTION INTRAVENOUS; SUBCUTANEOUS at 05:36

## 2018-10-13 RX ADMIN — Medication 667 MILLIGRAM(S): at 08:32

## 2018-10-13 RX ADMIN — HYDROMORPHONE HYDROCHLORIDE 6 MILLIGRAM(S): 2 INJECTION INTRAMUSCULAR; INTRAVENOUS; SUBCUTANEOUS at 21:59

## 2018-10-13 RX ADMIN — HYDROMORPHONE HYDROCHLORIDE 6 MILLIGRAM(S): 2 INJECTION INTRAMUSCULAR; INTRAVENOUS; SUBCUTANEOUS at 11:35

## 2018-10-13 RX ADMIN — HYDROMORPHONE HYDROCHLORIDE 0.5 MILLIGRAM(S): 2 INJECTION INTRAMUSCULAR; INTRAVENOUS; SUBCUTANEOUS at 13:37

## 2018-10-13 RX ADMIN — HYDROMORPHONE HYDROCHLORIDE 0.5 MILLIGRAM(S): 2 INJECTION INTRAMUSCULAR; INTRAVENOUS; SUBCUTANEOUS at 23:48

## 2018-10-13 RX ADMIN — HYDROMORPHONE HYDROCHLORIDE 6 MILLIGRAM(S): 2 INJECTION INTRAMUSCULAR; INTRAVENOUS; SUBCUTANEOUS at 00:41

## 2018-10-13 RX ADMIN — Medication 667 MILLIGRAM(S): at 17:55

## 2018-10-13 RX ADMIN — HEPARIN SODIUM 5000 UNIT(S): 5000 INJECTION INTRAVENOUS; SUBCUTANEOUS at 21:59

## 2018-10-13 RX ADMIN — Medication 667 MILLIGRAM(S): at 13:07

## 2018-10-13 RX ADMIN — HYDROMORPHONE HYDROCHLORIDE 6 MILLIGRAM(S): 2 INJECTION INTRAMUSCULAR; INTRAVENOUS; SUBCUTANEOUS at 18:30

## 2018-10-13 RX ADMIN — Medication 100 MILLIGRAM(S): at 05:36

## 2018-10-13 RX ADMIN — HYDROMORPHONE HYDROCHLORIDE 6 MILLIGRAM(S): 2 INJECTION INTRAMUSCULAR; INTRAVENOUS; SUBCUTANEOUS at 05:37

## 2018-10-13 RX ADMIN — HYDROMORPHONE HYDROCHLORIDE 6 MILLIGRAM(S): 2 INJECTION INTRAMUSCULAR; INTRAVENOUS; SUBCUTANEOUS at 01:30

## 2018-10-13 RX ADMIN — Medication 1 MILLIGRAM(S): at 13:07

## 2018-10-13 RX ADMIN — HYDROMORPHONE HYDROCHLORIDE 6 MILLIGRAM(S): 2 INJECTION INTRAMUSCULAR; INTRAVENOUS; SUBCUTANEOUS at 17:56

## 2018-10-13 RX ADMIN — HYDROMORPHONE HYDROCHLORIDE 0.5 MILLIGRAM(S): 2 INJECTION INTRAMUSCULAR; INTRAVENOUS; SUBCUTANEOUS at 13:07

## 2018-10-13 RX ADMIN — Medication 650 MILLIGRAM(S): at 00:56

## 2018-10-13 NOTE — PROGRESS NOTE ADULT - SUBJECTIVE AND OBJECTIVE BOX
[facility] PROGRESS NOTE    S: Patient seen at bedside.  -  fever and tachycardia overnight but resolved this AM  -  was asymptomatic all night, rested peacefully  -  tolerating regular diet  -  pain controlled      OBJECTIVE:    Vital Signs Last 24 Hrs  T(C): 36.9 (13 Oct 2018 05:31), Max: 38.4 (12 Oct 2018 16:29)  T(F): 98.5 (13 Oct 2018 05:31), Max: 101.2 (12 Oct 2018 23:04)  HR: 95 (13 Oct 2018 05:31) (95 - 126)  BP: 94/58 (13 Oct 2018 05:31) (94/58 - 117/72)  BP(mean): --  RR: 18 (13 Oct 2018 05:31) (16 - 18)  SpO2: 94% (13 Oct 2018 05:31) (94% - 99%)    I&O's Detail    12 Oct 2018 07:01  -  13 Oct 2018 07:00  --------------------------------------------------------  IN:    Other: 400 mL  Total IN: 400 mL    OUT:    Other: 1900 mL  Total OUT: 1900 mL    Total NET: -1500 mL      LABS:                        8.6    11.77 )-----------( 288      ( 12 Oct 2018 23:54 )             26.5     10-12    138  |  98  |  17  ----------------------------<  100<H>  3.8   |  26  |  5.15<H>    Ca    7.6<L>      12 Oct 2018 23:54  Phos  4.5     10-12  Mg     1.8     10-12      EXAM:  Gen:          NAD  CV:            RRR  Pulm:       non-labored respirations  Abd:       soft, nontender, nondistended            Dressing in place: c/d/i            PD catheter without drainage or erythema

## 2018-10-13 NOTE — PROVIDER CONTACT NOTE (OTHER) - ASSESSMENT
Site with dressing clean, dry and intact. Dressing last changed 10/12. No drainage noted. No abnormal characteristics. Pt states pain exacerbated by RUE movement. Describes pain as 8/10 burning and throbbing.

## 2018-10-13 NOTE — PROGRESS NOTE ADULT - ASSESSMENT
A/P: 38 yo F s/p Incisional hernia repair (10/5).  Patient unable to use PD catheter x1wk, requiring shiley placement (10/8), then permacath placed (10/10)    Tachycardia (120s), fever (101s) overnight  -blood cx x2 obtained  -CXR showed possible atelectasis, f/u final read this AM  -order repeat CXR AM 10/14 to f/u atelectasis  -CTA, venous duplex negative, no further wkup indicated for PE    S/p incisional hernia repair  -c/w reg diet  -Pain control  -DVT ppx    Dispo: pending setup for HD outpt, fever workup    [facility]  [id number]

## 2018-10-13 NOTE — PROVIDER CONTACT NOTE (OTHER) - SITUATION
Pt c/o burning, throbbing pain to R ACW permacath insertion site. States pain is unrelieved by PO Dilaudid 6mg and isn't getting better since day of insertion.

## 2018-10-14 LAB
BUN SERPL-MCNC: 27 MG/DL — HIGH (ref 7–23)
CALCIUM SERPL-MCNC: 8.8 MG/DL — SIGNIFICANT CHANGE UP (ref 8.4–10.5)
CHLORIDE SERPL-SCNC: 98 MMOL/L — SIGNIFICANT CHANGE UP (ref 98–107)
CO2 SERPL-SCNC: 25 MMOL/L — SIGNIFICANT CHANGE UP (ref 22–31)
CREAT SERPL-MCNC: 7.83 MG/DL — HIGH (ref 0.5–1.3)
GLUCOSE SERPL-MCNC: 88 MG/DL — SIGNIFICANT CHANGE UP (ref 70–99)
HCT VFR BLD CALC: 30.8 % — LOW (ref 34.5–45)
HGB BLD-MCNC: 9.9 G/DL — LOW (ref 11.5–15.5)
MAGNESIUM SERPL-MCNC: 2.4 MG/DL — SIGNIFICANT CHANGE UP (ref 1.6–2.6)
MCHC RBC-ENTMCNC: 28.9 PG — SIGNIFICANT CHANGE UP (ref 27–34)
MCHC RBC-ENTMCNC: 32.1 % — SIGNIFICANT CHANGE UP (ref 32–36)
MCV RBC AUTO: 89.8 FL — SIGNIFICANT CHANGE UP (ref 80–100)
NRBC # FLD: 0.12 — SIGNIFICANT CHANGE UP
NRBC FLD-RTO: 1.1 — SIGNIFICANT CHANGE UP
PHOSPHATE SERPL-MCNC: 3.5 MG/DL — SIGNIFICANT CHANGE UP (ref 2.5–4.5)
PLATELET # BLD AUTO: 410 K/UL — HIGH (ref 150–400)
PMV BLD: 11.4 FL — SIGNIFICANT CHANGE UP (ref 7–13)
POTASSIUM SERPL-MCNC: 4.3 MMOL/L — SIGNIFICANT CHANGE UP (ref 3.5–5.3)
POTASSIUM SERPL-SCNC: 4.3 MMOL/L — SIGNIFICANT CHANGE UP (ref 3.5–5.3)
RBC # BLD: 3.43 M/UL — LOW (ref 3.8–5.2)
RBC # FLD: 21.6 % — HIGH (ref 10.3–14.5)
SODIUM SERPL-SCNC: 139 MMOL/L — SIGNIFICANT CHANGE UP (ref 135–145)
SPECIMEN SOURCE: SIGNIFICANT CHANGE UP
SPECIMEN SOURCE: SIGNIFICANT CHANGE UP
WBC # BLD: 10.49 K/UL — SIGNIFICANT CHANGE UP (ref 3.8–10.5)
WBC # FLD AUTO: 10.49 K/UL — SIGNIFICANT CHANGE UP (ref 3.8–10.5)

## 2018-10-14 PROCEDURE — 71045 X-RAY EXAM CHEST 1 VIEW: CPT | Mod: 26

## 2018-10-14 RX ORDER — CHLORHEXIDINE GLUCONATE 213 G/1000ML
1 SOLUTION TOPICAL
Qty: 0 | Refills: 0 | Status: DISCONTINUED | OUTPATIENT
Start: 2018-10-14 | End: 2018-10-15

## 2018-10-14 RX ORDER — ACETAMINOPHEN 500 MG
1000 TABLET ORAL ONCE
Qty: 0 | Refills: 0 | Status: COMPLETED | OUTPATIENT
Start: 2018-10-14 | End: 2018-10-14

## 2018-10-14 RX ADMIN — HYDROMORPHONE HYDROCHLORIDE 6 MILLIGRAM(S): 2 INJECTION INTRAMUSCULAR; INTRAVENOUS; SUBCUTANEOUS at 02:14

## 2018-10-14 RX ADMIN — CALCITRIOL 0.5 MICROGRAM(S): 0.5 CAPSULE ORAL at 13:40

## 2018-10-14 RX ADMIN — HEPARIN SODIUM 5000 UNIT(S): 5000 INJECTION INTRAVENOUS; SUBCUTANEOUS at 15:38

## 2018-10-14 RX ADMIN — Medication 1000 MILLIGRAM(S): at 04:40

## 2018-10-14 RX ADMIN — Medication 1 MILLIGRAM(S): at 11:37

## 2018-10-14 RX ADMIN — Medication 650 MILLIGRAM(S): at 13:59

## 2018-10-14 RX ADMIN — HYDROMORPHONE HYDROCHLORIDE 0.5 MILLIGRAM(S): 2 INJECTION INTRAMUSCULAR; INTRAVENOUS; SUBCUTANEOUS at 00:01

## 2018-10-14 RX ADMIN — Medication 400 MILLIGRAM(S): at 04:10

## 2018-10-14 RX ADMIN — HEPARIN SODIUM 5000 UNIT(S): 5000 INJECTION INTRAVENOUS; SUBCUTANEOUS at 21:02

## 2018-10-14 RX ADMIN — Medication 1000 MILLIGRAM(S): at 21:17

## 2018-10-14 RX ADMIN — HYDROMORPHONE HYDROCHLORIDE 6 MILLIGRAM(S): 2 INJECTION INTRAMUSCULAR; INTRAVENOUS; SUBCUTANEOUS at 18:44

## 2018-10-14 RX ADMIN — HYDROMORPHONE HYDROCHLORIDE 6 MILLIGRAM(S): 2 INJECTION INTRAMUSCULAR; INTRAVENOUS; SUBCUTANEOUS at 13:59

## 2018-10-14 RX ADMIN — Medication 667 MILLIGRAM(S): at 08:16

## 2018-10-14 RX ADMIN — HYDROMORPHONE HYDROCHLORIDE 6 MILLIGRAM(S): 2 INJECTION INTRAMUSCULAR; INTRAVENOUS; SUBCUTANEOUS at 11:37

## 2018-10-14 RX ADMIN — Medication 650 MILLIGRAM(S): at 13:40

## 2018-10-14 RX ADMIN — HEPARIN SODIUM 5000 UNIT(S): 5000 INJECTION INTRAVENOUS; SUBCUTANEOUS at 05:16

## 2018-10-14 RX ADMIN — Medication 400 MILLIGRAM(S): at 20:59

## 2018-10-14 RX ADMIN — HYDROMORPHONE HYDROCHLORIDE 6 MILLIGRAM(S): 2 INJECTION INTRAMUSCULAR; INTRAVENOUS; SUBCUTANEOUS at 18:02

## 2018-10-14 RX ADMIN — Medication 667 MILLIGRAM(S): at 13:40

## 2018-10-14 RX ADMIN — HYDROMORPHONE HYDROCHLORIDE 6 MILLIGRAM(S): 2 INJECTION INTRAMUSCULAR; INTRAVENOUS; SUBCUTANEOUS at 03:12

## 2018-10-14 NOTE — PROGRESS NOTE ADULT - ASSESSMENT
A/P: 36 yo F s/p Incisional hernia repair (10/5).  Patient unable to use PD catheter x1wk, requiring shiley placement (10/8), then permacath placed (10/10)    - Afebrile overnight, prelim BCx = NGTD, f/u final read / CBC this AM  - F/U AM CXR  - c/w reg diet  - Pain control  - DVT ppx      Dispo: pending setup for HD outpt, fever workup    [facility]  [id number] A/P: 38 yo F s/p Incisional hernia repair (10/5).  Patient unable to use PD catheter x1wk, requiring shiley placement (10/8), then permacath placed (10/10)    - Afebrile overnight, prelim BCx = NGTD, f/u final read / CBC this AM  - F/U AM CXR  - c/w reg diet  - Pain control  - DVT ppx    Dispo: pending setup for HD outpt, fever workup    [facility]  [id number]

## 2018-10-14 NOTE — PROGRESS NOTE ADULT - ATTENDING COMMENTS
Ms. Lucero is comfortable getting HD. She denies any abdominal pain, nausea, or vomiting. She continues to have some pleuritic pain.     Vital Signs Last 24 Hrs  T(C): 37 (12 Oct 2018 06:50), Max: 37.5 (11 Oct 2018 22:25)  T(F): 98.6 (12 Oct 2018 06:50), Max: 99.5 (11 Oct 2018 22:25)  HR: 97 (12 Oct 2018 06:50) (97 - 113)  BP: 109/76 (12 Oct 2018 06:50) (90/50 - 110/74)  BP(mean): --  RR: 16 (12 Oct 2018 06:50) (16 - 17)  SpO2: 97% (12 Oct 2018 05:44) (95% - 100%)    General: comfortable in bed, NAD, AOx3  Abd: soft, not tender, not distended, incision healing well, PD catheter in place, permcath in place    37 year old woman s/p hernia repair  1. Complete HD today  2. Follow up final CTA report  3. Plan for discharge today
Ms. Lucero is comfortable in bed. She denies any nausea or vomiting. She denies fever or chills. She continues to have pleuritic pain.    Vital Signs Last 24 Hrs  T(C): 37.2 (11 Oct 2018 11:14), Max: 37.2 (11 Oct 2018 05:30)  T(F): 99 (11 Oct 2018 11:14), Max: 99 (11 Oct 2018 11:14)  HR: 97 (11 Oct 2018 11:14) (79 - 103)  BP: 109/70 (11 Oct 2018 11:14) (89/60 - 116/75)  BP(mean): --  RR: 17 (11 Oct 2018 11:14) (17 - 18)  SpO2: 95% (11 Oct 2018 11:14) (95% - 100%)    General: comfortable in bed, NAD, AOx3  Abd: SOft, not tender, and not distended, PD catheter in place, incision healing well    37 year old woman s/p umbilical hernia repair  1. CTA chest and bilateral LE duplex to rule out PE  2. Nephrology consultation is appreciated
Ms. Lucero is comfortable in bed. She denies nausea or vomiting. She was febrile yesterday.     ICU Vital Signs Last 24 Hrs  T(C): 37.2 (13 Oct 2018 09:32), Max: 38.4 (12 Oct 2018 16:29)  T(F): 98.9 (13 Oct 2018 09:32), Max: 101.2 (12 Oct 2018 23:04)  HR: 96 (13 Oct 2018 09:32) (95 - 126)  BP: 101/65 (13 Oct 2018 09:32) (94/58 - 117/72)  BP(mean): --  ABP: --  ABP(mean): --  RR: 18 (13 Oct 2018 09:32) (16 - 18)  SpO2: 98% (13 Oct 2018 09:32) (94% - 99%)                          8.6    11.77 )-----------( 288      ( 12 Oct 2018 23:54 )             26.5     10-12    138  |  98  |  17  ----------------------------<  100<H>  3.8   |  26  |  5.15<H>      General: comfortable in bed, NAD, AOx3  ABd: Soft, not tender, and not distended, incision healing well, no induration, no erythema.   Line: Right PIV and right permcath with no erythema    1. Continue chest PT, and incentive spirometry use  2. Follow up fever workup
Ms. Lucero is comfortable. She denies any nausea or vomiting, she tolerated regular diet. She is passing flatus. She tolerated HD yesterday.    Vital Signs Last 24 Hrs  T(C): 37.3 (09 Oct 2018 10:15), Max: 37.3 (09 Oct 2018 10:15)  T(F): 99.1 (09 Oct 2018 10:15), Max: 99.1 (09 Oct 2018 10:15)  HR: 106 (09 Oct 2018 10:15) (65 - 106)  BP: 103/64 (09 Oct 2018 10:15) (89/62 - 111/78)  BP(mean): --  RR: 18 (09 Oct 2018 10:15) (10 - 22)  SpO2: 99% (09 Oct 2018 10:15) (90% - 100%)    General: comfortable, NAD, AOx3  ABd: Soft, not tender, and not distended  Shiley in place    1. IR plan for permcath placement for HD  2. Continue diet
Ms. Lucero is comfortable in bed, she denies abdominal pain, she denies nausea or vomiting. She has not had any fever or chills over the past 24 hours.    ICU Vital Signs Last 24 Hrs  T(C): 36.8 (14 Oct 2018 09:35), Max: 37.4 (13 Oct 2018 21:57)  T(F): 98.2 (14 Oct 2018 09:35), Max: 99.3 (13 Oct 2018 21:57)  HR: 91 (14 Oct 2018 09:35) (91 - 110)  BP: 97/63 (14 Oct 2018 09:35) (97/63 - 110/71)  BP(mean): --  ABP: --  ABP(mean): --  RR: 17 (14 Oct 2018 09:35) (17 - 18)  SpO2: 96% (14 Oct 2018 09:35) (96% - 100%)      General: comfortable in bed, NAD, AOx3  Abd: Soft, not tender, not distended, PD catheter in place, permcath in place    37 year old woman s/p repair of incarcerated and obstructed incisional hernia  1. Follow up with nephrology regarding HD  2. Continue incentive spirometry and chest PT  3. Plan for discharge to home tomorrow
pt seen and examined.  tolerating clears.  still moderate incisional pain  oob to ambulate  await renal f/u re basilia vs permacath  await further GI fxn  d/w pt and mother at bedside in detail

## 2018-10-14 NOTE — PROGRESS NOTE ADULT - SUBJECTIVE AND OBJECTIVE BOX
[facility] PROGRESS NOTE    S: Patient seen at bedside.  -  afebrile overnight, prelim BCx = NGTD  -  complained of permacath pain, relieved by IV tylenol  -  +Flatus/+BM  -  OOB  -  Tolerating diet, denies n/v      OBJECTIVE:  Vital Signs Last 24 Hrs  T(C): 37.1 (14 Oct 2018 05:14), Max: 37.4 (13 Oct 2018 21:57)  T(F): 98.7 (14 Oct 2018 05:14), Max: 99.3 (13 Oct 2018 21:57)  HR: 94 (14 Oct 2018 05:14) (94 - 110)  BP: 97/64 (14 Oct 2018 05:14) (97/64 - 110/71)  BP(mean): --  RR: 17 (14 Oct 2018 05:14) (17 - 18)  SpO2: 96% (14 Oct 2018 05:14) (96% - 100%)    MEDICATIONS  (STANDING):  calcitriol   Capsule 0.5 MICROGram(s) Oral daily  calcium acetate 667 milliGRAM(s) Oral three times a day with meals  docusate sodium 100 milliGRAM(s) Oral two times a day  folic acid 1 milliGRAM(s) Oral daily  heparin  Injectable 5000 Unit(s) SubCutaneous every 8 hours  polyethylene glycol 3350 17 Gram(s) Oral daily  senna 1 Tablet(s) Oral daily    MEDICATIONS  (PRN):  acetaminophen   Tablet .. 650 milliGRAM(s) Oral every 6 hours PRN Mild Pain (1 - 3)  HYDROmorphone   Tablet 6 milliGRAM(s) Oral every 4 hours PRN Severe Pain (7 - 10)  HYDROmorphone   Tablet 4 milliGRAM(s) Oral every 4 hours PRN Moderate Pain (4 - 6)      EXAM:  Gen:          NAD  Neck:         R. IJ permacath   CV:            RRR  Pulm:       non-labored respirations  Abd:       soft, nontender, nondistended            Dressing in place: c/d/i            PD catheter without drainage or erythema

## 2018-10-15 VITALS
SYSTOLIC BLOOD PRESSURE: 101 MMHG | OXYGEN SATURATION: 100 % | RESPIRATION RATE: 18 BRPM | HEART RATE: 84 BPM | DIASTOLIC BLOOD PRESSURE: 63 MMHG | TEMPERATURE: 98 F

## 2018-10-15 PROCEDURE — 99233 SBSQ HOSP IP/OBS HIGH 50: CPT | Mod: GC

## 2018-10-15 RX ADMIN — Medication 650 MILLIGRAM(S): at 12:00

## 2018-10-15 RX ADMIN — HEPARIN SODIUM 5000 UNIT(S): 5000 INJECTION INTRAVENOUS; SUBCUTANEOUS at 05:40

## 2018-10-15 RX ADMIN — Medication 1 MILLIGRAM(S): at 12:26

## 2018-10-15 RX ADMIN — CALCITRIOL 0.5 MICROGRAM(S): 0.5 CAPSULE ORAL at 12:26

## 2018-10-15 RX ADMIN — Medication 650 MILLIGRAM(S): at 11:06

## 2018-10-15 RX ADMIN — Medication 667 MILLIGRAM(S): at 12:26

## 2018-10-15 NOTE — PROGRESS NOTE ADULT - SUBJECTIVE AND OBJECTIVE BOX
[facility] PROGRESS NOTE    S:   -  Afebrile, denies n/v, tolerating regular diet  -  complained of permacath pain, relieved by IV tylenol  -  +Flatus/+BM  -  OOB    Vital Signs Last 24 Hrs  T(C): 36.8 (15 Oct 2018 06:25), Max: 37 (14 Oct 2018 21:01)  T(F): 98.3 (15 Oct 2018 06:25), Max: 98.6 (14 Oct 2018 21:01)  HR: 74 (15 Oct 2018 06:25) (74 - 100)  BP: 107/75 (15 Oct 2018 06:25) (94/70 - 107/75)  BP(mean): --  RR: 18 (15 Oct 2018 06:25) (17 - 18)  SpO2: 99% (15 Oct 2018 05:33) (96% - 100%)    MEDICATIONS  (STANDING):  calcitriol   Capsule 0.5 MICROGram(s) Oral daily  calcium acetate 667 milliGRAM(s) Oral three times a day with meals  chlorhexidine 4% Liquid 1 Application(s) Topical <User Schedule>  docusate sodium 100 milliGRAM(s) Oral two times a day  folic acid 1 milliGRAM(s) Oral daily  heparin  Injectable 5000 Unit(s) SubCutaneous every 8 hours  polyethylene glycol 3350 17 Gram(s) Oral daily  senna 1 Tablet(s) Oral daily    MEDICATIONS  (PRN):  acetaminophen   Tablet .. 650 milliGRAM(s) Oral every 6 hours PRN Mild Pain (1 - 3)  HYDROmorphone   Tablet 6 milliGRAM(s) Oral every 4 hours PRN Severe Pain (7 - 10)  HYDROmorphone   Tablet 4 milliGRAM(s) Oral every 4 hours PRN Moderate Pain (4 - 6)  OBJECTIVE:    LABS:     CBC (10-14 @ 16:35)                              9.9<L>                         10.49   )----------------(  410<H>     --    % Neutrophils, --    % Lymphocytes, ANC: --                                  30.8<L>                BMP (10-14 @ 16:35)             139     |  98      |  27<H> 		Ca++ --      Ca 8.8                ---------------------------------( 88    		Mg 2.4                4.3     |  25      |  7.83<H>			Ph 3.5               -> BLOOD VENOUS Culture (10-13 @ 01:00)     NG    NG  NG    -> BLOOD VENOUS Culture (10-13 @ 00:07)     NG    NG  NG      EXAM:  Gen:          NAD  Neck:         R. IJ permacath   CV:            RRR  Pulm:       non-labored respirations  Abd:       soft, nontender, nondistended                Dressing in place: c/d/i                 PD catheter without drainage or erythema [facility] PROGRESS NOTE    S:   -  Afebrile, denies n/v, tolerating regular diet  -  complained of permacath pain, relieved by IV tylenol  -  +Flatus/+BM  -  OOB    Vital Signs Last 24 Hrs  T(C): 36.8 (15 Oct 2018 06:25), Max: 37 (14 Oct 2018 21:01)  T(F): 98.3 (15 Oct 2018 06:25), Max: 98.6 (14 Oct 2018 21:01)  HR: 74 (15 Oct 2018 06:25) (74 - 100)  BP: 107/75 (15 Oct 2018 06:25) (94/70 - 107/75)  BP(mean): --  RR: 18 (15 Oct 2018 06:25) (17 - 18)  SpO2: 99% (15 Oct 2018 05:33) (96% - 100%)    MEDICATIONS  (STANDING):  calcitriol   Capsule 0.5 MICROGram(s) Oral daily  calcium acetate 667 milliGRAM(s) Oral three times a day with meals  chlorhexidine 4% Liquid 1 Application(s) Topical <User Schedule>  docusate sodium 100 milliGRAM(s) Oral two times a day  folic acid 1 milliGRAM(s) Oral daily  heparin  Injectable 5000 Unit(s) SubCutaneous every 8 hours  polyethylene glycol 3350 17 Gram(s) Oral daily  senna 1 Tablet(s) Oral daily    MEDICATIONS  (PRN):  acetaminophen   Tablet .. 650 milliGRAM(s) Oral every 6 hours PRN Mild Pain (1 - 3)  HYDROmorphone   Tablet 6 milliGRAM(s) Oral every 4 hours PRN Severe Pain (7 - 10)  HYDROmorphone   Tablet 4 milliGRAM(s) Oral every 4 hours PRN Moderate Pain (4 - 6)  OBJECTIVE:    LABS:     CBC (10-14 @ 16:35)                              9.9<L>                         10.49   )----------------(  410<H>     --    % Neutrophils, --    % Lymphocytes, ANC: --                                  30.8<L>                BMP (10-14 @ 16:35)             139     |  98      |  27<H> 		Ca++ --      Ca 8.8                ---------------------------------( 88    		Mg 2.4                4.3     |  25      |  7.83<H>			Ph 3.5               -> BLOOD VENOUS Culture (10-13 @ 01:00)     NG    NG  NG    -> BLOOD VENOUS Culture (10-13 @ 00:07)     NG    NG  NG      EXAM:  Gen:       NAD  Neck:      R. IJ permacath   CV:         RRR  Pulm:      non-labored respirations  Abd:       soft, nontender, nondistended                Dressing in place: c/d/i                 PD catheter without drainage or erythema

## 2018-10-15 NOTE — PROGRESS NOTE ADULT - PROVIDER SPECIALTY LIST ADULT
Anesthesia
Intervent Radiology
Intervent Radiology
Nephrology
Surgery
Anesthesia

## 2018-10-15 NOTE — PROGRESS NOTE ADULT - ASSESSMENT
A/P: 38 yo F s/p Incisional hernia repair (10/5).  Patient unable to use PD catheter x1wk, requiring shiley placement (10/8), then permacath placed (10/10) undergoing HD.     - HD via permacath, appreciate neprhology recommendations  - c/w reg diet  - Pain control  - DVT ppx    Dispo: pending setup for HD outpt    [facility]  [id number] A/P: 38 yo F s/p Incisional hernia repair (10/5).  Patient unable to use PD catheter x1wk, requiring shiley placement (10/8), then permacath placed (10/10) undergoing HD.     - HD this AM via permacath, appreciate nephrology recommendations  - Hold PD for at least one week post-op  - c/w reg diet  - Pain control  - DVT ppx    Dispo: pending setup for HD outpt    [facility]  [id number]

## 2018-10-15 NOTE — PROGRESS NOTE ADULT - SUBJECTIVE AND OBJECTIVE BOX
Ms. Lucero is currently undergoing her HD session. She denies any abdominal pain, nausea, or vomiting. She complains of pain at her permcath site. She denies any fever or chills.    ICU Vital Signs Last 24 Hrs  T(C): 36.8 (15 Oct 2018 06:25), Max: 37 (14 Oct 2018 21:01)  T(F): 98.3 (15 Oct 2018 06:25), Max: 98.6 (14 Oct 2018 21:01)  HR: 74 (15 Oct 2018 06:25) (74 - 100)  BP: 107/75 (15 Oct 2018 06:25) (94/70 - 107/75)  BP(mean): --  ABP: --  ABP(mean): --  RR: 18 (15 Oct 2018 06:25) (17 - 18)  SpO2: 99% (15 Oct 2018 05:33) (96% - 100%)      General: comfortable in bed, NAD, AOx3  Abd: Soft, not tender, not distended, incision site healing well without induration or erythema. PD catheter in place. Permcath in place without erythema.    37 year old woman s/p incisional hernia repair  1. Follow up nephrology regarding HD - appreciate recommendations  2. Discharge planning

## 2018-10-15 NOTE — PROGRESS NOTE ADULT - REASON FOR ADMISSION
c/f incarcerated hernia
Incarcerated hernia
c/f incarcerated hernia
incarcerated hernia

## 2018-10-18 ENCOUNTER — APPOINTMENT (OUTPATIENT)
Dept: DERMATOLOGY | Facility: CLINIC | Age: 38
End: 2018-10-18

## 2018-10-18 LAB
BACTERIA BLD CULT: SIGNIFICANT CHANGE UP
BACTERIA BLD CULT: SIGNIFICANT CHANGE UP

## 2018-10-22 NOTE — PATIENT PROFILE ADULT. - MENTAL HEALTH CONDITIONS/SYMPTOMS, PROFILE
PTA, as per mother: pt has a custom mold wheelchair, hospital bed and feeding pump at home; parents are the primary caretakers; Pt is dependent in all mobility.  Mother reports lifting him in/out of wheelchair/bed. He is bathed in a blowup bathtub.  Pt attends [facility] and receives PT, OT, ST.  Pt's nickname is [place]. As per MOC  He makes eye contact, smiles, laughs; will make needs known by laughing/crying, shaking head.  He used to have hand splints however has not used them in a long time due to skin breakdown.
none

## 2018-11-19 RX ORDER — GENTAMICIN SULFATE 1 MG/G
0.1 CREAM TOPICAL DAILY
Qty: 1 | Refills: 0 | Status: ACTIVE | COMMUNITY
Start: 2018-11-19 | End: 1900-01-01

## 2018-11-20 ENCOUNTER — APPOINTMENT (OUTPATIENT)
Dept: ENDOVASCULAR SURGERY | Facility: CLINIC | Age: 38
End: 2018-11-20
Payer: COMMERCIAL

## 2018-11-20 PROCEDURE — 36589 REMOVAL TUNNELED CV CATH: CPT

## 2018-12-09 NOTE — DISCHARGE NOTE ADULT - IF YOU ARE A SMOKER, IT IS IMPORTANT FOR YOUR HEALTH TO STOP SMOKING. PLEASE BE AWARE THAT SECOND HAND SMOKE IS ALSO HARMFUL.
Progress Notes by Yolanda Leavitt DO at 02/21/17 04:30 PM     Author:  Yolanda Leavitt DO Service:  (none) Author Type:  Physician     Filed:  02/21/17 04:32 PM Encounter Date:  2/21/2017 Status:  Signed     :  Yolanda Leavitt DO (Physician)              SUBJECTIVE:  Devonte Bazzi is a 70year old male comes in today for a cystourethroscopy for[TT1.1T] microscopic hematuria and bladder wall thickening. Former patient for Dr. Emiliana Durant last seen on 6/5/13 for PSA screening for family history of prostate cancer and microscopic hematuria. Negative hematuria workup completed on 11/30/11 and a negative hematuria workup by Dr. Richie Andre in 2004. PSA 1.74 (5/28/16). 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 Recent UA +Heme and +RBCs. UC without growth. CMP unremarkable. CBC unremarkable. Urine cytology was negative for carcinoma[TT1.1C]. OBJECTIVE:  Procedure:    Patient was placed in a supine position on the examination table. After routine prep, drape and local anesthesia, the flexible cystoscope was inserted into the bladder. Residual urine was minimal.  The bladder mucosa was inspected throughout and was pink with no evidence of tumors, stones, or diverticula. The ureteral orifices are normal in position and configuration. The prostatic urethra is[TT1.1T] 2[TT1.1M] cms in length with[TT1.1T]out[TT1.1M] obstruction. The anterior urethra is normal.                                                    PLAN:[TT1.1T]  Follow up in 12 months with a UA[TT1.1M]      Electronically Signed by:     Yolanda Leavitt DO , 2/21/2017[TT1.1T]        Revision History        User Key Date/Time User Provider Type Action    > TT1.1 02/21/17 04:32 PM Apoorva Whitten DO Physician Sign    C - Copied, M - Manual, T - Template Statement Selected

## 2018-12-11 RX ORDER — AMOXICILLIN 250 MG/1
250 CAPSULE ORAL DAILY
Qty: 7 | Refills: 0 | Status: ACTIVE | COMMUNITY
Start: 2018-12-11 | End: 1900-01-01

## 2018-12-20 ENCOUNTER — LABORATORY RESULT (OUTPATIENT)
Age: 38
End: 2018-12-20

## 2018-12-20 LAB
BASOPHILS # BLD AUTO: 0.06 K/UL — SIGNIFICANT CHANGE UP (ref 0–0.2)
BASOPHILS NFR BLD AUTO: 0.7 % — SIGNIFICANT CHANGE UP (ref 0–2)
EOSINOPHIL # BLD AUTO: 0.84 K/UL — HIGH (ref 0–0.5)
EOSINOPHIL NFR BLD AUTO: 9.6 % — HIGH (ref 0–6)
HCT VFR BLD CALC: 22.9 % — LOW (ref 34.5–45)
HGB BLD-MCNC: 7.4 G/DL — LOW (ref 11.5–15.5)
IMM GRANULOCYTES # BLD AUTO: 0.1 # — SIGNIFICANT CHANGE UP
IMM GRANULOCYTES NFR BLD AUTO: 1.1 % — SIGNIFICANT CHANGE UP (ref 0–1.5)
LYMPHOCYTES # BLD AUTO: 2.28 K/UL — SIGNIFICANT CHANGE UP (ref 1–3.3)
LYMPHOCYTES # BLD AUTO: 26 % — SIGNIFICANT CHANGE UP (ref 13–44)
MCHC RBC-ENTMCNC: 31.9 PG — SIGNIFICANT CHANGE UP (ref 27–34)
MCHC RBC-ENTMCNC: 32.3 % — SIGNIFICANT CHANGE UP (ref 32–36)
MCV RBC AUTO: 98.7 FL — SIGNIFICANT CHANGE UP (ref 80–100)
MONOCYTES # BLD AUTO: 1.01 K/UL — HIGH (ref 0–0.9)
MONOCYTES NFR BLD AUTO: 11.5 % — SIGNIFICANT CHANGE UP (ref 2–14)
NEUTROPHILS # BLD AUTO: 4.47 K/UL — SIGNIFICANT CHANGE UP (ref 1.8–7.4)
NEUTROPHILS NFR BLD AUTO: 51.1 % — SIGNIFICANT CHANGE UP (ref 43–77)
NRBC # FLD: 1.66 — SIGNIFICANT CHANGE UP
NRBC FLD-RTO: 18.9 — SIGNIFICANT CHANGE UP
PLATELET # BLD AUTO: 266 K/UL — SIGNIFICANT CHANGE UP (ref 150–400)
PMV BLD: 11.7 FL — SIGNIFICANT CHANGE UP (ref 7–13)
RBC # BLD: 2.32 M/UL — LOW (ref 3.8–5.2)
RBC # FLD: 29.6 % — HIGH (ref 10.3–14.5)
RETICS #: 297 K/UL — HIGH (ref 25–125)
RETICS/RBC NFR: 12.9 % — HIGH (ref 0.5–2.5)
WBC # BLD: 8.76 K/UL — SIGNIFICANT CHANGE UP (ref 3.8–10.5)
WBC # FLD AUTO: 8.76 K/UL — SIGNIFICANT CHANGE UP (ref 3.8–10.5)

## 2018-12-20 RX ORDER — LANTHANUM CARBONATE 500 MG/1
500 TABLET, CHEWABLE ORAL
Qty: 540 | Refills: 0 | Status: ACTIVE | COMMUNITY
Start: 2018-12-20 | End: 1900-01-01

## 2019-01-24 RX ORDER — GENTAMICIN SULFATE 1 MG/G
0.1 CREAM TOPICAL
Qty: 90 | Refills: 0 | Status: ACTIVE | COMMUNITY
Start: 2017-02-16 | End: 1900-01-01

## 2019-01-31 ENCOUNTER — APPOINTMENT (OUTPATIENT)
Dept: INTERNAL MEDICINE | Facility: HOSPITAL | Age: 39
End: 2019-01-31
Payer: COMMERCIAL

## 2019-01-31 ENCOUNTER — OUTPATIENT (OUTPATIENT)
Dept: OUTPATIENT SERVICES | Facility: HOSPITAL | Age: 39
LOS: 1 days | End: 2019-01-31

## 2019-01-31 VITALS
WEIGHT: 141 LBS | HEIGHT: 65 IN | OXYGEN SATURATION: 93 % | HEART RATE: 103 BPM | DIASTOLIC BLOOD PRESSURE: 77 MMHG | SYSTOLIC BLOOD PRESSURE: 121 MMHG | BODY MASS INDEX: 23.49 KG/M2

## 2019-01-31 VITALS — OXYGEN SATURATION: 95 %

## 2019-01-31 DIAGNOSIS — Z98.89 OTHER SPECIFIED POSTPROCEDURAL STATES: Chronic | ICD-10-CM

## 2019-01-31 DIAGNOSIS — N18.6 END STAGE RENAL DISEASE: ICD-10-CM

## 2019-01-31 DIAGNOSIS — Z99.2 END STAGE RENAL DISEASE: ICD-10-CM

## 2019-01-31 DIAGNOSIS — Z98.890 OTHER SPECIFIED POSTPROCEDURAL STATES: Chronic | ICD-10-CM

## 2019-01-31 DIAGNOSIS — M25.552 PAIN IN RIGHT HIP: ICD-10-CM

## 2019-01-31 DIAGNOSIS — M25.551 PAIN IN RIGHT HIP: ICD-10-CM

## 2019-01-31 DIAGNOSIS — D57.1 SICKLE-CELL DISEASE W/OUT CRISIS: ICD-10-CM

## 2019-01-31 DIAGNOSIS — Z98.51 TUBAL LIGATION STATUS: Chronic | ICD-10-CM

## 2019-01-31 PROCEDURE — 99213 OFFICE O/P EST LOW 20 MIN: CPT

## 2019-01-31 RX ORDER — NAPROXEN 375 MG/1
375 TABLET ORAL
Qty: 30 | Refills: 2 | Status: ACTIVE | COMMUNITY
Start: 2018-04-13 | End: 1900-01-01

## 2019-01-31 RX ORDER — OXYCODONE AND ACETAMINOPHEN 5; 325 MG/1; MG/1
5-325 TABLET ORAL
Qty: 40 | Refills: 0 | Status: ACTIVE | COMMUNITY
Start: 2018-04-06 | End: 1900-01-01

## 2019-02-04 DIAGNOSIS — N18.6 END STAGE RENAL DISEASE: ICD-10-CM

## 2019-02-04 DIAGNOSIS — D57.1 SICKLE-CELL DISEASE WITHOUT CRISIS: ICD-10-CM

## 2019-02-04 DIAGNOSIS — M25.551 PAIN IN RIGHT HIP: ICD-10-CM

## 2019-02-04 NOTE — HISTORY OF PRESENT ILLNESS
[de-identified] : 38 yo female with HGB SS, RF with dialysis, s/p left THR, HAs have resolved, MRI/MRA were never performed.\par Still has some pain in her left hip, mostly controlled with tylenol #3, but occasionally requiring oxycodone.\par The patient is considering renal transplant. \par Pe: gen- thin female in nad\par vss\par mild icterus\par lungs- cta\par cor: rrr+2/6 cristhian\par abd- benign, soft, PD cath in place, clean and dry\par ext: -c/c/e, no ulcers\par neuro- A and O x 3, non-focal\par \par A/P- 38 yo female with HGB SS, RF on PD, s/p 3 days of horrific HA, no MrA/MRIs performed\par 1. Renal t'plant- needs to be  by a year from THR. Patient should consult ortho regarding the stability of the right hip prior to committing to long-term immunosuppression. \par 2. pain control-Tylenol #3- #60\par percocet- 5/325mg tab- #30-for severe pain\par ISTOP checked\par 3. f/u 3 months\par Saniya Velásquez

## 2019-02-26 ENCOUNTER — APPOINTMENT (OUTPATIENT)
Dept: DERMATOLOGY | Facility: CLINIC | Age: 39
End: 2019-02-26
Payer: COMMERCIAL

## 2019-02-26 VITALS — DIASTOLIC BLOOD PRESSURE: 70 MMHG | SYSTOLIC BLOOD PRESSURE: 110 MMHG

## 2019-02-26 DIAGNOSIS — L23.9 ALLERGIC CONTACT DERMATITIS, UNSPECIFIED CAUSE: ICD-10-CM

## 2019-02-26 DIAGNOSIS — Q80.0 ICHTHYOSIS VULGARIS: ICD-10-CM

## 2019-02-26 DIAGNOSIS — B35.4 TINEA CORPORIS: ICD-10-CM

## 2019-02-26 PROCEDURE — 99213 OFFICE O/P EST LOW 20 MIN: CPT

## 2019-02-26 RX ORDER — KETOCONAZOLE 20 MG/G
2 CREAM TOPICAL
Qty: 1 | Refills: 1 | Status: ACTIVE | COMMUNITY
Start: 2019-02-26 | End: 1900-01-01

## 2019-02-26 RX ORDER — HALOBETASOL PROPIONATE 0.5 MG/G
0.05 OINTMENT TOPICAL
Qty: 2 | Refills: 2 | Status: ACTIVE | COMMUNITY
Start: 2019-02-26 | End: 1900-01-01

## 2019-02-26 RX ORDER — HYDROCORTISONE 25 MG/G
2.5 OINTMENT TOPICAL
Qty: 1 | Refills: 1 | Status: ACTIVE | COMMUNITY
Start: 2019-02-26 | End: 1900-01-01

## 2019-03-07 ENCOUNTER — APPOINTMENT (OUTPATIENT)
Dept: ORTHOPEDIC SURGERY | Facility: CLINIC | Age: 39
End: 2019-03-07
Payer: COMMERCIAL

## 2019-03-07 VITALS
HEIGHT: 65 IN | BODY MASS INDEX: 23.32 KG/M2 | DIASTOLIC BLOOD PRESSURE: 75 MMHG | HEART RATE: 96 BPM | SYSTOLIC BLOOD PRESSURE: 127 MMHG | WEIGHT: 140 LBS

## 2019-03-07 DIAGNOSIS — M16.11 UNILATERAL PRIMARY OSTEOARTHRITIS, RIGHT HIP: ICD-10-CM

## 2019-03-07 DIAGNOSIS — Z96.642 PRESENCE OF LEFT ARTIFICIAL HIP JOINT: ICD-10-CM

## 2019-03-07 PROCEDURE — 99213 OFFICE O/P EST LOW 20 MIN: CPT

## 2019-03-07 PROCEDURE — 73521 X-RAY EXAM HIPS BI 2 VIEWS: CPT

## 2019-03-07 RX ORDER — TRAMADOL HYDROCHLORIDE 50 MG/1
50 TABLET, COATED ORAL
Qty: 90 | Refills: 0 | Status: ACTIVE | COMMUNITY
Start: 2019-03-07 | End: 1900-01-01

## 2019-03-12 PROBLEM — M16.11 PRIMARY OSTEOARTHRITIS OF RIGHT HIP: Status: ACTIVE | Noted: 2018-08-02

## 2019-03-12 PROBLEM — Z96.642 STATUS POST TOTAL REPLACEMENT OF LEFT HIP: Status: ACTIVE | Noted: 2018-06-19

## 2019-03-12 NOTE — HISTORY OF PRESENT ILLNESS
[8] : an average pain level of 8/10 [de-identified] : Ms. ERIC ELIZABETH is a 38 year old female presenting for follow up status post left total hip replacement 6/4/18. Patient states her left hip is doing great. She no longer goes to PT for this but continues home exercise. Patient states her Right hip is declining. She continues to have Right hip pain worse with weightbearing and any motion of the hip. Her hip pain is localized to the left groin and lateral aspect of the Right hip. She continues to take Naproxen and Tylenol for pain, as well as Acetaminophen-Codeine 300-30mg for breakthrough pain.

## 2019-03-12 NOTE — DISCUSSION/SUMMARY
[de-identified] : Worsening Right Hip advanced degenerative joint disease, Stable Status Post Left THR \par The natural history and treatment of degenerative arthritis was discussed with the patient at length today. The spectrum of treatment including nonoperative modalities to surgical intervention was elucidated. Noninvasive and nonoperative treatment modalities include weight reduction, activity modification with low impact exercise,  as needed use of acetaminophen or anti-inflammatory medications if tolerated, glucosamine/chondroitin supplements, and physical therapy. Further treatments can include corticosteroid injection and the use of viscosupplementation with hyaluronic acid injections. Definitive surgical treatment can certainly include total joint arthroplasty also. The risks and benefits of each treatment options was discussed and all questions were answered.\par Patient is aware she is very high risk for surgery and at this time will remain conservative at this time. \par Patient aware of potential for natural fusion of the right hip with potential worsening stiffness but chance for decreased pain. \par The patient was informed of the findings and recommended conservative management in the form of a home exercise program, activity modifications, stationary bicycling, swimming and weight loss program. A trial of Glucosamine and Chondroiten Sulphate was recommended.\par Follow-up appointment was recommended in June 2019. \par \par

## 2019-03-12 NOTE — CONSULT LETTER
[Dear  ___] : Dear  [unfilled], [Consult Letter:] : I had the pleasure of evaluating your patient, [unfilled]. [Consult Closing:] : Thank you very much for allowing me to participate in the care of this patient.  If you have any questions, please do not hesitate to contact me. [Sincerely,] : Sincerely, [FreeTextEntry2] : STACEY YI\par  [FreeTextEntry1] : Worsening Right Hip advanced degenerative joint disease, Stable Status Post Left THR \par The natural history and treatment of degenerative arthritis was discussed with the patient at length today. The spectrum of treatment including nonoperative modalities to surgical intervention was elucidated. Noninvasive and nonoperative treatment modalities include weight reduction, activity modification with low impact exercise,  as needed use of acetaminophen or anti-inflammatory medications if tolerated, glucosamine/chondroitin supplements, and physical therapy. Further treatments can include corticosteroid injection and the use of viscosupplementation with hyaluronic acid injections. Definitive surgical treatment can certainly include total joint arthroplasty also. The risks and benefits of each treatment options was discussed and all questions were answered.\par Patient is aware she is very high risk for surgery and at this time will remain conservative at this time. \par Patient aware of potential for natural fusion of the right hip with potential worsening stiffness but chance for decreased pain. \par The patient was informed of the findings and recommended conservative management in the form of a home exercise program, activity modifications, stationary bicycling, swimming and weight loss program. A trial of Glucosamine and Chondroiten Sulphate was recommended.\par Follow-up appointment was recommended in June 2019.  [FreeTextEntry3] : Long Modi MD\par \par ______________________________________________\par [facility]: Hip/Knee Arthroplasty\par [address], [address], [place] [address]\par (t) [phone]\par (f) [phone]

## 2019-03-12 NOTE — PHYSICAL EXAM
[Coxalgic] : coxalgic [LE] : Sensory: Intact in bilateral lower extremities [Knee] : patellar 2+ and symmetric bilaterally [Ankle] : ankle 2+ and symmetric bilaterally [DP] : dorsalis pedis 2+ and symmetric bilaterally [PT] : posterior tibial 2+ and symmetric bilaterally [de-identified] : On general examination the patient is adequately groomed and nourished. The vital parameters are as recorded. \par There is no lymphedema or diffuse swelling, no varicose veins, no skin warmth/erythema/scars/swelling, no ulcers and no palpable lymph nodes or masses in both lower extremities. Bilateral pedal pulses are well palpable.\par Upper Extremity:\par Both right and left upper extremities are unremarkable in terms of skin rash, lesions, pigmentation, redness, tenderness, swelling, joint instability, abnormal deformity or crepitus. The overall range of motion, sensation, motor tone and strength testing are normal.\par \par Hip Exam:\par The gait is right stiff hip coxalgic.\par The patient has unequal leg lengths - right lower limb shortening of 0.5 cm and no pelvic tilt. \par Ander/Sandra test is 12 inches on the right and 6 inches on the left. \par Active SLR is 40 degrees on the right and 60 degrees on the left. Right hip demonstrates no scars and the skin has no signs of inflammation or tenderness. Left Hip demonstrates well healed scar from Left THR with no erythema, swelling, or tenderness. \par Both Hips have a range of motion that is symmetrical in flexion and extension of:\par Hip flexion:             Right 70 degrees                Left 100 degrees\par Hip abduction:      Right 20 degrees                  Left 35 degrees\par Hip adduction:      Right 0 degrees                    Left 20 degrees\par Internal rotation:      Right 0 degrees                   Left 20 degrees\par External rotation:    Right 20 degrees                  Left 35 degrees\par There is no flexion contracture, deformity or instability. \par Labral impingement tests are negative.\par Right hip flexor, abductor and extensor power is grade 4+.\par Left hip flexor, abductor and extensor power is grade 5.\par \par Spine Exam:\par There is mild curvature of the spine with loss of normal lumbar lordosis. The skin is devoid of erythema, scars, pigmentation or rashes. There is mild lumbar spasm and tenderness especially at L4-L5 or L5-S1. \par The range of motion of the lumbar spine is limited by pain and spasm. Forward flexion is 80% normal, extension catch positive, lateral flexion and rotation 80% normal. There is no lumbar spine imbalance or instability detected.\par Bilateral passive SLR is right 40 degrees, left 40 degrees in supine and sitting positions. Lasegue's Test is negative.\par Neurology:\par The patient is alert and oriented in person, place and time. The mood is calm and affect is normal.\par Testing for coordination including Rhomberg's Test and Finger-Nose Test, sensation, motor tone and power and deep tendon reflexes in both lower extremities is normal.  [de-identified] : The following radiographs were ordered and read by me during this patients visit. I reviewed each radiograph in detail with the patient and discussed the findings as highlighted below. \par AP and false profile views of the Right hip and AP view of the pelvis confirm advanced degenerative joint disease with joint space collapse osteophyte and cyst formation. AP, Lateral, and Sunrise Radiographs of the Left hip and pelvis taken today reveal a well fixed and aligned Left total hip replacement with no signs of mechanical failure or periprosthetic fracture.\par \par

## 2019-03-21 RX ORDER — CINACALCET 30 MG/1
30 TABLET ORAL
Qty: 90 | Refills: 3 | Status: ACTIVE | COMMUNITY
Start: 2019-03-21 | End: 1900-01-01

## 2019-03-22 RX ORDER — OXYCODONE AND ACETAMINOPHEN 5; 325 MG/1; MG/1
5-325 TABLET ORAL
Qty: 60 | Refills: 0 | Status: ACTIVE | COMMUNITY
Start: 2018-06-19 | End: 1900-01-01

## 2019-03-24 ENCOUNTER — FORM ENCOUNTER (OUTPATIENT)
Age: 39
End: 2019-03-24

## 2019-03-25 ENCOUNTER — APPOINTMENT (OUTPATIENT)
Dept: ULTRASOUND IMAGING | Facility: IMAGING CENTER | Age: 39
End: 2019-03-25
Payer: COMMERCIAL

## 2019-03-25 ENCOUNTER — OUTPATIENT (OUTPATIENT)
Dept: OUTPATIENT SERVICES | Facility: HOSPITAL | Age: 39
LOS: 1 days | End: 2019-03-25
Payer: COMMERCIAL

## 2019-03-25 DIAGNOSIS — N18.6 END STAGE RENAL DISEASE: ICD-10-CM

## 2019-03-25 DIAGNOSIS — Z98.89 OTHER SPECIFIED POSTPROCEDURAL STATES: Chronic | ICD-10-CM

## 2019-03-25 DIAGNOSIS — Z98.51 TUBAL LIGATION STATUS: Chronic | ICD-10-CM

## 2019-03-25 DIAGNOSIS — Z98.890 OTHER SPECIFIED POSTPROCEDURAL STATES: Chronic | ICD-10-CM

## 2019-03-25 PROCEDURE — 76705 ECHO EXAM OF ABDOMEN: CPT

## 2019-03-25 PROCEDURE — 76705 ECHO EXAM OF ABDOMEN: CPT | Mod: 26

## 2019-03-26 RX ORDER — MUPIROCIN 20 MG/G
2 OINTMENT TOPICAL
Qty: 2 | Refills: 2 | Status: ACTIVE | COMMUNITY
Start: 2019-03-26 | End: 1900-01-01

## 2019-03-26 RX ORDER — CEPHALEXIN 500 MG/1
500 CAPSULE ORAL
Qty: 42 | Refills: 0 | Status: ACTIVE | COMMUNITY
Start: 2019-01-22 | End: 1900-01-01

## 2019-04-01 ENCOUNTER — FORM ENCOUNTER (OUTPATIENT)
Age: 39
End: 2019-04-01

## 2019-04-02 ENCOUNTER — OUTPATIENT (OUTPATIENT)
Dept: OUTPATIENT SERVICES | Facility: HOSPITAL | Age: 39
LOS: 1 days | End: 2019-04-02
Payer: COMMERCIAL

## 2019-04-02 ENCOUNTER — APPOINTMENT (OUTPATIENT)
Dept: MAMMOGRAPHY | Facility: IMAGING CENTER | Age: 39
End: 2019-04-02
Payer: COMMERCIAL

## 2019-04-02 DIAGNOSIS — Z98.51 TUBAL LIGATION STATUS: Chronic | ICD-10-CM

## 2019-04-02 DIAGNOSIS — Z98.89 OTHER SPECIFIED POSTPROCEDURAL STATES: Chronic | ICD-10-CM

## 2019-04-02 DIAGNOSIS — D57.1 SICKLE-CELL DISEASE WITHOUT CRISIS: ICD-10-CM

## 2019-04-02 DIAGNOSIS — Z98.890 OTHER SPECIFIED POSTPROCEDURAL STATES: Chronic | ICD-10-CM

## 2019-04-02 PROCEDURE — 77067 SCR MAMMO BI INCL CAD: CPT | Mod: 26

## 2019-04-02 PROCEDURE — 77067 SCR MAMMO BI INCL CAD: CPT

## 2019-04-02 PROCEDURE — 77063 BREAST TOMOSYNTHESIS BI: CPT

## 2019-04-02 PROCEDURE — 77063 BREAST TOMOSYNTHESIS BI: CPT | Mod: 26

## 2019-04-09 ENCOUNTER — APPOINTMENT (OUTPATIENT)
Dept: DERMATOLOGY | Facility: CLINIC | Age: 39
End: 2019-04-09

## 2019-04-15 ENCOUNTER — APPOINTMENT (OUTPATIENT)
Dept: NEPHROLOGY | Facility: CLINIC | Age: 39
End: 2019-04-15

## 2019-04-15 VITALS
HEART RATE: 110 BPM | HEIGHT: 65 IN | WEIGHT: 144 LBS | BODY MASS INDEX: 23.99 KG/M2 | DIASTOLIC BLOOD PRESSURE: 78 MMHG | OXYGEN SATURATION: 94 % | SYSTOLIC BLOOD PRESSURE: 112 MMHG

## 2019-04-17 ENCOUNTER — INPATIENT (INPATIENT)
Facility: HOSPITAL | Age: 39
LOS: 0 days | End: 2019-04-18
Attending: HOSPITALIST | Admitting: HOSPITALIST
Payer: COMMERCIAL

## 2019-04-17 VITALS
SYSTOLIC BLOOD PRESSURE: 135 MMHG | HEART RATE: 104 BPM | OXYGEN SATURATION: 100 % | DIASTOLIC BLOOD PRESSURE: 92 MMHG | TEMPERATURE: 98 F | RESPIRATION RATE: 18 BRPM

## 2019-04-17 DIAGNOSIS — D57.1 SICKLE-CELL DISEASE WITHOUT CRISIS: ICD-10-CM

## 2019-04-17 DIAGNOSIS — Z98.89 OTHER SPECIFIED POSTPROCEDURAL STATES: Chronic | ICD-10-CM

## 2019-04-17 DIAGNOSIS — Z98.51 TUBAL LIGATION STATUS: Chronic | ICD-10-CM

## 2019-04-17 DIAGNOSIS — Z98.890 OTHER SPECIFIED POSTPROCEDURAL STATES: Chronic | ICD-10-CM

## 2019-04-17 DIAGNOSIS — E87.5 HYPERKALEMIA: ICD-10-CM

## 2019-04-17 DIAGNOSIS — N18.6 END STAGE RENAL DISEASE: ICD-10-CM

## 2019-04-17 LAB
ALBUMIN SERPL ELPH-MCNC: 3.6 G/DL — SIGNIFICANT CHANGE UP (ref 3.3–5)
ALP SERPL-CCNC: 512 U/L — HIGH (ref 40–120)
ALT FLD-CCNC: 73 U/L — HIGH (ref 4–33)
ANION GAP SERPL CALC-SCNC: 33 MMO/L — HIGH (ref 7–14)
ANION GAP SERPL CALC-SCNC: 37 MMO/L — HIGH (ref 7–14)
ANISOCYTOSIS BLD QL: SIGNIFICANT CHANGE UP
AST SERPL-CCNC: 216 U/L — HIGH (ref 4–32)
BASOPHILS # BLD AUTO: 0.28 K/UL — HIGH (ref 0–0.2)
BASOPHILS NFR BLD AUTO: 1.5 % — SIGNIFICANT CHANGE UP (ref 0–2)
BASOPHILS NFR SPEC: 0 % — SIGNIFICANT CHANGE UP (ref 0–2)
BILIRUB SERPL-MCNC: 1.8 MG/DL — HIGH (ref 0.2–1.2)
BUN SERPL-MCNC: 71 MG/DL — HIGH (ref 7–23)
BUN SERPL-MCNC: 73 MG/DL — HIGH (ref 7–23)
CALCIUM SERPL-MCNC: 10.1 MG/DL — SIGNIFICANT CHANGE UP (ref 8.4–10.5)
CALCIUM SERPL-MCNC: 9.7 MG/DL — SIGNIFICANT CHANGE UP (ref 8.4–10.5)
CHLORIDE SERPL-SCNC: 84 MMOL/L — LOW (ref 98–107)
CHLORIDE SERPL-SCNC: 85 MMOL/L — LOW (ref 98–107)
CO2 SERPL-SCNC: 14 MMOL/L — LOW (ref 22–31)
CO2 SERPL-SCNC: 15 MMOL/L — LOW (ref 22–31)
CREAT SERPL-MCNC: 10.61 MG/DL — HIGH (ref 0.5–1.3)
CREAT SERPL-MCNC: 11 MG/DL — HIGH (ref 0.5–1.3)
ELLIPTOCYTES BLD QL SMEAR: SLIGHT — SIGNIFICANT CHANGE UP
EOSINOPHIL # BLD AUTO: 0.08 K/UL — SIGNIFICANT CHANGE UP (ref 0–0.5)
EOSINOPHIL NFR BLD AUTO: 0.4 % — SIGNIFICANT CHANGE UP (ref 0–6)
EOSINOPHIL NFR FLD: 0 % — SIGNIFICANT CHANGE UP (ref 0–6)
GLUCOSE SERPL-MCNC: 47 MG/DL — LOW (ref 70–99)
GLUCOSE SERPL-MCNC: 91 MG/DL — SIGNIFICANT CHANGE UP (ref 70–99)
HCT VFR BLD CALC: 32.5 % — LOW (ref 34.5–45)
HGB BLD-MCNC: 10.9 G/DL — LOW (ref 11.5–15.5)
HOWELL-JOLLY BOD BLD QL SMEAR: PRESENT — SIGNIFICANT CHANGE UP
HYPOCHROMIA BLD QL: SIGNIFICANT CHANGE UP
IMM GRANULOCYTES NFR BLD AUTO: 7.4 % — HIGH (ref 0–1.5)
LYMPHOCYTES # BLD AUTO: 13.5 % — SIGNIFICANT CHANGE UP (ref 13–44)
LYMPHOCYTES # BLD AUTO: 2.58 K/UL — SIGNIFICANT CHANGE UP (ref 1–3.3)
LYMPHOCYTES NFR SPEC AUTO: 10 % — LOW (ref 13–44)
MANUAL SMEAR VERIFICATION: SIGNIFICANT CHANGE UP
MCHC RBC-ENTMCNC: 27.2 PG — SIGNIFICANT CHANGE UP (ref 27–34)
MCHC RBC-ENTMCNC: 33.5 % — SIGNIFICANT CHANGE UP (ref 32–36)
MCV RBC AUTO: 81 FL — SIGNIFICANT CHANGE UP (ref 80–100)
METAMYELOCYTES # FLD: 2 % — HIGH (ref 0–1)
MICROCYTES BLD QL: SIGNIFICANT CHANGE UP
MONOCYTES # BLD AUTO: 2 K/UL — HIGH (ref 0–0.9)
MONOCYTES NFR BLD AUTO: 10.5 % — SIGNIFICANT CHANGE UP (ref 2–14)
MONOCYTES NFR BLD: 4 % — SIGNIFICANT CHANGE UP (ref 2–9)
MYELOCYTES NFR BLD: 2 % — HIGH (ref 0–0)
NEUTROPHIL AB SER-ACNC: 76 % — SIGNIFICANT CHANGE UP (ref 43–77)
NEUTROPHILS # BLD AUTO: 12.77 K/UL — HIGH (ref 1.8–7.4)
NEUTROPHILS NFR BLD AUTO: 66.7 % — SIGNIFICANT CHANGE UP (ref 43–77)
NEUTS BAND # BLD: 3 % — SIGNIFICANT CHANGE UP (ref 0–6)
NRBC # BLD: 0 /100WBC — SIGNIFICANT CHANGE UP
NRBC # FLD: 8.3 K/UL — SIGNIFICANT CHANGE UP (ref 0–0)
NRBC FLD-RTO: 43.4 — SIGNIFICANT CHANGE UP
PLATELET # BLD AUTO: 63 K/UL — LOW (ref 150–400)
PLATELET COUNT - ESTIMATE: SIGNIFICANT CHANGE UP
PMV BLD: SIGNIFICANT CHANGE UP FL (ref 7–13)
POIKILOCYTOSIS BLD QL AUTO: SIGNIFICANT CHANGE UP
POLYCHROMASIA BLD QL SMEAR: SLIGHT — SIGNIFICANT CHANGE UP
POTASSIUM SERPL-MCNC: 7.1 MMOL/L — CRITICAL HIGH (ref 3.5–5.3)
POTASSIUM SERPL-MCNC: 8.1 MMOL/L — CRITICAL HIGH (ref 3.5–5.3)
POTASSIUM SERPL-SCNC: 7.1 MMOL/L — CRITICAL HIGH (ref 3.5–5.3)
POTASSIUM SERPL-SCNC: 8.1 MMOL/L — CRITICAL HIGH (ref 3.5–5.3)
PROT SERPL-MCNC: 8.3 G/DL — SIGNIFICANT CHANGE UP (ref 6–8.3)
RBC # BLD: 4.01 M/UL — SIGNIFICANT CHANGE UP (ref 3.8–5.2)
RBC # FLD: 24.1 % — HIGH (ref 10.3–14.5)
RETICS #: 206 K/UL — HIGH (ref 25–125)
RETICS/RBC NFR: 5.1 % — HIGH (ref 0.5–2.5)
SCHISTOCYTES BLD QL AUTO: SLIGHT — SIGNIFICANT CHANGE UP
SICKLE CELLS BLD QL SMEAR: SIGNIFICANT CHANGE UP
SODIUM SERPL-SCNC: 132 MMOL/L — LOW (ref 135–145)
SODIUM SERPL-SCNC: 136 MMOL/L — SIGNIFICANT CHANGE UP (ref 135–145)
TARGETS BLD QL SMEAR: SLIGHT — SIGNIFICANT CHANGE UP
VARIANT LYMPHS # BLD: 3 % — SIGNIFICANT CHANGE UP
WBC # BLD: 19.12 K/UL — HIGH (ref 3.8–10.5)
WBC # FLD AUTO: 19.12 K/UL — HIGH (ref 3.8–10.5)

## 2019-04-17 PROCEDURE — 71045 X-RAY EXAM CHEST 1 VIEW: CPT | Mod: 26

## 2019-04-17 RX ORDER — HYDROMORPHONE HYDROCHLORIDE 2 MG/ML
2 INJECTION INTRAMUSCULAR; INTRAVENOUS; SUBCUTANEOUS ONCE
Qty: 0 | Refills: 0 | Status: DISCONTINUED | OUTPATIENT
Start: 2019-04-17 | End: 2019-04-17

## 2019-04-17 RX ORDER — DEXTROSE 50 % IN WATER 50 %
50 SYRINGE (ML) INTRAVENOUS ONCE
Qty: 0 | Refills: 0 | Status: COMPLETED | OUTPATIENT
Start: 2019-04-17 | End: 2019-04-17

## 2019-04-17 RX ORDER — ALBUTEROL 90 UG/1
10 AEROSOL, METERED ORAL ONCE
Qty: 0 | Refills: 0 | Status: COMPLETED | OUTPATIENT
Start: 2019-04-17 | End: 2019-04-17

## 2019-04-17 RX ORDER — HYDROMORPHONE HYDROCHLORIDE 2 MG/ML
1 INJECTION INTRAMUSCULAR; INTRAVENOUS; SUBCUTANEOUS ONCE
Qty: 0 | Refills: 0 | Status: DISCONTINUED | OUTPATIENT
Start: 2019-04-17 | End: 2019-04-17

## 2019-04-17 RX ORDER — SODIUM POLYSTYRENE SULFONATE 4.1 MEQ/G
15 POWDER, FOR SUSPENSION ORAL ONCE
Qty: 0 | Refills: 0 | Status: COMPLETED | OUTPATIENT
Start: 2019-04-17 | End: 2019-04-17

## 2019-04-17 RX ORDER — CALCIUM GLUCONATE 100 MG/ML
1 VIAL (ML) INTRAVENOUS ONCE
Qty: 0 | Refills: 0 | Status: COMPLETED | OUTPATIENT
Start: 2019-04-17 | End: 2019-04-17

## 2019-04-17 RX ORDER — INSULIN HUMAN 100 [IU]/ML
10 INJECTION, SOLUTION SUBCUTANEOUS ONCE
Qty: 0 | Refills: 0 | Status: COMPLETED | OUTPATIENT
Start: 2019-04-17 | End: 2019-04-17

## 2019-04-17 RX ORDER — DEXTROSE 50 % IN WATER 50 %
100 SYRINGE (ML) INTRAVENOUS ONCE
Qty: 0 | Refills: 0 | Status: COMPLETED | OUTPATIENT
Start: 2019-04-17 | End: 2019-04-17

## 2019-04-17 RX ADMIN — HYDROMORPHONE HYDROCHLORIDE 2 MILLIGRAM(S): 2 INJECTION INTRAMUSCULAR; INTRAVENOUS; SUBCUTANEOUS at 18:45

## 2019-04-17 RX ADMIN — Medication 100 MILLILITER(S): at 20:31

## 2019-04-17 RX ADMIN — HYDROMORPHONE HYDROCHLORIDE 1 MILLIGRAM(S): 2 INJECTION INTRAMUSCULAR; INTRAVENOUS; SUBCUTANEOUS at 17:29

## 2019-04-17 RX ADMIN — Medication 2000 GRAM(S): at 20:30

## 2019-04-17 RX ADMIN — SODIUM POLYSTYRENE SULFONATE 15 GRAM(S): 4.1 POWDER, FOR SUSPENSION ORAL at 22:30

## 2019-04-17 RX ADMIN — Medication 50 MILLILITER(S): at 23:36

## 2019-04-17 RX ADMIN — HYDROMORPHONE HYDROCHLORIDE 2 MILLIGRAM(S): 2 INJECTION INTRAMUSCULAR; INTRAVENOUS; SUBCUTANEOUS at 16:09

## 2019-04-17 RX ADMIN — INSULIN HUMAN 10 UNIT(S): 100 INJECTION, SOLUTION SUBCUTANEOUS at 20:30

## 2019-04-17 RX ADMIN — ALBUTEROL 10 MILLIGRAM(S): 90 AEROSOL, METERED ORAL at 20:31

## 2019-04-17 RX ADMIN — HYDROMORPHONE HYDROCHLORIDE 2 MILLIGRAM(S): 2 INJECTION INTRAMUSCULAR; INTRAVENOUS; SUBCUTANEOUS at 19:06

## 2019-04-17 NOTE — ED PROVIDER NOTE - PHYSICAL EXAMINATION
CONSTITUTIONAL: awake, alert, painful distress  HEAD: Normocephalic; atraumatic  EYES: EOMI, no nystagmus  ENMT: External appears normal, normal oropharynx  NECK: Supple; non-tender; no cervical lymphadenopathy  CARD: Normal Sl, S2; no audible murmur  RESP: Breathing comfortably on RA, normal wob, lungs ctab  ABD: Soft, non-distended; non-tender  EXT: No edema, normal ROM in all four extremities  SKIN: Warm, dry, no rashes  NEURO: aaox3, moving all extremities spontaneously

## 2019-04-17 NOTE — CONSULT NOTE ADULT - PROBLEM SELECTOR RECOMMENDATION 2
Pt with elevated serum potassium in the setting of sickle cell crisis and ESRD.   Medical management until PD can be arranged. Pt with elevated serum potassium in the setting of sickle cell crisis and ESRD.   Medical management until PD can be arranged. Please check BMP Q4 hours. Use calcium gluconate insulin D50 and kayexalate.   Case discussed with Dr. Castillo on Call attending

## 2019-04-17 NOTE — CHART NOTE - NSCHARTNOTEFT_GEN_A_CORE
MAR Note    Called by RUTH to assess patient for lethargy.    Upon entering room, patient noted to be lethargic but able to identify me ("Tu Bhat") without any prompting. Patient complaining of heavy, painful legs. Patient's pupils were not pinpoint and she had received opiates for several hours, lower suspicion for opiate overdose, especially given history. FS was checked and was too low to read. Patient was given juice and amp of D50. She returned to baseline mental status, answering questions, more alert.    Will continue to check FS x4hrs. Will establish better access.  Hemodynamically stable to proceed with urgent PD.    Ryan Bhat M.D.  Medicine Resident, PGY-3  Pager: [phone] MAR Note    Called by RUTH to assess patient for lethargy.    Upon entering room, patient noted to be lethargic but able to identify me ("Tu Bhat") without any prompting. Patient complaining of heavy, painful legs. Patient's pupils were not pinpoint and she had not received opiates for several hours, lower suspicion for opiate overdose, especially given history. FS was checked and was too low to read. Patient was given juice and amp of D50. She returned to baseline mental status, answering questions, more alert.    Will continue to check FS x4hrs. Will establish better access.  Hemodynamically stable to proceed with urgent PD.    Ryan Bhat M.D.  Medicine Resident, PGY-3  Pager: [phone]

## 2019-04-17 NOTE — ED ADULT TRIAGE NOTE - CHIEF COMPLAINT QUOTE
Pt c/o sickle cell crisis starting yesterday c/o pain to back and knee's pt denies sob, breathing even and unlabored, denies cp/discomfort.

## 2019-04-17 NOTE — ED PROVIDER NOTE - OBJECTIVE STATEMENT
38F w/ SCC, ESRD on PD at home, completed last night, avascular necrosis s/p hip replacement, one episode of acute chest in the past, p/w 3 days of sickle cell pain. States pain started in RUE and now has spread to back and bilateral knees. No improvement w/ Tylenol w/ Codeine at home. Denies SOB, chest pain, nausea, vomiting, headaches, or other symptoms. States this feels like her previous sickle cell pain.

## 2019-04-17 NOTE — ED PROVIDER NOTE - NS ED ROS FT
General: denies fever, chills  HENT: denies nasal congestion, rhinorrhea  Eyes: denies visual changes, blurred vision  CV: denies chest pain, palpitations  Resp: denies difficulty breathing, cough  Abdominal: denies nausea, vomiting, diarrhea, abdominal pain  MSK: + back pain, + upper extremity pain, + knee pain  Neuro: denies headaches, numbness, weakness  Skin: denies rashes, cuts, bruises

## 2019-04-17 NOTE — CONSULT NOTE ADULT - PROBLEM SELECTOR RECOMMENDATION 9
Pt with ESRD on PD. Plan to continue on ambulatory PD exchanges today overnight. Pt with ESRD on PD. Plan to continue on ambulatory PD exchanges today overnight.  Will plan for 2 exchanges with 2 hour dwells.

## 2019-04-17 NOTE — ED ADULT NURSE NOTE - NSIMPLEMENTINTERV_GEN_ALL_ED
Implemented All Universal Safety Interventions:  [place] to call system. Call bell, personal items and telephone within reach. Instruct patient to call for assistance. Room bathroom lighting operational. Non-slip footwear when patient is off stretcher. Physically safe environment: no spills, clutter or unnecessary equipment. Stretcher in lowest position, wheels locked, appropriate side rails in place.

## 2019-04-17 NOTE — ED PROVIDER NOTE - CLINICAL SUMMARY MEDICAL DECISION MAKING FREE TEXT BOX
Janay: 38 F, SS Dz, on dialysis, AVN, p/w SS crisis typical for her crises. No e/o acute chest syndrome. Plan: labs, pain meds, re-assess.

## 2019-04-17 NOTE — ED PROVIDER NOTE - ATTENDING CONTRIBUTION TO CARE
I performed a face-to-face evaluation of the patient and performed a history and physical examination. I agree with the history and physical examination.    38 F, SS Dz, on dialysis, AVN, p/w SS crisis typical for her crises. No e/o acute chest syndrome. Plan: labs, pain meds, re-assess.

## 2019-04-17 NOTE — ED ADULT NURSE NOTE - OBJECTIVE STATEMENT
39yo f a&ox4 amb presents to ed c/o SCC. pt states pain began last night, primarily in her R arm, back and b/l knees. pt hx acute chest 2 years ago, last crisis hospitalization multiple years ago. pt denies recent travel, states son at home sick w/ strep. pt hx ESRD, on daily peritoneal dialysis. denies cp, sob, dizziness, lightheadedness, n/v/d, fever/chills. breathing even/unlabored. abdomen soft, nontender, nondistended. skin warm, dry, and intact. 20g iv lock placed to R forearm. labs sent. will continue to monitor.

## 2019-04-18 VITALS — SYSTOLIC BLOOD PRESSURE: 119 MMHG | DIASTOLIC BLOOD PRESSURE: 54 MMHG | HEART RATE: 54 BPM

## 2019-04-18 DIAGNOSIS — D57.1 SICKLE-CELL DISEASE WITHOUT CRISIS: ICD-10-CM

## 2019-04-18 RX ORDER — DEXTROSE 50 % IN WATER 50 %
15 SYRINGE (ML) INTRAVENOUS ONCE
Qty: 0 | Refills: 0 | Status: DISCONTINUED | OUTPATIENT
Start: 2019-04-18 | End: 2019-04-18

## 2019-04-18 RX ORDER — DEXTROSE 50 % IN WATER 50 %
25 SYRINGE (ML) INTRAVENOUS ONCE
Qty: 0 | Refills: 0 | Status: DISCONTINUED | OUTPATIENT
Start: 2019-04-18 | End: 2019-04-18

## 2019-04-18 RX ORDER — HYDROMORPHONE HYDROCHLORIDE 2 MG/ML
2 INJECTION INTRAMUSCULAR; INTRAVENOUS; SUBCUTANEOUS EVERY 4 HOURS
Qty: 0 | Refills: 0 | Status: DISCONTINUED | OUTPATIENT
Start: 2019-04-18 | End: 2019-04-18

## 2019-04-18 RX ORDER — HYDROMORPHONE HYDROCHLORIDE 2 MG/ML
1 INJECTION INTRAMUSCULAR; INTRAVENOUS; SUBCUTANEOUS ONCE
Qty: 0 | Refills: 0 | Status: DISCONTINUED | OUTPATIENT
Start: 2019-04-18 | End: 2019-04-18

## 2019-04-18 RX ORDER — DEXTROSE 50 % IN WATER 50 %
12.5 SYRINGE (ML) INTRAVENOUS ONCE
Qty: 0 | Refills: 0 | Status: DISCONTINUED | OUTPATIENT
Start: 2019-04-18 | End: 2019-04-18

## 2019-04-18 RX ORDER — ONDANSETRON 8 MG/1
8 TABLET, FILM COATED ORAL ONCE
Qty: 0 | Refills: 0 | Status: COMPLETED | OUTPATIENT
Start: 2019-04-18 | End: 2019-04-18

## 2019-04-18 RX ORDER — GLUCAGON INJECTION, SOLUTION 0.5 MG/.1ML
1 INJECTION, SOLUTION SUBCUTANEOUS ONCE
Qty: 0 | Refills: 0 | Status: DISCONTINUED | OUTPATIENT
Start: 2019-04-18 | End: 2019-04-18

## 2019-04-18 RX ORDER — ONDANSETRON 8 MG/1
4 TABLET, FILM COATED ORAL EVERY 6 HOURS
Qty: 0 | Refills: 0 | Status: DISCONTINUED | OUTPATIENT
Start: 2019-04-18 | End: 2019-04-18

## 2019-04-18 RX ORDER — GENTAMICIN SULFATE 0.1 %
1 OINTMENT (GRAM) TOPICAL
Qty: 0 | Refills: 0 | Status: DISCONTINUED | OUTPATIENT
Start: 2019-04-18 | End: 2019-04-18

## 2019-04-18 RX ORDER — ACETAMINOPHEN 500 MG
650 TABLET ORAL EVERY 6 HOURS
Qty: 0 | Refills: 0 | Status: DISCONTINUED | OUTPATIENT
Start: 2019-04-18 | End: 2019-04-18

## 2019-04-18 RX ORDER — SODIUM CHLORIDE 9 MG/ML
1000 INJECTION, SOLUTION INTRAVENOUS
Qty: 0 | Refills: 0 | Status: DISCONTINUED | OUTPATIENT
Start: 2019-04-18 | End: 2019-04-18

## 2019-04-18 RX ORDER — HEPARIN SODIUM 5000 [USP'U]/ML
5000 INJECTION INTRAVENOUS; SUBCUTANEOUS EVERY 8 HOURS
Qty: 0 | Refills: 0 | Status: DISCONTINUED | OUTPATIENT
Start: 2019-04-18 | End: 2019-04-18

## 2019-04-18 RX ADMIN — HYDROMORPHONE HYDROCHLORIDE 1 MILLIGRAM(S): 2 INJECTION INTRAMUSCULAR; INTRAVENOUS; SUBCUTANEOUS at 00:30

## 2019-04-18 RX ADMIN — ONDANSETRON 8 MILLIGRAM(S): 8 TABLET, FILM COATED ORAL at 00:53

## 2019-04-18 NOTE — CHART NOTE - NSCHARTNOTEFT_GEN_A_CORE
RRT/CODE BLUE:    Called by Nursing Staff as patient was noted to be bradycardic and becoming less responsive. Patient was being set up for PD prior to this and had vomitted. On the way to evaluate patient, RRT was called overhead. Upon entering room, patient was noted to minimally responsive with weak pulse. Narcan 0.2mg was administered. FS was 119. Patient was then noted to be pulseless and RRT was upgraded to Code Blue. Calcium Chloride was admistered in the setting of hyperkalemia at presentation. RRT/CODE BLUE:    Called by Nursing Staff as patient was noted to be bradycardic and becoming less responsive. Patient was being set up for PD prior to this and had vomited. On the way to evaluate patient, RRT was called overhead. Upon entering room, patient was noted to minimally responsive with weak pulse. Narcan 0.2mg was administered. FS was 119. Patient was then noted to be pulseless and RRT was upgraded to Code Blue. Calcium Chloride was administered in the setting of hyperkalemia at presentation. CPR protocol was followed. During all pulse checks, patient was noted to be in PEA or Asystole. In total, Epi x5, Calcium x3, Bicarb x2, and Narcan 1mg total. While airway was being established, patient was noted to have copious aspirate in her airway. ~400cc of aspirate was suctioned from the airway, not including aspirate on bed and floor. Code was called at 1:26 AM on 4/18/19. Presumably PEA arrest in the setting of hypoxia due to aspiration.    Family was notified.    Ryan Bhat M.D.  Medicine Resident, PGY-3  Pager: [phone]

## 2019-04-18 NOTE — H&P ADULT - NSICDXFAMILYHX_GEN_ALL_CORE_FT
FAMILY HISTORY:  Family history of sickle cell trait in father  Family history of sickle cell trait in mother    Sibling  Still living? Yes, Estimated age: Age Unknown  Family history of sarcoidosis, Age at diagnosis: Age Unknown  Family history of sickle cell disease, Age at diagnosis: Age Unknown

## 2019-04-18 NOTE — H&P ADULT - NSICDXPASTMEDICALHX_GEN_ALL_CORE_FT
PAST MEDICAL HISTORY:  Anemia     Cholelithiasis     Chronic kidney disease started dialysis March 2017 with etiology from Parvo Virus 2016    HPV (Human Papillomavirus)     Nephrotic syndrome     Parvovirus B19 infection 2016 resulting in chronic renal disease    Peritoneal dialysis catheter in place     Sickle cell disease     Umbilical hernia November 2017

## 2019-04-18 NOTE — H&P ADULT - ASSESSMENT
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

## 2019-04-18 NOTE — PROVIDER CONTACT NOTE (OTHER) - ACTION/TREATMENT ORDERED:
leads were reapplied because they were wet & fell off during vomiting episode. pt now found to be bradycardic HR 50's. pt now not responding to commands &unresponsive with pulse, rapid response called

## 2019-04-18 NOTE — PROVIDER CONTACT NOTE (OTHER) - SITUATION
pt arrived from ED, lethargic, unable to hold conversation, yelling in pain & complaining of burning in lower extremities.

## 2019-04-18 NOTE — H&P ADULT - NSHPPHYSICALEXAM_GEN_ALL_CORE
Vital Signs Last 24 Hrs  T(C): 36.8 (04-17-19 @ 23:18), Max: 37.1 (04-17-19 @ 19:58)  T(F): 98.3 (04-17-19 @ 23:18), Max: 98.7 (04-17-19 @ 19:58)  HR: 99 (04-17-19 @ 23:51) (89 - 120)  BP: 144/95 (04-17-19 @ 23:51) (116/77 - 144/95)  BP(mean): --  RR: 20 (04-17-19 @ 23:51) (16 - 20)  SpO2: 100% (04-17-19 @ 23:18) (99% - 100%)    PHYSICAL EXAM:  GENERAL: NAD, well-groomed, well-developed  HEAD:  Atraumatic, Normocephalic  EYES: EOMI, PERRLA, conjunctiva and sclera clear  ENMT: No tonsillar erythema, exudates, or enlargement; Moist mucous membranes, Good dentition, No lesions  NECK: Supple, No JVD, Normal thyroid  CHEST/LUNG: Clear to percussion bilaterally; No rales, rhonchi, wheezing, or rubs  HEART: Regular rate and rhythm; No murmurs, rubs, or gallops  ABDOMEN: Soft, Nontender, Nondistended; Bowel sounds present  EXTREMITIES:  2+ Peripheral Pulses, No clubbing, cyanosis, or edema  LYMPH: No lymphadenopathy noted  SKIN: No rashes or lesions  NERVOUS SYSTEM:  Alert & Oriented X3, Good concentration; Motor Strength 5/5 B/L upper and lower extremities; DTRs 2+ intact and symmetric

## 2019-04-18 NOTE — DISCHARGE NOTE FOR THE EXPIRED PATIENT - HOSPITAL COURSE
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  Pt received medical management for hyperkalemia including albuterol and kayexalate.  Nephrology consulted to initiate urgent PD; pt had significant episode of hypoglycemia where FSG was undetectable; mental status and FSG responded to D50.  PT went up to medical floor to start PD and vomited at the initiation of the session.  RRT called for unresponsiveness around 1:14 AM which was then upgraded to code blue at 1:15 AM.  Pt initially in PEA arrest, which then become asystole.  Chest compressions were initiated w/ 30:2 ratio.   Pt received 5 IVP of Epinephrine, additional calcium gluconate and bicarbonate.  During compressions, pt vomited bilious material, suggesting a likely component of aspiration resulting in hypoxemic respiratory failure, contributing to cardiac compromise.  Pt pronounced dead at 1:26 AM based on cardiopulmonary criteria.  Family were notified and condolences given. 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  Pt received medical management for hyperkalemia including albuterol and kayexalate.  Nephrology consulted to initiate urgent PD; pt had significant episode of hypoglycemia where FSG was undetectable; mental status and FSG responded to D50.  PT went up to medical floor to start PD and vomited at the initiation of the session.  RRT called for unresponsiveness around 1:14 AM which was then upgraded to code blue at 1:15 AM.  Pt's electrical rhythms oscillated between PEA and asystole.  Chest compressions were initiated w/ 30:2 ratio.   In total, Epi x5, Calcium x3, Bicarb x2, and Narcan 1mg total. While airway was being established, patient was noted to have copious aspirate in her airway. ~400cc of aspirate was suctioned from the airway, not including aspirate on bed and floor, suggesting a likely component of aspiration resulting in hypoxemic respiratory failure, contributing to cardiac compromise.  Pt pronounced dead at 1:26 AM based on cardiopulmonary criteria.  Family were notified and condolences given. 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  Pt received medical management for hyperkalemia including albuterol and kayexalate.  Nephrology consulted to initiate urgent PD; pt had significant episode of hypoglycemia where FSG was undetectable; mental status and FSG responded to D50.  PT went up to medical floor to start PD and vomited at the initiation of the session.  RRT called for unresponsiveness around 1:14 AM which was then upgraded to code blue at 1:15 AM.  Pt's electrical rhythms oscillated between PEA and asystole.  Chest compressions were initiated w/ 30:2 ratio.   In total, Epi x5, Calcium x3, Bicarb x2, and Narcan 1mg total. While airway was being established, patient was noted to have copious aspirate in her airway. ~400cc of aspirate was suctioned from the airway, not including aspirate on bed and floor, suggesting a likely component of aspiration resulting in hypoxemic respiratory failure, contributing to cardiac compromise.  Pt pronounced dead at 1:26 AM based on cardiopulmonary criteria.   Family were notified and condolences given.

## 2019-04-18 NOTE — PROVIDER CONTACT NOTE (OTHER) - ACTION/TREATMENT ORDERED:
FS rechecked at 23:28 & 23:32 to check for hypoglycemia, which was done twice & resulted as unreadable and <25. MD ordered dextrose 50% IVP with new resulting FS of 222 at 23:41. FS then ordered q4 x4

## 2019-04-18 NOTE — PROGRESS NOTE ADULT - SUBJECTIVE AND OBJECTIVE BOX
Anesthesia called for emergency intubation.  On arrival, pt unresponsive, CPR initiated, and bag mask ventilation with 100% FiO2 being performed.  DL x _1_ with MAC 3 blade, grade 1___ view, _7.5__ cuffed ETT passed without trauma, + ETCO2 via EasyCap, + BBS, taped at 23____ cm by the team in my presence.  teeth intact, no complications.  b/l b/s and etco2 confirmed by 2 separate members of the code team team also. aspirate coming out from the ETT.  endotrachael tube needed suctioning by the team.  code team to manage the patient.  David Pizano; User ADM; Lupe Martinez; Dave Gustafson; Isaac Key; . -Attending Only-; Radha Minor; Jeffry Marie; Team MARK [employer]; Tuan Chavez; Tuan Chavez; Tuan Chavez; Naida Palacios; Ryan Bhat; Ordering Physician; Johnny Gray  [phone]     38y

## 2019-04-18 NOTE — CHART NOTE - NSCHARTNOTEFT_GEN_A_CORE
Called [facility] at [phone] to see if pt needs an autopsy and further investigation by ME.  After a list of screening questions, it was determined that this case does not meet criteria for ME investigation given that pt has sickle cell, ESRD on PD, and had hyperkalemia w/ a clear COD as a result of the aforementioned.  Spoke with Ms. Lolis Hodgson at the [facility] office who provided this information.

## 2019-04-18 NOTE — H&P ADULT - HISTORY OF PRESENT ILLNESS
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 Pt states that pain began her RUE and then disseminated into her back and b/l knees.  Pt takes Tylenol w/ Codeine, which did nothing for the pain.  Pt states that this feels like her sickle cell crisis pain.      Regarding her PD, pt follows w/ house renal - Dr. Larsen.  Pt currently does PD- 4 cycles 2.5% 2L for 8 hours with a midday extraneal fill of 1.5L. Pt did her last PD today. Pt is compliant w/ HD and follows in clinic regularly.      Denies f/c/abd pain/n/v/SOB/CP; now currently having nausea and vomiting on PD, which improved w/ Zofran.  Pt denies sick contacts or recent travel.    Of note, prior to exam, pt found lethargic; pupils were not pinpoint; FSG checked showed undetectable blood glucose and mental status improved w/ maple syrup in water and D50.      In the ED:  Vital Signs Last 24 Hrs  T(C): 36.8 (04-17-19 @ 23:18), Max: 37.1 (04-17-19 @ 19:58)  T(F): 98.3 (04-17-19 @ 23:18), Max: 98.7 (04-17-19 @ 19:58)  HR: 99 (04-17-19 @ 23:51) (89 - 120)  BP: 144/95 (04-17-19 @ 23:51) (116/77 - 144/95)  BP(mean): --  RR: 20 (04-17-19 @ 23:51) (16 - 20)  SpO2: 100% (04-17-19 @ 23:18) (99% - 100%)    Received: albuterol, calcium gluconate 1 g, dilaudid 2 mg x 2, dilaudid 1 mg x2, kayexalate

## 2019-04-18 NOTE — PROVIDER CONTACT NOTE (OTHER) - RECOMMENDATIONS
MD called to come assess patient, clinical impact Jai called to come to bedside for assessment as well.

## 2019-04-18 NOTE — PROVIDER CONTACT NOTE (OTHER) - REASON
pt received from ED, unable to ambulate to bed, complaining of burning "all over" tingling and extreme pain

## 2019-04-18 NOTE — H&P ADULT - NSHPLABSRESULTS_GEN_ALL_CORE
04-17    136  |  84<L>  |  73<H>  ----------------------------<  47<L>  7.1<HH>   |  15<L>  |  11.00<H>  04-17    132<L>  |  85<L>  |  71<H>  ----------------------------<  91  8.1<HH>   |  14<L>  |  10.61<H>    Ca    10.1      17 Apr 2019 20:05  Ca    9.7      17 Apr 2019 17:15    TPro  8.3  /  Alb  3.6  /  TBili  1.8<H>  /  DBili  x   /  AST  216<H>  /  ALT  73<H>  /  AlkPhos  512<H>  04-17               10.9   19.12 )-----------( 63       ( 17 Apr 2019 17:15 )             32.5     CAPILLARY BLOOD GLUCOSE    POCT Blood Glucose.: 119 mg/dL (18 Apr 2019 00:46)  POCT Blood Glucose.: 222 mg/dL (17 Apr 2019 23:41)  POCT Blood Glucose.: <25 mg/dL (17 Apr 2019 23:32)  POCT Blood Glucose.: <25 mg/dL (17 Apr 2019 23:28)  POCT Blood Glucose.: 139 mg/dL (17 Apr 2019 21:06)  POCT Blood Glucose.: 42 mg/dL (17 Apr 2019 20:16)

## 2019-04-18 NOTE — PROVIDER CONTACT NOTE (OTHER) - BACKGROUND
code blue upgraded at 1:10 am after sternal rub & pulse check again, CPR initiated & pt upgraded to code blue, see code blue flowsheet. code blue upgraded and called at 1:10 am

## 2019-04-18 NOTE — H&P ADULT - NSHPSOCIALHISTORY_GEN_ALL_CORE
Marital Status:  (   )    (   ) Single    (   )    (  )   Occupation:   Lives with: (  ) alone  (  ) children   (  ) spouse   (  ) parents  (  ) other  Substance Use (street drugs): (  ) never used  (  ) other:  Tobacco Usage:  (   ) never smoked   (   ) former smoker   (   ) current smoker  (     ) pack year  (        ) last cigarette date  Alcohol Usage:  Sexual History:

## 2019-04-18 NOTE — PROVIDER CONTACT NOTE (OTHER) - ASSESSMENT
once pt was transferred to bed, pt voided on herself, BP stable, tachycardic 's once placed on telemetry monitoring. pt complaining of burning in b/l LE and feels she is having an "allergic reaction"

## 2019-04-18 NOTE — PROVIDER CONTACT NOTE (OTHER) - SITUATION
RN made aware that patient was feeling nauseous post installation of peritoneal dialysis. dialysis RN at bedside.

## 2019-04-18 NOTE — H&P ADULT - NSICDXPASTSURGICALHX_GEN_ALL_CORE_FT
PAST SURGICAL HISTORY:  H/O tubal ligation in , along with     H/O umbilical hernia repair     H/O:  014    History of hip surgery R hip due to necrosis in --bone graft from right tibia    History of peritoneal dialysis 3/2017    S/P Laparoscopic Cholecystectomy

## 2019-04-18 NOTE — H&P ADULT - NSHPREVIEWOFSYSTEMS_GEN_ALL_CORE
REVIEW OF SYSTEMS:    CONSTITUTIONAL: No weakness, fevers or chills  EYES/ENT: No visual changes;  No vertigo or throat pain   NECK: No pain or stiffness  RESPIRATORY: No cough, wheezing, hemoptysis; No shortness of breath  CARDIOVASCULAR: No chest pain or palpitations  GASTROINTESTINAL: No abdominal or epigastric pain. + nausea/vomiting; no hematemesis; No diarrhea or constipation. No melena or hematochezia.  GENITOURINARY: No dysuria, frequency or hematuria  NEUROLOGICAL: No numbness or weakness  SKIN: No itching, burning, rashes, or lesions   All other review of systems is negative unless indicated above.

## 2019-04-18 NOTE — H&P ADULT - ATTENDING COMMENTS
Patient evaluated during code blue. This is a 38W with history of sickle cell disease and ESRD on PD who initially presented to the hospital for sickle cell crisis pain. While here her lab work was significant for hyperkalemia and metabolic acidosis. She was given medical management for her hyperkalemia (albuterol/insulin regular + D50/kayexelate) and was admitted for PD and pain management. While on the floor, her stay was complicated by hypoglycemia that, as per documentation, had improved with maple syrup in water and D50. She was also still in pain and had required a couple of doses of dilaudid. As she was being set up for her PD she became less responsive and bradycardic on telemetry and an RRT was called. The RRT was then upgraded to a code blue and she was started on CPR (please see MAR note for full code blue details). Patient was intubated during the code blue by anesthesia and was found to have significant amounts of aspiration in her airway. Patient likely coded due to PEA in setting of hypoxia from her aspiration. Unfortunately patient did not survive the code and she  at 1:26 AM on 19. This is a 38W with history of sickle cell disease and ESRD on PD who initially presented to the hospital for sickle cell crisis pain. While here her lab work was significant for hyperkalemia and metabolic acidosis. She was given medical management for her hyperkalemia (albuterol/insulin regular + D50/kayexelate) and was admitted for PD and pain management. While on the floor, her stay was complicated by hypoglycemia that, as per documentation, had improved with maple syrup in water and D50. She was also still in pain and had required a couple of doses of dilaudid. As she was being set up for her PD, she had the dialysate infused into her abdomen and started to complain of significant nausea and vomited a copious amount (as per nursing). Her leads had come off during this time and when they were placed back on she was noted to be bradycardic on telemetry and an RRT was called. The RRT was then upgraded to a code blue and she was started on CPR (please see MAR note for full code blue details). Patient was intubated during the code blue by anesthesia and was found to have significant amounts of aspiration in her airway. Patient likely coded due to PEA in setting of hypoxia from her aspiration. Unfortunately patient did not survive the code and she  at 1:26 AM on 19. This is a 38W with history of sickle cell disease and ESRD on PD who initially presented to the hospital for sickle cell crisis pain. While here her lab work was significant for hyperkalemia and metabolic acidosis. She was given medical management for her hyperkalemia (albuterol/insulin regular + D50/kayexelate) and was admitted for PD and pain management. While on the floor, her stay was complicated by hypoglycemia that, as per documentation, had improved with maple syrup in water and D50. She was also still in pain and had required a couple of doses of dilaudid. As she was being set up for her PD, she had the dialysate infused into her abdomen and started to complain of significant nausea and vomited a copious amount (as per nursing). Her leads had come off during this time and when they were placed back on she was noted to be bradycardic on telemetry and an RRT was called. The RRT was then upgraded to a code blue and she was started on CPR (please see MAR note and discharge note for full code blue details). Patient was intubated during the code blue by anesthesia and was found to have significant amounts of aspiration in her airway. Patient likely coded due to PEA in setting of hypoxia from her aspiration. Unfortunately patient did not survive the code and she  at 1:26 AM on 19.

## 2019-04-18 NOTE — PROVIDER CONTACT NOTE (OTHER) - ASSESSMENT
patient complaining of nausea. basin given at bedside for possible vomit. pt encouraged to sit up at 60-90% in case of potential vomiting. dialysis RN left bedside at that time & pt began complaining of similar symptoms from prior hypoglycemic episode. finger stick rechecked at 0046 and resulted 119.

## 2019-04-18 NOTE — PROVIDER CONTACT NOTE (OTHER) - RECOMMENDATIONS
after fingerstick PCA alerted RN that patient had vomited. clinical impact notified to return to bedside. pt now sitting up and actively vomiting in basin with x3 assist to stay upright in bed.

## 2019-04-18 NOTE — CHART NOTE - NSCHARTNOTEFT_GEN_A_CORE
Called to bedside due to Code Blue; pt found unresponsive w/o pulse.  Pt initially in PEA arrest followed by asystole in spite of 5 IVP Epi, bicarb, and calcium gluconate.  Pt had multiple rounds of compressions.  Pt pronounced dead at 1:26 AM based on absence of pulse, breath sounds, and heart beat as per cardiopulmonary criteria.  Family were notified and condolences given.

## 2019-04-18 NOTE — PROVIDER CONTACT NOTE (OTHER) - SITUATION
pt unresponsive with faint pulse, rapid response called. after sternal rub & pulse check again, pt upgraded to code blue, see code blue flowsheet. pt unresponsive with pulse, rapid response called. after sternal rub & pulse check again, CPR initiated pt upgraded to code blue, see code blue flowsheet. pt unresponsive with pulse, rapid response called, zoll and crash cart placed & bedside. pads & leads placed on patient.

## 2019-04-19 ENCOUNTER — RESULT REVIEW (OUTPATIENT)
Age: 39
End: 2019-04-19

## 2019-04-25 ENCOUNTER — APPOINTMENT (OUTPATIENT)
Dept: INTERNAL MEDICINE | Facility: HOSPITAL | Age: 39
End: 2019-04-25

## 2019-08-01 NOTE — ED ADULT NURSE NOTE - PAIN RATING/NUMBER SCALE (0-10): REST
· EDC: 9/04/19 by 10 wk u/s  · Accepts Blood: yes  · Anesthesia Consult: not needed  · 1T Labs: normal, in epic  · Genetic screen: [x ]Carrier screen negative, genetic counselor [x  ] first trimester screen low risk [ ]msafp - did not complete  [ x ] chromosome analysis; Normal  · Anatomy US: Normal but intracardiac echogenic focus seen, no other abnl --> Follow up u/s normal  · 3T Labs: Done and normal   · GBBS: collected  · Immunization:  [ x ]Flu vaccine  2/8/19      [x]Tdap 5/31  · Contraception: Mirena at 6w PP visit  · Feeding plan (breast/formula): breastfeed  
8

## 2019-09-02 PROBLEM — Z09 FOLLOW UP: Status: ACTIVE | Noted: 2017-11-01

## 2019-09-24 NOTE — PATIENT PROFILE ADULT - NSPROEDAABILITYLEARN_GEN_A_NUR
57 y/o female no PMH presents to ED for call back for contaminated urine culture. Pt. was seen on 9/21/19 for abdominal and back pain - had negative ua, negative CT a/p and labs wnl. Pt was dc with pmd follow up - urine culture came back today with 3 or more organisms indicating contaminated sample (patient states she didn't wipe first). Pt. was called today and states she still is experiencing symptoms and told to return to ER if symptoms persist. Pt. states symptoms have worsened or improved - states still having lower abdominal pain and urinary frequency as well. Denies fever chills nausea vomit weakness dizziness.
none

## 2020-01-09 NOTE — PATIENT PROFILE ADULT. - FALL HARM RISK
Pt advanced from lying to sitting at side of bed without adverse events: VSS/RESP WNL  abd assessment unchanged- denies pain /tolerating oral liquids without ponv    Physician  out to see pt and family and discuss exam results with family /significant other/pts family verbalized understanding of postop activity restrictions -pt/family denies additional questions    Reviewed discharge instructions with family  And pt  /verbalized understanding - all questions /concerns addressed  Educational materials given to pt and explained- all questions answered    Pain level =0 surgery

## 2020-02-17 NOTE — PROGRESS NOTE ADULT - PROBLEM SELECTOR PLAN 3
Patient comes to clinic for follow up anticoagulation management visit.  Patient was last seen 2/3/20  with an INR of 2.5 on 28mg TWD.  No changes were made at that time and INR today is 2.8 within INR goal range of 2.0-3.0. Current warfarin dosing verified with patient.  Patient denies any changes that could have impacted the INR.  Reinforced with patient to call with any medication changes as this can impact the INR.  Patient verbalized understanding.  Dr Oliveira is in office supervising the anticoagulation clinic.  Note forwarded to physician for review.    Patient was notified that their INR result was within therapeutic range. Patient was instructed to continue taking 28mg TWD.  Next INR is due in 4 weeks.  Patient was instructed to contact us with any unusual bleeding, bruising, any changes in medications, diet or health status and if there are any other questions or concerns. Patient verbalized understanding of above.       -Plan for catheter replacement today.  -Keep NPO.

## 2020-04-04 NOTE — CHART NOTE - NSCHARTNOTEFT_GEN_A_CORE
pt currently not connected to PCA 2/2 non-functioning IV, receiving IV abx other functioning IV. Patient asking for tylenol #3 PO as pain management. ISTOP ref [id number]  reflecting tylenol #3 as most current pain medication prescribed as outpatient management. KADY VILLAR  47M no pmh pw fevers, cough, myalgias and headache 2/2 to COVID, developed AMS on the floor and was intubated for resp failure on 4/3. HCT done afterwards showed areas of low attenuation in posterior fossa. D dimer [id number], lactate 2.3 . Is on fentanyl, prop, rocuronium, and levo.   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 HCT done afterwards showed areas of low attenuation in posterior fossa. D dimer 99200, lactate 2.3 . Is on fentanyl, prop, rocuronium, and levo.   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

## 2020-06-17 NOTE — CONSULT NOTE ADULT - SUBJECTIVE AND OBJECTIVE BOX
Over Night Pulse Oximetry     Indication:To assess the efficacy of the current pressure .       Impression:   The study was done on CPAP 5-15 cm. The total analyzed time was 7 hrs 0 min. O2 Sat. jason was 86% and mean O2 sat was 90 % and baseline O2 at 92%. O2 sat was below 88% for 1.1 min of the flow evaluation time. Oxygen Desaturation (>=3%) Index was 1.9/hr.      Recommendation:  Overall it is an unremarkable oximetry. Continue CPAP at the current pressure. Clinical correlation recommended.     
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 Pt. being seen for ESRD/dialysis management. Pt.'s outpatient nephrologist is Dr. Larsen. Pt currently does PD- 4 cycles 2.5% 2L for 8 hours with a midday extraneal fill of 1.5L. Pt did her last PD today. She has been compliant with her daily PD. Her last monthly visit was on Monday which went well. Pt seen and examined in the ER. Admits to pain.     PAST HISTORY  --------------------------------------------------------------------------------  PAST MEDICAL & SURGICAL HISTORY:  Peritoneal dialysis catheter in place  Umbilical hernia: 2017  Cholelithiasis  Anemia  Sickle cell disease  Chronic kidney disease: started dialysis 2017 with etiology from Parvo Virus   Parvovirus B19 infection:  resulting in chronic renal disease  Nephrotic syndrome  HPV (Human Papillomavirus)  History of peritoneal dialysis: 3/2017  H/O umbilical hernia repair:   History of hip surgery: R hip due to necrosis in --bone graft from right tibia  H/O tubal ligation: in , along with   H/O: : 014  S/P Laparoscopic Cholecystectomy    FAMILY HISTORY:  Family history of sarcoidosis (Sibling): Sister with HgbSS and sarcoidosis  Family history of sickle cell disease (Sibling): Sister with HgbSS and sarcoidosis  Family history of sickle cell trait in mother  Family history of sickle cell trait in father    PAST SOCIAL HISTORY:    ALLERGIES & MEDICATIONS  --------------------------------------------------------------------------------  Allergies    morphine (Other)    Intolerances      Standing Inpatient Medications    PRN Inpatient Medications      REVIEW OF SYSTEMS  --------------------------------------------------------------------------------  Gen: +weakness  Skin: No rashes  Head/Eyes/Ears/Mouth: No headache  Respiratory: No dyspnea, cough  CV: No chest pain, PND, orthopnea  GI: No abdominal pain, diarrhea, constipation, nausea, vomiting  : No increased frequency, dysuria  MSK: No edema  Neuro: No dizziness/lightheadedness    All other systems were reviewed and are negative, except as noted.    VITALS/PHYSICAL EXAM  --------------------------------------------------------------------------------  T(C): 37.1 (19 @ 19:58), Max: 37.1 (19 @ 19:58)  HR: 90 (19 @ 19:58) (89 - 104)  BP: 121/71 (19 @ 19:58) (116/77 - 135/92)  RR: 16 (19 @ 19:58) (16 - 18)  SpO2: 100% (19 19:58) (99% - 100%)  Wt(kg): --    Physical Exam:  	Gen: NAD  	Pulm: CTA B/L  	CV: RRR, S1S2; no rub  	Abd: +BS, soft, nontender/nondistended  	: No suprapubic tenderness  	UE: Warm, no asterixis  	LE: Warm, no edema  	Psych: Normal affect and mood  	Skin: Warm, without rashes  	Vascular access: + PD catheter    LABS/STUDIES  --------------------------------------------------------------------------------              10.9   19.12 >-----------<  63       [19 @ 17:15]              32.5     132  |  85  |  71  ----------------------------<  91      [19 @ 17:15]  8.1   |  14  |  10.61        Ca     9.7     [19 @ 17:15]    TPro  8.3  /  Alb  3.6  /  TBili  1.8  /  DBili  x   /  AST  216  /  ALT  73  /  AlkPhos  512  [19 @ 17:15]    Creatinine Trend:  SCr 10.61 [ 17:15]    Urinalysis - [05-10-16 @ 19:10]      Color PLYEL / Appearance CLEAR / SG 1.011 / pH 7.5      Gluc 50 / Ketone NEGATIVE  / Bili NEGATIVE / Urobili NORMAL       Blood SMALL / Protein >600 / Leuk Est NEGATIVE / Nitrite NEGATIVE      RBC 0-2 / WBC 10-25 / Hyaline 2-5 / Gran  / Sq Epi OCC / Non Sq Epi  / Bacteria FEW      HbA1c 3.5      [05-15-18 @ 10:40]  TSH 1.62      [18 @ 05:51]    HBsAb Reactive      [10-12-18 @ 06:50]  HBsAg Nonreactive      [10-12-18 @ 06:50]  HCV 0.14, Nonreactive Hepatitis C AB  S/CO Ratio                        Interpretation  < 1.0                                     Non-Reactive  1.0 - 4.9                           Weakly-Reactive  > 5.0                                 Reactive  Non-Reactive: Aperson with a non-reactive HCV antibody  result is considered uninfected.  No further action is  needed unless recent infection is suspected.  In these  cases, consider repeat testing later to detect  seroconversion..  Weakly-Reactive: HCV antibody test is abnormal, HCV RNA  Qualitative test will follow.  Reactive: HCV antibody test is abnormal, HCV RNA  Qualitative test will follow.  Note: HCV antibody testing is performed on the [employer]   system.      [10-12-18 @ 06:50]    dsDNA <12      [16 @ 06:20]  Rheumatoid Factor 8.6      [16 @ 06:20]  Syphilis Screen (Treponema Pallidum Ab) Negative      [14 @ 17:15]

## 2020-08-25 NOTE — ED PROVIDER NOTE - ATTENDING CONTRIBUTION TO CARE
23 KIMBERLY Attending Note - Dr. 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 Angulo.  PE: pt is alert and oriented, perrl, ent normal, membranes are moist, neck supple. no lymphadenopathy or thyroid enlargement, No JVD.  Chest clear to P&A, Heart- reg rhythm without murmur, rubs or gallops, radial pulses equal bilaterally.  Abd is soft, non-tender, Bowel sounds are active. no mass or organomegaly. : No CVA tenderness. Neuro:  Pt alert and oriented x 3. Perrl    Distal neurosensory is intact. Motor function is 5/5 strength bilaterally.  No focal deficits. Extremities:  No edema.  Skin: warm and dry.  Imp migration of peritoneal catheter,  Plan labs and admission

## 2020-09-10 NOTE — ED ADULT NURSE REASSESSMENT NOTE - TEMPLATE LIST FOR HEAD TO TOE ASSESSMENT
General [Subsequent Evaluation] : a subsequent evaluation for [FreeTextEntry2] : ear follow up / FEELING BETTER

## 2020-12-09 NOTE — PROGRESS NOTE ADULT - PROBLEM SELECTOR PLAN 2
Patient stated he's feeling better,optimistic goal today is to talk to Dr. Evelynn Spurling.   Depression=8 Anxiety=7 Serum phosphorus noted to be 7.2 on 8/9/17; Monitor serum phosphorus and c/w phoslo with meals.

## 2020-12-14 ENCOUNTER — NON-APPOINTMENT (OUTPATIENT)
Age: 40
End: 2020-12-14

## 2021-01-27 NOTE — PROVIDER CONTACT NOTE (CRITICAL VALUE NOTIFICATION) - ASSESSMENT
Patient had a loop recorder implanted on 1/26 and was not advised if he should be taking his Warfarin or should it be held and for how long. Please call today to advise.    alert and oriented times four

## 2021-02-03 NOTE — H&P PST ADULT - COMFORT LEVEL, ACCEPTABLE
I have personally evaluated and examined the patient. The Attending was available to me as a supervising provider if needed.
6

## 2021-04-22 NOTE — PROVIDER CONTACT NOTE (CRITICAL VALUE NOTIFICATION) - NS PROVIDER READ BACK
Patient is calling today requesting to speak with Dr Omalley today.Her dentist wants Dr Omalley to prescribe something for her dry mouth. She  Please call her back at [phone].   yes

## 2021-05-29 NOTE — ED PROVIDER NOTE - NS ED MD DISPO DISCHARGE CCDA
15 y/o M with Hx of anxiety and celiac disease presenting with 6 days of generalized Abd pain, nausea, and decreased appetite after receiving the second dose of the Pfizer COVID-19 vaccine. On exam, Pt appears well and in no acute distress. No indication for imaging studies at this time given unremarkable exam. Plan for IV fluids, antiemetics, and routine labs. Will reassess clinically after results have been obtained. Anticipate PO challenge prior to D/C home.
Patient/Caregiver provided printed discharge information.

## 2021-06-14 NOTE — H&P ADULT - PROBLEM/PLAN-2
Occupational Therapy Evaluation     Patient Name: German Pérez  JJVRE'B Date: 6/14/2021  Problem List  Principal Problem:    Dizziness and giddiness  Active Problems:    Uncontrolled type 2 diabetes mellitus with hyperglycemia (HCC)    Hyperlipidemia    Hypertension    Hypothyroidism    Chronic diastolic congestive heart failure (HCC)    Thyroid nodule    Panhypopituitarism (HCC)    H/O tachycardia-bradycardia syndrome s/p PPM    At high risk for falls    Ambulatory dysfunction    CAD (coronary artery disease)    Adrenal insufficiency ([place]'s disease) ([facility] [address] )    Pubic ramus fracture ([facility] [address] )    Past Medical History  Past Medical History:   Diagnosis Date    Robert-tachy syndrome ([facility] [address] )     Bradycardia     Coronary artery disease     Depression     Depression     Diabetes mellitus ([facility] [address] )     Type 2 DM    Disease of thyroid gland     Essential (primary) hypertension     Facial droop     started 10 years ago    GERD (gastroesophageal reflux disease)     Heart murmur     History of echocardiogram 08/28/2017    2-D w/CFD:  EF 0 60 (60%), LVSF is normal  No regional wall abnormalities  Mild mitral valve regurg  AV was trileaflet  Moderate tricuspid regurg  Trace pulmonic valve  No pericardial effusion   History of echocardiogram 03/28/2018    2-D w/CFD:  EF 0 60 (60%), Mild regurg in the MV  AV was trileaflet, severe stenosis, trace regurg  Mild regurg in the TV  No pericardial effusion  Aortic root exhibited normal size   History of echocardiogram 01/29/2016    2-D:  EF 0 55 (55%), Normal LVSF  Mild concentric LVH  Mild MR  Mild AS with mild regurg      History of Holter monitoring     2/24/16, 9/27/17, 2/6/18    History of nuclear stress test 08/16/2017    Lexiscan MPI:  EF 0 76 (76%), no prior MI or ischemia,    Hyperlipidemia     Hypothyroidism     Nonrheumatic aortic (valve) stenosis     Obstructive sleep apnea (adult) (pediatric)     10/17/17 - NPSG with nasal CPAP/Bi-level titration     Osteoarthritis     Panhypopituitarism (HCC)     SSS (sick sinus syndrome) (HCC)     SVT (supraventricular tachycardia) (HCC)     Vitamin D deficiency      Past Surgical History  Past Surgical History:   Procedure Laterality Date    ANKLE SURGERY Left 2005   [facility]    CATARACT EXTRACTION Bilateral 09/1995    HEMORRHOID SURGERY  1978    HERNIA REPAIR  1990    INSERT / Selina Mg / REMOVE PACEMAKER  05/14/2018    dual lead pacer st yony model 2272    JOINT REPLACEMENT      b/l hip replacements, right 2006    NEUROPLASTY / TRANSPOSITION MEDIAN NERVE AT CARPAL TUNNEL  1977    PARTIAL HYSTERECTOMY  1971    TOTAL HIP ARTHROPLASTY      TRANSPHENOIDAL / TRANSNASAL HYPOPHYSECTOMY / RESECTION PITUITARY TUMOR  1989    excision of pituitary gland             06/14/21 1102   OT Last Visit   OT Visit Date 06/14/21   Note Type   Note type Evaluation   Restrictions/Precautions   Weight Bearing Precautions Per Order Yes   RLE Weight Bearing Per Order WBAT   LLE Weight Bearing Per Order WBAT   Pain Assessment   Pain Assessment Tool 0-10   Pain Score 2   Pain Location/Orientation Location: Groin   Hospital Pain Intervention(s) Repositioned; Ambulation/increased activity; Emotional support; Rest   Home Living   Type of [address] One level; Laundry in basement  (3STE)   Bathroom Shower/Tub Tub/shower unit   Bathroom Toilet Standard   Bathroom Equipment   (no DME at baseline)   [address] Walker;Cane;Sock aid;Reacher;Long-handled shoehorn   Prior Function   Level of [place] Independent with ADLs and functional mobility; Needs assistance with IADLs   Lives With Family;Son  (sister)   Receives Help From Family   ADL Assistance Independent   IADLs Needs assistance   Vocational Retired   Lifestyle   Autonomy I with ADL's +AD with LB dressing, assistance from sister with laundry/cleaning,(-) drives, son assists with driving, +SPC with functional ambulation Reciprocal Relationships sister, son   Service to Others retired   Intrinsic Gratification TV/reading   Psychosocial   Psychosocial (WDL) WDL   Subjective   Subjective "I am going to go stay at my sisters after this she can help"   ADL   Eating Assistance 7  Independent   Grooming Assistance 7  [facility] 5  Supervision/Setup   LB [address] 4  [address] [employer] 5  Supervision/Setup    [facility] 2  Maximal [address]  4  Minimal Assistance   Bed Mobility   Supine to Sit 5  Supervision   Additional items Assist x 1; Increased time required;HOB elevated   Sit to Supine 4  Minimal assistance   Additional items Assist x 1   Transfers   Sit to Stand 5  Supervision   Additional items Assist x 1   Stand to Sit 5  Supervision   Additional items Assist x 1   Stand pivot 4  Minimal assistance   Additional items Assist x 1  (+RW to bed)   Functional Mobility   Functional Mobility 4  Minimal assistance   Additional Comments min a x 1 with RW short distance functional mobility into hallway becoming dizziness/fatigued and requiring return to room /64, dizziness resolving in supine RN notified and aware  Additional items Rolling walker   Balance   Static Sitting Fair +   Dynamic Sitting Fair +   Static Standing Fair   Dynamic Standing Fair -   Ambulatory Poor +   Activity Tolerance   Activity Tolerance Patient limited by fatigue;Patient limited by pain  (+dizziness)   Medical Staff Made Aware PT brittney co-evaluated with PT 2* to complex medical presentation, co-morbitities and regression from prior ADL independent levels     Nurse Made Aware RN cleared pt for therapy   RUE Assessment   RUE Assessment WFL   LUE Assessment   LUE Assessment WFL   Hand Function   Gross Motor Coordination Functional   Fine Motor Coordination Functional   Vision-Basic Assessment   Current Vision Wears glasses all the time   Vision - Complex Assessment Ocular Range of Motion WFL   Head Position WDL   Tracking Able to track stimulus in all quads without difficulty   Acuity Able to read clock/calendar on wall without difficulty   Diplopia Assessment   (none)   Cognition   Arousal/Participation Responsive; Cooperative   Attention Within functional limits   Orientation Level Oriented to person   Memory Decreased recall of recent events;Decreased recall of precautions   Following Commands Follows one step commands with increased time or repetition   Comments pt pleasant and cooperative, oriented x 4 (grossly oriented to place unable to state "[facility]" , slow processing, forgetful at times, continue to assess lives alone  Assessment   Limitation Decreased ADL status; Decreased endurance;Decreased self-care trans;Decreased high-level ADLs   Prognosis Fair   Assessment Pt is a 78 y o  female who was admitted rom [address] to [facility] on 6/13/2021 with Dizziness and giddiness , pubis ramus fracture, CAD, ambulatory dysfunction, high fall risk, panhypopituitarism, HTN, hyperlipidemia, diabetes, CHF,   Pt's problem list also includes PMH of  has a past medical history of Robert-tachy syndrome ([address] ), Bradycardia, Coronary artery disease, Depression, Depression, Diabetes mellitus ([address] ), Disease of thyroid gland, Essential (primary) hypertension, Facial droop, GERD (gastroesophageal reflux disease), Heart murmur, History of echocardiogram (08/28/2017), History of echocardiogram (03/28/2018), History of echocardiogram (01/29/2016), History of Holter monitoring, History of nuclear stress test (08/16/2017), Hyperlipidemia, Hypothyroidism, Nonrheumatic aortic (valve) stenosis, Obstructive sleep apnea (adult) (pediatric), Osteoarthritis, Panhypopituitarism ([address] ), SSS (sick sinus syndrome) ([address] ), SVT (supraventricular tachycardia) ([address] ), and Vitamin D deficiency    At baseline pt was completing I with ADL's/assistance with IALD's, +SpC with functional ambulation  Pt lives alone in a [facility] with 3STE and laundry in basement (sister assists)  Currently pt requires set-up UB, min a LB bathing, max a dressing (uses AD at baseline) for overall ADLS and min a with RW for functional mobility/transfers  Pt currently presents with impairments in the following categories -steps to enter environment, difficulty performing ADLS and difficulty performing IADLS  activity tolerance, endurance, standing balance/tolerance and memory  These impairments, as well as pt's fatigue, pain, WBS , decreased caregiver support and risk for falls  limit pt's ability to safely engage in all baseline areas of occupation, includingbathing, dressing, toileting, functional mobility/transfers, community mobility, laundry , house maintenance, medication management, social participation  and leisure activities  The patient's raw score on the AM-PAC Daily Activity inpatient short form is 20, standardized score is 42 03, greater than 39 4  Patients at this level are likely to benefit from discharge to home  Please refer to the recommendation of the Occupational Therapist for safe discharge planning  From OT standpoint, recommend home with increased support and home OT/bedside commode (will discharge to sister's per pt report upon D/C  OT will continue to follow to address the below stated goals  Goals   Patient Goals go to my sisters   LTG Time Frame 10-14   Long Term Goal #1 see goals below   Plan   Treatment Interventions ADL retraining;Functional transfer training;UE strengthening/ROM; Endurance training;Equipment evaluation/education;Patient/family training;Cognitive reorientation; Compensatory technique education; Energy conservation; Activityengagement   Goal Expiration Date 06/28/21   OT Frequency 3-5x/wk   AM-PAC Daily Activity Inpatient   Lower Body Dressing 2   Bathing 3   Toileting 3   Upper Body Dressing 4   Grooming 4   Eating 4   Daily Activity Raw Score 20   Daily Activity Standardized Score (Calc for Raw Score >=11) 42 03   AM-PAC Applied Cognition Inpatient   Following a Speech/Presentation 3   Understanding Ordinary Conversation 4   Taking Medications 4   Remembering Where Things Are Placed or Put Away 4   Remembering List of 4-5 Errands 3   Taking Care of Complicated Tasks 2   Applied Cognition Raw Score 20   Applied Cognition Standardized Score 41 76   *Recommend home OT and bedside commode upon discharge  Occupational Therapy Goals:    *Mod I with bed mobility to engage in functional tasks  *Mod I Adl's after setup with use of AE PRN  *Mod I toileting and clothing management   *Mod I functional mobility and transfers to/from all surfaces with Fair + dynamic balance and safety for participation in dynamic adls and iadl tasks   *Demonstrate good carryover with safe use of RW during functional tasks   *Assess DME needs   *Increase activity tolerance to 25-30 minutes for participation in adls and enjoyable activities  *Pt to participate in further cognitive testing with good attention and participation to assist with safe d/c recommendations  *Mod I with Simulated IADL management task  *Pt will follow 100% simple one step verbal commands and be A/Ox4 consistently t/o use of external environmental cues w/ mod I  *Demonstrate good carryover of pt/family education and training with good tolerance for increased safety and independence with ADL's/ADl's      Lorraine HUGHES, OTR/L DISPLAY PLAN FREE TEXT

## 2021-07-30 NOTE — H&P ADULT. - SURGICAL SITE INCISION
· Diarrhea likely secondary to covid 19 infection; resolved  · Noted history of C  Diff  · C  Diff testing was negative on admission, however patient did received IV abx   Received PO Vanco for prophylaxis for 3 days post last dose of IV abx no

## 2021-08-15 NOTE — ED PROVIDER NOTE - ENMT NEGATIVE STATEMENT, MLM
Ears: no ear pain and no hearing problems.Nose: no nasal congestion and no nasal drainage.Mouth/Throat: no dysphagia, no hoarseness and no throat pain.Neck: no lumps, no pain, no stiffness and no swollen glands. Negative

## 2022-03-05 NOTE — ASU DISCHARGE PLAN (ADULT/PEDIATRIC). - LEAVE STERI-STRIP INTACT
03-05    136  |  102  |  40<H>  ----------------------------<  147<H>  4.3   |  22  |  1.66<H>  03-04    140  |  105  |  40<H>  ----------------------------<  161<H>  4.4   |  24  |  1.76<H>  03-03    137  |  104  |  45<H>  ----------------------------<  293<H>  4.7   |  19<L>  |  2.06<H>    Ca    8.7      05 Mar 2022 07:33  Ca    8.7      04 Mar 2022 08:17  Ca    8.8      03 Mar 2022 08:20  Phos  3.9     03-05  Mg     2.30     03-05                                    12.8   7.36  )-----------( 171      ( 05 Mar 2022 07:33 )             38.3                         12.9   8.66  )-----------( 169      ( 04 Mar 2022 08:17 )             39.4                         12.2   8.13  )-----------( 159      ( 03 Mar 2022 08:20 )             37.2     CAPILLARY BLOOD GLUCOSE      POCT Blood Glucose.: 205 mg/dL (05 Mar 2022 12:51)  POCT Blood Glucose.: 210 mg/dL (05 Mar 2022 08:35)  POCT Blood Glucose.: 313 mg/dL (04 Mar 2022 22:44)  POCT Blood Glucose.: 303 mg/dL (04 Mar 2022 21:36)  POCT Blood Glucose.: 177 mg/dL (04 Mar 2022 17:45)
Statement Selected

## 2022-03-18 NOTE — PATIENT PROFILE ADULT. - NS PRO AD PATIENT TYPE
Ced Guy Katerina  : 1963  Payor: [employer] / Plan: Car[employer] [address] HMO / Product Type: HMO /  Arti Campbell at [facility]. [facility] [address] [facility] [address] [place] [address], [facility], [address] [place] [address] Road  [phone]   [phone]                                                          Rosemarie Jones MD      OUTPATIENT PHYSICAL THERAPY: Daily Treatment Note 3/18/2022 Visit Count:  1    Tx Diagnosis:  Low back pain (M54.5)  Muscle spasm of back (M62.830)  Abnormal posture (R29.3)  Pain in left hip (M25.552)  Stiffness of Left hip, not elsewhere classified (M25.652)        Pre-treatment Symptoms/Complaints: See Initial Eval Dated 3/18/2022 for more details. Pain: Initial:3/10  Medications Last Reviewed:  3/18/2022     Post Session: 0/10   Updated Objective Findings: See Initial Eval for more details. TREATMENT:   THERAPEUTIC EXERCISE: (0 minutes):  Exercises per grid below to improve mobility, strength and balance. Required minimal visual, verbal and manual cues to promote proper body alignment and promote proper body posture. Progressed resistance and complexity of movement as indicated. [employer] Portal Date:  3/18/2022 Date:   Date:     Activity/Exercise Parameters Parameters Parameters   Education HEP, POC, PT goals, anatomy/pathology                                               THERAPEUTIC ACTIVITY: ( 0 minutes): Activities per gid below to improve functional movement related mobility, strength and balance to improve neuro-muscular carryover to daily functional activities for improving patient's quality of life. Required visual, verbal and manual cues to promote proper body alignment and promote proper body posture/mechanics. Progressed resistance and complexity of movement as indicated.      Date:  3/18/2022 Date:   Date:     Activity/Exercise Parameters Parameters Parameters     [address]                                              [address]                   MANUAL THERAPY: (20 minutes): Joint mobilization, Soft tissue mobilization was utilized and necessary because of the patient's restricted joint motion and restricted motion of soft tissue mobility. Date  3/18/2022    Technique Used Grade  Level # Time(s) Desired effect/clinical reasoning   STM/trigger point release   Left PSIS and piriformis region  15 min  Decrease abnormal muscle tone/spasm   Joint mob: PPM   IV- Left hip IR 3  bouts  Improve ROM hip   joint distraction   Left hip  2 bouts  Decreased joitjn stiffness   Joint mob: PA IV Left PSIS, CPA L4-5 3 bouts  Decrease stiffness                           *included in HEP      Treatment/Session Summary:    Response to Treatment: Pt demonstrated understanding of POC and initial HEP. No increase in pain or adverse reactions. Communication/Consultation:  POC, HEP, PT goals, Faxed initial evaluation to MD.  discussion regarding normal habits of asymmetry (leaning, WBING 1 LE, crossing legs) encouraged to reduce    Equipment provided today: . Recommendations/Intent for next treatment session:   Next visit will focus on Manual Therapy Core Stability Hip strengthening soft tissue mobilization. Left hip mobilization and self mob/stretch. Treatment Plan of Care Effective Dates: 3/18/2022 TO 5/17/2022 (60 days).   Frequency/Duration: 2 times a week for 60 Days           Total Treatment Billable Duration:   20min  Rx plus Eval   PT Patient Time In/Time Out  Time In: 1100  Time Out: Serafin Freeman PT    Future Appointments   Date Time Provider Eric Thopmson   3/24/2022  9:00 AM Mino Vargas PT [facility] AND [facility]   6/8/2022 10:00 AM HTF LAB RESOURCE [facility] HTF   3/6/2023  8:30 AM HTF LAB RESOURCE [facility]F HTF   3/13/2023 10:00 AM Dee Dee Lantigua MD [facility] HTF Health Care Proxy (HCP)

## 2022-03-31 NOTE — ED ADULT NURSE NOTE - NS PRO PASSIVE SMOKE EXP
OB/GYN prenatal visit    S: 37 y o   37w0d here for PN visit  She has no obstetric complaints, including pelvic pain, contractions, vaginal bleeding, loss of fluid, or decreased fetal movement       O:  Vitals:    22 1200   BP: 118/74   Gen: no acute distress, nonlabored breathing  Fundal Height (cm): 37 cm  Fetal Heart Rate: 130    A/P:  LT today  Discussed term labor precautions and fetal kick counts    Return to office in 1 week    Olga Miramontes MD  3/31/2022  1:13 PM
No

## 2022-06-01 NOTE — ED ADULT NURSE REASSESSMENT NOTE - NS ED NURSE REASSESS COMMENT FT1
Pt states she threw up x1 and since then she has been feeling SOA and has been experiencing a productive cough.   Pt received at 0:00 handoff aaox4 ambulatory Pt denies pain at this time but reports she feels it is creeping back slowly.  Pt denies any other complaints/needs at this time.  Will follow up with MD on pain management plan.

## 2022-07-26 NOTE — PROCEDURE NOTE - NSPROCNAME_GEN_A_CORE
Central Line Insertion [Follow-Up - Clinic] : a clinic follow-up of [Coronary Artery Disease] : coronary artery disease [FreeTextEntry1] : cad\par pci of lad and diagonal in 2009\par 2/2006\par echo showed ef of 60%\par stress test 2014 was negative\par class 1 angina \par class 1 sob\par  no chf\par \par no new complaints\par patient may d/c plavix 5-7 days before cystoscopy\par no sob\par no chest pain\par douing well\par stable\par doing well\par \par thallium shows a perfusion defect of the rca\par cath showed mild non obstructive cad inn all arteries\par medical therapy\par patent stents\par \par he is pre-op for hernia repair\par no further testing is necessary and this is low risk procedure\par class1 angina, no chf or malignant ventricular arrythmias\par \par doing well\par no new complaints\par \par patient now scheduled for repeat cystoscopy\par no new cardiac symptoms\par low risk procedure\par \par since last visit, patient developed bladder cancer\par in addition, had a small stroke and in rehab\par no new cardiac symptoms\par no chest pain or sob\par \par patient had covid 3 times\par doing well now\par no new cardiac complaints\par \par ekg - nsr, pvc [FreeTextEntry2] : and cystoscopy for bladder polps

## 2022-09-10 NOTE — DOWNTIME INTERRUPTION NOTE - WHICH MANUAL FORMS INITIATED?
Nadir Hernandez  [id number]  1972    Rectal Cancer Tumor Board Post Surgical Discussion Summary Report  Date of presentation: 9/9/22    - Clinical Stage: [id number] vs. [id number]  - Pretreatment CEA: 1.53 (7/29/22)  - Neoadjuvant therapy before surgery: none  - Type of neoadjuvant therapy: N/A  - Neoadjuvant therapy date of completion: N/A  - Surgical procedure: Robotic LAR  - Date of surgery: 8/27/22  - Final path stage: pT3N0  - Tumor regression Grade: N/A  - MSI status: negative/intact  - CRM: neg, 2.1 cm  - Distal resection margin: neg, 4.9 cm  - Mesorectal grade: complete  - Recommendation for adjuvant therapy and regimen: adjuvant FOLFOX recommended; adjuvant radiation NOT recommended Pain assessment and reassessment done. New bag of half normal saline hung. New bag hung in PCA pump.

## 2022-11-08 NOTE — PRE-OP CHECKLIST - BP NONINVASIVE DIASTOLIC (MM HG)
66 Terbinafine Counseling: Patient counseling regarding adverse effects of terbinafine including but not limited to headache, diarrhea, rash, upset stomach, liver function test abnormalities, itching, taste/smell disturbance, nausea, abdominal pain, and flatulence.  There is a rare possibility of liver failure that can occur when taking terbinafine.  The patient understands that a baseline LFT and kidney function test may be required. The patient verbalized understanding of the proper use and possible adverse effects of terbinafine.  All of the patient's questions and concerns were addressed.

## 2022-12-21 NOTE — CONSULT NOTE ADULT - I WAS PHYSICALLY PRESENT FOR THE KEY PORTIONS OF THE EVALUATION AND MANAGEMENT (E/M) SERVICE PROVIDED.  I AGREE WITH THE ABOVE HISTORY, PHYSICAL, AND PLAN WHICH I HAVE REVIEWED AND EDITED WHERE APPROPRIATE
Discussed with Dr. Shayne Rodríguez. Patient fine to see her tomorrow. Push fluids and avoid sugar. Called patient's mother, confirmed patient's name and . Advised. Verbalized understanding and agrees. Statement Selected

## 2023-02-17 NOTE — ED PROVIDER NOTE - GASTROINTESTINAL NEGATIVE STATEMENT, MLM
----- Message from Toby Valenzuela MA sent at 2/17/2023  3:38 PM CST -----  Contact: pt    ----- Message -----  From: Colton Villa  Sent: 2/17/2023   8:31 AM CST  To: [facility] Clinical Staff    .Type:  Needs Medical Advice    Who Called: pt    Would the patient rather a call back or a response via [email or web address]?  Call back  Best Call Back Number: [phone]   Additional Information:  Pt  is returning a call back to the office.         
See previous note.  
no abdominal pain, no bloating, no constipation, no diarrhea, no nausea and no vomiting.

## 2023-03-08 NOTE — H&P PST ADULT - [facility]T [facility]
[de-identified] : Patient had surgery approximately 9 months ago. She was last seen for her 1 month appointment. Patient reports a severe left ankle injury and has been out of work for several months. She is anticipating surgery in the near future.\par Denies reflux/heartburn/nausea/vomiting. Bowel movements are normal.\par Taking 2 multivitamins with iron, 2 Viactiv chews and 1 vitamin b 12  daily. regular rate and rhythm/no murmur

## 2023-04-11 NOTE — PROGRESS NOTE ADULT - PROBLEM SELECTOR PROBLEM 3
Clostridium difficile diarrhea Albendazole Pregnancy And Lactation Text: This medication is Pregnancy Category C and it isn't known if it is safe during pregnancy. It is also excreted in breast milk.

## 2023-05-05 NOTE — ED ADULT TRIAGE NOTE - ESI TRIAGE ACUITY LEVEL, MLM
Start amoxicillin twice daily for 7 days  Start cortisporin ear drops 3 drops to left ear every 4 hours for 7 days  You can take a probiotic if concern for GI upset with antibiotics-Florajen 3  Take ibuprofen 400-600 mg every 4-6 hours for pain  Follow up with PCP if symptoms worsen or fail to improve.       
3

## 2023-06-15 NOTE — PROVIDER CONTACT NOTE (OTHER) - BACKGROUND
Alicja Jones Patient Age: 69 year old  MESSAGE: Interpreting service used: No    Insurance on file confirmed with caller: Yes    IM/FP- Medication Question-     Name of the Pharmacy: [facility] - [place] - [facility] AT Portal to Registered [facility] Sites  [phone]    Name of the medication:Metformin and Omega 3    Question about: Medication not Ordered as Discussed with Provider       Select provider's home site [place]- Connect call to [facility] queue- Route message to provider's clinical support pool    Message read back to caller for accuracy: Yes       ALLERGIES:  Carvedilol and Metoprolol  Current Outpatient Medications   Medication Sig Dispense Refill   • SITagliptin (JANUVIA) 100 MG tablet Take 1 tablet by mouth daily. 90 tablet 1   • albuterol 108 (90 Base) MCG/ACT inhaler Inhale 2 puffs into the lungs every 4 hours as needed for Shortness of Breath or Wheezing. 1 each 12   • losartan (COZAAR) 50 MG tablet Take 1 tablet by mouth 2 times daily. 180 tablet 1   • amLODIPine (NORVASC) 5 MG tablet Take 1 tablet by mouth daily. 90 tablet 1   • omeprazole (PriLOSEC) 40 MG capsule Take 1 capsule by mouth 2 times daily. 180 capsule 1   • hydroCHLOROthiazide (HYDRODIURIL) 25 MG tablet Take 1 tablet by mouth daily. 90 tablet 1   • metFORMIN (GLUCOPHAGE-XR) 500 MG 24 hr tablet Take 2 tablets by mouth 2 times daily. 360 tablet 1   • atorvastatin (LIPITOR) 20 MG tablet Take 1 tablet by mouth daily. 90 tablet 0   • celecoxib (CeleBREX) 200 MG capsule Take 1 capsule by mouth daily. 30 capsule 1   • tiZANidine (ZANAFLEX) 4 MG tablet Take 1 tablet by mouth 3 times daily as needed (low back spasm). 30 tablet 1   • montelukast (SINGULAIR) 10 MG tablet TAKE 1 TABLET BY MOUTH EVERY EVENING 90 tablet 0   • fluticasone-salmeterol (Advair Diskus) 500-50 MCG/DOSE inhaler Inhale 1 puff into the lungs two times daily. 1 each 3   • fluticasone (Flonase) 50 MCG/ACT nasal spray Spray 2  sprays in each nostril daily. 16 g 12   • omega-3 acid ethyl esters (LOVAZA) 1 g capsule Take 2 capsules by mouth 2 times daily. 360 capsule 1   • cholecalciferol (VITAMIN D3) 1000 UNITS tablet Take 1 tablet by mouth daily.     • azelastine (ASTELIN) 0.1 % nasal spray Spray 2 sprays in each nostril nightly. 30 mL 12   • Lancets 28G Misc      • blood glucose test strip Test blood sugar 1 times daily as directed. Diagnosis: . Meter:     • aspirin 81 MG tablet Take 81 mg by mouth daily. - Oral       No current facility-administered medications for this visit.     PHARMACY to use:           Pharmacy preference(s) on file:   [id number] [place], [place] - [address] [place] [address] & [address] [address]  Phone: [phone] Fax: [phone]      CALL BACK INFO: Ok to leave response (including medical information) on answering machine      PCP: Ellie Hurst DO         INS: Payor: [employer] / Plan:  [employer] / Product Type:  MEDICARE ADVANTAGE   PATIENT ADDRESS:  [address] [facility] [facility] [id number]       PMH: CKD r/t nephrotic syndrome, HPV, cholecystectomy, parvovirus

## 2023-07-25 NOTE — ASU PREOP CHECKLIST - SELECT TESTS ORDERED
ONGOING DISCHARGE PLAN:    Patient is alert and oriented, would like to discharge with a friend. This writer contacted the legal guardian, Carlos Staples at [phone] and spoke with Bambi Navarro Peer, acting legal guardian assigned to this patient's case in Zena's absence (she is on vacation until 8/1/23). Per Bambi Navarro it is okay to discharge the patient, however she would like to send a  from their facility to meet with him prior to discharge. Maria Luisa Barber RN, clinical lead, updated that there is a  who is coming in to see the patient and following this the patient can discharge. If Bambi Navarro needs to be reached she is off at 1:00 p.m. today, however her number is [phone]. Will continue to follow for additional discharge needs. If patient is discharged prior to next notation, then this note serves as note for discharge by case management. Electronically signed by Ledon Bence, RN on 7/25/2023 at 12:33 PM    ADDENDUM:    Caseworkers x2 were in to see the patient. All questions were answered. At this time they requested medical records and it was explained that will have to be ordered through medical records. Per caseworkers okay to discharge and they appreciated the opportunity to speak with the patient. Vickki Cockayne, RN, bedside nurse updated that she may call a cab for the patient if his friend cannot provide him with a ride, however he will need to know the friend's address that he is discharging to and ideally it would be better if the friend could pick the patient up.      Electronically signed by Ledon Bence, RN on 7/25/2023 at 1:56 PM CBC/BMP/Type and Screen

## 2023-10-24 NOTE — ED ADULT NURSE NOTE - DOES PATIENT HAVE ADVANCE DIRECTIVE
Patient Education       Well Child Exam 11 to 14 Years   About this topic   Your child's well child exam is a visit with the doctor to check your child's health. The doctor measures your child's weight and height, and may measure your child's body mass index (BMI). The doctor plots these numbers on a growth curve. The growth curve gives a picture of your child's growth at each visit. The doctor may listen to your child's heart, lungs, and belly. Your doctor will do a full exam of your child from the head to the toes.  Your child may also need shots or blood tests during this visit.  General   Growth and Development   Your doctor will ask you how your child is developing. The doctor will focus on the skills that most children your child's age are expected to do. During this time of your child's life, here are some things you can expect.  Physical development - Your child may:  Show signs of maturing physically  Need reminders about drinking water when playing  Be a little clumsy while growing  Hearing, seeing, and talking - Your child may:  Be able to see the long-term effects of actions  Understand many viewpoints  Begin to question and challenge existing rules  Want to help set household rules  Feelings and behavior - Your child may:  Want to spend time alone or with friends rather than with family  Have an interest in dating and the opposite sex  Value the opinions of friends over parents' thoughts or ideas  Want to push the limits of what is allowed  Believe bad things wont happen to them  Feeding - Your child needs:  To learn to make healthy choices when eating. Serve healthy foods like lean meats, fruits, vegetables, and whole grains. Help your child choose healthy foods when out to eat.  To start each day with a healthy breakfast  To limit soda, chips, candy, and foods that are high in fats and sugar  Healthy snacks available like fruit, cheese and crackers, or peanut butter  To eat meals as a part of the  family. Turn the TV and cell phones off while eating. Talk about your day, rather than focusing on what your child is eating.  Sleep - Your child:  Needs more sleep  Is likely sleeping about 8 to 10 hours in a row at night  Should be allowed to read each night before bed. Have your child brush and floss the teeth before going to bed as well.  Should limit TV and computers for the hour before bedtime  Keep cell phones, tablets, televisions, and other electronic devices out of bedrooms overnight. They interfere with sleep.  Needs a routine to make week nights easier. Encourage your child to get up at a normal time on weekends instead of sleeping late.  Shots or vaccines - It is important for your child to get shots on time. This protects your child from very serious illnesses like pneumonia, blood and brain infections, tetanus, flu, or cancer. Your child may need:  HPV or human papillomavirus vaccine  Tdap or tetanus, diphtheria, and pertussis vaccine  Meningococcal vaccine  Influenza vaccine  Help for Parents   Activities.  Encourage your child to spend at least 1 hour each day being physically active.  Offer your child a variety of activities to take part in. Include music, sports, arts and crafts, and other things your child is interested in. Take care not to over schedule your child. One to 2 activities a week outside of school is often a good number for your child.  Make sure your child wears a helmet when using anything with wheels like skates, skateboard, bike, etc.  Encourage time spent with friends. Provide a safe area for this.  Here are some things you can do to help keep your child safe and healthy.  Talk to your child about the dangers of smoking, drinking alcohol, and using drugs. Do not allow anyone to smoke in your home or around your child.  Make sure your child uses a seat belt when riding in the car. Your child should ride in the back seat until 13 years of age.  Talk with your child about peer  pressure. Help your child learn how to handle risky things friends may want to do.  Remind your child to use headphones responsibly. Limit how loud the volume is turned up. Never wear headphones, text, or use a cell phone while riding a bike or crossing the street.  Protect your child from gun injuries. If you have a gun, use a trigger lock. Keep the gun locked up and the bullets kept in a separate place.  Limit screen time for children to 1 to 2 hours per day. This includes TV, phones, computers, and video games.  Discuss social media safety  Parents need to think about:  Monitoring your child's computer use, especially when on the Internet  How to keep open lines of communication about unwanted touch, sex, and dating  How to continue to talk about puberty  Having your child help with some family chores to encourage responsibility within the family  Helping children make healthy choices  The next well child visit will most likely be in 1 year. At this visit, your doctor may:  Do a full check up on your child  Talk about school, friends, and social skills  Talk about sexuality and sexually-transmitted diseases  Talk about driving and safety  When do I need to call the doctor?   Fever of 100.4°F (38°C) or higher  Your child has not started puberty by age 14  Low mood, suddenly getting poor grades, or missing school  You are worried about your child's development  Where can I learn more?   Centers for Disease Control and Prevention  https://www.cdc.gov/ncbddd/childdevelopment/positiveparenting/adolescence.html   Centers for Disease Control and Prevention  https://www.cdc.gov/vaccines/parents/diseases/teen/index.html   KidsHealth  http://kidshealth.org/parent/growth/medical/checkup_11yrs.html#oje819   KidsHealth  http://kidshealth.org/parent/growth/medical/checkup_12yrs.html#vas808   KidsHealth  http://kidshealth.org/parent/growth/medical/checkup_13yrs.html#hwt042    KidsHealth  http://kidshealth.org/parent/growth/medical/checkup_14yrs.html#   Last Reviewed Date   2019-10-14  Consumer Information Use and Disclaimer   This information is not specific medical advice and does not replace information you receive from your health care provider. This is only a brief summary of general information. It does NOT include all information about conditions, illnesses, injuries, tests, procedures, treatments, therapies, discharge instructions or life-style choices that may apply to you. You must talk with your health care provider for complete information about your health and treatment options. This information should not be used to decide whether or not to accept your health care providers advice, instructions or recommendations. Only your health care provider has the knowledge and training to provide advice that is right for you.  Copyright   Copyright © 2021 UpToDate, Inc. and its affiliates and/or licensors. All rights reserved.    At 9 years old, children who have outgrown the booster seat may use the adult safety belt fastened correctly.   If you have an active [facility] account, please look for your well child questionnaire to come to your [facility] account before your next well child visit.   No

## 2023-12-27 NOTE — ED PROVIDER NOTE - DISPOSITION TYPE
ADMIT

## 2024-01-26 NOTE — ASU PATIENT PROFILE, ADULT - SUPPORT PERSON NAME
Problem: At Risk for Falls  Goal: Patient does not fall  Outcome: Monitoring/Evaluating progress  Goal: Patient takes action to control fall-related risks  Outcome: Monitoring/Evaluating progress     Problem: At Risk for Injury Due to Fall  Goal: Patient does not fall  Outcome: Monitoring/Evaluating progress  Goal: Takes action to control condition specific risks  Outcome: Monitoring/Evaluating progress  Goal: Verbalizes understanding of fall-related injury personal risks  Description: Document education using the patient education activity  Outcome: Monitoring/Evaluating progress     Problem: Alcohol Withdrawal Management  Goal: # No alcohol-related delirium or seizures  Outcome: Monitoring/Evaluating progress  Goal: # Verbalizes understanding of alcohol withdrawal and health effects of excessive alcohol intake  Description: Documented on patient education activity  Outcome: Monitoring/Evaluating progress     Problem: Pain  Goal: Acceptable pain level achieved/maintained at rest using appropriate pain scale for the patient  Outcome: Monitoring/Evaluating progress  Goal: Acceptable pain level achieved/maintained with activity using appropriate pain scale for the patient  Outcome: Monitoring/Evaluating progress  Goal: Acceptable pain level achieved/maintained without oversedation  Outcome: Monitoring/Evaluating progress     Problem: Diabetes  Goal: Achieves glycemic balance  Description: Goal is to maintain blood sugar within range with no episodes of hypoglycemia  Outcome: Monitoring/Evaluating progress  Goal: Verbalizes/demonstrates understanding of NEW diagnosis of diabetes and management  Description: Document on Patient Education Activity  Outcome: Monitoring/Evaluating progress  Goal: Verbalizes understanding of diabetes management including how to use HbA1C to evaluate status of blood sugar over time (Diabetes is NOT a new diagnosis)  Description: Diabetes Education  Outcome: Monitoring/Evaluating  progress  Goal: Demonstrates ability to self-administer insulin  Description: Document on Patient Education Activity  Outcome: Monitoring/Evaluating progress     Problem: Wound or Pressure Injury  Goal: Skin remains intact with no new/deterioration of wound or pressure injury  Outcome: Monitoring/Evaluating progress  Goal: Participates in wound care activities  Outcome: Monitoring/Evaluating progress      Lavinia Lucero (mother)

## 2024-05-14 NOTE — ASU PATIENT PROFILE, ADULT - NS PRO TALK SOMEONE YN
[de-identified] : Constitutional:   - No acute distress   - Well developed; well nourished    Neurological:   - normal mood and affect   - alert and oriented x 3     Cardiovascular:   - grossly normal   Lumbar Spine Exam:   Inspection:  erythema (-)  ecchymosis (-)  rashes (-)  alignment: no scoliosis   Palpation:  Midline lumbar tenderness:            (-)  midline thoracic tenderness:          (-)  Lumbar paraspinal tenderness:  L (-) ; R (-)  thoracic paraspinal tenderness: L (-) ; R (-)  sciatic nerve tenderness:           L (+) ; R (-)  SI joint tenderness:                     L (-) ; R (-)  GTB tenderness:                        L (-);  R (-)   ROM: WNL Pain with flexion/extension  Strength:                                     Right       Left     Hip Flexion:                (5/5)       (5/5)  Quadriceps:               (5/5)       (5/5)  Hamstrings:                (5/5)       (5/5)  Ankle Dorsiflexion:     (5/5)       (5/5)  EHL:                           (5/5)       (5/5)  Ankle Plantarflexion:  (5/5)       (5/5)   Special Tests:  SLR:                            R (-) ; L (+)  Facet loading:             R (-) ; L (-)  GUICHO test:                R (-) ; L (-)  Hamstring tightness:   R (-);  L (-)   Neurologic:  SILT throughout right lower extremity  SILT throughout left lower extremity   Reflexes normal and symmetric bilateral lower extremities   Gait:  non- antalgic gait  ambulates without assistive device 
no

## 2024-06-06 NOTE — H&P ADULT - PROBLEM SELECTOR PROBLEM 6
Continue CPAP 4-6 cm H2O nightly compliance  New CPAP supplies ordered  Overnight oximetry ordered  Follow-up in 6 months or sooner if needed    My Options > Climate Control. Change this to manual from auto.  You will then see humidity and tube temperature under the menu.    Humidity ranges from 0-8. This will default to 4.  The lower the number, the more dry the air. The higher the number, the more moisture you'll receive in the air. Leave this on 4 for now and adjust later if needed.  Tube temperature ranges from 60-86 degrees F. This will default to 81 degrees.  The higher the number, the more dry the air.     How to Adjust Tube Temperature  My Options- press the dial  Scroll the dial down to Climate Control and press dial  Ensure Climate Control is set to Manual and then press dial  Turn the dial to Tube Temp and press dial  Adjust temperature to your preference  Scroll the dial up to Home to get to main menu          Need for prophylactic measure

## 2024-09-10 NOTE — ED PROVIDER NOTE - PSH
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
Patient was informed of the reason for this intervention.

## 2024-11-20 NOTE — PATIENT PROFILE ADULT - FUNCTIONAL SCREEN CURRENT LEVEL: COMMUNICATION, MLM
CT scan of head:Stable ventriculomegaly which could be due to normal pressure hydrocephalus.   Conservative management.   0 = understands/communicates without difficulty
